# Patient Record
Sex: FEMALE | Race: BLACK OR AFRICAN AMERICAN | NOT HISPANIC OR LATINO | Employment: UNEMPLOYED | URBAN - METROPOLITAN AREA
[De-identification: names, ages, dates, MRNs, and addresses within clinical notes are randomized per-mention and may not be internally consistent; named-entity substitution may affect disease eponyms.]

---

## 2022-10-24 ENCOUNTER — APPOINTMENT (EMERGENCY)
Dept: RADIOLOGY | Facility: HOSPITAL | Age: 57
End: 2022-10-24

## 2022-10-24 ENCOUNTER — HOSPITAL ENCOUNTER (INPATIENT)
Facility: HOSPITAL | Age: 57
LOS: 7 days | Discharge: HOME | End: 2022-11-01
Attending: EMERGENCY MEDICINE | Admitting: INTERNAL MEDICINE

## 2022-10-24 DIAGNOSIS — K21.9 GASTROESOPHAGEAL REFLUX DISEASE, UNSPECIFIED WHETHER ESOPHAGITIS PRESENT: ICD-10-CM

## 2022-10-24 DIAGNOSIS — S30.1XXA RECTUS SHEATH HEMATOMA: ICD-10-CM

## 2022-10-24 DIAGNOSIS — K59.00 CONSTIPATION: ICD-10-CM

## 2022-10-24 DIAGNOSIS — R05.3 CHRONIC COUGH: ICD-10-CM

## 2022-10-24 DIAGNOSIS — K21.9 GERD (GASTROESOPHAGEAL REFLUX DISEASE): ICD-10-CM

## 2022-10-24 DIAGNOSIS — E78.5 HLD (HYPERLIPIDEMIA): ICD-10-CM

## 2022-10-24 DIAGNOSIS — J45.901 ACUTE ASTHMA EXACERBATION: Primary | ICD-10-CM

## 2022-10-24 PROBLEM — E11.9 TYPE 2 DIABETES MELLITUS, WITH LONG-TERM CURRENT USE OF INSULIN (HCC): Status: ACTIVE | Noted: 2022-10-24

## 2022-10-24 PROBLEM — A41.9 SEPSIS (HCC): Status: ACTIVE | Noted: 2022-10-24

## 2022-10-24 PROBLEM — Z79.4 TYPE 2 DIABETES MELLITUS, WITH LONG-TERM CURRENT USE OF INSULIN (HCC): Status: ACTIVE | Noted: 2022-10-24

## 2022-10-24 PROBLEM — R65.10 SIRS (SYSTEMIC INFLAMMATORY RESPONSE SYNDROME) (HCC): Status: ACTIVE | Noted: 2022-10-24

## 2022-10-24 LAB
ALBUMIN SERPL BCP-MCNC: 3.1 G/DL (ref 3.5–5)
ALP SERPL-CCNC: 167 U/L (ref 46–116)
ALT SERPL W P-5'-P-CCNC: 46 U/L (ref 12–78)
ANION GAP SERPL CALCULATED.3IONS-SCNC: 9 MMOL/L (ref 4–13)
AST SERPL W P-5'-P-CCNC: 39 U/L (ref 5–45)
BASOPHILS # BLD AUTO: 0.03 THOUSANDS/ÂΜL (ref 0–0.1)
BASOPHILS NFR BLD AUTO: 0 % (ref 0–1)
BILIRUB SERPL-MCNC: 0.44 MG/DL (ref 0.2–1)
BUN SERPL-MCNC: 6 MG/DL (ref 5–25)
CALCIUM ALBUM COR SERPL-MCNC: 9.8 MG/DL (ref 8.3–10.1)
CALCIUM SERPL-MCNC: 9.1 MG/DL (ref 8.3–10.1)
CHLORIDE SERPL-SCNC: 105 MMOL/L (ref 96–108)
CO2 SERPL-SCNC: 26 MMOL/L (ref 21–32)
CREAT SERPL-MCNC: 0.81 MG/DL (ref 0.6–1.3)
EOSINOPHIL # BLD AUTO: 0.39 THOUSAND/ÂΜL (ref 0–0.61)
EOSINOPHIL NFR BLD AUTO: 4 % (ref 0–6)
ERYTHROCYTE [DISTWIDTH] IN BLOOD BY AUTOMATED COUNT: 13.6 % (ref 11.6–15.1)
FLUAV RNA RESP QL NAA+PROBE: NEGATIVE
FLUBV RNA RESP QL NAA+PROBE: NEGATIVE
GFR SERPL CREATININE-BSD FRML MDRD: 82 ML/MIN/1.73SQ M
GLUCOSE SERPL-MCNC: 107 MG/DL (ref 65–140)
GLUCOSE SERPL-MCNC: 144 MG/DL (ref 65–140)
GLUCOSE SERPL-MCNC: 240 MG/DL (ref 65–140)
HCT VFR BLD AUTO: 38.3 % (ref 34.8–46.1)
HGB BLD-MCNC: 12.5 G/DL (ref 11.5–15.4)
IMM GRANULOCYTES # BLD AUTO: 0.05 THOUSAND/UL (ref 0–0.2)
IMM GRANULOCYTES NFR BLD AUTO: 1 % (ref 0–2)
LACTATE SERPL-SCNC: 6.5 MMOL/L (ref 0.5–2)
LYMPHOCYTES # BLD AUTO: 3.45 THOUSANDS/ÂΜL (ref 0.6–4.47)
LYMPHOCYTES NFR BLD AUTO: 32 % (ref 14–44)
MCH RBC QN AUTO: 31.4 PG (ref 26.8–34.3)
MCHC RBC AUTO-ENTMCNC: 32.6 G/DL (ref 31.4–37.4)
MCV RBC AUTO: 96 FL (ref 82–98)
MONOCYTES # BLD AUTO: 1.15 THOUSAND/ÂΜL (ref 0.17–1.22)
MONOCYTES NFR BLD AUTO: 11 % (ref 4–12)
NEUTROPHILS # BLD AUTO: 5.73 THOUSANDS/ÂΜL (ref 1.85–7.62)
NEUTS SEG NFR BLD AUTO: 52 % (ref 43–75)
NRBC BLD AUTO-RTO: 0 /100 WBCS
PLATELET # BLD AUTO: 434 THOUSANDS/UL (ref 149–390)
PMV BLD AUTO: 10.1 FL (ref 8.9–12.7)
POTASSIUM SERPL-SCNC: 3.8 MMOL/L (ref 3.5–5.3)
PROT SERPL-MCNC: 7.8 G/DL (ref 6.4–8.4)
RBC # BLD AUTO: 3.98 MILLION/UL (ref 3.81–5.12)
RSV RNA RESP QL NAA+PROBE: NEGATIVE
SARS-COV-2 RNA RESP QL NAA+PROBE: NEGATIVE
SODIUM SERPL-SCNC: 140 MMOL/L (ref 135–147)
WBC # BLD AUTO: 10.8 THOUSAND/UL (ref 4.31–10.16)

## 2022-10-24 RX ORDER — PROMETHAZINE HYDROCHLORIDE 6.25 MG/5ML
12.5 SYRUP ORAL EVERY 6 HOURS PRN
Status: DISCONTINUED | OUTPATIENT
Start: 2022-10-24 | End: 2022-11-01 | Stop reason: HOSPADM

## 2022-10-24 RX ORDER — AZITHROMYCIN 500 MG/1
500 TABLET, FILM COATED ORAL EVERY 24 HOURS
Status: COMPLETED | OUTPATIENT
Start: 2022-10-24 | End: 2022-10-26

## 2022-10-24 RX ORDER — MONTELUKAST SODIUM 10 MG/1
10 TABLET ORAL
Status: DISCONTINUED | OUTPATIENT
Start: 2022-10-24 | End: 2022-11-01 | Stop reason: HOSPADM

## 2022-10-24 RX ORDER — ENOXAPARIN SODIUM 100 MG/ML
40 INJECTION SUBCUTANEOUS DAILY
Status: DISCONTINUED | OUTPATIENT
Start: 2022-10-25 | End: 2022-10-24

## 2022-10-24 RX ORDER — ZOLPIDEM TARTRATE 10 MG/1
10 TABLET ORAL
COMMUNITY

## 2022-10-24 RX ORDER — METHYLPREDNISOLONE SODIUM SUCCINATE 125 MG/2ML
125 INJECTION, POWDER, LYOPHILIZED, FOR SOLUTION INTRAMUSCULAR; INTRAVENOUS ONCE
Status: COMPLETED | OUTPATIENT
Start: 2022-10-24 | End: 2022-10-24

## 2022-10-24 RX ORDER — CITALOPRAM 10 MG/1
10 TABLET ORAL DAILY
Status: DISCONTINUED | OUTPATIENT
Start: 2022-10-25 | End: 2022-10-24

## 2022-10-24 RX ORDER — CITALOPRAM 20 MG/1
10 TABLET ORAL
Status: DISCONTINUED | OUTPATIENT
Start: 2022-10-24 | End: 2022-11-01 | Stop reason: HOSPADM

## 2022-10-24 RX ORDER — ENOXAPARIN SODIUM 100 MG/ML
40 INJECTION SUBCUTANEOUS
Status: DISCONTINUED | OUTPATIENT
Start: 2022-10-25 | End: 2022-10-29

## 2022-10-24 RX ORDER — SODIUM CHLORIDE 9 MG/ML
150 INJECTION, SOLUTION INTRAVENOUS CONTINUOUS
Status: DISCONTINUED | OUTPATIENT
Start: 2022-10-24 | End: 2022-10-26

## 2022-10-24 RX ORDER — PRAVASTATIN SODIUM 40 MG
40 TABLET ORAL
Status: DISCONTINUED | OUTPATIENT
Start: 2022-10-25 | End: 2022-11-01 | Stop reason: HOSPADM

## 2022-10-24 RX ORDER — IPRATROPIUM BROMIDE AND ALBUTEROL SULFATE 2.5; .5 MG/3ML; MG/3ML
3 SOLUTION RESPIRATORY (INHALATION) ONCE
Status: COMPLETED | OUTPATIENT
Start: 2022-10-24 | End: 2022-10-24

## 2022-10-24 RX ORDER — MAGNESIUM SULFATE HEPTAHYDRATE 40 MG/ML
2 INJECTION, SOLUTION INTRAVENOUS ONCE
Status: COMPLETED | OUTPATIENT
Start: 2022-10-24 | End: 2022-10-24

## 2022-10-24 RX ORDER — SODIUM CHLORIDE FOR INHALATION 0.9 %
3 VIAL, NEBULIZER (ML) INHALATION ONCE
Status: COMPLETED | OUTPATIENT
Start: 2022-10-24 | End: 2022-10-24

## 2022-10-24 RX ORDER — CITALOPRAM 10 MG/1
10 TABLET ORAL DAILY
COMMUNITY

## 2022-10-24 RX ORDER — METHYLPREDNISOLONE SODIUM SUCCINATE 40 MG/ML
40 INJECTION, POWDER, LYOPHILIZED, FOR SOLUTION INTRAMUSCULAR; INTRAVENOUS EVERY 8 HOURS SCHEDULED
Status: DISCONTINUED | OUTPATIENT
Start: 2022-10-25 | End: 2022-10-26

## 2022-10-24 RX ORDER — INSULIN LISPRO 100 [IU]/ML
1-5 INJECTION, SOLUTION INTRAVENOUS; SUBCUTANEOUS
Status: DISCONTINUED | OUTPATIENT
Start: 2022-10-24 | End: 2022-11-01 | Stop reason: HOSPADM

## 2022-10-24 RX ORDER — ACETAMINOPHEN 325 MG/1
650 TABLET ORAL EVERY 6 HOURS PRN
Status: DISCONTINUED | OUTPATIENT
Start: 2022-10-24 | End: 2022-11-01 | Stop reason: HOSPADM

## 2022-10-24 RX ORDER — SUCRALFATE 1 G/1
1 TABLET ORAL
Status: DISCONTINUED | OUTPATIENT
Start: 2022-10-24 | End: 2022-10-31

## 2022-10-24 RX ORDER — PANTOPRAZOLE SODIUM 40 MG/1
40 TABLET, DELAYED RELEASE ORAL
Status: DISCONTINUED | OUTPATIENT
Start: 2022-10-25 | End: 2022-10-31

## 2022-10-24 RX ORDER — GUAIFENESIN 600 MG/1
600 TABLET, EXTENDED RELEASE ORAL 2 TIMES DAILY
Status: DISCONTINUED | OUTPATIENT
Start: 2022-10-24 | End: 2022-11-01 | Stop reason: HOSPADM

## 2022-10-24 RX ORDER — LORAZEPAM 2 MG/ML
1 INJECTION INTRAMUSCULAR ONCE
Status: COMPLETED | OUTPATIENT
Start: 2022-10-24 | End: 2022-10-24

## 2022-10-24 RX ORDER — ALBUTEROL SULFATE 2.5 MG/3ML
SOLUTION RESPIRATORY (INHALATION)
COMMUNITY

## 2022-10-24 RX ORDER — FLUTICASONE PROPIONATE 110 UG/1
2 AEROSOL, METERED RESPIRATORY (INHALATION)
Status: DISCONTINUED | OUTPATIENT
Start: 2022-10-24 | End: 2022-10-31

## 2022-10-24 RX ADMIN — MAGNESIUM SULFATE HEPTAHYDRATE 2 G: 40 INJECTION, SOLUTION INTRAVENOUS at 20:09

## 2022-10-24 RX ADMIN — SUCRALFATE 1 G: 1 TABLET ORAL at 22:07

## 2022-10-24 RX ADMIN — METHYLPREDNISOLONE SODIUM SUCCINATE 125 MG: 125 INJECTION, POWDER, FOR SOLUTION INTRAMUSCULAR; INTRAVENOUS at 18:38

## 2022-10-24 RX ADMIN — MONTELUKAST 10 MG: 10 TABLET, FILM COATED ORAL at 22:08

## 2022-10-24 RX ADMIN — IPRATROPIUM BROMIDE AND ALBUTEROL SULFATE 3 ML: 2.5; .5 SOLUTION RESPIRATORY (INHALATION) at 20:13

## 2022-10-24 RX ADMIN — ISODIUM CHLORIDE 3 ML: 0.03 SOLUTION RESPIRATORY (INHALATION) at 18:56

## 2022-10-24 RX ADMIN — LORAZEPAM 1 MG: 2 INJECTION INTRAMUSCULAR; INTRAVENOUS at 20:10

## 2022-10-24 RX ADMIN — PROMETHAZINE HYDROCHLORIDE 12.5 MG: 6.25 SYRUP ORAL at 20:59

## 2022-10-24 RX ADMIN — GUAIFENESIN 600 MG: 600 TABLET, EXTENDED RELEASE ORAL at 22:07

## 2022-10-24 RX ADMIN — ALBUTEROL SULFATE 10 MG: 2.5 SOLUTION RESPIRATORY (INHALATION) at 18:56

## 2022-10-24 RX ADMIN — SODIUM CHLORIDE 1000 ML: 0.9 INJECTION, SOLUTION INTRAVENOUS at 20:08

## 2022-10-24 RX ADMIN — IPRATROPIUM BROMIDE 1 MG: 0.5 SOLUTION RESPIRATORY (INHALATION) at 18:56

## 2022-10-24 RX ADMIN — AZITHROMYCIN 500 MG: 500 TABLET, FILM COATED ORAL at 22:07

## 2022-10-24 NOTE — ED PROVIDER NOTES
Pt Name: Eliza Rodriguez  MRN: [id number]  [place] 1965  Age/Sex: 56 y o  female  Date of evaluation: 10/24/2022  PCP: Real Hernandez    CHIEF COMPLAINT    Chief Complaint   Patient presents with   • Asthma         HPI    56 y o  female presenting with shortness of breath  Patient has a history of asthma, states that she has had multiple recent asthma exacerbations, night over the course of the year, is currently on prednisone taper for previous exacerbation with the last dose of prescribed several day course taken today  She complains of wheezing, cough, chest tightness, consistent with her usual asthma exacerbations  Patient states she has been using nebulizer treatments at home every 30 minutes today with only transient relief  She denies fever, trauma, sick contacts, other symptoms        HPI      Past Medical and Surgical History  Past medical surgical and social history obtained from EMR due to significant difficulty speaking due to respiratory distress    Surgical History      Surgical History  Surgery Date Site/Laterality Comments   CERVICAL BIOPSY W/ LOOP ELECTRODE EXCISION          OVARIAN CYST SURGERY     x2     TUBAL LIGATION     Abdominal      SECTION     x2     TOTAL ABDOMINAL HYSTERECTOMY 2011   Dr Tahmina Manuel LUMPECTOMY   Left      LAPAROSCOPY            Medical History      Medical History  Medical History Date Comments   Left breast lump       Ovarian cyst       Migraine       Asthma       Vaginal bleeding, abnormal 2018 pt had hysterectomy     Family History      Family History  Medical History Relation Name Comments   Stroke Father        Asthma Mother          Family History  Relation Name Status Comments   Father         Mother            Social History      Social History  Tobacco Use Types Packs/Day Years Used Date   Smoking Tobacco: Never           Smokeless Tobacco: Never             Social History  Alcohol Use Standard Drinks/Week Comments   Yes 0 (1 standard drink = 0 6 oz pure alcohol)           Allergies    No Known Allergies    Home Medications    Prior to Admission medications    Not on File           Review of Systems    Review of Systems   Constitutional: Negative for activity change, chills and fever  HENT: Negative for drooling and facial swelling  Eyes: Negative for pain, discharge and visual disturbance  Respiratory: Positive for cough, chest tightness, shortness of breath and wheezing  Negative for apnea  Cardiovascular: Negative for chest pain and leg swelling  Gastrointestinal: Negative for abdominal pain, constipation, diarrhea, nausea and vomiting  Genitourinary: Negative for difficulty urinating, dysuria and urgency  Musculoskeletal: Negative for arthralgias, back pain and gait problem  Skin: Negative for color change and rash  Neurological: Negative for dizziness, speech difficulty, weakness and headaches  Psychiatric/Behavioral: Negative for agitation, behavioral problems and confusion  All other systems reviewed and negative  Physical Exam      ED Triage Vitals   Temperature Pulse Respirations Blood Pressure SpO2   10/24/22 1842 10/24/22 1839 10/24/22 1839 10/24/22 1839 10/24/22 1839   98 8 °F (37 1 °C) (!) 118 (!) 25 130/75 97 %      Temp Source Heart Rate Source Patient Position - Orthostatic VS BP Location FiO2 (%)   10/24/22 1842 -- -- -- --   Axillary          Pain Score       --                      Physical Exam  Vitals and nursing note reviewed  Constitutional:       General: She is in acute distress  Appearance: She is well-developed  She is ill-appearing  HENT:      Head: Normocephalic and atraumatic  Right Ear: External ear normal       Left Ear: External ear normal    Eyes:      Conjunctiva/sclera: Conjunctivae normal       Pupils: Pupils are equal, round, and reactive to light  Cardiovascular:      Rate and Rhythm: Regular rhythm  Tachycardia present        Pulses: Normal pulses  Heart sounds: Normal heart sounds  Pulmonary:      Effort: Respiratory distress present  Breath sounds: Wheezing present  No rales  Comments: Patient in respiratory distress, speaking in 1 word phrases, tachypneic, dense wheezes throughout all lung fields, significantly prolonged expiration  Abdominal:      General: There is no distension  Palpations: Abdomen is soft  Tenderness: There is no abdominal tenderness  There is no guarding or rebound  Musculoskeletal:         General: No deformity  Normal range of motion  Cervical back: Normal range of motion and neck supple  Right lower leg: No edema  Left lower leg: No edema  Skin:     General: Skin is warm and dry  Capillary Refill: Capillary refill takes less than 2 seconds  Findings: No erythema or rash  Neurological:      Mental Status: She is alert and oriented to person, place, and time  Psychiatric:         Behavior: Behavior normal          Thought Content: Thought content normal          Judgment: Judgment normal               Diagnostic Results      Labs:    Results Reviewed     Procedure Component Value Units Date/Time    Lactic Acid with Reflex STAT [[id number]]     Lab Status: No result Specimen: Blood     Procalcitonin [[id number]]     Lab Status: No result Specimen: Blood     Fingerstick Glucose (POCT) [[id number]]  (Abnormal) Collected: 10/24/22 2018    Lab Status: Final result Updated: 10/24/22 2020     POC Glucose 144 mg/dl     FLU/RSV/COVID - if FLU/RSV clinically relevant [[id number]]  (Normal) Collected: 10/24/22 1859    Lab Status: Final result Specimen: Nares from Nose Updated: 10/24/22 2017     SARS-CoV-2 Negative     INFLUENZA A PCR Negative     INFLUENZA B PCR Negative     RSV PCR Negative    Narrative:      FOR PEDIATRIC PATIENTS - copy/paste COVID Guidelines URL to browser: [email or web address]  [email or web address]    SARS-CoV-2 assay is a Nucleic Acid Amplification assay intended for the  qualitative detection of nucleic acid from SARS-CoV-2 in nasopharyngeal  swabs  Results are for the presumptive identification of SARS-CoV-2 RNA  Positive results are indicative of infection with SARS-CoV-2, the virus  causing COVID-19, but do not rule out bacterial infection or co-infection  with other viruses  Laboratories within the [place] and its  territories are required to report all positive results to the appropriate  public health authorities  Negative results do not preclude SARS-CoV-2  infection and should not be used as the sole basis for treatment or other  patient management decisions  Negative results must be combined with  clinical observations, patient history, and epidemiological information  This test has not been FDA cleared or approved  This test has been authorized by FDA under an Emergency Use Authorization  (EUA)  This test is only authorized for the duration of time the  declaration that circumstances exist justifying the authorization of the  emergency use of an in vitro diagnostic tests for detection of SARS-CoV-2  virus and/or diagnosis of COVID-19 infection under section 564(b)(1) of  the Act, 21 U  S C  723XLW-8(L)(3), unless the authorization is terminated  or revoked sooner  The test has been validated but independent review by FDA  and CLIA is pending  Test performed using [employer] GeneXpert: This RT-PCR assay targets N2,  a region unique to SARS-CoV-2  A conserved region in the E-gene was chosen  for pan-Sarbecovirus detection which includes SARS-CoV-2  According to CMS-2020-01-R, this platform meets the definition of high-throughput technology      Comprehensive metabolic panel [[id number]]  (Abnormal) Collected: 10/24/22 1901    Lab Status: Final result Specimen: Blood from Arm, Left Updated: 10/24/22 1923     Sodium 140 mmol/L      Potassium 3 8 mmol/L      Chloride 105 mmol/L      CO2 26 mmol/L      ANION GAP 9 mmol/L      BUN 6 mg/dL      Creatinine 0 81 mg/dL      Glucose 107 mg/dL      Calcium 9 1 mg/dL      Corrected Calcium 9 8 mg/dL      AST 39 U/L      ALT 46 U/L      Alkaline Phosphatase 167 U/L      Total Protein 7 8 g/dL      Albumin 3 1 g/dL      Total Bilirubin 0 44 mg/dL      eGFR 82 ml/min/1 73sq m     Narrative:      National Kidney Disease Foundation guidelines for Chronic Kidney Disease (CKD):   •  Stage 1 with normal or high GFR (GFR > 90 mL/min/1 73 square meters)  •  Stage 2 Mild CKD (GFR = 60-89 mL/min/1 73 square meters)  •  Stage 3A Moderate CKD (GFR = 45-59 mL/min/1 73 square meters)  •  Stage 3B Moderate CKD (GFR = 30-44 mL/min/1 73 square meters)  •  Stage 4 Severe CKD (GFR = 15-29 mL/min/1 73 square meters)  •  Stage 5 End Stage CKD (GFR <15 mL/min/1 73 square meters)  Note: GFR calculation is accurate only with a steady state creatinine    CBC and differential [[id number]]  (Abnormal) Collected: 10/24/22 1901    Lab Status: Final result Specimen: Blood from Arm, Left Updated: 10/24/22 1907     WBC 10 80 Thousand/uL      RBC 3 98 Million/uL      Hemoglobin 12 5 g/dL      Hematocrit 38 3 %      MCV 96 fL      MCH 31 4 pg      MCHC 32 6 g/dL      RDW 13 6 %      MPV 10 1 fL      Platelets 254 Thousands/uL      nRBC 0 /100 WBCs      Neutrophils Relative 52 %      Immat GRANS % 1 %      Lymphocytes Relative 32 %      Monocytes Relative 11 %      Eosinophils Relative 4 %      Basophils Relative 0 %      Neutrophils Absolute 5 73 Thousands/µL      Immature Grans Absolute 0 05 Thousand/uL      Lymphocytes Absolute 3 45 Thousands/µL      Monocytes Absolute 1 15 Thousand/µL      Eosinophils Absolute 0 39 Thousand/µL      Basophils Absolute 0 03 Thousands/µL           All labs reviewed and utilized in the medical decision making process    Radiology:    XR chest 1 view portable    (Results Pending)       All radiology studies independently viewed by me and interpreted by the radiologist     Procedure    Procedures        ED Course of Care and Re-Assessments      Patient given heart neb and methylprednisolone initially, chest x-ray reassuring  Patient experience some improvement of symptoms with improvement of respiratory distress but not return to baseline    Patient given additional DuoNebs as well as magnesium, given Ativan to help counteract shakiness that her request   Also given Phenergan for cough patient's request   Admitted Internal Medicine  Medications   sodium chloride 0 9 % bolus 1,000 mL (1,000 mL Intravenous New Bag 10/24/22 2008)   promethazine (PHENERGAN) oral syrup 12 5 mg (12 5 mg Oral Given 10/24/22 2059)   acetaminophen (TYLENOL) tablet 650 mg (has no administration in time range)   guaiFENesin (MUCINEX) 12 hr tablet 600 mg (has no administration in time range)   fluticasone (FLOVENT HFA) 110 MCG/ACT inhaler 2 puff (has no administration in time range)   fluticasone-vilanterol (BREO ELLIPTA) 200-25 MCG/INH inhaler 1 puff (has no administration in time range)   ipratropium (ATROVENT) 0 02 % inhalation solution 0 5 mg (has no administration in time range)   methylPREDNISolone sodium succinate (Solu-MEDROL) injection 40 mg (has no administration in time range)   enoxaparin (LOVENOX) subcutaneous injection 40 mg (has no administration in time range)   azithromycin (ZITHROMAX) tablet 500 mg (has no administration in time range)   albuterol inhalation solution 10 mg (10 mg Nebulization Given 10/24/22 1856)     And   ipratropium (ATROVENT) 0 02 % inhalation solution 1 mg (1 mg Nebulization Given 10/24/22 1856)     And   sodium chloride 0 9 % inhalation solution 3 mL (3 mL Nebulization Given 10/24/22 1856)   methylPREDNISolone sodium succinate (Solu-MEDROL) injection 125 mg (125 mg Intravenous Given 10/24/22 1838)   ipratropium-albuterol (DUO-NEB) 0 5-2 5 mg/3 mL inhalation solution 3 mL (3 mL Nebulization Given 10/24/22 2013)   ipratropium-albuterol (DUO-NEB) 0 5-2 5 mg/3 mL inhalation solution 3 mL (3 mL Nebulization Given 10/24/22 2013)   magnesium sulfate 2 g/50 mL IVPB (premix) 2 g (0 g Intravenous Stopped 10/24/22 2058)   LORazepam (ATIVAN) injection 1 mg (1 mg Intravenous Given 10/24/22 2010)           FINAL IMPRESSION    Final diagnoses:   Acute asthma exacerbation         DISPOSITION/PLAN    Presentation as above felt most consistent acute asthma exacerbation having failed steroids and other therapy at home  Vital signs, tachycardia tachypnea, examination respiratory distress which responded somewhat to initial treatment but did not return to baseline  Low suspicion bacterial pneumonia or other infectious etiology at this time  Given further treatment for respiratory symptoms and admitted Internal Medicine, hemodynamically stable and comfortable at that time  Time reflects when diagnosis was documented in both MDM as applicable and the Disposition within this note     Time User Action Codes Description Comment    10/24/2022  8:25 PM Sabi MURO Add [P80 705] Acute asthma exacerbation       ED Disposition     ED Disposition   Admit    Condition   Stable    Date/Time   Mon Oct 24, 2022  8:25 PM    Comment   Case was discussed with RAFA and the patient's admission status was agreed to be Admission Status: observation status to the service of Dr Alpesh Sam  Follow-up Information    None           PATIENT REFERRED TO:    No follow-up provider specified  DISCHARGE MEDICATIONS:    Patient's Medications   Discharge Prescriptions    No medications on file       No discharge procedures on file  Jose Harris MD    Portions of the record may have been created with voice recognition software  Occasional wrong word or "sound alike" substitutions may have occurred due to the inherent limitations of voice recognition software    Please read the chart carefully and recognize, using context, where substitutions have occurred     Jose Harris MD  10/24/22 0676

## 2022-10-25 LAB
BACTERIA UR QL AUTO: ABNORMAL /HPF
BILIRUB UR QL STRIP: NEGATIVE
CLARITY UR: CLEAR
COLOR UR: COLORLESS
ERYTHROCYTE [DISTWIDTH] IN BLOOD BY AUTOMATED COUNT: 13.8 % (ref 11.6–15.1)
GLUCOSE SERPL-MCNC: 171 MG/DL (ref 65–140)
GLUCOSE SERPL-MCNC: 176 MG/DL (ref 65–140)
GLUCOSE SERPL-MCNC: 207 MG/DL (ref 65–140)
GLUCOSE SERPL-MCNC: 235 MG/DL (ref 65–140)
GLUCOSE SERPL-MCNC: 236 MG/DL (ref 65–140)
GLUCOSE SERPL-MCNC: 238 MG/DL (ref 65–140)
GLUCOSE UR STRIP-MCNC: ABNORMAL MG/DL
HCT VFR BLD AUTO: 38.6 % (ref 34.8–46.1)
HGB BLD-MCNC: 12.4 G/DL (ref 11.5–15.4)
HGB UR QL STRIP.AUTO: ABNORMAL
KETONES UR STRIP-MCNC: NEGATIVE MG/DL
LACTATE SERPL-SCNC: 2.8 MMOL/L (ref 0.5–2)
LACTATE SERPL-SCNC: 3.3 MMOL/L (ref 0.5–2)
LACTATE SERPL-SCNC: 4.1 MMOL/L (ref 0.5–2)
LACTATE SERPL-SCNC: 4.2 MMOL/L (ref 0.5–2)
LACTATE SERPL-SCNC: 4.8 MMOL/L (ref 0.5–2)
LACTATE SERPL-SCNC: 5.3 MMOL/L (ref 0.5–2)
LEUKOCYTE ESTERASE UR QL STRIP: NEGATIVE
MCH RBC QN AUTO: 31.2 PG (ref 26.8–34.3)
MCHC RBC AUTO-ENTMCNC: 32.1 G/DL (ref 31.4–37.4)
MCV RBC AUTO: 97 FL (ref 82–98)
NITRITE UR QL STRIP: NEGATIVE
NON-SQ EPI CELLS URNS QL MICRO: ABNORMAL /HPF
PH UR STRIP.AUTO: 6 [PH]
PLATELET # BLD AUTO: 329 THOUSANDS/UL (ref 149–390)
PMV BLD AUTO: 11.5 FL (ref 8.9–12.7)
PROCALCITONIN SERPL-MCNC: 0.05 NG/ML
PROCALCITONIN SERPL-MCNC: 0.06 NG/ML
PROT UR STRIP-MCNC: NEGATIVE MG/DL
RBC # BLD AUTO: 3.97 MILLION/UL (ref 3.81–5.12)
RBC #/AREA URNS AUTO: ABNORMAL /HPF
SP GR UR STRIP.AUTO: 1.01 (ref 1–1.03)
UROBILINOGEN UR STRIP-ACNC: <2 MG/DL
WBC # BLD AUTO: 12.17 THOUSAND/UL (ref 4.31–10.16)
WBC #/AREA URNS AUTO: ABNORMAL /HPF

## 2022-10-25 RX ORDER — POLYETHYLENE GLYCOL 3350 17 G/17G
17 POWDER, FOR SOLUTION ORAL DAILY
Status: DISCONTINUED | OUTPATIENT
Start: 2022-10-25 | End: 2022-11-01 | Stop reason: HOSPADM

## 2022-10-25 RX ORDER — ALBUTEROL SULFATE 2.5 MG/3ML
2.5 SOLUTION RESPIRATORY (INHALATION) EVERY 6 HOURS PRN
Status: DISCONTINUED | OUTPATIENT
Start: 2022-10-25 | End: 2022-10-25

## 2022-10-25 RX ORDER — ALBUTEROL SULFATE 2.5 MG/3ML
2.5 SOLUTION RESPIRATORY (INHALATION) EVERY 4 HOURS PRN
Status: DISCONTINUED | OUTPATIENT
Start: 2022-10-25 | End: 2022-10-26

## 2022-10-25 RX ORDER — LEVALBUTEROL 1.25 MG/.5ML
1.25 SOLUTION, CONCENTRATE RESPIRATORY (INHALATION)
Status: DISCONTINUED | OUTPATIENT
Start: 2022-10-25 | End: 2022-11-01 | Stop reason: HOSPADM

## 2022-10-25 RX ORDER — ZOLPIDEM TARTRATE 5 MG/1
10 TABLET ORAL
Status: DISCONTINUED | OUTPATIENT
Start: 2022-10-25 | End: 2022-11-01 | Stop reason: HOSPADM

## 2022-10-25 RX ORDER — HYDROCODONE POLISTIREX AND CHLORPHENIRAMINE POLISTIREX 10; 8 MG/5ML; MG/5ML
5 SUSPENSION, EXTENDED RELEASE ORAL EVERY 12 HOURS PRN
Status: DISCONTINUED | OUTPATIENT
Start: 2022-10-25 | End: 2022-10-26

## 2022-10-25 RX ADMIN — METHYLPREDNISOLONE SODIUM SUCCINATE 40 MG: 40 INJECTION, POWDER, FOR SOLUTION INTRAMUSCULAR; INTRAVENOUS at 14:20

## 2022-10-25 RX ADMIN — FLUTICASONE PROPIONATE 2 PUFF: 110 AEROSOL, METERED RESPIRATORY (INHALATION) at 22:15

## 2022-10-25 RX ADMIN — METHYLPREDNISOLONE SODIUM SUCCINATE 40 MG: 40 INJECTION, POWDER, FOR SOLUTION INTRAMUSCULAR; INTRAVENOUS at 22:11

## 2022-10-25 RX ADMIN — ZOLPIDEM TARTRATE 10 MG: 5 TABLET, COATED ORAL at 02:53

## 2022-10-25 RX ADMIN — INSULIN LISPRO 2 UNITS: 100 INJECTION, SOLUTION INTRAVENOUS; SUBCUTANEOUS at 17:07

## 2022-10-25 RX ADMIN — SODIUM CHLORIDE 150 ML/HR: 0.9 INJECTION, SOLUTION INTRAVENOUS at 19:56

## 2022-10-25 RX ADMIN — PROMETHAZINE HYDROCHLORIDE 12.5 MG: 6.25 SYRUP ORAL at 17:04

## 2022-10-25 RX ADMIN — SUCRALFATE 1 G: 1 TABLET ORAL at 11:54

## 2022-10-25 RX ADMIN — ENOXAPARIN SODIUM 40 MG: 40 INJECTION SUBCUTANEOUS at 10:57

## 2022-10-25 RX ADMIN — INSULIN LISPRO 1 UNITS: 100 INJECTION, SOLUTION INTRAVENOUS; SUBCUTANEOUS at 08:46

## 2022-10-25 RX ADMIN — MONTELUKAST 10 MG: 10 TABLET, FILM COATED ORAL at 22:09

## 2022-10-25 RX ADMIN — CITALOPRAM HYDROBROMIDE 10 MG: 20 TABLET ORAL at 00:54

## 2022-10-25 RX ADMIN — ALBUTEROL SULFATE 2.5 MG: 2.5 SOLUTION RESPIRATORY (INHALATION) at 17:38

## 2022-10-25 RX ADMIN — GUAIFENESIN 600 MG: 600 TABLET, EXTENDED RELEASE ORAL at 17:04

## 2022-10-25 RX ADMIN — PROMETHAZINE HYDROCHLORIDE 12.5 MG: 6.25 SYRUP ORAL at 10:57

## 2022-10-25 RX ADMIN — FLUTICASONE PROPIONATE 2 PUFF: 110 AEROSOL, METERED RESPIRATORY (INHALATION) at 00:33

## 2022-10-25 RX ADMIN — FLUTICASONE PROPIONATE 2 PUFF: 110 AEROSOL, METERED RESPIRATORY (INHALATION) at 08:45

## 2022-10-25 RX ADMIN — CEFTRIAXONE SODIUM 1000 MG: 10 INJECTION, POWDER, FOR SOLUTION INTRAVENOUS at 13:32

## 2022-10-25 RX ADMIN — INSULIN LISPRO 2 UNITS: 100 INJECTION, SOLUTION INTRAVENOUS; SUBCUTANEOUS at 11:54

## 2022-10-25 RX ADMIN — POLYETHYLENE GLYCOL 3350 17 G: 17 POWDER, FOR SOLUTION ORAL at 10:57

## 2022-10-25 RX ADMIN — INSULIN LISPRO 1 UNITS: 100 INJECTION, SOLUTION INTRAVENOUS; SUBCUTANEOUS at 22:10

## 2022-10-25 RX ADMIN — FLUTICASONE FUROATE AND VILANTEROL TRIFENATATE 1 PUFF: 200; 25 POWDER RESPIRATORY (INHALATION) at 08:55

## 2022-10-25 RX ADMIN — SODIUM CHLORIDE 100 ML/HR: 0.9 INJECTION, SOLUTION INTRAVENOUS at 00:36

## 2022-10-25 RX ADMIN — CITALOPRAM HYDROBROMIDE 10 MG: 20 TABLET ORAL at 22:09

## 2022-10-25 RX ADMIN — ZOLPIDEM TARTRATE 10 MG: 5 TABLET, COATED ORAL at 22:09

## 2022-10-25 RX ADMIN — INSULIN LISPRO 2 UNITS: 100 INJECTION, SOLUTION INTRAVENOUS; SUBCUTANEOUS at 00:48

## 2022-10-25 RX ADMIN — AZITHROMYCIN 500 MG: 500 TABLET, FILM COATED ORAL at 22:09

## 2022-10-25 RX ADMIN — SUCRALFATE 1 G: 1 TABLET ORAL at 17:03

## 2022-10-25 RX ADMIN — METHYLPREDNISOLONE SODIUM SUCCINATE 40 MG: 40 INJECTION, POWDER, FOR SOLUTION INTRAMUSCULAR; INTRAVENOUS at 05:57

## 2022-10-25 RX ADMIN — SUCRALFATE 1 G: 1 TABLET ORAL at 22:09

## 2022-10-25 RX ADMIN — LEVALBUTEROL HYDROCHLORIDE 1.25 MG: 1.25 SOLUTION, CONCENTRATE RESPIRATORY (INHALATION) at 12:35

## 2022-10-25 RX ADMIN — GUAIFENESIN 600 MG: 600 TABLET, EXTENDED RELEASE ORAL at 08:45

## 2022-10-25 RX ADMIN — PANTOPRAZOLE SODIUM 40 MG: 40 TABLET, DELAYED RELEASE ORAL at 05:57

## 2022-10-25 RX ADMIN — PRAVASTATIN SODIUM 40 MG: 40 TABLET ORAL at 17:04

## 2022-10-25 RX ADMIN — IPRATROPIUM BROMIDE 0.5 MG: 0.5 SOLUTION RESPIRATORY (INHALATION) at 12:35

## 2022-10-25 RX ADMIN — SUCRALFATE 1 G: 1 TABLET ORAL at 08:45

## 2022-10-25 NOTE — ASSESSMENT & PLAN NOTE
Lab Results   Component Value Date    HGBA1C 4 9 10/04/2022       Recent Labs     10/24/22  2018   POCGLU 144*       Blood Sugar Average: Last 72 hrs:  (P) 144   · Per chart review, appears patient previously on insulin regimen  However, secondary to hypoglycemic episodes insulin had been discontinued    · Will start on SSI with Accu-Cheks  · Monitor for hyperglycemia while treated with nebulizers and IV steroids  · Hypoglycemia protocol  · Diabetic diet

## 2022-10-25 NOTE — RESPIRATORY THERAPY NOTE
RT Protocol Note  Robinson Skiff 56 y o  female MRN: [id number]  Unit/Bed#: ED 15 Encounter: [id number]    Assessment    Principal Problem:    Asthma exacerbation  Active Problems:    SIRS (systemic inflammatory response syndrome) ([facility] [address] )    Hyperlipidemia    Type 2 diabetes mellitus, with long-term current use of insulin [facility])      Home Pulmonary Medications:     10/24/22 2300   Respiratory Protocol   Protocol Initiated? Yes   Protocol Selection Respiratory   Language Barrier? No   Medical & Social History Reviewed? Yes   Diagnostic Studies Reviewed? Yes   Physical Assessment Performed? Yes   Respiratory Plan Home Bronchodilator Patient pathway; Moderate/Severe Distress pathway   Respiratory Assessment   Assessment Type Assess only   General Appearance Alert; Awake   Respiratory Pattern Dyspnea with exertion   Chest Assessment Chest expansion symmetrical   Bilateral Breath Sounds Expiratory wheezes; Coarse   R Breath Sounds Expiratory wheezes; Coarse   L Breath Sounds Expiratory wheezes; Coarse   Location Specific No   Cough Non-productive   Resp Comments Pt does not use Home )2 or CPAP machines  Pt says she does use inhalers at home   O2 Device NC   Additional Assessments   Pulse (!) 121   Respirations (!) 24   SpO2 96 %          History reviewed  No pertinent past medical history    Social History     Socioeconomic History    Marital status: /Civil Union     Spouse name: None    Number of children: None    Years of education: None    Highest education level: None   Occupational History    None   Tobacco Use    Smoking status: Never Smoker    Smokeless tobacco: None   Substance and Sexual Activity    Alcohol use: None    Drug use: None    Sexual activity: None   Other Topics Concern    None   Social History Narrative    None     Social Determinants of Health     Financial Resource Strain: Not on file   Food Insecurity: Not on file   Transportation Needs: Not on file   Physical Activity: Not on file   Stress: Not on file   Social Connections: Not on file   Intimate Partner Violence: Not on file   Housing Stability: Not on file       Subjective         Objective    Physical Exam:   Assessment Type: Assess only  General Appearance: Alert, Awake  Respiratory Pattern: Dyspnea with exertion  Chest Assessment: Chest expansion symmetrical  Bilateral Breath Sounds: Expiratory wheezes, Coarse  R Breath Sounds: Expiratory wheezes, Coarse  L Breath Sounds: Expiratory wheezes, Coarse  Location Specific: No  Cough: Non-productive  O2 Device: NC    Vitals:  Blood pressure 130/75, pulse (!) 121, temperature 98 8 °F (37 1 °C), temperature source Axillary, resp  rate (!) 24, SpO2 96 %  Imaging and other studies: I have personally reviewed pertinent reports  O2 Device: NC     Plan    Respiratory Plan: Home Bronchodilator Patient pathway, Moderate/Severe Distress pathway        Resp Comments: Pt does not use Home )2 or CPAP machines   Pt says she does use inhalers at home

## 2022-10-25 NOTE — CASE MANAGEMENT
Case Management Progress Note    Patient name Eliza Wallace  Location ED 20/ED 20 MRN [id number]  : 1965 Date 10/25/2022       LOS (days): 0  Geometric Mean LOS (GMLOS) (days):   Days to GMLOS:        OBJECTIVE:        Current admission status: Observation  Preferred Pharmacy: No Pharmacies Listed  Primary Care Provider: Bert Cowan    Primary Insurance: BLUE CROSS  Secondary Insurance:     PROGRESS NOTE:    Per chart review, patient has a chest xray pending, and she is on neb meds and IV steroids, stable on room air  No CM needs anticipated at this time  CM department will continue to follow patient through discharge

## 2022-10-25 NOTE — ASSESSMENT & PLAN NOTE
Lab Results   Component Value Date    HGBA1C 4 9 10/04/2022       Recent Labs     10/24/22  2018   POCGLU 144*       Blood Sugar Average: Last 72 hrs:  (P) 144   · Per chart review, appears patient previously on insulin regimen  However, insulin regimen discontinued this month per the chart  Last HA1C was 4 9    · Will start on SSI with Accu-Cheks  · Monitor for hyperglycemia while treated with nebulizers and IV steroids  · Hypoglycemia protocol  · Diabetic diet

## 2022-10-25 NOTE — PROGRESS NOTES
[address] [facility]  Progress Note - Meg Buchanan 1965, 56 y o  female MRN: [id number]  Unit/Bed#: ED 20 Encounter: [id number]  Primary Care Provider: Lashay Naik   Date and time admitted to hospital: 10/24/2022  6:25 PM    * Asthma exacerbation  Assessment & Plan  Patient presenting to the ED for increased shortness of breath wheezing at home  Reports that she had taken all of her home inhaler treatments as well as a course of prednisone without improvement  Denies any associated fever/chills, chest pain, abdominal pain, nausea/vomiting/diarrhea  ED gave multiple nebulizer treatments, IV steroids, magnesium without improvement in symptoms  Currently oxygenating well on RA  Continue pre-hospital inhalers, scheduled nebulizers, IV Solu-Medrol, azithromycin  Respiratory protocol  Continuous pulse ox monitoring    Type 2 diabetes mellitus, with long-term current use of insulin [facility])  Assessment & Plan  Lab Results   Component Value Date    HGBA1C 4 9 10/04/2022       Recent Labs     10/24/22  2018   POCGLU 144*       Blood Sugar Average: Last 72 hrs:  (P) 144   · Per chart review, appears patient previously on insulin regimen  However, insulin regimen discontinued this month per the chart  Last HA1C was 4 9  · Will start on SSI with Accu-Cheks  · Monitor for hyperglycemia while treated with nebulizers and IV steroids  · Hypoglycemia protocol  · Diabetic diet    Hyperlipidemia  Assessment & Plan  · Continue statin therapy    SIRS (systemic inflammatory response syndrome) (HCC)  Assessment & Plan  · Meeting SIRS criteria for leukocytosis, tachycardia, tachypnea  · Chest x-ray w/o any acute abnormalities   · Likely in the setting of acute asthma exacerbation  · Will start on course of oral azithromycin  ·  procalc negative  · f/u lactic acid, blood cultures      VTE Pharmacologic Prophylaxis:   Pharmacologic: Heparin  Mechanical VTE Prophylaxis in Place: Yes    Patient Centered Rounds:  I have performed bedside rounds with nursing staff today  Discussions with Specialists or Other Care Team Provider: cm,  nursing    Education and Discussions with Family / Patient: pt    Time Spent for Care: 30 minutes  More than 50% of total time spent on counseling and coordination of care as described above  Current Length of Stay: 0 day(s)    Current Patient Status: Observation   Certification Statement: The patient will continue to require additional inpatient hospital stay due to see below    Discharge Plan: still requiring iv steroids    Code Status: Level 1 - Full Code      Subjective:   Denies chest pain, fevers, chills, abdominal pain    Objective:     Vitals:   Temp (24hrs), Av 6 °F (37 °C), Min:98 3 °F (36 8 °C), Max:98 8 °F (37 1 °C)    Temp:  [98 3 °F (36 8 °C)-98 8 °F (37 1 °C)] 98 3 °F (36 8 °C)  HR:  [107-121] 110  Resp:  [18-25] 18  BP: (115-130)/(64-75) 115/66  SpO2:  [95 %-97 %] 96 %  There is no height or weight on file to calculate BMI  Input and Output Summary (last 24 hours):        Intake/Output Summary (Last 24 hours) at 10/25/2022 0821  Last data filed at 10/25/2022 0319  Gross per 24 hour   Intake 1050 ml   Output --   Net 1050 ml       Physical Exam:     Physical Exam      Additional Data:     Labs:    Results from last 7 days   Lab Units 10/25/22  0433 10/24/22  1901   WBC Thousand/uL 12 17* 10 80*   HEMOGLOBIN g/dL 12 4 12 5   HEMATOCRIT % 38 6 38 3   PLATELETS Thousands/uL 329 434*   NEUTROS PCT %  --  52   LYMPHS PCT %  --  32   MONOS PCT %  --  11   EOS PCT %  --  4     Results from last 7 days   Lab Units 10/24/22  1901   SODIUM mmol/L 140   POTASSIUM mmol/L 3 8   CHLORIDE mmol/L 105   CO2 mmol/L 26   BUN mg/dL 6   CREATININE mg/dL 0 81   ANION GAP mmol/L 9   CALCIUM mg/dL 9 1   ALBUMIN g/dL 3 1*   TOTAL BILIRUBIN mg/dL 0 44   ALK PHOS U/L 167*   ALT U/L 46   AST U/L 39   GLUCOSE RANDOM mg/dL 107         Results from last 7 days   Lab Units 10/25/22  0725 10/25/22  0045 10/24/22  2229 10/24/22  2018   POC GLUCOSE mg/dl 176* 236* 240* 144*         Results from last 7 days   Lab Units 10/25/22  0433 10/25/22  0253 10/24/22  2223   LACTIC ACID mmol/L  --  5 3* 6 5*   PROCALCITONIN ng/ml 0 06  --  0 05           * I Have Reviewed All Lab Data Listed Above  * Additional Pertinent Lab Tests Reviewed: All Labs Within Last 24 Hours Reviewed    Imaging:    Imaging Reports Reviewed Today Include: na  Imaging Personally Reviewed by Myself Includes:  na    Recent Cultures (last 7 days):           Last 24 Hours Medication List:   Current Facility-Administered Medications   Medication Dose Route Frequency Provider Last Rate   • acetaminophen  650 mg Oral Q6H PRN Kira Hough PA-C     • azithromycin  500 mg Oral Q24H Kat Alvarez PA-C     • citalopram  10 mg Oral HS Kat Alvarez PA-C     • enoxaparin  40 mg Subcutaneous Q24H [facility] Kat Alvarez PA-C     • fluticasone  2 puff Inhalation BID Kat Alvarez PA-C     • fluticasone-vilanterol  1 puff Inhalation Daily Kat Alvarez PA-C     • guaiFENesin  600 mg Oral BID Kat Alvarez PA-C     • insulin lispro  1-5 Units Subcutaneous 4x Daily (AC & HS) Kat Alvarez PA-C     • ipratropium  0 5 mg Nebulization TID Kira Hough PA-C     • methylPREDNISolone sodium succinate  40 mg Intravenous Q8H [facility] Kat Alvarez PA-C     • montelukast  10 mg Oral HS Kat Alvarez PA-C     • pantoprazole  40 mg Oral Early Morning Kat Alvarez PA-C     • pravastatin  40 mg Oral Daily With [facility] LANA Rivas     • promethazine  12 5 mg Oral Q6H PRN Kira Hough PA-C     • sodium chloride  150 mL/hr Intravenous Continuous Lavell Bhakta  mL/hr (10/25/22 0036)   • sucralfate  1 g Oral 4x Daily (AC & HS) Kat Alvarez PA-C     • zolpidem  10 mg Oral HS PRN Ronnie Garvin PA-C          Today, Patient Was Seen By: Ghulam Locke    ** Please Note: Dictation voice to text software may have been used in the creation of this document   **

## 2022-10-25 NOTE — UTILIZATION REVIEW
OBSERVATION 10/24/22 @ 2104  CONVERTED TO INPATIENT 10/25/22 @ 1326 DUE TO ASTHMA EXACERBATION REQUIRING CONTINUED IV STEROIDS, NEBULIZER TREATMENTS  Initial Clinical Review    Admission: Date/Time/Statement:   Admission Orders (From admission, onward)     Ordered        10/25/22 1326  Inpatient Admission  Once                      Orders Placed This Encounter   Procedures   • Inpatient Admission     Standing Status:   Standing     Number of Occurrences:   1     Order Specific Question:   Level of Care     Answer:   Med Surg [16]     Order Specific Question:   Estimated length of stay     Answer:   More than 2 Midnights     Order Specific Question:   Certification     Answer:   I certify that inpatient services are medically necessary for this patient for a duration of greater than two midnights  See H&P and MD Progress Notes for additional information about the patient's course of treatment  ED Arrival Information     Expected   -    Arrival   10/24/2022 18:19    Acuity   Emergent            Means of arrival   Walk-In    Escorted by   Self    Service   Hospitalist    Admission type   Emergency            Arrival complaint   asthma attack,difficulty breathing           Chief Complaint   Patient presents with   • Asthma       Initial Presentation: 56 y o  female to the ED from home with complaints of shortness of breath  Admitted under observation the converted to inpatient for asthma exacerbation, SIRS  H/O asthma, currently on prednisone taper  Arrives tachycardic, tachypneic, wheezing, cough, chest tightness  Has been using nebulizer at home with transient relief  Started on IV steroids in the ED  Continue nebulizer  WBCs elevated  SIRS likely in the setting of acute asthma exacerbation  Date:     10/25  Continue with IV steroids, nebulizers  Start on course of azithromycin  Follow up lactic acid and blood cultures  Remains tachycardic requiring 2LNC  Expiratory wheezing  Cough  Continue IV fluids  ED Triage Vitals   Temperature Pulse Respirations Blood Pressure SpO2   10/24/22 1842 10/24/22 1839 10/24/22 1839 10/24/22 1839 10/24/22 1839   98 8 °F (37 1 °C) (!) 118 (!) 25 130/75 97 %      Temp Source Heart Rate Source Patient Position - Orthostatic VS BP Location FiO2 (%)   10/24/22 1842 10/25/22 0200 10/25/22 0200 10/25/22 0200 --   Axillary Monitor Sitting Left arm       Pain Score       10/25/22 0459       No Pain          Wt Readings from Last 1 Encounters:   10/25/22 88 7 kg (195 lb 8 8 oz)     Additional Vital Signs:   Date/Time Temp Pulse Resp BP MAP (mmHg) SpO2 Calculated FIO2 (%) - Nasal Cannula Nasal Cannula O2 Flow Rate (L/min) O2 Device Patient Position - Orthostatic VS   10/25/22 1239 -- -- -- -- -- 96 % 28 2 L/min Nasal cannula --   10/25/22 1018 -- 108 Abnormal  18 115/66 -- 92 % 28 2 L/min Nasal cannula Lying   10/25/22 0748 98 3 °F (36 8 °C) 110 Abnormal  18 115/66 -- 96 % 28 2 L/min -- --   10/25/22 0459 -- 107 Abnormal  -- 121/68 88 95 % 28 2 L/min Nasal         Date/Time Temp Pulse Resp BP MAP (mmHg) SpO2 Calculated FIO2 (%) - Nasal Cannula Nasal Cannula O2 Flow Rate (L/min) O2 Device Patient Position - Orthostatic VS   10/25/22 0459 -- 107 Abnormal  -- 121/68 88 95 % 28 2 L/min Nasal cannula --   10/25/22 0300 -- 110 Abnormal  24 Abnormal  117/64 -- 96 % 28 2 L/min Nasal cannula Sitting   10/25/22 0200 -- 119 Abnormal  25 Abnormal  -- -- 96 % -- -- None (Room air) Sitting   10/24/22 2300 -- 121 Abnormal  24 Abnormal  -- -- 96 % -- -- -- --   10/24/22 1842 98 8 °F (37 1 °C) -- -- -- -- -- -- -- -- --   10/24/22 1839 -- 118 Abnormal  25 Abnormal  130/75 -- 97 % -- -- None (Room air)        Pertinent Labs/Diagnostic Test Results:   XR chest 1 view portable   Final Result by Rocio Gomez MD (10/25 3117)      No acute cardiopulmonary disease                    Workstation performed: [id number]           Results from last 7 days   Lab Units 10/24/22  1859   SARS-COV-2  Negative Results from last 7 days   Lab Units 10/25/22  0433 10/24/22  1901   WBC Thousand/uL 12 17* 10 80*   HEMOGLOBIN g/dL 12 4 12 5   HEMATOCRIT % 38 6 38 3   PLATELETS Thousands/uL 329 434*   NEUTROS ABS Thousands/µL  --  5 73         Results from last 7 days   Lab Units 10/24/22  1901   SODIUM mmol/L 140   POTASSIUM mmol/L 3 8   CHLORIDE mmol/L 105   CO2 mmol/L 26   ANION GAP mmol/L 9   BUN mg/dL 6   CREATININE mg/dL 0 81   EGFR ml/min/1 73sq m 82   CALCIUM mg/dL 9 1     Results from last 7 days   Lab Units 10/24/22  1901   AST U/L 39   ALT U/L 46   ALK PHOS U/L 167*   TOTAL PROTEIN g/dL 7 8   ALBUMIN g/dL 3 1*   TOTAL BILIRUBIN mg/dL 0 44     Results from last 7 days   Lab Units 10/25/22  1051 10/25/22  0725 10/25/22  0045 10/24/22  2229 10/24/22  2018   POC GLUCOSE mg/dl 238* 176* 236* 240* 144*     Results from last 7 days   Lab Units 10/24/22  1901   GLUCOSE RANDOM mg/dL 107       Results from last 7 days   Lab Units 10/25/22  0433 10/24/22  2223   PROCALCITONIN ng/ml 0 06 0 05     Results from last 7 days   Lab Units 10/25/22  1201 10/25/22  0811 10/25/22  0253 10/24/22  2223   LACTIC ACID mmol/L 4 2* 2 8* 5 3* 6 5*       Results from last 7 days   Lab Units 10/24/22  1859   INFLUENZA A PCR  Negative   INFLUENZA B PCR  Negative   RSV PCR  Negative       ED Treatment:   Medication Administration from 10/24/2022 1819 to 10/25/2022 0726       Date/Time Order Dose Route Action     10/24/2022 1856 albuterol inhalation solution 10 mg 10 mg Nebulization Given     10/24/2022 1856 ipratropium (ATROVENT) 0 02 % inhalation solution 1 mg 1 mg Nebulization Given     10/24/2022 1856 sodium chloride 0 9 % inhalation solution 3 mL 3 mL Nebulization Given     10/24/2022 1838 methylPREDNISolone sodium succinate (Solu-MEDROL) injection 125 mg 125 mg Intravenous Given     10/24/2022 2013 ipratropium-albuterol (DUO-NEB) 0 5-2 5 mg/3 mL inhalation solution 3 mL 3 mL Nebulization Given     10/24/2022 2013 ipratropium-albuterol (DUO-NEB) 0 5-2 5 mg/3 mL inhalation solution 3 mL 3 mL Nebulization Given     10/24/2022 2009 magnesium sulfate 2 g/50 mL IVPB (premix) 2 g 2 g Intravenous New Bag     10/24/2022 2010 LORazepam (ATIVAN) injection 1 mg 1 mg Intravenous Given     10/24/2022 2008 sodium chloride 0 9 % bolus 1,000 mL 1,000 mL Intravenous New Bag     10/24/2022 2059 promethazine (PHENERGAN) oral syrup 12 5 mg 12 5 mg Oral Given     10/24/2022 2207 guaiFENesin (MUCINEX) 12 hr tablet 600 mg 600 mg Oral Given     10/25/2022 0033 fluticasone (FLOVENT HFA) 110 MCG/ACT inhaler 2 puff 2 puff Inhalation Given     10/25/2022 0557 methylPREDNISolone sodium succinate (Solu-MEDROL) injection 40 mg 40 mg Intravenous Given     10/24/2022 2207 azithromycin (ZITHROMAX) tablet 500 mg 500 mg Oral Given     10/25/2022 0557 pantoprazole (PROTONIX) EC tablet 40 mg 40 mg Oral Given     10/25/2022 0048 insulin lispro (HumaLOG) 100 units/mL subcutaneous injection 1-5 Units 2 Units Subcutaneous Given     10/24/2022 2208 montelukast (SINGULAIR) tablet 10 mg 10 mg Oral Given     10/24/2022 2207 sucralfate (CARAFATE) tablet 1 g 1 g Oral Given     10/25/2022 0054 citalopram (CeleXA) tablet 10 mg 10 mg Oral Given     10/25/2022 0036 sodium chloride 0 9 % infusion 100 mL/hr Intravenous New Bag     10/25/2022 0253 zolpidem (AMBIEN) tablet 10 mg 10 mg Oral Given          Admitting Diagnosis: Asthma [J45 [id number]]  Age/Sex: 56 y o  female  Admission Orders:  Scheduled Medications:  azithromycin, 500 mg, Oral, Q24H  citalopram, 10 mg, Oral, HS  enoxaparin, 40 mg, Subcutaneous, Q24H [facility]  fluticasone, 2 puff, Inhalation, BID  fluticasone-vilanterol, 1 puff, Inhalation, Daily  guaiFENesin, 600 mg, Oral, BID  insulin lispro, 1-5 Units, Subcutaneous, 4x Daily (AC & HS)  ipratropium, 0 5 mg, Nebulization, TID  methylPREDNISolone sodium succinate, 40 mg, Intravenous, Q8H KAYLEIGH  montelukast, 10 mg, Oral, HS  pantoprazole, 40 mg, Oral, Early Morning  pravastatin, 40 mg, Oral, Daily With Dinner  sucralfate, 1 g, Oral, 4x Daily (AC & HS)      Continuous IV Infusions:  sodium chloride, 150 mL/hr, Intravenous, Continuous      PRN Meds:  acetaminophen, 650 mg, Oral, Q6H PRN  albuterol, 2 5 mg, Nebulization, Q6H PRN  promethazine, 12 5 mg, Oral, Q6H PRN  zolpidem, 10 mg, Oral, HS PRN        None    Network Utilization Review Department  ATTENTION: Please call with any questions or concerns to [phone] and carefully listen to the prompts so that you are directed to the right person  All voicemails are confidential   Anthony Donnelly all requests for admission clinical reviews, approved or denied determinations and any other requests to dedicated fax number below belonging to the campus where the patient is receiving treatment   List of dedicated fax numbers for the Facilities:  [address] DENIALS (Administrative/Medical Necessity) [phone]   [address] (Maternity/NICU/Pediatrics) [phone]   0 Kate Vo [phone]   [facility] [place]  [phone]   [address] [place] [address] [facility]laN[facility] Center[facility] [phone]   [address] Susana Solorzano viry Richards [address] [facility] [phone]

## 2022-10-25 NOTE — ASSESSMENT & PLAN NOTE
· Meeting SIRS criteria for leukocytosis, tachycardia, tachypnea  · Chest x-ray w/o any acute abnormalities   · Likely in the setting of acute asthma exacerbation  · Will start on course of oral azithromycin  ·  procalc negative  · f/u lactic acid, blood cultures

## 2022-10-25 NOTE — ASSESSMENT & PLAN NOTE
· Meeting SIRS criteria for leukocytosis, tachycardia, tachypnea  · Chest x-ray pending  · Likely in the setting of acute asthma exacerbation  · Will start on course of oral azithromycin  · Follow-up procalcitonin, lactic acid, blood cultures

## 2022-10-25 NOTE — PLAN OF CARE
Cough continues with lactic acid still elevated  MD to order iv abt  Pt oob ambulating ad wendi  Pt dyspnic with any exertion  Miralax given for c/o constipation

## 2022-10-25 NOTE — ED NOTES
Spoke with admitting team PA Aleisha Daniels - FELICITY attempted to collect repeat lactic acid, only ~100 mL of saline bolus infused at this time  Per PA, pt to receive minimum of 500 mL bolus or lactic acid to be collected with other AM labs  RN to attempt additional PIV placement for better infusion        Tarsha Schafer RN  10/25/22 2639

## 2022-10-25 NOTE — ASSESSMENT & PLAN NOTE
Patient presenting to the ED for increased shortness of breath wheezing at home  Reports that she had taken all of her home inhaler treatments, without improvement  Denies any associated fever/chills, chest pain, abdominal pain, nausea/vomiting/diarrhea    · ED gave multiple nebulizer treatments, IV steroids, magnesium without improvement in symptoms  · Currently oxygenating well on RA  · Continue pre-hospital inhalers, scheduled nebulizers, IV Solu-Medrol, azithromycin  · Respiratory protocol  · Continuous pulse ox monitoring

## 2022-10-25 NOTE — ASSESSMENT & PLAN NOTE
Patient presenting to the ED for increased shortness of breath wheezing at home  Reports that she had taken all of her home inhaler treatments as well as a course of prednisone without improvement  Denies any associated fever/chills, chest pain, abdominal pain, nausea/vomiting/diarrhea    ED gave multiple nebulizer treatments, IV steroids, magnesium without improvement in symptoms  Currently oxygenating well on RA  Continue pre-hospital inhalers, scheduled nebulizers, IV Solu-Medrol, azithromycin  Respiratory protocol  Continuous pulse ox monitoring

## 2022-10-25 NOTE — H&P
[address] [facility]  H&P- Warner Lambert 1965, 56 y o  female MRN: [id number]  Unit/Bed#: ED 15 Encounter: [id number]  Primary Care Provider: Randolph Tavera   Date and time admitted to hospital: 10/24/2022  6:25 PM    * Asthma exacerbation  Assessment & Plan  Patient presenting to the ED for increased shortness of breath wheezing at home  Reports that she had taken all of her home inhaler treatments as well as a course of prednisone without improvement  Denies any associated fever/chills, chest pain, abdominal pain, nausea/vomiting/diarrhea  · ED gave multiple nebulizer treatments, IV steroids, magnesium without improvement in symptoms  · Currently oxygenating well on RA  · Continue pre-hospital inhalers, scheduled nebulizers, IV Solu-Medrol, azithromycin  · Respiratory protocol  · Continuous pulse ox monitoring    SIRS (systemic inflammatory response syndrome) (HCC)  Assessment & Plan  · Meeting SIRS criteria for leukocytosis, tachycardia, tachypnea  · Chest x-ray pending  · Likely in the setting of acute asthma exacerbation  · Will start on course of oral azithromycin  · Follow-up procalcitonin, lactic acid, blood cultures    Type 2 diabetes mellitus, with long-term current use of insulin [facility])  Assessment & Plan  Lab Results   Component Value Date    HGBA1C 4 9 10/04/2022       Recent Labs     10/24/22  2018   POCGLU 144*       Blood Sugar Average: Last 72 hrs:  (P) 144   · Per chart review, appears patient previously on insulin regimen  However, insulin regimen discontinued this month per the chart  Last HA1C was 4 9    · Will start on SSI with Accu-Cheks  · Monitor for hyperglycemia while treated with nebulizers and IV steroids  · Hypoglycemia protocol  · Diabetic diet    Hyperlipidemia  Assessment & Plan  · Continue statin therapy    VTE Prophylaxis: Enoxaparin (Lovenox)  / sequential compression device   Code Status: Level 1 code  Discussion with family: Update in the AM    Anticipated Length of Stay:  Patient will be admitted on an Observation basis with an anticipated length of stay of  Less than 2 midnights  Justification for Hospital Stay:  Asthma exacerbation    Total Time for Visit, including Counseling / Coordination of Care: 60 minutes  Greater than 50% of this total time spent on direct patient counseling and coordination of care  Chief Complaint:   SOB    History of Present Illness:    Matt Cotter is a 56 y o  female who has a past medical history significant for diabetes, hyperlipidemia, asthma  Patient presenting to the ED for increased shortness of breath wheezing at home  Reports that she had taken all of her home inhaler treatments, without improvement  Denies any associated fever/chills, chest pain, abdominal pain, nausea/vomiting/diarrhea  Patient requiring medical admission for treatment of acute asthma exacerbation  All patient questions answered to the best my ability  Review of Systems:    Review of Systems   Constitutional: Negative for chills and fever  HENT: Negative for ear pain and sore throat  Eyes: Negative for pain and visual disturbance  Respiratory: Positive for cough, shortness of breath and wheezing  Cardiovascular: Negative for chest pain and palpitations  Gastrointestinal: Negative for abdominal pain and vomiting  Genitourinary: Negative for dysuria and hematuria  Musculoskeletal: Negative for arthralgias and back pain  Skin: Negative for color change and rash  Neurological: Negative for seizures and syncope  All other systems reviewed and are negative  Past Medical and Surgical History:     History reviewed  No pertinent past medical history  History reviewed  No pertinent surgical history  Meds/Allergies:    Prior to Admission medications    Medication Sig Start Date End Date Taking?  Authorizing Provider   albuterol (2 5 mg/3 mL) 0 083 % nebulizer solution 3 ml    Historical Provider, MD MOSER have reviewed home medications using allscripts  Allergies: No Known Allergies    Social History:     Marital Status: /Civil Union   Occupation: NA  Patient Pre-hospital Living Situation: Home  Patient Pre-hospital Level of Mobility: Walks  Patient Pre-hospital Diet Restrictions: Diabetic  Substance Use History:   Social History     Substance and Sexual Activity   Alcohol Use None     Social History     Tobacco Use   Smoking Status Never Smoker   Smokeless Tobacco Not on file     Social History     Substance and Sexual Activity   Drug Use Not on file       Family History:    History reviewed  No pertinent family history  Physical Exam:     Vitals:   Blood Pressure: 130/75 (10/24/22 1839)  Pulse: (!) 118 (10/24/22 1839)  Temperature: 98 8 °F (37 1 °C) (10/24/22 1842)  Temp Source: Axillary (10/24/22 1842)  Respirations: (!) 25 (10/24/22 1839)  SpO2: 97 % (10/24/22 1839)    Physical Exam  Vitals and nursing note reviewed  Constitutional:       General: She is in acute distress  Appearance: She is well-developed  HENT:      Head: Normocephalic and atraumatic  Mouth/Throat:      Pharynx: Oropharynx is clear  Eyes:      Conjunctiva/sclera: Conjunctivae normal    Cardiovascular:      Rate and Rhythm: Regular rhythm  Tachycardia present  Pulses: Normal pulses  Heart sounds: No murmur heard  Pulmonary:      Effort: Respiratory distress present  Breath sounds: Wheezing present  Abdominal:      Palpations: Abdomen is soft  Tenderness: There is no abdominal tenderness  Musculoskeletal:      Cervical back: Neck supple  Right lower leg: No edema  Left lower leg: No edema  Skin:     General: Skin is warm and dry  Neurological:      Mental Status: She is alert and oriented to person, place, and time  Additional Data:     Lab Results: I have personally reviewed pertinent reports        Results from last 7 days   Lab Units 10/24/22  1901   WBC Thousand/uL 10 80*   HEMOGLOBIN g/dL 12 5   HEMATOCRIT % 38 3   PLATELETS Thousands/uL 434*   NEUTROS PCT % 52   LYMPHS PCT % 32   MONOS PCT % 11   EOS PCT % 4     Results from last 7 days   Lab Units 10/24/22  1901   SODIUM mmol/L 140   POTASSIUM mmol/L 3 8   CHLORIDE mmol/L 105   CO2 mmol/L 26   BUN mg/dL 6   CREATININE mg/dL 0 81   ANION GAP mmol/L 9   CALCIUM mg/dL 9 1   ALBUMIN g/dL 3 1*   TOTAL BILIRUBIN mg/dL 0 44   ALK PHOS U/L 167*   ALT U/L 46   AST U/L 39   GLUCOSE RANDOM mg/dL 107         Results from last 7 days   Lab Units 10/24/22  2018   POC GLUCOSE mg/dl 144*               Imaging: I have personally reviewed pertinent reports  XR chest 1 view portable    (Results Pending)       EKG, Pathology, and Other Studies Reviewed on Admission:   · EKG: Obtain baseline    Allscripts / Epic Records Reviewed: Yes     ** Please Note: This note has been constructed using a voice recognition system   **

## 2022-10-26 ENCOUNTER — APPOINTMENT (INPATIENT)
Dept: CT IMAGING | Facility: HOSPITAL | Age: 57
End: 2022-10-26

## 2022-10-26 LAB
ANION GAP SERPL CALCULATED.3IONS-SCNC: 12 MMOL/L (ref 4–13)
BASOPHILS # BLD AUTO: 0.03 THOUSANDS/ÂΜL (ref 0–0.1)
BASOPHILS NFR BLD AUTO: 0 % (ref 0–1)
BUN SERPL-MCNC: 12 MG/DL (ref 5–25)
CALCIUM SERPL-MCNC: 9 MG/DL (ref 8.3–10.1)
CHLORIDE SERPL-SCNC: 108 MMOL/L (ref 96–108)
CO2 SERPL-SCNC: 20 MMOL/L (ref 21–32)
CREAT SERPL-MCNC: 1.01 MG/DL (ref 0.6–1.3)
EOSINOPHIL # BLD AUTO: 0.01 THOUSAND/ÂΜL (ref 0–0.61)
EOSINOPHIL NFR BLD AUTO: 0 % (ref 0–6)
ERYTHROCYTE [DISTWIDTH] IN BLOOD BY AUTOMATED COUNT: 14.1 % (ref 11.6–15.1)
GFR SERPL CREATININE-BSD FRML MDRD: 62 ML/MIN/1.73SQ M
GLUCOSE SERPL-MCNC: 134 MG/DL (ref 65–140)
GLUCOSE SERPL-MCNC: 192 MG/DL (ref 65–140)
GLUCOSE SERPL-MCNC: 195 MG/DL (ref 65–140)
GLUCOSE SERPL-MCNC: 203 MG/DL (ref 65–140)
GLUCOSE SERPL-MCNC: 206 MG/DL (ref 65–140)
HCT VFR BLD AUTO: 35 % (ref 34.8–46.1)
HGB BLD-MCNC: 11.5 G/DL (ref 11.5–15.4)
IMM GRANULOCYTES # BLD AUTO: 0.33 THOUSAND/UL (ref 0–0.2)
IMM GRANULOCYTES NFR BLD AUTO: 2 % (ref 0–2)
LACTATE SERPL-SCNC: 2.7 MMOL/L (ref 0.5–2)
LACTATE SERPL-SCNC: 3.2 MMOL/L (ref 0.5–2)
LYMPHOCYTES # BLD AUTO: 1.16 THOUSANDS/ÂΜL (ref 0.6–4.47)
LYMPHOCYTES NFR BLD AUTO: 5 % (ref 14–44)
MCH RBC QN AUTO: 31.9 PG (ref 26.8–34.3)
MCHC RBC AUTO-ENTMCNC: 32.9 G/DL (ref 31.4–37.4)
MCV RBC AUTO: 97 FL (ref 82–98)
MONOCYTES # BLD AUTO: 0.93 THOUSAND/ÂΜL (ref 0.17–1.22)
MONOCYTES NFR BLD AUTO: 4 % (ref 4–12)
NEUTROPHILS # BLD AUTO: 19.75 THOUSANDS/ÂΜL (ref 1.85–7.62)
NEUTS SEG NFR BLD AUTO: 89 % (ref 43–75)
NRBC BLD AUTO-RTO: 0 /100 WBCS
PLATELET # BLD AUTO: 426 THOUSANDS/UL (ref 149–390)
PMV BLD AUTO: 10.2 FL (ref 8.9–12.7)
POTASSIUM SERPL-SCNC: 3.5 MMOL/L (ref 3.5–5.3)
RBC # BLD AUTO: 3.6 MILLION/UL (ref 3.81–5.12)
SODIUM SERPL-SCNC: 140 MMOL/L (ref 135–147)
WBC # BLD AUTO: 22.21 THOUSAND/UL (ref 4.31–10.16)

## 2022-10-26 RX ORDER — HYDROCODONE POLISTIREX AND CHLORPHENIRAMINE POLISTIREX 10; 8 MG/5ML; MG/5ML
5 SUSPENSION, EXTENDED RELEASE ORAL EVERY 6 HOURS PRN
Status: DISCONTINUED | OUTPATIENT
Start: 2022-10-26 | End: 2022-11-01 | Stop reason: HOSPADM

## 2022-10-26 RX ORDER — IPRATROPIUM BROMIDE AND ALBUTEROL SULFATE 2.5; .5 MG/3ML; MG/3ML
3 SOLUTION RESPIRATORY (INHALATION) EVERY 4 HOURS PRN
Status: DISCONTINUED | OUTPATIENT
Start: 2022-10-26 | End: 2022-11-01 | Stop reason: HOSPADM

## 2022-10-26 RX ORDER — METHYLPREDNISOLONE SODIUM SUCCINATE 40 MG/ML
40 INJECTION, POWDER, LYOPHILIZED, FOR SOLUTION INTRAMUSCULAR; INTRAVENOUS EVERY 6 HOURS SCHEDULED
Status: DISCONTINUED | OUTPATIENT
Start: 2022-10-26 | End: 2022-10-27

## 2022-10-26 RX ADMIN — PRAVASTATIN SODIUM 40 MG: 40 TABLET ORAL at 18:22

## 2022-10-26 RX ADMIN — SUCRALFATE 1 G: 1 TABLET ORAL at 21:36

## 2022-10-26 RX ADMIN — FLUTICASONE PROPIONATE 2 PUFF: 110 AEROSOL, METERED RESPIRATORY (INHALATION) at 21:38

## 2022-10-26 RX ADMIN — METHYLPREDNISOLONE SODIUM SUCCINATE 40 MG: 40 INJECTION, POWDER, FOR SOLUTION INTRAMUSCULAR; INTRAVENOUS at 18:22

## 2022-10-26 RX ADMIN — FLUTICASONE PROPIONATE 2 PUFF: 110 AEROSOL, METERED RESPIRATORY (INHALATION) at 09:13

## 2022-10-26 RX ADMIN — IPRATROPIUM BROMIDE 0.5 MG: 0.5 SOLUTION RESPIRATORY (INHALATION) at 19:57

## 2022-10-26 RX ADMIN — SUCRALFATE 1 G: 1 TABLET ORAL at 07:37

## 2022-10-26 RX ADMIN — LEVALBUTEROL HYDROCHLORIDE 1.25 MG: 1.25 SOLUTION, CONCENTRATE RESPIRATORY (INHALATION) at 07:31

## 2022-10-26 RX ADMIN — SUCRALFATE 1 G: 1 TABLET ORAL at 18:22

## 2022-10-26 RX ADMIN — IPRATROPIUM BROMIDE 0.5 MG: 0.5 SOLUTION RESPIRATORY (INHALATION) at 07:31

## 2022-10-26 RX ADMIN — PROMETHAZINE HYDROCHLORIDE 12.5 MG: 6.25 SYRUP ORAL at 11:37

## 2022-10-26 RX ADMIN — MONTELUKAST 10 MG: 10 TABLET, FILM COATED ORAL at 21:36

## 2022-10-26 RX ADMIN — METHYLPREDNISOLONE SODIUM SUCCINATE 40 MG: 40 INJECTION, POWDER, FOR SOLUTION INTRAMUSCULAR; INTRAVENOUS at 13:03

## 2022-10-26 RX ADMIN — LEVALBUTEROL HYDROCHLORIDE 1.25 MG: 1.25 SOLUTION, CONCENTRATE RESPIRATORY (INHALATION) at 19:57

## 2022-10-26 RX ADMIN — METHYLPREDNISOLONE SODIUM SUCCINATE 40 MG: 40 INJECTION, POWDER, FOR SOLUTION INTRAMUSCULAR; INTRAVENOUS at 05:26

## 2022-10-26 RX ADMIN — FLUTICASONE FUROATE AND VILANTEROL TRIFENATATE 1 PUFF: 200; 25 POWDER RESPIRATORY (INHALATION) at 09:13

## 2022-10-26 RX ADMIN — INSULIN LISPRO 1 UNITS: 100 INJECTION, SOLUTION INTRAVENOUS; SUBCUTANEOUS at 21:36

## 2022-10-26 RX ADMIN — GUAIFENESIN 600 MG: 600 TABLET, EXTENDED RELEASE ORAL at 09:10

## 2022-10-26 RX ADMIN — HYDROCODONE POLISTIREX AND CHLORPHENIRAMINE POLISTIREX 5 ML: 10; 8 SUSPENSION, EXTENDED RELEASE ORAL at 00:07

## 2022-10-26 RX ADMIN — IOHEXOL 100 ML: 350 INJECTION, SOLUTION INTRAVENOUS at 14:41

## 2022-10-26 RX ADMIN — CITALOPRAM HYDROBROMIDE 10 MG: 20 TABLET ORAL at 21:36

## 2022-10-26 RX ADMIN — GUAIFENESIN 600 MG: 600 TABLET, EXTENDED RELEASE ORAL at 18:22

## 2022-10-26 RX ADMIN — INSULIN LISPRO 1 UNITS: 100 INJECTION, SOLUTION INTRAVENOUS; SUBCUTANEOUS at 18:10

## 2022-10-26 RX ADMIN — CEFTRIAXONE SODIUM 1000 MG: 10 INJECTION, POWDER, FOR SOLUTION INTRAVENOUS at 15:04

## 2022-10-26 RX ADMIN — PANTOPRAZOLE SODIUM 40 MG: 40 TABLET, DELAYED RELEASE ORAL at 05:26

## 2022-10-26 RX ADMIN — ENOXAPARIN SODIUM 40 MG: 40 INJECTION SUBCUTANEOUS at 09:10

## 2022-10-26 RX ADMIN — POLYETHYLENE GLYCOL 3350 17 G: 17 POWDER, FOR SOLUTION ORAL at 09:10

## 2022-10-26 RX ADMIN — ALBUTEROL SULFATE 2.5 MG: 2.5 SOLUTION RESPIRATORY (INHALATION) at 00:44

## 2022-10-26 RX ADMIN — SODIUM CHLORIDE 150 ML/HR: 0.9 INJECTION, SOLUTION INTRAVENOUS at 02:27

## 2022-10-26 RX ADMIN — PROMETHAZINE HYDROCHLORIDE 12.5 MG: 6.25 SYRUP ORAL at 18:07

## 2022-10-26 RX ADMIN — AZITHROMYCIN 500 MG: 500 TABLET, FILM COATED ORAL at 21:35

## 2022-10-26 RX ADMIN — ACETAMINOPHEN 650 MG: 325 TABLET ORAL at 21:42

## 2022-10-26 NOTE — UTILIZATION REVIEW
Continued Stay Review    Date: 10/26                        Current Patient Class: Inpatient Current Level of Care: Med Surg    HPI:57 y o  female initially admitted on 10/25    Assessment/Plan: Asthma exacerbation, SIRS  Continues on Iv Steriod and Iv antibiotics  Continuous pulse ox monitoring  Monitor for hyperglycemia while treated with nebulizers and IV steroids  CT chest/abd/pelvis to r/o any occult infection  Vital Signs:   10/26/22 0925 -- -- -- -- -- 91 % -- -- -- --   10/26/22 0731 -- -- -- -- -- 91 % -- -- None (Room air) --   10/26/22 07:16:45 98 2 °F (36 8 °C) 105 18 124/69 87 91 % -- -- -- --   10/26/22 0047 -- -- -- -- -- 96 % -- -- None (Room air)      Pertinent Labs/Diagnostic Results:   10/26  CT chest/abd/pelvis - No acute pathology in the chest, abdomen, or pelvis  Hepatomegaly/hepatic steatosis      Results from last 7 days   Lab Units 10/24/22  1859   SARS-COV-2  Negative     Results from last 7 days   Lab Units 10/26/22  0312 10/25/22  0433 10/24/22  1901   WBC Thousand/uL 22 21* 12 17* 10 80*   HEMOGLOBIN g/dL 11 5 12 4 12 5   HEMATOCRIT % 35 0 38 6 38 3   PLATELETS Thousands/uL 426* 329 434*   NEUTROS ABS Thousands/µL 19 75*  --  5 73         Results from last 7 days   Lab Units 10/26/22  0000 10/24/22  1901   SODIUM mmol/L 140 140   POTASSIUM mmol/L 3 5 3 8   CHLORIDE mmol/L 108 105   CO2 mmol/L 20* 26   ANION GAP mmol/L 12 9   BUN mg/dL 12 6   CREATININE mg/dL 1 01 0 81   EGFR ml/min/1 73sq m 62 82   CALCIUM mg/dL 9 0 9 1     Results from last 7 days   Lab Units 10/24/22  1901   AST U/L 39   ALT U/L 46   ALK PHOS U/L 167*   TOTAL PROTEIN g/dL 7 8   ALBUMIN g/dL 3 1*   TOTAL BILIRUBIN mg/dL 0 44     Results from last 7 days   Lab Units 10/26/22  1132 10/26/22  0714 10/25/22  2046 10/25/22  1941 10/25/22  1642 10/25/22  1051 10/25/22  0725 10/25/22  0045 10/24/22  2229 10/24/22  2018   POC GLUCOSE mg/dl 192* 134 207* 235* 171* 238* 176* 236* 240* 144*     Results from last 7 days Lab Units 10/26/22  0000 10/24/22  1901   GLUCOSE RANDOM mg/dL 206* 107       Results from last 7 days   Lab Units 10/25/22  0433 10/24/22  2223   PROCALCITONIN ng/ml 0 06 0 05     Results from last 7 days   Lab Units 10/26/22  0241 10/26/22  0006 10/25/22  2202 10/25/22  1843 10/25/22  1622   LACTIC ACID mmol/L 3 2* 2 7* 3 3* 4 8* 4 1*         Results from last 7 days   Lab Units 10/25/22  1424   CLARITY UA  Clear   COLOR UA  Colorless   SPEC GRAV UA  1 011   PH UA  6 0   GLUCOSE UA mg/dl 500 (1/2%)*   KETONES UA mg/dl Negative   BLOOD UA  Small*   PROTEIN UA mg/dl Negative   NITRITE UA  Negative   BILIRUBIN UA  Negative   UROBILINOGEN UA (BE) mg/dl <2 0   LEUKOCYTES UA  Negative   WBC UA /hpf None Seen   RBC UA /hpf 2-4*   BACTERIA UA /hpf None Seen   EPITHELIAL CELLS WET PREP /hpf Occasional     Results from last 7 days   Lab Units 10/24/22  1859   INFLUENZA A PCR  Negative   INFLUENZA B PCR  Negative   RSV PCR  Negative         Results from last 7 days   Lab Units 10/24/22  2222 10/24/22  2204   BLOOD CULTURE  No Growth at 24 hrs  No Growth at 24 hrs       Medications:   Scheduled Medications:  azithromycin, 500 mg, Oral, Q24H  cefTRIAXone, 1,000 mg, Intravenous, Q24H  citalopram, 10 mg, Oral, HS  enoxaparin, 40 mg, Subcutaneous, Q24H KAYLEIGH  fluticasone, 2 puff, Inhalation, BID  fluticasone-vilanterol, 1 puff, Inhalation, Daily  guaiFENesin, 600 mg, Oral, BID  insulin lispro, 1-5 Units, Subcutaneous, 4x Daily (AC & HS)  ipratropium, 0 5 mg, Nebulization, TID  levalbuterol, 1 25 mg, Nebulization, TID  methylPREDNISolone sodium succinate, 40 mg, Intravenous, Q6H KAYLEIGH  montelukast, 10 mg, Oral, HS  pantoprazole, 40 mg, Oral, Early Morning  polyethylene glycol, 17 g, Oral, Daily  pravastatin, 40 mg, Oral, Daily With Dinner  sucralfate, 1 g, Oral, 4x Daily (AC & HS)      Continuous IV Infusions:    sodium chloride 0 9 % infusion  Rate: 150 mL/hr Dose: 150 mL/hr  Freq: Continuous Route: IV  Last Dose: Stopped (10/26/22 4844)  Start: 10/24/22 2315 End: 10/26/22 0332      PRN Meds:  acetaminophen, 650 mg, Oral, Q6H PRN  hydrocodone-chlorpheniramine polistirex, 5 mL, Oral, Q6H PRN  ipratropium-albuterol, 3 mL, Nebulization, Q4H PRN  promethazine, 12 5 mg, Oral, Q6H PRN 10/26 x1  zolpidem, 10 mg, Oral, HS PRN    Discharge Plan: TBD    Network Utilization Review Department  ATTENTION: Please call with any questions or concerns to [phone] and carefully listen to the prompts so that you are directed to the right person  All voicemails are confidential   Olney Andrei all requests for admission clinical reviews, approved or denied determinations and any other requests to dedicated fax number below belonging to the campus where the patient is receiving treatment   List of dedicated fax numbers for the Facilities:  [address] DENIALS (Administrative/Medical Necessity) [phone]   [address] (Maternity/NICU/Pediatrics) [phone]   [address] [phone]   [place] [address] [phone]   [address] [place] [address] [facility] [phone]   1554 [facility] Susana Solorzano [facility] 134 815 [facility] [phone]

## 2022-10-26 NOTE — ASSESSMENT & PLAN NOTE
Patient presenting to the ED for increased shortness of breath wheezing at home  Reports that she had taken all of her home inhaler treatments as well as a course of prednisone without improvement  Denies any associated fever/chills, chest pain, abdominal pain, nausea/vomiting/diarrhea    ED gave multiple nebulizer treatments, IV steroids, magnesium without improvement in symptoms  Currently oxygenating well on RA  Continue pre-hospital inhalers, scheduled nebulizers, IV Solu-Medrol  Respiratory protocol  Continuous pulse ox monitoring

## 2022-10-26 NOTE — PLAN OF CARE
Problem: INFECTION - ADULT  Goal: Absence or prevention of progression during hospitalization  Description: INTERVENTIONS:  - Assess and monitor for signs and symptoms of infection  - Monitor lab/diagnostic results  - Monitor all insertion sites, i e  indwelling lines, tubes, and drains  - Monitor endotracheal if appropriate and nasal secretions for changes in amount and color  - K[place]aid appropriate cooling/warming therapies per order  - Administer medications as ordered  - Instruct and encourage patient and family to use good hand hygiene technique  - Identify and instruct in appropriate isolation precautions for identified infection/condition  Outcome: Progressing  Goal: Absence of fever/infection during neutropenic period  Description: INTERVENTIONS:  - Monitor WBC    Outcome: Progressing

## 2022-10-26 NOTE — PROGRESS NOTES
[address] [facility]  Progress Note - Misty Souza 1965, 56 y o  female MRN: [id number]  Unit/Bed#: -01 Encounter: [id number]  Primary Care Provider: Shonda Olson   Date and time admitted to hospital: 10/24/2022  6:25 PM    * Asthma exacerbation  Assessment & Plan  Patient presenting to the ED for increased shortness of breath wheezing at home  Reports that she had taken all of her home inhaler treatments as well as a course of prednisone without improvement  Denies any associated fever/chills, chest pain, abdominal pain, nausea/vomiting/diarrhea  ED gave multiple nebulizer treatments, IV steroids, magnesium without improvement in symptoms  Currently oxygenating well on RA  Continue pre-hospital inhalers, scheduled nebulizers, IV Solu-Medrol  Respiratory protocol  Continuous pulse ox monitoring    Type 2 diabetes mellitus, with long-term current use of insulin [facility])  Assessment & Plan  Lab Results   Component Value Date    HGBA1C 4 9 10/04/2022       Recent Labs     10/25/22  1642 10/25/22  1941 10/25/22  2046 10/26/22  0714   POCGLU 171* 235* 207* 134       Blood Sugar Average: Last 72 hrs:  (P) 891 [id number]   · Per chart review, appears patient previously on insulin regimen  However, insulin regimen discontinued this month per the chart   Last HA1C was 4 9   ·  SSI with Accu-Cheks  · Monitor for hyperglycemia while treated with nebulizers and IV steroids  · Hypoglycemia protocol  · Diabetic diet  ·     Hyperlipidemia  Assessment & Plan  · Continue statin    SIRS (systemic inflammatory response syndrome) (HCC)  Assessment & Plan  · Meeting SIRS criteria for leukocytosis, tachycardia, tachypnea  · Chest x-ray w/o any acute abnormalities   · Likely in the setting of acute asthma exacerbation  · Lactate remains elevated however stable, does have leukocytosis however on steroids  · Will obtain CT chest abdomen and pelvis to rule any occult infection  · Blood cultures thus far negative      VTE Pharmacologic Prophylaxis:   Pharmacologic: Heparin  Mechanical VTE Prophylaxis in Place: Yes    Patient Centered Rounds: I have performed bedside rounds with nursing staff today  Discussions with Specialists or Other Care Team Provider: cm, nursing    Education and Discussions with Family / Patient:pt    Time Spent for Care: 30 minutes  More than 50% of total time spent on counseling and coordination of care as described above  Current Length of Stay: 1 day(s)    Current Patient Status: Inpatient   Certification Statement: The patient will continue to require additional inpatient hospital stay due to see below    Discharge Plan:  Still requiring IV steroids    Code Status: Level 1 - Full Code      Subjective:   Reports breathing improved  Denies chest pain, fevers, chills    Objective:     Vitals:   Temp (24hrs), Av 5 °F (36 9 °C), Min:98 2 °F (36 8 °C), Max:98 6 °F (37 °C)    Temp:  [98 2 °F (36 8 °C)-98 6 °F (37 °C)] 98 2 °F (36 8 °C)  HR:  [102-108] 105  Resp:  [16-18] 18  BP: (115-131)/(66-69) 124/69  SpO2:  [91 %-96 %] 91 %  Body mass index is 39 5 kg/m²  Input and Output Summary (last 24 hours): Intake/Output Summary (Last 24 hours) at 10/26/2022 0819  Last data filed at 10/26/2022 0428  Gross per 24 hour   Intake 1402 5 ml   Output --   Net 1402 5 ml       Physical Exam:     Physical Exam  Constitutional:       General: She is not in acute distress  Appearance: She is well-developed  She is not diaphoretic  HENT:      Head: Normocephalic and atraumatic  Nose: Nose normal       Mouth/Throat:      Pharynx: No oropharyngeal exudate  Eyes:      General: No scleral icterus  Right eye: No discharge  Left eye: No discharge  Conjunctiva/sclera: Conjunctivae normal    Neck:      Thyroid: No thyromegaly  Vascular: No JVD  Cardiovascular:      Rate and Rhythm: Normal rate and regular rhythm  Heart sounds: Normal heart sounds   No murmur heard     No friction rub  No gallop  Pulmonary:      Effort: Pulmonary effort is normal  No respiratory distress  Breath sounds: Normal breath sounds  No wheezing or rales  Chest:      Chest wall: No tenderness  Abdominal:      General: Bowel sounds are normal  There is no distension  Palpations: Abdomen is soft  Tenderness: There is no abdominal tenderness  There is no guarding or rebound  Musculoskeletal:         General: No tenderness or deformity  Normal range of motion  Cervical back: Normal range of motion and neck supple  Skin:     General: Skin is warm and dry  Findings: No erythema or rash  Neurological:      Mental Status: She is alert  Mental status is at baseline  Cranial Nerves: No cranial nerve deficit  Sensory: No sensory deficit  Motor: No abnormal muscle tone        Coordination: Coordination normal            Additional Data:     Labs:    Results from last 7 days   Lab Units 10/26/22  0312   WBC Thousand/uL 22 21*   HEMOGLOBIN g/dL 11 5   HEMATOCRIT % 35 0   PLATELETS Thousands/uL 426*   NEUTROS PCT % 89*   LYMPHS PCT % 5*   MONOS PCT % 4   EOS PCT % 0     Results from last 7 days   Lab Units 10/26/22  0000 10/24/22  1901   SODIUM mmol/L 140 140   POTASSIUM mmol/L 3 5 3 8   CHLORIDE mmol/L 108 105   CO2 mmol/L 20* 26   BUN mg/dL 12 6   CREATININE mg/dL 1 01 0 81   ANION GAP mmol/L 12 9   CALCIUM mg/dL 9 0 9 1   ALBUMIN g/dL  --  3 1*   TOTAL BILIRUBIN mg/dL  --  0 44   ALK PHOS U/L  --  167*   ALT U/L  --  46   AST U/L  --  39   GLUCOSE RANDOM mg/dL 206* 107         Results from last 7 days   Lab Units 10/26/22  0714 10/25/22  2046 10/25/22  1941 10/25/22  1642 10/25/22  1051 10/25/22  0725 10/25/22  0045 10/24/22  2229 10/24/22  2018   POC GLUCOSE mg/dl 134 207* 235* 171* 238* 176* 236* 240* 144*         Results from last 7 days   Lab Units 10/26/22  0241 10/26/22  0006 10/25/22  2202 10/25/22  1843 10/25/22  2996 10/25/22  0433 10/25/22  0253 10/24/22  2223   LACTIC ACID mmol/L 3 2* 2 7* 3 3* 4 8*   < >  --    < > 6 5*   PROCALCITONIN ng/ml  --   --   --   --   --  0 06  --  0 05    < > = values in this interval not displayed  * I Have Reviewed All Lab Data Listed Above  * Additional Pertinent Lab Tests Reviewed: All Labs Within Last 24 Hours Reviewed    Imaging:    Imaging Reports Reviewed Today Include: na  Imaging Personally Reviewed by Myself Includes:  na    Recent Cultures (last 7 days):     Results from last 7 days   Lab Units 10/24/22  2222 10/24/22  2204   BLOOD CULTURE  No Growth at 24 hrs  No Growth at 24 hrs         Last 24 Hours Medication List:   Current Facility-Administered Medications   Medication Dose Route Frequency Provider Last Rate   • acetaminophen  650 mg Oral Q6H PRN Lois Sandoval PA-C     • azithromycin  500 mg Oral Q24H Kat Alvarez PA-C     • citalopram  10 mg Oral HS Kat Alvarez PA-C     • enoxaparin  40 mg Subcutaneous Q24H [facility] Kat Alvarez PA-C     • fluticasone  2 puff Inhalation BID Kat Alvarez PA-C     • fluticasone-vilanterol  1 puff Inhalation Daily Kat Alvarez PA-C     • guaiFENesin  600 mg Oral BID Kat Alvarez PA-C     • hydrocodone-chlorpheniramine polistirex  5 mL Oral Q12H PRN Kat Alvarez PA-C     • insulin lispro  1-5 Units Subcutaneous 4x Daily (AC & HS) Kat Alvarez PA-C     • ipratropium  0 5 mg Nebulization TID Kat Alvarez PA-C     • ipratropium-albuterol  3 mL Nebulization Q4H PRN Lavell Bhakta MD     • levalbuterol  1 25 mg Nebulization TID Lavell Bhakta MD     • methylPREDNISolone sodium succinate  40 mg Intravenous Q8H [facility] Kat Alvarez PA-C     • montelukast  10 mg Oral HS Kat Alvarez PA-C     • pantoprazole  40 mg Oral Early Morning aKt Alvarez PA-C     • polyethylene glycol  17 g Oral Daily Lavell Bhakta MD     • pravastatin  40 mg Oral Daily With Pearlo! Rob PACarlosC     • promethazine  12 5 mg Oral Q6H PRN Lois Sandoval PA-C     • sucralfate  1 g Oral 4x Daily (AC & HS) Keron Denny PA-C     • zolpidem  10 mg Oral HS PRN Valarie Medina PA-C          Today, Patient Was Seen By: Nataliay Castaneda    ** Please Note: Dictation voice to text software may have been used in the creation of this document   **

## 2022-10-26 NOTE — ASSESSMENT & PLAN NOTE
· Meeting SIRS criteria for leukocytosis, tachycardia, tachypnea  · Chest x-ray w/o any acute abnormalities   · Likely in the setting of acute asthma exacerbation  · Lactate remains elevated however stable, does have leukocytosis however on steroids  · Will obtain CT chest abdomen and pelvis to rule any occult infection  · Blood cultures thus far negative

## 2022-10-26 NOTE — UTILIZATION REVIEW
NOTIFICATION OF INPATIENT ADMISSION   AUTHORIZATION REQUEST   SERVICING FACILITY:   [address] [place] [address]  Tax ID: [id number]  NPI: [id number] ATTENDING PROVIDER:  Attending Name and NPI#: [facility][id number]  Address: [address] [place] [address]  Phone: [phone]     ADMISSION INFORMATION:  [facility]  [facility]  Place of Service Code: 21  Inpatient Admission Date/Time: 10/25/22  1:26 PM  Discharge Date/Time: No discharge date for patient encounter  Admitting Diagnosis Code/Description:  Asthma [J45 909]  Acute asthma exacerbation [J45 901]     UTILIZATION REVIEW CONTACT:  Jordy Maldonado Utilization   Network Utilization Review Department  Phone: [phone]  Fax [phone]  Email: [email or web address]  Contact for approvals/pending authorizations, clinical reviews, and discharge  PHYSICIAN ADVISORY SERVICES:  Medical Necessity Denial & [facility]kar-xa-Ndd[facility] Review  Phone: [phone]  Fax: [phone]  Email: [email or web address]

## 2022-10-26 NOTE — ASSESSMENT & PLAN NOTE
Lab Results   Component Value Date    HGBA1C 4 9 10/04/2022       Recent Labs     10/25/22  1642 10/25/22  1941 10/25/22  2046 10/26/22  0714   POCGLU 171* 235* 207* 134       Blood Sugar Average: Last 72 hrs:  (P) 932 [id number]   · Per chart review, appears patient previously on insulin regimen  However, insulin regimen discontinued this month per the chart   Last HA1C was 4 9   ·  SSI with Accu-Cheks  · Monitor for hyperglycemia while treated with nebulizers and IV steroids  · Hypoglycemia protocol  · Diabetic diet  ·

## 2022-10-26 NOTE — PLAN OF CARE
Problem: PAIN - ADULT  Goal: Verbalizes/displays adequate comfort level or baseline comfort level  Description: Interventions:  - Encourage patient to monitor pain and request assistance  - Assess pain using appropriate pain scale  - Administer analgesics based on type and severity of pain and evaluate response  - Implement non-pharmacological measures as appropriate and evaluate response  - Consider cultural and social influences on pain and pain management  - Notify physician/advanced practitioner if interventions unsuccessful or patient reports new pain  Outcome: Progressing     Problem: INFECTION - ADULT  Goal: Absence or prevention of progression during hospitalization  Description: INTERVENTIONS:  - Assess and monitor for signs and symptoms of infection  - Monitor lab/diagnostic results  - Monitor all insertion sites, i e  indwelling lines, tubes, and drains  - Monitor endotracheal if appropriate and nasal secretions for changes in amount and color  - [place] appropriate cooling/warming therapies per order  - Administer medications as ordered  - Instruct and encourage patient and family to use good hand hygiene technique  - Identify and instruct in appropriate isolation precautions for identified infection/condition  Outcome: Progressing  Goal: Absence of fever/infection during neutropenic period  Description: INTERVENTIONS:  - Monitor     Outcome: Progressing

## 2022-10-27 LAB
ANION GAP SERPL CALCULATED.3IONS-SCNC: 11 MMOL/L (ref 4–13)
ANION GAP SERPL CALCULATED.3IONS-SCNC: 14 MMOL/L (ref 4–13)
ANISOCYTOSIS BLD QL SMEAR: PRESENT
BASOPHILS # BLD AUTO: 0.06 THOUSANDS/ÂΜL (ref 0–0.1)
BASOPHILS # BLD MANUAL: 0 THOUSAND/UL (ref 0–0.1)
BASOPHILS NFR BLD AUTO: 0 % (ref 0–1)
BASOPHILS NFR MAR MANUAL: 0 % (ref 0–1)
BUN SERPL-MCNC: 15 MG/DL (ref 5–25)
BUN SERPL-MCNC: 17 MG/DL (ref 5–25)
CALCIUM SERPL-MCNC: 8.8 MG/DL (ref 8.3–10.1)
CALCIUM SERPL-MCNC: 9 MG/DL (ref 8.3–10.1)
CHLORIDE SERPL-SCNC: 104 MMOL/L (ref 96–108)
CHLORIDE SERPL-SCNC: 104 MMOL/L (ref 96–108)
CO2 SERPL-SCNC: 23 MMOL/L (ref 21–32)
CO2 SERPL-SCNC: 24 MMOL/L (ref 21–32)
CREAT SERPL-MCNC: 0.95 MG/DL (ref 0.6–1.3)
CREAT SERPL-MCNC: 1.02 MG/DL (ref 0.6–1.3)
EOSINOPHIL # BLD AUTO: 0 THOUSAND/ÂΜL (ref 0–0.61)
EOSINOPHIL # BLD MANUAL: 0 THOUSAND/UL (ref 0–0.4)
EOSINOPHIL NFR BLD AUTO: 0 % (ref 0–6)
EOSINOPHIL NFR BLD MANUAL: 0 % (ref 0–6)
ERYTHROCYTE [DISTWIDTH] IN BLOOD BY AUTOMATED COUNT: 14.1 % (ref 11.6–15.1)
ERYTHROCYTE [DISTWIDTH] IN BLOOD BY AUTOMATED COUNT: 14.2 % (ref 11.6–15.1)
GFR SERPL CREATININE-BSD FRML MDRD: 62 ML/MIN/1.73SQ M
GFR SERPL CREATININE-BSD FRML MDRD: 67 ML/MIN/1.73SQ M
GLUCOSE SERPL-MCNC: 168 MG/DL (ref 65–140)
GLUCOSE SERPL-MCNC: 222 MG/DL (ref 65–140)
GLUCOSE SERPL-MCNC: 254 MG/DL (ref 65–140)
GLUCOSE SERPL-MCNC: 267 MG/DL (ref 65–140)
GLUCOSE SERPL-MCNC: 271 MG/DL (ref 65–140)
GLUCOSE SERPL-MCNC: 275 MG/DL (ref 65–140)
HCT VFR BLD AUTO: 35.3 % (ref 34.8–46.1)
HCT VFR BLD AUTO: 36.7 % (ref 34.8–46.1)
HGB BLD-MCNC: 11.6 G/DL (ref 11.5–15.4)
HGB BLD-MCNC: 11.9 G/DL (ref 11.5–15.4)
IMM GRANULOCYTES # BLD AUTO: >0.5 THOUSAND/UL (ref 0–0.2)
IMM GRANULOCYTES NFR BLD AUTO: 4 % (ref 0–2)
LYMPHOCYTES # BLD AUTO: 1.67 THOUSANDS/ÂΜL (ref 0.6–4.47)
LYMPHOCYTES # BLD AUTO: 1.82 THOUSAND/UL (ref 0.6–4.47)
LYMPHOCYTES # BLD AUTO: 10 % (ref 14–44)
LYMPHOCYTES NFR BLD AUTO: 8 % (ref 14–44)
MCH RBC QN AUTO: 31.4 PG (ref 26.8–34.3)
MCH RBC QN AUTO: 31.4 PG (ref 26.8–34.3)
MCHC RBC AUTO-ENTMCNC: 32.4 G/DL (ref 31.4–37.4)
MCHC RBC AUTO-ENTMCNC: 32.9 G/DL (ref 31.4–37.4)
MCV RBC AUTO: 96 FL (ref 82–98)
MCV RBC AUTO: 97 FL (ref 82–98)
MONOCYTES # BLD AUTO: 0.54 THOUSAND/UL (ref 0–1.22)
MONOCYTES # BLD AUTO: 0.96 THOUSAND/ÂΜL (ref 0.17–1.22)
MONOCYTES NFR BLD AUTO: 5 % (ref 4–12)
MONOCYTES NFR BLD: 3 % (ref 4–12)
NEUTROPHILS # BLD AUTO: 17.36 THOUSANDS/ÂΜL (ref 1.85–7.62)
NEUTROPHILS # BLD MANUAL: 15.79 THOUSAND/UL (ref 1.85–7.62)
NEUTS SEG NFR BLD AUTO: 83 % (ref 43–75)
NEUTS SEG NFR BLD AUTO: 87 % (ref 43–75)
NRBC BLD AUTO-RTO: 0 /100 WBCS
PLATELET # BLD AUTO: 428 THOUSANDS/UL (ref 149–390)
PLATELET # BLD AUTO: 433 THOUSANDS/UL (ref 149–390)
PLATELET BLD QL SMEAR: ABNORMAL
PMV BLD AUTO: 10.2 FL (ref 8.9–12.7)
PMV BLD AUTO: 10.4 FL (ref 8.9–12.7)
POTASSIUM SERPL-SCNC: 3.2 MMOL/L (ref 3.5–5.3)
POTASSIUM SERPL-SCNC: 3.8 MMOL/L (ref 3.5–5.3)
PROCALCITONIN SERPL-MCNC: <0.05 NG/ML
RBC # BLD AUTO: 3.69 MILLION/UL (ref 3.81–5.12)
RBC # BLD AUTO: 3.79 MILLION/UL (ref 3.81–5.12)
RBC MORPH BLD: PRESENT
SODIUM SERPL-SCNC: 138 MMOL/L (ref 135–147)
SODIUM SERPL-SCNC: 142 MMOL/L (ref 135–147)
WBC # BLD AUTO: 18.15 THOUSAND/UL (ref 4.31–10.16)
WBC # BLD AUTO: 20.94 THOUSAND/UL (ref 4.31–10.16)

## 2022-10-27 RX ORDER — METHYLPREDNISOLONE SODIUM SUCCINATE 40 MG/ML
40 INJECTION, POWDER, LYOPHILIZED, FOR SOLUTION INTRAMUSCULAR; INTRAVENOUS EVERY 8 HOURS SCHEDULED
Status: DISCONTINUED | OUTPATIENT
Start: 2022-10-27 | End: 2022-10-28

## 2022-10-27 RX ORDER — POTASSIUM CHLORIDE 20 MEQ/1
40 TABLET, EXTENDED RELEASE ORAL ONCE
Status: COMPLETED | OUTPATIENT
Start: 2022-10-27 | End: 2022-10-27

## 2022-10-27 RX ADMIN — INSULIN LISPRO 1 UNITS: 100 INJECTION, SOLUTION INTRAVENOUS; SUBCUTANEOUS at 08:23

## 2022-10-27 RX ADMIN — GUAIFENESIN 600 MG: 600 TABLET, EXTENDED RELEASE ORAL at 08:15

## 2022-10-27 RX ADMIN — ZOLPIDEM TARTRATE 10 MG: 5 TABLET, COATED ORAL at 22:34

## 2022-10-27 RX ADMIN — FLUTICASONE FUROATE AND VILANTEROL TRIFENATATE 1 PUFF: 200; 25 POWDER RESPIRATORY (INHALATION) at 08:21

## 2022-10-27 RX ADMIN — HYDROCODONE POLISTIREX AND CHLORPHENIRAMINE POLISTIREX 5 ML: 10; 8 SUSPENSION, EXTENDED RELEASE ORAL at 02:03

## 2022-10-27 RX ADMIN — METHYLPREDNISOLONE SODIUM SUCCINATE 40 MG: 40 INJECTION, POWDER, FOR SOLUTION INTRAMUSCULAR; INTRAVENOUS at 14:00

## 2022-10-27 RX ADMIN — LEVALBUTEROL HYDROCHLORIDE 1.25 MG: 1.25 SOLUTION, CONCENTRATE RESPIRATORY (INHALATION) at 13:33

## 2022-10-27 RX ADMIN — FLUTICASONE PROPIONATE 2 PUFF: 110 AEROSOL, METERED RESPIRATORY (INHALATION) at 08:21

## 2022-10-27 RX ADMIN — METHYLPREDNISOLONE SODIUM SUCCINATE 40 MG: 40 INJECTION, POWDER, FOR SOLUTION INTRAMUSCULAR; INTRAVENOUS at 22:34

## 2022-10-27 RX ADMIN — HYDROCODONE POLISTIREX AND CHLORPHENIRAMINE POLISTIREX 5 ML: 10; 8 SUSPENSION, EXTENDED RELEASE ORAL at 19:06

## 2022-10-27 RX ADMIN — INSULIN LISPRO 1 UNITS: 100 INJECTION, SOLUTION INTRAVENOUS; SUBCUTANEOUS at 16:25

## 2022-10-27 RX ADMIN — METHYLPREDNISOLONE SODIUM SUCCINATE 40 MG: 40 INJECTION, POWDER, FOR SOLUTION INTRAMUSCULAR; INTRAVENOUS at 09:55

## 2022-10-27 RX ADMIN — SUCRALFATE 1 G: 1 TABLET ORAL at 08:22

## 2022-10-27 RX ADMIN — SUCRALFATE 1 G: 1 TABLET ORAL at 12:25

## 2022-10-27 RX ADMIN — PRAVASTATIN SODIUM 40 MG: 40 TABLET ORAL at 16:23

## 2022-10-27 RX ADMIN — GUAIFENESIN 600 MG: 600 TABLET, EXTENDED RELEASE ORAL at 18:29

## 2022-10-27 RX ADMIN — ENOXAPARIN SODIUM 40 MG: 40 INJECTION SUBCUTANEOUS at 08:22

## 2022-10-27 RX ADMIN — SUCRALFATE 1 G: 1 TABLET ORAL at 22:34

## 2022-10-27 RX ADMIN — HYDROCODONE POLISTIREX AND CHLORPHENIRAMINE POLISTIREX 5 ML: 10; 8 SUSPENSION, EXTENDED RELEASE ORAL at 10:35

## 2022-10-27 RX ADMIN — CITALOPRAM HYDROBROMIDE 10 MG: 20 TABLET ORAL at 22:34

## 2022-10-27 RX ADMIN — SUCRALFATE 1 G: 1 TABLET ORAL at 16:23

## 2022-10-27 RX ADMIN — METHYLPREDNISOLONE SODIUM SUCCINATE 40 MG: 40 INJECTION, POWDER, FOR SOLUTION INTRAMUSCULAR; INTRAVENOUS at 01:56

## 2022-10-27 RX ADMIN — MONTELUKAST 10 MG: 10 TABLET, FILM COATED ORAL at 22:34

## 2022-10-27 RX ADMIN — INSULIN LISPRO 2 UNITS: 100 INJECTION, SOLUTION INTRAVENOUS; SUBCUTANEOUS at 12:25

## 2022-10-27 RX ADMIN — CEFTRIAXONE SODIUM 1000 MG: 10 INJECTION, POWDER, FOR SOLUTION INTRAVENOUS at 12:56

## 2022-10-27 RX ADMIN — IPRATROPIUM BROMIDE 0.5 MG: 0.5 SOLUTION RESPIRATORY (INHALATION) at 07:16

## 2022-10-27 RX ADMIN — LEVALBUTEROL HYDROCHLORIDE 1.25 MG: 1.25 SOLUTION, CONCENTRATE RESPIRATORY (INHALATION) at 07:16

## 2022-10-27 RX ADMIN — IPRATROPIUM BROMIDE 0.5 MG: 0.5 SOLUTION RESPIRATORY (INHALATION) at 13:33

## 2022-10-27 RX ADMIN — POTASSIUM CHLORIDE 40 MEQ: 1500 TABLET, EXTENDED RELEASE ORAL at 15:30

## 2022-10-27 RX ADMIN — ZOLPIDEM TARTRATE 10 MG: 5 TABLET, COATED ORAL at 02:02

## 2022-10-27 RX ADMIN — PANTOPRAZOLE SODIUM 40 MG: 40 TABLET, DELAYED RELEASE ORAL at 05:28

## 2022-10-27 RX ADMIN — INSULIN LISPRO 2 UNITS: 100 INJECTION, SOLUTION INTRAVENOUS; SUBCUTANEOUS at 22:35

## 2022-10-27 NOTE — PROGRESS NOTES
[address] [facility]  Progress Note - Robinson Skiff 1965, 56 y o  female MRN: [id number]  Unit/Bed#: -01 Encounter: [id number]  Primary Care Provider: Julia Su   Date and time admitted to hospital: 10/24/2022  6:25 PM    * Asthma exacerbation  Assessment & Plan  Patient presenting to the ED for increased shortness of breath wheezing at home  Reports that she had taken all of her home inhaler treatments as well as a course of prednisone without improvement  Denies any associated fever/chills, chest pain, abdominal pain, nausea/vomiting/diarrhea  ED gave multiple nebulizer treatments, IV steroids, magnesium without improvement in symptoms  Currently oxygenating well on RA  Continue pre-hospital inhalers, scheduled nebulizers, IV Solu-Medrol  Wean steroids as able  Respiratory protocol  Continuous pulse ox monitoring    Type 2 diabetes mellitus, with long-term current use of insulin [facility])  Assessment & Plan  Lab Results   Component Value Date    HGBA1C 4 9 10/04/2022       Recent Labs     10/26/22  1132 10/26/22  1753 10/26/22  2104 10/27/22  0732   POCGLU 192* 203* 195* 168*       Blood Sugar Average: Last 72 hrs:  (P) 679 [id number]   · Per chart review, appears patient previously on insulin regimen  However, insulin regimen discontinued this month per the chart   Last HA1C was 4 9   ·  SSI with Accu-Cheks  · Monitor for hyperglycemia while treated with nebulizers and IV steroids  · Hypoglycemia protocol  · Diabetic diet    Hyperlipidemia  Assessment & Plan  · Continue statin    SIRS (systemic inflammatory response syndrome) (HCC)  Assessment & Plan  · Meeting SIRS criteria for leukocytosis, tachycardia, tachypnea  · Chest x-ray w/o any acute abnormalities   · Likely in the setting of acute asthma exacerbation  · Lactate remains elevated however stable, does have leukocytosis however on steroids  · CT chest abdomen and pelvis did not reveal any acute infection  · Blood cultures thus far negative  · Will continue ceftriaxone for now      VTE Pharmacologic Prophylaxis:   Pharmacologic: Heparin  Mechanical VTE Prophylaxis in Place: Yes    Patient Centered Rounds: I have performed bedside rounds with nursing staff today  Discussions with Specialists or Other Care Team Provider: cm, nursing    Education and Discussions with Family / Patient: pt    Time Spent for Care: 30 minutes  More than 50% of total time spent on counseling and coordination of care as described above  Current Length of Stay: 2 day(s)    Current Patient Status: Inpatient   Certification Statement: The patient will continue to require additional inpatient hospital stay due to see below    Discharge Plan:  Still requiring IV steroids    Code Status: Level 1 - Full Code      Subjective:   Reports breathing improved  Denies chest pain, cough, fevers, chills    Objective:     Vitals:   Temp (24hrs), Av 2 °F (36 8 °C), Min:98 2 °F (36 8 °C), Max:98 2 °F (36 8 °C)    Temp:  [98 2 °F (36 8 °C)] 98 2 °F (36 8 °C)  HR:  [] 100  Resp:  [18] 18  BP: (112-132)/(59-81) 132/81  SpO2:  [91 %-95 %] 93 %  Body mass index is 39 5 kg/m²  Input and Output Summary (last 24 hours): Intake/Output Summary (Last 24 hours) at 10/27/2022 1012  Last data filed at 10/26/2022 1831  Gross per 24 hour   Intake 440 ml   Output --   Net 440 ml       Physical Exam:     Physical Exam  Constitutional:       General: She is not in acute distress  Appearance: She is well-developed  She is not diaphoretic  HENT:      Head: Normocephalic and atraumatic  Nose: Nose normal       Mouth/Throat:      Pharynx: No oropharyngeal exudate  Eyes:      General: No scleral icterus  Right eye: No discharge  Left eye: No discharge  Conjunctiva/sclera: Conjunctivae normal    Neck:      Thyroid: No thyromegaly  Vascular: No JVD  Cardiovascular:      Rate and Rhythm: Normal rate and regular rhythm        Heart sounds: Normal heart sounds  No murmur heard  No friction rub  No gallop  Pulmonary:      Effort: Pulmonary effort is normal  No respiratory distress  Breath sounds: Normal breath sounds  No wheezing or rales  Chest:      Chest wall: No tenderness  Abdominal:      General: Bowel sounds are normal  There is no distension  Palpations: Abdomen is soft  Tenderness: There is no abdominal tenderness  There is no guarding or rebound  Musculoskeletal:         General: No tenderness or deformity  Normal range of motion  Cervical back: Normal range of motion and neck supple  Skin:     General: Skin is warm and dry  Findings: No erythema or rash  Neurological:      Mental Status: She is alert  Cranial Nerves: No cranial nerve deficit  Sensory: No sensory deficit  Motor: No abnormal muscle tone  Coordination: Coordination normal        (    Additional Data:     Labs:    Results from last 7 days   Lab Units 10/27/22  0200   WBC Thousand/uL 20 94*   HEMOGLOBIN g/dL 11 9   HEMATOCRIT % 36 7   PLATELETS Thousands/uL 433*   NEUTROS PCT % 83*   LYMPHS PCT % 8*   MONOS PCT % 5   EOS PCT % 0     Results from last 7 days   Lab Units 10/27/22  0200 10/26/22  0000 10/24/22  1901   SODIUM mmol/L 138   < > 140   POTASSIUM mmol/L 3 8   < > 3 8   CHLORIDE mmol/L 104   < > 105   CO2 mmol/L 23   < > 26   BUN mg/dL 17   < > 6   CREATININE mg/dL 0 95   < > 0 81   ANION GAP mmol/L 11   < > 9   CALCIUM mg/dL 9 0   < > 9 1   ALBUMIN g/dL  --   --  3 1*   TOTAL BILIRUBIN mg/dL  --   --  0 44   ALK PHOS U/L  --   --  167*   ALT U/L  --   --  46   AST U/L  --   --  39   GLUCOSE RANDOM mg/dL 267*   < > 107    < > = values in this interval not displayed           Results from last 7 days   Lab Units 10/27/22  0732 10/26/22  2104 10/26/22  1753 10/26/22  1132 10/26/22  0714 10/25/22  2046 10/25/22  1941 10/25/22  1642 10/25/22  1051 10/25/22  0725 10/25/22  0045 10/24/22  2229   POC GLUCOSE mg/dl 168* 195* 203* 192* 134 207* 235* 171* 238* 176* 236* 240*         Results from last 7 days   Lab Units 10/27/22  0200 10/26/22  0241 10/26/22  0006 10/25/22  2202 10/25/22  1843 10/25/22  0811 10/25/22  0433 10/25/22  0253 10/24/22  2223   LACTIC ACID mmol/L  --  3 2* 2 7* 3 3* 4 8*   < >  --    < > 6 5*   PROCALCITONIN ng/ml <0 05  --   --   --   --   --  0 06  --  0 05    < > = values in this interval not displayed  * I Have Reviewed All Lab Data Listed Above  * Additional Pertinent Lab Tests Reviewed: All Labs Within Last 24 Hours Reviewed    Imaging:    Imaging Reports Reviewed Today Include: na  Imaging Personally Reviewed by Myself Includes:  na    Recent Cultures (last 7 days):     Results from last 7 days   Lab Units 10/24/22  2222 10/24/22  2204   BLOOD CULTURE  No Growth at 48 hrs  No Growth at 48 hrs         Last 24 Hours Medication List:   Current Facility-Administered Medications   Medication Dose Route Frequency Provider Last Rate   • acetaminophen  650 mg Oral Q6H PRN Keron Denny PA-C     • cefTRIAXone  1,000 mg Intravenous Q24H Lavell Bhakta MD 1,000 mg (10/26/22 1504)   • citalopram  10 mg Oral HS Kat Alvarez PA-C     • enoxaparin  40 mg Subcutaneous Q24H [address] Kat Alvarez PA-C     • fluticasone  2 puff Inhalation BID Kat Alvarez PA-C     • fluticasone-vilanterol  1 puff Inhalation Daily Kat Alvarez PA-C     • guaiFENesin  600 mg Oral BID Kat Alvarez PA-C     • hydrocodone-chlorpheniramine polistirex  5 mL Oral Q6H PRN Lavell Bhakta MD     • insulin lispro  1-5 Units Subcutaneous 4x Daily (AC & HS) Kat Alvarez PA-C     • iohexol  50 mL Oral Once in imaging Lavell Bhakta MD     • ipratropium  0 5 mg Nebulization TID Kat Alvarez PA-C     • ipratropium-albuterol  3 mL Nebulization Q4H PRN Lavell Bhakta MD     • levalbuterol  1 25 mg Nebulization TID Lavell Bhakta MD     • methylPREDNISolone sodium succinate  40 mg Intravenous Q8H [address] Lavell Bhakta MD     • montelukast  10 mg Oral HS Kat Alvarez PA-C     • pantoprazole  40 mg Oral Early Morning Kat Alvarez PA-C     • polyethylene glycol  17 g Oral Daily Lavell Bhakta MD     • pravastatin  40 mg Oral Daily With LudiaHalle Rivas PA-C     • promethazine  12 5 mg Oral Q6H PRN Kira Hough PA-C     • sucralfate  1 g Oral 4x Daily (AC & HS) Kira Hough PA-C     • zolpidem  10 mg Oral HS PRN Ronnie Garvin PA-C          Today, Patient Was Seen By: Ghulam Locke    ** Please Note: Dictation voice to text software may have been used in the creation of this document   **

## 2022-10-27 NOTE — PLAN OF CARE
Problem: PAIN - ADULT  Goal: Verbalizes/displays adequate comfort level or baseline comfort level  Description: Interventions:  - Encourage patient to monitor pain and request assistance  - Assess pain using appropriate pain scale  - Administer analgesics based on type and severity of pain and evaluate response  - Implement non-pharmacological measures as appropriate and evaluate response  - Consider cultural and social influences on pain and pain management  - Notify physician/advanced practitioner if interventions unsuccessful or patient reports new pain  Outcome: Progressing     Problem: INFECTION - ADULT  Goal: Absence or prevention of progression during hospitalization  Description: INTERVENTIONS:  - Assess and monitor for signs and symptoms of infection  - Monitor lab/diagnostic results  - Monitor all insertion sites, i e  indwelling lines, tubes, and drains  - Monitor endotracheal if appropriate and nasal secretions for changes in amount and color  - [place] appropriate cooling/warming therapies per order  - Administer medications as ordered  - Instruct and encourage patient and family to use good hand hygiene technique  - Identify and instruct in appropriate isolation precautions for identified infection/condition  Outcome: Progressing  Goal: Absence of fever/infection during neutropenic period  Description: INTERVENTIONS:  - Monitor WBC    Outcome: Progressing     Problem: SAFETY ADULT  Goal: Patient will remain free of falls  Description: INTERVENTIONS:  - Educate patient/family on patient safety including physical limitations  - Instruct patient to call for assistance with activity   - Consult OT/PT to assist with strengthening/mobility   - Keep Call bell within reach  - Keep bed low and locked with side rails adjusted as appropriate  - Keep care items and personal belongings within reach  - Initiate and maintain comfort rounds  - Make Fall Risk Sign visible to staff  - Offer Toileting every  Hours, in advance of need  - Initiate/Maintain alarm  - Obtain necessary fall risk management equipment:   - Apply yellow socks and bracelet for high fall risk patients  - Consider moving patient to room near nurses station  Outcome: Progressing  Goal: Maintain or return to baseline ADL function  Description: INTERVENTIONS:  -  Assess patient's ability to carry out ADLs; assess patient's baseline for ADL function and identify physical deficits which impact ability to perform ADLs (bathing, care of mouth/teeth, toileting, grooming, dressing, etc )  - Assess/evaluate cause of self-care deficits   - Assess range of motion  - Assess patient's mobility; develop plan if impaired  - Assess patient's need for assistive devices and provide as appropriate  - Encourage maximum independence but intervene and supervise when necessary  - Involve family in performance of ADLs  - Assess for home care needs following discharge   - Consider OT consult to assist with ADL evaluation and planning for discharge  - Provide patient education as appropriate  Outcome: Progressing  Goal: Maintains/Returns to pre admission functional level  Description: INTERVENTIONS:  - Perform BMAT or MOVE assessment daily    - Set and communicate daily mobility goal to care team and patient/family/caregiver  - Collaborate with rehabilitation services on mobility goals if consulted  - Perform Range of Motion  times a day  - Reposition patient every  hours    - Dangle patient  times a day  - Stand patient  times a day  - Ambulate patient  times a day  - Out of bed to chair  times a day   - Out of bed for meals  times a day  - Out of bed for toileting  - Record patient progress and toleration of activity level   Outcome: Progressing     Problem: DISCHARGE PLANNING  Goal: Discharge to home or other facility with appropriate resources  Description: INTERVENTIONS:  - Identify barriers to discharge w/patient and caregiver  - Arrange for needed discharge resources and transportation as appropriate  - Identify discharge learning needs (meds, wound care, etc )  - Arrange for interpretive services to assist at discharge as needed  - Refer to Case Management Department for coordinating discharge planning if the patient needs post-hospital services based on physician/advanced practitioner order or complex needs related to functional status, cognitive ability, or social support system  Outcome: Progressing     Problem: Knowledge Deficit  Goal: Patient/family/caregiver demonstrates understanding of disease process, treatment plan, medications, and discharge instructions  Description: Complete learning assessment and assess knowledge base    Interventions:  - Provide teaching at level of understanding  - Provide teaching via preferred learning methods  Outcome: Progressing     Problem: Potential for Falls  Goal: Patient will remain free of falls  Description: INTERVENTIONS:  - Educate patient/family on patient safety including physical limitations  - Instruct patient to call for assistance with activity   - Consult OT/PT to assist with strengthening/mobility   - Keep Call bell within reach  - Keep bed low and locked with side rails adjusted as appropriate  - Keep care items and personal belongings within reach  - Initiate and maintain comfort rounds  - Make Fall Risk Sign visible to staff  - Offer Toileting every  Hours, in advance of need  - Initiate/Maintain alarm  - Obtain necessary fall risk management equipment:   - Apply yellow socks and bracelet for high fall risk patients  - Consider moving patient to room near nurses station  Outcome: Progressing

## 2022-10-27 NOTE — ASSESSMENT & PLAN NOTE
· Meeting SIRS criteria for leukocytosis, tachycardia, tachypnea  · Chest x-ray w/o any acute abnormalities   · Likely in the setting of acute asthma exacerbation  · Lactate remains elevated however stable, does have leukocytosis however on steroids  · CT chest abdomen and pelvis did not reveal any acute infection  · Blood cultures thus far negative  · Will continue ceftriaxone for now

## 2022-10-27 NOTE — PLAN OF CARE
Problem: PAIN - ADULT  Goal: Verbalizes/displays adequate comfort level or baseline comfort level  Description: Interventions:  - Encourage patient to monitor pain and request assistance  - Assess pain using appropriate pain scale  - Administer analgesics based on type and severity of pain and evaluate response  - Implement non-pharmacological measures as appropriate and evaluate response  - Consider cultural and social influences on pain and pain management  - Notify physician/advanced practitioner if interventions unsuccessful or patient reports new pain  Outcome: Progressing     Problem: INFECTION - ADULT  Goal: Absence or prevention of progression during hospitalization  Description: INTERVENTIONS:  - Assess and monitor for signs and symptoms of infection  - Monitor lab/diagnostic results  - Monitor all insertion sites, i e  indwelling lines, tubes, and drains  - Monitor endotracheal if appropriate and nasal secretions for changes in amount and color  - [place] appropriate cooling/warming therapies per order  - Administer medications as ordered  - Instruct and encourage patient and family to use good hand hygiene technique  - Identify and instruct in appropriate isolation precautions for identified infection/condition  Outcome: Progressing  Goal: Absence of fever/infection during neutropenic period  Description: INTERVENTIONS:  - Monitor WBC    Outcome: Progressing     Problem: SAFETY ADULT  Goal: Patient will remain free of falls  Description: INTERVENTIONS:  - Educate patient/family on patient safety including physical limitations  - Instruct patient to call for assistance with activity   - Consult OT/PT to assist with strengthening/mobility   - Keep Call bell within reach  - Keep bed low and locked with side rails adjusted as appropriate  - Keep care items and personal belongings within reach  - Initiate and maintain comfort rounds  - Make Fall Risk Sign visible to staff  - Offer Toileting every  Hours, in advance of need  - Initiate/Maintain alarm  - Obtain necessary fall risk management equipment:   - Apply yellow socks and bracelet for high fall risk patients  - Consider moving patient to room near nurses station  Outcome: Progressing  Goal: Maintain or return to baseline ADL function  Description: INTERVENTIONS:  -  Assess patient's ability to carry out ADLs; assess patient's baseline for ADL function and identify physical deficits which impact ability to perform ADLs (bathing, care of mouth/teeth, toileting, grooming, dressing, etc )  - Assess/evaluate cause of self-care deficits   - Assess range of motion  - Assess patient's mobility; develop plan if impaired  - Assess patient's need for assistive devices and provide as appropriate  - Encourage maximum independence but intervene and supervise when necessary  - Involve family in performance of ADLs  - Assess for home care needs following discharge   - Consider OT consult to assist with ADL evaluation and planning for discharge  - Provide patient education as appropriate  Outcome: Progressing  Goal: Maintains/Returns to pre admission functional level  Description: INTERVENTIONS:  - Perform BMAT or MOVE assessment daily    - Set and communicate daily mobility goal to care team and patient/family/caregiver  - Collaborate with rehabilitation services on mobility goals if consulted  - Perform Range of Motion  times a day  - Reposition patient every  hours    - Dangle patient  times a day  - Stand patient  times a day  - Ambulate patient  times a day  - Out of bed to chair  times a day   - Out of bed for meals  times a day  - Out of bed for toileting  - Record patient progress and toleration of activity level   Outcome: Progressing     Problem: DISCHARGE PLANNING  Goal: Discharge to home or other facility with appropriate resources  Description: INTERVENTIONS:  - Identify barriers to discharge w/patient and caregiver  - Arrange for needed discharge resources and transportation as appropriate  - Identify discharge learning needs (meds, wound care, etc )  - Arrange for interpretive services to assist at discharge as needed  - Refer to Case Management Department for coordinating discharge planning if the patient needs post-hospital services based on physician/advanced practitioner order or complex needs related to functional status, cognitive ability, or social support system  Outcome: Progressing     Problem: Knowledge Deficit  Goal: Patient/family/caregiver demonstrates understanding of disease process, treatment plan, medications, and discharge instructions  Description: Complete learning assessment and assess knowledge base    Interventions:  - Provide teaching at level of understanding  - Provide teaching via preferred learning methods  Outcome: Progressing     Problem: Potential for Falls  Goal: Patient will remain free of falls  Description: INTERVENTIONS:  - Educate patient/family on patient safety including physical limitations  - Instruct patient to call for assistance with activity   - Consult OT/PT to assist with strengthening/mobility   - Keep Call bell within reach  - Keep bed low and locked with side rails adjusted as appropriate  - Keep care items and personal belongings within reach  - Initiate and maintain comfort rounds  - Make Fall Risk Sign visible to staff  - Offer Toileting every  Hours, in advance of need  - Initiate/Maintain alarm  - Obtain necessary fall risk management equipment:   - Apply yellow socks and bracelet for high fall risk patients  - Consider moving patient to room near nurses station  Outcome: Progressing

## 2022-10-27 NOTE — ASSESSMENT & PLAN NOTE
Patient presenting to the ED for increased shortness of breath wheezing at home  Reports that she had taken all of her home inhaler treatments as well as a course of prednisone without improvement  Denies any associated fever/chills, chest pain, abdominal pain, nausea/vomiting/diarrhea    ED gave multiple nebulizer treatments, IV steroids, magnesium without improvement in symptoms  Currently oxygenating well on RA  Continue pre-hospital inhalers, scheduled nebulizers, IV Solu-Medrol  Wean steroids as able  Respiratory protocol  Continuous pulse ox monitoring

## 2022-10-27 NOTE — ASSESSMENT & PLAN NOTE
Lab Results   Component Value Date    HGBA1C 4 9 10/04/2022       Recent Labs     10/26/22  1132 10/26/22  1753 10/26/22  2104 10/27/22  0732   POCGLU 192* 203* 195* 168*       Blood Sugar Average: Last 72 hrs:  (P) 441 [id number]   · Per chart review, appears patient previously on insulin regimen  However, insulin regimen discontinued this month per the chart   Last HA1C was 4 9   ·  SSI with Accu-Cheks  · Monitor for hyperglycemia while treated with nebulizers and IV steroids  · Hypoglycemia protocol  · Diabetic diet

## 2022-10-27 NOTE — UTILIZATION REVIEW
NOTIFICATION OF INPATIENT ADMISSION   AUTHORIZATION REQUEST   SERVICING FACILITY:   [address] [place] [address]  Tax ID: [id number]  NPI: [id number] ATTENDING PROVIDER:  Attending Name and NPI#: Leonardo White [[id number]]  Address: [address] [place] [address]  Phone: [phone]     ADMISSION INFORMATION:  [facility]patient [facility]  Place of Service Code: 21  Inpatient Admission Date/Time: 10/25/22  1:26 PM  Discharge Date/Time: No discharge date for patient encounter  Admitting Diagnosis Code/Description:  Asthma [J45 909]  Acute asthma exacerbation [J45 901]     UTILIZATION REVIEW CONTACT:  Elizabeth Fuentes Utilization   Network Utilization Review Department  Phone: [phone]  Fax [phone]  Email: [email or web address]  Contact for approvals/pending authorizations, clinical reviews, and discharge  PHYSICIAN ADVISORY SERVICES:  Medical Necessity Denial & Eqhk-[id number] Review  Phone: [phone]  Fax: [phone]  Email: [email or web address]

## 2022-10-28 LAB
ANION GAP SERPL CALCULATED.3IONS-SCNC: 9 MMOL/L (ref 4–13)
BUN SERPL-MCNC: 17 MG/DL (ref 5–25)
CALCIUM SERPL-MCNC: 8.7 MG/DL (ref 8.3–10.1)
CHLORIDE SERPL-SCNC: 103 MMOL/L (ref 96–108)
CO2 SERPL-SCNC: 26 MMOL/L (ref 21–32)
CREAT SERPL-MCNC: 0.91 MG/DL (ref 0.6–1.3)
ERYTHROCYTE [DISTWIDTH] IN BLOOD BY AUTOMATED COUNT: 13.9 % (ref 11.6–15.1)
GFR SERPL CREATININE-BSD FRML MDRD: 71 ML/MIN/1.73SQ M
GLUCOSE SERPL-MCNC: 144 MG/DL (ref 65–140)
GLUCOSE SERPL-MCNC: 211 MG/DL (ref 65–140)
GLUCOSE SERPL-MCNC: 216 MG/DL (ref 65–140)
GLUCOSE SERPL-MCNC: 284 MG/DL (ref 65–140)
GLUCOSE SERPL-MCNC: 309 MG/DL (ref 65–140)
GLUCOSE SERPL-MCNC: 323 MG/DL (ref 65–140)
HCT VFR BLD AUTO: 35.5 % (ref 34.8–46.1)
HGB BLD-MCNC: 11.9 G/DL (ref 11.5–15.4)
MCH RBC QN AUTO: 31.6 PG (ref 26.8–34.3)
MCHC RBC AUTO-ENTMCNC: 33.5 G/DL (ref 31.4–37.4)
MCV RBC AUTO: 94 FL (ref 82–98)
NRBC BLD AUTO-RTO: 0 /100 WBCS
PLATELET # BLD AUTO: 429 THOUSANDS/UL (ref 149–390)
PMV BLD AUTO: 10.3 FL (ref 8.9–12.7)
POTASSIUM SERPL-SCNC: 3.4 MMOL/L (ref 3.5–5.3)
RBC # BLD AUTO: 3.77 MILLION/UL (ref 3.81–5.12)
SODIUM SERPL-SCNC: 138 MMOL/L (ref 135–147)
WBC # BLD AUTO: 17.25 THOUSAND/UL (ref 4.31–10.16)

## 2022-10-28 RX ORDER — METHYLPREDNISOLONE SODIUM SUCCINATE 40 MG/ML
40 INJECTION, POWDER, LYOPHILIZED, FOR SOLUTION INTRAMUSCULAR; INTRAVENOUS EVERY 8 HOURS SCHEDULED
Status: CANCELLED | OUTPATIENT
Start: 2022-10-28

## 2022-10-28 RX ORDER — METHYLPREDNISOLONE SODIUM SUCCINATE 40 MG/ML
40 INJECTION, POWDER, LYOPHILIZED, FOR SOLUTION INTRAMUSCULAR; INTRAVENOUS EVERY 12 HOURS SCHEDULED
Status: DISCONTINUED | OUTPATIENT
Start: 2022-10-28 | End: 2022-10-29

## 2022-10-28 RX ORDER — ALBUTEROL SULFATE 2.5 MG/3ML
10 SOLUTION RESPIRATORY (INHALATION) ONCE
Status: COMPLETED | OUTPATIENT
Start: 2022-10-28 | End: 2022-10-28

## 2022-10-28 RX ORDER — FUROSEMIDE 10 MG/ML
40 INJECTION INTRAMUSCULAR; INTRAVENOUS ONCE
Status: COMPLETED | OUTPATIENT
Start: 2022-10-28 | End: 2022-10-28

## 2022-10-28 RX ORDER — POTASSIUM CHLORIDE 20 MEQ/1
40 TABLET, EXTENDED RELEASE ORAL ONCE
Status: COMPLETED | OUTPATIENT
Start: 2022-10-28 | End: 2022-10-28

## 2022-10-28 RX ORDER — ALBUTEROL SULFATE 2.5 MG/3ML
SOLUTION RESPIRATORY (INHALATION)
Status: COMPLETED
Start: 2022-10-28 | End: 2022-10-28

## 2022-10-28 RX ADMIN — IPRATROPIUM BROMIDE 0.5 MG: 0.5 SOLUTION RESPIRATORY (INHALATION) at 07:32

## 2022-10-28 RX ADMIN — CITALOPRAM HYDROBROMIDE 10 MG: 20 TABLET ORAL at 21:02

## 2022-10-28 RX ADMIN — PROMETHAZINE HYDROCHLORIDE 12.5 MG: 6.25 SYRUP ORAL at 21:02

## 2022-10-28 RX ADMIN — INSULIN LISPRO 1 UNITS: 100 INJECTION, SOLUTION INTRAVENOUS; SUBCUTANEOUS at 21:18

## 2022-10-28 RX ADMIN — LEVALBUTEROL HYDROCHLORIDE 1.25 MG: 1.25 SOLUTION, CONCENTRATE RESPIRATORY (INHALATION) at 20:08

## 2022-10-28 RX ADMIN — FLUTICASONE PROPIONATE 2 PUFF: 110 AEROSOL, METERED RESPIRATORY (INHALATION) at 20:46

## 2022-10-28 RX ADMIN — IPRATROPIUM BROMIDE 0.5 MG: 0.5 SOLUTION RESPIRATORY (INHALATION) at 20:08

## 2022-10-28 RX ADMIN — MONTELUKAST 10 MG: 10 TABLET, FILM COATED ORAL at 21:02

## 2022-10-28 RX ADMIN — ALBUTEROL SULFATE 10 MG: 2.5 SOLUTION RESPIRATORY (INHALATION) at 01:41

## 2022-10-28 RX ADMIN — ZOLPIDEM TARTRATE 10 MG: 5 TABLET, COATED ORAL at 21:02

## 2022-10-28 RX ADMIN — POTASSIUM CHLORIDE 40 MEQ: 1500 TABLET, EXTENDED RELEASE ORAL at 12:39

## 2022-10-28 RX ADMIN — FLUTICASONE FUROATE AND VILANTEROL TRIFENATATE 1 PUFF: 200; 25 POWDER RESPIRATORY (INHALATION) at 09:00

## 2022-10-28 RX ADMIN — CEFTRIAXONE SODIUM 1000 MG: 10 INJECTION, POWDER, FOR SOLUTION INTRAVENOUS at 13:54

## 2022-10-28 RX ADMIN — GUAIFENESIN 600 MG: 600 TABLET, EXTENDED RELEASE ORAL at 09:00

## 2022-10-28 RX ADMIN — METHYLPREDNISOLONE SODIUM SUCCINATE 40 MG: 40 INJECTION, POWDER, FOR SOLUTION INTRAMUSCULAR; INTRAVENOUS at 05:58

## 2022-10-28 RX ADMIN — HYDROCODONE POLISTIREX AND CHLORPHENIRAMINE POLISTIREX 5 ML: 10; 8 SUSPENSION, EXTENDED RELEASE ORAL at 01:13

## 2022-10-28 RX ADMIN — SUCRALFATE 1 G: 1 TABLET ORAL at 09:00

## 2022-10-28 RX ADMIN — PRAVASTATIN SODIUM 40 MG: 40 TABLET ORAL at 17:15

## 2022-10-28 RX ADMIN — IPRATROPIUM BROMIDE 0.5 MG: 0.5 SOLUTION RESPIRATORY (INHALATION) at 01:38

## 2022-10-28 RX ADMIN — IPRATROPIUM BROMIDE 0.5 MG: 0.5 SOLUTION RESPIRATORY (INHALATION) at 13:55

## 2022-10-28 RX ADMIN — ENOXAPARIN SODIUM 40 MG: 40 INJECTION SUBCUTANEOUS at 09:45

## 2022-10-28 RX ADMIN — IPRATROPIUM BROMIDE AND ALBUTEROL SULFATE 3 ML: 2.5; .5 SOLUTION RESPIRATORY (INHALATION) at 01:24

## 2022-10-28 RX ADMIN — PANTOPRAZOLE SODIUM 40 MG: 40 TABLET, DELAYED RELEASE ORAL at 05:57

## 2022-10-28 RX ADMIN — INSULIN LISPRO 2 UNITS: 100 INJECTION, SOLUTION INTRAVENOUS; SUBCUTANEOUS at 15:49

## 2022-10-28 RX ADMIN — LEVALBUTEROL HYDROCHLORIDE 1.25 MG: 1.25 SOLUTION, CONCENTRATE RESPIRATORY (INHALATION) at 07:32

## 2022-10-28 RX ADMIN — HYDROCODONE POLISTIREX AND CHLORPHENIRAMINE POLISTIREX 5 ML: 10; 8 SUSPENSION, EXTENDED RELEASE ORAL at 10:08

## 2022-10-28 RX ADMIN — LEVALBUTEROL HYDROCHLORIDE 1.25 MG: 1.25 SOLUTION, CONCENTRATE RESPIRATORY (INHALATION) at 13:55

## 2022-10-28 RX ADMIN — INSULIN LISPRO 3 UNITS: 100 INJECTION, SOLUTION INTRAVENOUS; SUBCUTANEOUS at 12:00

## 2022-10-28 RX ADMIN — METHYLPREDNISOLONE SODIUM SUCCINATE 40 MG: 40 INJECTION, POWDER, FOR SOLUTION INTRAMUSCULAR; INTRAVENOUS at 21:02

## 2022-10-28 RX ADMIN — HYDROCODONE POLISTIREX AND CHLORPHENIRAMINE POLISTIREX 5 ML: 10; 8 SUSPENSION, EXTENDED RELEASE ORAL at 14:28

## 2022-10-28 RX ADMIN — FUROSEMIDE 40 MG: 10 INJECTION, SOLUTION INTRAMUSCULAR; INTRAVENOUS at 12:40

## 2022-10-28 RX ADMIN — IPRATROPIUM BROMIDE 0.5 MG: 0.5 SOLUTION RESPIRATORY (INHALATION) at 01:42

## 2022-10-28 RX ADMIN — FLUTICASONE PROPIONATE 2 PUFF: 110 AEROSOL, METERED RESPIRATORY (INHALATION) at 09:00

## 2022-10-28 RX ADMIN — GUAIFENESIN 600 MG: 600 TABLET, EXTENDED RELEASE ORAL at 17:14

## 2022-10-28 NOTE — PLAN OF CARE
Problem: PAIN - ADULT  Goal: Verbalizes/displays adequate comfort level or baseline comfort level  Description: Interventions:  - Encourage patient to monitor pain and request assistance  - Assess pain using appropriate pain scale  - Administer analgesics based on type and severity of pain and evaluate response  - Implement non-pharmacological measures as appropriate and evaluate response  - Consider cultural and social influences on pain and pain management  - Notify physician/advanced practitioner if interventions unsuccessful or patient reports new pain  Outcome: Progressing     Problem: INFECTION - ADULT  Goal: Absence or prevention of progression during hospitalization  Description: INTERVENTIONS:  - Assess and monitor for signs and symptoms of infection  - Monitor lab/diagnostic results  - Monitor all insertion sites, i e  indwelling lines, tubes, and drains  - Monitor endotracheal if appropriate and nasal secretions for changes in amount and color  - [place] appropriate cooling/warming therapies per order  - Administer medications as ordered  - Instruct and encourage patient and family to use good hand hygiene technique  - Identify and instruct in appropriate isolation precautions for identified infection/condition  Outcome: Progressing  Goal: Absence of fever/infection during neutropenic period  Description: INTERVENTIONS:  - Monitor WBC    Outcome: Progressing     Problem: SAFETY ADULT  Goal: Patient will remain free of falls  Description: INTERVENTIONS:  - Educate patient/family on patient safety including physical limitations  - Instruct patient to call for assistance with activity   - Consult OT/PT to assist with strengthening/mobility   - Keep Call bell within reach  - Keep bed low and locked with side rails adjusted as appropriate  - Keep care items and personal belongings within reach  - Initiate and maintain comfort rounds  - Make Fall Risk Sign visible to staff  - Offer Toileting every  Hours, in advance of need  - Initiate/Maintain alarm  - Obtain necessary fall risk management equipment:   - Apply yellow socks and bracelet for high fall risk patients  - Consider moving patient to room near nurses station  Outcome: Progressing  Goal: Maintain or return to baseline ADL function  Description: INTERVENTIONS:  -  Assess patient's ability to carry out ADLs; assess patient's baseline for ADL function and identify physical deficits which impact ability to perform ADLs (bathing, care of mouth/teeth, toileting, grooming, dressing, etc )  - Assess/evaluate cause of self-care deficits   - Assess range of motion  - Assess patient's mobility; develop plan if impaired  - Assess patient's need for assistive devices and provide as appropriate  - Encourage maximum independence but intervene and supervise when necessary  - Involve family in performance of ADLs  - Assess for home care needs following discharge   - Consider OT consult to assist with ADL evaluation and planning for discharge  - Provide patient education as appropriate  Outcome: Progressing  Goal: Maintains/Returns to pre admission functional level  Description: INTERVENTIONS:  - Perform BMAT or MOVE assessment daily    - Set and communicate daily mobility goal to care team and patient/family/caregiver  - Collaborate with rehabilitation services on mobility goals if consulted  - Perform Range of Motion  times a day  - Reposition patient every  hours    - Dangle patient  times a day  - Stand patient  times a day  - Ambulate patient  times a day  - Out of bed to chair  times a day   - Out of bed for meals  times a day  - Out of bed for toileting  - Record patient progress and toleration of activity level   Outcome: Progressing     Problem: DISCHARGE PLANNING  Goal: Discharge to home or other facility with appropriate resources  Description: INTERVENTIONS:  - Identify barriers to discharge w/patient and caregiver  - Arrange for needed discharge resources and transportation as appropriate  - Identify discharge learning needs (meds, wound care, etc )  - Arrange for interpretive services to assist at discharge as needed  - Refer to Case Management Department for coordinating discharge planning if the patient needs post-hospital services based on physician/advanced practitioner order or complex needs related to functional status, cognitive ability, or social support system  Outcome: Progressing     Problem: Knowledge Deficit  Goal: Patient/family/caregiver demonstrates understanding of disease process, treatment plan, medications, and discharge instructions  Description: Complete learning assessment and assess knowledge base    Interventions:  - Provide teaching at level of understanding  - Provide teaching via preferred learning methods  Outcome: Progressing     Problem: Potential for Falls  Goal: Patient will remain free of falls  Description: INTERVENTIONS:  - Educate patient/family on patient safety including physical limitations  - Instruct patient to call for assistance with activity   - Consult OT/PT to assist with strengthening/mobility   - Keep Call bell within reach  - Keep bed low and locked with side rails adjusted as appropriate  - Keep care items and personal belongings within reach  - Initiate and maintain comfort rounds  - Make Fall Risk Sign visible to staff  - Offer Toileting every  Hours, in advance of need  - Initiate/Maintain alarm  - Obtain necessary fall risk management equipment:   - Apply yellow socks and bracelet for high fall risk patients  - Consider moving patient to room near nurses station  Outcome: Progressing

## 2022-10-28 NOTE — ASSESSMENT & PLAN NOTE
Lab Results   Component Value Date    HGBA1C 4 9 10/04/2022       Recent Labs     10/27/22  1111 10/27/22  1611 10/27/22  2120 10/28/22  0736   POCGLU 271* 222* 254* 144*       Blood Sugar Average: Last 72 hrs:  (P) 432 [id number]   · Per chart review, appears patient previously on insulin regimen  However, insulin regimen discontinued this month per the chart   Last HA1C was 4 9   ·  SSI with Accu-Cheks  · Monitor for hyperglycemia while treated with nebulizers and IV steroids  · Hypoglycemia protocol  · Diabetic diet S

## 2022-10-28 NOTE — PROGRESS NOTES
[address] [facility]  Progress Note - Charlesemmanuel Jasper 1965, 56 y o  female MRN: [id number]  Unit/Bed#: -01 Encounter: [id number]  Primary Care Provider: Valerie Seay   Date and time admitted to hospital: 10/24/2022  6:25 PM    * Asthma exacerbation  Assessment & Plan  Patient presenting to the ED for increased shortness of breath wheezing at home  Reports that she had taken all of her home inhaler treatments as well as a course of prednisone without improvement  Denies any associated fever/chills, chest pain, abdominal pain, nausea/vomiting/diarrhea  ED gave multiple nebulizer treatments, IV steroids, magnesium without improvement in symptoms  Currently oxygenating well on RA  Continue pre-hospital inhalers, scheduled nebulizers, IV Solu-Medrol  Wean steroids as able  Respiratory protocol  Continuous pulse ox monitoring    Type 2 diabetes mellitus, with long-term current use of insulin [facility])  Assessment & Plan  Lab Results   Component Value Date    HGBA1C 4 9 10/04/2022       Recent Labs     10/27/22  1111 10/27/22  1611 10/27/22  2120 10/28/22  0736   POCGLU 271* 222* 254* 144*       Blood Sugar Average: Last 72 hrs:  (P) 397 [id number]   · Per chart review, appears patient previously on insulin regimen  However, insulin regimen discontinued this month per the chart   Last HA1C was 4 9   ·  SSI with Accu-Cheks  · Monitor for hyperglycemia while treated with nebulizers and IV steroids  · Hypoglycemia protocol  · Diabetic diet S    Hyperlipidemia  Assessment & Plan  Continue statin    SIRS (systemic inflammatory response syndrome) (HCC)  Assessment & Plan  · Meeting SIRS criteria for leukocytosis, tachycardia, tachypnea  · Chest x-ray w/o any acute abnormalities   · Likely in the setting of acute asthma exacerbation  · Lactate remains elevated however stable, does have leukocytosis however on steroids  · CT chest abdomen and pelvis did not reveal any acute infection  · Blood cultures thus far negative  · Will continue ceftriaxone for now      VTE Pharmacologic Prophylaxis:   Pharmacologic: Heparin  Mechanical VTE Prophylaxis in Place: Yes    Patient Centered Rounds: I have performed bedside rounds with nursing staff today  Discussions with Specialists or Other Care Team Provider: cm, nursing    Education and Discussions with Family / Patient: pt    Time Spent for Care: 30 minutes  More than 50% of total time spent on counseling and coordination of care as described above  Current Length of Stay: 3 day(s)    Current Patient Status: Inpatient   Certification Statement: The patient will continue to require additional inpatient hospital stay due to see below    Discharge Plan:  Still requiring IV steroids  Code Status: Level 1 - Full Code      Subjective:   Reports breathing improved  Denies chest pain, abdominal pain, nausea    Objective:     Vitals:   Temp (24hrs), Av 4 °F (36 3 °C), Min:97 4 °F (36 3 °C), Max:97 4 °F (36 3 °C)    Temp:  [97 4 °F (36 3 °C)] 97 4 °F (36 3 °C)  HR:  [73-80] 73  Resp:  [18-20] 20  BP: (126-127)/(73-78) 127/78  SpO2:  [93 %-98 %] 98 %  Body mass index is 39 5 kg/m²  Input and Output Summary (last 24 hours): Intake/Output Summary (Last 24 hours) at 10/28/2022 1013  Last data filed at 10/27/2022 1301  Gross per 24 hour   Intake 100 ml   Output --   Net 100 ml       Physical Exam:     Physical Exam  Constitutional:       General: She is not in acute distress  Appearance: She is well-developed  She is not diaphoretic  HENT:      Head: Normocephalic and atraumatic  Nose: Nose normal       Mouth/Throat:      Pharynx: No oropharyngeal exudate  Eyes:      General: No scleral icterus  Right eye: No discharge  Left eye: No discharge  Conjunctiva/sclera: Conjunctivae normal    Neck:      Thyroid: No thyromegaly  Vascular: No JVD  Cardiovascular:      Rate and Rhythm: Normal rate and regular rhythm        Heart sounds: Normal heart sounds  No murmur heard  No friction rub  No gallop  Pulmonary:      Effort: Pulmonary effort is normal  No respiratory distress  Breath sounds: Normal breath sounds  No wheezing or rales  Chest:      Chest wall: No tenderness  Abdominal:      General: Bowel sounds are normal  There is no distension  Palpations: Abdomen is soft  Tenderness: There is no abdominal tenderness  There is no guarding or rebound  Musculoskeletal:         General: No tenderness or deformity  Normal range of motion  Cervical back: Normal range of motion and neck supple  Skin:     General: Skin is warm and dry  Findings: No erythema or rash  Neurological:      Mental Status: She is alert and oriented to person, place, and time  Mental status is at baseline  Cranial Nerves: No cranial nerve deficit  Sensory: No sensory deficit  Motor: No abnormal muscle tone  Coordination: Coordination normal          Additional Data:     Labs:    Results from last 7 days   Lab Units 10/28/22  0532 10/27/22  1107 10/27/22  0200   WBC Thousand/uL 17 25* 18 15* 20 94*   HEMOGLOBIN g/dL 11 9 11 6 11 9   HEMATOCRIT % 35 5 35 3 36 7   PLATELETS Thousands/uL 429* 428* 433*   NEUTROS PCT %  --   --  83*   LYMPHS PCT %  --   --  8*   LYMPHO PCT %  --  10*  --    MONOS PCT %  --   --  5   MONO PCT %  --  3*  --    EOS PCT %  --  0 0     Results from last 7 days   Lab Units 10/28/22  0532 10/26/22  0000 10/24/22  1901   SODIUM mmol/L 138   < > 140   POTASSIUM mmol/L 3 4*   < > 3 8   CHLORIDE mmol/L 103   < > 105   CO2 mmol/L 26   < > 26   BUN mg/dL 17   < > 6   CREATININE mg/dL 0 91   < > 0 81   ANION GAP mmol/L 9   < > 9   CALCIUM mg/dL 8 7   < > 9 1   ALBUMIN g/dL  --   --  3 1*   TOTAL BILIRUBIN mg/dL  --   --  0 44   ALK PHOS U/L  --   --  167*   ALT U/L  --   --  46   AST U/L  --   --  39   GLUCOSE RANDOM mg/dL 211*   < > 107    < > = values in this interval not displayed  Results from last 7 days   Lab Units 10/28/22  0736 10/27/22  2120 10/27/22  1611 10/27/22  1111 10/27/22  0732 10/26/22  2104 10/26/22  1753 10/26/22  1132 10/26/22  0714 10/25/22  2046 10/25/22  1941 10/25/22  1642   POC GLUCOSE mg/dl 144* 254* 222* 271* 168* 195* 203* 192* 134 207* 235* 171*         Results from last 7 days   Lab Units 10/27/22  0200 10/26/22  0241 10/26/22  0006 10/25/22  2202 10/25/22  1843 10/25/22  0811 10/25/22  0433 10/25/22  0253 10/24/22  2223   LACTIC ACID mmol/L  --  3 2* 2 7* 3 3* 4 8*   < >  --    < > 6 5*   PROCALCITONIN ng/ml <0 05  --   --   --   --   --  0 06  --  0 05    < > = values in this interval not displayed  * I Have Reviewed All Lab Data Listed Above  * Additional Pertinent Lab Tests Reviewed: All Labs Within Last 24 Hours Reviewed    Imaging:    Imaging Reports Reviewed Today Include: na  Imaging Personally Reviewed by Myself Includes:  na    Recent Cultures (last 7 days):     Results from last 7 days   Lab Units 10/24/22  2222 10/24/22  2204   BLOOD CULTURE  No Growth at 72 hrs  No Growth at 72 hrs         Last 24 Hours Medication List:   Current Facility-Administered Medications   Medication Dose Route Frequency Provider Last Rate   • acetaminophen  650 mg Oral Q6H PRN Kira Hough PA-C     • cefTRIAXone  1,000 mg Intravenous Q24H Lavell Bhakta  mL/hr at 10/27/22 1301   • citalopram  10 mg Oral HS Kat Alvarez PA-C     • enoxaparin  40 mg Subcutaneous Q24H [facility] Kat Alvarez PA-C     • fluticasone  2 puff Inhalation BID Kat Alvarez PA-C     • fluticasone-vilanterol  1 puff Inhalation Daily Kat Alvarez PA-C     • guaiFENesin  600 mg Oral BID Kat Alvarez PA-C     • hydrocodone-chlorpheniramine polistirex  5 mL Oral Q6H PRN Lavell Bhakta MD     • insulin lispro  1-5 Units Subcutaneous 4x Daily (AC & HS) Kat Alvarez PA-C     • iohexol  50 mL Oral Once in imaging Lavell Bhakta MD     • ipratropium  0 5 mg Nebulization TID Kira Hough PA-C     • ipratropium-albuterol  3 mL Nebulization Q4H PRN Lavell Bhakta MD     • levalbuterol  1 25 mg Nebulization TID Lavell Bhakta MD     • methylPREDNISolone sodium succinate  40 mg Intravenous Q8H [facility] Lavell Bhakta MD     • montelukast  10 mg Oral HS Kat Alvarez PA-C     • pantoprazole  40 mg Oral Early Morning Kat Alvarez PA-C     • polyethylene glycol  17 g Oral Daily Lavell Bhakta MD     • potassium chloride  40 mEq Oral Once Lavell Bhakta MD     • pravastatin  40 mg Oral Daily With Spottly LANA Rivas     • promethazine  12 5 mg Oral Q6H PRN Aleta Odonnell PA-C     • sucralfate  1 g Oral 4x Daily (AC & HS) Aleta Odonnell PA-C     • zolpidem  10 mg Oral HS PRN Devon Lynn PA-C          Today, Patient Was Seen By: Dmitriy Valera    ** Please Note: Dictation voice to text software may have been used in the creation of this document   **

## 2022-10-28 NOTE — PLAN OF CARE
Problem: PAIN - ADULT  Goal: Verbalizes/displays adequate comfort level or baseline comfort level  Description: Interventions:  - Encourage patient to monitor pain and request assistance  - Assess pain using appropriate pain scale  - Administer analgesics based on type and severity of pain and evaluate response  - Implement non-pharmacological measures as appropriate and evaluate response  - Consider cultural and social influences on pain and pain management  - Notify physician/advanced practitioner if interventions unsuccessful or patient reports new pain  Outcome: Progressing     Problem: INFECTION - ADULT  Goal: Absence or prevention of progression during hospitalization  Description: INTERVENTIONS:  - Assess and monitor for signs and symptoms of infection  - Monitor lab/diagnostic results  - Monitor all insertion sites, i e  indwelling lines, tubes, and drains  - Monitor endotracheal if appropriate and nasal secretions for changes in amount and color  - [place] appropriate cooling/warming therapies per order  - Administer medications as ordered  - Instruct and encourage patient and family to use good hand hygiene technique  - Identify and instruct in appropriate isolation precautions for identified infection/condition  Outcome: Progressing  Goal: Absence of fever/infection during neutropenic period  Description: INTERVENTIONS:  - Monitor WBC    Outcome: Progressing     Problem: SAFETY ADULT  Goal: Patient will remain free of falls  Description: INTERVENTIONS:  - Educate patient/family on patient safety including physical limitations  - Instruct patient to call for assistance with activity   - Consult OT/PT to assist with strengthening/mobility   - Keep Call bell within reach  - Keep bed low and locked with side rails adjusted as appropriate  - Keep care items and personal belongings within reach  - Initiate and maintain comfort rounds  - Make Fall Risk Sign visible to staff  - Offer Toileting every  Hours, in advance of need  - Initiate/Maintain alarm  - Obtain necessary fall risk management equipment:   - Apply yellow socks and bracelet for high fall risk patients  - Consider moving patient to room near nurses station  Outcome: Progressing  Goal: Maintain or return to baseline ADL function  Description: INTERVENTIONS:  -  Assess patient's ability to carry out ADLs; assess patient's baseline for ADL function and identify physical deficits which impact ability to perform ADLs (bathing, care of mouth/teeth, toileting, grooming, dressing, etc )  - Assess/evaluate cause of self-care deficits   - Assess range of motion  - Assess patient's mobility; develop plan if impaired  - Assess patient's need for assistive devices and provide as appropriate  - Encourage maximum independence but intervene and supervise when necessary  - Involve family in performance of ADLs  - Assess for home care needs following discharge   - Consider OT consult to assist with ADL evaluation and planning for discharge  - Provide patient education as appropriate  Outcome: Progressing  Goal: Maintains/Returns to pre admission functional level  Description: INTERVENTIONS:  - Perform BMAT or MOVE assessment daily    - Set and communicate daily mobility goal to care team and patient/family/caregiver  - Collaborate with rehabilitation services on mobility goals if consulted  - Perform Range of Motion  times a day  - Reposition patient every  hours    - Dangle patient  times a day  - Stand patient  times a day  - Ambulate patient  times a day  - Out of bed to chair  times a day   - Out of bed for meals  times a day  - Out of bed for toileting  - Record patient progress and toleration of activity level   Outcome: Progressing     Problem: DISCHARGE PLANNING  Goal: Discharge to home or other facility with appropriate resources  Description: INTERVENTIONS:  - Identify barriers to discharge w/patient and caregiver  - Arrange for needed discharge resources and transportation as appropriate  - Identify discharge learning needs (meds, wound care, etc )  - Arrange for interpretive services to assist at discharge as needed  - Refer to Case Management Department for coordinating discharge planning if the patient needs post-hospital services based on physician/advanced practitioner order or complex needs related to functional status, cognitive ability, or social support system  Outcome: Progressing     Problem: Knowledge Deficit  Goal: Patient/family/caregiver demonstrates understanding of disease process, treatment plan, medications, and discharge instructions  Description: Complete learning assessment and assess knowledge base    Interventions:  - Provide teaching at level of understanding  - Provide teaching via preferred learning methods  Outcome: Progressing     Problem: Potential for Falls  Goal: Patient will remain free of falls  Description: INTERVENTIONS:  - Educate patient/family on patient safety including physical limitations  - Instruct patient to call for assistance with activity   - Consult OT/PT to assist with strengthening/mobility   - Keep Call bell within reach  - Keep bed low and locked with side rails adjusted as appropriate  - Keep care items and personal belongings within reach  - Initiate and maintain comfort rounds  - Make Fall Risk Sign visible to staff  - Offer Toileting every Hours, in advance of need  - Initiate/Maintain alarm  - Obtain necessary fall risk management equipment:   - Apply yellow socks and bracelet for high fall risk patients  - Consider moving patient to room near nurses station  Outcome: Progressing

## 2022-10-29 ENCOUNTER — APPOINTMENT (INPATIENT)
Dept: CT IMAGING | Facility: HOSPITAL | Age: 57
End: 2022-10-29

## 2022-10-29 PROBLEM — S30.1XXA RECTUS SHEATH HEMATOMA: Status: ACTIVE | Noted: 2022-10-29

## 2022-10-29 LAB
ALBUMIN SERPL BCP-MCNC: 2.7 G/DL (ref 3.5–5)
ALP SERPL-CCNC: 106 U/L (ref 46–116)
ALT SERPL W P-5'-P-CCNC: 37 U/L (ref 12–78)
ANION GAP SERPL CALCULATED.3IONS-SCNC: 9 MMOL/L (ref 4–13)
AST SERPL W P-5'-P-CCNC: 30 U/L (ref 5–45)
BILIRUB SERPL-MCNC: 0.33 MG/DL (ref 0.2–1)
BUN SERPL-MCNC: 18 MG/DL (ref 5–25)
CALCIUM ALBUM COR SERPL-MCNC: 9.4 MG/DL (ref 8.3–10.1)
CALCIUM SERPL-MCNC: 8.4 MG/DL (ref 8.3–10.1)
CHLORIDE SERPL-SCNC: 103 MMOL/L (ref 96–108)
CO2 SERPL-SCNC: 29 MMOL/L (ref 21–32)
CREAT SERPL-MCNC: 0.68 MG/DL (ref 0.6–1.3)
ERYTHROCYTE [DISTWIDTH] IN BLOOD BY AUTOMATED COUNT: 14.2 % (ref 11.6–15.1)
GFR SERPL CREATININE-BSD FRML MDRD: 98 ML/MIN/1.73SQ M
GLUCOSE SERPL-MCNC: 129 MG/DL (ref 65–140)
GLUCOSE SERPL-MCNC: 134 MG/DL (ref 65–140)
GLUCOSE SERPL-MCNC: 137 MG/DL (ref 65–140)
GLUCOSE SERPL-MCNC: 158 MG/DL (ref 65–140)
GLUCOSE SERPL-MCNC: 174 MG/DL (ref 65–140)
HCT VFR BLD AUTO: 36.2 % (ref 34.8–46.1)
HCT VFR BLD AUTO: 38.1 % (ref 34.8–46.1)
HGB BLD-MCNC: 11.8 G/DL (ref 11.5–15.4)
HGB BLD-MCNC: 12.3 G/DL (ref 11.5–15.4)
MCH RBC QN AUTO: 31.2 PG (ref 26.8–34.3)
MCHC RBC AUTO-ENTMCNC: 32.6 G/DL (ref 31.4–37.4)
MCV RBC AUTO: 96 FL (ref 82–98)
PLATELET # BLD AUTO: 437 THOUSANDS/UL (ref 149–390)
PMV BLD AUTO: 10.3 FL (ref 8.9–12.7)
POTASSIUM SERPL-SCNC: 3.9 MMOL/L (ref 3.5–5.3)
PROT SERPL-MCNC: 6.7 G/DL (ref 6.4–8.4)
RBC # BLD AUTO: 3.78 MILLION/UL (ref 3.81–5.12)
SODIUM SERPL-SCNC: 141 MMOL/L (ref 135–147)
WBC # BLD AUTO: 16.39 THOUSAND/UL (ref 4.31–10.16)

## 2022-10-29 RX ORDER — OXYCODONE HYDROCHLORIDE 5 MG/1
5 TABLET ORAL EVERY 4 HOURS PRN
Status: DISCONTINUED | OUTPATIENT
Start: 2022-10-29 | End: 2022-11-01 | Stop reason: HOSPADM

## 2022-10-29 RX ORDER — KETOROLAC TROMETHAMINE 30 MG/ML
15 INJECTION, SOLUTION INTRAMUSCULAR; INTRAVENOUS ONCE
Status: COMPLETED | OUTPATIENT
Start: 2022-10-29 | End: 2022-10-29

## 2022-10-29 RX ORDER — PREDNISONE 20 MG/1
40 TABLET ORAL DAILY
Status: DISCONTINUED | OUTPATIENT
Start: 2022-10-29 | End: 2022-10-31

## 2022-10-29 RX ADMIN — PREDNISONE 40 MG: 20 TABLET ORAL at 12:08

## 2022-10-29 RX ADMIN — HYDROCODONE POLISTIREX AND CHLORPHENIRAMINE POLISTIREX 5 ML: 10; 8 SUSPENSION, EXTENDED RELEASE ORAL at 15:41

## 2022-10-29 RX ADMIN — CITALOPRAM HYDROBROMIDE 10 MG: 20 TABLET ORAL at 21:43

## 2022-10-29 RX ADMIN — PROMETHAZINE HYDROCHLORIDE 12.5 MG: 6.25 SYRUP ORAL at 19:19

## 2022-10-29 RX ADMIN — ZOLPIDEM TARTRATE 10 MG: 5 TABLET, COATED ORAL at 21:45

## 2022-10-29 RX ADMIN — IOHEXOL 100 ML: 350 INJECTION, SOLUTION INTRAVENOUS at 09:17

## 2022-10-29 RX ADMIN — FLUTICASONE PROPIONATE 2 PUFF: 110 AEROSOL, METERED RESPIRATORY (INHALATION) at 08:29

## 2022-10-29 RX ADMIN — LEVALBUTEROL HYDROCHLORIDE 1.25 MG: 1.25 SOLUTION, CONCENTRATE RESPIRATORY (INHALATION) at 19:56

## 2022-10-29 RX ADMIN — GUAIFENESIN 600 MG: 600 TABLET, EXTENDED RELEASE ORAL at 08:28

## 2022-10-29 RX ADMIN — HYDROCODONE POLISTIREX AND CHLORPHENIRAMINE POLISTIREX 5 ML: 10; 8 SUSPENSION, EXTENDED RELEASE ORAL at 03:09

## 2022-10-29 RX ADMIN — PANTOPRAZOLE SODIUM 40 MG: 40 TABLET, DELAYED RELEASE ORAL at 05:29

## 2022-10-29 RX ADMIN — FLUTICASONE FUROATE AND VILANTEROL TRIFENATATE 1 PUFF: 200; 25 POWDER RESPIRATORY (INHALATION) at 08:27

## 2022-10-29 RX ADMIN — IPRATROPIUM BROMIDE 0.5 MG: 0.5 SOLUTION RESPIRATORY (INHALATION) at 19:56

## 2022-10-29 RX ADMIN — PROMETHAZINE HYDROCHLORIDE 12.5 MG: 6.25 SYRUP ORAL at 06:51

## 2022-10-29 RX ADMIN — MORPHINE SULFATE 2 MG: 2 INJECTION, SOLUTION INTRAMUSCULAR; INTRAVENOUS at 10:20

## 2022-10-29 RX ADMIN — MORPHINE SULFATE 2 MG: 2 INJECTION, SOLUTION INTRAMUSCULAR; INTRAVENOUS at 15:33

## 2022-10-29 RX ADMIN — INSULIN LISPRO 1 UNITS: 100 INJECTION, SOLUTION INTRAVENOUS; SUBCUTANEOUS at 21:43

## 2022-10-29 RX ADMIN — METHYLPREDNISOLONE SODIUM SUCCINATE 40 MG: 40 INJECTION, POWDER, FOR SOLUTION INTRAMUSCULAR; INTRAVENOUS at 08:28

## 2022-10-29 RX ADMIN — MONTELUKAST 10 MG: 10 TABLET, FILM COATED ORAL at 21:43

## 2022-10-29 RX ADMIN — GUAIFENESIN 600 MG: 600 TABLET, EXTENDED RELEASE ORAL at 18:26

## 2022-10-29 RX ADMIN — OXYCODONE HYDROCHLORIDE 5 MG: 5 TABLET ORAL at 18:32

## 2022-10-29 RX ADMIN — KETOROLAC TROMETHAMINE 15 MG: 30 INJECTION, SOLUTION INTRAMUSCULAR at 06:47

## 2022-10-29 RX ADMIN — PRAVASTATIN SODIUM 40 MG: 40 TABLET ORAL at 17:00

## 2022-10-29 RX ADMIN — CEFTRIAXONE SODIUM 1000 MG: 10 INJECTION, POWDER, FOR SOLUTION INTRAVENOUS at 12:10

## 2022-10-29 NOTE — CONSULTS
Consult- General Surgery   Lidya Joy Sport 56 y o  female MRN: [id number]  Unit/Bed#: -01 Encounter: [id number]      Assessment/Plan     Gilda Watts is a 56 y o  female with rectus sheath hematoma  CTAP: 7 6 cm right rectus abdominis hematoma with small internal focus of active extravasation  Plan:  · No acute surgical intervention indicated at this time  The rectus sheath bleed will likely resolve without intervention as patient is not on chronic anticoagulation  · Hold dvt ppx  · Recommend PCA pump for IV pain control  · Okay to continue regular diet  · Serial abdominal exams  · Monitor labs and vitals  · Encourage ambulation t i d   · Remainder of care per primary team - SLIM  · Surgery will sign off - tiger text if any questions or concerns    History of Present Illness     HPI:  Gilda Watts is a 56 y o  female who presents with asthma exacerbation on 10/24 and persistent cough throughout admission  She reports a few days ago she experienced a sharp pain of the anterior abdomen while coughing  CTAP demonstrated 7 6 cm right rectus abdominis hematoma with small internal focus of active extravasation  Patient denies any history of similar symptoms  Aside from the shortness of breath due to asthma and abdominal pain due to the hematoma she has no other complaints at this time  The patient denies CP, SOB, palpitations, headache, fever, chills, unintentional weight loss, night sweats, nausea, vomiting, constipation, diarrhea  Admits to normal bowel function  No chronic anticoagulation  Review of Systems   Constitutional: Negative for activity change, appetite change and fever  HENT: Negative for congestion, hearing loss and sore throat  Eyes: Negative for discharge and visual disturbance  Respiratory: Positive for cough  Negative for chest tightness and shortness of breath  Cardiovascular: Negative for chest pain and palpitations  Gastrointestinal: Positive for abdominal pain  Negative for abdominal distention, constipation, diarrhea, nausea and vomiting  Endocrine: Negative for polyuria  Genitourinary: Negative for difficulty urinating, dysuria and urgency  Musculoskeletal: Negative for arthralgias, myalgias and neck stiffness  Skin: Negative for color change and wound  Neurological: Negative for dizziness, seizures, syncope and headaches  Psychiatric/Behavioral: Negative for behavioral problems  Historical Information   History reviewed  No pertinent past medical history  History reviewed  No pertinent surgical history  Social History   Social History     Substance and Sexual Activity   Alcohol Use Never     Social History     Substance and Sexual Activity   Drug Use Never     Social History     Tobacco Use   Smoking Status Never Smoker   Smokeless Tobacco Never Used     Family History: non-contributory    Meds/Allergies   all medications and allergies reviewed  No Known Allergies    Objective   First Vitals:   Blood Pressure: 130/75 (10/24/22 1839)  Pulse: (!) 118 (10/24/22 1839)  Temperature: 98 8 °F (37 1 °C) (10/24/22 1842)  Temp Source: Axillary (10/24/22 1842)  Respirations: (!) 25 (10/24/22 1839)  Height: 4' 11" (149 9 cm) (10/25/22 1018)  Weight - Scale: 88 7 kg (195 lb 8 8 oz) (10/25/22 1018)  SpO2: 97 % (10/24/22 1839)    Current Vitals:   Blood Pressure: 152/87 (10/29/22 0719)  Pulse: 78 (10/29/22 0719)  Temperature: 98 2 °F (36 8 °C) (10/29/22 0719)  Temp Source: Axillary (10/24/22 1842)  Respirations: 18 (10/29/22 0719)  Height: 4' 11" (149 9 cm) (10/25/22 1018)  Weight - Scale: 88 7 kg (195 lb 8 8 oz) (10/25/22 1018)  SpO2: 96 % (10/29/22 0719)      Intake/Output Summary (Last 24 hours) at 10/29/2022 1152  Last data filed at 10/29/2022 0300  Gross per 24 hour   Intake 480 ml   Output --   Net 480 ml       Invasive Devices  Report    Peripheral Intravenous Line  Duration           Peripheral IV 10/27/22 Dorsal (posterior); Right Forearm 2 days Physical Exam  Vitals reviewed  Constitutional:       General: She is not in acute distress  Appearance: Normal appearance  She is not ill-appearing  HENT:      Head: Normocephalic and atraumatic  Right Ear: External ear normal       Left Ear: External ear normal       Nose: Nose normal       Mouth/Throat:      Mouth: Mucous membranes are moist    Eyes:      General:         Right eye: No discharge  Left eye: No discharge  Conjunctiva/sclera: Conjunctivae normal    Cardiovascular:      Rate and Rhythm: Normal rate and regular rhythm  Pulmonary:      Effort: Pulmonary effort is normal  No respiratory distress  Abdominal:      General: There is no distension  Palpations: Abdomen is soft  Tenderness: There is abdominal tenderness  There is no guarding  Musculoskeletal:         General: Normal range of motion  Cervical back: Normal range of motion  Skin:     General: Skin is warm  Coloration: Skin is not jaundiced  Neurological:      General: No focal deficit present  Mental Status: She is alert and oriented to person, place, and time  Psychiatric:         Mood and Affect: Mood normal          Behavior: Behavior normal          Thought Content: Thought content normal          Judgment: Judgment normal          Lab Results: I have personally reviewed pertinent lab results      Recent Results (from the past 36 hour(s))   CBC and differential    Collection Time: 10/28/22  5:32 AM   Result Value Ref Range    WBC 17 25 (H) 4 31 - 10 16 Thousand/uL    RBC 3 77 (L) 3 81 - 5 12 Million/uL    Hemoglobin 11 9 11 5 - 15 4 g/dL    Hematocrit 35 5 34 8 - 46 1 %    MCV 94 82 - 98 fL    MCH 31 6 26 8 - 34 3 pg    MCHC 33 5 31 4 - 37 4 g/dL    RDW 13 9 11 6 - 15 1 %    MPV 10 3 8 9 - 12 7 fL    Platelets 961 (H) 110 - 390 Thousands/uL    nRBC 0 /100 WBCs   Basic metabolic panel    Collection Time: 10/28/22  5:32 AM   Result Value Ref Range    Sodium 138 135 - 147 mmol/L    Potassium 3 4 (L) 3 5 - 5 3 mmol/L    Chloride 103 96 - 108 mmol/L    CO2 26 21 - 32 mmol/L    ANION GAP 9 4 - 13 mmol/L    BUN 17 5 - 25 mg/dL    Creatinine 0 91 0 60 - 1 30 mg/dL    Glucose 211 (H) 65 - 140 mg/dL    Calcium 8 7 8 3 - 10 1 mg/dL    eGFR 71 ml/min/1 73sq m   Fingerstick Glucose (POCT)    Collection Time: 10/28/22  7:36 AM   Result Value Ref Range    POC Glucose 144 (H) 65 - 140 mg/dl   Fingerstick Glucose (POCT)    Collection Time: 10/28/22 11:33 AM   Result Value Ref Range    POC Glucose 323 (H) 65 - 140 mg/dl   Fingerstick Glucose (POCT)    Collection Time: 10/28/22 11:34 AM   Result Value Ref Range    POC Glucose 309 (H) 65 - 140 mg/dl   Fingerstick Glucose (POCT)    Collection Time: 10/28/22  3:43 PM   Result Value Ref Range    POC Glucose 284 (H) 65 - 140 mg/dl   Fingerstick Glucose (POCT)    Collection Time: 10/28/22  9:17 PM   Result Value Ref Range    POC Glucose 216 (H) 65 - 140 mg/dl   Fingerstick Glucose (POCT)    Collection Time: 10/29/22  7:18 AM   Result Value Ref Range    POC Glucose 158 (H) 65 - 140 mg/dl   Comprehensive metabolic panel    Collection Time: 10/29/22  8:43 AM   Result Value Ref Range    Sodium 141 135 - 147 mmol/L    Potassium 3 9 3 5 - 5 3 mmol/L    Chloride 103 96 - 108 mmol/L    CO2 29 21 - 32 mmol/L    ANION GAP 9 4 - 13 mmol/L    BUN 18 5 - 25 mg/dL    Creatinine 0 68 0 60 - 1 30 mg/dL    Glucose 134 65 - 140 mg/dL    Calcium 8 4 8 3 - 10 1 mg/dL    Corrected Calcium 9 4 8 3 - 10 1 mg/dL    AST 30 5 - 45 U/L    ALT 37 12 - 78 U/L    Alkaline Phosphatase 106 46 - 116 U/L    Total Protein 6 7 6 4 - 8 4 g/dL    Albumin 2 7 (L) 3 5 - 5 0 g/dL    Total Bilirubin 0 33 0 20 - 1 00 mg/dL    eGFR 98 ml/min/1 73sq m   CBC and differential    Collection Time: 10/29/22  8:43 AM   Result Value Ref Range    WBC 16 39 (H) 4 31 - 10 16 Thousand/uL    RBC 3 78 (L) 3 81 - 5 12 Million/uL    Hemoglobin 11 8 11 5 - 15 4 g/dL    Hematocrit 36 2 34 8 - 46 1 %    MCV 96 82 - 98 fL    MCH 31 2 26 8 - 34 3 pg    MCHC 32 6 31 4 - 37 4 g/dL    RDW 14 2 11 6 - 15 1 %    MPV 10 3 8 9 - 12 7 fL    Platelets 270 (H) 014 - 390 Thousands/uL   Fingerstick Glucose (POCT)    Collection Time: 10/29/22 11:23 AM   Result Value Ref Range    POC Glucose 129 65 - 140 mg/dl     Imaging: I have personally reviewed pertinent reports  XR chest 1 view portable    Result Date: 10/25/2022  Impression: No acute cardiopulmonary disease  Workstation performed: [id number]     CT chest abdomen pelvis w contrast    Result Date: 10/26/2022  Impression: No acute pathology in the chest, abdomen, or pelvis  Hepatomegaly/hepatic steatosis  Workstation performed: [id number]     CT abdomen pelvis w contrast    Result Date: 10/29/2022  Impression: 1   7 6 cm right rectus abdominis hematoma with small internal focus of active extravasation  2   Small but increasing layering pleural effusions  Workstation performed: [id number]       EKG, Pathology, and Other Studies: I have personally reviewed pertinent reports

## 2022-10-29 NOTE — PLAN OF CARE
Problem: PAIN - ADULT  Goal: Verbalizes/displays adequate comfort level or baseline comfort level  Description: Interventions:  - Encourage patient to monitor pain and request assistance  - Assess pain using appropriate pain scale  - Administer analgesics based on type and severity of pain and evaluate response  - Implement non-pharmacological measures as appropriate and evaluate response  - Consider cultural and social influences on pain and pain management  - Notify physician/advanced practitioner if interventions unsuccessful or patient reports new pain  Outcome: Progressing     Problem: INFECTION - ADULT  Goal: Absence or prevention of progression during hospitalization  Description: INTERVENTIONS:  - Assess and monitor for signs and symptoms of infection  - Monitor lab/diagnostic results  - Monitor all insertion sites, i e  indwelling lines, tubes, and drains  - Monitor endotracheal if appropriate and nasal secretions for changes in amount and color  - [place] appropriate cooling/warming therapies per order  - Administer medications as ordered  - Instruct and encourage patient and family to use good hand hygiene technique  - Identify and instruct in appropriate isolation precautions for identified infection/condition  Outcome: Progressing  Goal: Absence of fever/infection during neutropenic period  Description: INTERVENTIONS:  - Monitor WBC    Outcome: Progressing     Problem: SAFETY ADULT  Goal: Patient will remain free of falls  Description: INTERVENTIONS:  - Educate patient/family on patient safety including physical limitations  - Instruct patient to call for assistance with activity   - Consult OT/PT to assist with strengthening/mobility   - Keep Call bell within reach  - Keep bed low and locked with side rails adjusted as appropriate  - Keep care items and personal belongings within reach  - Initiate and maintain comfort rounds  - Make Fall Risk Sign visible to staff  - Offer Toileting every  Hours, in advance of need  - Initiate/Maintain alarm  - Obtain necessary fall risk management equipment:   - Apply yellow socks and bracelet for high fall risk patients  - Consider moving patient to room near nurses station  Outcome: Progressing  Goal: Maintain or return to baseline ADL function  Description: INTERVENTIONS:  -  Assess patient's ability to carry out ADLs; assess patient's baseline for ADL function and identify physical deficits which impact ability to perform ADLs (bathing, care of mouth/teeth, toileting, grooming, dressing, etc )  - Assess/evaluate cause of self-care deficits   - Assess range of motion  - Assess patient's mobility; develop plan if impaired  - Assess patient's need for assistive devices and provide as appropriate  - Encourage maximum independence but intervene and supervise when necessary  - Involve family in performance of ADLs  - Assess for home care needs following discharge   - Consider OT consult to assist with ADL evaluation and planning for discharge  - Provide patient education as appropriate  Outcome: Progressing  Goal: Maintains/Returns to pre admission functional level  Description: INTERVENTIONS:  - Perform BMAT or MOVE assessment daily    - Set and communicate daily mobility goal to care team and patient/family/caregiver  - Collaborate with rehabilitation services on mobility goals if consulted  - Perform Range of Motion  times a day  - Reposition patient every  hours    - Dangle patient  times a day  - Stand patient  times a day  - Ambulate patient  times a day  - Out of bed to chair  times a day   - Out of bed for meals  times a day  - Out of bed for toileting  - Record patient progress and toleration of activity level   Outcome: Progressing     Problem: DISCHARGE PLANNING  Goal: Discharge to home or other facility with appropriate resources  Description: INTERVENTIONS:  - Identify barriers to discharge w/patient and caregiver  - Arrange for needed discharge resources and transportation as appropriate  - Identify discharge learning needs (meds, wound care, etc )  - Arrange for interpretive services to assist at discharge as needed  - Refer to Case Management Department for coordinating discharge planning if the patient needs post-hospital services based on physician/advanced practitioner order or complex needs related to functional status, cognitive ability, or social support system  Outcome: Progressing     Problem: Knowledge Deficit  Goal: Patient/family/caregiver demonstrates understanding of disease process, treatment plan, medications, and discharge instructions  Description: Complete learning assessment and assess knowledge base    Interventions:  - Provide teaching at level of understanding  - Provide teaching via preferred learning methods  Outcome: Progressing     Problem: Potential for Falls  Goal: Patient will remain free of falls  Description: INTERVENTIONS:  - Educate patient/family on patient safety including physical limitations  - Instruct patient to call for assistance with activity   - Consult OT/PT to assist with strengthening/mobility   - Keep Call bell within reach  - Keep bed low and locked with side rails adjusted as appropriate  - Keep care items and personal belongings within reach  - Initiate and maintain comfort rounds  - Make Fall Risk Sign visible to staff  - Offer Toileting every Hours, in advance of need  - Initiate/Maintain alarm  - Obtain necessary fall risk management equipment: Apply yellow socks and bracelet for high fall risk patients  - Consider moving patient to room near nurses station  Outcome: Progressing

## 2022-10-29 NOTE — ASSESSMENT & PLAN NOTE
**Patient presenting to the ED for increased shortness of breath/wheezing at home  Reports that she had taken all of her home inhaler treatments as well as a course of prednisone without improvement  ED gave multiple nebulizer treatments, IV steroids, magnesium without improvement in symptoms  Currently oxygenating well on RA  · Continue breo ellipta/flovent, scheduled atrovent/xoponexnebulizers  · On IV Solu-Medrol -> start PO taper  · On iv ceftriaxone (day4/5)  · On RA  · Please note chest x-ray negative for any acute abnormality

## 2022-10-29 NOTE — PROGRESS NOTES
[address] [facility]  Progress Note - Ave Rash 1965, 56 y o  female MRN: [id number]  Unit/Bed#: -01 Encounter: [id number]  Primary Care Provider: Sara Kay   Date and time admitted to hospital: 10/24/2022  6:25 PM    SIRS (systemic inflammatory response syndrome) (HCC)  Assessment & Plan  · Meeting SIRS criteria on admission for leukocytosis, tachycardia, tachypnea  · Chest x-ray w/o any acute abnormalities   · Likely in the setting of acute asthma exacerbation  · Lactate remains elevated however stable, does have leukocytosis however on steroids  · Blood cultures thus far negative  · Will continue ceftriaxone for now(day4/5)    * Asthma exacerbation  Assessment & Plan  **Patient presenting to the ED for increased shortness of breath/wheezing at home  Reports that she had taken all of her home inhaler treatments as well as a course of prednisone without improvement  ED gave multiple nebulizer treatments, IV steroids, magnesium without improvement in symptoms  Currently oxygenating well on RA  · Continue breo ellipta/flovent, scheduled atrovent/xoponexnebulizers  · On IV Solu-Medrol -> start PO taper  · On iv ceftriaxone (day4/5)  · On RA  · Please note chest x-ray negative for any acute abnormality  Rectus sheath hematoma  Assessment & Plan  This am patient reporting Increased RLQ Pain 10/10 not relieved despite iv toradol    · CT : 7 6 cm right rectus abdominis hematoma with small internal focus of active extravasation  · General surgery made aware  · Patient now NPO  · Lovenox Dc'd  · Monitor H/H closely   Currently stable  · PRN morphine     Type 2 diabetes mellitus, with long-term current use of insulin [facility])  Assessment & Plan  Lab Results   Component Value Date    HGBA1C 4 9 10/04/2022       Recent Labs     10/28/22  1134 10/28/22  1543 10/28/22  2117 10/29/22  0718   POCGLU 309* 284* 216* 158*       Blood Sugar Average: Last 72 hrs:  (P) 219 5   · Per chart review, appears patient previously on insulin regimen  However, insulin regimen discontinued this month per the chart  Last HA1C was 4 9   · SSI with Accu-Cheks  · Monitor for hyperglycemia while treated with nebulizers and IV steroids  · Hypoglycemia protocol  · Diabetic diet     Hyperlipidemia  Assessment & Plan  · Continue statin      VTE Pharmacologic Prophylaxis:   Pharmacologic: Pharmacologic VTE Prophylaxis contraindicated due to rectus sheath hematoma  Mechanical VTE Prophylaxis in Place: Yes    Patient Centered Rounds: I have performed bedside rounds with nursing staff today  Discussions with Specialists or Other Care Team Provider: general surgery    Education and Discussions with Family / Patient: dw patient and her  at bedside    Time Spent for Care: 30 minutes  More than 50% of total time spent on counseling and coordination of care as described above  Current Length of Stay: 4 day(s)    Current Patient Status: Inpatient   Certification Statement: The patient will continue to require additional inpatient hospital stay due to needs general surgery eval    Discharge Plan: undetermined    Code Status: Level 1 - Full Code      Subjective:   C/o intense RLQ pain this am  No associated nausea/vomiting/fevers  SOB Improved  Still with significant cough    Objective:     Vitals:   Temp (24hrs), Av 2 °F (36 8 °C), Min:97 9 °F (36 6 °C), Max:98 5 °F (36 9 °C)    Temp:  [97 9 °F (36 6 °C)-98 5 °F (36 9 °C)] 98 2 °F (36 8 °C)  HR:  [78-94] 78  Resp:  [18] 18  BP: (111-152)/(69-87) 152/87  SpO2:  [94 %-96 %] 96 %  Body mass index is 39 5 kg/m²  Input and Output Summary (last 24 hours): Intake/Output Summary (Last 24 hours) at 10/29/2022 1116  Last data filed at 10/29/2022 0300  Gross per 24 hour   Intake 480 ml   Output --   Net 480 ml       Physical Exam:     Physical Exam  Constitutional:       General: She is not in acute distress  Appearance: She is obese   She is not toxic-appearing  HENT:      Mouth/Throat:      Mouth: Mucous membranes are moist       Pharynx: Oropharynx is clear  Cardiovascular:      Rate and Rhythm: Normal rate and regular rhythm  Pulses: Normal pulses  Pulmonary:      Effort: Pulmonary effort is normal       Breath sounds: Normal breath sounds  Abdominal:      General: Abdomen is flat  Bowel sounds are normal       Tenderness: There is abdominal tenderness  There is no guarding  Musculoskeletal:      Right lower leg: No edema  Left lower leg: No edema  Neurological:      General: No focal deficit present  Mental Status: She is alert and oriented to person, place, and time  Psychiatric:         Mood and Affect: Mood normal          Additional Data:     Labs:    Results from last 7 days   Lab Units 10/29/22  0843 10/28/22  0532 10/27/22  1107 10/27/22  0200   WBC Thousand/uL 16 39*   < > 18 15* 20 94*   HEMOGLOBIN g/dL 11 8   < > 11 6 11 9   HEMATOCRIT % 36 2   < > 35 3 36 7   PLATELETS Thousands/uL 437*   < > 428* 433*   NEUTROS PCT %  --   --   --  83*   LYMPHS PCT %  --   --   --  8*   LYMPHO PCT %  --   --  10*  --    MONOS PCT %  --   --   --  5   MONO PCT %  --   --  3*  --    EOS PCT %  --   --  0 0    < > = values in this interval not displayed       Results from last 7 days   Lab Units 10/29/22  0843   SODIUM mmol/L 141   POTASSIUM mmol/L 3 9   CHLORIDE mmol/L 103   CO2 mmol/L 29   BUN mg/dL 18   CREATININE mg/dL 0 68   ANION GAP mmol/L 9   CALCIUM mg/dL 8 4   ALBUMIN g/dL 2 7*   TOTAL BILIRUBIN mg/dL 0 33   ALK PHOS U/L 106   ALT U/L 37   AST U/L 30   GLUCOSE RANDOM mg/dL 134         Results from last 7 days   Lab Units 10/29/22  0718 10/28/22  2117 10/28/22  1543 10/28/22  1134 10/28/22  1133 10/28/22  0736 10/27/22  2120 10/27/22  1611 10/27/22  1111 10/27/22  0732 10/26/22  2104 10/26/22  1753   POC GLUCOSE mg/dl 158* 216* 284* 309* 323* 144* 254* 222* 271* 168* 195* 203*         Results from last 7 days   Lab Units 10/27/22  0200 10/26/22  0241 10/26/22  0006 10/25/22  2202 10/25/22  1843 10/25/22  0811 10/25/22  0433 10/25/22  0253 10/24/22  2223   LACTIC ACID mmol/L  --  3 2* 2 7* 3 3* 4 8*   < >  --    < > 6 5*   PROCALCITONIN ng/ml <0 05  --   --   --   --   --  0 06  --  0 05    < > = values in this interval not displayed  * I Have Reviewed All Lab Data Listed Above  * Additional Pertinent Lab Tests Reviewed: All Labs Within Last 24 Hours Reviewed      Recent Cultures (last 7 days):     Results from last 7 days   Lab Units 10/24/22  2222 10/24/22  2204   BLOOD CULTURE  No Growth After 4 Days  No Growth After 4 Days         Last 24 Hours Medication List:   Current Facility-Administered Medications   Medication Dose Route Frequency Provider Last Rate   • acetaminophen  650 mg Oral Q6H PRN Donn Lemus PA-C     • cefTRIAXone  1,000 mg Intravenous Q24H Debora Carreon MD 1,000 mg (10/28/22 1354)   • citalopram  10 mg Oral HS Kat Alvarez PA-C     • fluticasone  2 puff Inhalation BID Kat Alvarez PA-C     • fluticasone-vilanterol  1 puff Inhalation Daily Kat Alvarez PA-C     • guaiFENesin  600 mg Oral BID Kat Alvarez PA-C     • hydrocodone-chlorpheniramine polistirex  5 mL Oral Q6H PRN Lavell Bhakta MD     • insulin lispro  1-5 Units Subcutaneous 4x Daily (AC & HS) Kat Alvarez PA-C     • iohexol  50 mL Oral Once in imaging Lavell Bhakta MD     • ipratropium  0 5 mg Nebulization TID Kat Alvarez PA-C     • ipratropium-albuterol  3 mL Nebulization Q4H PRN Lavell Bhakta MD     • levalbuterol  1 25 mg Nebulization TID Lavell Bhakta MD     • montelukast  10 mg Oral HS Kat Alvarez PA-C     • morphine injection  2 mg Intravenous Q6H PRN Debora Carreon MD     • pantoprazole  40 mg Oral Early Morning Kat Alvarez PA-C     • polyethylene glycol  17 g Oral Daily Lavell Bhakta MD     • pravastatin  40 mg Oral Daily With [employer], PA-C     • predniSONE  40 mg Oral Daily Debora Carreon MD     • promethazine 12 5 mg Oral Q6H PRN Jose Day, LANA     • sucralfate  1 g Oral 4x Daily (AC & HS) Jose Day, LANA     • zolpidem  10 mg Oral HS PRN Gilda Gabriel PA-C          Today, Patient Was Seen By: JASON Guerrero      ** Please Note: Dictation voice to text software may have been used in the creation of this document   **

## 2022-10-29 NOTE — ASSESSMENT & PLAN NOTE
· Meeting SIRS criteria on admission for leukocytosis, tachycardia, tachypnea  · Chest x-ray w/o any acute abnormalities   · Likely in the setting of acute asthma exacerbation  · Lactate remains elevated however stable, does have leukocytosis however on steroids  · Blood cultures thus far negative  · Will continue ceftriaxone for now(day4/5)

## 2022-10-29 NOTE — PLAN OF CARE
Problem: PAIN - ADULT  Goal: Verbalizes/displays adequate comfort level or baseline comfort level  Description: Interventions:  - Encourage patient to monitor pain and request assistance  - Assess pain using appropriate pain scale  - Administer analgesics based on type and severity of pain and evaluate response  - Implement non-pharmacological measures as appropriate and evaluate response  - Consider cultural and social influences on pain and pain management  - Notify physician/advanced practitioner if interventions unsuccessful or patient reports new pain  Outcome: Progressing     Problem: INFECTION - ADULT  Goal: Absence or prevention of progression during hospitalization  Description: INTERVENTIONS:  - Assess and monitor for signs and symptoms of infection  - Monitor lab/diagnostic results  - Monitor all insertion sites, i e  indwelling lines, tubes, and drains  - Monitor endotracheal if appropriate and nasal secretions for changes in amount and color  - [place] appropriate cooling/warming therapies per order  - Administer medications as ordered  - Instruct and encourage patient and family to use good hand hygiene technique  - Identify and instruct in appropriate isolation precautions for identified infection/condition  Outcome: Progressing  Goal: Absence of fever/infection during neutropenic period  Description: INTERVENTIONS:  - Monitor WBC    Outcome: Progressing     Problem: SAFETY ADULT  Goal: Patient will remain free of falls  Description: INTERVENTIONS:  - Educate patient/family on patient safety including physical limitations  - Instruct patient to call for assistance with activity   - Consult OT/PT to assist with strengthening/mobility   - Keep Call bell within reach  - Keep bed low and locked with side rails adjusted as appropriate  - Keep care items and personal belongings within reach  - Initiate and maintain comfort rounds  - Make Fall Risk Sign visible to staff  - Offer Toileting every  Hours, in advance of need  - Initiate/Maintain alarm  - Obtain necessary fall risk management equipment:   - Apply yellow socks and bracelet for high fall risk patients  - Consider moving patient to room near nurses station  Outcome: Progressing  Goal: Maintain or return to baseline ADL function  Description: INTERVENTIONS:  -  Assess patient's ability to carry out ADLs; assess patient's baseline for ADL function and identify physical deficits which impact ability to perform ADLs (bathing, care of mouth/teeth, toileting, grooming, dressing, etc )  - Assess/evaluate cause of self-care deficits   - Assess range of motion  - Assess patient's mobility; develop plan if impaired  - Assess patient's need for assistive devices and provide as appropriate  - Encourage maximum independence but intervene and supervise when necessary  - Involve family in performance of ADLs  - Assess for home care needs following discharge   - Consider OT consult to assist with ADL evaluation and planning for discharge  - Provide patient education as appropriate  Outcome: Progressing  Goal: Maintains/Returns to pre admission functional level  Description: INTERVENTIONS:  - Perform BMAT or MOVE assessment daily    - Set and communicate daily mobility goal to care team and patient/family/caregiver  - Collaborate with rehabilitation services on mobility goals if consulted  - Perform Range of Motion  times a day  - Reposition patient every  hours    - Dangle patient  times a day  - Stand patient  times a day  - Ambulate patient  times a day  - Out of bed to chair  times a day   - Out of bed for meals  times a day  - Out of bed for toileting  - Record patient progress and toleration of activity level   Outcome: Progressing     Problem: DISCHARGE PLANNING  Goal: Discharge to home or other facility with appropriate resources  Description: INTERVENTIONS:  - Identify barriers to discharge w/patient and caregiver  - Arrange for needed discharge resources and transportation as appropriate  - Identify discharge learning needs (meds, wound care, etc )  - Arrange for interpretive services to assist at discharge as needed  - Refer to Case Management Department for coordinating discharge planning if the patient needs post-hospital services based on physician/advanced practitioner order or complex needs related to functional status, cognitive ability, or social support system  Outcome: Progressing     Problem: Knowledge Deficit  Goal: Patient/family/caregiver demonstrates understanding of disease process, treatment plan, medications, and discharge instructions  Description: Complete learning assessment and assess knowledge base    Interventions:  - Provide teaching at level of understanding  - Provide teaching via preferred learning methods  Outcome: Progressing     Problem: Potential for Falls  Goal: Patient will remain free of falls  Description: INTERVENTIONS:  - Educate patient/family on patient safety including physical limitations  - Instruct patient to call for assistance with activity   - Consult OT/PT to assist with strengthening/mobility   - Keep Call bell within reach  - Keep bed low and locked with side rails adjusted as appropriate  - Keep care items and personal belongings within reach  - Initiate and maintain comfort rounds  - Make Fall Risk Sign visible to staff  - Offer Toileting every Hours, in advance of need  - Initiate/Maintain alarm  - Obtain necessary fall risk management equipment:  - Apply yellow socks and bracelet for high fall risk patients  - Consider moving patient to room near nurses station  Outcome: Progressing

## 2022-10-29 NOTE — ASSESSMENT & PLAN NOTE
Lab Results   Component Value Date    HGBA1C 4 9 10/04/2022       Recent Labs     10/28/22  1134 10/28/22  1543 10/28/22  2117 10/29/22  0718   POCGLU 309* 284* 216* 158*       Blood Sugar Average: Last 72 hrs:  (P) 219 5   · Per chart review, appears patient previously on insulin regimen  However, insulin regimen discontinued this month per the chart   Last HA1C was 4 9   · SSI with Accu-Cheks  · Monitor for hyperglycemia while treated with nebulizers and IV steroids  · Hypoglycemia protocol  · Diabetic diet

## 2022-10-29 NOTE — ASSESSMENT & PLAN NOTE
This am patient reporting Increased RLQ Pain 10/10 not relieved despite iv toradol    · CT : 7 6 cm right rectus abdominis hematoma with small internal focus of active extravasation  · General surgery made aware  · Patient now NPO  · Lovenox Dc'd  · Monitor H/H closely   Currently stable  · PRN morphine

## 2022-10-29 NOTE — NURSING NOTE
Patient complaints of severe abdominal pain, RLQ  Increases when coughing  Abdomen is soft to palpation  Heating pack applied   Toradol IV push ordered by Jewel TOMLIN  Pt is resting in bed

## 2022-10-30 LAB
ALBUMIN SERPL BCP-MCNC: 2.7 G/DL (ref 3.5–5)
ALP SERPL-CCNC: 111 U/L (ref 46–116)
ALT SERPL W P-5'-P-CCNC: 45 U/L (ref 12–78)
ANION GAP SERPL CALCULATED.3IONS-SCNC: 8 MMOL/L (ref 4–13)
AST SERPL W P-5'-P-CCNC: 22 U/L (ref 5–45)
BACTERIA BLD CULT: NORMAL
BACTERIA BLD CULT: NORMAL
BILIRUB SERPL-MCNC: 0.25 MG/DL (ref 0.2–1)
BUN SERPL-MCNC: 26 MG/DL (ref 5–25)
CALCIUM ALBUM COR SERPL-MCNC: 9.3 MG/DL (ref 8.3–10.1)
CALCIUM SERPL-MCNC: 8.3 MG/DL (ref 8.3–10.1)
CHLORIDE SERPL-SCNC: 102 MMOL/L (ref 96–108)
CO2 SERPL-SCNC: 29 MMOL/L (ref 21–32)
CREAT SERPL-MCNC: 1.02 MG/DL (ref 0.6–1.3)
ERYTHROCYTE [DISTWIDTH] IN BLOOD BY AUTOMATED COUNT: 14.6 % (ref 11.6–15.1)
GFR SERPL CREATININE-BSD FRML MDRD: 62 ML/MIN/1.73SQ M
GLUCOSE SERPL-MCNC: 121 MG/DL (ref 65–140)
GLUCOSE SERPL-MCNC: 161 MG/DL (ref 65–140)
GLUCOSE SERPL-MCNC: 190 MG/DL (ref 65–140)
GLUCOSE SERPL-MCNC: 280 MG/DL (ref 65–140)
GLUCOSE SERPL-MCNC: 84 MG/DL (ref 65–140)
HCT VFR BLD AUTO: 35.9 % (ref 34.8–46.1)
HGB BLD-MCNC: 11.7 G/DL (ref 11.5–15.4)
MCH RBC QN AUTO: 31.7 PG (ref 26.8–34.3)
MCHC RBC AUTO-ENTMCNC: 32.6 G/DL (ref 31.4–37.4)
MCV RBC AUTO: 97 FL (ref 82–98)
NRBC BLD AUTO-RTO: 0 /100 WBCS
PLATELET # BLD AUTO: 398 THOUSANDS/UL (ref 149–390)
PMV BLD AUTO: 10.2 FL (ref 8.9–12.7)
POTASSIUM SERPL-SCNC: 3.2 MMOL/L (ref 3.5–5.3)
PROT SERPL-MCNC: 6.7 G/DL (ref 6.4–8.4)
RBC # BLD AUTO: 3.69 MILLION/UL (ref 3.81–5.12)
SODIUM SERPL-SCNC: 139 MMOL/L (ref 135–147)
WBC # BLD AUTO: 18.13 THOUSAND/UL (ref 4.31–10.16)

## 2022-10-30 RX ORDER — FUROSEMIDE 20 MG/1
20 TABLET ORAL ONCE
Status: COMPLETED | OUTPATIENT
Start: 2022-10-30 | End: 2022-10-30

## 2022-10-30 RX ORDER — POTASSIUM CHLORIDE 20 MEQ/1
40 TABLET, EXTENDED RELEASE ORAL ONCE
Status: COMPLETED | OUTPATIENT
Start: 2022-10-30 | End: 2022-10-30

## 2022-10-30 RX ORDER — FLUTICASONE PROPIONATE 50 MCG
1 SPRAY, SUSPENSION (ML) NASAL DAILY
Status: DISCONTINUED | OUTPATIENT
Start: 2022-10-30 | End: 2022-11-01 | Stop reason: HOSPADM

## 2022-10-30 RX ADMIN — PROMETHAZINE HYDROCHLORIDE 12.5 MG: 6.25 SYRUP ORAL at 23:28

## 2022-10-30 RX ADMIN — GUAIFENESIN 600 MG: 600 TABLET, EXTENDED RELEASE ORAL at 17:43

## 2022-10-30 RX ADMIN — CITALOPRAM HYDROBROMIDE 10 MG: 20 TABLET ORAL at 21:58

## 2022-10-30 RX ADMIN — FLUTICASONE PROPIONATE 1 SPRAY: 50 SPRAY, METERED NASAL at 11:00

## 2022-10-30 RX ADMIN — IPRATROPIUM BROMIDE 0.5 MG: 0.5 SOLUTION RESPIRATORY (INHALATION) at 07:34

## 2022-10-30 RX ADMIN — HYDROCODONE POLISTIREX AND CHLORPHENIRAMINE POLISTIREX 5 ML: 10; 8 SUSPENSION, EXTENDED RELEASE ORAL at 09:13

## 2022-10-30 RX ADMIN — ACETAMINOPHEN 650 MG: 325 TABLET ORAL at 09:15

## 2022-10-30 RX ADMIN — MORPHINE SULFATE 2 MG: 2 INJECTION, SOLUTION INTRAMUSCULAR; INTRAVENOUS at 23:29

## 2022-10-30 RX ADMIN — PRAVASTATIN SODIUM 40 MG: 40 TABLET ORAL at 17:43

## 2022-10-30 RX ADMIN — HYDROCODONE POLISTIREX AND CHLORPHENIRAMINE POLISTIREX 5 ML: 10; 8 SUSPENSION, EXTENDED RELEASE ORAL at 00:57

## 2022-10-30 RX ADMIN — POTASSIUM CHLORIDE 40 MEQ: 1500 TABLET, EXTENDED RELEASE ORAL at 09:14

## 2022-10-30 RX ADMIN — OXYCODONE HYDROCHLORIDE 5 MG: 5 TABLET ORAL at 09:14

## 2022-10-30 RX ADMIN — FLUTICASONE PROPIONATE 2 PUFF: 110 AEROSOL, METERED RESPIRATORY (INHALATION) at 20:55

## 2022-10-30 RX ADMIN — MORPHINE SULFATE 2 MG: 2 INJECTION, SOLUTION INTRAMUSCULAR; INTRAVENOUS at 14:28

## 2022-10-30 RX ADMIN — FLUTICASONE FUROATE AND VILANTEROL TRIFENATATE 1 PUFF: 200; 25 POWDER RESPIRATORY (INHALATION) at 09:17

## 2022-10-30 RX ADMIN — LEVALBUTEROL HYDROCHLORIDE 1.25 MG: 1.25 SOLUTION, CONCENTRATE RESPIRATORY (INHALATION) at 13:41

## 2022-10-30 RX ADMIN — PROMETHAZINE HYDROCHLORIDE 12.5 MG: 6.25 SYRUP ORAL at 05:30

## 2022-10-30 RX ADMIN — PREDNISONE 40 MG: 20 TABLET ORAL at 09:14

## 2022-10-30 RX ADMIN — HYDROCODONE POLISTIREX AND CHLORPHENIRAMINE POLISTIREX 5 ML: 10; 8 SUSPENSION, EXTENDED RELEASE ORAL at 17:43

## 2022-10-30 RX ADMIN — SUCRALFATE 1 G: 1 TABLET ORAL at 17:43

## 2022-10-30 RX ADMIN — GUAIFENESIN 600 MG: 600 TABLET, EXTENDED RELEASE ORAL at 09:14

## 2022-10-30 RX ADMIN — CEFTRIAXONE SODIUM 1000 MG: 10 INJECTION, POWDER, FOR SOLUTION INTRAVENOUS at 13:25

## 2022-10-30 RX ADMIN — FLUTICASONE PROPIONATE 2 PUFF: 110 AEROSOL, METERED RESPIRATORY (INHALATION) at 09:17

## 2022-10-30 RX ADMIN — MONTELUKAST 10 MG: 10 TABLET, FILM COATED ORAL at 21:58

## 2022-10-30 RX ADMIN — FUROSEMIDE 20 MG: 20 TABLET ORAL at 09:58

## 2022-10-30 RX ADMIN — INSULIN LISPRO 1 UNITS: 100 INJECTION, SOLUTION INTRAVENOUS; SUBCUTANEOUS at 11:32

## 2022-10-30 RX ADMIN — LEVALBUTEROL HYDROCHLORIDE 1.25 MG: 1.25 SOLUTION, CONCENTRATE RESPIRATORY (INHALATION) at 07:34

## 2022-10-30 RX ADMIN — POLYETHYLENE GLYCOL 3350 17 G: 17 POWDER, FOR SOLUTION ORAL at 09:14

## 2022-10-30 RX ADMIN — PANTOPRAZOLE SODIUM 40 MG: 40 TABLET, DELAYED RELEASE ORAL at 05:29

## 2022-10-30 RX ADMIN — MORPHINE SULFATE 2 MG: 2 INJECTION, SOLUTION INTRAMUSCULAR; INTRAVENOUS at 00:57

## 2022-10-30 RX ADMIN — MORPHINE SULFATE 2 MG: 2 INJECTION, SOLUTION INTRAMUSCULAR; INTRAVENOUS at 05:28

## 2022-10-30 RX ADMIN — OXYCODONE HYDROCHLORIDE 5 MG: 5 TABLET ORAL at 17:54

## 2022-10-30 RX ADMIN — INSULIN LISPRO 2 UNITS: 100 INJECTION, SOLUTION INTRAVENOUS; SUBCUTANEOUS at 17:43

## 2022-10-30 RX ADMIN — IPRATROPIUM BROMIDE 0.5 MG: 0.5 SOLUTION RESPIRATORY (INHALATION) at 13:41

## 2022-10-30 RX ADMIN — ZOLPIDEM TARTRATE 10 MG: 5 TABLET, COATED ORAL at 21:57

## 2022-10-30 NOTE — PROGRESS NOTES
[facility]  Progress Note - Sid Gorman 1965, 56 y o  female MRN: [id number]  Unit/Bed#: -[facility] Encounter: [id number]  Primary Care Provider: Mare Godoy   Date and time admitted to hospital: 10/24/2022  6:25 PM    SIRS (systemic inflammatory response syndrome) (HCC)  Assessment & Plan  · Meeting SIRS criteria on admission for leukocytosis, tachycardia, tachypnea  · Chest x-ray w/o any acute abnormalities   · Likely in the setting of acute asthma exacerbation  · Lactate remains elevated however stable, does have leukocytosis however on steroids  · Blood cultures thus far negative  · S/p 5 days of ceftriaxone    * Asthma exacerbation  Assessment & Plan  **Patient presenting to the ED for increased shortness of breath/wheezing at home  Reports that she had taken all of her home inhaler treatments as well as a course of prednisone without improvement  ED gave multiple nebulizer treatments, IV steroids, magnesium without improvement in symptoms  Currently oxygenating well on RA  · Continue breo ellipta/flovent, scheduled atrovent/xoponexnebulizers  · On IV Solu-Medrol -> started PO taper  · S/p iv ceftriaxone  · On RA  · Please note chest x-ray negative for any acute abnormality  Rectus sheath hematoma  Assessment & Plan  On 10/29 am patient reporting Increased RLQ Pain 10/10 not relieved despite iv toradol    · CT : 7 6 cm right rectus abdominis hematoma with small internal focus of active extravasation  · General surgery signed off  No surgical recommendations  · Lovenox Dc'd  · Monitor H/H closely  Currently stable at 11 7  · PRN morphine/oxy for pain control      Type 2 diabetes mellitus, with long-term current use of insulin [facility])  Assessment & Plan  Lab Results   Component Value Date    HGBA1C 4 9 10/04/2022       Recent Labs     10/29/22  1123 10/29/22  1653 10/29/22  2142 10/30/22  0818   POCGLU 129 137 174* 84       Blood Sugar Average: Last 72 hrs:  (P) 515 [id number]   · Per chart review, appears patient previously on insulin regimen  However, insulin regimen discontinued this month per the chart  Last HA1C was 4 9   · SSI with Accu-Cheks  · Monitor for hyperglycemia while treated with nebulizers and IV steroids  · Hypoglycemia protocol  · Diabetic diet     Hyperlipidemia  Assessment & Plan  · Continue statin      VTE Pharmacologic Prophylaxis:   Pharmacologic: Pharmacologic VTE Prophylaxis contraindicated due to rectus sheath hematoma  Mechanical VTE Prophylaxis in Place: Yes    Patient Centered Rounds: I have performed bedside rounds with nursing staff today  Discussions with Specialists or Other Care Team Provider: general surgery    Education and Discussions with Family / Patient: patient    Time Spent for Care: 30 minutes  More than 50% of total time spent on counseling and coordination of care as described above  Current Length of Stay: 5 day(s)    Current Patient Status: Inpatient   Certification Statement: The patient will continue to require additional inpatient hospital stay due to persistent cough and abdominal pain    Discharge Plan: 24-48hrs    Code Status: Level 1 - Full Code      Subjective: This morning patient does report abdominal pain improved especially at rest   On coughing still 10 on 10 in intensity  Patient is able to ambulate  Shortness of breath improved however she still reports the persistent hacking cough which she feels has worsened since yesterday  She is reporting now greenish sputum production  Oxygen saturation has been good in the 94-96 range on room air  Objective:     Vitals:   Temp (24hrs), Av 2 °F (36 8 °C), Min:97 3 °F (36 3 °C), Max:98 8 °F (37 1 °C)    Temp:  [97 3 °F (36 3 °C)-98 8 °F (37 1 °C)] 98 8 °F (37 1 °C)  HR:  [74-97] 83  Resp:  [16] 16  BP: (114-147)/(73-86) 114/73  SpO2:  [92 %-96 %] 96 %  Body mass index is 39 5 kg/m²       Input and Output Summary (last 24 hours):     No intake or output data in the 24 hours ending 10/30/22 0959    Physical Exam:     Physical Exam  Constitutional:       General: She is not in acute distress  Appearance: She is obese  She is not toxic-appearing  HENT:      Mouth/Throat:      Mouth: Mucous membranes are moist       Pharynx: Oropharynx is clear  Cardiovascular:      Rate and Rhythm: Normal rate and regular rhythm  Pulses: Normal pulses  Pulmonary:      Effort: Pulmonary effort is normal       Breath sounds: Normal breath sounds  Abdominal:      General: Abdomen is flat  Bowel sounds are normal       Tenderness: There is abdominal tenderness  There is no guarding  Musculoskeletal:      Right lower leg: Edema present  Left lower leg: Edema present  Neurological:      General: No focal deficit present  Mental Status: She is alert and oriented to person, place, and time  Psychiatric:         Mood and Affect: Mood normal          Additional Data:     Labs:    Results from last 7 days   Lab Units 10/30/22  0548 10/28/22  0532 10/27/22  1107 10/27/22  0200   WBC Thousand/uL 18 13*   < > 18 15* 20 94*   HEMOGLOBIN g/dL 11 7   < > 11 6 11 9   HEMATOCRIT % 35 9   < > 35 3 36 7   PLATELETS Thousands/uL 398*   < > 428* 433*   NEUTROS PCT %  --   --   --  83*   LYMPHS PCT %  --   --   --  8*   LYMPHO PCT %  --   --  10*  --    MONOS PCT %  --   --   --  5   MONO PCT %  --   --  3*  --    EOS PCT %  --   --  0 0    < > = values in this interval not displayed       Results from last 7 days   Lab Units 10/30/22  0548   SODIUM mmol/L 139   POTASSIUM mmol/L 3 2*   CHLORIDE mmol/L 102   CO2 mmol/L 29   BUN mg/dL 26*   CREATININE mg/dL 1 02   ANION GAP mmol/L 8   CALCIUM mg/dL 8 3   ALBUMIN g/dL 2 7*   TOTAL BILIRUBIN mg/dL 0 25   ALK PHOS U/L 111   ALT U/L 45   AST U/L 22   GLUCOSE RANDOM mg/dL 161*         Results from last 7 days   Lab Units 10/30/22  0818 10/29/22  2142 10/29/22  1653 10/29/22  1123 10/29/22  0718 10/28/22  2117 10/28/22  1543 10/28/22  1134 10/28/22  1133 10/28/22  0736 10/27/22  2120 10/27/22  1611   POC GLUCOSE mg/dl 84 174* 137 129 158* 216* 284* 309* 323* 144* 254* 222*         Results from last 7 days   Lab Units 10/27/22  0200 10/26/22  0241 10/26/22  0006 10/25/22  2202 10/25/22  1843 10/25/22  0811 10/25/22  0433 10/25/22  0253 10/24/22  2223   LACTIC ACID mmol/L  --  3 2* 2 7* 3 3* 4 8*   < >  --    < > 6 5*   PROCALCITONIN ng/ml <0 05  --   --   --   --   --  0 06  --  0 05    < > = values in this interval not displayed  * I Have Reviewed All Lab Data Listed Above  * Additional Pertinent Lab Tests Reviewed: All Labs Within Last 24 Hours Reviewed      Recent Cultures (last 7 days):     Results from last 7 days   Lab Units 10/24/22  2222 10/24/22  2204   BLOOD CULTURE  No Growth After 5 Days  No Growth After 5 Days         Last 24 Hours Medication List:   Current Facility-Administered Medications   Medication Dose Route Frequency Provider Last Rate   • acetaminophen  650 mg Oral Q6H PRN Maurice Castro PA-C     • cefTRIAXone  1,000 mg Intravenous Q24H Michael Quezada MD 1,000 mg (10/29/22 1210)   • citalopram  10 mg Oral HS Kat Alvarez PA-C     • fluticasone  1 spray Each Nare Daily Michael Quezada MD     • fluticasone  2 puff Inhalation BID Kat Alvarez PA-C     • fluticasone-vilanterol  1 puff Inhalation Daily Kat Alvarez PA-C     • guaiFENesin  600 mg Oral BID Kat Alvarez PA-C     • hydrocodone-chlorpheniramine polistirex  5 mL Oral Q6H PRN Lavell Bhakta MD     • insulin lispro  1-5 Units Subcutaneous 4x Daily (AC & HS) Kat Alvarez PA-C     • iohexol  50 mL Oral Once in imaging Lavell Bhakta MD     • ipratropium  0 5 mg Nebulization TID Kat Alvarez PA-C     • ipratropium-albuterol  3 mL Nebulization Q4H PRN Lavell Bhakta MD     • levalbuterol  1 25 mg Nebulization TID Lavell Bhakta MD     • montelukast  10 mg Oral HS Kat Alvarez PA-C     • morphine injection  2 mg Intravenous Q4H PRN Michael Quezada MD     • oxyCODONE  5 mg Oral Q4H PRN Lorie Cochran MD     • pantoprazole  40 mg Oral Early Morning Kat Alvarez PA-C     • polyethylene glycol  17 g Oral Daily Lavell Bhakta MD     • pravastatin  40 mg Oral Daily [employer], PA-C     • predniSONE  40 mg Oral Daily Lorie Cochran MD     • promethazine  12 5 mg Oral Q6H PRN Florinda Chavez PA-C     • sucralfate  1 g Oral 4x Daily (AC & HS) Florinda Chavez PA-C     • zolpidem  10 mg Oral HS PRN Haylie Travis PA-C          Today, Patient Was Seen By: JASON Xavier      ** Please Note: Dictation voice to text software may have been used in the creation of this document   **

## 2022-10-30 NOTE — ASSESSMENT & PLAN NOTE
· Meeting SIRS criteria on admission for leukocytosis, tachycardia, tachypnea  · Chest x-ray w/o any acute abnormalities   · Likely in the setting of acute asthma exacerbation  · Lactate remains elevated however stable, does have leukocytosis however on steroids  · Blood cultures thus far negative  · S/p 5 days of ceftriaxone

## 2022-10-30 NOTE — ASSESSMENT & PLAN NOTE
Lab Results   Component Value Date    HGBA1C 4 9 10/04/2022       Recent Labs     10/29/22  1123 10/29/22  1653 10/29/22  2142 10/30/22  0818   POCGLU 129 137 174* 84       Blood Sugar Average: Last 72 hrs:  (P) 352 [id number]   · Per chart review, appears patient previously on insulin regimen  However, insulin regimen discontinued this month per the chart   Last HA1C was 4 9   · SSI with Accu-Cheks  · Monitor for hyperglycemia while treated with nebulizers and IV steroids  · Hypoglycemia protocol  · Diabetic diet

## 2022-10-30 NOTE — ASSESSMENT & PLAN NOTE
**Patient presenting to the ED for increased shortness of breath/wheezing at home  Reports that she had taken all of her home inhaler treatments as well as a course of prednisone without improvement  ED gave multiple nebulizer treatments, IV steroids, magnesium without improvement in symptoms  Currently oxygenating well on RA  · Continue breo ellipta/flovent, scheduled atrovent/xoponexnebulizers  · On IV Solu-Medrol -> started PO taper  · S/p iv ceftriaxone  · On RA  · Please note chest x-ray negative for any acute abnormality

## 2022-10-30 NOTE — PLAN OF CARE
Problem: PAIN - ADULT  Goal: Verbalizes/displays adequate comfort level or baseline comfort level  Description: Interventions:  - Encourage patient to monitor pain and request assistance  - Assess pain using appropriate pain scale  - Administer analgesics based on type and severity of pain and evaluate response  - Implement non-pharmacological measures as appropriate and evaluate response  - Consider cultural and social influences on pain and pain management  - Notify physician/advanced practitioner if interventions unsuccessful or patient reports new pain  Outcome: Progressing     Problem: INFECTION - ADULT  Goal: Absence or prevention of progression during hospitalization  Description: INTERVENTIONS:  - Assess and monitor for signs and symptoms of infection  - Monitor lab/diagnostic results  - Monitor all insertion sites, i e  indwelling lines, tubes, and drains  - Monitor endotracheal if appropriate and nasal secretions for changes in amount and color  - [place] appropriate cooling/warming therapies per order  - Administer medications as ordered  - Instruct and encourage patient and family to use good hand hygiene technique  - Identify and instruct in appropriate isolation precautions for identified infection/condition  Outcome: Progressing  Goal: Absence of fever/infection during neutropenic period  Description: INTERVENTIONS:  - Monitor WBC    Outcome: Progressing     Problem: SAFETY ADULT  Goal: Patient will remain free of falls  Description: INTERVENTIONS:  - Educate patient/family on patient safety including physical limitations  - Instruct patient to call for assistance with activity   - Consult OT/PT to assist with strengthening/mobility   - Keep Call bell within reach  - Keep bed low and locked with side rails adjusted as appropriate  - Keep care items and personal belongings within reach  - Initiate and maintain comfort rounds  - Make Fall Risk Sign visible to staff  - Offer Toileting every  Hours, in advance of need  - Initiate/Maintain alarm  - Obtain necessary fall risk management equipment:   - Apply yellow socks and bracelet for high fall risk patients  - Consider moving patient to room near nurses station  Outcome: Progressing  Goal: Maintain or return to baseline ADL function  Description: INTERVENTIONS:  -  Assess patient's ability to carry out ADLs; assess patient's baseline for ADL function and identify physical deficits which impact ability to perform ADLs (bathing, care of mouth/teeth, toileting, grooming, dressing, etc )  - Assess/evaluate cause of self-care deficits   - Assess range of motion  - Assess patient's mobility; develop plan if impaired  - Assess patient's need for assistive devices and provide as appropriate  - Encourage maximum independence but intervene and supervise when necessary  - Involve family in performance of ADLs  - Assess for home care needs following discharge   - Consider OT consult to assist with ADL evaluation and planning for discharge  - Provide patient education as appropriate  Outcome: Progressing  Goal: Maintains/Returns to pre admission functional level  Description: INTERVENTIONS:  - Perform BMAT or MOVE assessment daily    - Set and communicate daily mobility goal to care team and patient/family/caregiver  - Collaborate with rehabilitation services on mobility goals if consulted  - Perform Range of Motion  times a day  - Reposition patient every  hours    - Dangle patient  times a day  - Stand patient  times a day  - Ambulate patient  times a day  - Out of bed to chair  times a day   - Out of bed for meals  times a day  - Out of bed for toileting  - Record patient progress and toleration of activity level   Outcome: Progressing     Problem: DISCHARGE PLANNING  Goal: Discharge to home or other facility with appropriate resources  Description: INTERVENTIONS:  - Identify barriers to discharge w/patient and caregiver  - Arrange for needed discharge resources and transportation as appropriate  - Identify discharge learning needs (meds, wound care, etc )  - Arrange for interpretive services to assist at discharge as needed  - Refer to Case Management Department for coordinating discharge planning if the patient needs post-hospital services based on physician/advanced practitioner order or complex needs related to functional status, cognitive ability, or social support system  Outcome: Progressing     Problem: Knowledge Deficit  Goal: Patient/family/caregiver demonstrates understanding of disease process, treatment plan, medications, and discharge instructions  Description: Complete learning assessment and assess knowledge base    Interventions:  - Provide teaching at level of understanding  - Provide teaching via preferred learning methods  Outcome: Progressing     Problem: Potential for Falls  Goal: Patient will remain free of falls  Description: INTERVENTIONS:  - Educate patient/family on patient safety including physical limitations  - Instruct patient to call for assistance with activity   - Consult OT/PT to assist with strengthening/mobility   - Keep Call bell within reach  - Keep bed low and locked with side rails adjusted as appropriate  - Keep care items and personal belongings within reach  - Initiate and maintain comfort rounds  - Make Fall Risk Sign visible to staff  - Offer Toileting every  Hours, in advance of need  - Initiate/Maintain alarm  - Obtain necessary fall risk management equipment: - Apply yellow socks and bracelet for high fall risk patients  - Consider moving patient to room near nurses station  Outcome: Progressing

## 2022-10-30 NOTE — PLAN OF CARE
Problem: PAIN - ADULT  Goal: Verbalizes/displays adequate comfort level or baseline comfort level  Description: Interventions:  - Encourage patient to monitor pain and request assistance  - Assess pain using appropriate pain scale  - Administer analgesics based on type and severity of pain and evaluate response  - Implement non-pharmacological measures as appropriate and evaluate response  - Consider cultural and social influences on pain and pain management  - Notify physician/advanced practitioner if interventions unsuccessful or patient reports new pain  Outcome: Progressing     Problem: INFECTION - ADULT  Goal: Absence or prevention of progression during hospitalization  Description: INTERVENTIONS:  - Assess and monitor for signs and symptoms of infection  - Monitor lab/diagnostic results  - Monitor all insertion sites, i e  indwelling lines, tubes, and drains  - Monitor endotracheal if appropriate and nasal secretions for changes in amount and color  - [place] appropriate cooling/warming therapies per order  - Administer medications as ordered  - Instruct and encourage patient and family to use good hand hygiene technique  - Identify and instruct in appropriate isolation precautions for identified infection/condition  Outcome: Progressing  Goal: Absence of fever/infection during neutropenic period  Description: INTERVENTIONS:  - Monitor WBC    Outcome: Progressing     Problem: SAFETY ADULT  Goal: Patient will remain free of falls  Description: INTERVENTIONS:  - Educate patient/family on patient safety including physical limitations  - Instruct patient to call for assistance with activity   - Consult OT/PT to assist with strengthening/mobility   - Keep Call bell within reach  - Keep bed low and locked with side rails adjusted as appropriate  - Keep care items and personal belongings within reach  - Initiate and maintain comfort rounds  - Make Fall Risk Sign visible to staff  - Offer Toileting every  Hours, in advance of need  - Initiate/Maintain alarm  - Obtain necessary fall risk management equipment:   - Apply yellow socks and bracelet for high fall risk patients  - Consider moving patient to room near nurses station  Outcome: Progressing  Goal: Maintain or return to baseline ADL function  Description: INTERVENTIONS:  -  Assess patient's ability to carry out ADLs; assess patient's baseline for ADL function and identify physical deficits which impact ability to perform ADLs (bathing, care of mouth/teeth, toileting, grooming, dressing, etc )  - Assess/evaluate cause of self-care deficits   - Assess range of motion  - Assess patient's mobility; develop plan if impaired  - Assess patient's need for assistive devices and provide as appropriate  - Encourage maximum independence but intervene and supervise when necessary  - Involve family in performance of ADLs  - Assess for home care needs following discharge   - Consider OT consult to assist with ADL evaluation and planning for discharge  - Provide patient education as appropriate  Outcome: Progressing  Goal: Maintains/Returns to pre admission functional level  Description: INTERVENTIONS:  - Perform BMAT or MOVE assessment daily    - Set and communicate daily mobility goal to care team and patient/family/caregiver  - Collaborate with rehabilitation services on mobility goals if consulted  - Perform Range of Motion  times a day  - Reposition patient every  hours    - Dangle patient  times a day  - Stand patient times a day  - Ambulate patient  times a day  - Out of bed to chair  times a day   - Out of bed for meals  times a day  - Out of bed for toileting  - Record patient progress and toleration of activity level   Outcome: Progressing     Problem: DISCHARGE PLANNING  Goal: Discharge to home or other facility with appropriate resources  Description: INTERVENTIONS:  - Identify barriers to discharge w/patient and caregiver  - Arrange for needed discharge resources and transportation as appropriate  - Identify discharge learning needs (meds, wound care, etc )  - Arrange for interpretive services to assist at discharge as needed  - Refer to Case Management Department for coordinating discharge planning if the patient needs post-hospital services based on physician/advanced practitioner order or complex needs related to functional status, cognitive ability, or social support system  Outcome: Progressing     Problem: Knowledge Deficit  Goal: Patient/family/caregiver demonstrates understanding of disease process, treatment plan, medications, and discharge instructions  Description: Complete learning assessment and assess knowledge base    Interventions:  - Provide teaching at level of understanding  - Provide teaching via preferred learning methods  Outcome: Progressing     Problem: Potential for Falls  Goal: Patient will remain free of falls  Description: INTERVENTIONS:  - Educate patient/family on patient safety including physical limitations  - Instruct patient to call for assistance with activity   - Consult OT/PT to assist with strengthening/mobility   - Keep Call bell within reach  - Keep bed low and locked with side rails adjusted as appropriate  - Keep care items and personal belongings within reach  - Initiate and maintain comfort rounds  - Make Fall Risk Sign visible to staff  - Offer Toileting every  Hours, in advance of need  - Initiate/Maintain alarm  - Obtain necessary fall risk management equipment:  - Apply yellow socks and bracelet for high fall risk patients  - Consider moving patient to room near nurses station  Outcome: Progressing

## 2022-10-30 NOTE — ASSESSMENT & PLAN NOTE
On 10/29 am patient reporting Increased RLQ Pain 10/10 not relieved despite iv toradol    · CT : 7 6 cm right rectus abdominis hematoma with small internal focus of active extravasation  · General surgery signed off  No surgical recommendations  · Lovenox Dc'd  · Monitor H/H closely  Currently stable at 11 7  · PRN morphine/oxy for pain control

## 2022-10-31 ENCOUNTER — APPOINTMENT (INPATIENT)
Dept: RADIOLOGY | Facility: HOSPITAL | Age: 57
End: 2022-10-31

## 2022-10-31 PROBLEM — K21.9 GERD (GASTROESOPHAGEAL REFLUX DISEASE): Status: ACTIVE | Noted: 2022-10-31

## 2022-10-31 PROBLEM — J45.51 SEVERE PERSISTENT ASTHMA WITH EXACERBATION: Status: ACTIVE | Noted: 2022-10-24

## 2022-10-31 LAB
ANION GAP SERPL CALCULATED.3IONS-SCNC: 6 MMOL/L (ref 4–13)
BASOPHILS # BLD AUTO: 0.04 THOUSANDS/ÂΜL (ref 0–0.1)
BASOPHILS NFR BLD AUTO: 0 % (ref 0–1)
BUN SERPL-MCNC: 17 MG/DL (ref 5–25)
CALCIUM SERPL-MCNC: 8.6 MG/DL (ref 8.3–10.1)
CHLORIDE SERPL-SCNC: 100 MMOL/L (ref 96–108)
CO2 SERPL-SCNC: 30 MMOL/L (ref 21–32)
CREAT SERPL-MCNC: 0.78 MG/DL (ref 0.6–1.3)
EOSINOPHIL # BLD AUTO: 0.06 THOUSAND/ÂΜL (ref 0–0.61)
EOSINOPHIL NFR BLD AUTO: 0 % (ref 0–6)
ERYTHROCYTE [DISTWIDTH] IN BLOOD BY AUTOMATED COUNT: 14.8 % (ref 11.6–15.1)
GFR SERPL CREATININE-BSD FRML MDRD: 85 ML/MIN/1.73SQ M
GLUCOSE SERPL-MCNC: 142 MG/DL (ref 65–140)
GLUCOSE SERPL-MCNC: 179 MG/DL (ref 65–140)
GLUCOSE SERPL-MCNC: 197 MG/DL (ref 65–140)
GLUCOSE SERPL-MCNC: 219 MG/DL (ref 65–140)
GLUCOSE SERPL-MCNC: 342 MG/DL (ref 65–140)
GLUCOSE SERPL-MCNC: 96 MG/DL (ref 65–140)
HCT VFR BLD AUTO: 35.5 % (ref 34.8–46.1)
HGB BLD-MCNC: 11.5 G/DL (ref 11.5–15.4)
IMM GRANULOCYTES # BLD AUTO: >0.5 THOUSAND/UL (ref 0–0.2)
IMM GRANULOCYTES NFR BLD AUTO: 3 % (ref 0–2)
LYMPHOCYTES # BLD AUTO: 4.27 THOUSANDS/ÂΜL (ref 0.6–4.47)
LYMPHOCYTES NFR BLD AUTO: 23 % (ref 14–44)
MCH RBC QN AUTO: 31.8 PG (ref 26.8–34.3)
MCHC RBC AUTO-ENTMCNC: 32.4 G/DL (ref 31.4–37.4)
MCV RBC AUTO: 98 FL (ref 82–98)
MONOCYTES # BLD AUTO: 1.54 THOUSAND/ÂΜL (ref 0.17–1.22)
MONOCYTES NFR BLD AUTO: 8 % (ref 4–12)
NEUTROPHILS # BLD AUTO: 12.31 THOUSANDS/ÂΜL (ref 1.85–7.62)
NEUTS SEG NFR BLD AUTO: 66 % (ref 43–75)
NRBC BLD AUTO-RTO: 0 /100 WBCS
PLATELET # BLD AUTO: 375 THOUSANDS/UL (ref 149–390)
PMV BLD AUTO: 10.3 FL (ref 8.9–12.7)
POTASSIUM SERPL-SCNC: 3.8 MMOL/L (ref 3.5–5.3)
PROCALCITONIN SERPL-MCNC: 0.08 NG/ML
RBC # BLD AUTO: 3.62 MILLION/UL (ref 3.81–5.12)
SODIUM SERPL-SCNC: 136 MMOL/L (ref 135–147)
WBC # BLD AUTO: 18.8 THOUSAND/UL (ref 4.31–10.16)

## 2022-10-31 RX ORDER — FUROSEMIDE 10 MG/ML
20 INJECTION INTRAMUSCULAR; INTRAVENOUS ONCE
Status: COMPLETED | OUTPATIENT
Start: 2022-10-31 | End: 2022-10-31

## 2022-10-31 RX ORDER — HYDROXYZINE HYDROCHLORIDE 25 MG/1
25 TABLET, FILM COATED ORAL EVERY 6 HOURS PRN
Status: DISCONTINUED | OUTPATIENT
Start: 2022-10-31 | End: 2022-11-01 | Stop reason: HOSPADM

## 2022-10-31 RX ORDER — METHYLPREDNISOLONE SODIUM SUCCINATE 40 MG/ML
40 INJECTION, POWDER, LYOPHILIZED, FOR SOLUTION INTRAMUSCULAR; INTRAVENOUS EVERY 8 HOURS SCHEDULED
Status: DISCONTINUED | OUTPATIENT
Start: 2022-10-31 | End: 2022-11-01

## 2022-10-31 RX ORDER — BUDESONIDE 0.5 MG/2ML
0.5 INHALANT ORAL
Status: DISCONTINUED | OUTPATIENT
Start: 2022-10-31 | End: 2022-11-01 | Stop reason: HOSPADM

## 2022-10-31 RX ORDER — PANTOPRAZOLE SODIUM 40 MG/1
40 TABLET, DELAYED RELEASE ORAL
Status: DISCONTINUED | OUTPATIENT
Start: 2022-11-01 | End: 2022-10-31

## 2022-10-31 RX ORDER — PANTOPRAZOLE SODIUM 40 MG/1
40 TABLET, DELAYED RELEASE ORAL
Status: DISCONTINUED | OUTPATIENT
Start: 2022-10-31 | End: 2022-11-01 | Stop reason: HOSPADM

## 2022-10-31 RX ORDER — FAMOTIDINE 20 MG/1
40 TABLET, FILM COATED ORAL 2 TIMES DAILY
Status: DISCONTINUED | OUTPATIENT
Start: 2022-10-31 | End: 2022-11-01 | Stop reason: HOSPADM

## 2022-10-31 RX ADMIN — LEVALBUTEROL HYDROCHLORIDE 1.25 MG: 1.25 SOLUTION, CONCENTRATE RESPIRATORY (INHALATION) at 13:54

## 2022-10-31 RX ADMIN — IPRATROPIUM BROMIDE 0.5 MG: 0.5 SOLUTION RESPIRATORY (INHALATION) at 13:54

## 2022-10-31 RX ADMIN — GUAIFENESIN 600 MG: 600 TABLET, EXTENDED RELEASE ORAL at 10:48

## 2022-10-31 RX ADMIN — HYDROXYZINE HYDROCHLORIDE 25 MG: 25 TABLET ORAL at 16:35

## 2022-10-31 RX ADMIN — INSULIN LISPRO 3 UNITS: 100 INJECTION, SOLUTION INTRAVENOUS; SUBCUTANEOUS at 17:52

## 2022-10-31 RX ADMIN — BUDESONIDE 0.5 MG: 0.5 INHALANT ORAL at 19:42

## 2022-10-31 RX ADMIN — IPRATROPIUM BROMIDE AND ALBUTEROL SULFATE 3 ML: 2.5; .5 SOLUTION RESPIRATORY (INHALATION) at 01:32

## 2022-10-31 RX ADMIN — LEVALBUTEROL HYDROCHLORIDE 1.25 MG: 1.25 SOLUTION, CONCENTRATE RESPIRATORY (INHALATION) at 19:32

## 2022-10-31 RX ADMIN — OXYCODONE HYDROCHLORIDE 5 MG: 5 TABLET ORAL at 01:22

## 2022-10-31 RX ADMIN — HYDROCODONE POLISTIREX AND CHLORPHENIRAMINE POLISTIREX 5 ML: 10; 8 SUSPENSION, EXTENDED RELEASE ORAL at 07:55

## 2022-10-31 RX ADMIN — ZOLPIDEM TARTRATE 10 MG: 5 TABLET, COATED ORAL at 21:30

## 2022-10-31 RX ADMIN — METHYLPREDNISOLONE SODIUM SUCCINATE 40 MG: 40 INJECTION, POWDER, FOR SOLUTION INTRAMUSCULAR; INTRAVENOUS at 10:47

## 2022-10-31 RX ADMIN — INSULIN LISPRO 1 UNITS: 100 INJECTION, SOLUTION INTRAVENOUS; SUBCUTANEOUS at 21:31

## 2022-10-31 RX ADMIN — FLUTICASONE FUROATE AND VILANTEROL TRIFENATATE 1 PUFF: 200; 25 POWDER RESPIRATORY (INHALATION) at 10:55

## 2022-10-31 RX ADMIN — MONTELUKAST 10 MG: 10 TABLET, FILM COATED ORAL at 21:28

## 2022-10-31 RX ADMIN — IPRATROPIUM BROMIDE 0.5 MG: 0.5 SOLUTION RESPIRATORY (INHALATION) at 07:27

## 2022-10-31 RX ADMIN — PANTOPRAZOLE SODIUM 40 MG: 40 TABLET, DELAYED RELEASE ORAL at 18:03

## 2022-10-31 RX ADMIN — LEVALBUTEROL HYDROCHLORIDE 1.25 MG: 1.25 SOLUTION, CONCENTRATE RESPIRATORY (INHALATION) at 07:27

## 2022-10-31 RX ADMIN — POLYETHYLENE GLYCOL 3350 17 G: 17 POWDER, FOR SOLUTION ORAL at 10:48

## 2022-10-31 RX ADMIN — FUROSEMIDE 20 MG: 10 INJECTION, SOLUTION INTRAMUSCULAR; INTRAVENOUS at 16:32

## 2022-10-31 RX ADMIN — INSULIN LISPRO 1 UNITS: 100 INJECTION, SOLUTION INTRAVENOUS; SUBCUTANEOUS at 12:52

## 2022-10-31 RX ADMIN — HYDROCODONE POLISTIREX AND CHLORPHENIRAMINE POLISTIREX 5 ML: 10; 8 SUSPENSION, EXTENDED RELEASE ORAL at 01:08

## 2022-10-31 RX ADMIN — GUAIFENESIN 600 MG: 600 TABLET, EXTENDED RELEASE ORAL at 17:54

## 2022-10-31 RX ADMIN — HYDROCODONE POLISTIREX AND CHLORPHENIRAMINE POLISTIREX 5 ML: 10; 8 SUSPENSION, EXTENDED RELEASE ORAL at 18:40

## 2022-10-31 RX ADMIN — FAMOTIDINE 40 MG: 20 TABLET ORAL at 13:02

## 2022-10-31 RX ADMIN — FLUTICASONE PROPIONATE 1 SPRAY: 50 SPRAY, METERED NASAL at 10:56

## 2022-10-31 RX ADMIN — OXYCODONE HYDROCHLORIDE 5 MG: 5 TABLET ORAL at 07:56

## 2022-10-31 RX ADMIN — MORPHINE SULFATE 2 MG: 2 INJECTION, SOLUTION INTRAMUSCULAR; INTRAVENOUS at 13:12

## 2022-10-31 RX ADMIN — PANTOPRAZOLE SODIUM 40 MG: 40 TABLET, DELAYED RELEASE ORAL at 06:22

## 2022-10-31 RX ADMIN — CITALOPRAM HYDROBROMIDE 10 MG: 20 TABLET ORAL at 21:28

## 2022-10-31 RX ADMIN — IPRATROPIUM BROMIDE 0.5 MG: 0.5 SOLUTION RESPIRATORY (INHALATION) at 19:32

## 2022-10-31 RX ADMIN — METHYLPREDNISOLONE SODIUM SUCCINATE 40 MG: 40 INJECTION, POWDER, FOR SOLUTION INTRAMUSCULAR; INTRAVENOUS at 21:28

## 2022-10-31 RX ADMIN — MORPHINE SULFATE 2 MG: 2 INJECTION, SOLUTION INTRAMUSCULAR; INTRAVENOUS at 04:29

## 2022-10-31 RX ADMIN — PRAVASTATIN SODIUM 40 MG: 40 TABLET ORAL at 18:03

## 2022-10-31 RX ADMIN — FAMOTIDINE 40 MG: 20 TABLET ORAL at 18:04

## 2022-10-31 NOTE — ASSESSMENT & PLAN NOTE
· History of;  · Last EGD was completed in March 2022; unable to see EGD report    Biopsy is reviewed by GI  · GI Consult, appreciate input  · Recommending barium swallow study to investigate size of hiatal hernia  · Continue PPI BID  · Initiate on Pepcid

## 2022-10-31 NOTE — CONSULTS
Consultation - [facility]  Warner Woodarder 56 y o  female MRN: [id number]  Unit/Bed#: -01 Encounter: [id number]         Reason for Consult / Principal Problem: Dysphagia, GERD     HPI:  Eli Srivastava is a 62-year-old female with history of asthma, type 2 DM and GERD  Patient presented to the hospital with asthma exacerbations symptoms  She reports that in the past year, she has had multiple hospitalizations for asthma  She continues to have treatment with steroid and nebulizer, but reports she has recurrence of cough and wheezing soon after  GI was consulted for concern that GERD is contributing  She experiences dysphagia to solids and regurgitation  She denies unintentional weight loss  Patient reports she would like to find a GI group closer to home  For GERD, she has been taking PPI once daily and Carafate tabs  CXR on this admission showing no acute cardiopulmonary disease  Patient reports it is difficult for her to take a deep breath, and she is still wheezing  She is otherwise in no respiratory distress when sitting in the chair  Patient's last EGD was at [facility] in March 2022  Report is unavailable to me, but the biopsy results are  Patient was told that she had a hiatal hernia, gastritis and possible Candida esophagitis  Gastric and esophageal biopsies were negative  Esophageal brushings consistent with Candida esophagitis  She reports that she was treated  She also had a gastric emptying study that was normal     Review of Systems:    CONSTITUTIONAL: Denies any fever, chills, or rigors  Good appetite, and no recent weight loss  HEENT: No earache or tinnitus  Denies hearing loss or visual disturbances  CARDIOVASCULAR: No chest pain or palpitations  RESPIRATORY: Positive for wheezing and dry cough  GASTROINTESTINAL: As noted in the History of Present Illness  GENITOURINARY: No problems with urination  Denies any hematuria or dysuria    NEUROLOGIC: No dizziness or vertigo, denies headaches  MUSCULOSKELETAL: Denies any muscle or joint pain  SKIN: Denies skin rashes or itching  ENDOCRINE: Denies excessive thirst  Denies intolerance to heat or cold  PSYCHOSOCIAL: Denies depression or anxiety  Denies any recent memory loss  Historical Information   History reviewed  No pertinent past medical history  History reviewed  No pertinent surgical history  Social History   Social History     Substance and Sexual Activity   Alcohol Use Never     Social History     Substance and Sexual Activity   Drug Use Never     Social History     Tobacco Use   Smoking Status Never Smoker   Smokeless Tobacco Never Used     History reviewed  No pertinent family history       Meds/Allergies     Current Facility-Administered Medications   Medication Dose Route Frequency   • acetaminophen (TYLENOL) tablet 650 mg  650 mg Oral Q6H PRN   • budesonide (PULMICORT) inhalation solution 0 5 mg  0 5 mg Nebulization Q12H   • citalopram (CeleXA) tablet 10 mg  10 mg Oral HS   • fluticasone (FLONASE) 50 mcg/act nasal spray 1 spray  1 spray Each Nare Daily   • fluticasone-vilanterol (BREO ELLIPTA) 200-25 MCG/INH inhaler 1 puff  1 puff Inhalation Daily   • guaiFENesin (MUCINEX) 12 hr tablet 600 mg  600 mg Oral BID   • hydrocodone-chlorpheniramine polistirex (TUSSIONEX) ER suspension 5 mL  5 mL Oral Q6H PRN   • insulin lispro (HumaLOG) 100 units/mL subcutaneous injection 1-5 Units  1-5 Units Subcutaneous 4x Daily (AC & HS)   • iohexol (OMNIPAQUE) 240 MG/ML solution 50 mL  50 mL Oral Once in imaging   • ipratropium (ATROVENT) 0 02 % inhalation solution 0 5 mg  0 5 mg Nebulization TID   • ipratropium-albuterol (DUO-NEB) 0 5-2 5 mg/3 mL inhalation solution 3 mL  3 mL Nebulization Q4H PRN   • levalbuterol (XOPENEX) inhalation solution 1 25 mg  1 25 mg Nebulization TID   • methylPREDNISolone sodium succinate (Solu-MEDROL) injection 40 mg  40 mg Intravenous Q8H KAYLEIGH   • montelukast (SINGULAIR) tablet 10 mg  10 mg Oral HS   • morphine injection 2 mg  2 mg Intravenous Q4H PRN   • oxyCODONE (ROXICODONE) IR tablet 5 mg  5 mg Oral Q4H PRN   • [START ON 11/1/2022] pantoprazole (PROTONIX) EC tablet 40 mg  40 mg Oral Early Morning   • polyethylene glycol (MIRALAX) packet 17 g  17 g Oral Daily   • pravastatin (PRAVACHOL) tablet 40 mg  40 mg Oral Daily With Dinner   • promethazine (PHENERGAN) oral syrup 12 5 mg  12 5 mg Oral Q6H PRN   • sucralfate (CARAFATE) tablet 1 g  1 g Oral 4x Daily (AC & HS)   • zolpidem (AMBIEN) tablet 10 mg  10 mg Oral HS PRN       No Known Allergies      Objective     Blood pressure 109/64, pulse 80, temperature 98 8 °F (37 1 °C), resp  rate 16, height 4' 11" (1 499 m), weight 88 7 kg (195 lb 8 8 oz), SpO2 98 %  Intake/Output Summary (Last 24 hours) at 10/31/2022 1147  Last data filed at 10/31/2022 0701  Gross per 24 hour   Intake 480 ml   Output --   Net 480 ml         PHYSICAL EXAM:      General Appearance:   Alert and oriented x 3  Cooperative, and in no respiratory distress   HEENT:   Normocephalic, atraumatic, anicteric      Neck:  Supple, symmetrical, trachea midline   Lungs:   Wheezes auscultated bilaterly; no rales, rhonchi or wheezing; respirations unlabored    Heart[de-identified]   S1 and S2 normal; regular rate and rhythm; no murmur, rub, or gallop     Abdomen:   Soft, non-tender, non-distended; normal bowel sounds; no masses, no organomegaly    Genitalia:   Deferred    Rectal:   Deferred    Extremities:  No cyanosis, clubbing or edema    Pulses:  2+ and symmetric all extremities    Skin:  Skin color, texture, turgor normal, no rashes or lesions    Lymph nodes:  No palpable cervical or supraclavicular lymphadenopathy        Lab Results:   Results from last 7 days   Lab Units 10/31/22  0546   WBC Thousand/uL 18 80*   HEMOGLOBIN g/dL 11 5   HEMATOCRIT % 35 5   PLATELETS Thousands/uL 375   NEUTROS PCT % 66   LYMPHS PCT % 23   MONOS PCT % 8   EOS PCT % 0     Results from last 7 days   Lab Units 10/31/22  0546 10/30/22  0548   POTASSIUM mmol/L 3 8 3 2*   CHLORIDE mmol/L 100 102   CO2 mmol/L 30 29   BUN mg/dL 17 26*   CREATININE mg/dL 0 78 1 02   CALCIUM mg/dL 8 6 8 3   ALK PHOS U/L  --  111   ALT U/L  --  45   AST U/L  --  22               Imaging Studies: I have personally reviewed pertinent imaging studies  XR chest 1 view portable    Result Date: 10/25/2022  Impression: No acute cardiopulmonary disease  Workstation performed: [id number]     CT chest abdomen pelvis w contrast    Result Date: 10/26/2022  Impression: No acute pathology in the chest, abdomen, or pelvis  Hepatomegaly/hepatic steatosis  Workstation performed: [id number]     CT abdomen pelvis w contrast    Result Date: 10/29/2022  Impression: 1   7 6 cm right rectus abdominis hematoma with small internal focus of active extravasation  2   Small but increasing layering pleural effusions  Workstation performed: [id number]       ASSESSMENT and PLAN:      1) Dysphagia to solid, GERD and asthma - Patient told she has a hiatal hernia  EGD report is not available currently, but we were able to access her biopsies from March 2022  GES was negative even in the setting of newly diagnosed diabetes  - We will plan for barium swallow to investigate size of hiatal hernia and to screen for dysmotility  - Ultimately, she may benefit from an outpatient pH and manometry test   - Will increase PPI to BID, and order Pepcid   - Outpatient follow-up with us and potentially a thoracic surgery consult   - Discussed with pulmonary and Dr Cassi Sutherland on [facility]  - Patient agrees with above plan    The patient was seen and examined by Dr Nolberto Nickerson, all [facility] medical decisions were made with Dr Nolberto Nickerson  Thank you for allowing us to participate in the care of this pleasant patient  We will follow up with you closely

## 2022-10-31 NOTE — CASE MANAGEMENT
Case Management Discharge Planning Note    Patient name Sam Acosta  Location [address] 211/-[id number] MRN [id number]  : 1965 Date 10/31/2022       Current Admission Date: 10/24/2022  Current Admission Diagnosis:Severe persistent asthma with exacerbation   Patient Active Problem List    Diagnosis Date Noted   • GERD (gastroesophageal reflux disease) 10/31/2022   • Rectus sheath hematoma 10/29/2022   • SIRS (systemic inflammatory response syndrome) ([facility] [address] ) 10/24/2022   • Severe persistent asthma with exacerbation 10/24/2022   • Hyperlipidemia 10/24/2022   • Type 2 diabetes mellitus, with long-term current use of insulin ([facility] [address] ) 10/24/2022      LOS (days): 6  Geometric Mean LOS (GMLOS) (days): 2 80  Days to GMLOS:-3 4     OBJECTIVE:  Risk of Unplanned Readmission Score: 10 32         Current admission status: Inpatient   Preferred Pharmacy:   [facility] [address] [place] [address] 65 22  [address] [address]  Phone: [phone] Fax: [phone]    Primary Care Provider: Lukas Hoffman    Primary Insurance: BLUE CROSS  Secondary Insurance:     DISCHARGE DETAILS:  Patient reviewed for d/c disposition during SLIM rounds today  Anticipated d/c in 48hrs  Patient is still on O2  No rehab recommendation noted at this time  CM will continue to follow as needed for d/c planning

## 2022-10-31 NOTE — PROGRESS NOTES
[facility]  Progress Note - Sharon Handley 1965, 56 y o  female MRN: [id number]  Unit/Bed#: -01 Encounter: [id number]  Primary Care Provider: Anamika Linda   Date and time admitted to hospital: 10/24/2022  6:25 PM    * Severe persistent asthma with exacerbation  Assessment & Plan  · Patient presented to the ED with increased shortness of breath/wheezing at home  Reports that she had taken all of her home inhaler treatments as well as a course of prednisone without improvement  · ED gave multiple nebulizer treatments, IV steroids, magnesium without improvement in symptoms  · Pulmonology consult, appreciate input  · Continue nebulizer treatments  · Continue Pulmicort, Breo Ellipta  · Await sputum culture   · Patient had been on oral prednisone as of yesterday, however, switched back to IV Solu-medrol in setting of persistent shortness of breath and wheezing  · Continue IV Solu-Medrol, 40 mg every 8 hours; wean as able  · Consult for reflux/GERD-GI consulted          GERD (gastroesophageal reflux disease)  Assessment & Plan  · History of;  · Last EGD was completed in March 2022; unable to see EGD report  Biopsy is reviewed by GI  · GI Consult, appreciate input  · Recommending barium swallow study to investigate size of hiatal hernia  · Continue PPI BID  · Initiate on Pepcid    Rectus sheath hematoma  Assessment & Plan  · On 10/29, am patient reporting Increased RLQ Pain 10/10 not relieved despite iv toradol  · CT : 7 6 cm right rectus abdominis hematoma with small internal focus of active extravasation  · General surgery signed off  No surgical recommendations  · Lovenox Dc'd  · Hemoglobin stays stable at 11 5  · PRN morphine/oxy for pain control      Type 2 diabetes mellitus, with long-term current use of insulin [facility])  Assessment & Plan  Lab Results   Component Value Date    HGBA1C 4 9 10/04/2022       Recent Labs     10/30/22  2150 10/31/22  0153 10/31/22  5559 10/31/22  1147   POCGLU 121 179* 96 197*       Blood Sugar Average: Last 72 hrs:  (P) 188 8125     · Per chart review, appears patient previously on insulin regimen  However, insulin regimen discontinued this month per the chart  Last HA1C was 4 9   · SSI with Accu-Cheks  · Monitor for hyperglycemia while treated with nebulizers and IV steroids  · Hypoglycemia protocol  · Diabetic diet     Hyperlipidemia  Assessment & Plan  · Outpatient lipid check  · Continue statin    SIRS (systemic inflammatory response syndrome) (HCC)  Assessment & Plan  · Stable, low suspicion for infection  ·  Meeting SIRS criteria on admission for leukocytosis, tachycardia, tachypnea  · Chest x-ray w/o any acute abnormalities   · Likely in the setting of acute asthma exacerbation  · Lactate remains elevated however stable, does have leukocytosis however on steroids  · Blood cultures thus far negative  · S/p 5 days of ceftriaxone; monitor off abx  VTE Pharmacologic Prophylaxis: VTE Score: 4 none due to rectus sheath hematoma    Patient Centered Rounds: I performed bedside rounds with nursing staff today  Discussions with Specialists or Other Care Team Provider: yes pulmonary and GI    Education and Discussions with Family / Patient: patient has been updated  Time Spent for Care: 15 minutes  More than 50% of total time spent on counseling and coordination of care as described above  Current Length of Stay: 6 day(s)  Current Patient Status: Inpatient   Certification Statement: The patient will continue to require additional inpatient hospital stay due to need for IV steroids  Discharge Plan: Anticipate discharge in 24-48 hrs to home  Code Status: Level 1 - Full Code    Subjective:   Seen and examined at bedside  Pt continues to have sob and wheezing and cough  XR done today shows no cardiopulm disease  No fever       Objective:     Vitals:   No data recorded      HR:  [80-82] 80  BP: (108-143)/(58-80) 109/64  SpO2:  [91 %-98 %] 95 %  Body mass index is 39 5 kg/m²  Input and Output Summary (last 24 hours):      Intake/Output Summary (Last 24 hours) at 10/31/2022 1446  Last data filed at 10/31/2022 1312  Gross per 24 hour   Intake 1320 ml   Output --   Net 1320 ml       Physical Exam:   Physical Exam General- Awake, alert and oriented x 3, looks uncomfortable from abd pain on coughing  HEENT- Normocephalic, atraumatic, oral mucosa- moist  Neck- Supple, No carotid bruit, no JVD  CVS- Normal S1/ S2, Regular rate and rhythm, No murmur, mild edema  Respiratory system- B/L wheezing  Abdomen- Soft, Non distended, (+) tenderness, Bowel sound- present 4 quads  Genitourinary- No suprapubic tenderness, No CVA tenderness  Skin- No new bruise or rash  Musculoskeletal- No gross deformity  Psych- No acute psychosis  CNS- CN II- XII grossly intact, No acute focal neurologic deficit noted      Additional Data:     Labs:  Results from last 7 days   Lab Units 10/31/22  0546   WBC Thousand/uL 18 80*   HEMOGLOBIN g/dL 11 5   HEMATOCRIT % 35 5   PLATELETS Thousands/uL 375   NEUTROS PCT % 66   LYMPHS PCT % 23   MONOS PCT % 8   EOS PCT % 0     Results from last 7 days   Lab Units 10/31/22  0546 10/30/22  0548   SODIUM mmol/L 136 139   POTASSIUM mmol/L 3 8 3 2*   CHLORIDE mmol/L 100 102   CO2 mmol/L 30 29   BUN mg/dL 17 26*   CREATININE mg/dL 0 78 1 02   ANION GAP mmol/L 6 8   CALCIUM mg/dL 8 6 8 3   ALBUMIN g/dL  --  2 7*   TOTAL BILIRUBIN mg/dL  --  0 25   ALK PHOS U/L  --  111   ALT U/L  --  45   AST U/L  --  22   GLUCOSE RANDOM mg/dL 142* 161*         Results from last 7 days   Lab Units 10/31/22  1147 10/31/22  0812 10/31/22  0153 10/30/22  2150 10/30/22  1713 10/30/22  1122 10/30/22  0818 10/29/22  2142 10/29/22  1653 10/29/22  1123 10/29/22  0718 10/28/22  2117   POC GLUCOSE mg/dl 197* 96 179* 121 280* 190* 84 174* 137 129 158* 216*         Results from last 7 days   Lab Units 10/31/22  1301 10/27/22  0200 10/26/22  0241 10/26/22  0006 10/25/22  2202 10/25/22  1843 10/25/22  0811 10/25/22  0433 10/25/22  0253 10/24/22  2223   LACTIC ACID mmol/L  --   --  3 2* 2 7* 3 3* 4 8*   < >  --    < > 6 5*   PROCALCITONIN ng/ml 0 08 <0 05  --   --   --   --   --  0 06  --  0 05    < > = values in this interval not displayed  Lines/Drains:  Invasive Devices  Report    Peripheral Intravenous Line  Duration           Peripheral IV 10/27/22 Dorsal (posterior); Right Forearm 4 days                      Imaging: Reviewed radiology reports from this admission including: chest xray    Recent Cultures (last 7 days):   Results from last 7 days   Lab Units 10/30/22  2156 10/24/22  2222 10/24/22  2204   BLOOD CULTURE   --  No Growth After 5 Days  No Growth After 5 Days     GRAM STAIN RESULT  2+ Epithelial cells per low power field*  2+ Polys*  2+ Mononuclear Cells*  1+ Gram positive cocci in pairs, chains and clusters*  1+ Gram positive rods*  --   --        Last 24 Hours Medication List:   Current Facility-Administered Medications   Medication Dose Route Frequency Provider Last Rate   • acetaminophen  650 mg Oral Q6H PRN Maurice Castro PA-C     • budesonide  0 5 mg Nebulization Q12H Manfred Garrett MD     • citalopram  10 mg Oral HS Kat Alvarez PA-C     • famotidine  40 mg Oral BID Suhail Gomez PA-C     • fluticasone  1 spray Each Nare Daily Michael Quezada MD     • fluticasone-vilanterol  1 puff Inhalation Daily Kat Alvarez PA-C     • guaiFENesin  600 mg Oral BID Kat Alvarez PA-C     • hydrocodone-chlorpheniramine polistirex  5 mL Oral Q6H PRN Lavell Bhakta MD     • insulin lispro  1-5 Units Subcutaneous 4x Daily (AC & HS) Kat Alvarez PA-C     • iohexol  50 mL Oral Once in imaging Lavell Bhakta MD     • ipratropium  0 5 mg Nebulization TID Kat Alvarez PA-C     • ipratropium-albuterol  3 mL Nebulization Q4H PRN Lavell Bhakta MD     • levalbuterol  1 25 mg Nebulization TID Lavell Bhakta MD     • methylPREDNISolone sodium succinate  40 mg Intravenous Q8H [address] Sumit Mounika Álvarez MD     • montelukast  10 mg Oral HS Kat Alvarez PA-C     • morphine injection  2 mg Intravenous Q4H PRN Loyda Sullivan MD     • oxyCODONE  5 mg Oral Q4H PRN Loyda Sullivan MD     • pantoprazole  40 mg Oral BID AC Corine Sidhu PA-C     • polyethylene glycol  17 g Oral Daily Lavell Bhakta MD     • pravastatin  40 mg Oral Daily With [employer] DAWN RivasC     • promethazine  12 5 mg Oral Q6H PRN Fadi Martino PA-C     • zolpidem  10 mg Oral HS PRN Idania Holder PA-C          Today, Patient Was Seen By: Dom Arango    **Please Note: This note may have been constructed using a voice recognition system  **

## 2022-10-31 NOTE — CONSULTS
Consultation - Pulmonary Medicine   Betty Salvador 56 y o  female MRN: [id number]  Unit/Bed#: -01 Encounter: [id number]      Assessment:  1  Severe persistent asthma with acute exacerbation, resolving  2  Persistent cough  3  Rectus sheath hematoma  4  GERD    Plan:   Resolving asthma exacerbation with persistent cough  She received 6 days of ceftriaxone, CT chest showed just atelectasis on admission  She is afebrile, no leukocytosis  Persistent cough seems to be longstanding for patient over the last 2 years or so, as outpatient had been taking promethazine/codeine until it was discontinued  Repeat CXR today still looks like atelectasis, sputum culture has been ordered  Agree with adding budesonide nebs, continue Breo, xopenex, atrovent, Singulair, tussionex  Has been switched back to solu-medrol this morning due to persistent cough - no significant wheezing on exam but poor effort - can consider switching back to prednisone  Encourage OOB/ambulate, use of IS    Symptoms seem to be related to uncontrolled GERD despite treatment and possibly contributed by her hiatal hernia  Will consult GI for their input  Will continue to follow  History of Present Illness   Physician Requesting Consult: Shannon Madrigal MD  Reason for Consult / Principal Problem: Asthma exacerbation  Hx and PE limited by: None  HPI: Betty Salvador is a 56y o  year old female with PMH of severe persistent asthma, DM who presents with complaint of worsening shortness of breath and cough over several days prior to admission  She has been treated with antibiotics, steroids and is now having persistent cough despite treatments  She reports that since having COVID in 2020, her asthma has been poorly controlled  She has required prednisone frequently at least once per month and has been hospitalized several times for her asthma  She developed asthma during both of her pregnancies but had no trouble in between   Into her 45s her asthma had been well controlled  Over the last 2-3 years has been having trouble with her asthma  She takes Breo, Flovent, Singulair, albuterol    Consults    Review of Systems   Constitutional: Negative  HENT: Negative  Respiratory: Positive for cough and shortness of breath  Cardiovascular: Negative  Gastrointestinal: Negative  Genitourinary: Negative  Musculoskeletal: Negative  Skin: Negative  Allergic/Immunologic: Negative  Neurological: Negative  Psychiatric/Behavioral: Negative  Historical Information   History reviewed  No pertinent past medical history  History reviewed  No pertinent surgical history  Social History   Social History     Substance and Sexual Activity   Alcohol Use Never     Social History     Substance and Sexual Activity   Drug Use Never     E-Cigarette/Vaping   • E-Cigarette Use Never User      E-Cigarette/Vaping Substances   • Nicotine No    • THC No    • CBD No    • Flavoring No    • Other No    • Unknown No      Social History     Tobacco Use   Smoking Status Never Smoker   Smokeless Tobacco Never Used     Occupational History:     Family History: History reviewed  No pertinent family history      Meds/Allergies   all current active meds have been reviewed, pertinent pulmonary meds have been reviewed and current meds:   Current Facility-Administered Medications   Medication Dose Route Frequency   • acetaminophen (TYLENOL) tablet 650 mg  650 mg Oral Q6H PRN   • budesonide (PULMICORT) inhalation solution 0 5 mg  0 5 mg Nebulization Q12H   • citalopram (CeleXA) tablet 10 mg  10 mg Oral HS   • fluticasone (FLONASE) 50 mcg/act nasal spray 1 spray  1 spray Each Nare Daily   • fluticasone-vilanterol (BREO ELLIPTA) 200-25 MCG/INH inhaler 1 puff  1 puff Inhalation Daily   • guaiFENesin (MUCINEX) 12 hr tablet 600 mg  600 mg Oral BID   • hydrocodone-chlorpheniramine polistirex (TUSSIONEX) ER suspension 5 mL  5 mL Oral Q6H PRN   • insulin lispro (HumaLOG) 100 units/mL subcutaneous injection 1-5 Units  1-5 Units Subcutaneous 4x Daily (AC & HS)   • iohexol (OMNIPAQUE) 240 MG/ML solution 50 mL  50 mL Oral Once in imaging   • ipratropium (ATROVENT) 0 02 % inhalation solution 0 5 mg  0 5 mg Nebulization TID   • ipratropium-albuterol (DUO-NEB) 0 5-2 5 mg/3 mL inhalation solution 3 mL  3 mL Nebulization Q4H PRN   • levalbuterol (XOPENEX) inhalation solution 1 25 mg  1 25 mg Nebulization TID   • methylPREDNISolone sodium succinate (Solu-MEDROL) injection 40 mg  40 mg Intravenous Q8H [address]   • montelukast (SINGULAIR) tablet 10 mg  10 mg Oral HS   • morphine injection 2 mg  2 mg Intravenous Q4H PRN   • oxyCODONE (ROXICODONE) IR tablet 5 mg  5 mg Oral Q4H PRN   • [START ON 11/1/2022] pantoprazole (PROTONIX) EC tablet 40 mg  40 mg Oral Early Morning   • polyethylene glycol (MIRALAX) packet 17 g  17 g Oral Daily   • pravastatin (PRAVACHOL) tablet 40 mg  40 mg Oral Daily With Dinner   • promethazine (PHENERGAN) oral syrup 12 5 mg  12 5 mg Oral Q6H PRN   • sucralfate (CARAFATE) tablet 1 g  1 g Oral 4x Daily (AC & HS)   • zolpidem (AMBIEN) tablet 10 mg  10 mg Oral HS PRN       No Known Allergies    Objective   Vitals: Blood pressure 109/64, pulse 80, temperature 98 8 °F (37 1 °C), resp  rate 16, height 4' 11" (1 499 m), weight 88 7 kg (195 lb 8 8 oz), SpO2 98 %  ,Body mass index is 39 5 kg/m²  Intake/Output Summary (Last 24 hours) at 10/31/2022 1100  Last data filed at 10/31/2022 0701  Gross per 24 hour   Intake 480 ml   Output --   Net 480 ml     Invasive Devices  Report    Peripheral Intravenous Line  Duration           Peripheral IV 10/27/22 Dorsal (posterior); Right Forearm 4 days                Physical Exam  Vitals reviewed  Constitutional:       General: She is not in acute distress  Appearance: Normal appearance  She is obese  She is not ill-appearing  HENT:      Head: Normocephalic and atraumatic  Mouth/Throat:      Pharynx: Oropharynx is clear     Eyes: Conjunctiva/sclera: Conjunctivae normal    Cardiovascular:      Rate and Rhythm: Normal rate and regular rhythm  Pulmonary:      Effort: Pulmonary effort is normal  No respiratory distress  Breath sounds: Decreased breath sounds present  No wheezing, rhonchi or rales  Abdominal:      General: Abdomen is flat  There is no distension  Musculoskeletal:         General: Normal range of motion  Cervical back: Normal range of motion  Right lower leg: No edema  Left lower leg: No edema  Skin:     General: Skin is warm and dry  Neurological:      Mental Status: She is alert and oriented to person, place, and time  Psychiatric:         Mood and Affect: Mood normal          Behavior: Behavior normal          Lab Results:   I have personally reviewed pertinent lab results  , CBC:   Lab Results   Component Value Date    WBC 18 80 (H) 10/31/2022    HGB 11 5 10/31/2022    HCT 35 5 10/31/2022    MCV 98 10/31/2022     10/31/2022    MCH 31 8 10/31/2022    MCHC 32 4 10/31/2022    RDW 14 8 10/31/2022    MPV 10 3 10/31/2022    NRBC 0 10/31/2022   , CMP:   Lab Results   Component Value Date    SODIUM 136 10/31/2022    K 3 8 10/31/2022     10/31/2022    CO2 30 10/31/2022    BUN 17 10/31/2022    CREATININE 0 78 10/31/2022    CALCIUM 8 6 10/31/2022    EGFR 85 10/31/2022     Imaging Studies: I have personally reviewed pertinent reports  and I have personally reviewed pertinent films in PACS  EKG, Pathology, and Other Studies: I have personally reviewed pertinent reports      VTE Prophylaxis: Sequential compression device Nicole Rodríguez     Code Status: Level 1 - Full Code  Advance Directive and Living Will:      Power of :    POLST:

## 2022-10-31 NOTE — ASSESSMENT & PLAN NOTE
· Patient presented to the ED with increased shortness of breath/wheezing at home  Reports that she had taken all of her home inhaler treatments as well as a course of prednisone without improvement      · ED gave multiple nebulizer treatments, IV steroids, magnesium without improvement in symptoms  · Pulmonology consult, appreciate input  · Continue nebulizer treatments  · Continue Pulmicort, Breo Ellipta  · Await sputum culture   · Patient had been on oral prednisone as of yesterday, however, switched back to IV Solu-medrol in setting of persistent shortness of breath and wheezing  · Continue IV Solu-Medrol, 40 mg every 8 hours; wean as able  · Consult for reflux/GERD-GI consulted

## 2022-10-31 NOTE — ASSESSMENT & PLAN NOTE
· Stable, low suspicion for infection  ·  Meeting SIRS criteria on admission for leukocytosis, tachycardia, tachypnea  · Chest x-ray w/o any acute abnormalities   · Likely in the setting of acute asthma exacerbation  · Lactate remains elevated however stable, does have leukocytosis however on steroids  · Blood cultures thus far negative  · S/p 5 days of ceftriaxone; monitor off abx

## 2022-10-31 NOTE — ASSESSMENT & PLAN NOTE
· On 10/29, am patient reporting Increased RLQ Pain 10/10 not relieved despite iv toradol  · CT : 7 6 cm right rectus abdominis hematoma with small internal focus of active extravasation  · General surgery signed off  No surgical recommendations  · Lovenox Dc'd  · Hemoglobin stays stable at 11 5  · PRN morphine/oxy for pain control

## 2022-10-31 NOTE — UTILIZATION REVIEW
Continued Stay Review    Date: 10/31                          Current Patient Class: INpatient   Current Level of Care: MEd/surg    HPI:57 y o  female initially admitted on 10/25  Assessment/Plan: PErsistant shortness of breath requiring switch back to IV solu medrol  Continue   Consult GI for GERd  Bilateral wheezing lung sounds  Pulmonary consult 10/31:Has chronic cough  Maintained on breo and flovent  Has been taking protonix and carafate regularly  Current exacerbation likely triggered by URi, but also has chronic trigger of GERD  Continue with nebulizers  Sputum culture pending  Decreased breath sounds  GI consult: She experiences dysphagia to solids and regurgitation  Wheezing breath sounds  Abdomen soft, nontender  Vital Signs:   Date/Time Temp Pulse Resp BP MAP (mmHg) SpO2 Calculated FIO2 (%) - Nasal Cannula Nasal Cannula O2 Flow Rate (L/min) O2 Device Patient Position - Orthostatic VS   10/31/22 0727 -- -- -- -- -- 98 % 28 2 L/min Nasal cannula --   10/31/22 07:01:18 -- 80 -- 109/64 79 96 % -- -- -- --   10/31/22 0132 -- -- -- -- -- 96 % -- -- None (Room air) --   10/30/22 21:52:19 -- 80 -- 143/80 101 97 % -- -- -- --   10/30/22 15:47:22 -- 82 -- 108/58 75 91 % -- -- -- --   10/30/22 1342 -- -- -- -- -- 96 % 28 2 L/min Nasal cannula --   10/30/22 0900 -- -- -- -- -- 95 % -- -- None       Pertinent Labs/Diagnostic Results:   10/31 CXR: No acute cardiopulmonary disease  10/29 CT A/P:  7 6 cm right rectus abdominis hematoma with small internal focus of active extravasation      Small but increasing layering pleural effusions     Results from last 7 days   Lab Units 10/24/22  1859   SARS-COV-2  Negative     Results from last 7 days   Lab Units 10/31/22  0546 10/30/22  0548 10/29/22  1254 10/29/22  0843 10/28/22  0532 10/27/22  1107 10/27/22  0200 10/26/22  0312   WBC Thousand/uL 18 80* 18 13*  --  16 39* 17 25*   < > 20 94* 22 21*   HEMOGLOBIN g/dL 11 5 11 7 12 3 11 8 11 9   < > 11 9 11 5 HEMATOCRIT % 35 5 35 9 38 1 36 2 35 5   < > 36 7 35 0   PLATELETS Thousands/uL 375 398*  --  437* 429*   < > 433* 426*   NEUTROS ABS Thousands/µL 12 31*  --   --   --   --   --  17 36* 19 75*    < > = values in this interval not displayed           Results from last 7 days   Lab Units 10/31/22  0546 10/30/22  0548 10/29/22  0843 10/28/22  0532 10/27/22  1107   SODIUM mmol/L 136 139 141 138 142   POTASSIUM mmol/L 3 8 3 2* 3 9 3 4* 3 2*   CHLORIDE mmol/L 100 102 103 103 104   CO2 mmol/L 30 29 29 26 24   ANION GAP mmol/L 6 8 9 9 14*   BUN mg/dL 17 26* 18 17 15   CREATININE mg/dL 0 78 1 02 0 68 0 91 1 02   EGFR ml/min/1 73sq m 85 62 98 71 62   CALCIUM mg/dL 8 6 8 3 8 4 8 7 8 8     Results from last 7 days   Lab Units 10/30/22  0548 10/29/22  0843 10/24/22  1901   AST U/L 22 30 39   ALT U/L 45 37 46   ALK PHOS U/L 111 106 167*   TOTAL PROTEIN g/dL 6 7 6 7 7 8   ALBUMIN g/dL 2 7* 2 7* 3 1*   TOTAL BILIRUBIN mg/dL 0 25 0 33 0 44     Results from last 7 days   Lab Units 10/31/22  1147 10/31/22  0812 10/31/22  0153 10/30/22  2150 10/30/22  1713 10/30/22  1122 10/30/22  0818 10/29/22  2142 10/29/22  1653 10/29/22  1123 10/29/22  0718 10/28/22  2117   POC GLUCOSE mg/dl 197* 96 179* 121 280* 190* 84 174* 137 129 158* 216*     Results from last 7 days   Lab Units 10/31/22  0546 10/30/22  0548 10/29/22  0843 10/28/22  0532 10/27/22  1107 10/27/22  0200 10/26/22  0000 10/24/22  1901   GLUCOSE RANDOM mg/dL 142* 161* 134 211* 275* 267* 206* 107         Results from last 7 days   Lab Units 10/31/22  1301 10/27/22  0200 10/25/22  0433 10/24/22  2223   PROCALCITONIN ng/ml 0 08 <0 05 0 06 0 05     Results from last 7 days   Lab Units 10/26/22  0241 10/26/22  0006 10/25/22  2202 10/25/22  1843 10/25/22  1622   LACTIC ACID mmol/L 3 2* 2 7* 3 3* 4 8* 4 1*       Results from last 7 days   Lab Units 10/25/22  1424   CLARITY UA  Clear   COLOR UA  Colorless   SPEC GRAV UA  1 011   PH UA  6 0   GLUCOSE UA mg/dl 500 (1/2%)*   KETONES UA mg/dl Negative   BLOOD UA  Small*   PROTEIN UA mg/dl Negative   NITRITE UA  Negative   BILIRUBIN UA  Negative   UROBILINOGEN UA (BE) mg/dl <2 0   LEUKOCYTES UA  Negative   WBC UA /hpf None Seen   RBC UA /hpf 2-4*   BACTERIA UA /hpf None Seen   EPITHELIAL CELLS WET PREP /hpf Occasional     Results from last 7 days   Lab Units 10/24/22  1859   INFLUENZA A PCR  Negative   INFLUENZA B PCR  Negative   RSV PCR  Negative       Results from last 7 days   Lab Units 10/30/22  2156 10/24/22  2222 10/24/22  2204   BLOOD CULTURE   --  No Growth After 5 Days  No Growth After 5 Days     GRAM STAIN RESULT  2+ Epithelial cells per low power field*  2+ Polys*  2+ Mononuclear Cells*  1+ Gram positive cocci in pairs, chains and clusters*  1+ Gram positive rods*  --   --          Medications:   Scheduled Medications:  budesonide, 0 5 mg, Nebulization, Q12H  citalopram, 10 mg, Oral, HS  famotidine, 40 mg, Oral, BID  fluticasone, 1 spray, Each Nare, Daily  fluticasone-vilanterol, 1 puff, Inhalation, Daily  guaiFENesin, 600 mg, Oral, BID  insulin lispro, 1-5 Units, Subcutaneous, 4x Daily (AC & HS)  ipratropium, 0 5 mg, Nebulization, TID  levalbuterol, 1 25 mg, Nebulization, TID  methylPREDNISolone sodium succinate, 40 mg, Intravenous, Q8H KAYLEIGH  montelukast, 10 mg, Oral, HS  pantoprazole, 40 mg, Oral, BID AC  polyethylene glycol, 17 g, Oral, Daily  pravastatin, 40 mg, Oral, Daily With Dinner      Continuous IV Infusions:     PRN Meds:  acetaminophen, 650 mg, Oral, Q6H PRN  hydrocodone-chlorpheniramine polistirex, 5 mL, Oral, Q6H PRN  iohexol, 50 mL, Oral, Once in imaging  ipratropium-albuterol, 3 mL, Nebulization, Q4H PRN  morphine injection, 2 mg, Intravenous, Q4H PRN 4 on 10/30, x 2 on 10/31  oxyCODONE, 5 mg, Oral, Q4H PRN  promethazine, 12 5 mg, Oral, Q6H PRN  zolpidem, 10 mg, Oral, HS PRN        Discharge Plan: TBD    Network Utilization Review Department  ATTENTION: Please call with any questions or concerns to [phone] and carefully listen to the prompts so that you are directed to the right person  All voicemails are confidential   Allyson Caryn all requests for admission clinical reviews, approved or denied determinations and any other requests to dedicated fax number below belonging to the campus where the patient is receiving treatment   List of dedicated fax numbers for the Facilities:  [address] DENIALS (Administrative/Medical Necessity) [phone]   [address] (Maternity/NICU/Pediatrics) [phone]   5 Kate Vo [phone]   [facility] [place]  [phone]   [address] [address] Diallo Bliss [facility] 28 [phone]   1553 [facility] Susana Solorzano [facility] 134 815 [facility] [phone]

## 2022-10-31 NOTE — ASSESSMENT & PLAN NOTE
Lab Results   Component Value Date    HGBA1C 4 9 10/04/2022       Recent Labs     10/30/22  2150 10/31/22  0153 10/31/22  0812 10/31/22  1147   POCGLU 121 179* 96 197*       Blood Sugar Average: Last 72 hrs:  (P) 188 8125     · Per chart review, appears patient previously on insulin regimen  However, insulin regimen discontinued this month per the chart   Last HA1C was 4 9   · SSI with Accu-Cheks  · Monitor for hyperglycemia while treated with nebulizers and IV steroids  · Hypoglycemia protocol  · Diabetic diet

## 2022-10-31 NOTE — PLAN OF CARE
Problem: PAIN - ADULT  Goal: Verbalizes/displays adequate comfort level or baseline comfort level  Description: Interventions:  - Encourage patient to monitor pain and request assistance  - Assess pain using appropriate pain scale  - Administer analgesics based on type and severity of pain and evaluate response  - Implement non-pharmacological measures as appropriate and evaluate response  - Consider cultural and social influences on pain and pain management  - Notify physician/advanced practitioner if interventions unsuccessful or patient reports new pain  Outcome: Progressing     Problem: INFECTION - ADULT  Goal: Absence or prevention of progression during hospitalization  Description: INTERVENTIONS:  - Assess and monitor for signs and symptoms of infection  - Monitor lab/diagnostic results  - Monitor all insertion sites, i e  indwelling lines, tubes, and drains  - Monitor endotracheal if appropriate and nasal secretions for changes in amount and color  - [place] appropriate cooling/warming therapies per order  - Administer medications as ordered  - Instruct and encourage patient and family to use good hand hygiene technique  - Identify and instruct in appropriate isolation precautions for identified infection/condition  Outcome: Progressing  Goal: Absence of fever/infection during neutropenic period  Description: INTERVENTIONS:  - Monitor WBC    Outcome: Progressing

## 2022-10-31 NOTE — PLAN OF CARE
Problem: PAIN - ADULT  Goal: Verbalizes/displays adequate comfort level or baseline comfort level  Description: Interventions:  - Encourage patient to monitor pain and request assistance  - Assess pain using appropriate pain scale  - Administer analgesics based on type and severity of pain and evaluate response  - Implement non-pharmacological measures as appropriate and evaluate response  - Consider cultural and social influences on pain and pain management  - Notify physician/advanced practitioner if interventions unsuccessful or patient reports new pain  Outcome: Progressing     Problem: INFECTION - ADULT  Goal: Absence or prevention of progression during hospitalization  Description: INTERVENTIONS:  - Assess and monitor for signs and symptoms of infection  - Monitor lab/diagnostic results  - Monitor all insertion sites, i e  indwelling lines, tubes, and drains  - Monitor endotracheal if appropriate and nasal secretions for changes in amount and color  - [place] appropriate cooling/warming therapies per order  - Administer medications as ordered  - Instruct and encourage patient and family to use good hand hygiene technique  - Identify and instruct in appropriate isolation precautions for identified infection/condition  Outcome: Progressing  Goal: Absence of fever/infection during neutropenic period  Description: INTERVENTIONS:  - Monitor WBC    Outcome: Progressing     Problem: SAFETY ADULT  Goal: Patient will remain free of falls  Description: INTERVENTIONS:  - Educate patient/family on patient safety including physical limitations  - Instruct patient to call for assistance with activity   - Consult OT/PT to assist with strengthening/mobility   - Keep Call bell within reach  - Keep bed low and locked with side rails adjusted as appropriate  - Keep care items and personal belongings within reach  - Initiate and maintain comfort rounds  - Make Fall Risk Sign visible to staff  - Offer Toileting every  Hours, in advance of need  - Initiate/Maintain alarm  - Obtain necessary fall risk management equipment:   - Apply yellow socks and bracelet for high fall risk patients  - Consider moving patient to room near nurses station  Outcome: Progressing  Goal: Maintain or return to baseline ADL function  Description: INTERVENTIONS:  -  Assess patient's ability to carry out ADLs; assess patient's baseline for ADL function and identify physical deficits which impact ability to perform ADLs (bathing, care of mouth/teeth, toileting, grooming, dressing, etc )  - Assess/evaluate cause of self-care deficits   - Assess range of motion  - Assess patient's mobility; develop plan if impaired  - Assess patient's need for assistive devices and provide as appropriate  - Encourage maximum independence but intervene and supervise when necessary  - Involve family in performance of ADLs  - Assess for home care needs following discharge   - Consider OT consult to assist with ADL evaluation and planning for discharge  - Provide patient education as appropriate  Outcome: Progressing  Goal: Maintains/Returns to pre admission functional level  Description: INTERVENTIONS:  - Perform BMAT or MOVE assessment daily    - Set and communicate daily mobility goal to care team and patient/family/caregiver  - Collaborate with rehabilitation services on mobility goals if consulted  - Perform Range of Motion  times a day  - Reposition patient every  hours    - Dangle patient  times a day  - Stand patient  times a day  - Ambulate patient  times a day  - Out of bed to chair  times a day   - Out of bed for meals  times a day  - Out of bed for toileting  - Record patient progress and toleration of activity level   Outcome: Progressing     Problem: DISCHARGE PLANNING  Goal: Discharge to home or other facility with appropriate resources  Description: INTERVENTIONS:  - Identify barriers to discharge w/patient and caregiver  - Arrange for needed discharge resources and transportation as appropriate  - Identify discharge learning needs (meds, wound care, etc )  - Arrange for interpretive services to assist at discharge as needed  - Refer to Case Management Department for coordinating discharge planning if the patient needs post-hospital services based on physician/advanced practitioner order or complex needs related to functional status, cognitive ability, or social support system  Outcome: Progressing     Problem: Knowledge Deficit  Goal: Patient/family/caregiver demonstrates understanding of disease process, treatment plan, medications, and discharge instructions  Description: Complete learning assessment and assess knowledge base    Interventions:  - Provide teaching at level of understanding  - Provide teaching via preferred learning methods  Outcome: Progressing     Problem: Potential for Falls  Goal: Patient will remain free of falls  Description: INTERVENTIONS:  - Educate patient/family on patient safety including physical limitations  - Instruct patient to call for assistance with activity   - Consult OT/PT to assist with strengthening/mobility   - Keep Call bell within reach  - Keep bed low and locked with side rails adjusted as appropriate  - Keep care items and personal belongings within reach  - Initiate and maintain comfort rounds  - Make Fall Risk Sign visible to staff  - Offer Toileting every  Hours, in advance of need  - Initiate/Maintain alarm  - Obtain necessary fall risk management equipment:   - Apply yellow socks and bracelet for high fall risk patients  - Consider moving patient to room near nurses station  Outcome: Progressing

## 2022-11-01 ENCOUNTER — APPOINTMENT (INPATIENT)
Dept: RADIOLOGY | Facility: HOSPITAL | Age: 57
End: 2022-11-01

## 2022-11-01 VITALS
DIASTOLIC BLOOD PRESSURE: 75 MMHG | TEMPERATURE: 98 F | HEIGHT: 59 IN | HEART RATE: 91 BPM | RESPIRATION RATE: 18 BRPM | OXYGEN SATURATION: 93 % | SYSTOLIC BLOOD PRESSURE: 134 MMHG | BODY MASS INDEX: 39.42 KG/M2 | WEIGHT: 195.55 LBS

## 2022-11-01 PROBLEM — R05.3 CHRONIC COUGH: Status: ACTIVE | Noted: 2022-11-01

## 2022-11-01 LAB
ANION GAP SERPL CALCULATED.3IONS-SCNC: 7 MMOL/L (ref 4–13)
BUN SERPL-MCNC: 19 MG/DL (ref 5–25)
CALCIUM SERPL-MCNC: 8.9 MG/DL (ref 8.3–10.1)
CHLORIDE SERPL-SCNC: 99 MMOL/L (ref 96–108)
CO2 SERPL-SCNC: 30 MMOL/L (ref 21–32)
CREAT SERPL-MCNC: 0.85 MG/DL (ref 0.6–1.3)
ERYTHROCYTE [DISTWIDTH] IN BLOOD BY AUTOMATED COUNT: 14.8 % (ref 11.6–15.1)
GFR SERPL CREATININE-BSD FRML MDRD: 77 ML/MIN/1.73SQ M
GLUCOSE SERPL-MCNC: 166 MG/DL (ref 65–140)
GLUCOSE SERPL-MCNC: 177 MG/DL (ref 65–140)
GLUCOSE SERPL-MCNC: 182 MG/DL (ref 65–140)
GLUCOSE SERPL-MCNC: 255 MG/DL (ref 65–140)
HCT VFR BLD AUTO: 35.8 % (ref 34.8–46.1)
HGB BLD-MCNC: 11.9 G/DL (ref 11.5–15.4)
MCH RBC QN AUTO: 31.7 PG (ref 26.8–34.3)
MCHC RBC AUTO-ENTMCNC: 33.2 G/DL (ref 31.4–37.4)
MCV RBC AUTO: 96 FL (ref 82–98)
PLATELET # BLD AUTO: 375 THOUSANDS/UL (ref 149–390)
PMV BLD AUTO: 10.2 FL (ref 8.9–12.7)
POTASSIUM SERPL-SCNC: 4.5 MMOL/L (ref 3.5–5.3)
PROCALCITONIN SERPL-MCNC: 0.08 NG/ML
RBC # BLD AUTO: 3.75 MILLION/UL (ref 3.81–5.12)
SODIUM SERPL-SCNC: 136 MMOL/L (ref 135–147)
WBC # BLD AUTO: 18.23 THOUSAND/UL (ref 4.31–10.16)

## 2022-11-01 RX ORDER — FLUTICASONE FUROATE AND VILANTEROL 200; 25 UG/1; UG/1
1 POWDER RESPIRATORY (INHALATION) DAILY
Qty: 60 BLISTER | Refills: 0 | Status: SHIPPED | OUTPATIENT
Start: 2022-11-01 | End: 2022-12-01

## 2022-11-01 RX ORDER — PRAVASTATIN SODIUM 40 MG
40 TABLET ORAL
Qty: 30 TABLET | Refills: 0 | Status: SHIPPED | OUTPATIENT
Start: 2022-11-01 | End: 2022-12-01

## 2022-11-01 RX ORDER — FAMOTIDINE 40 MG/1
40 TABLET, FILM COATED ORAL 2 TIMES DAILY
Qty: 60 TABLET | Refills: 0 | Status: SHIPPED | OUTPATIENT
Start: 2022-11-01 | End: 2022-12-01

## 2022-11-01 RX ORDER — PROMETHAZINE HYDROCHLORIDE AND CODEINE PHOSPHATE 6.25; 1 MG/5ML; MG/5ML
5 SYRUP ORAL EVERY 4 HOURS PRN
Qty: 118 ML | Refills: 0 | Status: SHIPPED | OUTPATIENT
Start: 2022-11-01 | End: 2022-11-01

## 2022-11-01 RX ORDER — IPRATROPIUM BROMIDE AND ALBUTEROL SULFATE 2.5; .5 MG/3ML; MG/3ML
3 SOLUTION RESPIRATORY (INHALATION) EVERY 4 HOURS PRN
Refills: 0
Start: 2022-11-01

## 2022-11-01 RX ORDER — GUAIFENESIN 600 MG/1
600 TABLET, EXTENDED RELEASE ORAL 2 TIMES DAILY
Qty: 14 TABLET | Refills: 0 | Status: SHIPPED | OUTPATIENT
Start: 2022-11-01 | End: 2022-11-01

## 2022-11-01 RX ORDER — POLYETHYLENE GLYCOL 3350 17 G/17G
17 POWDER, FOR SOLUTION ORAL DAILY
Qty: 170 G | Refills: 0 | Status: SHIPPED | OUTPATIENT
Start: 2022-11-02 | End: 2022-11-12

## 2022-11-01 RX ORDER — PREDNISONE 20 MG/1
40 TABLET ORAL DAILY
Status: DISCONTINUED | OUTPATIENT
Start: 2022-11-01 | End: 2022-11-01 | Stop reason: HOSPADM

## 2022-11-01 RX ORDER — ACETAMINOPHEN 325 MG/1
650 TABLET ORAL EVERY 6 HOURS PRN
Refills: 0
Start: 2022-11-01

## 2022-11-01 RX ORDER — HYDROXYZINE HYDROCHLORIDE 25 MG/1
25 TABLET, FILM COATED ORAL EVERY 6 HOURS PRN
Qty: 30 TABLET | Refills: 0 | Status: SHIPPED | OUTPATIENT
Start: 2022-11-01 | End: 2022-11-11

## 2022-11-01 RX ORDER — PREDNISONE 20 MG/1
TABLET ORAL
Qty: 15 TABLET | Refills: 0 | Status: SHIPPED | OUTPATIENT
Start: 2022-11-01 | End: 2022-11-13

## 2022-11-01 RX ORDER — GUAIFENESIN AND CODEINE PHOSPHATE 100; 10 MG/5ML; MG/5ML
5 SOLUTION ORAL 3 TIMES DAILY PRN
Qty: 118 ML | Refills: 0 | Status: SHIPPED | OUTPATIENT
Start: 2022-11-01 | End: 2022-11-11

## 2022-11-01 RX ORDER — METHYLPREDNISOLONE SODIUM SUCCINATE 40 MG/ML
40 INJECTION, POWDER, LYOPHILIZED, FOR SOLUTION INTRAMUSCULAR; INTRAVENOUS EVERY 12 HOURS SCHEDULED
Status: DISCONTINUED | OUTPATIENT
Start: 2022-11-01 | End: 2022-11-01

## 2022-11-01 RX ORDER — METHYLPREDNISOLONE SODIUM SUCCINATE 40 MG/ML
40 INJECTION, POWDER, LYOPHILIZED, FOR SOLUTION INTRAMUSCULAR; INTRAVENOUS DAILY
Status: DISCONTINUED | OUTPATIENT
Start: 2022-11-01 | End: 2022-11-01

## 2022-11-01 RX ORDER — OXYCODONE HYDROCHLORIDE 5 MG/1
5 TABLET ORAL EVERY 4 HOURS PRN
Qty: 10 TABLET | Refills: 0 | Status: SHIPPED | OUTPATIENT
Start: 2022-11-01 | End: 2022-11-11

## 2022-11-01 RX ORDER — MONTELUKAST SODIUM 10 MG/1
10 TABLET ORAL
Qty: 30 TABLET | Refills: 0 | Status: SHIPPED | OUTPATIENT
Start: 2022-11-01 | End: 2022-12-01

## 2022-11-01 RX ORDER — PANTOPRAZOLE SODIUM 40 MG/1
40 TABLET, DELAYED RELEASE ORAL
Qty: 60 TABLET | Refills: 0 | Status: SHIPPED | OUTPATIENT
Start: 2022-11-01 | End: 2022-12-01

## 2022-11-01 RX ADMIN — OXYCODONE HYDROCHLORIDE 5 MG: 5 TABLET ORAL at 12:33

## 2022-11-01 RX ADMIN — INSULIN LISPRO 1 UNITS: 100 INJECTION, SOLUTION INTRAVENOUS; SUBCUTANEOUS at 12:35

## 2022-11-01 RX ADMIN — PREDNISONE 40 MG: 20 TABLET ORAL at 14:04

## 2022-11-01 RX ADMIN — HYDROCODONE POLISTIREX AND CHLORPHENIRAMINE POLISTIREX 5 ML: 10; 8 SUSPENSION, EXTENDED RELEASE ORAL at 08:36

## 2022-11-01 RX ADMIN — PANTOPRAZOLE SODIUM 40 MG: 40 TABLET, DELAYED RELEASE ORAL at 08:35

## 2022-11-01 RX ADMIN — FLUTICASONE FUROATE AND VILANTEROL TRIFENATATE 1 PUFF: 200; 25 POWDER RESPIRATORY (INHALATION) at 08:36

## 2022-11-01 RX ADMIN — FAMOTIDINE 40 MG: 20 TABLET ORAL at 12:27

## 2022-11-01 RX ADMIN — FLUTICASONE PROPIONATE 1 SPRAY: 50 SPRAY, METERED NASAL at 08:36

## 2022-11-01 RX ADMIN — MORPHINE SULFATE 2 MG: 2 INJECTION, SOLUTION INTRAMUSCULAR; INTRAVENOUS at 05:33

## 2022-11-01 RX ADMIN — OXYCODONE HYDROCHLORIDE 5 MG: 5 TABLET ORAL at 00:22

## 2022-11-01 RX ADMIN — LEVALBUTEROL HYDROCHLORIDE 1.25 MG: 1.25 SOLUTION, CONCENTRATE RESPIRATORY (INHALATION) at 07:20

## 2022-11-01 RX ADMIN — IPRATROPIUM BROMIDE 0.5 MG: 0.5 SOLUTION RESPIRATORY (INHALATION) at 07:20

## 2022-11-01 RX ADMIN — GUAIFENESIN 600 MG: 600 TABLET, EXTENDED RELEASE ORAL at 08:35

## 2022-11-01 RX ADMIN — METHYLPREDNISOLONE SODIUM SUCCINATE 40 MG: 40 INJECTION, POWDER, FOR SOLUTION INTRAMUSCULAR; INTRAVENOUS at 05:33

## 2022-11-01 RX ADMIN — BUDESONIDE 0.5 MG: 0.5 INHALANT ORAL at 07:20

## 2022-11-01 RX ADMIN — INSULIN HUMAN 2 UNITS: 100 INJECTION, SOLUTION PARENTERAL at 00:44

## 2022-11-01 NOTE — ASSESSMENT & PLAN NOTE
· Improved, cleared for DC by pulm on tapering dose of steroids  · Patient presented to the ED with increased shortness of breath/wheezing at home  Reports that she had taken all of her home inhaler treatments as well as a course of prednisone without improvement      · ED gave multiple nebulizer treatments, IV steroids, magnesium without improvement in symptoms  · Pulmonology consult, appreciate input  · Continue nebulizer treatments  · Continue Pulmicort, Breo Ellipta  · Patient had been on oral prednisone initially, however, switched back to IV Solu-medrol in setting of persistent shortness of breath and wheezing  · Continue IV Solu-Medrol, 40 mg every 8 hours; wean as able  · Consult for reflux/GERD-GI consulted

## 2022-11-01 NOTE — PLAN OF CARE
Problem: PAIN - ADULT  Goal: Verbalizes/displays adequate comfort level or baseline comfort level  Description: Interventions:  - Encourage patient to monitor pain and request assistance  - Assess pain using appropriate pain scale  - Administer analgesics based on type and severity of pain and evaluate response  - Implement non-pharmacological measures as appropriate and evaluate response  - Consider cultural and social influences on pain and pain management  - Notify physician/advanced practitioner if interventions unsuccessful or patient reports new pain  Outcome: Progressing     Problem: INFECTION - ADULT  Goal: Absence or prevention of progression during hospitalization  Description: INTERVENTIONS:  - Assess and monitor for signs and symptoms of infection  - Monitor lab/diagnostic results  - Monitor all insertion sites, i e  indwelling lines, tubes, and drains  - Monitor endotracheal if appropriate and nasal secretions for changes in amount and color  - [place] appropriate cooling/warming therapies per order  - Administer medications as ordered  - Instruct and encourage patient and family to use good hand hygiene technique  - Identify and instruct in appropriate isolation precautions for identified infection/condition  Outcome: Progressing  Goal: Absence of fever/infection during neutropenic period  Description: INTERVENTIONS:  - Monitor WBC    Outcome: Progressing

## 2022-11-01 NOTE — DISCHARGE SUMMARY
[address] [facility]  Discharge- Kathy Bergman 1965, 56 y o  female MRN: [id number]  Unit/Bed#: -01 Encounter: [id number]  Primary Care Provider: Spencer Valladares   Date and time admitted to hospital: 10/24/2022  6:25 PM    * Severe persistent asthma with exacerbation  Assessment & Plan  · Improved, cleared for DC by pulm on tapering dose of steroids  · Patient presented to the ED with increased shortness of breath/wheezing at home  Reports that she had taken all of her home inhaler treatments as well as a course of prednisone without improvement  · ED gave multiple nebulizer treatments, IV steroids, magnesium without improvement in symptoms  · Pulmonology consult, appreciate input  · Continue nebulizer treatments  · Continue Pulmicort, Breo Ellipta  · Patient had been on oral prednisone initially, however, switched back to IV Solu-medrol in setting of persistent shortness of breath and wheezing  · Continue IV Solu-Medrol, 40 mg every 8 hours; wean as able  · Consult for reflux/GERD-GI consulted          Chronic cough  Assessment & Plan  · Likely from GERD more than asthma as asthma has been treated well and she still has persistent cough  · Started on pepcid bid and protonix bid    GERD (gastroesophageal reflux disease)  Assessment & Plan  · History of;  · Last EGD was completed in March 2022; unable to see EGD report  Biopsy is reviewed by GI  · GI Consult, appreciate input  · Recommending barium swallow study to investigate size of hiatal hernia  · Continue PPI BID  · Initiate on Pepcid BID  · OP manometry testing  · Barium study shows small hiatal hernia, and severe reflux    Rectus sheath hematoma  Assessment & Plan  · abd binder for pain control  · On 10/29, am patient reporting Increased RLQ Pain 10/10 not relieved despite iv toradol  · CT : 7 6 cm right rectus abdominis hematoma with small internal focus of active extravasation  · General surgery signed off   No surgical recommendations  · Lovenox Dc'd  · Hemoglobin stable   · PRN morphine/oxy for pain control  Type 2 diabetes mellitus, with long-term current use of insulin [facility])  Assessment & Plan  Lab Results   Component Value Date    HGBA1C 4 9 10/04/2022       Recent Labs     10/31/22  2054 11/01/22  0031 11/01/22  0717 11/01/22  1032   POCGLU 219* 255* 166* 182*       Blood Sugar Average: Last 72 hrs:  (P) 181 8125     · Per chart review, appears patient previously on insulin regimen  However, insulin regimen discontinued this month per the chart  Last HA1C was 4 9   · SSI with Accu-Cheks  · Monitor for hyperglycemia while treated with nebulizers and IV steroids  · Hypoglycemia protocol  · Diabetic diet     Hyperlipidemia  Assessment & Plan  · Stable, Outpatient lipid check  · Continue statin    SIRS (systemic inflammatory response syndrome) (HCC)  Assessment & Plan  · Stable,  low suspicion for infection  ·  Meeting SIRS criteria on admission for leukocytosis, tachycardia, tachypnea  · Chest x-ray w/o any acute abnormalities   · Likely in the setting of acute asthma exacerbation  · Lactate remains elevated however stable, does have leukocytosis however on steroids  · Blood cultures thus far negative  · S/p 5 days of ceftriaxone; monitor off abx        Medical Problems             Resolved Problems  Date Reviewed: 11/1/2022   None               Discharging Physician / Practitioner: Meme Russo  PCP: Jonny Bravo  Admission Date:   Admission Orders (From admission, onward)     Ordered        10/25/22 1326  Inpatient Admission  Once            10/24/22 2104  Place in Observation  Once                      Discharge Date: 11/01/22    Consultations During Hospital Stay:  Jessica Phillips 100 TO PULMONOLOGY  · IP CONSULT TO GASTROENTEROLOGY      Procedures Performed:   FL barium swallow ROUTINE esophagus   Final Result by Francheska Tomas MD (11/01 1050)   Multiple episodes of moderate to severe gastroesophageal reflux   No obstructing or constricting lesions seen   Small hiatal hernia   No paraesophageal hernia   Esophageal tertiary contractions with mild esophageal dysmotility   No delay in emptying of the contrast from esophagus      Workstation performed: [id number]         XR chest portable   Final Result by Rekha Yee MD (10/31 1051)      No acute cardiopulmonary disease  Workstation performed: [id number]         CT abdomen pelvis w contrast   Final Result by Nicol Reyes MD (10/29 1008)      1   7 6 cm right rectus abdominis hematoma with small internal focus of active extravasation  2   Small but increasing layering pleural effusions  Workstation performed: [id number]         CT chest abdomen pelvis w contrast   Final Result by Donald Wilkes MD (10/26 1530)      No acute pathology in the chest, abdomen, or pelvis  Hepatomegaly/hepatic steatosis  Workstation performed: [id number]         XR chest 1 view portable   Final Result by Elida Lopez MD (10/25 0825)      No acute cardiopulmonary disease  Workstation performed: [id number]         ·   ·     Significant Findings / Test Results:   · none    Incidental Findings:   · none     Test Results Pending at Discharge (will require follow up):   · none     Outpatient Tests Requested:  · Cbc in 1 week  · Manometry OP with GI in 1 week    Complications:  none    Reason for Admission: Leonid Dominique is a 56 y o  female who has a past medical history significant for diabetes, hyperlipidemia, asthma  Patient presenting to the ED for increased shortness of breath wheezing at home  Reports that she had taken all of her home inhaler treatments, without improvement  Denies any associated fever/chills, chest pain, abdominal pain, nausea/vomiting/diarrhea  Patient requiring medical admission for treatment of acute asthma exacerbation    All patient questions answered to the best my ability  Hospital Course:   Musa Pavon is a 56 y o  female patient who originally presented to the hospital on 10/24/2022 due to the above reason  Pt was treated with IV steroids for asthma exacerbation and is now under an acceptable control  Pt has chronic cough likely from GERD confirmed by barium study today to rule out hiatal hernia  Pt has small hiatal hernia  Pt has been set up by GI for OP manometry testing  Pt developed rectus sheath hematoma and abd binder and pain control were initiated  Now pain is controlled with oxycodone and the binder  Hb has remained stable  Pt is hemodynamically stable and is medically cleared for DC today with close OP followup with PCP    Please see above list of diagnoses and related plan for additional information  Condition at Discharge: stable    Discharge Day Visit / Exam:   Subjective:  No CP or SOB,  Vitals: Blood Pressure: 110/64 (11/01/22 0702)  Pulse: 84 (11/01/22 0900)  Temperature: 97 8 °F (36 6 °C) (11/01/22 0702)  Temp Source: Oral (11/01/22 6724)  Respirations: 18 (11/01/22 0702)  Height: 4' 11" (149 9 cm) (10/25/22 1018)  Weight - Scale: 88 7 kg (195 lb 8 8 oz) (10/25/22 1018)  SpO2: 94 % (11/01/22 0720)  Exam:   Physical Exam General- Awake, alert and oriented x 3, looks comfortable  HEENT- Normocephalic, atraumatic, oral mucosa- moist  Neck- Supple, No carotid bruit, no JVD  CVS- Normal S1/ S2, Regular rate and rhythm, No murmur, No edema  Respiratory system- B/L clear breath sounds, no wheezing today, reduced air at the bases  Abdomen- Soft, Non distended, (+) mild to moderate tenderness, Bowel sound- present 4 quads, abd binder (+)  Genitourinary- No suprapubic tenderness, No CVA tenderness  Skin- No new bruise or rash  Musculoskeletal- No gross deformity  Psych- anxious, No acute psychosis  CNS- CN II- XII grossly intact, No acute focal neurologic deficit noted      Discussion with Family: Patient declined call to        Discharge instructions/Information to patient and family:   See after visit summary for information provided to patient and family  Provisions for Follow-Up Care:  See after visit summary for information related to follow-up care and any pertinent home health orders  Disposition:   Home    Planned Readmission: no     Discharge Statement:  I spent 35 minutes discharging the patient  This time was spent on the day of discharge  I had direct contact with the patient on the day of discharge  Greater than 50% of the total time was spent examining patient, answering all patient questions, arranging and discussing plan of care with patient as well as directly providing post-discharge instructions  Additional time then spent on discharge activities  Discharge Medications:  See after visit summary for reconciled discharge medications provided to patient and/or family        **Please Note: This note may have been constructed using a voice recognition system**

## 2022-11-01 NOTE — PLAN OF CARE
Problem: PAIN - ADULT  Goal: Verbalizes/displays adequate comfort level or baseline comfort level  Description: Interventions:  - Encourage patient to monitor pain and request assistance  - Assess pain using appropriate pain scale  - Administer analgesics based on type and severity of pain and evaluate response  - Implement non-pharmacological measures as appropriate and evaluate response  - Consider cultural and social influences on pain and pain management  - Notify physician/advanced practitioner if interventions unsuccessful or patient reports new pain  Outcome: Progressing     Problem: INFECTION - ADULT  Goal: Absence or prevention of progression during hospitalization  Description: INTERVENTIONS:  - Assess and monitor for signs and symptoms of infection  - Monitor lab/diagnostic results  - Monitor all insertion sites, i e  indwelling lines, tubes, and drains  - Monitor endotracheal if appropriate and nasal secretions for changes in amount and color  - [place] appropriate cooling/warming therapies per order  - Administer medications as ordered  - Instruct and encourage patient and family to use good hand hygiene technique  - Identify and instruct in appropriate isolation precautions for identified infection/condition  Outcome: Progressing  Goal: Absence of fever/infection during neutropenic period  Description: INTERVENTIONS:  - Monitor WBC    Outcome: Progressing     Problem: SAFETY ADULT  Goal: Patient will remain free of falls  Description: INTERVENTIONS:  - Educate patient/family on patient safety including physical limitations  - Instruct patient to call for assistance with activity   - Consult OT/PT to assist with strengthening/mobility   - Keep Call bell within reach  - Keep bed low and locked with side rails adjusted as appropriate  - Keep care items and personal belongings within reach  - Initiate and maintain comfort rounds  - Make Fall Risk Sign visible to staff  - Offer Toileting every  Hours, in advance of need  - Initiate/Maintain alarm  - Obtain necessary fall risk management equipment:   - Apply yellow socks and bracelet for high fall risk patients  - Consider moving patient to room near nurses station  Outcome: Progressing  Goal: Maintain or return to baseline ADL function  Description: INTERVENTIONS:  -  Assess patient's ability to carry out ADLs; assess patient's baseline for ADL function and identify physical deficits which impact ability to perform ADLs (bathing, care of mouth/teeth, toileting, grooming, dressing, etc )  - Assess/evaluate cause of self-care deficits   - Assess range of motion  - Assess patient's mobility; develop plan if impaired  - Assess patient's need for assistive devices and provide as appropriate  - Encourage maximum independence but intervene and supervise when necessary  - Involve family in performance of ADLs  - Assess for home care needs following discharge   - Consider OT consult to assist with ADL evaluation and planning for discharge  - Provide patient education as appropriate  Outcome: Progressing  Goal: Maintains/Returns to pre admission functional level  Description: INTERVENTIONS:  - Perform BMAT or MOVE assessment daily    - Set and communicate daily mobility goal to care team and patient/family/caregiver  - Collaborate with rehabilitation services on mobility goals if consulted  - Perform Range of Motion  times a day  - Reposition patient every  hours    - Dangle patient  times a day  - Stand patient  times a day  - Ambulate patient  times a day  - Out of bed to chair  times a day   - Out of bed for meals  times a day  - Out of bed for toileting  - Record patient progress and toleration of activity level   Outcome: Progressing     Problem: DISCHARGE PLANNING  Goal: Discharge to home or other facility with appropriate resources  Description: INTERVENTIONS:  - Identify barriers to discharge w/patient and caregiver  - Arrange for needed discharge resources and transportation as appropriate  - Identify discharge learning needs (meds, wound care, etc )  - Arrange for interpretive services to assist at discharge as needed  - Refer to Case Management Department for coordinating discharge planning if the patient needs post-hospital services based on physician/advanced practitioner order or complex needs related to functional status, cognitive ability, or social support system  Outcome: Progressing     Problem: Knowledge Deficit  Goal: Patient/family/caregiver demonstrates understanding of disease process, treatment plan, medications, and discharge instructions  Description: Complete learning assessment and assess knowledge base    Interventions:  - Provide teaching at level of understanding  - Provide teaching via preferred learning methods  Outcome: Progressing     Problem: Potential for Falls  Goal: Patient will remain free of falls  Description: INTERVENTIONS:  - Educate patient/family on patient safety including physical limitations  - Instruct patient to call for assistance with activity   - Consult OT/PT to assist with strengthening/mobility   - Keep Call bell within reach  - Keep bed low and locked with side rails adjusted as appropriate  - Keep care items and personal belongings within reach  - Initiate and maintain comfort rounds  - Make Fall Risk Sign visible to staff  - Offer Toileting every  Hours, in advance of need  - Initiate/Maintain alarm  - Obtain necessary fall risk management equipment:   - Apply yellow socks and bracelet for high fall risk patients  - Consider moving patient to room near nurses station  Outcome: Progressing

## 2022-11-01 NOTE — DISCHARGE INSTR - AVS FIRST PAGE
Take prednisone as recommended in tapering doses  Take pepcid and protonix  Use abd binder for pain control when you are up   Take it off when you are resting   GI appointment as scheduled by them

## 2022-11-01 NOTE — ASSESSMENT & PLAN NOTE
· History of;  · Last EGD was completed in March 2022; unable to see EGD report    Biopsy is reviewed by GI  · GI Consult, appreciate input  · Recommending barium swallow study to investigate size of hiatal hernia  · Continue PPI BID  · Initiate on Pepcid BID  · OP manometry testing  · Barium study shows small hiatal hernia, and severe reflux

## 2022-11-01 NOTE — PROGRESS NOTES
Progress Note - Pulmonary   Lidya Varner 56 y o  female MRN: [id number]  Unit/Bed#: -01 Encounter: [id number]    Assessment:  1  Severe persistent asthma with acute exacerbation  2  Chronic cough  3  Poorly controlled GERD    Plan:  Severe persistent asthma with acute exacerbation likely triggered by acute URI  At baseline has uncontrolled asthma due to poorly controlled GERD  She underwent barium swallow today which showed some increased teritary contractions of the esophagus  Plan for outpatient manometry  Continue PPI twice per day and pepcid BID  Continue Breo, Flovent, Singulair, albuterol 4 times per day as needed upon discharge  Can transition to prednisone 40 mg and decrease by 10 mg every 3 days  Would send with codeine cough syrup as that has helped her cough the most in the past-  Can hopefully stop use once GERD under control  She is stable for discharge home from pulmonary standpoint, had been following with [facility] pulmonary as well as a pulmonologist in [place]    Subjective:   Patient resting in bed  She continues to have ongoing cough though overall better from prior to admission  She is worried about the cough when she goes home  Objective:     Vitals: Blood pressure 110/64, pulse 84, temperature 97 8 °F (36 6 °C), temperature source Oral, resp  rate 18, height 4' 11" (1 499 m), weight 88 7 kg (195 lb 8 8 oz), SpO2 94 %  ,Body mass index is 39 5 kg/m²  No intake or output data in the 24 hours ending 11/01/22 1327    Invasive Devices  Report    Peripheral Intravenous Line  Duration           Peripheral IV 10/27/22 Dorsal (posterior); Right Forearm 5 days                Physical Exam: /64 (BP Location: Right arm)   Pulse 84   Temp 97 8 °F (36 6 °C) (Oral)   Resp 18   Ht 4' 11" (1 499 m)   Wt 88 7 kg (195 lb 8 8 oz)   SpO2 94%   BMI 39 50 kg/m²   General appearance: alert and oriented, in no acute distress  Head: Normocephalic, without obvious abnormality, atraumatic  Eyes: negative findings: conjunctivae and sclerae normal  Lungs: faint expiratory wheeze  Heart: regular rate and rhythm  Abdomen: normal findings: soft, non-tender  Extremities: No edema  Skin: Warm and dry  Neurologic: Mental status: Alert, oriented, thought content appropriate     Labs: I have personally reviewed pertinent lab results  , CBC:   Lab Results   Component Value Date    WBC 18 23 (H) 11/01/2022    HGB 11 9 11/01/2022    HCT 35 8 11/01/2022    MCV 96 11/01/2022     11/01/2022    MCH 31 7 11/01/2022    MCHC 33 2 11/01/2022    RDW 14 8 11/01/2022    MPV 10 2 11/01/2022   , CMP:   Lab Results   Component Value Date    SODIUM 136 11/01/2022    K 4 5 11/01/2022    CL 99 11/01/2022    CO2 30 11/01/2022    BUN 19 11/01/2022    CREATININE 0 85 11/01/2022    CALCIUM 8 9 11/01/2022    EGFR 77 11/01/2022     Imaging and other studies: I have personally reviewed pertinent reports     and I have personally reviewed pertinent films in PACS

## 2022-11-01 NOTE — ASSESSMENT & PLAN NOTE
Lab Results   Component Value Date    HGBA1C 4 9 10/04/2022       Recent Labs     10/31/22  2054 11/01/22  0031 11/01/22  0717 11/01/22  1032   POCGLU 219* 255* 166* 182*       Blood Sugar Average: Last 72 hrs:  (P) 181 8125     · Per chart review, appears patient previously on insulin regimen  However, insulin regimen discontinued this month per the chart   Last HA1C was 4 9   · SSI with Accu-Cheks  · Monitor for hyperglycemia while treated with nebulizers and IV steroids  · Hypoglycemia protocol  · Diabetic diet

## 2022-11-01 NOTE — ASSESSMENT & PLAN NOTE
· Likely from GERD more than asthma as asthma has been treated well and she still has persistent cough  · Started on pepcid bid and protonix bid

## 2022-11-01 NOTE — ASSESSMENT & PLAN NOTE
· abd binder for pain control  · On 10/29, am patient reporting Increased RLQ Pain 10/10 not relieved despite iv toradol  · CT : 7 6 cm right rectus abdominis hematoma with small internal focus of active extravasation  · General surgery signed off  No surgical recommendations  · Lovenox Dc'd  · Hemoglobin stable   · PRN morphine/oxy for pain control

## 2022-11-01 NOTE — PROGRESS NOTES
Progress Note - Nubia Mckeon 56 y o  female MRN: [id number]    Unit/Bed#: -01 Encounter: [id number]        Subjective:     Patient has no new complaints  We went over her barium swallow results this morning  We changed her acid reflux regimen yesterday  ROS: As noted in the HPI, otherwise all others negative  Objective:     Vitals: Blood pressure 110/64, pulse 84, temperature 97 8 °F (36 6 °C), temperature source Oral, resp  rate 18, height 4' 11" (1 499 m), weight 88 7 kg (195 lb 8 8 oz), SpO2 94 %  ,Body mass index is 39 5 kg/m²  Intake/Output Summary (Last 24 hours) at 11/1/2022 1130  Last data filed at 10/31/2022 1312  Gross per 24 hour   Intake 480 ml   Output --   Net 480 ml       Physical Exam:     General Appearance: Alert and oriented x 3  In no respiratory distress  Lungs:  Wheezing to auscultation bilaterally, no rales or rhonchi  Heart: Regular rate and rhythm, S1, S2 normal, no murmur, click, rub or gallop  Abdomen: Soft, non-tender, non-distended; bowel sounds normal; no masses or no organomegaly  Extremities: No cyanosis, edema    Invasive Devices  Report    Peripheral Intravenous Line  Duration           Peripheral IV 10/27/22 Dorsal (posterior); Right Forearm 5 days                Lab Results:  Results from last 7 days   Lab Units 11/01/22  0532 10/31/22  0546   WBC Thousand/uL 18 23* 18 80*   HEMOGLOBIN g/dL 11 9 11 5   HEMATOCRIT % 35 8 35 5   PLATELETS Thousands/uL 375 375   NEUTROS PCT %  --  66   LYMPHS PCT %  --  23   MONOS PCT %  --  8   EOS PCT %  --  0     Results from last 7 days   Lab Units 11/01/22  0532 10/31/22  0546 10/30/22  0548   POTASSIUM mmol/L 4 5   < > 3 2*   CHLORIDE mmol/L 99   < > 102   CO2 mmol/L 30   < > 29   BUN mg/dL 19   < > 26*   CREATININE mg/dL 0 85   < > 1 02   CALCIUM mg/dL 8 9   < > 8 3   ALK PHOS U/L  --   --  111   ALT U/L  --   --  45   AST U/L  --   --  22    < > = values in this interval not displayed                 Imaging Studies: I have personally reviewed pertinent imaging studies  XR chest 1 view portable    Result Date: 10/25/2022  Impression: No acute cardiopulmonary disease  Workstation performed: [id number]     CT chest abdomen pelvis w contrast    Result Date: 10/26/2022  Impression: No acute pathology in the chest, abdomen, or pelvis  Hepatomegaly/hepatic steatosis  Workstation performed: [id number]     CT abdomen pelvis w contrast    Result Date: 10/29/2022  Impression: 1   7 6 cm right rectus abdominis hematoma with small internal focus of active extravasation  2   Small but increasing layering pleural effusions  Workstation performed: [id number]         Assessment and Plan:     1) Dysphagia to solid with tertiary contractions on barium swallow, GERD and asthma -  Patient admitted with what was thought to be asthma exacerbation  She reports that she has been hospitalized on and off for the past year  She was following with Gastroenterology at [facility] in [place]  She had EGD and gastric emptying study this year  Patient was reporting dysphagia  Therefore, we plan for barium swallow revealing a small hiatal hernia and increased tertiary contractions of the esophagus  As an outpatient, patient was only taking PPI once daily and Carafate  We discussed her barium swallow, and suggestion of dysmotility  - Will plan for manometry testing as an outpatient  - Continue PPI twice daily, and Pepcid at lunch and before bedtime  - Patient reports that she already sleeps upright  - Outpatient follow-up with us and potentially a thoracic surgery consult depending on manometry results  - Discussed with pulmonary  - Patient agrees with above plan      GI will sign off, please call with questions    Thank you

## 2022-11-02 ENCOUNTER — TELEPHONE (OUTPATIENT)
Dept: GASTROENTEROLOGY | Facility: HOSPITAL | Age: 57
End: 2022-11-02

## 2022-11-02 LAB
BACTERIA SPT RESP CULT: ABNORMAL
GRAM STN SPEC: ABNORMAL

## 2022-11-02 NOTE — UTILIZATION REVIEW
Maranda James MD   Physician   Hospitalist   Discharge Summary      Signed   Date of Service:  11/1/2022  1:15 PM                 Signed              Show:Clear all  [x]Manual[x]Template[x]Copied    Added by:  [x]Sumit Meza MD      []Genaro for details    [facility] INPATIENT  Discharge- Ave Rash 1965, 56 y o  female MRN: [id number]  Unit/Bed#: -01 Encounter: [id number]  Primary Care Provider: Sara Kay   Date and time admitted to hospital: 10/24/2022  6:25 PM     * Severe persistent asthma with exacerbation  Assessment & Plan  · Improved, cleared for DC by pulm on tapering dose of steroids  · Patient presented to the ED with increased shortness of breath/wheezing at home  Reports that she had taken all of her home inhaler treatments as well as a course of prednisone without improvement  · ED gave multiple nebulizer treatments, IV steroids, magnesium without improvement in symptoms  · Pulmonology consult, appreciate input  ? Continue nebulizer treatments  ? Continue Pulmicort, Breo Ellipta  ? Patient had been on oral prednisone initially, however, switched back to IV Solu-medrol in setting of persistent shortness of breath and wheezing  ? Continue IV Solu-Medrol, 40 mg every 8 hours; wean as able  ? Consult for reflux/GERD-GI consulted              Chronic cough  Assessment & Plan  · Likely from GERD more than asthma as asthma has been treated well and she still has persistent cough  · Started on pepcid bid and protonix bid     GERD (gastroesophageal reflux disease)  Assessment & Plan  · History of;  · Last EGD was completed in March 2022; unable to see EGD report  Biopsy is reviewed by GI  · GI Consult, appreciate input  ? Recommending barium swallow study to investigate size of hiatal hernia  ? Continue PPI BID  ? Initiate on Pepcid BID  ? OP manometry testing  ?  Barium study shows small hiatal hernia, and severe reflux     Rectus sheath hematoma  Assessment & Plan  · abd binder for pain control  · On 10/29, am patient reporting Increased RLQ Pain 10/10 not relieved despite iv toradol  · CT : 7 6 cm right rectus abdominis hematoma with small internal focus of active extravasation  · General surgery signed off  No surgical recommendations  · Lovenox Dc'd  · Hemoglobin stable   · PRN morphine/oxy for pain control      Type 2 diabetes mellitus, with long-term current use of insulin [facility])  Assessment & Plan        Lab Results   Component Value Date     HGBA1C 4 9 10/04/2022                Recent Labs     10/31/22  2054 11/01/22  0031 11/01/22  0717 11/01/22  1032   POCGLU 219* 255* 166* 182*         Blood Sugar Average: Last 72 hrs:  (P) 181 8125      · Per chart review, appears patient previously on insulin regimen  However, insulin regimen discontinued this month per the chart   Last HA1C was 4 9   · SSI with Accu-Cheks  · Monitor for hyperglycemia while treated with nebulizers and IV steroids  · Hypoglycemia protocol  · Diabetic diet      Hyperlipidemia  Assessment & Plan  · Stable, Outpatient lipid check  · Continue statin     SIRS (systemic inflammatory response syndrome) (HCC)  Assessment & Plan  · Stable,  low suspicion for infection  ·  Meeting SIRS criteria on admission for leukocytosis, tachycardia, tachypnea  · Chest x-ray w/o any acute abnormalities   · Likely in the setting of acute asthma exacerbation  · Lactate remains elevated however stable, does have leukocytosis however on steroids  · Blood cultures thus far negative  · S/p 5 days of ceftriaxone; monitor off abx             Medical Problems                  Resolved Problems  Date Reviewed: 11/1/2022   None                   Discharging Physician / Practitioner: Nanette Quiroz  PCP: Elroy Dooley  Admission Date:       Admission Orders (From admission, onward)              Ordered          10/25/22 1326   Inpatient Admission  Once              10/24/22 2104   Place in Observation  Once                        Discharge Date: 11/01/22     Consultations During Hospital Stay:  · IP CONSULT TO ACUTE CARE SURGERY  · IP CONSULT TO PULMONOLOGY  · IP CONSULT TO GASTROENTEROLOGY        Procedures Performed:   · FL barium swallow ROUTINE esophagus ·   Final Result by Katalina Cm MD (11/01 1050) ·   Multiple episodes of moderate to severe gastroesophageal reflux ·   No obstructing or constricting lesions seen ·   Small hiatal hernia ·   No paraesophageal hernia ·   Esophageal tertiary contractions with mild esophageal dysmotility ·   No delay in emptying of the contrast from esophagus ·     ·   Workstation performed: [id number] ·     ·     ·   XR chest portable ·   Final Result by Katalina Cruz MD (10/31 1051) ·     ·   No acute cardiopulmonary disease  ·     ·     ·     ·     ·     ·   Workstation performed: [id number] ·     ·     ·   CT abdomen pelvis w contrast ·   Final Result by Eric Amaya MD (10/29 1008) ·     ·   1   7 6 cm right rectus abdominis hematoma with small internal focus of active extravasation  ·     ·   2  Small but increasing layering pleural effusions  ·     ·   Workstation performed: [id number] ·     ·     ·   CT chest abdomen pelvis w contrast ·   Final Result by August Gitelman, MD (10/26 7826) ·     ·   No acute pathology in the chest, abdomen, or pelvis  ·     ·   Hepatomegaly/hepatic steatosis  ·     ·     ·     ·   Workstation performed: [id number] ·     ·     ·   XR chest 1 view portable ·   Final Result by Ruma Miguel MD (10/25 8845) ·     ·   No acute cardiopulmonary disease   ·     ·     ·     ·     ·     ·   Workstation performed: [id number] ·     ·     ·      ·       Significant Findings / Test Results:   · none     Incidental Findings:   · none      Test Results Pending at Discharge (will require follow up):   · none     Outpatient Tests Requested:  · Cbc in 1 week  · Manometry OP with GI in 1 week     Complications:  none     Reason for Admission: Christy Cullen y o  female who has a past medical history significant for diabetes, hyperlipidemia, asthma  Patient presenting to the ED for increased shortness of breath wheezing at home   Reports that she had taken all of her home inhaler treatments, without improvement   Denies any associated fever/chills, chest pain, abdominal pain, nausea/vomiting/diarrhea   Patient requiring medical admission for treatment of acute asthma exacerbation   All patient questions answered to the best my ability      Hospital Course:   Sharon Handley is a 56 y o  female patient who originally presented to the hospital on 10/24/2022 due to the above reason  Pt was treated with IV steroids for asthma exacerbation and is now under an acceptable control  Pt has chronic cough likely from GERD confirmed by barium study today to rule out hiatal hernia  Pt has small hiatal hernia  Pt has been set up by GI for OP manometry testing  Pt developed rectus sheath hematoma and abd binder and pain control were initiated  Now pain is controlled with oxycodone and the binder   Hb has remained stable  Pt is hemodynamically stable and is medically cleared for DC today with close OP followup with PCP     Please see above list of diagnoses and related plan for additional information       Condition at Discharge: stable     Discharge Day Visit / Exam:   Subjective:  No CP or SOB,  Vitals: Blood Pressure: 110/64 (11/01/22 0702)  Pulse: 84 (11/01/22 0900)  Temperature: 97 8 °F (36 6 °C) (11/01/22 0702)  Temp Source: Oral (11/01/22 0702)  Respirations: 18 (11/01/22 0702)  Height: 4' 11" (149 9 cm) (10/25/22 1018)  Weight - Scale: 88 7 kg (195 lb 8 8 oz) (10/25/22 1018)  SpO2: 94 % (11/01/22 0720)  Exam:   Physical Exam General- Awake, alert and oriented x 3, looks comfortable  HEENT- Normocephalic, atraumatic, oral mucosa- moist  Neck- Supple, No carotid bruit, no JVD  CVS- Normal S1/ S2, Regular rate and rhythm, No murmur, No edema  Respiratory system- B/L clear breath sounds, no wheezing today, reduced air at the bases  Abdomen- Soft, Non distended, (+) mild to moderate tenderness, Bowel sound- present 4 quads, abd binder (+)  Genitourinary- No suprapubic tenderness, No CVA tenderness  Skin- No new bruise or rash  Musculoskeletal- No gross deformity  Psych- anxious, No acute psychosis  CNS- CN II- XII grossly intact, No acute focal neurologic deficit noted        Discussion with Family: Patient declined call to        Discharge instructions/Information to patient and family:   See after visit summary for information provided to patient and family        Provisions for Follow-Up Care:  See after visit summary for information related to follow-up care and any pertinent home health orders  Disposition:   Home     Planned Readmission: no     Discharge Statement:  I spent 35 minutes discharging the patient  This time was spent on the day of discharge  I had direct contact with the patient on the day of discharge  Greater than 50% of the total time was spent examining patient, answering all patient questions, arranging and discussing plan of care with patient as well as directly providing post-discharge instructions    Additional time then spent on discharge activities      Discharge Medications:  See after visit summary for reconciled discharge medications provided to patient and/or family        **Please Note: This note may have been constructed using a voice recognition system**

## 2022-11-03 ENCOUNTER — HOSPITAL ENCOUNTER (EMERGENCY)
Facility: HOSPITAL | Age: 57
Discharge: HOME | End: 2022-11-03
Attending: EMERGENCY MEDICINE

## 2022-11-03 VITALS
TEMPERATURE: 98 F | SYSTOLIC BLOOD PRESSURE: 108 MMHG | OXYGEN SATURATION: 96 % | HEART RATE: 84 BPM | DIASTOLIC BLOOD PRESSURE: 55 MMHG | RESPIRATION RATE: 20 BRPM

## 2022-11-03 DIAGNOSIS — R19.8 ABNORMAL BOWEL MOVEMENT: Primary | ICD-10-CM

## 2022-11-03 LAB
ALBUMIN SERPL BCP-MCNC: 2.5 G/DL (ref 3.5–5)
ALP SERPL-CCNC: 142 U/L (ref 46–116)
ALT SERPL W P-5'-P-CCNC: 140 U/L (ref 12–78)
ANION GAP SERPL CALCULATED.3IONS-SCNC: 8 MMOL/L (ref 4–13)
AST SERPL W P-5'-P-CCNC: 76 U/L (ref 5–45)
BASOPHILS # BLD AUTO: 0.05 THOUSANDS/ÂΜL (ref 0–0.1)
BASOPHILS NFR BLD AUTO: 0 % (ref 0–1)
BILIRUB SERPL-MCNC: 0.34 MG/DL (ref 0.2–1)
BUN SERPL-MCNC: 18 MG/DL (ref 5–25)
CALCIUM ALBUM COR SERPL-MCNC: 9.3 MG/DL (ref 8.3–10.1)
CALCIUM SERPL-MCNC: 8.1 MG/DL (ref 8.3–10.1)
CHLORIDE SERPL-SCNC: 104 MMOL/L (ref 96–108)
CO2 SERPL-SCNC: 29 MMOL/L (ref 21–32)
CREAT SERPL-MCNC: 0.85 MG/DL (ref 0.6–1.3)
EOSINOPHIL # BLD AUTO: 0.05 THOUSAND/ÂΜL (ref 0–0.61)
EOSINOPHIL NFR BLD AUTO: 0 % (ref 0–6)
ERYTHROCYTE [DISTWIDTH] IN BLOOD BY AUTOMATED COUNT: 15.3 % (ref 11.6–15.1)
GFR SERPL CREATININE-BSD FRML MDRD: 77 ML/MIN/1.73SQ M
GLUCOSE SERPL-MCNC: 155 MG/DL (ref 65–140)
HCT VFR BLD AUTO: 36 % (ref 34.8–46.1)
HGB BLD-MCNC: 11.4 G/DL (ref 11.5–15.4)
IMM GRANULOCYTES # BLD AUTO: >0.5 THOUSAND/UL (ref 0–0.2)
IMM GRANULOCYTES NFR BLD AUTO: 4 % (ref 0–2)
LIPASE SERPL-CCNC: 73 U/L (ref 73–393)
LYMPHOCYTES # BLD AUTO: 4.75 THOUSANDS/ÂΜL (ref 0.6–4.47)
LYMPHOCYTES NFR BLD AUTO: 30 % (ref 14–44)
MAGNESIUM SERPL-MCNC: 2.1 MG/DL (ref 1.6–2.6)
MCH RBC QN AUTO: 31.6 PG (ref 26.8–34.3)
MCHC RBC AUTO-ENTMCNC: 31.7 G/DL (ref 31.4–37.4)
MCV RBC AUTO: 100 FL (ref 82–98)
MONOCYTES # BLD AUTO: 1.52 THOUSAND/ÂΜL (ref 0.17–1.22)
MONOCYTES NFR BLD AUTO: 10 % (ref 4–12)
NEUTROPHILS # BLD AUTO: 8.67 THOUSANDS/ÂΜL (ref 1.85–7.62)
NEUTS SEG NFR BLD AUTO: 56 % (ref 43–75)
NRBC BLD AUTO-RTO: 0 /100 WBCS
PLATELET # BLD AUTO: 321 THOUSANDS/UL (ref 149–390)
PMV BLD AUTO: 10.5 FL (ref 8.9–12.7)
POTASSIUM SERPL-SCNC: 3.7 MMOL/L (ref 3.5–5.3)
PROT SERPL-MCNC: 6.2 G/DL (ref 6.4–8.4)
RBC # BLD AUTO: 3.61 MILLION/UL (ref 3.81–5.12)
SODIUM SERPL-SCNC: 141 MMOL/L (ref 135–147)
WBC # BLD AUTO: 15.68 THOUSAND/UL (ref 4.31–10.16)

## 2022-11-03 RX ORDER — IPRATROPIUM BROMIDE AND ALBUTEROL SULFATE 2.5; .5 MG/3ML; MG/3ML
3 SOLUTION RESPIRATORY (INHALATION) ONCE
Status: COMPLETED | OUTPATIENT
Start: 2022-11-03 | End: 2022-11-03

## 2022-11-03 RX ORDER — OXYCODONE HYDROCHLORIDE 5 MG/1
5 TABLET ORAL ONCE
Status: COMPLETED | OUTPATIENT
Start: 2022-11-03 | End: 2022-11-03

## 2022-11-03 RX ADMIN — IPRATROPIUM BROMIDE AND ALBUTEROL SULFATE 3 ML: 2.5; .5 SOLUTION RESPIRATORY (INHALATION) at 02:48

## 2022-11-03 RX ADMIN — OXYCODONE HYDROCHLORIDE 5 MG: 5 TABLET ORAL at 02:48

## 2022-11-03 NOTE — ED PROVIDER NOTES
History  Chief Complaint   Patient presents with   • Abdominal Pain     RUQ pain, known hematoma, reports change in bowel movements today - white and fully formed approx 11P      Patient is a 56-year-old female past medical history of asthma, diabetes, hyperlipidemia, GERD presenting with white stool  Patient states that she was discharged today from the hospital following admission for asthma exacerbation as well as right upper quadrant hematoma  She states that her pain is unchanged that she took her OxyContin today with some relief and states that her asthma has been flaring however she took the steroids that she was discharged from the hospital with and states that the shortness breath is unchanged from hospitalization  She states that she had 1 white bowel movement today prior to arrival and had another bowel movement however did not notice the color  Denies any diarrhea constipation, nausea vomiting, fevers, chest pain, rashes, vision changes, dysuria  Prior to Admission Medications   Prescriptions Last Dose Informant Patient Reported? Taking?   acetaminophen (TYLENOL) 325 mg tablet   No No   Sig: Take 2 tablets (650 mg total) by mouth every 6 (six) hours as needed for mild pain, headaches or fever   albuterol (2 5 mg/3 mL) 0 083 % nebulizer solution   Yes No   Sig: 3 ml   citalopram (CeleXA) 10 mg tablet   Yes No   Sig: Take 10 mg by mouth daily   famotidine (PEPCID) 40 MG tablet   No No   Sig: Take 1 tablet (40 mg total) by mouth 2 (two) times a day   fluticasone-vilanterol (Breo Ellipta) 200-25 mcg/actuation inhaler   No No   Sig: Inhale 1 puff daily Rinse mouth after use     guaifenesin-codeine (GUAIFENESIN AC) 100-10 MG/5ML liquid   No No   Sig: Take 5 mL by mouth 3 (three) times a day as needed for cough for up to 10 days   hydrOXYzine HCL (ATARAX) 25 mg tablet   No No   Sig: Take 1 tablet (25 mg total) by mouth every 6 (six) hours as needed for anxiety for up to 10 days   ipratropium (ATROVENT) 0 02 % nebulizer solution   No No   Sig: Take 2 5 mL (0 5 mg total) by nebulization 3 (three) times a day   ipratropium-albuterol (DUO-NEB) 0 5-2 5 mg/3 mL nebulizer solution   No No   Sig: Take 3 mL by nebulization every 4 (four) hours as needed for wheezing or shortness of breath   montelukast (SINGULAIR) 10 mg tablet   No No   Sig: Take 1 tablet (10 mg total) by mouth daily at bedtime   oxyCODONE (ROXICODONE) 5 immediate release tablet   No No   Sig: Take 1 tablet (5 mg total) by mouth every 4 (four) hours as needed for moderate pain for up to 10 days Max Daily Amount: 30 mg   pantoprazole (PROTONIX) 40 mg tablet   No No   Sig: Take 1 tablet (40 mg total) by mouth 2 (two) times a day before meals   polyethylene glycol (MIRALAX) 17 g packet   No No   Sig: Take 17 g by mouth daily for 10 days Do not start before November 2, 2022  pravastatin (PRAVACHOL) 40 mg tablet   No No   Sig: Take 1 tablet (40 mg total) by mouth daily with dinner   predniSONE 20 mg tablet   No No   Sig: Take 2 tablets (40 mg total) by mouth daily for 3 days, THEN 1 5 tablets (30 mg total) daily for 3 days, THEN 1 tablet (20 mg total) daily for 3 days, THEN 0 5 tablets (10 mg total) daily for 3 days  zolpidem (Ambien) 10 mg tablet   Yes No   Sig: Take 10 mg by mouth daily at bedtime as needed for sleep      Facility-Administered Medications: None       Past Medical History:   Diagnosis Date   • Asthma    • Diabetes mellitus ([facility] [address] )        No past surgical history on file  No family history on file  I have reviewed and agree with the history as documented      E-Cigarette/Vaping   • E-Cigarette Use Never User      E-Cigarette/Vaping Substances   • Nicotine No    • THC No    • CBD No    • Flavoring No    • Other No    • Unknown No      Social History     Tobacco Use   • Smoking status: Never Smoker   • Smokeless tobacco: Never Used   Vaping Use   • Vaping Use: Never used   Substance Use Topics   • Alcohol use: Never   • Drug use: Never       Review of Systems   All other systems reviewed and are negative  Physical Exam  Physical Exam  Vitals reviewed  Constitutional:       General: She is not in acute distress  Appearance: Normal appearance  She is not ill-appearing  HENT:      Mouth/Throat:      Mouth: Mucous membranes are moist    Eyes:      Conjunctiva/sclera: Conjunctivae normal       Comments: Normal conjunctiva   Cardiovascular:      Rate and Rhythm: Normal rate and regular rhythm  Heart sounds: Normal heart sounds  Pulmonary:      Effort: Pulmonary effort is normal       Comments: No conversational dyspnea retractions, scattered expiratory wheezing and severe cough  Abdominal:      General: Abdomen is flat  Palpations: Abdomen is soft  Tenderness: There is no abdominal tenderness  Musculoskeletal:         General: No swelling  Normal range of motion  Cervical back: Neck supple  Skin:     General: Skin is warm and dry  Neurological:      General: No focal deficit present  Mental Status: She is alert     Psychiatric:         Mood and Affect: Mood normal          Vital Signs  ED Triage Vitals   Temperature Pulse Respirations Blood Pressure SpO2   11/03/22 0218 11/03/22 0218 11/03/22 0218 11/03/22 0218 11/03/22 0218   98 °F (36 7 °C) 95 20 121/59 96 %      Temp Source Heart Rate Source Patient Position - Orthostatic VS BP Location FiO2 (%)   11/03/22 0218 11/03/22 0218 11/03/22 0218 11/03/22 0218 --   Tympanic Monitor Sitting Left arm       Pain Score       11/03/22 0248       8           Vitals:    11/03/22 0218 11/03/22 0514   BP: 121/59 108/55   Pulse: 95 84   Patient Position - Orthostatic VS: Sitting Lying         Visual Acuity      ED Medications  Medications   ipratropium-albuterol (DUO-NEB) 0 5-2 5 mg/3 mL inhalation solution 3 mL (3 mL Nebulization Given 11/3/22 0248)   oxyCODONE (ROXICODONE) IR tablet 5 mg (5 mg Oral Given 11/3/22 0248)       Diagnostic Studies  Results Reviewed Procedure Component Value Units Date/Time    Comprehensive metabolic panel [[id number]]  (Abnormal) Collected: 11/03/22 0504    Lab Status: Final result Specimen: Blood from Arm, Right Updated: 11/03/22 0530     Sodium 141 mmol/L      Potassium 3 7 mmol/L      Chloride 104 mmol/L      CO2 29 mmol/L      ANION GAP 8 mmol/L      BUN 18 mg/dL      Creatinine 0 85 mg/dL      Glucose 155 mg/dL      Calcium 8 1 mg/dL      Corrected Calcium 9 3 mg/dL      AST 76 U/L       U/L      Alkaline Phosphatase 142 U/L      Total Protein 6 2 g/dL      Albumin 2 5 g/dL      Total Bilirubin 0 34 mg/dL      eGFR 77 ml/min/1 73sq m     Narrative:      [place] guidelines for Chronic Kidney Disease (CKD):   •  Stage 1 with normal or high GFR (GFR > 90 mL/min/1 73 square meters)  •  Stage 2 Mild CKD (GFR = 60-89 mL/min/1 73 square meters)  •  Stage 3A Moderate CKD (GFR = 45-59 mL/min/1 73 square meters)  •  Stage 3B Moderate CKD (GFR = 30-44 mL/min/1 73 square meters)  •  Stage 4 Severe CKD (GFR = 15-29 mL/min/1 73 square meters)  •  Stage 5 End Stage CKD (GFR <15 mL/min/1 73 square meters)  Note: GFR calculation is accurate only with a steady state creatinine    Magnesium [[id number]]  (Normal) Collected: 11/03/22 0504    Lab Status: Final result Specimen: Blood from Arm, Right Updated: 11/03/22 0530     Magnesium 2 1 mg/dL     Lipase [[id number]]  (Normal) Collected: 11/03/22 0504    Lab Status: Final result Specimen: Blood from Arm, Right Updated: 11/03/22 0530     Lipase 73 u/L     CBC and differential [[id number]]  (Abnormal) Collected: 11/03/22 0347    Lab Status: Final result Specimen: Blood from Arm, Right Updated: 11/03/22 0410     WBC 15 68 Thousand/uL      RBC 3 61 Million/uL      Hemoglobin 11 4 g/dL      Hematocrit 36 0 %       fL      MCH 31 6 pg      MCHC 31 7 g/dL      RDW 15 3 %      MPV 10 5 fL      Platelets 990 Thousands/uL      nRBC 0 /100 WBCs      Neutrophils Relative 56 % Immat GRANS % 4 %      Lymphocytes Relative 30 %      Monocytes Relative 10 %      Eosinophils Relative 0 %      Basophils Relative 0 %      Neutrophils Absolute 8 67 Thousands/µL      Immature Grans Absolute >0 50 Thousand/uL      Lymphocytes Absolute 4 75 Thousands/µL      Monocytes Absolute 1 52 Thousand/µL      Eosinophils Absolute 0 05 Thousand/µL      Basophils Absolute 0 05 Thousands/µL     Narrative: This is an appended report  These results have been appended to a previously verified report  No orders to display              Procedures  Procedures         ED Course  ED Course as of 11/03/22 0548   Thu Nov 03, 2022   1383 Patient has mild transaminitis but otherwise unremarkable, tolerating p o  in the ED, will discharge with return precautions outpatient follow-up for white stools  SBIRT 20yo+    Flowsheet Row Most Recent Value   SBIRT (25 yo +)    In order to provide better care to our patients, we are screening all of our patients for alcohol and drug use  Would it be okay to ask you these screening questions? Yes Filed at: 11/03/2022 0216   Initial Alcohol Screen: US AUDIT-C     1  How often do you have a drink containing alcohol? 1 Filed at: 11/03/2022 0216   2  How many drinks containing alcohol do you have on a typical day you are drinking? 1 Filed at: 11/03/2022 0216   3b  FEMALE Any Age, or MALE 65+: How often do you have 4 or more drinks on one occassion? 0 Filed at: 11/03/2022 0216   Audit-C Score 2 Filed at: 11/03/2022 0870   JANNETH: How many times in the past year have you    Used an illegal drug or used a prescription medication for non-medical reasons? Never Filed at: 11/03/2022 0216                    MDM  Number of Diagnoses or Management Options  Diagnosis management comments: Patient is a 44-year-old female past medical history of asthma, diabetes, hyperlipidemia, GERD presenting with white stools    Patient is well-appearing bedside stable vitals and in no acute distress  She has no significant abdominal tenderness, scattered expiratory wheezing and severe cough at bedside however denies any change to her abdominal pain or shortness of breath since discharge from the hospital   Also states she took steroids today  Will give DuoNeb breathing treatment, give oxycodone for pain which patient is taking at home following discharge, obtain labs but do not feel the patient requires repeat imaging due to lack of significant abdominal tenderness and will reassess  Disposition  Final diagnoses:   Abnormal bowel movement     Time reflects when diagnosis was documented in both MDM as applicable and the Disposition within this note     Time User Action Codes Description Comment    11/3/2022  5:38 AM Ayad Alfredochester Add [R19 8] Abnormal bowel movement       ED Disposition     ED Disposition   Discharge    Condition   Stable    Date/Time   Thu Nov 3, 2022  5:38 AM    Comment   Kathy Bergman discharge to home/self care  Follow-up Information     Follow up With Specialties Details Why Contact Info Additional [facility] Gastroenterology Schedule an appointment as soon as possible for a visit   [address] [address]  Jessica Petty [facility], [facility] [phone], [address], [facility], [place], [address] [phone]           Patient's Medications   Discharge Prescriptions    No medications on file       No discharge procedures on file      PDMP Review     None          ED Provider  Electronically Signed by           Tiara Prado DO  11/03/22 9280

## 2022-11-06 ENCOUNTER — HOSPITAL ENCOUNTER (EMERGENCY)
Facility: HOSPITAL | Age: 57
Discharge: HOME | End: 2022-11-06
Attending: EMERGENCY MEDICINE

## 2022-11-06 ENCOUNTER — APPOINTMENT (EMERGENCY)
Dept: CT IMAGING | Facility: HOSPITAL | Age: 57
End: 2022-11-06

## 2022-11-06 VITALS
TEMPERATURE: 98.7 F | HEIGHT: 59 IN | HEART RATE: 108 BPM | RESPIRATION RATE: 18 BRPM | DIASTOLIC BLOOD PRESSURE: 69 MMHG | BODY MASS INDEX: 39.31 KG/M2 | WEIGHT: 195 LBS | SYSTOLIC BLOOD PRESSURE: 129 MMHG | OXYGEN SATURATION: 95 %

## 2022-11-06 DIAGNOSIS — S30.1XXA ABDOMINAL WALL HEMATOMA, INITIAL ENCOUNTER: Primary | ICD-10-CM

## 2022-11-06 LAB
ALBUMIN SERPL BCP-MCNC: 3.3 G/DL (ref 3.5–5)
ALP SERPL-CCNC: 200 U/L (ref 46–116)
ALT SERPL W P-5'-P-CCNC: 204 U/L (ref 12–78)
ANION GAP SERPL CALCULATED.3IONS-SCNC: 7 MMOL/L (ref 4–13)
APTT PPP: 26 SECONDS (ref 23–37)
AST SERPL W P-5'-P-CCNC: 56 U/L (ref 5–45)
BASOPHILS # BLD AUTO: 0.01 THOUSANDS/ÂΜL (ref 0–0.1)
BASOPHILS NFR BLD AUTO: 0 % (ref 0–1)
BILIRUB DIRECT SERPL-MCNC: 0.3 MG/DL (ref 0–0.2)
BILIRUB SERPL-MCNC: 1.01 MG/DL (ref 0.2–1)
BUN SERPL-MCNC: 12 MG/DL (ref 5–25)
CALCIUM SERPL-MCNC: 9.2 MG/DL (ref 8.3–10.1)
CHLORIDE SERPL-SCNC: 98 MMOL/L (ref 96–108)
CO2 SERPL-SCNC: 32 MMOL/L (ref 21–32)
CREAT SERPL-MCNC: 0.98 MG/DL (ref 0.6–1.3)
EOSINOPHIL # BLD AUTO: 0.12 THOUSAND/ÂΜL (ref 0–0.61)
EOSINOPHIL NFR BLD AUTO: 1 % (ref 0–6)
ERYTHROCYTE [DISTWIDTH] IN BLOOD BY AUTOMATED COUNT: 15.3 % (ref 11.6–15.1)
GFR SERPL CREATININE-BSD FRML MDRD: 65 ML/MIN/1.73SQ M
GLUCOSE SERPL-MCNC: 143 MG/DL (ref 65–140)
HCT VFR BLD AUTO: 38.9 % (ref 34.8–46.1)
HGB BLD-MCNC: 12.7 G/DL (ref 11.5–15.4)
IMM GRANULOCYTES # BLD AUTO: 0.16 THOUSAND/UL (ref 0–0.2)
IMM GRANULOCYTES NFR BLD AUTO: 1 % (ref 0–2)
INR PPP: 0.95 (ref 0.84–1.19)
LACTATE SERPL-SCNC: 1.6 MMOL/L (ref 0.5–2)
LIPASE SERPL-CCNC: 85 U/L (ref 73–393)
LYMPHOCYTES # BLD AUTO: 3.14 THOUSANDS/ÂΜL (ref 0.6–4.47)
LYMPHOCYTES NFR BLD AUTO: 27 % (ref 14–44)
MCH RBC QN AUTO: 32 PG (ref 26.8–34.3)
MCHC RBC AUTO-ENTMCNC: 32.6 G/DL (ref 31.4–37.4)
MCV RBC AUTO: 98 FL (ref 82–98)
MONOCYTES # BLD AUTO: 1.03 THOUSAND/ÂΜL (ref 0.17–1.22)
MONOCYTES NFR BLD AUTO: 9 % (ref 4–12)
NEUTROPHILS # BLD AUTO: 7.07 THOUSANDS/ÂΜL (ref 1.85–7.62)
NEUTS SEG NFR BLD AUTO: 62 % (ref 43–75)
NRBC BLD AUTO-RTO: 0 /100 WBCS
PLATELET # BLD AUTO: 324 THOUSANDS/UL (ref 149–390)
PMV BLD AUTO: 10.5 FL (ref 8.9–12.7)
POTASSIUM SERPL-SCNC: 3.5 MMOL/L (ref 3.5–5.3)
PROCALCITONIN SERPL-MCNC: 0.18 NG/ML
PROT SERPL-MCNC: 7.9 G/DL (ref 6.4–8.4)
PROTHROMBIN TIME: 12.5 SECONDS (ref 11.6–14.5)
RBC # BLD AUTO: 3.97 MILLION/UL (ref 3.81–5.12)
SODIUM SERPL-SCNC: 137 MMOL/L (ref 135–147)
WBC # BLD AUTO: 11.53 THOUSAND/UL (ref 4.31–10.16)

## 2022-11-06 RX ORDER — ONDANSETRON 2 MG/ML
4 INJECTION INTRAMUSCULAR; INTRAVENOUS ONCE
Status: COMPLETED | OUTPATIENT
Start: 2022-11-06 | End: 2022-11-06

## 2022-11-06 RX ORDER — OXYCODONE HYDROCHLORIDE 10 MG/1
10 TABLET ORAL EVERY 6 HOURS PRN
Qty: 30 TABLET | Refills: 0 | Status: SHIPPED | OUTPATIENT
Start: 2022-11-06

## 2022-11-06 RX ORDER — MORPHINE SULFATE 10 MG/ML
6 INJECTION, SOLUTION INTRAMUSCULAR; INTRAVENOUS ONCE
Status: COMPLETED | OUTPATIENT
Start: 2022-11-06 | End: 2022-11-06

## 2022-11-06 RX ADMIN — MORPHINE SULFATE 6 MG: 10 INJECTION INTRAVENOUS at 08:02

## 2022-11-06 RX ADMIN — IOHEXOL 100 ML: 350 INJECTION, SOLUTION INTRAVENOUS at 09:17

## 2022-11-06 RX ADMIN — ONDANSETRON 4 MG: 2 INJECTION INTRAMUSCULAR; INTRAVENOUS at 08:03

## 2022-11-06 NOTE — ED NOTES
Patient found drinking coffee prior to administration of Zofran  Patient advised to avoid drinking or eating food until CT scan is resulted  Patient expressed understanding and has no further questions or concerns at this time, provider was also made aware       Ruma Mathew RN  11/06/22 0748

## 2022-11-06 NOTE — ED PROVIDER NOTES
History  Chief Complaint   Patient presents with   • Abdominal Pain     Pain in rt side of stomach  Pain started a week ago  Low grade fevers  HPI patient is a 72-year-old female, recently hospitalized with asthma apparently developed abdominal pain during that stay, was found to have a 7 6 cm right rectus abdominus hematoma on CT scan  Patient reports the pain from the hematoma has gotten worse complains of right-sided abdominal pain now with low-grade fever 100 2 100 4  She denies worsening cough or congestion  Denies difficulty breathing  Primarily complaining of pain  She reports that when she coughs it is very painful when she coughs in the area of the hematoma  She denies any new injury  She denies any falls or weakness  She denies any difficulty ambulating  She reports a fairly sharp right-sided abdominal pain worse with a deep cough or worse with palpation of her abdominal wall  She denies any vomiting or diarrhea  She reports improvement with oxycodone at home but more improvement with the cough suppressant  Past medical history asthma abdominal wall hematoma diabetes,  Family history noncontributory  Social history nonsmoker no history of drug abuse    Prior to Admission Medications   Prescriptions Last Dose Informant Patient Reported? Taking?   acetaminophen (TYLENOL) 325 mg tablet   No No   Sig: Take 2 tablets (650 mg total) by mouth every 6 (six) hours as needed for mild pain, headaches or fever   albuterol (2 5 mg/3 mL) 0 083 % nebulizer solution   Yes No   Sig: 3 ml   citalopram (CeleXA) 10 mg tablet   Yes No   Sig: Take 10 mg by mouth daily   famotidine (PEPCID) 40 MG tablet   No No   Sig: Take 1 tablet (40 mg total) by mouth 2 (two) times a day   fluticasone-vilanterol (Breo Ellipta) 200-25 mcg/actuation inhaler   No No   Sig: Inhale 1 puff daily Rinse mouth after use     guaifenesin-codeine (GUAIFENESIN AC) 100-10 MG/5ML liquid   No No   Sig: Take 5 mL by mouth 3 (three) times a day as needed for cough for up to 10 days   hydrOXYzine HCL (ATARAX) 25 mg tablet   No No   Sig: Take 1 tablet (25 mg total) by mouth every 6 (six) hours as needed for anxiety for up to 10 days   ipratropium (ATROVENT) 0 02 % nebulizer solution   No No   Sig: Take 2 5 mL (0 5 mg total) by nebulization 3 (three) times a day   ipratropium-albuterol (DUO-NEB) 0 5-2 5 mg/3 mL nebulizer solution   No No   Sig: Take 3 mL by nebulization every 4 (four) hours as needed for wheezing or shortness of breath   montelukast (SINGULAIR) 10 mg tablet   No No   Sig: Take 1 tablet (10 mg total) by mouth daily at bedtime   oxyCODONE (ROXICODONE) 5 immediate release tablet   No No   Sig: Take 1 tablet (5 mg total) by mouth every 4 (four) hours as needed for moderate pain for up to 10 days Max Daily Amount: 30 mg   pantoprazole (PROTONIX) 40 mg tablet   No No   Sig: Take 1 tablet (40 mg total) by mouth 2 (two) times a day before meals   polyethylene glycol (MIRALAX) 17 g packet   No No   Sig: Take 17 g by mouth daily for 10 days Do not start before November 2, 2022  pravastatin (PRAVACHOL) 40 mg tablet   No No   Sig: Take 1 tablet (40 mg total) by mouth daily with dinner   predniSONE 20 mg tablet   No No   Sig: Take 2 tablets (40 mg total) by mouth daily for 3 days, THEN 1 5 tablets (30 mg total) daily for 3 days, THEN 1 tablet (20 mg total) daily for 3 days, THEN 0 5 tablets (10 mg total) daily for 3 days  zolpidem (Ambien) 10 mg tablet   Yes No   Sig: Take 10 mg by mouth daily at bedtime as needed for sleep      Facility-Administered Medications: None       Past Medical History:   Diagnosis Date   • Asthma    • Diabetes mellitus ([facility] [address] )        History reviewed  No pertinent surgical history  History reviewed  No pertinent family history  I have reviewed and agree with the history as documented      E-Cigarette/Vaping   • E-Cigarette Use Never User      E-Cigarette/Vaping Substances   • Nicotine No    • THC No    • CBD No    • Flavoring No    • Other No    • Unknown No      Social History     Tobacco Use   • Smoking status: Never Smoker   • Smokeless tobacco: Never Used   Vaping Use   • Vaping Use: Never used   Substance Use Topics   • Alcohol use: Never   • Drug use: Never       Review of Systems   Constitutional: Negative for diaphoresis, fatigue and fever  HENT: Negative for congestion, ear pain, nosebleeds and sore throat  Eyes: Negative for photophobia, pain, discharge and visual disturbance  Respiratory: Negative for cough, choking, chest tightness, shortness of breath and wheezing  Cardiovascular: Negative for chest pain and palpitations  Gastrointestinal: Positive for abdominal pain  Negative for abdominal distention, diarrhea and vomiting  Genitourinary: Negative for dysuria, flank pain and frequency  Musculoskeletal: Negative for back pain, gait problem and joint swelling  Skin: Negative for color change and rash  Neurological: Negative for dizziness, syncope and headaches  Psychiatric/Behavioral: Negative for behavioral problems and confusion  The patient is not nervous/anxious  All other systems reviewed and are negative  Physical Exam  Physical Exam  Vitals and nursing note reviewed  Constitutional:       Appearance: She is well-developed  HENT:      Head: Normocephalic  Right Ear: External ear normal       Left Ear: External ear normal       Nose: Nose normal    Eyes:      General: Lids are normal       Pupils: Pupils are equal, round, and reactive to light  Cardiovascular:      Rate and Rhythm: Normal rate and regular rhythm  Pulses: Normal pulses  Heart sounds: Normal heart sounds  Pulmonary:      Effort: Pulmonary effort is normal  No respiratory distress  Breath sounds: Normal breath sounds  Abdominal:      General: Abdomen is flat  Bowel sounds are normal       Tenderness: There is abdominal tenderness        Comments: Tender right side of her abdomen primarily mid abdomen, no left-sided tenderness, no rebound no guarding,   Musculoskeletal:         General: No deformity  Normal range of motion  Cervical back: Normal range of motion and neck supple  Skin:     General: Skin is warm and dry  Neurological:      Mental Status: She is alert and oriented to person, place, and time  Pulse oximetry 98% on room air adequate oxygenation, there is no hypoxia  Vital Signs  ED Triage Vitals   Temperature Pulse Respirations Blood Pressure SpO2   11/06/22 0737 11/06/22 0737 11/06/22 0737 11/06/22 0737 11/06/22 0737   98 7 °F (37 1 °C) (!) 118 20 142/70 98 %      Temp Source Heart Rate Source Patient Position - Orthostatic VS BP Location FiO2 (%)   11/06/22 0737 11/06/22 0956 11/06/22 0737 11/06/22 0737 --   Temporal Monitor Sitting Left arm       Pain Score       11/06/22 0802       9           Vitals:    11/06/22 0737 11/06/22 0956   BP: 142/70 129/69   Pulse: (!) 118 (!) 108   Patient Position - Orthostatic VS: Sitting          Visual Acuity      ED Medications  Medications   ondansetron (ZOFRAN) injection 4 mg (4 mg Intravenous Given 11/6/22 0803)   morphine injection 6 mg (6 mg Intravenous Given 11/6/22 0802)   iohexol (OMNIPAQUE) 350 MG/ML injection (MULTI-DOSE) 100 mL (100 mL Intravenous Given 11/6/22 0917)       Diagnostic Studies  Results Reviewed     Procedure Component Value Units Date/Time    Blood culture #1 [[id number]] Collected: 11/06/22 0757    Lab Status: Preliminary result Specimen: Blood from Arm, Right Updated: 11/06/22 1203     Blood Culture Received in Microbiology Lab  Culture in Progress  Blood culture #2 [[id number]] Collected: 11/06/22 0757    Lab Status: Preliminary result Specimen: Blood from Arm, Left Updated: 11/06/22 1203     Blood Culture Received in Microbiology Lab  Culture in Progress      Procalcitonin [[id number]]  (Normal) Collected: 11/06/22 0757    Lab Status: Final result Specimen: Blood from Arm, Left Updated: 11/06/22 7312 Procalcitonin 0 18 ng/ml     Lactic acid [[id number]]  (Normal) Collected: 11/06/22 0757    Lab Status: Final result Specimen: Blood from Arm, Left Updated: 11/06/22 0829     LACTIC ACID 1 6 mmol/L     Narrative:      Result may be elevated if tourniquet was used during collection      Hepatic function panel [[id number]]  (Abnormal) Collected: 11/06/22 0757    Lab Status: Final result Specimen: Blood from Arm, Left Updated: 11/06/22 0826     Total Bilirubin 1 01 mg/dL      Bilirubin, Direct 0 30 mg/dL      Alkaline Phosphatase 200 U/L      AST 56 U/L       U/L      Total Protein 7 9 g/dL      Albumin 3 3 g/dL     Lipase [[id number]]  (Normal) Collected: 11/06/22 0757    Lab Status: Final result Specimen: Blood from Arm, Left Updated: 11/06/22 0826     Lipase 85 u/L     Basic metabolic panel [[id number]]  (Abnormal) Collected: 11/06/22 0757    Lab Status: Final result Specimen: Blood from Arm, Left Updated: 11/06/22 0826     Sodium 137 mmol/L      Potassium 3 5 mmol/L      Chloride 98 mmol/L      CO2 32 mmol/L      ANION GAP 7 mmol/L      BUN 12 mg/dL      Creatinine 0 98 mg/dL      Glucose 143 mg/dL      Calcium 9 2 mg/dL      eGFR 65 ml/min/1 73sq m     Narrative:      [place] guidelines for Chronic Kidney Disease (CKD):   •  Stage 1 with normal or high GFR (GFR > 90 mL/min/1 73 square meters)  •  Stage 2 Mild CKD (GFR = 60-89 mL/min/1 73 square meters)  •  Stage 3A Moderate CKD (GFR = 45-59 mL/min/1 73 square meters)  •  Stage 3B Moderate CKD (GFR = 30-44 mL/min/1 73 square meters)  •  Stage 4 Severe CKD (GFR = 15-29 mL/min/1 73 square meters)  •  Stage 5 End Stage CKD (GFR <15 mL/min/1 73 square meters)  Note: GFR calculation is accurate only with a steady state creatinine    Protime-INR [[id number]]  (Normal) Collected: 11/06/22 0757    Lab Status: Final result Specimen: Blood from Arm, Left Updated: 11/06/22 0824     Protime 12 5 seconds      INR 0 95    APTT [[id number]]  (Normal) Collected: 11/06/22 0757    Lab Status: Final result Specimen: Blood from Arm, Left Updated: 11/06/22 0824     PTT 26 seconds     CBC and differential [[id number]]  (Abnormal) Collected: 11/06/22 0757    Lab Status: Final result Specimen: Blood from Arm, Left Updated: 11/06/22 0809     WBC 11 53 Thousand/uL      RBC 3 97 Million/uL      Hemoglobin 12 7 g/dL      Hematocrit 38 9 %      MCV 98 fL      MCH 32 0 pg      MCHC 32 6 g/dL      RDW 15 3 %      MPV 10 5 fL      Platelets 098 Thousands/uL      nRBC 0 /100 WBCs      Neutrophils Relative 62 %      Immat GRANS % 1 %      Lymphocytes Relative 27 %      Monocytes Relative 9 %      Eosinophils Relative 1 %      Basophils Relative 0 %      Neutrophils Absolute 7 07 Thousands/µL      Immature Grans Absolute 0 16 Thousand/uL      Lymphocytes Absolute 3 14 Thousands/µL      Monocytes Absolute 1 03 Thousand/µL      Eosinophils Absolute 0 12 Thousand/µL      Basophils Absolute 0 01 Thousands/µL                  CT abdomen pelvis with contrast   Final Result by Lacey Ellis MD (11/06 0957)      Improving right rectus abdominis intramuscular hematoma  New adjacent subcutaneous fatty inflammatory-type change  Correlate clinically for infection  Workstation performed: [id number]                    Procedures  Procedures         ED Course            sepsis workup showed no sign of severe sepsis, normal lactate normal procalcitonin  Liver function showed some mild elevations-nonspecific finding    White count was minimally elevated 11 5 nonspecific finding hemoglobin was normal at 12 no sign of anemia  CT scan abdomen pelvis showed a right rectus abdominus intramuscular hematoma that seems to be resolving, discussed with patient gave her copy the CT report  Patient was improved with analgesics                                  MDM medical decision making 24-year-old female, recently hospitalized and had a spontaneous abdominal wall hematoma seen on CT scan here in the hospital   Patient reports increasing tenderness in that area, CT showed an improving hematoma  Discussed with patient requires additional analgesics  This point there is no sign of obvious infection  We discussed outpatient treatment follow-up we discussed indications to return  Patient has history of asthma, we discussed potential of developing pneumonia  We discussed no indication for antibiotics at this time but advised patient to return for worsening symptoms or increasing fever any problems  Disposition  Final diagnoses:   Abdominal wall hematoma, initial encounter - Resolving     Time reflects when diagnosis was documented in both MDM as applicable and the Disposition within this note     Time User Action Codes Description Comment    11/6/2022 10:04 AM Peri Peck Add [S30 1XXA] Abdominal wall hematoma, initial encounter     11/6/2022 10:04 AM Peri Peck Modify [S30 1XXA] Abdominal wall hematoma, initial encounter Resolving      ED Disposition     ED Disposition   Discharge    Condition   Stable    Date/Time   Sun Nov 6, 2022 10:04 AM    Comment   Ronald Mccain discharge to home/self care  Follow-up Information     Follow up With Specialties Details Why Contact Info    [address] [place] [address]    [address] [place] [address]            Discharge Medication List as of 11/6/2022 10:06 AM      START taking these medications    Details   !! oxyCODONE (ROXICODONE) 10 MG TABS Take 1 tablet (10 mg total) by mouth every 6 (six) hours as needed for moderate pain or severe pain for up to 30 doses Max Daily Amount: 40 mg, Starting Sun 11/6/2022, Normal       !! - Potential duplicate medications found  Please discuss with provider        CONTINUE these medications which have NOT CHANGED    Details   acetaminophen (TYLENOL) 325 mg tablet Take 2 tablets (650 mg total) by mouth every 6 (six) hours as needed for mild pain, headaches or fever, Starting Tue 11/1/2022, No Print albuterol (2 5 mg/3 mL) 0 083 % nebulizer solution 3 ml, Historical Med      citalopram (CeleXA) 10 mg tablet Take 10 mg by mouth daily, Historical Med      famotidine (PEPCID) 40 MG tablet Take 1 tablet (40 mg total) by mouth 2 (two) times a day, Starting Tue 11/1/2022, Until Thu 12/1/2022, Normal      fluticasone-vilanterol (Breo Ellipta) 200-25 mcg/actuation inhaler Inhale 1 puff daily Rinse mouth after use , Starting Tue 11/1/2022, Until Thu 12/1/2022, Normal      guaifenesin-codeine (GUAIFENESIN AC) 100-10 MG/5ML liquid Take 5 mL by mouth 3 (three) times a day as needed for cough for up to 10 days, Starting Tue 11/1/2022, Until Fri 11/11/2022 at 2359, Normal      hydrOXYzine HCL (ATARAX) 25 mg tablet Take 1 tablet (25 mg total) by mouth every 6 (six) hours as needed for anxiety for up to 10 days, Starting Tue 11/1/2022, Until Fri 11/11/2022 at 2359, Normal      ipratropium (ATROVENT) 0 02 % nebulizer solution Take 2 5 mL (0 5 mg total) by nebulization 3 (three) times a day, Starting Tue 11/1/2022, Until Thu 12/1/2022, Normal      ipratropium-albuterol (DUO-NEB) 0 5-2 5 mg/3 mL nebulizer solution Take 3 mL by nebulization every 4 (four) hours as needed for wheezing or shortness of breath, Starting Tue 11/1/2022, No Print      montelukast (SINGULAIR) 10 mg tablet Take 1 tablet (10 mg total) by mouth daily at bedtime, Starting Tue 11/1/2022, Until Thu 12/1/2022, Normal      !! oxyCODONE (ROXICODONE) 5 immediate release tablet Take 1 tablet (5 mg total) by mouth every 4 (four) hours as needed for moderate pain for up to 10 days Max Daily Amount: 30 mg, Starting Tue 11/1/2022, Until Fri 11/11/2022 at 2359, Normal      pantoprazole (PROTONIX) 40 mg tablet Take 1 tablet (40 mg total) by mouth 2 (two) times a day before meals, Starting Tue 11/1/2022, Until Thu 12/1/2022, Normal      polyethylene glycol (MIRALAX) 17 g packet Take 17 g by mouth daily for 10 days Do not start before November 2, 2022 , Starting Wed 11/2/2022, Until Sat 11/12/2022, Normal      pravastatin (PRAVACHOL) 40 mg tablet Take 1 tablet (40 mg total) by mouth daily with dinner, Starting Tue 11/1/2022, Until Thu 12/1/2022, Normal      predniSONE 20 mg tablet Multiple Dosages:Starting Tue 11/1/2022, Until Thu 11/3/2022 at 2359, THEN Starting Fri 11/4/2022, Until Sun 11/6/2022 at 2359, THEN Starting Mon 11/7/2022, Until Wed 11/9/2022 at 2359, THEN Starting Thu 11/10/2022, Until Sat 11/12/2022 at 2359Take  2 tablets (40 mg total) by mouth daily for 3 days, THEN 1 5 tablets (30 mg total) daily for 3 days, THEN 1 tablet (20 mg total) daily for 3 days, THEN 0 5 tablets (10 mg total) daily for 3 days  , Normal      zolpidem (Ambien) 10 mg tablet Take 10 mg by mouth daily at bedtime as needed for sleep, Historical Med       !! - Potential duplicate medications found  Please discuss with provider  No discharge procedures on file      PDMP Review     None          ED Provider  Electronically Signed by           Yoly Hurley MD  11/06/22 9895

## 2022-11-06 NOTE — ED NOTES
Patient returned from [facility] scan     Stewart Huntley RN  11/06/22 2555

## 2022-11-06 NOTE — DISCHARGE INSTRUCTIONS
The hematoma will slowly resolving ear pain should improve    Oxycodone 10 mg every 6 hours if needed for pain caution narcotic will make a sleepy   follow-up with your provider return increasing pain fever or any problems

## 2022-11-09 ENCOUNTER — TELEPHONE (OUTPATIENT)
Dept: GASTROENTEROLOGY | Facility: HOSPITAL | Age: 57
End: 2022-11-09

## 2022-11-11 LAB
BACTERIA BLD CULT: NORMAL
BACTERIA BLD CULT: NORMAL

## 2022-11-28 ENCOUNTER — TELEPHONE (OUTPATIENT)
Dept: OTHER | Facility: OTHER | Age: 57
End: 2022-11-28

## 2022-11-28 NOTE — TELEPHONE ENCOUNTER
Patient would like a call back ASAP  She does not think she can do the manometry she has scheduled for tomorrow and wants to talk to Dr Alfredo Basurto or the PA-C about alternative procedures

## 2022-11-30 RX ORDER — EPINEPHRINE 0.3 MG/.3ML
INJECTION SUBCUTANEOUS
COMMUNITY

## 2022-11-30 RX ORDER — TORSEMIDE 10 MG/1
10 TABLET ORAL DAILY
COMMUNITY
Start: 2022-09-15

## 2022-11-30 RX ORDER — BLOOD SUGAR DIAGNOSTIC
STRIP MISCELLANEOUS
COMMUNITY
Start: 2022-10-21

## 2022-11-30 RX ORDER — ALBUTEROL SULFATE 90 UG/1
AEROSOL, METERED RESPIRATORY (INHALATION)
COMMUNITY
Start: 2022-10-21

## 2022-11-30 RX ORDER — PANTOPRAZOLE SODIUM 40 MG/1
GRANULE, DELAYED RELEASE ORAL EVERY 24 HOURS
COMMUNITY

## 2022-11-30 RX ORDER — SENNOSIDES 8.6 MG
CAPSULE ORAL EVERY 12 HOURS
COMMUNITY

## 2022-11-30 RX ORDER — BUDESONIDE AND FORMOTEROL FUMARATE DIHYDRATE 160; 4.5 UG/1; UG/1
AEROSOL RESPIRATORY (INHALATION) EVERY 12 HOURS
COMMUNITY

## 2022-11-30 RX ORDER — EPINEPHRINE 0.3 MG/.3ML
INJECTION SUBCUTANEOUS
COMMUNITY
Start: 2022-09-16

## 2022-11-30 RX ORDER — MONTELUKAST SODIUM 10 MG/1
TABLET ORAL EVERY 24 HOURS
COMMUNITY

## 2022-11-30 RX ORDER — DIPHENHYDRAMINE HCL 25 MG
CAPSULE ORAL 3 TIMES DAILY
COMMUNITY

## 2022-11-30 RX ORDER — PREDNISONE 10 MG/1
TABLET ORAL
COMMUNITY
Start: 2022-10-21

## 2022-11-30 RX ORDER — ROSUVASTATIN CALCIUM 5 MG/1
1 TABLET, COATED ORAL DAILY
COMMUNITY

## 2022-11-30 RX ORDER — ROSUVASTATIN CALCIUM 5 MG/1
5 TABLET, COATED ORAL DAILY
COMMUNITY

## 2022-11-30 RX ORDER — ONDANSETRON 4 MG/1
1 TABLET, FILM COATED ORAL EVERY 8 HOURS
COMMUNITY

## 2022-11-30 RX ORDER — SEMAGLUTIDE 1.34 MG/ML
INJECTION, SOLUTION SUBCUTANEOUS
COMMUNITY
Start: 2022-09-27

## 2022-11-30 RX ORDER — ZOLPIDEM TARTRATE 10 MG/1
1 TABLET ORAL
COMMUNITY

## 2022-11-30 RX ORDER — ONDANSETRON 4 MG/1
TABLET, FILM COATED ORAL
COMMUNITY
Start: 2022-11-22

## 2022-11-30 RX ORDER — SEMAGLUTIDE 1.34 MG/ML
INJECTION, SOLUTION SUBCUTANEOUS
COMMUNITY
End: 2022-12-26

## 2022-11-30 RX ORDER — CODEINE PHOSPHATE AND GUAIFENESIN 10; 100 MG/5ML; MG/5ML
SOLUTION ORAL
COMMUNITY
Start: 2022-11-15

## 2022-11-30 RX ORDER — GABAPENTIN 100 MG/1
100 CAPSULE ORAL DAILY
COMMUNITY
Start: 2022-10-21

## 2022-11-30 RX ORDER — PANTOPRAZOLE SODIUM 40 MG/1
40 TABLET, DELAYED RELEASE ORAL DAILY
COMMUNITY

## 2022-11-30 RX ORDER — GABAPENTIN 100 MG/1
CAPSULE ORAL EVERY 24 HOURS
COMMUNITY
Start: 2022-10-21

## 2022-11-30 RX ORDER — FLUTICASONE FUROATE AND VILANTEROL 200; 25 UG/1; UG/1
POWDER RESPIRATORY (INHALATION) EVERY 24 HOURS
COMMUNITY

## 2022-11-30 RX ORDER — INSULIN LISPRO 100 [IU]/ML
INJECTION, SOLUTION INTRAVENOUS; SUBCUTANEOUS 3 TIMES DAILY
COMMUNITY
Start: 2022-08-04

## 2022-11-30 RX ORDER — IBUPROFEN 600 MG/1
TABLET ORAL
COMMUNITY
Start: 2022-10-07

## 2022-11-30 RX ORDER — FLUTICASONE PROPIONATE 250 UG/1
1 POWDER, METERED RESPIRATORY (INHALATION) 2 TIMES DAILY
COMMUNITY

## 2022-11-30 RX ORDER — DEXTROMETHORPHAN HYDROBROMIDE AND PROMETHAZINE HYDROCHLORIDE 15; 6.25 MG/5ML; MG/5ML
SYRUP ORAL
COMMUNITY
Start: 2022-09-23

## 2022-11-30 RX ORDER — SUCRALFATE ORAL 1 G/10ML
SUSPENSION ORAL
COMMUNITY

## 2022-11-30 RX ORDER — ALBUTEROL SULFATE 2.5 MG/3ML
2.5 SOLUTION RESPIRATORY (INHALATION) EVERY 4 HOURS PRN
COMMUNITY

## 2022-11-30 RX ORDER — BLOOD PRESSURE TEST KIT-LARGE
KIT MISCELLANEOUS
COMMUNITY
Start: 2022-10-21

## 2022-12-01 ENCOUNTER — CONSULT (OUTPATIENT)
Dept: GASTROENTEROLOGY | Facility: CLINIC | Age: 57
End: 2022-12-01

## 2022-12-01 VITALS
BODY MASS INDEX: 38.51 KG/M2 | HEART RATE: 95 BPM | WEIGHT: 191 LBS | SYSTOLIC BLOOD PRESSURE: 118 MMHG | OXYGEN SATURATION: 96 % | DIASTOLIC BLOOD PRESSURE: 82 MMHG | HEIGHT: 59 IN

## 2022-12-01 DIAGNOSIS — R13.19 ESOPHAGEAL DYSPHAGIA: Primary | ICD-10-CM

## 2022-12-01 DIAGNOSIS — K21.9 GASTROESOPHAGEAL REFLUX DISEASE, UNSPECIFIED WHETHER ESOPHAGITIS PRESENT: ICD-10-CM

## 2022-12-01 NOTE — PROGRESS NOTES
[address] [facility]  Lisbet Wali 56 y o  female MRN: [id number]       CC:  Post hospital follow-up for dysphagia and GERD    HPI: Kt Rodríguez is a 56year old female with history of  history of asthma, type 2 DM and GERD on Ozempic  Patient presents to the office for post hospitalization  We had seen her for consultation for dysphagia and GERD  Patient reports that she was having dysphagia to solids and regurgitation  In addition, she will have nausea and vomiting  At that time, she was in an asthma exacerbation  He also had a recent EGD at [facility]  Therefore, we had order a barium swallow to help investigate her dysphagia complaints  She had increased tertiary contractions on barium swallow with severe GERD seen  We had recommended an outpatient manometry, which we did arrange  However, when she was discussing instructions and how the procedures performed more detail with 1 of the RNs she did not think she could tolerate  She was told it could be done sedated  Terms of her asthma, she is actually doing very well feels that the change in acid reflux regimen may have something to do with it  Reports that she is still feeling nauseous  In addition, she also is mentioning issues with chronic constipation with abdominal cramping and mucus  Patient's last EGD was at [facility] in March 2022  Report is unavailable to me, but the biopsy results are  Patient was told that she had a hiatal hernia, gastritis and possible Candida esophagitis  Gastric and esophageal biopsies were negative  Esophageal brushings consistent with Candida esophagitis  She reports that she was treated  She also had a gastric emptying study that was normal   She believes that she had a colonoscopy in 2020 with her previous gastroenterologist   Those records are not available to me at this time  She does not have first-degree relatives with colon cancer    She reports that she does have second-degree family history of gastric cancer  Review of Systems:    CONSTITUTIONAL: Denies any fever, chills, or rigors  Good appetite, and no recent weight loss  HEENT: No earache or tinnitus  Denies hearing loss or visual disturbances  CARDIOVASCULAR: No chest pain or palpitations  RESPIRATORY: Denies any cough, hemoptysis, shortness of breath or dyspnea on exertion  GASTROINTESTINAL: As noted in the History of Present Illness  GENITOURINARY: No problems with urination  Denies any hematuria or dysuria  NEUROLOGIC: No dizziness or vertigo, denies headaches  MUSCULOSKELETAL: Denies any muscle or joint pain  SKIN: Denies skin rashes or itching  ENDOCRINE: Denies excessive thirst  Denies intolerance to heat or cold  PSYCHOSOCIAL: Denies depression or anxiety  Denies any recent memory loss         Current Outpatient Medications   Medication Sig Dispense Refill   • gabapentin (NEURONTIN) 100 mg capsule every 24 hours     • insulin lispro (HumaLOG KwikPen) 100 units/mL injection pen 3 (three) times a day     • acetaminophen (Tylenol 8 Hour Arthritis Pain) 650 mg CR tablet Every 12 hours     • acetaminophen (TYLENOL) 325 mg tablet Take 2 tablets (650 mg total) by mouth every 6 (six) hours as needed for mild pain, headaches or fever  0   • albuterol (2 5 mg/3 mL) 0 083 % nebulizer solution 3 ml     • albuterol (2 5 mg/3 mL) 0 083 % nebulizer solution Inhale 2 5 mg every 4 (four) hours as needed     • albuterol (PROVENTIL HFA,VENTOLIN HFA) 90 mcg/act inhaler INHALE 2 PUFFS INTO THE LUNGS EVERY 4 HOURS AS NEEDED FOR 30 DAYS     • albuterol (PROVENTIL HFA,VENTOLIN HFA) 90 mcg/act inhaler Inhale 2 puffs as needed     • Blood Pressure Monitoring (Blood Pressure Monitor 3) CARLOS MANUEL Use as directed     • budesonide-formoterol (Symbicort) 160-4 5 mcg/act inhaler Every 12 hours     • Cholecalciferol 50 MCG (2000 UT) CAPS every 24 hours     • citalopram (CeleXA) 10 mg tablet Take 10 mg by mouth daily     • diphenhydrAMINE (Benadryl Allergy) 25 mg capsule 3 (three) times a day     • EPINEPHrine (EpiPen 2-Alen) 0 3 mg/0 3 mL SOAJ as directed     • EPINEPHrine (EPIPEN) 0 3 mg/0 3 mL SOAJ INJECT 1 PEN INTRAMUSCULARLY AS DIRECTED AS NEEDED     • famotidine (PEPCID) 40 MG tablet Take 1 tablet (40 mg total) by mouth 2 (two) times a day 60 tablet 0   • fluticasone (Flovent Diskus) 250 mcg/actuation diskus inhaler Inhale 1 puff 2 (two) times a day     • fluticasone-vilanterol (Breo Ellipta) 200-25 mcg/actuation inhaler Inhale 1 puff daily Rinse mouth after use   60 blister 0   • fluticasone-vilanterol 200-25 mcg/actuation inhaler every 24 hours     • gabapentin (NEURONTIN) 100 mg capsule Take 100 mg by mouth daily     • Glucagon 1 MG/0 2ML SOAJ 1 mg if BS is less than 60     • Glucagon, rDNA, (Glucagon Emergency) 1 MG KIT INJECT 1MG UNDER THE SKIN IF BLOOD SUGAR IS LESS THAN 60 AS NEEDED     • guaiFENesin-codeine (ROBITUSSIN AC) 100-10 mg/5 mL oral solution TAKE 1 TEASPOONFUL (5ML) BY MOUTH EVERY 6 HOURS AS NEEDED FOR 7 DAYS     • hydrOXYzine HCL (ATARAX) 25 mg tablet Take 1 tablet (25 mg total) by mouth every 6 (six) hours as needed for anxiety for up to 10 days 30 tablet 0   • ipratropium (ATROVENT) 0 02 % nebulizer solution Take 2 5 mL (0 5 mg total) by nebulization 3 (three) times a day 225 mL 0   • ipratropium-albuterol (DUO-NEB) 0 5-2 5 mg/3 mL nebulizer solution Take 3 mL by nebulization every 4 (four) hours as needed for wheezing or shortness of breath  0   • montelukast (SINGULAIR) 10 mg tablet Take 1 tablet (10 mg total) by mouth daily at bedtime 30 tablet 0   • montelukast (SINGULAIR) 10 mg tablet every 24 hours     • ondansetron (ZOFRAN) 4 mg tablet      • ondansetron (ZOFRAN) 4 mg tablet Take 1 tablet by mouth every 8 (eight) hours     • OneTouch Ultra test strip TEST TWICE A DAY     • oxyCODONE (ROXICODONE) 10 MG TABS Take 1 tablet (10 mg total) by mouth every 6 (six) hours as needed for moderate pain or severe pain for up to 30 doses Max Daily Amount: 40 mg 30 tablet 0   • Ozempic, 1 MG/DOSE, 4 MG/3ML SOPN injection pen INJECT 1MG SUBCUTANEOUSLY ONCE WEEKLY     • pantoprazole (PROTONIX) 40 mg tablet Take 1 tablet (40 mg total) by mouth 2 (two) times a day before meals 60 tablet 0   • pantoprazole (PROTONIX) 40 mg tablet Take 40 mg by mouth daily     • pantoprazole (Protonix) 40 mg every 24 hours     • polyethylene glycol (MIRALAX) 17 g packet Take 17 g by mouth daily for 10 days Do not start before November 2, 2022  170 g 0   • pravastatin (PRAVACHOL) 40 mg tablet Take 1 tablet (40 mg total) by mouth daily with dinner 30 tablet 0   • predniSONE 10 mg tablet TAKE 4 TABS DAILY X 3 DAYS, 3 TABS DAILY X 3 DAYS, 2 TABS DAILY X 3 DAYS, 1 TAB DAILY X3 DAYS     • promethazine-dextromethorphan (PHENERGAN-DM) 6 25-15 mg/5 mL oral syrup TAKE 5 MILLILITERS BY MOUTH EVERY 6 HOURS AS NEEDED FOR 5 DAYS     • Promethazine-Phenyleph-Codeine (Promethazine VC/Codeine) 6  25-5-10 MG/5ML SYRP Every 6 hours     • rosuvastatin (CRESTOR) 5 mg tablet Take 5 mg by mouth daily     • rosuvastatin (CRESTOR) 5 mg tablet Take 1 tablet by mouth daily     • semaglutide, 1 mg/dose, (Ozempic, 1 MG/DOSE,) 4 MG/3ML SOPN injection pen 1 mg     • sucralfate (Carafate) 1 g/10 mL suspension 1 tablet on an empty stomach     • torsemide (DEMADEX) 10 mg tablet Take 10 mg by mouth daily     • zolpidem (Ambien) 10 mg tablet Take 10 mg by mouth daily at bedtime as needed for sleep     • zolpidem (AMBIEN) 10 mg tablet Take 1 tablet by mouth daily at bedtime as needed       No current facility-administered medications for this visit  Past Medical History:   Diagnosis Date   • Asthma    • Diabetes mellitus ([facility] [address] )      No past surgical history on file    Social History     Socioeconomic History   • Marital status: /Civil Union     Spouse name: Not on file   • Number of children: Not on file   • Years of education: Not on file   • Highest education level: Not on file   Occupational History • Not on file   Tobacco Use   • Smoking status: Never   • Smokeless tobacco: Never   Vaping Use   • Vaping Use: Never used   Substance and Sexual Activity   • Alcohol use: Never   • Drug use: Never   • Sexual activity: Not on file   Other Topics Concern   • Not on file   Social History Narrative   • Not on file     Social Determinants of Health     Financial Resource Strain: Not on file   Food Insecurity: Not on file   Transportation Needs: Not on file   Physical Activity: Not on file   Stress: Not on file   Social Connections: Not on file   Intimate Partner Violence: Not on file   Housing Stability: Not on file     No family history on file  PHYSICAL EXAM:    There were no vitals filed for this visit  General Appearance:   Alert and oriented x 3   Cooperative, and in no respiratory distress   HEENT:   Normocephalic, atraumatic, anicteric      Neck:  Supple, symmetrical, trachea midline   Lungs:   Clear to auscultation bilaterally    Heart[de-identified]   Regular rate and rhythm   Abdomen:   Soft, non-tender, non-distended; normal bowel sounds; no masses, no organomegaly    Genitalia:   Deferred    Rectal:   Deferred    Extremities:  No cyanosis, clubbing or edema    Pulses:  2+ and symmetric all extremities    Skin:  Skin color, texture, turgor normal, no rashes or lesions    Lymph nodes:  No palpable cervical or supraclavicular lymphadenopathy        Lab Results:   Results from last 6 Months   Lab Units 11/06/22  0757   WBC Thousand/uL 11 53*   HEMOGLOBIN g/dL 12 7   HEMATOCRIT % 38 9   PLATELETS Thousands/uL 324   NEUTROS PCT % 62   LYMPHS PCT % 27   MONOS PCT % 9   EOS PCT % 1     Results from last 6 Months   Lab Units 11/06/22  0757   POTASSIUM mmol/L 3 5   CHLORIDE mmol/L 98   CO2 mmol/L 32   BUN mg/dL 12   CREATININE mg/dL 0 98   CALCIUM mg/dL 9 2   ALK PHOS U/L 200*   ALT U/L 204*   AST U/L 56*     Results from last 6 Months   Lab Units 11/06/22  0757   INR  0 95     Results from last 6 Months   Lab Units 11/06/22  0757   LIPASE u/L 85       Imaging Studies: I have personally reviewed pertinent imaging studies  CT abdomen pelvis with contrast    Result Date: 11/6/2022  Impression: Improving right rectus abdominis intramuscular hematoma  New adjacent subcutaneous fatty inflammatory-type change  Correlate clinically for infection  Workstation performed: [id number]       ASSESSMENT and PLAN:      1) Dysphagia to solids with tertiary contractions on barium swallow, GERD and asthma - Patient was admitted for asthma exacerbation in November  She was previously following with Gastroenterology in [place]  She had an EGD and gastric emptying study in 2022  We were planning for a manometry test, but patient does not think she could tolerate this awake  Fortunately, her asthma is currently under control  Her lung sounds significantly improved as compared to her exam in early November, and are actually clear on exam   Her barium swallow is suspicious for possible motility disorder  In order to refer her to thoracic surgery, she would require this testing   - Will discuss with Dr Jennifer Steven if we can offer her a sedated manometry test   Myself and the patient discussed that if she is having an asthma exacerbation, she can not safely have the endoscopy performed  He voices understanding   - Continue Protonix 30-60 minutes before breakfast, then 30-60 minutes before dinner as well as Pepcid at lunch and Pepcid before bedtime  - Patient reports that she is already sleeping upright at night  - Weight loss   - Continue follow-up with pulmonary         Follow up after above testing

## 2022-12-06 ENCOUNTER — TELEPHONE (OUTPATIENT)
Dept: GASTROENTEROLOGY | Facility: CLINIC | Age: 57
End: 2022-12-06

## 2022-12-06 NOTE — TELEPHONE ENCOUNTER
Received patients records from surgery center  Gave to Rani to look over then will scan into patients chart

## 2022-12-21 ENCOUNTER — PREP FOR PROCEDURE (OUTPATIENT)
Dept: GASTROENTEROLOGY | Facility: CLINIC | Age: 57
End: 2022-12-21

## 2022-12-21 DIAGNOSIS — R13.19 ESOPHAGEAL DYSPHAGIA: Primary | ICD-10-CM

## 2022-12-21 DIAGNOSIS — K21.9 GASTROESOPHAGEAL REFLUX DISEASE, UNSPECIFIED WHETHER ESOPHAGITIS PRESENT: ICD-10-CM

## 2022-12-30 ENCOUNTER — TELEPHONE (OUTPATIENT)
Dept: GASTROENTEROLOGY | Facility: CLINIC | Age: 57
End: 2022-12-30

## 2022-12-30 NOTE — TELEPHONE ENCOUNTER
EGD/Manometry scheduled 01/5/23 @[facility] with Dr Lisa Rodriguez  Prep instructions emailed to [email or web address]

## 2022-12-30 NOTE — TELEPHONE ENCOUNTER
----- Message from Charline Rankin sent at 12/22/2022  9:38 AM EST -----  Please assist in scheduling  Thank you  ----- Message -----  From: Andre Montgomery MD  Sent: 12/21/2022   7:28 PM EST  To: Chasidy Navarro RN, Eladia Mills PA-C, #    Can we schedule this patient for egd with manometry   Thanks

## 2023-01-03 ENCOUNTER — TELEPHONE (OUTPATIENT)
Dept: GASTROENTEROLOGY | Facility: HOSPITAL | Age: 58
End: 2023-01-03

## 2023-01-05 ENCOUNTER — APPOINTMENT (OUTPATIENT)
Dept: RADIOLOGY | Facility: HOSPITAL | Age: 58
End: 2023-01-05

## 2023-01-05 ENCOUNTER — HOSPITAL ENCOUNTER (INPATIENT)
Dept: GASTROENTEROLOGY | Facility: HOSPITAL | Age: 58
LOS: 6 days | Discharge: HOME | End: 2023-01-11
Attending: INTERNAL MEDICINE | Admitting: INTERNAL MEDICINE

## 2023-01-05 ENCOUNTER — ANESTHESIA EVENT (OUTPATIENT)
Dept: GASTROENTEROLOGY | Facility: HOSPITAL | Age: 58
End: 2023-01-05

## 2023-01-05 ENCOUNTER — ANESTHESIA (OUTPATIENT)
Dept: GASTROENTEROLOGY | Facility: HOSPITAL | Age: 58
End: 2023-01-05

## 2023-01-05 ENCOUNTER — HOSPITAL ENCOUNTER (OUTPATIENT)
Dept: GASTROENTEROLOGY | Facility: HOSPITAL | Age: 58
End: 2023-01-05

## 2023-01-05 DIAGNOSIS — R05.3 CHRONIC COUGH: ICD-10-CM

## 2023-01-05 DIAGNOSIS — R13.19 ESOPHAGEAL DYSPHAGIA: ICD-10-CM

## 2023-01-05 DIAGNOSIS — K21.9 GASTROESOPHAGEAL REFLUX DISEASE, UNSPECIFIED WHETHER ESOPHAGITIS PRESENT: ICD-10-CM

## 2023-01-05 DIAGNOSIS — J45.901 ASTHMA EXACERBATION: Primary | ICD-10-CM

## 2023-01-05 DIAGNOSIS — J96.91 RESPIRATORY FAILURE WITH HYPOXIA (HCC): ICD-10-CM

## 2023-01-05 DIAGNOSIS — G47.00 INSOMNIA: ICD-10-CM

## 2023-01-05 DIAGNOSIS — R93.3 ABNORMAL BARIUM SWALLOW: ICD-10-CM

## 2023-01-05 PROBLEM — R94.31 QT PROLONGATION: Status: ACTIVE | Noted: 2023-01-05

## 2023-01-05 LAB
ALBUMIN SERPL BCP-MCNC: 3.2 G/DL (ref 3.5–5)
ALP SERPL-CCNC: 126 U/L (ref 46–116)
ALT SERPL W P-5'-P-CCNC: 32 U/L (ref 12–78)
ANION GAP SERPL CALCULATED.3IONS-SCNC: 10 MMOL/L (ref 4–13)
ANION GAP SERPL CALCULATED.3IONS-SCNC: 11 MMOL/L (ref 4–13)
ANION GAP SERPL CALCULATED.3IONS-SCNC: 13 MMOL/L (ref 4–13)
ANION GAP SERPL CALCULATED.3IONS-SCNC: 6 MMOL/L (ref 4–13)
AST SERPL W P-5'-P-CCNC: 94 U/L (ref 5–45)
ATRIAL RATE: 96 BPM
BASE EX.OXY STD BLDV CALC-SCNC: 86.4 % (ref 60–80)
BASE EXCESS BLDV CALC-SCNC: -1.2 MMOL/L
BASOPHILS # BLD AUTO: 0.02 THOUSANDS/ÂΜL (ref 0–0.1)
BASOPHILS NFR BLD AUTO: 0 % (ref 0–1)
BILIRUB SERPL-MCNC: 0.72 MG/DL (ref 0.2–1)
BUN SERPL-MCNC: 5 MG/DL (ref 5–25)
BUN SERPL-MCNC: 6 MG/DL (ref 5–25)
CALCIUM ALBUM COR SERPL-MCNC: 9.4 MG/DL (ref 8.3–10.1)
CALCIUM SERPL-MCNC: 8.8 MG/DL (ref 8.3–10.1)
CALCIUM SERPL-MCNC: 8.8 MG/DL (ref 8.3–10.1)
CALCIUM SERPL-MCNC: 8.9 MG/DL (ref 8.3–10.1)
CALCIUM SERPL-MCNC: 9.1 MG/DL (ref 8.3–10.1)
CHLORIDE SERPL-SCNC: 105 MMOL/L (ref 96–108)
CHLORIDE SERPL-SCNC: 106 MMOL/L (ref 96–108)
CHLORIDE SERPL-SCNC: 107 MMOL/L (ref 96–108)
CHLORIDE SERPL-SCNC: 109 MMOL/L (ref 96–108)
CO2 SERPL-SCNC: 20 MMOL/L (ref 21–32)
CO2 SERPL-SCNC: 21 MMOL/L (ref 21–32)
CO2 SERPL-SCNC: 21 MMOL/L (ref 21–32)
CO2 SERPL-SCNC: 22 MMOL/L (ref 21–32)
CREAT SERPL-MCNC: 0.76 MG/DL (ref 0.6–1.3)
CREAT SERPL-MCNC: 0.84 MG/DL (ref 0.6–1.3)
CREAT SERPL-MCNC: 1.06 MG/DL (ref 0.6–1.3)
CREAT SERPL-MCNC: 1.12 MG/DL (ref 0.6–1.3)
EOSINOPHIL # BLD AUTO: 0.02 THOUSAND/ÂΜL (ref 0–0.61)
EOSINOPHIL NFR BLD AUTO: 0 % (ref 0–6)
ERYTHROCYTE [DISTWIDTH] IN BLOOD BY AUTOMATED COUNT: 15.7 % (ref 11.6–15.1)
FLUAV RNA RESP QL NAA+PROBE: NEGATIVE
FLUBV RNA RESP QL NAA+PROBE: NEGATIVE
GFR SERPL CREATININE-BSD FRML MDRD: 55 ML/MIN/1.73SQ M
GFR SERPL CREATININE-BSD FRML MDRD: 59 ML/MIN/1.73SQ M
GFR SERPL CREATININE-BSD FRML MDRD: 78 ML/MIN/1.73SQ M
GFR SERPL CREATININE-BSD FRML MDRD: 88 ML/MIN/1.73SQ M
GLUCOSE SERPL-MCNC: 116 MG/DL (ref 65–140)
GLUCOSE SERPL-MCNC: 155 MG/DL (ref 65–140)
GLUCOSE SERPL-MCNC: 191 MG/DL (ref 65–140)
GLUCOSE SERPL-MCNC: 210 MG/DL (ref 65–140)
GLUCOSE SERPL-MCNC: 224 MG/DL (ref 65–140)
GLUCOSE SERPL-MCNC: 235 MG/DL (ref 65–140)
GLUCOSE SERPL-MCNC: 236 MG/DL (ref 65–140)
GLUCOSE SERPL-MCNC: 266 MG/DL (ref 65–140)
HCO3 BLDV-SCNC: 22.8 MMOL/L (ref 24–30)
HCT VFR BLD AUTO: 40 % (ref 34.8–46.1)
HGB BLD-MCNC: 13.1 G/DL (ref 11.5–15.4)
IMM GRANULOCYTES # BLD AUTO: 0.06 THOUSAND/UL (ref 0–0.2)
IMM GRANULOCYTES NFR BLD AUTO: 0 % (ref 0–2)
IPAP: 10
LYMPHOCYTES # BLD AUTO: 2.79 THOUSANDS/ÂΜL (ref 0.6–4.47)
LYMPHOCYTES NFR BLD AUTO: 20 % (ref 14–44)
MAGNESIUM SERPL-MCNC: 2.3 MG/DL (ref 1.6–2.6)
MCH RBC QN AUTO: 30.5 PG (ref 26.8–34.3)
MCHC RBC AUTO-ENTMCNC: 32.8 G/DL (ref 31.4–37.4)
MCV RBC AUTO: 93 FL (ref 82–98)
MONOCYTES # BLD AUTO: 0.59 THOUSAND/ÂΜL (ref 0.17–1.22)
MONOCYTES NFR BLD AUTO: 4 % (ref 4–12)
NEUTROPHILS # BLD AUTO: 10.5 THOUSANDS/ÂΜL (ref 1.85–7.62)
NEUTS SEG NFR BLD AUTO: 76 % (ref 43–75)
NON VENT- BIPAP: ABNORMAL
NRBC BLD AUTO-RTO: 0 /100 WBCS
O2 CT BLDV-SCNC: 17.2 ML/DL
P AXIS: 70 DEGREES
PCO2 BLDV: 36.2 MM HG (ref 42–50)
PEEP MAX SETTING VENT: 5 CM[H2O]
PH BLDV: 7.42 [PH] (ref 7.3–7.4)
PHOSPHATE SERPL-MCNC: 2.8 MG/DL (ref 2.7–4.5)
PLATELET # BLD AUTO: 424 THOUSANDS/UL (ref 149–390)
PMV BLD AUTO: 10.4 FL (ref 8.9–12.7)
PO2 BLDV: 51.4 MM HG (ref 35–45)
POTASSIUM SERPL-SCNC: 2.6 MMOL/L (ref 3.5–5.3)
POTASSIUM SERPL-SCNC: 3.3 MMOL/L (ref 3.5–5.3)
POTASSIUM SERPL-SCNC: 3.8 MMOL/L (ref 3.5–5.3)
POTASSIUM SERPL-SCNC: 6.7 MMOL/L (ref 3.5–5.3)
PR INTERVAL: 142 MS
PROT SERPL-MCNC: 8.2 G/DL (ref 6.4–8.4)
QRS AXIS: 20 DEGREES
QRSD INTERVAL: 83 MS
QT INTERVAL: 413 MS
QTC INTERVAL: 522 MS
RBC # BLD AUTO: 4.29 MILLION/UL (ref 3.81–5.12)
RSV RNA RESP QL NAA+PROBE: NEGATIVE
SARS-COV-2 RNA RESP QL NAA+PROBE: NEGATIVE
SODIUM SERPL-SCNC: 134 MMOL/L (ref 135–147)
SODIUM SERPL-SCNC: 138 MMOL/L (ref 135–147)
SODIUM SERPL-SCNC: 139 MMOL/L (ref 135–147)
SODIUM SERPL-SCNC: 140 MMOL/L (ref 135–147)
T WAVE AXIS: 53 DEGREES
VENT BIPAP FIO2: 60 %
VENTRICULAR RATE: 96 BPM
WBC # BLD AUTO: 13.98 THOUSAND/UL (ref 4.31–10.16)

## 2023-01-05 PROCEDURE — 0CJS8ZZ INSPECTION OF LARYNX, VIA NATURAL OR ARTIFICIAL OPENING ENDOSCOPIC: ICD-10-PCS | Performed by: INTERNAL MEDICINE

## 2023-01-05 RX ORDER — ZOLPIDEM TARTRATE 5 MG/1
10 TABLET ORAL
Status: DISCONTINUED | OUTPATIENT
Start: 2023-01-05 | End: 2023-01-11 | Stop reason: HOSPADM

## 2023-01-05 RX ORDER — POTASSIUM CHLORIDE 20 MEQ/1
20 TABLET, EXTENDED RELEASE ORAL ONCE
Status: COMPLETED | OUTPATIENT
Start: 2023-01-05 | End: 2023-01-05

## 2023-01-05 RX ORDER — PROPOFOL 10 MG/ML
INJECTION, EMULSION INTRAVENOUS AS NEEDED
Status: DISCONTINUED | OUTPATIENT
Start: 2023-01-05 | End: 2023-01-05

## 2023-01-05 RX ORDER — METHYLPREDNISOLONE SODIUM SUCCINATE 125 MG/2ML
125 INJECTION, POWDER, LYOPHILIZED, FOR SOLUTION INTRAMUSCULAR; INTRAVENOUS ONCE
Status: COMPLETED | OUTPATIENT
Start: 2023-01-05 | End: 2023-01-05

## 2023-01-05 RX ORDER — ALBUTEROL SULFATE 90 UG/1
AEROSOL, METERED RESPIRATORY (INHALATION) AS NEEDED
Status: DISCONTINUED | OUTPATIENT
Start: 2023-01-05 | End: 2023-01-05

## 2023-01-05 RX ORDER — POTASSIUM CHLORIDE 29.8 MG/ML
40 INJECTION INTRAVENOUS ONCE
Status: DISCONTINUED | OUTPATIENT
Start: 2023-01-05 | End: 2023-01-05

## 2023-01-05 RX ORDER — ALBUTEROL SULFATE 2.5 MG/3ML
SOLUTION RESPIRATORY (INHALATION) AS NEEDED
Status: COMPLETED | OUTPATIENT
Start: 2023-01-05 | End: 2023-01-05

## 2023-01-05 RX ORDER — FAMOTIDINE 20 MG/1
40 TABLET, FILM COATED ORAL 2 TIMES DAILY
Status: DISCONTINUED | OUTPATIENT
Start: 2023-01-06 | End: 2023-01-11 | Stop reason: HOSPADM

## 2023-01-05 RX ORDER — CITALOPRAM 10 MG/1
10 TABLET ORAL DAILY
Status: DISCONTINUED | OUTPATIENT
Start: 2023-01-06 | End: 2023-01-11 | Stop reason: HOSPADM

## 2023-01-05 RX ORDER — LEVALBUTEROL 1.25 MG/.5ML
1.25 SOLUTION, CONCENTRATE RESPIRATORY (INHALATION)
Status: DISCONTINUED | OUTPATIENT
Start: 2023-01-05 | End: 2023-01-09

## 2023-01-05 RX ORDER — METHYLPREDNISOLONE SODIUM SUCCINATE 40 MG/ML
40 INJECTION, POWDER, LYOPHILIZED, FOR SOLUTION INTRAMUSCULAR; INTRAVENOUS EVERY 6 HOURS SCHEDULED
Status: DISCONTINUED | OUTPATIENT
Start: 2023-01-05 | End: 2023-01-09

## 2023-01-05 RX ORDER — INSULIN LISPRO 100 [IU]/ML
1-5 INJECTION, SOLUTION INTRAVENOUS; SUBCUTANEOUS EVERY 6 HOURS SCHEDULED
Status: DISCONTINUED | OUTPATIENT
Start: 2023-01-05 | End: 2023-01-06

## 2023-01-05 RX ORDER — ACETAMINOPHEN 325 MG/1
975 TABLET ORAL ONCE
Status: COMPLETED | OUTPATIENT
Start: 2023-01-05 | End: 2023-01-05

## 2023-01-05 RX ORDER — PANTOPRAZOLE SODIUM 40 MG/1
40 TABLET, DELAYED RELEASE ORAL
Status: DISCONTINUED | OUTPATIENT
Start: 2023-01-06 | End: 2023-01-11 | Stop reason: HOSPADM

## 2023-01-05 RX ORDER — LIDOCAINE HYDROCHLORIDE 20 MG/ML
INJECTION, SOLUTION EPIDURAL; INFILTRATION; INTRACAUDAL; PERINEURAL AS NEEDED
Status: DISCONTINUED | OUTPATIENT
Start: 2023-01-05 | End: 2023-01-05

## 2023-01-05 RX ORDER — BUDESONIDE 0.5 MG/2ML
0.5 INHALANT ORAL
Status: DISCONTINUED | OUTPATIENT
Start: 2023-01-05 | End: 2023-01-11 | Stop reason: HOSPADM

## 2023-01-05 RX ORDER — HEPARIN SODIUM 5000 [USP'U]/ML
5000 INJECTION, SOLUTION INTRAVENOUS; SUBCUTANEOUS EVERY 8 HOURS SCHEDULED
Status: DISCONTINUED | OUTPATIENT
Start: 2023-01-05 | End: 2023-01-06

## 2023-01-05 RX ORDER — ALBUTEROL SULFATE 2.5 MG/3ML
2.5 SOLUTION RESPIRATORY (INHALATION) ONCE AS NEEDED
Status: COMPLETED | OUTPATIENT
Start: 2023-01-05 | End: 2023-01-05

## 2023-01-05 RX ORDER — EPINEPHRINE 0.1 MG/ML
SYRINGE (ML) INJECTION AS NEEDED
Status: DISCONTINUED | OUTPATIENT
Start: 2023-01-05 | End: 2023-01-05

## 2023-01-05 RX ORDER — IPRATROPIUM BROMIDE AND ALBUTEROL SULFATE 2.5; .5 MG/3ML; MG/3ML
3 SOLUTION RESPIRATORY (INHALATION) ONCE
Status: COMPLETED | OUTPATIENT
Start: 2023-01-05 | End: 2023-01-05

## 2023-01-05 RX ORDER — LORAZEPAM 2 MG/ML
1 INJECTION INTRAMUSCULAR ONCE
Status: COMPLETED | OUTPATIENT
Start: 2023-01-05 | End: 2023-01-05

## 2023-01-05 RX ORDER — ONDANSETRON 2 MG/ML
4 INJECTION INTRAMUSCULAR; INTRAVENOUS ONCE AS NEEDED
Status: DISCONTINUED | OUTPATIENT
Start: 2023-01-05 | End: 2023-01-05 | Stop reason: HOSPADM

## 2023-01-05 RX ORDER — SODIUM CHLORIDE 9 MG/ML
INJECTION, SOLUTION INTRAVENOUS CONTINUOUS PRN
Status: DISCONTINUED | OUTPATIENT
Start: 2023-01-05 | End: 2023-01-05

## 2023-01-05 RX ORDER — MAGNESIUM SULFATE HEPTAHYDRATE 40 MG/ML
2 INJECTION, SOLUTION INTRAVENOUS ONCE
Status: COMPLETED | OUTPATIENT
Start: 2023-01-05 | End: 2023-01-05

## 2023-01-05 RX ORDER — GUAIFENESIN/DEXTROMETHORPHAN 100-10MG/5
10 SYRUP ORAL EVERY 4 HOURS PRN
Status: DISCONTINUED | OUTPATIENT
Start: 2023-01-05 | End: 2023-01-06

## 2023-01-05 RX ORDER — DEXAMETHASONE SODIUM PHOSPHATE 10 MG/ML
INJECTION, SOLUTION INTRAMUSCULAR; INTRAVENOUS AS NEEDED
Status: DISCONTINUED | OUTPATIENT
Start: 2023-01-05 | End: 2023-01-05

## 2023-01-05 RX ORDER — METHYLPREDNISOLONE SODIUM SUCCINATE 40 MG/ML
40 INJECTION, POWDER, LYOPHILIZED, FOR SOLUTION INTRAMUSCULAR; INTRAVENOUS EVERY 6 HOURS SCHEDULED
Status: DISCONTINUED | OUTPATIENT
Start: 2023-01-05 | End: 2023-01-05

## 2023-01-05 RX ORDER — POTASSIUM CHLORIDE 14.9 MG/ML
20 INJECTION INTRAVENOUS ONCE
Status: DISCONTINUED | OUTPATIENT
Start: 2023-01-05 | End: 2023-01-05

## 2023-01-05 RX ORDER — PRAVASTATIN SODIUM 40 MG
40 TABLET ORAL
Status: DISCONTINUED | OUTPATIENT
Start: 2023-01-06 | End: 2023-01-11 | Stop reason: HOSPADM

## 2023-01-05 RX ORDER — FENTANYL CITRATE/PF 50 MCG/ML
25 SYRINGE (ML) INJECTION
Status: DISCONTINUED | OUTPATIENT
Start: 2023-01-05 | End: 2023-01-05 | Stop reason: HOSPADM

## 2023-01-05 RX ORDER — TERBUTALINE SULFATE 1 MG/ML
0.25 INJECTION, SOLUTION SUBCUTANEOUS ONCE
Status: COMPLETED | OUTPATIENT
Start: 2023-01-05 | End: 2023-01-05

## 2023-01-05 RX ORDER — FENTANYL CITRATE 50 UG/ML
25 INJECTION, SOLUTION INTRAMUSCULAR; INTRAVENOUS ONCE
Status: COMPLETED | OUTPATIENT
Start: 2023-01-05 | End: 2023-01-05

## 2023-01-05 RX ADMIN — FENTANYL CITRATE 25 MCG: 50 INJECTION INTRAMUSCULAR; INTRAVENOUS at 10:36

## 2023-01-05 RX ADMIN — IPRATROPIUM BROMIDE AND ALBUTEROL SULFATE 6 ML: 2.5; .5 SOLUTION RESPIRATORY (INHALATION) at 08:43

## 2023-01-05 RX ADMIN — IPRATROPIUM BROMIDE 0.5 MG: 0.5 SOLUTION RESPIRATORY (INHALATION) at 13:25

## 2023-01-05 RX ADMIN — ALBUTEROL SULFATE 10 MG: 2.5 SOLUTION RESPIRATORY (INHALATION) at 10:36

## 2023-01-05 RX ADMIN — LIDOCAINE HYDROCHLORIDE 100 MG: 20 INJECTION, SOLUTION EPIDURAL; INFILTRATION; INTRACAUDAL; PERINEURAL at 08:16

## 2023-01-05 RX ADMIN — POTASSIUM CHLORIDE 20 MEQ: 1500 TABLET, EXTENDED RELEASE ORAL at 17:51

## 2023-01-05 RX ADMIN — ALBUTEROL SULFATE 2.5 MG: 2.5 SOLUTION RESPIRATORY (INHALATION) at 08:41

## 2023-01-05 RX ADMIN — INSULIN LISPRO 1 UNITS: 100 INJECTION, SOLUTION INTRAVENOUS; SUBCUTANEOUS at 17:46

## 2023-01-05 RX ADMIN — BUDESONIDE 0.5 MG: 0.5 INHALANT ORAL at 20:59

## 2023-01-05 RX ADMIN — METHYLPREDNISOLONE SODIUM SUCCINATE 40 MG: 40 INJECTION, POWDER, FOR SOLUTION INTRAMUSCULAR; INTRAVENOUS at 17:46

## 2023-01-05 RX ADMIN — HEPARIN SODIUM 5000 UNITS: 5000 INJECTION INTRAVENOUS; SUBCUTANEOUS at 13:10

## 2023-01-05 RX ADMIN — POTASSIUM CHLORIDE 20 MEQ: 14.9 INJECTION, SOLUTION INTRAVENOUS at 13:10

## 2023-01-05 RX ADMIN — ZOLPIDEM TARTRATE 10 MG: 5 TABLET ORAL at 21:23

## 2023-01-05 RX ADMIN — DEXAMETHASONE SODIUM PHOSPHATE 10 MG: 10 INJECTION, SOLUTION INTRAMUSCULAR; INTRAVENOUS at 08:38

## 2023-01-05 RX ADMIN — ACETAMINOPHEN 975 MG: 325 TABLET, FILM COATED ORAL at 21:23

## 2023-01-05 RX ADMIN — LEVALBUTEROL HYDROCHLORIDE 1.25 MG: 1.25 SOLUTION, CONCENTRATE RESPIRATORY (INHALATION) at 13:25

## 2023-01-05 RX ADMIN — LEVALBUTEROL HYDROCHLORIDE 1.25 MG: 1.25 SOLUTION, CONCENTRATE RESPIRATORY (INHALATION) at 20:59

## 2023-01-05 RX ADMIN — BUDESONIDE 0.5 MG: 0.5 INHALANT ORAL at 12:19

## 2023-01-05 RX ADMIN — MAGNESIUM SULFATE HEPTAHYDRATE 2 G: 40 INJECTION, SOLUTION INTRAVENOUS at 10:33

## 2023-01-05 RX ADMIN — PROPOFOL 200 MG: 10 INJECTION, EMULSION INTRAVENOUS at 08:16

## 2023-01-05 RX ADMIN — LORAZEPAM 1 MG: 2 INJECTION INTRAMUSCULAR; INTRAVENOUS at 11:14

## 2023-01-05 RX ADMIN — EPINEPHRINE 20 MCG: 0.1 INJECTION INTRACARDIAC; INTRAVENOUS at 08:55

## 2023-01-05 RX ADMIN — ALBUTEROL SULFATE 2.5 MG: 2.5 SOLUTION RESPIRATORY (INHALATION) at 09:39

## 2023-01-05 RX ADMIN — INSULIN LISPRO 1 UNITS: 100 INJECTION, SOLUTION INTRAVENOUS; SUBCUTANEOUS at 13:10

## 2023-01-05 RX ADMIN — SODIUM CHLORIDE: 0.9 INJECTION, SOLUTION INTRAVENOUS at 08:06

## 2023-01-05 RX ADMIN — ALBUTEROL SULFATE 4 PUFF: 90 AEROSOL, METERED RESPIRATORY (INHALATION) at 08:30

## 2023-01-05 RX ADMIN — METHYLPREDNISOLONE SODIUM SUCCINATE 125 MG: 125 INJECTION, POWDER, FOR SOLUTION INTRAMUSCULAR; INTRAVENOUS at 10:33

## 2023-01-05 RX ADMIN — TERBUTALINE SULFATE 0.25 MG: 1 INJECTION, SOLUTION SUBCUTANEOUS at 10:45

## 2023-01-05 RX ADMIN — POTASSIUM CHLORIDE 20 MEQ: 1500 TABLET, EXTENDED RELEASE ORAL at 15:00

## 2023-01-05 RX ADMIN — HEPARIN SODIUM 5000 UNITS: 5000 INJECTION INTRAVENOUS; SUBCUTANEOUS at 21:23

## 2023-01-05 RX ADMIN — IPRATROPIUM BROMIDE 0.5 MG: 0.5 SOLUTION RESPIRATORY (INHALATION) at 20:59

## 2023-01-05 NOTE — ASSESSMENT & PLAN NOTE
Assessment:   -EKG on arrival to unit showing QTc of 522, which is prolonged from prior EKG 8/22 with QTc of 465    Plan:   - Discontinue QTc prolonging medications while inpatient   - Repeat EKG 1/6 showing QTc of 462

## 2023-01-05 NOTE — H&P
H&P Exam - Critical Care   Lidya Gonzalez 56 y o  female MRN: [id number]  [facility]: [id number]      -------------------------------------------------------------------------------------------------------------  Chief Complaint: Acute asthma exacerbation     History of Present Illness   HX and PE limited by: Shyann Monk is a 56 y o  female with PMH of GERD, hiatal hernia, gastritis, candidal esophagitis s/p treatment, HTN, asthma and diabetes who presents after EGD planned for manometry catheter placement with GI  Patient suspected to have bronchospasm, and procedure was aborted and patient placed on BiPAP and admitted to MICU for acute asthma exacerbation  She has beenhospitalized 9 times since last October for asthma, never been intubated  Initial orders:   -Albuterol nebs 10mg x1  -Magnesium sulfate 2g x1  -Methylprednisolone 125mg x1  -Terbutaline 0 25mg x1    History obtained from chart review and patient   -------------------------------------------------------------------------------------------------------------  Assessment and Plan:    Neuro:   o No acute issues      CV:   • Diagnosis: QTc prolongation  o EKG on arrival to unit showing QTc of 522, which is prolonged from prior EKG 8/22 with QTc of 465  o Plan:   - Discontinue QTc prolonging medications while inpatient   - Repeat EKG   • Diagnosis: HLD  o Plan:   - Continue statin      Pulm:  • Diagnosis: Acute asthma exacerbation  o Patient with hx of at least 3 hospitalizations for asthma exacerbations this year, has never been intubated per chart review  o PFTs 3/22: The FEV1 was moderately decreased 45%  FVC moderately decreased 45%  FEV1/FVC was normal 83%  Following the bronchodilator, the FEV1 increased to 20%, FVC increased 21%  o Initial VBG showing pH 7 418, pCO2 36 2, pO2 51 4, HCO3 22 8  o S/p terbutaline   25 x1, Albuterol nebs 10mg x1, Magnesium sulfate 2g x1, Methylprednisolone 125mg x1  o Plan:   - Budesonide BID, Atrovent TID, Xenopex TID  - Solumedrol 40mg q6h scheduled  - Held home inhalers while inpatient regimen in place       GI:   • Diagnosis: GERD, dysphagia  o Barium swallow with evidence of severe GERD, increased contractions  o Patient presented 1/5 for EGD and manometry catheter placement  o Plan:   - Continue patient's home regimen pepcid and pantoprazole  - GI following, appreciate recommendations   • Diagnosis: Hiatal hernia  o Plan:   - Serial abdominal exams       :   o No acute issues      F/E/N:   • Fluids: none   • Nutrition: NPO on BiPAP  • Electrolytes:   o Initial BMP showing K+ of 6 7, repeat showing 2 6   o In setting of 10mg albuterol, 2 6 more likely, will replete slowly at 20mEq and repeat a BMP         Heme/Onc:   • Diagnosis: DVT ppx  o Plan: heparin + SCDs      Endo:   • Diagnosis: T2DM  o Last A1c Oct 22 showing 4 9%  o Plan:   - Sliding scale insulin algorithm #1       ID:   o No acute issues      MSK/Skin:   o No acute issues  o Prior hx of rectus sheath hematoma 2/2 violent coughing      Disposition: Admit to Critical Care   Code Status: Prior  --------------------------------------------------------------------------------------------------------------  Review of Systems   Respiratory: Positive for shortness of breath and wheezing  Psychiatric/Behavioral:        Anxious   All other systems reviewed and are negative  A 12-point, complete review of systems was reviewed and negative except as stated above     Physical Exam  Constitutional:       General: She is in acute distress  HENT:      Head: Normocephalic and atraumatic  Nose: Nose normal       Mouth/Throat:      Mouth: Mucous membranes are moist    Eyes:      Extraocular Movements: Extraocular movements intact  Cardiovascular:      Rate and Rhythm: Regular rhythm  Tachycardia present  Pulses: Normal pulses  Pulmonary:      Effort: Respiratory distress present  Breath sounds: Wheezing present  Abdominal:      Palpations: Abdomen is soft  Tenderness: There is no abdominal tenderness  Musculoskeletal:      Right lower leg: No edema  Left lower leg: No edema  Skin:     General: Skin is warm and dry  Neurological:      General: No focal deficit present  Mental Status: She is alert and oriented to person, place, and time  Psychiatric:      Comments: Anxious mood       --------------------------------------------------------------------------------------------------------------  Vitals:   Vitals:    01/05/23 0915 01/05/23 0930 01/05/23 0945 01/05/23 1000   BP: 100/62 105/65 103/61 106/58   Pulse: 94 94 96 94   Resp: (!) 31 (!) 36 (!) 36 (!) 42   Temp: 98 °F (36 7 °C)      TempSrc:       SpO2: 99% 96% 97% 97%   Weight:       Height:         Temp  Min: 96 8 °F (36 °C)  Max: 98 °F (36 7 °C)     Height: 4' 11" (149 9 cm)  Body mass index is 38 38 kg/m²  Laboratory and Diagnostics:        Invalid input(s):  EOSPCT                        ABG:    VBG:          Micro:        EKG: Reviewed  Imaging: Reviewed  Historical Information   Past Medical History:   Diagnosis Date   • Asthma    • Diabetes mellitus ([facility] [address] )      History reviewed  No pertinent surgical history  Social History   Social History     Substance and Sexual Activity   Alcohol Use Never     Social History     Substance and Sexual Activity   Drug Use Never     Social History     Tobacco Use   Smoking Status Never   Smokeless Tobacco Never       Family History:   History reviewed  No pertinent family history    I have reviewed this patient's family history and commented on sigificant items within the HPI      Medications:  Current Facility-Administered Medications   Medication Dose Route Frequency   • albuterol inhalation solution 10 mg  10 mg Nebulization Once   • fentanyl citrate (PF) 100 MCG/2ML 25 mcg  25 mcg Intravenous Once   • magnesium sulfate 2 g/50 mL IVPB (premix) 2 g  2 g Intravenous Once   • methylPREDNISolone sodium succinate (Solu-MEDROL) injection 125 mg  125 mg Intravenous Once   • terbutaline (BRETHINE) injection 0 25 mg  0 25 mg Subcutaneous Once     Home medications:  Prior to Admission Medications   Prescriptions Last Dose Informant Patient Reported? Taking? Blood Pressure Monitoring (Blood Pressure Monitor 3) CARLOS MANUEL   Yes No   Sig: Use as directed   Cholecalciferol 50 MCG ( UT) CAPS   Yes No   Sig: every 24 hours   EPINEPHrine (EPIPEN) 0 3 mg/0 3 mL SOAJ   Yes No   Sig: as directed   Glucagon 1 MG/0 2ML SOAJ   Yes No   Si mg if BS is less than 60   OneTouch Ultra test strip   Yes No   Sig: TEST TWICE A DAY   Ozempic, 1 MG/DOSE, 4 MG/3ML SOPN injection pen   Yes No   Sig: INJECT 1MG SUBCUTANEOUSLY ONCE WEEKLY   Promethazine-Phenyleph-Codeine (Promethazine VC/Codeine) 6  25-5-10 MG/5ML SYRP   Yes No   Sig: Every 6 hours   acetaminophen (TYLENOL) 650 mg CR tablet   Yes No   Sig: Every 12 hours   albuterol (2 5 mg/3 mL) 0 083 % nebulizer solution   Yes No   Sig: 3 ml   albuterol (PROVENTIL HFA,VENTOLIN HFA) 90 mcg/act inhaler 2023  Yes Yes   Sig: Inhale 2 puffs as needed   budesonide-formoterol (SYMBICORT) 160-4 5 mcg/act inhaler   Yes No   Sig: Every 12 hours   citalopram (CeleXA) 10 mg tablet 2023  Yes Yes   Sig: Take 10 mg by mouth daily   diphenhydrAMINE (BENADRYL) 25 mg capsule   Yes No   Sig: 3 (three) times a day   famotidine (PEPCID) 40 MG tablet   No No   Sig: Take 1 tablet (40 mg total) by mouth 2 (two) times a day   fluticasone (Flovent Diskus) 250 mcg/actuation diskus inhaler   Yes No   Sig: Inhale 1 puff 2 (two) times a day   fluticasone-vilanterol (Breo Ellipta) 200-25 mcg/actuation inhaler   No No   Sig: Inhale 1 puff daily Rinse mouth after use     fluticasone-vilanterol 200-25 mcg/actuation inhaler   Yes No   Sig: every 24 hours   gabapentin (NEURONTIN) 100 mg capsule 2023  Yes Yes   Sig: every 24 hours   guaiFENesin-codeine (ROBITUSSIN AC) 100-10 mg/5 mL oral solution   Yes No Sig: TAKE 1 TEASPOONFUL (5ML) BY MOUTH EVERY 6 HOURS AS NEEDED FOR 7 DAYS   hydrOXYzine HCL (ATARAX) 25 mg tablet   No No   Sig: Take 1 tablet (25 mg total) by mouth every 6 (six) hours as needed for anxiety for up to 10 days   insulin lispro (HumaLOG) 100 units/mL injection pen   Yes No   Sig: 3 (three) times a day   ipratropium (ATROVENT) 0 02 % nebulizer solution   No No   Sig: Take 2 5 mL (0 5 mg total) by nebulization 3 (three) times a day   ipratropium-albuterol (DUO-NEB) 0 5-2 5 mg/3 mL nebulizer solution   No No   Sig: Take 3 mL by nebulization every 4 (four) hours as needed for wheezing or shortness of breath   montelukast (SINGULAIR) 10 mg tablet   No No   Sig: Take 1 tablet (10 mg total) by mouth daily at bedtime   montelukast (SINGULAIR) 10 mg tablet   Yes No   Sig: every 24 hours   ondansetron (ZOFRAN) 4 mg tablet   Yes No   Sig: Take 1 tablet by mouth every 8 (eight) hours   oxyCODONE (ROXICODONE) 10 MG TABS   No No   Sig: Take 1 tablet (10 mg total) by mouth every 6 (six) hours as needed for moderate pain or severe pain for up to 30 doses Max Daily Amount: 40 mg   pantoprazole (PROTONIX) 40 mg   Yes No   Sig: every 24 hours   polyethylene glycol (MIRALAX) 17 g packet   No No   Sig: Take 17 g by mouth daily for 10 days Do not start before 2022     pravastatin (PRAVACHOL) 40 mg tablet   No No   Sig: Take 1 tablet (40 mg total) by mouth daily with dinner   predniSONE 10 mg tablet   Yes No   Sig: TAKE 4 TABS DAILY X 3 DAYS, 3 TABS DAILY X 3 DAYS, 2 TABS DAILY X 3 DAYS, 1 TAB DAILY X3 DAYS   promethazine-dextromethorphan (PHENERGAN-DM) 6 25-15 mg/5 mL oral syrup   Yes No   Sig: TAKE 5 MILLILITERS BY MOUTH EVERY 6 HOURS AS NEEDED FOR 5 DAYS   rosuvastatin (CRESTOR) 5 mg tablet   Yes No   Sig: Take 1 tablet by mouth daily   sucralfate (CARAFATE) 1 g/10 mL suspension   Yes No   Si tablet on an empty stomach   torsemide (DEMADEX) 10 mg tablet   Yes No   Sig: Take 10 mg by mouth daily zolpidem (AMBIEN) 10 mg tablet   Yes No   Sig: Take 1 tablet by mouth daily at bedtime as needed      Facility-Administered Medications: None     Allergies: Allergies   Allergen Reactions   • Codeine Other (See Comments)   • Aspirin GI Intolerance and Rash   • Hydromorphone Rash     GI upset     • Oxycodone-Acetaminophen Rash   • Tramadol Rash     ------------------------------------------------------------------------------------------------------------  Advance Directive and Living Will:      Power of :    POLST:    ------------------------------------------------------------------------------------------------------------  Anticipated Length of Stay is > 2 midnights    Care Time Delivered:   Please see attending's attestation  Ade Rosen MD        Portions of the record may have been created with voice recognition software  Occasional wrong word or "sound a like" substitutions may have occurred due to the inherent limitations of voice recognition software    Read the chart carefully and recognize, using context, where substitutions have occurred

## 2023-01-05 NOTE — RESPIRATORY THERAPY NOTE
RT Protocol Note  Katina Lainez 56 y o  female MRN: [id number]  Unit/Bed#:  Encounter: [id number]    Assessment    Active Problems:    * No active hospital problems  *      Home Pulmonary Medications:  Albuterol udn/mdi , duoneb, atrovent , breo, flovent, symbicort       Past Medical History:   Diagnosis Date    Asthma     Diabetes mellitus ([facility] [address] )      Social History     Socioeconomic History    Marital status: /Civil Union     Spouse name: None    Number of children: None    Years of education: None    Highest education level: None   Occupational History    None   Tobacco Use    Smoking status: Never    Smokeless tobacco: Never   Vaping Use    Vaping Use: Never used   Substance and Sexual Activity    Alcohol use: Never    Drug use: Never    Sexual activity: None   Other Topics Concern    None   Social History Narrative    None     Social Determinants of Health     Financial Resource Strain: Not on file   Food Insecurity: Not on file   Transportation Needs: Not on file   Physical Activity: Not on file   Stress: Not on file   Social Connections: Not on file   Intimate Partner Violence: Not on file   Housing Stability: Not on file       Subjective         Objective    Physical Exam:   Assessment Type: Assess only  General Appearance: Alert, Awake  Respiratory Pattern: Tachypneic  Chest Assessment: Chest expansion symmetrical  Bilateral Breath Sounds: Expiratory wheezes    Vitals:  Blood pressure 100/62, pulse 94, temperature 98 °F (36 7 °C), resp  rate (!) 31, height 4' 11" (1 499 m), weight 86 2 kg (190 lb), SpO2 99 %  Imaging and other studies: I have personally reviewed pertinent reports  Plan    Respiratory Plan: Vent/NIV/HFNC        Resp Comments: pt placed on bipap post gi procedure  pt has hx of asthma  transported to pacu on bipap  pt will be admitted to [facility] overnight

## 2023-01-05 NOTE — ASSESSMENT & PLAN NOTE
Lab Results   Component Value Date    HGBA1C 4 9 10/04/2022       Recent Labs     01/05/23  0940 01/05/23  1156   POCGLU 116 191*       Blood Sugar Average: Last 72 hrs:  (P) 153 5     Assessment:   -Diabetes in setting of steroid use     Plan:   -SSI Algorithm #3  -Carb controlled diet   -POCT glucose

## 2023-01-05 NOTE — ASSESSMENT & PLAN NOTE
Assessment:   -follows with GI outpatient  -had presented initially for outpatient EGD and manometry placement     Plan:   -Continue pepcid 40mg BID   -Continue Protonix 40mg daily   -Continue carafate BID   -Outpatient GI follow up for EGD planning

## 2023-01-05 NOTE — ANESTHESIA POSTPROCEDURE EVALUATION
Post-Op Assessment Note    CV Status:  Stable    Pain management: adequate     Mental Status:  Alert and awake   Hydration Status:  Euvolemic   PONV Controlled:  Controlled   Airway Patency:  Patent      Post Op Vitals Reviewed: Yes            No notable events documented      /65 (01/05/23 0930)    Temp      Pulse 94 (01/05/23 0930)   Resp (!) 36 (01/05/23 0930)    SpO2 96 % (01/05/23 0930)

## 2023-01-05 NOTE — PROGRESS NOTES
Medical Critical Care Attending and Respiratory therapist at bedside   Report called to MICU and patient transferred with medical critical care resident and respiratory therapist

## 2023-01-05 NOTE — RESPIRATORY THERAPY NOTE
Pt pulled off bipap mask at this time  Pt states she is done with it  Breathing appears much improved with rr28 and B/S faint exp wheezes with increased aeration  Pt appears anxious and in pain c/o of pain at I V  site  RN aware ice applied to site  Pt placed on N/C at 5lpm  Spo2 95%  Will cont to monitor and provide support as needed

## 2023-01-05 NOTE — H&P
History and Physical - [facility]  Lidya Summer Rawls 56 y o  female MRN: [id number]    HPI: Jacoby Foley is a 56y o  year old female w asthma, GERD, T2DM, hiatal hernia, gastritis, candida esophagitis s/p treatment 2022 who presents for EGD and manometry catheter placement  Last EGD 3/2022 with above findings  Subsequently endorsing dysphagia to solids and regurgitation, nausea, emesis  Barium swallow showed severe GERD and increased tertiary contractions  Last Hg   Lab Results   Component Value Date    HGB 12 7 11/06/2022     Last INR   Lab Results   Component Value Date    INR 0 95 11/06/2022       Review of Systems    Historical Information   Past Medical History:   Diagnosis Date   • Asthma    • Diabetes mellitus ([address] )      No past surgical history on file  Social History   Social History     Substance and Sexual Activity   Alcohol Use Never     Social History     Substance and Sexual Activity   Drug Use Never     Social History     Tobacco Use   Smoking Status Never   Smokeless Tobacco Never     No family history on file      Meds/Allergies     (Not in a hospital admission)      Allergies   Allergen Reactions   • Codeine Other (See Comments)   • Aspirin GI Intolerance and Rash   • Hydromorphone Rash     GI upset     • Oxycodone-Acetaminophen Rash   • Tramadol Rash       Objective     /88   Pulse 82   Temp (!) 96 8 °F (36 °C) (Tympanic)   Resp 18   Ht 4' 11" (1 499 m)   Wt 86 2 kg (190 lb)   LMP  (LMP Unknown)   SpO2 100%   BMI 38 38 kg/m²     PHYSICAL EXAM    Gen: NAD  CV: RRR  CHEST: Clear  ABD: soft, NT/ND  EXT: no edema  Neuro: AAO    ASSESSMENT/PLAN:  This is a 56y o  year old female here for EGD, manometry catheter placement    Plan/Procedure: EGD

## 2023-01-05 NOTE — ANESTHESIA PREPROCEDURE EVALUATION
Procedure:  EGD    Relevant Problems   CARDIO   (+) Hyperlipidemia      ENDO   (+) Type 2 diabetes mellitus, with long-term current use of insulin (HCC)      GI/HEPATIC   (+) GERD (gastroesophageal reflux disease)      PULMONARY   (+) Severe persistent asthma with exacerbation        Physical Exam    Airway    Mallampati score: III  TM Distance: >3 FB  Neck ROM: full     Dental   No notable dental hx     Cardiovascular  Rhythm: regular, Rate: normal,     Pulmonary  Breath sounds clear to auscultation,     Other Findings  Intercisor Distance > 3cm          Anesthesia Plan  ASA Score- 3     Anesthesia Type- IV sedation with anesthesia with ASA Monitors  Additional Monitors:   Airway Plan:     Comment: NPO appropriate  Discussed benefits/risks of monitored anesthetic care which involves providing a dynamic level of mild to deep sedation  Complications include awareness and/or airway obstruction/aspiration which may necessitate conversion to general anesthesia  All questions answered  Patient understands and wishes to proceed          Plan Factors-Exercise tolerance (METS): >4 METS  Chart reviewed  EKG reviewed  Existing labs reviewed  Induction-     Postoperative Plan- Plan for postoperative opioid use  Informed Consent- Anesthetic plan and risks discussed with patient  I personally reviewed this patient with the CRNA  Discussed and agreed on the Anesthesia Plan with the JARRED Phipps

## 2023-01-06 LAB
ANION GAP SERPL CALCULATED.3IONS-SCNC: 6 MMOL/L (ref 4–13)
ATRIAL RATE: 97 BPM
BASOPHILS # BLD AUTO: 0.03 THOUSANDS/ÂΜL (ref 0–0.1)
BASOPHILS NFR BLD AUTO: 0 % (ref 0–1)
BUN SERPL-MCNC: 7 MG/DL (ref 5–25)
CALCIUM SERPL-MCNC: 9.3 MG/DL (ref 8.3–10.1)
CHLORIDE SERPL-SCNC: 111 MMOL/L (ref 96–108)
CO2 SERPL-SCNC: 23 MMOL/L (ref 21–32)
CREAT SERPL-MCNC: 0.66 MG/DL (ref 0.6–1.3)
EOSINOPHIL # BLD AUTO: 0 THOUSAND/ÂΜL (ref 0–0.61)
EOSINOPHIL NFR BLD AUTO: 0 % (ref 0–6)
ERYTHROCYTE [DISTWIDTH] IN BLOOD BY AUTOMATED COUNT: 15.3 % (ref 11.6–15.1)
GFR SERPL CREATININE-BSD FRML MDRD: 99 ML/MIN/1.73SQ M
GLUCOSE SERPL-MCNC: 143 MG/DL (ref 65–140)
GLUCOSE SERPL-MCNC: 149 MG/DL (ref 65–140)
GLUCOSE SERPL-MCNC: 153 MG/DL (ref 65–140)
GLUCOSE SERPL-MCNC: 160 MG/DL (ref 65–140)
GLUCOSE SERPL-MCNC: 207 MG/DL (ref 65–140)
GLUCOSE SERPL-MCNC: 213 MG/DL (ref 65–140)
HCT VFR BLD AUTO: 40.6 % (ref 34.8–46.1)
HGB BLD-MCNC: 13.2 G/DL (ref 11.5–15.4)
IMM GRANULOCYTES # BLD AUTO: 0.14 THOUSAND/UL (ref 0–0.2)
IMM GRANULOCYTES NFR BLD AUTO: 1 % (ref 0–2)
LYMPHOCYTES # BLD AUTO: 1.15 THOUSANDS/ÂΜL (ref 0.6–4.47)
LYMPHOCYTES NFR BLD AUTO: 5 % (ref 14–44)
MCH RBC QN AUTO: 30.6 PG (ref 26.8–34.3)
MCHC RBC AUTO-ENTMCNC: 32.5 G/DL (ref 31.4–37.4)
MCV RBC AUTO: 94 FL (ref 82–98)
MONOCYTES # BLD AUTO: 0.62 THOUSAND/ÂΜL (ref 0.17–1.22)
MONOCYTES NFR BLD AUTO: 3 % (ref 4–12)
NEUTROPHILS # BLD AUTO: 22.41 THOUSANDS/ÂΜL (ref 1.85–7.62)
NEUTS SEG NFR BLD AUTO: 91 % (ref 43–75)
NRBC BLD AUTO-RTO: 0 /100 WBCS
P AXIS: 55 DEGREES
PLATELET # BLD AUTO: 407 THOUSANDS/UL (ref 149–390)
PMV BLD AUTO: 10.1 FL (ref 8.9–12.7)
POTASSIUM SERPL-SCNC: 4.6 MMOL/L (ref 3.5–5.3)
PR INTERVAL: 129 MS
QRS AXIS: 19 DEGREES
QRSD INTERVAL: 79 MS
QT INTERVAL: 363 MS
QTC INTERVAL: 462 MS
RBC # BLD AUTO: 4.32 MILLION/UL (ref 3.81–5.12)
SODIUM SERPL-SCNC: 140 MMOL/L (ref 135–147)
T WAVE AXIS: 50 DEGREES
VENTRICULAR RATE: 97 BPM
WBC # BLD AUTO: 24.35 THOUSAND/UL (ref 4.31–10.16)

## 2023-01-06 RX ORDER — SUCRALFATE 1 G/1
1 TABLET ORAL
Status: DISCONTINUED | OUTPATIENT
Start: 2023-01-06 | End: 2023-01-11 | Stop reason: HOSPADM

## 2023-01-06 RX ORDER — INSULIN LISPRO 100 [IU]/ML
1-6 INJECTION, SOLUTION INTRAVENOUS; SUBCUTANEOUS
Status: DISCONTINUED | OUTPATIENT
Start: 2023-01-06 | End: 2023-01-11 | Stop reason: HOSPADM

## 2023-01-06 RX ORDER — LANOLIN ALCOHOL/MO/W.PET/CERES
9 CREAM (GRAM) TOPICAL
Status: DISCONTINUED | OUTPATIENT
Start: 2023-01-06 | End: 2023-01-11 | Stop reason: HOSPADM

## 2023-01-06 RX ORDER — ACETAMINOPHEN 325 MG/1
650 TABLET ORAL EVERY 8 HOURS PRN
Status: DISCONTINUED | OUTPATIENT
Start: 2023-01-06 | End: 2023-01-11 | Stop reason: HOSPADM

## 2023-01-06 RX ORDER — HYDROCODONE POLISTIREX AND CHLORPHENIRAMINE POLISTIREX 10; 8 MG/5ML; MG/5ML
2.5 SUSPENSION, EXTENDED RELEASE ORAL EVERY 8 HOURS PRN
Status: DISCONTINUED | OUTPATIENT
Start: 2023-01-06 | End: 2023-01-07

## 2023-01-06 RX ORDER — GABAPENTIN 300 MG/1
300 CAPSULE ORAL
Status: DISCONTINUED | OUTPATIENT
Start: 2023-01-06 | End: 2023-01-11 | Stop reason: HOSPADM

## 2023-01-06 RX ORDER — ALPRAZOLAM 0.25 MG/1
0.25 TABLET ORAL
Status: COMPLETED | OUTPATIENT
Start: 2023-01-06 | End: 2023-01-09

## 2023-01-06 RX ORDER — ALBUTEROL SULFATE 90 UG/1
2 AEROSOL, METERED RESPIRATORY (INHALATION) EVERY 4 HOURS PRN
Status: DISCONTINUED | OUTPATIENT
Start: 2023-01-06 | End: 2023-01-07

## 2023-01-06 RX ORDER — HEPARIN SODIUM 5000 [USP'U]/ML
7500 INJECTION, SOLUTION INTRAVENOUS; SUBCUTANEOUS EVERY 8 HOURS SCHEDULED
Status: DISCONTINUED | OUTPATIENT
Start: 2023-01-06 | End: 2023-01-11 | Stop reason: HOSPADM

## 2023-01-06 RX ORDER — HYDROCODONE POLISTIREX AND CHLORPHENIRAMINE POLISTIREX 10; 8 MG/5ML; MG/5ML
5 SUSPENSION, EXTENDED RELEASE ORAL EVERY 12 HOURS PRN
Status: DISCONTINUED | OUTPATIENT
Start: 2023-01-06 | End: 2023-01-06

## 2023-01-06 RX ADMIN — IPRATROPIUM BROMIDE 0.5 MG: 0.5 SOLUTION RESPIRATORY (INHALATION) at 21:18

## 2023-01-06 RX ADMIN — BUDESONIDE 0.5 MG: 0.5 INHALANT ORAL at 09:21

## 2023-01-06 RX ADMIN — GABAPENTIN 300 MG: 300 CAPSULE ORAL at 22:41

## 2023-01-06 RX ADMIN — FAMOTIDINE 40 MG: 20 TABLET ORAL at 17:44

## 2023-01-06 RX ADMIN — MELATONIN 9 MG: at 00:34

## 2023-01-06 RX ADMIN — HEPARIN SODIUM 7500 UNITS: 5000 INJECTION INTRAVENOUS; SUBCUTANEOUS at 22:40

## 2023-01-06 RX ADMIN — METHYLPREDNISOLONE SODIUM SUCCINATE 40 MG: 40 INJECTION, POWDER, FOR SOLUTION INTRAMUSCULAR; INTRAVENOUS at 05:52

## 2023-01-06 RX ADMIN — METHYLPREDNISOLONE SODIUM SUCCINATE 40 MG: 40 INJECTION, POWDER, FOR SOLUTION INTRAMUSCULAR; INTRAVENOUS at 23:02

## 2023-01-06 RX ADMIN — SUCRALFATE 1 G: 1 TABLET ORAL at 08:13

## 2023-01-06 RX ADMIN — METHYLPREDNISOLONE SODIUM SUCCINATE 40 MG: 40 INJECTION, POWDER, FOR SOLUTION INTRAMUSCULAR; INTRAVENOUS at 17:43

## 2023-01-06 RX ADMIN — PANTOPRAZOLE SODIUM 40 MG: 40 TABLET, DELAYED RELEASE ORAL at 05:52

## 2023-01-06 RX ADMIN — LEVALBUTEROL HYDROCHLORIDE 1.25 MG: 1.25 SOLUTION, CONCENTRATE RESPIRATORY (INHALATION) at 14:01

## 2023-01-06 RX ADMIN — HYDROCODONE POLISTIREX AND CHLORPHENIRAMINE POLISTIREX 2.5 ML: 10; 8 SUSPENSION, EXTENDED RELEASE ORAL at 19:52

## 2023-01-06 RX ADMIN — PRAVASTATIN SODIUM 40 MG: 40 TABLET ORAL at 17:44

## 2023-01-06 RX ADMIN — CITALOPRAM HYDROBROMIDE 10 MG: 10 TABLET ORAL at 09:20

## 2023-01-06 RX ADMIN — ALBUTEROL SULFATE 2 PUFF: 90 AEROSOL, METERED RESPIRATORY (INHALATION) at 22:23

## 2023-01-06 RX ADMIN — IPRATROPIUM BROMIDE 0.5 MG: 0.5 SOLUTION RESPIRATORY (INHALATION) at 14:01

## 2023-01-06 RX ADMIN — HEPARIN SODIUM 7500 UNITS: 5000 INJECTION INTRAVENOUS; SUBCUTANEOUS at 14:19

## 2023-01-06 RX ADMIN — ZOLPIDEM TARTRATE 10 MG: 5 TABLET ORAL at 23:29

## 2023-01-06 RX ADMIN — HEPARIN SODIUM 5000 UNITS: 5000 INJECTION INTRAVENOUS; SUBCUTANEOUS at 05:52

## 2023-01-06 RX ADMIN — METHYLPREDNISOLONE SODIUM SUCCINATE 40 MG: 40 INJECTION, POWDER, FOR SOLUTION INTRAMUSCULAR; INTRAVENOUS at 11:47

## 2023-01-06 RX ADMIN — METHYLPREDNISOLONE SODIUM SUCCINATE 40 MG: 40 INJECTION, POWDER, FOR SOLUTION INTRAMUSCULAR; INTRAVENOUS at 00:34

## 2023-01-06 RX ADMIN — INSULIN LISPRO 2 UNITS: 100 INJECTION, SOLUTION INTRAVENOUS; SUBCUTANEOUS at 11:47

## 2023-01-06 RX ADMIN — ALBUTEROL SULFATE 10 MG: 2.5 SOLUTION RESPIRATORY (INHALATION) at 23:51

## 2023-01-06 RX ADMIN — BUDESONIDE 0.5 MG: 0.5 INHALANT ORAL at 21:17

## 2023-01-06 RX ADMIN — IPRATROPIUM BROMIDE 0.5 MG: 0.5 SOLUTION RESPIRATORY (INHALATION) at 09:21

## 2023-01-06 RX ADMIN — HYDROCODONE POLISTIREX AND CHLORPHENIRAMINE POLISTIREX 5 ML: 10; 8 SUSPENSION, EXTENDED RELEASE ORAL at 11:47

## 2023-01-06 RX ADMIN — MELATONIN 9 MG: at 23:29

## 2023-01-06 RX ADMIN — INSULIN LISPRO 1 UNITS: 100 INJECTION, SOLUTION INTRAVENOUS; SUBCUTANEOUS at 00:33

## 2023-01-06 RX ADMIN — ACETAMINOPHEN 650 MG: 325 TABLET, FILM COATED ORAL at 17:46

## 2023-01-06 RX ADMIN — FAMOTIDINE 40 MG: 20 TABLET ORAL at 09:20

## 2023-01-06 RX ADMIN — SUCRALFATE 1 G: 1 TABLET ORAL at 16:13

## 2023-01-06 RX ADMIN — LEVALBUTEROL HYDROCHLORIDE 1.25 MG: 1.25 SOLUTION, CONCENTRATE RESPIRATORY (INHALATION) at 21:18

## 2023-01-06 RX ADMIN — LEVALBUTEROL HYDROCHLORIDE 1.25 MG: 1.25 SOLUTION, CONCENTRATE RESPIRATORY (INHALATION) at 09:21

## 2023-01-06 RX ADMIN — INSULIN LISPRO 1 UNITS: 100 INJECTION, SOLUTION INTRAVENOUS; SUBCUTANEOUS at 22:40

## 2023-01-06 RX ADMIN — INSULIN LISPRO 1 UNITS: 100 INJECTION, SOLUTION INTRAVENOUS; SUBCUTANEOUS at 17:53

## 2023-01-06 RX ADMIN — ALPRAZOLAM 0.25 MG: 0.25 TABLET ORAL at 22:42

## 2023-01-06 NOTE — UTILIZATION REVIEW
NOTIFICATION OF INPATIENT ADMISSION   AUTHORIZATION REQUEST   SERVICING FACILITY:   [facility]  Address: [address] [place] [address], [address] [place] [address]  Tax ID: [id number]  NPI: [id number] ATTENDING PROVIDER:  Attending Name and NPI#: Denisse Ji [[id number]]  Address: [address] [place] [address]  Phone: [phone]   ADMISSION INFORMATION:  Place of Service: [place] [address]  Place of Service Code: 21  Inpatient Admission Date/Time: 1/5/23 10:30 AM  Discharge Date/Time: No discharge date for patient encounter  Admitting Diagnosis Code/Description:  Esophageal dysphagia [R13 19]  Gastroesophageal reflux disease, unspecified whether esophagitis present [K21 9]     UTILIZATION REVIEW CONTACT:  Vipin Martinez Utilization   Network Utilization Review Department  Phone: [phone]  Fax: [phone]  Email: [email or web address]  Contact for approvals/pending authorizations, clinical reviews, and discharge  PHYSICIAN ADVISORY SERVICES:  Medical Necessity Denial & [facility] Review  Phone: [phone]  Fax: [phone]  Email: [email or web address]

## 2023-01-06 NOTE — PLAN OF CARE
Problem: MOBILITY - ADULT  Goal: Maintain or return to baseline ADL function  Description: INTERVENTIONS:  -  Assess patient's ability to carry out ADLs; assess patient's baseline for ADL function and identify physical deficits which impact ability to perform ADLs (bathing, care of mouth/teeth, toileting, grooming, dressing, etc )  - Assess/evaluate cause of self-care deficits   - Assess range of motion  - Assess patient's mobility; develop plan if impaired  - Assess patient's need for assistive devices and provide as appropriate  - Encourage maximum independence but intervene and supervise when necessary  - Involve family in performance of ADLs  - Assess for home care needs following discharge   - Consider OT consult to assist with ADL evaluation and planning for discharge  - Provide patient education as appropriate  Outcome: Progressing  Goal: Maintains/Returns to pre admission functional level  Description: INTERVENTIONS:  - Perform BMAT or MOVE assessment daily    - Set and communicate daily mobility goal to care team and patient/family/caregiver  - Collaborate with rehabilitation services on mobility goals if consulted  - Perform Range of Motion 4 times a day  - Reposition patient every 2 hours    - Dangle patient 3 times a day  - Stand patient 3 times a day  - Ambulate patient 3 times a day  - Out of bed to chair 3 times a day   - Out of bed for meals 3 times a day  - Out of bed for toileting  - Record patient progress and toleration of activity level   Outcome: Progressing     Problem: Prexisting or High Potential for Compromised Skin Integrity  Goal: Skin integrity is maintained or improved  Description: INTERVENTIONS:  - Identify patients at risk for skin breakdown  - Assess and monitor skin integrity  - Assess and monitor nutrition and hydration status  - Monitor labs   - Assess for incontinence   - Turn and reposition patient  - Assist with mobility/ambulation  - Relieve pressure over bony prominences  - Avoid friction and shearing  - Provide appropriate hygiene as needed including keeping skin clean and dry  - Evaluate need for skin moisturizer/barrier cream  - Collaborate with interdisciplinary team   - Patient/family teaching  - Consider wound care consult   Outcome: Progressing     Problem: Potential for Falls  Goal: Patient will remain free of falls  Description: INTERVENTIONS:  - Educate patient/family on patient safety including physical limitations  - Instruct patient to call for assistance with activity   - Consult OT/PT to assist with strengthening/mobility   - Keep Call bell within reach  - Keep bed low and locked with side rails adjusted as appropriate  - Keep care items and personal belongings within reach  - Initiate and maintain comfort rounds  - Make Fall Risk Sign visible to staff  - Offer Toileting every 2 Hours, in advance of need  - Initiate/Maintain alarm  - Obtain necessary fall risk management equipment  - Apply yellow socks and bracelet for high fall risk patients  - Consider moving patient to room near nurses station  Outcome: Progressing

## 2023-01-06 NOTE — PROGRESS NOTES
Transfer Note - Critical Care   Lidya Hobbs Willows 56 y o  female MRN: [id number]  Unit/Bed#: MICU 07 Encounter: [id number]    Code Status: Level 1 - Full Code  POA:    POLST:      Reason for ICU admission:   Acute Asthma Exacerbation    Active problems:   Principal Problem:    Acute asthma exacerbation  Active Problems:    Hyperlipidemia    Type 2 diabetes mellitus, with long-term current use of insulin ([facility])    GERD (gastroesophageal reflux disease)    QT prolongation  Resolved Problems:    * No resolved hospital problems  *    QT prolongation  Assessment & Plan  Assessment:   -EKG on arrival to unit showing QTc of 522, which is prolonged from prior EKG 8/22 with QTc of 465    Plan:   - Discontinue QTc prolonging medications while inpatient   - Repeat EKG 1/6 showing QTc of 462    GERD (gastroesophageal reflux disease)  Assessment & Plan  Assessment:   -follows with GI outpatient  -had presented initially for outpatient EGD and manometry placement     Plan:   -Continue pepcid 40mg BID   -Continue Protonix 40mg daily   -Continue carafate BID   -Outpatient GI follow up for EGD planning    Type 2 diabetes mellitus, with long-term current use of insulin [facility])  Assessment & Plan  Lab Results   Component Value Date    HGBA1C 4 9 10/04/2022       Recent Labs     01/05/23  0940 01/05/23  1156   POCGLU 116 191*       Blood Sugar Average: Last 72 hrs:  (P) 153 5     Assessment:   -Diabetes in setting of steroid use     Plan:   -SSI Algorithm #3  -Carb controlled diet   -POCT glucose    Hyperlipidemia  Assessment & Plan  Continue statin    * Acute asthma exacerbation  Assessment & Plan  Assessment:   ? Patient with hx of at least 3 hospitalizations for asthma exacerbations this year, has never been intubated per chart review  ? PFTs 3/22: The FEV1 was moderately decreased 45%  FVC moderately decreased 45%  FEV1/FVC was normal 83%  Following the bronchodilator, the FEV1 increased to 20%, FVC increased 21%    ? Initial VBG showing pH 7 418, pCO2 36 2, pO2 51 4, HCO3 22 8  ? Initially requiring BiPAP, weaned to NC  ? S/p terbutaline   25 x1, Albuterol nebs 10mg x1, Magnesium sulfate 2g x1, Methylprednisolone 125mg x1  Plan:   - Budesonide BID, Atrovent TID, Xenopex TID  - Solumedrol 40mg q6h scheduled  - Held home inhalers while inpatient regimen in place   - Added cough suppressant - tussinex today      Consultants:   -None    History of Present Illness:   Lidya Ansari is a 56 y  o  female with PMH of GERD, hiatal hernia, gastritis, candidal esophagitis s/p treatment, HTN, asthma and diabetes who presents after EGD planned for manometry catheter placement with GI  Patient suspected to have bronchospasm, and procedure was aborted and patient placed on BiPAP and admitted to MICU for acute asthma exacerbation  She has been hospitalized 9 times since last October for asthma, never been intubated      Summary of clinical course:   -Patient initially required BiPAP for respiratory support, she was treated with Albuterol nebs 10mg x1, Magnesium sulfate 2g x1, Methylprednisolone 125mg x1, Terbutaline 0 25mg x1 on arrival to the ICU  She was weaned from BiPAP to nasal cannula  -Patient was found to have QTc prolongation of 522 on arrival to ICU  QT prolonging home medications were held as able  -Gastroenterology will plan for outpatient follow up  Recent or scheduled procedures:   None    Outstanding/pending diagnostics:   None    Cultures:   None       Mobilization Plan:   None    Nutrition Plan:   Carb controlled diet for Diabetes in setting of steroid use    VTE Pharmacologic Prophylaxis: Heparin  VTE Mechanical Prophylaxis: sequential compression device    Home medications that are not reordered and reason why:   1  Albuterol - currently on nebs here  2  Symbicort - currently on nebs here   3  Benadryl - patient not taking   4  Fluticasone - currently on nebs here   5  Duo-neb - currently on nebs here   6   Singulair - currently on nebs here 7  Zofran - patient only takes once a week with Ozempic, QT prolongation   8  Tylenol - patient not taking   9  Oxycodone - patient not taking   10  Prednisone - on steroids here   11  Torsemide - patient was taking as needed    Spoke with [facility]  regarding transfer  Please call critical care with any questions or concerns  Portions of the record may have been created with voice recognition software  Occasional wrong word or "sound a like" substitutions may have occurred due to the inherent limitations of voice recognition software  Read the chart carefully and recognize, using context, where substitutions have occurred

## 2023-01-06 NOTE — ASSESSMENT & PLAN NOTE
Assessment:   ? Patient with hx of at least 3 hospitalizations for asthma exacerbations this year, has never been intubated per chart review  ? PFTs 3/22: The FEV1 was moderately decreased 45%  FVC moderately decreased 45%  FEV1/FVC was normal 83%  Following the bronchodilator, the FEV1 increased to 20%, FVC increased 21%  ? Initial VBG showing pH 7 418, pCO2 36 2, pO2 51 4, HCO3 22 8  ? Initially requiring BiPAP, weaned to NC  ? S/p terbutaline   25 x1, Albuterol nebs 10mg x1, Magnesium sulfate 2g x1, Methylprednisolone 125mg x1  Plan:   - Budesonide BID, Atrovent TID, Xenopex TID  - Solumedrol 40mg q6h scheduled  - Held home inhalers while inpatient regimen in place   - Added cough suppressant - tussinex today

## 2023-01-06 NOTE — CASE MANAGEMENT
Case Management Assessment & Discharge Planning Note    Patient name Serafin Bravo  [facility] [id number]  : 1965 Date 2023       Current Admission Date: 2023  Current Admission Diagnosis:Acute asthma exacerbation   Patient Active Problem List    Diagnosis Date Noted   • Acute asthma exacerbation 2023   • QT prolongation 2023   • Chronic cough 2022   • GERD (gastroesophageal reflux disease) 10/31/2022   • Rectus sheath hematoma 10/29/2022   • [facility]ic [facility] response syndrome) [facility] ) 10/24/2022   • Severe persistent asthma with exacerbation 10/24/2022   • Hyperlipidemia 10/24/2022   • Type 2 diabetes mellitus, with long-term current use of insulin ([facility] ) 10/24/2022      LOS (days): 1  Geometric Mean LOS (GMLOS) (days): 2 60  Days to GMLOS:1 4     OBJECTIVE:    Risk of Unplanned Readmission Score: 17 91         Current admission status: Inpatient       Preferred Pharmacy:   [facility]/pharmacy [address], [facility] [address]  [address] [place] [address]  Phone: [phone] Fax: [phone]    Primary Care Provider: Jason Manrique    Primary Insurance: BLUE CROSS  Secondary Insurance:     ASSESSMENT:  Fabio Wilkerson Proxies    There are no active Health Care Proxies on file  Readmission Root Cause  30 Day Readmission: No    Patient Information  Admitted from[de-identified] Home  Mental Status: Alert  During Assessment patient was accompanied by: Not accompanied during assessment  Assessment information provided by[de-identified] Patient  Primary Caregiver: Self  Support Systems: Spouse/significant other  [place] of Residence: [place] [address] do you live in?: [address] [facility] entry access options   Select all that apply : Stairs  Number of steps to enter home : 3  Type of Current Residence: [address] [place] [address] home  Upon entering residence, is there a bedroom on the main floor (no further steps)?: No  A bedroom is located on the following floor levels of residence (select all that apply):: 2nd Floor  Upon entering residence, is there a bathroom on the main floor (no further steps)?: No  Indicate which floors of current residence have a bathroom (select all the apply):: 2nd Floor  Number of steps to 2nd floor from main floor: One Flight  In the last 12 months, was there a time when you were not able to pay the mortgage or rent on time?: No  In the last 12 months, was there a time when you did not have a steady place to sleep or slept in a shelter (including now)?: No  Homeless/housing insecurity resource given?: N/A  Living Arrangements: Lives w/ Spouse/significant other  Is patient a ?: No    Activities of Daily Living Prior to Admission  Functional Status: Independent  Completes ADLs independently?: Yes  Ambulates independently?: Yes  Does patient use assisted devices?: No  Does patient currently own DME?: No  Does patient have a history of Outpatient Therapy (PT/OT)?: No  Does the patient have a history of Short-Term Rehab?: No  Does patient have a history of HHC?: No  Does patient currently have [facility]?: No         Patient Information Continued  Income Source: SSI/SSD  Does patient have prescription coverage?: Yes  Within the past 12 months, you worried that your food would run out before you got the money to buy more : Never true  Within the past 12 months, the food you bought just didn't last and you didn't have money to get more : Never true  Food insecurity resource given?: N/A  Does patient receive dialysis treatments?: No  Does patient have a history of substance abuse?: No  Does patient have a history of Mental Health Diagnosis?: No         Means of Transportation  In the past 12 months, has lack of transportation kept you from medical appointments or from getting medications?: No  In the past 12 months, has lack of transportation kept you from meetings, work, or from getting things needed for daily living?: No        DISCHARGE DETAILS:    Discharge planning discussed with[de-identified] Patient  Freedom of Choice: Yes     CM contacted family/caregiver?: No- see comments (patient declines)  Were Treatment Team discharge recommendations reviewed with patient/caregiver?: Yes  Did patient/caregiver verbalize understanding of patient care needs?: Yes  Were patient/caregiver advised of the risks associated with not following Treatment Team discharge recommendations?: Yes    Contacts  Patient Contacts: Keisha Santana,   Relationship to Patient[de-identified] Family  Contact Method: Phone  Phone Number: [phone]  Reason/Outcome: Emergency Contact              Other Referral/Resources/Interventions Provided:  Interventions: DME         Treatment Team Recommendation: Other (TBD)  Discharge Destination Plan[de-identified] Other (TBD -- awaiting recommendations)  Transport at Discharge : Family                Patient/caregiver received discharge checklist   Content reviewed  Patient/caregiver encouraged to participate in discharge plan of care prior to discharge home  CM reviewed d/c planning process including the following: identifying help at home, patient preference for d/c planning needs, Discharge Lounge, [facility] Meds to Bed program, availability of treatment team to discuss questions or concerns patient and/or family may have regarding understanding medications and recognizing signs and symptoms once discharged  CM also encouraged patient to follow up with all recommended appointments after discharge  Patient advised of importance for patient and family to participate in managing patient’s medical well being  Additional Comments: Met with patient at bedside, introduced self and role  Patient independent at baseline, stated she used to be active and worked full-time, however has finally been able to receive disability due to effects of COVID    Patient would be interested in physical therapy (either outpatient or with Romeo Aaron), and is interested in obtaining a shower chair prior to discharge  Awaiting PT/OT recommendations  CM will continue to follow

## 2023-01-06 NOTE — PROGRESS NOTES
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 with PMH of GERD, hiatal hernia, gastritis, candidal esophagitis s/p treatment, HTN, asthma and diabetes who presents after EGD planned for manometry catheter placement with GI  Patient suspected to have bronchospasm, and procedure was aborted and patient placed on BiPAP and admitted to MICU for acute asthma exacerbation  She has beenhospitalized 9 times since last October for asthma, never been intubated  24h events:   -weaned from BiPAP  -repeat  QTc    ---------------------------------------------------------------------------------------  SUBJECTIVE  Patient asking for medication for her cough  Developing muscle pain from coughing  Robitussin makes her vomit  States tessalon perles do not work for her  Will address with pharmacy about medication that does not prolong her QT  Review of Systems   Respiratory: Positive for cough and wheezing  All other systems reviewed and are negative  Temp:  [97 4 °F (36 3 °C)-98 °F (36 7 °C)] 98 °F (36 7 °C)  HR:  [] 84  Resp:  [22-42] 32  BP: ()/(58-78) 104/64  SpO2:  [94 %-100 %] 95 %    I/O last 24 hours: In: 300 [I V :300]  Out: -     ---------------------------------------------------------------------------------------  Assessment and Plan:    Neuro:   ? No acute issues        CV:   • Diagnosis: QTc prolongation  ? EKG on arrival to unit showing QTc of 522, which is prolonged from prior EKG 8/22 with QTc of 465  ? Plan:   - Discontinue QTc prolonging medications while inpatient   - Repeat EKG showing QTc of 462  • Diagnosis: HLD  ? Plan:   - Continue statin        Pulm:  • Diagnosis: Acute asthma exacerbation  ?  Patient with hx of at least 3 hospitalizations for asthma exacerbations this year, has never been intubated per chart review  ? PFTs 3/22: The FEV1 was moderately decreased 45%  FVC moderately decreased 45%  FEV1/FVC was normal 83%  Following the bronchodilator, the FEV1 increased to 20%, FVC increased 21%  ? Initial VBG showing pH 7 418, pCO2 36 2, pO2 51 4, HCO3 22 8  ? S/p terbutaline   25 x1, Albuterol nebs 10mg x1, Magnesium sulfate 2g x1, Methylprednisolone 125mg x1  ? Plan:   - Budesonide BID, Atrovent TID, Xenopex TID  - Solumedrol 40mg q6h scheduled  - Held home inhalers while inpatient regimen in place   - ?cough suppressant?        GI:   • Diagnosis: GERD, dysphagia  ? Barium swallow with evidence of severe GERD, increased contractions  ? Patient presented 1/5 for EGD and manometry catheter placement  ? Plan:   - Continue patient's home regimen pepcid and pantoprazole  - GI following, appreciate recommendations   • Diagnosis: Hiatal hernia  ? Plan:   - Serial abdominal exams         :   ? No acute issues        F/E/N:   • Fluids: none   • Nutrition: NPO on BiPAP  • Electrolytes:   ? Initial BMP showing K+ of 6 7, repeat showing 2 6   ? In setting of 10mg albuterol, 2 6 more likely, will replete slowly at 20mEq and repeat a BMP            Heme/Onc:   • Diagnosis: DVT ppx  ? Plan: heparin + SCDs        Endo:   • Diagnosis: T2DM  ? Last A1c Oct 22 showing 4 9%  ? Plan:   - Sliding scale insulin algorithm #1         ID:   ? No acute issues        MSK/Skin:   ? No acute issues  ?  Prior hx of rectus sheath hematoma 2/2 violent coughing    Patient appropriate for transfer out of the ICU today?: No  Disposition: Continue Critical Care   Code Status: Level 3 - DNAR and DNI  ---------------------------------------------------------------------------------------  ICU CORE MEASURES    Prophylaxis   VTE Pharmacologic Prophylaxis: Heparin  VTE Mechanical Prophylaxis: sequential compression device  Stress Ulcer Prophylaxis: Pantoprazole PO      Invasive Devices Review  Invasive Devices     Peripheral Intravenous Line  Duration           Peripheral IV 23 Dorsal (posterior); Right Hand <1 day    Peripheral IV 23 Left Arm <1 day              Can any invasive devices be discontinued today? Not applicable  ---------------------------------------------------------------------------------------  OBJECTIVE    Vitals   Vitals:    23 2200 23 0000 23 0600 23 0726   BP: 130/62 113/65 (!) 89/59 104/64   BP Location:  Left arm     Pulse: 100 102 94 84   Resp: (!) 28 22 (!) 24 (!) 32   Temp:  97 5 °F (36 4 °C) 97 8 °F (36 6 °C) 98 °F (36 7 °C)   TempSrc:  Axillary Oral Oral   SpO2: 94% 94% 97% 95%   Weight:   90 kg (198 lb 6 6 oz)    Height:         Temp (24hrs), Av 7 °F (36 5 °C), Min:97 4 °F (36 3 °C), Max:98 °F (36 7 °C)  Current: Temperature: 98 °F (36 7 °C)    Respiratory:    Nasal Cannula O2 Flow Rate (L/min): 1 L/min    Invasive/non-invasive ventilation settings   Respiratory    Lab Data (Last 4 hours)    None         O2/Vent Data (Last 4 hours)    None                Physical Exam  Constitutional:       General: She is not in acute distress  HENT:      Head: Normocephalic and atraumatic  Mouth/Throat:      Mouth: Mucous membranes are moist    Eyes:      Extraocular Movements: Extraocular movements intact  Cardiovascular:      Rate and Rhythm: Normal rate and regular rhythm  Pulmonary:      Effort: No respiratory distress  Breath sounds: Wheezing present  Comments: On RA     Abdominal:      Palpations: Abdomen is soft  Tenderness: There is no abdominal tenderness  Musculoskeletal:      Right lower leg: No edema  Left lower leg: No edema  Skin:     General: Skin is warm and dry  Neurological:      Mental Status: She is alert and oriented to person, place, and time               Laboratory and Diagnostics:  Results from last 7 days   Lab Units 23  0600 23  1053   WBC Thousand/uL 24 35* 13 98* HEMOGLOBIN g/dL 13 2 13 1   HEMATOCRIT % 40 6 40 0   PLATELETS Thousands/uL 407* 424*   NEUTROS PCT % 91* 76*   MONOS PCT % 3* 4     Results from last 7 days   Lab Units 01/06/23  0600 01/05/23 2011 01/05/23  1640 01/05/23  1153 01/05/23  1053   SODIUM mmol/L 140 139 138 140 134*   POTASSIUM mmol/L 4 6 3 8 3 3* 2 6* 6 7*   CHLORIDE mmol/L 111* 106 105 109* 107   CO2 mmol/L 23 22 20* 21 21   ANION GAP mmol/L 6 11 13 10 6   BUN mg/dL 7 6 5 6 6   CREATININE mg/dL 0 66 1 06 1 12 0 76 0 84   CALCIUM mg/dL 9 3 9 1 8 9 8 8 8 8   GLUCOSE RANDOM mg/dL 149* 266* 235* 210* 155*   ALT U/L  --   --   --   --  32   AST U/L  --   --   --   --  94*   ALK PHOS U/L  --   --   --   --  126*   ALBUMIN g/dL  --   --   --   --  3 2*   TOTAL BILIRUBIN mg/dL  --   --   --   --  0 72     Results from last 7 days   Lab Units 01/05/23  1053   MAGNESIUM mg/dL 2 3   PHOSPHORUS mg/dL 2 8                   ABG:    VBG:  Results from last 7 days   Lab Units 01/05/23  1107   PH JOHAN  7 418*   PCO2 JOHAN mm Hg 36 2*   PO2 JOHAN mm Hg 51 4*   HCO3 JOHAN mmol/L 22 8*   BASE EXC JOHAN mmol/L -1 2           Micro        EKG: Reviewed  Imaging: Reviewed  Intake and Output  I/O       01/04 0701 01/05 0700 01/05 0701 01/06 0700 01/06 0701 01/07 0700    I V  (mL/kg)  300 (3 3)     Total Intake(mL/kg)  300 (3 3)     Net  +300            Unmeasured Urine Occurrence  1 x           Height and Weights   Height: 4' 11" (149 9 cm)     Body mass index is 40 07 kg/m²  Weight (last 2 days)     Date/Time Weight    01/06/23 0600 90 (198 41)    01/05/23 1047 86 2 (190)    01/05/23 0749 86 2 (190)            Nutrition       Diet Orders   (From admission, onward)             Start     Ordered    01/06/23 0000  Diet NPO; Sips with meds  Diet effective midnight        References:    Nutrtion Support Algorithm Enteral vs  Parenteral   Question Answer Comment   Diet Type NPO    NPO Except:  Sips with meds        01/05/23 1111                    Active Medications  Scheduled Meds:  Current Facility-Administered Medications   Medication Dose Route Frequency Provider Last Rate   • budesonide  0 5 mg Nebulization Q12H Marlen Tirado MD     • citalopram  10 mg Oral Daily Marlen Tirado MD     • dextromethorphan-guaiFENesin  10 mL Oral Q4H PRN Marlen Tirado MD     • famotidine  40 mg Oral BID Marlen Tirado MD     • heparin (porcine)  5,000 Units Subcutaneous [facility] MD     • insulin lispro  1-5 Units Subcutaneous Q6H [address] Stephanie Manjarrez MD     • ipratropium  0 5 mg Nebulization TID Marlen Tirado MD     • levalbuterol  1 25 mg Nebulization TID Marlen Tirado MD     • melatonin  9 mg Oral HS Huong Devine DO     • methylPREDNISolone sodium succinate  40 mg Intravenous Q6H [address] Stephanie Manjarrez MD     • pantoprazole  40 mg Oral Early Morning Marlen Tirado MD     • pravastatin  40 mg Oral Daily With [address] Ivan Meza MD     • sucralfate  1 g Oral BID AC Chasity Salvador DO     • zolpidem  10 mg Oral HS Marlen Tirado MD       Continuous Infusions:     PRN Meds:   dextromethorphan-guaiFENesin, 10 mL, Q4H PRN        Allergies   Allergies   Allergen Reactions   • Codeine Other (See Comments)   • Aspirin GI Intolerance and Rash   • Hydromorphone Rash     GI upset     • Oxycodone-Acetaminophen Rash   • Tramadol Rash     ---------------------------------------------------------------------------------------  Advance Directive and Living Will:      Power of :    POLST:    ---------------------------------------------------------------------------------------  Care Time Delivered:   See attending's attestation  Marlen Tirado MD      Portions of the record may have been created with voice recognition software  Occasional wrong word or "sound a like" substitutions may have occurred due to the inherent limitations of voice recognition software    Read the chart carefully and recognize, using context, where substitutions have occurred

## 2023-01-06 NOTE — UTILIZATION REVIEW
Initial Clinical Review    Admission: Date/Time/Statement:   Admission Orders (From admission, onward)     Ordered        01/05/23 1339  Inpatient Admission  Once                      Orders Placed This Encounter   Procedures   • Inpatient Admission     Standing Status:   Standing     Number of Occurrences:   1     Order Specific Question:   Level of Care     Answer:   Critical Care [15]     Order Specific Question:   Estimated length of stay     Answer:   More than 2 Midnights     Order Specific Question:   Certification     Answer:   I certify that inpatient services are medically necessary for this patient for a duration of greater than two midnights  See H&P and MD Progress Notes for additional information about the patient's course of treatment  ED Arrival Information     Patient not seen in ED                       Initial Presentation: 56 y o  female, Direct admit, initially presented for Elective EGD planned for manometry catheter placement with GI  Pt had acute bronchospasm and status asthmaticus in the PACU and procedure was aborted  She was placed on BiPAP and admit to MICU for acute asthma exacerbation  Pt has beenhospitalized 9 times since last October for asthma, never been intubated  PMH for GERD, hiatal hernia, gastritis, candidal esophagitis s/p treatment, HTN, asthma and diabetes  Admit Inpatient level of care for Acute asthma exacerbation and QTc prolongation  Initial VBG showing pH 7 418, pCO2 36 2, pO2 51 4, HCO3 22 8  S/p terbutaline   25 x1, Albuterol nebs 10mg x1, Magnesium sulfate 2g x1, Methylprednisolone 125mg x1  Started on Iv Solu-medron q6h  Repeat EKG  D/c QTc prolonging meds while in hospital  NPO on BiPAP  Serial abdominal exams  On exam; Tachycardia and Wheezing  Respiratory distress       Date: 1/6  Day 2:   Progress notes; 24hr events; Weaned from BiPAP  Continues on Iv Solu-medrol  Repeat  QTc  Developing muscle pain from coughing  Robitussin makes her vomit   States tessalon perles do not work for her  On exam; wheezing  Vitals   Temperature Pulse Respirations Blood Pressure SpO2   01/05/23 0749 01/05/23 0749 01/05/23 0749 01/05/23 0749 01/05/23 0749   (!) 96 8 °F (36 °C) 82 18 137/88 100 %      Temp Source Heart Rate Source Patient Position - Orthostatic VS BP Location FiO2 (%)   01/05/23 0749 01/05/23 0749 01/05/23 1047 01/05/23 1047 01/05/23 1233   Tympanic Monitor Lying Left arm 2      Pain Score       01/05/23 0749       No Pain          Wt Readings from Last 1 Encounters:   01/06/23 90 kg (198 lb 6 6 oz)     Additional Vital Signs:   01/06/23 0726 98 °F (36 7 °C) 84 32   Abnormal  104/64 78 95 % -- 24 1 L/min Nasal cannula -- --   01/06/23 0600 97 8 °F (36 6 °C) 94 24   Abnormal  89/59   Abnormal  67 97 % -- -- -- -- -- --   01/06/23 0000 97 5 °F (36 4 °C) 102 22 113/65 76 94 % -- -- -- Nasal cannula -- Lying   01/05/23 2200 -- 100 28   Abnormal  130/62 88 94 % -- -- -- Nasal cannula -- --   01/05/23 2059 -- -- -- -- -- -- -- 28 2 L/min Nasal cannula --      01/05/23 1327 -- -- -- -- -- 100 % -- -- -- -- --   01/05/23 1300 -- -- -- -- -- 98 % -- -- -- BiPAP --   01/05/23 1234 -- 94 22 106/64 77 98 % -- -- -- -- --   01/05/23 1233 -- 94 22 -- -- 97 % 2 -- -- Nasal cannula --   01/05/23 1200 -- 94 24   Abnormal  107/59 70 98 % -- -- -- -- --   01/05/23 1047 97 4 °F (36 3 °C)   Abnormal  110   Abnormal  25   Abnormal  118/62 92 99 % -- -- -- BiPAP      Pertinent Labs/Diagnostic Test Results:   XR chest portable ICU   Final Result by Santos Holder MD (01/05 8202)      New obscuration of the left costophrenic angle is favored to be due to overlying soft tissue structures, though a pleural effusion is not excluded  If there is clinical concern for an effusion, and upright frontal and lateral chest radiograph can be    considered for further assessment                    Workstation performed: [id number]           1/5   EKG - NSR    1/6  EKG - NSR    Results from last 7 days   Lab Units 01/05/23  1153   SARS-COV-2  Negative     Results from last 7 days   Lab Units 01/06/23  0600 01/05/23  1053   WBC Thousand/uL 24 35* 13 98*   HEMOGLOBIN g/dL 13 2 13 1   HEMATOCRIT % 40 6 40 0   PLATELETS Thousands/uL 407* 424*   NEUTROS ABS Thousands/µL 22 41* 10 50*         Results from last 7 days   Lab Units 01/06/23  0600 01/05/23 2011 01/05/23  1640 01/05/23  1153 01/05/23  1053   SODIUM mmol/L 140 139 138 140 134*   POTASSIUM mmol/L 4 6 3 8 3 3* 2 6* 6 7*   CHLORIDE mmol/L 111* 106 105 109* 107   CO2 mmol/L 23 22 20* 21 21   ANION GAP mmol/L 6 11 13 10 6   BUN mg/dL 7 6 5 6 6   CREATININE mg/dL 0 66 1 06 1 12 0 76 0 84   EGFR ml/min/1 73sq m 99 59 55 88 78   CALCIUM mg/dL 9 3 9 1 8 9 8 8 8 8   MAGNESIUM mg/dL  --   --   --   --  2 3   PHOSPHORUS mg/dL  --   --   --   --  2 8     Results from last 7 days   Lab Units 01/05/23  1053   AST U/L 94*   ALT U/L 32   ALK PHOS U/L 126*   TOTAL PROTEIN g/dL 8 2   ALBUMIN g/dL 3 2*   TOTAL BILIRUBIN mg/dL 0 72     Results from last 7 days   Lab Units 01/06/23  0609 01/06/23  0003 01/05/23  2134 01/05/23  1740 01/05/23  1156 01/05/23  0940   POC GLUCOSE mg/dl 143* 207* 236* 224* 191* 116     Results from last 7 days   Lab Units 01/06/23  0600 01/05/23 2011 01/05/23  1640 01/05/23  1153 01/05/23  1053   GLUCOSE RANDOM mg/dL 149* 266* 235* 210* 155*       Results from last 7 days   Lab Units 01/05/23  1107   PH JOHAN  7 418*   PCO2 JOHAN mm Hg 36 2*   PO2 JOHAN mm Hg 51 4*   HCO3 JOHAN mmol/L 22 8*   BASE EXC JOHAN mmol/L -1 2   O2 CONTENT JOHAN ml/dL 17 2   O2 HGB, VENOUS % 86 4*         Results from last 7 days   Lab Units 01/05/23  1153   INFLUENZA A PCR  Negative   INFLUENZA B PCR  Negative   RSV PCR  Negative       Past Medical History:   Diagnosis Date   • Asthma    • Diabetes mellitus ([facility] [address] )      Present on Admission:  • GERD (gastroesophageal reflux disease)  • Hyperlipidemia      Admitting Diagnosis: Esophageal dysphagia [R13 19]  Gastroesophageal reflux disease, unspecified whether esophagitis present [K21 9]  Age/Sex: 56 y o  female     Admission Orders:  Scheduled Medications:  budesonide, 0 5 mg, Nebulization, Q12H  citalopram, 10 mg, Oral, Daily  famotidine, 40 mg, Oral, BID  heparin (porcine), 7,500 Units, Subcutaneous, Q8H [address]  insulin lispro, 1-5 Units, Subcutaneous, Q6H KAYLEIGH  ipratropium, 0 5 mg, Nebulization, TID  levalbuterol, 1 25 mg, Nebulization, TID  melatonin, 9 mg, Oral, HS  methylPREDNISolone sodium succinate, 40 mg, Intravenous, Q6H KAYLEIGH  pantoprazole, 40 mg, Oral, Early Morning  pravastatin, 40 mg, Oral, Daily With Dinner  sucralfate, 1 g, Oral, BID AC  zolpidem, 10 mg, Oral, HS      Continuous IV Infusions: None     PRN Meds:       IP CONSULT TO CASE MANAGEMENT    Network Utilization Review Department  ATTENTION: Please call with any questions or concerns to [phone] and carefully listen to the prompts so that you are directed to the right person  All voicemails are confidential   Gabby Back all requests for admission clinical reviews, approved or denied determinations and any other requests to dedicated fax number below belonging to the campus where the patient is receiving treatment   List of dedicated fax numbers for the Facilities:  [address] DENIALS (Administrative/Medical Necessity) [phone]   [address] (Maternity/NICU/Pediatrics) [phone]   [address] Vo [phone]   [facility] [phone]   [facility] [phone]   [facility] [place] [address] [facility] [place] [place] [id number] [place] [address] [address] [phone] [address] [address] Alec Rae [phone]

## 2023-01-07 PROBLEM — J96.01 ACUTE RESPIRATORY FAILURE WITH HYPOXIA (HCC): Status: ACTIVE | Noted: 2023-01-07

## 2023-01-07 PROBLEM — F41.8 DEPRESSION WITH ANXIETY: Status: ACTIVE | Noted: 2023-01-07

## 2023-01-07 PROBLEM — F32.A DEPRESSION: Status: ACTIVE | Noted: 2023-01-07

## 2023-01-07 LAB
ANION GAP SERPL CALCULATED.3IONS-SCNC: 7 MMOL/L (ref 4–13)
BASOPHILS # BLD AUTO: 0.03 THOUSANDS/ÂΜL (ref 0–0.1)
BASOPHILS NFR BLD AUTO: 0 % (ref 0–1)
BUN SERPL-MCNC: 11 MG/DL (ref 5–25)
CALCIUM SERPL-MCNC: 9.1 MG/DL (ref 8.3–10.1)
CHLORIDE SERPL-SCNC: 107 MMOL/L (ref 96–108)
CO2 SERPL-SCNC: 23 MMOL/L (ref 21–32)
CREAT SERPL-MCNC: 0.87 MG/DL (ref 0.6–1.3)
EOSINOPHIL # BLD AUTO: 0 THOUSAND/ÂΜL (ref 0–0.61)
EOSINOPHIL NFR BLD AUTO: 0 % (ref 0–6)
ERYTHROCYTE [DISTWIDTH] IN BLOOD BY AUTOMATED COUNT: 15.5 % (ref 11.6–15.1)
GFR SERPL CREATININE-BSD FRML MDRD: 75 ML/MIN/1.73SQ M
GLUCOSE SERPL-MCNC: 130 MG/DL (ref 65–140)
GLUCOSE SERPL-MCNC: 158 MG/DL (ref 65–140)
GLUCOSE SERPL-MCNC: 161 MG/DL (ref 65–140)
GLUCOSE SERPL-MCNC: 164 MG/DL (ref 65–140)
GLUCOSE SERPL-MCNC: 204 MG/DL (ref 65–140)
GLUCOSE SERPL-MCNC: 220 MG/DL (ref 65–140)
HCT VFR BLD AUTO: 38.8 % (ref 34.8–46.1)
HGB BLD-MCNC: 12.5 G/DL (ref 11.5–15.4)
IMM GRANULOCYTES # BLD AUTO: 0.17 THOUSAND/UL (ref 0–0.2)
IMM GRANULOCYTES NFR BLD AUTO: 1 % (ref 0–2)
LYMPHOCYTES # BLD AUTO: 1.09 THOUSANDS/ÂΜL (ref 0.6–4.47)
LYMPHOCYTES NFR BLD AUTO: 5 % (ref 14–44)
MCH RBC QN AUTO: 31 PG (ref 26.8–34.3)
MCHC RBC AUTO-ENTMCNC: 32.2 G/DL (ref 31.4–37.4)
MCV RBC AUTO: 96 FL (ref 82–98)
MONOCYTES # BLD AUTO: 1.07 THOUSAND/ÂΜL (ref 0.17–1.22)
MONOCYTES NFR BLD AUTO: 5 % (ref 4–12)
NEUTROPHILS # BLD AUTO: 20.51 THOUSANDS/ÂΜL (ref 1.85–7.62)
NEUTS SEG NFR BLD AUTO: 89 % (ref 43–75)
NRBC BLD AUTO-RTO: 0 /100 WBCS
PLATELET # BLD AUTO: 408 THOUSANDS/UL (ref 149–390)
PMV BLD AUTO: 10.4 FL (ref 8.9–12.7)
POTASSIUM SERPL-SCNC: 4 MMOL/L (ref 3.5–5.3)
RBC # BLD AUTO: 4.03 MILLION/UL (ref 3.81–5.12)
SODIUM SERPL-SCNC: 137 MMOL/L (ref 135–147)
WBC # BLD AUTO: 22.87 THOUSAND/UL (ref 4.31–10.16)

## 2023-01-07 RX ORDER — ALPRAZOLAM 0.25 MG/1
0.25 TABLET ORAL ONCE AS NEEDED
Status: COMPLETED | OUTPATIENT
Start: 2023-01-07 | End: 2023-01-07

## 2023-01-07 RX ORDER — HYDROCODONE POLISTIREX AND CHLORPHENIRAMINE POLISTIREX 10; 8 MG/5ML; MG/5ML
2.5 SUSPENSION, EXTENDED RELEASE ORAL EVERY 8 HOURS PRN
Status: DISCONTINUED | OUTPATIENT
Start: 2023-01-07 | End: 2023-01-07

## 2023-01-07 RX ORDER — BENZONATATE 100 MG/1
100 CAPSULE ORAL 3 TIMES DAILY PRN
Status: DISCONTINUED | OUTPATIENT
Start: 2023-01-07 | End: 2023-01-07

## 2023-01-07 RX ORDER — HYDROCODONE POLISTIREX AND CHLORPHENIRAMINE POLISTIREX 10; 8 MG/5ML; MG/5ML
2.5 SUSPENSION, EXTENDED RELEASE ORAL EVERY 6 HOURS PRN
Status: DISCONTINUED | OUTPATIENT
Start: 2023-01-07 | End: 2023-01-10 | Stop reason: SDUPTHER

## 2023-01-07 RX ORDER — LEVALBUTEROL 1.25 MG/.5ML
1.25 SOLUTION, CONCENTRATE RESPIRATORY (INHALATION) EVERY 4 HOURS PRN
Status: DISCONTINUED | OUTPATIENT
Start: 2023-01-07 | End: 2023-01-09

## 2023-01-07 RX ORDER — ALBUTEROL SULFATE 2.5 MG/3ML
2.5 SOLUTION RESPIRATORY (INHALATION) EVERY 4 HOURS PRN
Status: DISCONTINUED | OUTPATIENT
Start: 2023-01-07 | End: 2023-01-07

## 2023-01-07 RX ORDER — HYDROCODONE POLISTIREX AND CHLORPHENIRAMINE POLISTIREX 10; 8 MG/5ML; MG/5ML
5 SUSPENSION, EXTENDED RELEASE ORAL EVERY 8 HOURS PRN
Status: DISCONTINUED | OUTPATIENT
Start: 2023-01-07 | End: 2023-01-07

## 2023-01-07 RX ADMIN — INSULIN LISPRO 1 UNITS: 100 INJECTION, SOLUTION INTRAVENOUS; SUBCUTANEOUS at 16:12

## 2023-01-07 RX ADMIN — BUDESONIDE 0.5 MG: 0.5 INHALANT ORAL at 19:45

## 2023-01-07 RX ADMIN — CITALOPRAM HYDROBROMIDE 10 MG: 10 TABLET ORAL at 08:02

## 2023-01-07 RX ADMIN — LEVALBUTEROL HYDROCHLORIDE 1.25 MG: 1.25 SOLUTION, CONCENTRATE RESPIRATORY (INHALATION) at 19:45

## 2023-01-07 RX ADMIN — FAMOTIDINE 40 MG: 20 TABLET ORAL at 08:01

## 2023-01-07 RX ADMIN — IPRATROPIUM BROMIDE 0.5 MG: 0.5 SOLUTION RESPIRATORY (INHALATION) at 19:45

## 2023-01-07 RX ADMIN — LEVALBUTEROL HYDROCHLORIDE 1.25 MG: 1.25 SOLUTION, CONCENTRATE RESPIRATORY (INHALATION) at 13:25

## 2023-01-07 RX ADMIN — PANTOPRAZOLE SODIUM 40 MG: 40 TABLET, DELAYED RELEASE ORAL at 06:00

## 2023-01-07 RX ADMIN — ALBUTEROL SULFATE 2.5 MG: 2.5 SOLUTION RESPIRATORY (INHALATION) at 02:18

## 2023-01-07 RX ADMIN — INSULIN LISPRO 2 UNITS: 100 INJECTION, SOLUTION INTRAVENOUS; SUBCUTANEOUS at 06:14

## 2023-01-07 RX ADMIN — BUDESONIDE 0.5 MG: 0.5 INHALANT ORAL at 07:02

## 2023-01-07 RX ADMIN — SUCRALFATE 1 G: 1 TABLET ORAL at 06:00

## 2023-01-07 RX ADMIN — PRAVASTATIN SODIUM 40 MG: 40 TABLET ORAL at 16:12

## 2023-01-07 RX ADMIN — HEPARIN SODIUM 7500 UNITS: 5000 INJECTION INTRAVENOUS; SUBCUTANEOUS at 06:00

## 2023-01-07 RX ADMIN — ZOLPIDEM TARTRATE 10 MG: 5 TABLET ORAL at 22:12

## 2023-01-07 RX ADMIN — HEPARIN SODIUM 7500 UNITS: 5000 INJECTION INTRAVENOUS; SUBCUTANEOUS at 13:02

## 2023-01-07 RX ADMIN — METHYLPREDNISOLONE SODIUM SUCCINATE 40 MG: 40 INJECTION, POWDER, FOR SOLUTION INTRAMUSCULAR; INTRAVENOUS at 11:46

## 2023-01-07 RX ADMIN — METHYLPREDNISOLONE SODIUM SUCCINATE 40 MG: 40 INJECTION, POWDER, FOR SOLUTION INTRAMUSCULAR; INTRAVENOUS at 17:56

## 2023-01-07 RX ADMIN — Medication 2.5 ML: at 21:12

## 2023-01-07 RX ADMIN — HEPARIN SODIUM 7500 UNITS: 5000 INJECTION INTRAVENOUS; SUBCUTANEOUS at 22:12

## 2023-01-07 RX ADMIN — IPRATROPIUM BROMIDE 0.5 MG: 0.5 SOLUTION RESPIRATORY (INHALATION) at 07:02

## 2023-01-07 RX ADMIN — HYDROCODONE POLISTIREX AND CHLORPHENIRAMINE POLISTIREX 2.5 ML: 10; 8 SUSPENSION, EXTENDED RELEASE ORAL at 13:02

## 2023-01-07 RX ADMIN — LEVALBUTEROL HYDROCHLORIDE 1.25 MG: 1.25 SOLUTION, CONCENTRATE RESPIRATORY (INHALATION) at 07:02

## 2023-01-07 RX ADMIN — FAMOTIDINE 40 MG: 20 TABLET ORAL at 17:56

## 2023-01-07 RX ADMIN — MELATONIN 9 MG: at 22:12

## 2023-01-07 RX ADMIN — METHYLPREDNISOLONE SODIUM SUCCINATE 40 MG: 40 INJECTION, POWDER, FOR SOLUTION INTRAMUSCULAR; INTRAVENOUS at 06:00

## 2023-01-07 RX ADMIN — GABAPENTIN 300 MG: 300 CAPSULE ORAL at 22:13

## 2023-01-07 RX ADMIN — METHYLPREDNISOLONE SODIUM SUCCINATE 40 MG: 40 INJECTION, POWDER, FOR SOLUTION INTRAMUSCULAR; INTRAVENOUS at 23:28

## 2023-01-07 RX ADMIN — ALPRAZOLAM 0.25 MG: 0.25 TABLET ORAL at 08:05

## 2023-01-07 RX ADMIN — ALBUTEROL SULFATE 2 PUFF: 90 AEROSOL, METERED RESPIRATORY (INHALATION) at 08:01

## 2023-01-07 RX ADMIN — SUCRALFATE 1 G: 1 TABLET ORAL at 16:12

## 2023-01-07 RX ADMIN — HYDROCODONE POLISTIREX AND CHLORPHENIRAMINE POLISTIREX 2.5 ML: 10; 8 SUSPENSION, EXTENDED RELEASE ORAL at 05:59

## 2023-01-07 RX ADMIN — IPRATROPIUM BROMIDE 0.5 MG: 0.5 SOLUTION RESPIRATORY (INHALATION) at 13:26

## 2023-01-07 RX ADMIN — INSULIN LISPRO 1 UNITS: 100 INJECTION, SOLUTION INTRAVENOUS; SUBCUTANEOUS at 11:44

## 2023-01-07 RX ADMIN — INSULIN LISPRO 1 UNITS: 100 INJECTION, SOLUTION INTRAVENOUS; SUBCUTANEOUS at 22:16

## 2023-01-07 NOTE — ASSESSMENT & PLAN NOTE
Multiple hospitalizations through the last year for similar issue  Initially presented in the outpatient setting for an elective procedure (EGD w/ manometry) which was aborted due to intractable wheezing found to be in status asthmaticus -> required a loading dose of IV Solu-Medrol coupled w/ IV magnesium and terbutaline, and urgent Albuterol nebulizer treatment  Initially BiPAP dependent, now progressively wean down to room air - transferred out of ICU on 1/6  Continue IV Solu-Medrol, Pulmicort, and nebulizer treatments  Still reports refractory coughing which induces more wheezing episodes -> on Tussionex for cough control  Pulmonology to follow

## 2023-01-07 NOTE — ASSESSMENT & PLAN NOTE
Lab Results   Component Value Date    HGBA1C 4 9 10/04/2022     Well-controlled  Continue SSI coverage per [employer]   Carbohydrate restricted diet  Anticipate a degree of blood sugar fluctuation with corticosteroid administration

## 2023-01-07 NOTE — PLAN OF CARE
Problem: MOBILITY - ADULT  Goal: Maintain or return to baseline ADL function  Description: INTERVENTIONS:  -  Assess patient's ability to carry out ADLs; assess patient's baseline for ADL function and identify physical deficits which impact ability to perform ADLs (bathing, care of mouth/teeth, toileting, grooming, dressing, etc )  - Assess/evaluate cause of self-care deficits   - Assess range of motion  - Assess patient's mobility; develop plan if impaired  - Assess patient's need for assistive devices and provide as appropriate  - Encourage maximum independence but intervene and supervise when necessary  - Involve family in performance of ADLs  - Assess for home care needs following discharge   - Consider OT consult to assist with ADL evaluation and planning for discharge  - Provide patient education as appropriate  Outcome: Progressing  Goal: Maintains/Returns to pre admission functional level  Description: INTERVENTIONS:  - Perform BMAT or MOVE assessment daily    - Set and communicate daily mobility goal to care team and patient/family/caregiver  - Collaborate with rehabilitation services on mobility goals if consulted  - Perform Range of Motion 3 times a day  - Reposition patient every 2 hours    - Dangle patient 3 times a day  - Stand patient 3 times a day  - Ambulate patient 3 times a day  - Out of bed to chair 3 times a day   - Out of bed for meals 3 times a day  - Out of bed for toileting  - Record patient progress and toleration of activity level   Outcome: Progressing     Problem: Prexisting or High Potential for Compromised Skin Integrity  Goal: Skin integrity is maintained or improved  Description: INTERVENTIONS:  - Identify patients at risk for skin breakdown  - Assess and monitor skin integrity  - Assess and monitor nutrition and hydration status  - Monitor labs   - Assess for incontinence   - Turn and reposition patient  - Assist with mobility/ambulation  - Relieve pressure over bony prominences  - Avoid friction and shearing  - Provide appropriate hygiene as needed including keeping skin clean and dry  - Evaluate need for skin moisturizer/barrier cream  - Collaborate with interdisciplinary team   - Patient/family teaching  - Consider wound care consult   Outcome: Progressing     Problem: Potential for Falls  Goal: Patient will remain free of falls  Description: INTERVENTIONS:  - Educate patient/family on patient safety including physical limitations  - Instruct patient to call for assistance with activity   - Consult OT/PT to assist with strengthening/mobility   - Keep Call bell within reach  - Keep bed low and locked with side rails adjusted as appropriate  - Keep care items and personal belongings within reach  - Initiate and maintain comfort rounds  - Make Fall Risk Sign visible to staff  - Offer Toileting every  Hours, in advance of need  - Initiate/Maintain alarm  - Obtain necessary fall risk management equipment:   - Apply yellow socks and bracelet for high fall risk patients  - Consider moving patient to room near nurses station  Outcome: Progressing     Problem: PAIN - ADULT  Goal: Verbalizes/displays adequate comfort level or baseline comfort level  Description: Interventions:  - Encourage patient to monitor pain and request assistance  - Assess pain using appropriate pain scale  - Administer analgesics based on type and severity of pain and evaluate response  - Implement non-pharmacological measures as appropriate and evaluate response  - Consider cultural and social influences on pain and pain management  - Notify physician/advanced practitioner if interventions unsuccessful or patient reports new pain  Outcome: Progressing     Problem: INFECTION - ADULT  Goal: Absence or prevention of progression during hospitalization  Description: INTERVENTIONS:  - Assess and monitor for signs and symptoms of infection  - Monitor lab/diagnostic results  - Monitor all insertion sites, i e  indwelling lines, tubes, and drains  - Monitor endotracheal if appropriate and nasal secretions for changes in amount and color  - [place] appropriate cooling/warming therapies per order  - Administer medications as ordered  - Instruct and encourage patient and family to use good hand hygiene technique  - Identify and instruct in appropriate isolation precautions for identified infection/condition  Outcome: Progressing  Goal: Absence of fever/infection during neutropenic period  Description: INTERVENTIONS:  - Monitor WBC    Outcome: Progressing     Problem: DISCHARGE PLANNING  Goal: Discharge to home or other facility with appropriate resources  Description: INTERVENTIONS:  - Identify barriers to discharge w/patient and caregiver  - Arrange for needed discharge resources and transportation as appropriate  - Identify discharge learning needs (meds, wound care, etc )  - Arrange for interpretive services to assist at discharge as needed  - Refer to Case Management Department for coordinating discharge planning if the patient needs post-hospital services based on physician/advanced practitioner order or complex needs related to functional status, cognitive ability, or social support system  Outcome: Progressing     Problem: Knowledge Deficit  Goal: Patient/family/caregiver demonstrates understanding of disease process, treatment plan, medications, and discharge instructions  Description: Complete learning assessment and assess knowledge base    Interventions:  - Provide teaching at level of understanding  - Provide teaching via preferred learning methods  Outcome: Progressing     Problem: RESPIRATORY - ADULT  Goal: Achieves optimal ventilation and oxygenation  Description: INTERVENTIONS:  - Assess for changes in respiratory status  - Assess for changes in mentation and behavior  - Position to facilitate oxygenation and minimize respiratory effort  - Oxygen administered by appropriate delivery if ordered  - Initiate smoking cessation education as indicated  - Encourage broncho-pulmonary hygiene including cough, deep breathe, Incentive Spirometry  - Assess the need for suctioning and aspirate as needed  - Assess and instruct to report SOB or any respiratory difficulty  - Respiratory Therapy support as indicated  Outcome: Progressing

## 2023-01-07 NOTE — CONSULTS
No need for a new consult, pt will be seen by pulmonary as a follow up   Pls see the follow up note from today

## 2023-01-07 NOTE — PROGRESS NOTES
Progress Note - Pulmonary   Lidya Jones [place] 56 y o  female MRN: [id number]  Unit/Bed#: [facility] 206-01 Encounter: [id number]      Assessment:  1  Acute hypoxic respiratory failure-noted postprocedure acute bronchospasm/likely from #2 admitted to the ICU for BiPAP  Downgraded 1/6  2  Asthma exacerbation  3  Hiatal hernia  4  Acid reflux    Plan:  · Continue budesonide every 12, Atrovent/Xopenex 3 times daily all nebs  · Continue Solu-Medrol 40 mg every 6 given the ongoing wheezing  · Tussionex for cough PRN every 6  · We will continue to follow    Subjective:   Respiratory distress overnight, required additional nebulizer treatment  Did not require BiPAP  Still not feeling back to baseline, frequents dry cough  Vitals: Blood pressure 108/62, pulse (!) 106, temperature 97 8 °F (36 6 °C), temperature source Oral, resp  rate (!) 38, height 4' 11" (1 499 m), weight 90 kg (198 lb 6 6 oz), SpO2 94 %  , Body mass index is 40 07 kg/m²  Intake/Output Summary (Last 24 hours) at 1/7/2023 1356  Last data filed at 1/7/2023 1200  Gross per 24 hour   Intake 380 ml   Output --   Net 380 ml       Physical Exam  Gen: not in acute distress, obese, Body mass index is 40 07 kg/m²  HEENT:supple, no accessory muscle use, no JVD appreciated  Cardiac: RRR, no murmurs appreciated  Lungs: normal respiratory effort, moderate expiratory wheeze/bilateral  Abdomen: soft, nontender, normoactive bowel sounds  Extremities: no cyanosis, no clubbing, no edema  Neuro: AAO X3, no focal motor deficit    Labs: I have personally reviewed pertinent lab results          Jayla Orellana MD

## 2023-01-07 NOTE — PROGRESS NOTES
[facility]  Progress Note - Annette Hall 1965, 56 y o  female MRN: [id number]  [facility] Encounter: [id number]  Primary Care Provider: Ailyn Sood   Date and time admitted to hospital: 1/5/2023 10:30 AM      * Severe asthma with acute exacerbation  Assessment & Plan  Multiple hospitalizations through the last year for similar issue  Initially presented in the outpatient setting for an elective procedure (EGD w/ manometry) which was aborted due to intractable wheezing found to be in status asthmaticus -> required a loading dose of IV Solu-Medrol coupled w/ IV magnesium and terbutaline, and urgent Albuterol nebulizer treatment  Initially BiPAP dependent, now progressively wean down to room air - transferred out of ICU on 1/6  Continue IV Solu-Medrol, Pulmicort, and nebulizer treatments  Still reports refractory coughing which induces more wheezing episodes -> on Tussionex for cough control  Pulmonology to follow    Acute respiratory failure with hypoxia   Assessment & Plan  Previously requiring BiPAP now progressively weaned down to room air at rest  Setting of severe asthma exacerbation (see above)  Influenza/RSV/COVID screen negative    GERD (gastroesophageal reflux disease)  Assessment & Plan  Continue PPI/Pepcid regimen    Hyperlipidemia  Assessment & Plan  Continue statin    Insulin-dependent diabetes mellitus  Assessment & Plan  Lab Results   Component Value Date    HGBA1C 4 9 10/04/2022     Well-controlled  Continue SSI coverage per Accu-Cheks   Carbohydrate restricted diet  Anticipate a degree of blood sugar fluctuation with corticosteroid administration    Depression with anxiety  Assessment & Plan  Continue Celexa and PRN Xanax      DVT Prophylaxis:  Heparin SC       Patient Centered Rounds:  I have performed bedside rounds and discussed plan of care with nursing today    Discussions with Specialists or Other Care Team Provider:  see above assessments if applicable    Education and Discussions with Family / Patient:  Patient at bedside - denied need to update other contacts today    Time Spent for Care:  32 minutes  More than 50% of total time spent on counseling and coordination of care as described above  Current Length of Stay: 2 day(s)  Current Patient Status: Inpatient   Certification Statement:  Patient will continue to require additional hospital stay due to assessments as noted above  Code Status: Level 1 - Full Code        Subjective:     Encountered earlier in the day  Still reports increased/intermittent frequencies of coughing spells which induced further wheezing  At the time of my encounter, she was oxygenating at rest on room air  Objective:     Vitals:   Temp (24hrs), Av 7 °F (36 5 °C), Min:96 8 °F (36 °C), Max:98 1 °F (36 7 °C)    Temp:  [96 8 °F (36 °C)-98 1 °F (36 7 °C)] 97 8 °F (36 6 °C)  HR:  [] 106  Resp:  [19-38] 38  BP: ()/(62-76) 108/62  SpO2:  [93 %-98 %] 94 %  Body mass index is 40 07 kg/m²  Input and Output Summary (last 24 hours):        Intake/Output Summary (Last 24 hours) at 2023 1459  Last data filed at 2023 1200  Gross per 24 hour   Intake 380 ml   Output --   Net 380 ml       Physical Exam:     GENERAL:   Mildly weak/fatigued  HEAD:   Normocephalic - atraumatic  EYES:   PERRL - EOMI   MOUTH:   Mucosa moist  NECK:   Supple - full range of motion  CARDIAC:   Tachycardic - S1/S2 positive  PULMONARY:   Diminished bilaterally with expiratory wheezing   ABDOMEN:   Soft - nontender/nondistended - active bowel sounds  MUSCULOSKELETAL:   Motor strength/range of motion intact  NEUROLOGIC:   Alert/oriented at baseline  SKIN:   Chronic wrinkles/blemishes   PSYCHIATRIC:   Mood/affect stable      Additional Data:     Labs & Recent Cultures:    Results from last 7 days   Lab Units 23  0613   WBC Thousand/uL 22 87*   HEMOGLOBIN g/dL 12 5   HEMATOCRIT % 38 8   PLATELETS Thousands/uL 408*   NEUTROS PCT % 89*   LYMPHS PCT % 5*   MONOS PCT % 5   EOS PCT % 0     Results from last 7 days   Lab Units 01/07/23  0613 01/05/23  1153 01/05/23  1053   POTASSIUM mmol/L 4 0   < > 6 7*   CHLORIDE mmol/L 107   < > 107   CO2 mmol/L 23   < > 21   BUN mg/dL 11   < > 6   CREATININE mg/dL 0 87   < > 0 84   CALCIUM mg/dL 9 1   < > 8 8   ALK PHOS U/L  --   --  126*   ALT U/L  --   --  32   AST U/L  --   --  94*    < > = values in this interval not displayed           Results from last 7 days   Lab Units 01/07/23  1115 01/07/23  0727 01/07/23  8414 01/06/23  2059 01/06/23  1719 01/06/23  1135 01/06/23  0609 01/06/23  0003 01/05/23  2134 01/05/23  1740 01/05/23  1156 01/05/23  0940   POC GLUCOSE mg/dl 161* 130 204* 160* 153* 213* 143* 207* 236* 224* 191* 116                         Lines/Drains:  Invasive Devices     Peripheral Intravenous Line  Duration           Peripheral IV 01/06/23 Right;Ventral (anterior) Forearm <1 day                  Last 24 Hours Medication List:   Current Facility-Administered Medications   Medication Dose Route Frequency Provider Last Rate   • acetaminophen  650 mg Oral Q8H PRN Stephanie Montalvo MD     • ALPRAZolam  0 25 mg Oral HS PRN Harshad Smith PA-C     • benzonatate  100 mg Oral TID PRN Hever Castellanos MD     • budesonide  0 5 mg Nebulization Q12H Stephanie Montalvo MD     • citalopram  10 mg Oral Daily Naomie Carney MD     • famotidine  40 mg Oral BID Naomie Carney MD     • gabapentin  300 mg Oral HS Naomie Carney MD     • heparin (porcine)  7,500 Units Subcutaneous Q8H [address] Naomie Carney MD     • hydrocodone-chlorpheniramine polistirex  2 5 mL Oral Q8H PRN Hever Castellanos MD     • insulin lispro  1-6 Units Subcutaneous TID AC Naomie Carney MD     • insulin lispro  1-6 Units Subcutaneous HS Naomie Carney MD     • ipratropium  0 5 mg Nebulization TID Naomie Carney MD     • levalbuterol  1 25 mg Nebulization TID Naomie Carney MD     • levalbuterol  1 25 mg Nebulization Q4H PRN Karthik Cody MD     • melatonin  9 mg Oral HS Sonja Blair MD     • methylPREDNISolone sodium succinate  40 mg Intravenous Q6H [address] Sonja Blair MD     • pantoprazole  40 mg Oral Early Morning Sonja Blair MD     • pravastatin  40 mg Oral Daily With Tan Paige MD     • sucralfate  1 g Oral BID AC Sonja Blair MD     • zolpidem  10 mg Oral HS Sonja Blair MD                      ** Please Note: This note is constructed using a voice recognition dictation system  An occasional wrong word/phrase or “sound-a-like” substitution may have been picked up by dictation device due to the inherent limitations of voice recognition software  Read the chart carefully and recognize, using reasonable context, where substitutions may have occurred  **

## 2023-01-07 NOTE — ASSESSMENT & PLAN NOTE
Previously requiring BiPAP now progressively weaned down to room air at rest  Setting of severe asthma exacerbation (see above)  Influenza/RSV/COVID screen negative

## 2023-01-08 LAB
ANION GAP SERPL CALCULATED.3IONS-SCNC: 6 MMOL/L (ref 4–13)
BASOPHILS # BLD AUTO: 0.03 THOUSANDS/ÂΜL (ref 0–0.1)
BASOPHILS NFR BLD AUTO: 0 % (ref 0–1)
BUN SERPL-MCNC: 14 MG/DL (ref 5–25)
CALCIUM SERPL-MCNC: 9 MG/DL (ref 8.3–10.1)
CHLORIDE SERPL-SCNC: 107 MMOL/L (ref 96–108)
CO2 SERPL-SCNC: 26 MMOL/L (ref 21–32)
CREAT SERPL-MCNC: 0.71 MG/DL (ref 0.6–1.3)
EOSINOPHIL # BLD AUTO: 0 THOUSAND/ÂΜL (ref 0–0.61)
EOSINOPHIL NFR BLD AUTO: 0 % (ref 0–6)
ERYTHROCYTE [DISTWIDTH] IN BLOOD BY AUTOMATED COUNT: 15.5 % (ref 11.6–15.1)
GFR SERPL CREATININE-BSD FRML MDRD: 96 ML/MIN/1.73SQ M
GLUCOSE SERPL-MCNC: 149 MG/DL (ref 65–140)
GLUCOSE SERPL-MCNC: 163 MG/DL (ref 65–140)
GLUCOSE SERPL-MCNC: 176 MG/DL (ref 65–140)
GLUCOSE SERPL-MCNC: 182 MG/DL (ref 65–140)
GLUCOSE SERPL-MCNC: 196 MG/DL (ref 65–140)
HCT VFR BLD AUTO: 40.8 % (ref 34.8–46.1)
HGB BLD-MCNC: 13.1 G/DL (ref 11.5–15.4)
IMM GRANULOCYTES # BLD AUTO: 0.28 THOUSAND/UL (ref 0–0.2)
IMM GRANULOCYTES NFR BLD AUTO: 2 % (ref 0–2)
LYMPHOCYTES # BLD AUTO: 1.13 THOUSANDS/ÂΜL (ref 0.6–4.47)
LYMPHOCYTES NFR BLD AUTO: 8 % (ref 14–44)
MCH RBC QN AUTO: 30.5 PG (ref 26.8–34.3)
MCHC RBC AUTO-ENTMCNC: 32.1 G/DL (ref 31.4–37.4)
MCV RBC AUTO: 95 FL (ref 82–98)
MONOCYTES # BLD AUTO: 0.81 THOUSAND/ÂΜL (ref 0.17–1.22)
MONOCYTES NFR BLD AUTO: 6 % (ref 4–12)
NEUTROPHILS # BLD AUTO: 11.88 THOUSANDS/ÂΜL (ref 1.85–7.62)
NEUTS SEG NFR BLD AUTO: 84 % (ref 43–75)
NRBC BLD AUTO-RTO: 0 /100 WBCS
PLATELET # BLD AUTO: 398 THOUSANDS/UL (ref 149–390)
PMV BLD AUTO: 10.3 FL (ref 8.9–12.7)
POTASSIUM SERPL-SCNC: 4 MMOL/L (ref 3.5–5.3)
RBC # BLD AUTO: 4.29 MILLION/UL (ref 3.81–5.12)
SODIUM SERPL-SCNC: 139 MMOL/L (ref 135–147)
WBC # BLD AUTO: 14.13 THOUSAND/UL (ref 4.31–10.16)

## 2023-01-08 RX ORDER — POLYETHYLENE GLYCOL 3350 17 G/17G
17 POWDER, FOR SOLUTION ORAL DAILY PRN
Status: DISCONTINUED | OUTPATIENT
Start: 2023-01-08 | End: 2023-01-11 | Stop reason: HOSPADM

## 2023-01-08 RX ORDER — ALPRAZOLAM 0.25 MG/1
0.25 TABLET ORAL ONCE
Status: COMPLETED | OUTPATIENT
Start: 2023-01-08 | End: 2023-01-08

## 2023-01-08 RX ORDER — MAGNESIUM SULFATE HEPTAHYDRATE 40 MG/ML
2 INJECTION, SOLUTION INTRAVENOUS ONCE
Status: COMPLETED | OUTPATIENT
Start: 2023-01-08 | End: 2023-01-08

## 2023-01-08 RX ADMIN — MAGNESIUM HYDROXIDE 30 ML: 400 SUSPENSION ORAL at 22:30

## 2023-01-08 RX ADMIN — BUDESONIDE 0.5 MG: 0.5 INHALANT ORAL at 20:35

## 2023-01-08 RX ADMIN — HEPARIN SODIUM 7500 UNITS: 5000 INJECTION INTRAVENOUS; SUBCUTANEOUS at 06:29

## 2023-01-08 RX ADMIN — ALPRAZOLAM 0.25 MG: 0.25 TABLET ORAL at 08:52

## 2023-01-08 RX ADMIN — FAMOTIDINE 40 MG: 20 TABLET ORAL at 08:20

## 2023-01-08 RX ADMIN — LEVALBUTEROL HYDROCHLORIDE 1.25 MG: 1.25 SOLUTION, CONCENTRATE RESPIRATORY (INHALATION) at 20:35

## 2023-01-08 RX ADMIN — MELATONIN 9 MG: at 21:59

## 2023-01-08 RX ADMIN — METHYLPREDNISOLONE SODIUM SUCCINATE 40 MG: 40 INJECTION, POWDER, FOR SOLUTION INTRAMUSCULAR; INTRAVENOUS at 17:03

## 2023-01-08 RX ADMIN — INSULIN LISPRO 2 UNITS: 100 INJECTION, SOLUTION INTRAVENOUS; SUBCUTANEOUS at 16:53

## 2023-01-08 RX ADMIN — IPRATROPIUM BROMIDE 0.5 MG: 0.5 SOLUTION RESPIRATORY (INHALATION) at 02:33

## 2023-01-08 RX ADMIN — HEPARIN SODIUM 7500 UNITS: 5000 INJECTION INTRAVENOUS; SUBCUTANEOUS at 21:59

## 2023-01-08 RX ADMIN — Medication 2.5 ML: at 03:22

## 2023-01-08 RX ADMIN — BUDESONIDE 0.5 MG: 0.5 INHALANT ORAL at 06:56

## 2023-01-08 RX ADMIN — HEPARIN SODIUM 7500 UNITS: 5000 INJECTION INTRAVENOUS; SUBCUTANEOUS at 14:33

## 2023-01-08 RX ADMIN — PANTOPRAZOLE SODIUM 40 MG: 40 TABLET, DELAYED RELEASE ORAL at 06:30

## 2023-01-08 RX ADMIN — LEVALBUTEROL HYDROCHLORIDE 1.25 MG: 1.25 SOLUTION, CONCENTRATE RESPIRATORY (INHALATION) at 06:55

## 2023-01-08 RX ADMIN — METHYLPREDNISOLONE SODIUM SUCCINATE 40 MG: 40 INJECTION, POWDER, FOR SOLUTION INTRAMUSCULAR; INTRAVENOUS at 06:29

## 2023-01-08 RX ADMIN — LEVALBUTEROL HYDROCHLORIDE 1.25 MG: 1.25 SOLUTION, CONCENTRATE RESPIRATORY (INHALATION) at 13:32

## 2023-01-08 RX ADMIN — IPRATROPIUM BROMIDE 0.5 MG: 0.5 SOLUTION RESPIRATORY (INHALATION) at 13:33

## 2023-01-08 RX ADMIN — IPRATROPIUM BROMIDE 0.5 MG: 0.5 SOLUTION RESPIRATORY (INHALATION) at 20:35

## 2023-01-08 RX ADMIN — GABAPENTIN 300 MG: 300 CAPSULE ORAL at 21:59

## 2023-01-08 RX ADMIN — FAMOTIDINE 40 MG: 20 TABLET ORAL at 16:51

## 2023-01-08 RX ADMIN — INSULIN LISPRO 1 UNITS: 100 INJECTION, SOLUTION INTRAVENOUS; SUBCUTANEOUS at 12:03

## 2023-01-08 RX ADMIN — MAGNESIUM SULFATE HEPTAHYDRATE 2 G: 40 INJECTION, SOLUTION INTRAVENOUS at 16:50

## 2023-01-08 RX ADMIN — SUCRALFATE 1 G: 1 TABLET ORAL at 06:29

## 2023-01-08 RX ADMIN — Medication 2.5 ML: at 16:50

## 2023-01-08 RX ADMIN — METHYLPREDNISOLONE SODIUM SUCCINATE 40 MG: 40 INJECTION, POWDER, FOR SOLUTION INTRAMUSCULAR; INTRAVENOUS at 12:03

## 2023-01-08 RX ADMIN — CITALOPRAM HYDROBROMIDE 10 MG: 10 TABLET ORAL at 08:20

## 2023-01-08 RX ADMIN — ZOLPIDEM TARTRATE 10 MG: 5 TABLET ORAL at 21:59

## 2023-01-08 RX ADMIN — IPRATROPIUM BROMIDE 0.5 MG: 0.5 SOLUTION RESPIRATORY (INHALATION) at 06:56

## 2023-01-08 RX ADMIN — PRAVASTATIN SODIUM 40 MG: 40 TABLET ORAL at 16:50

## 2023-01-08 RX ADMIN — Medication 2.5 ML: at 09:39

## 2023-01-08 RX ADMIN — LEVALBUTEROL HYDROCHLORIDE 1.25 MG: 1.25 SOLUTION, CONCENTRATE RESPIRATORY (INHALATION) at 02:33

## 2023-01-08 RX ADMIN — SUCRALFATE 1 G: 1 TABLET ORAL at 16:50

## 2023-01-08 RX ADMIN — INSULIN LISPRO 1 UNITS: 100 INJECTION, SOLUTION INTRAVENOUS; SUBCUTANEOUS at 22:03

## 2023-01-08 NOTE — ASSESSMENT & PLAN NOTE
Multiple hospitalizations through the last year for similar issue  Initially presented in the outpatient setting for an elective procedure (EGD w/ manometry) which was aborted due to intractable wheezing found to be in status asthmaticus -> required a loading dose of IV Solu-Medrol coupled w/ IV magnesium and terbutaline, and urgent Albuterol nebulizer treatment  Initially BIPAP dependent, now progressively wean down to room air - transferred out of ICU on 1/6  Continue IV Solu-Medrol (w/ plan for progressive tapering), Pulmicort, and nebulizer treatments -> additional dose of IV magnesium sulfate administered today  Continues to report refractory coughing which induces more wheezing episodes -> optimized Tussionex for cough control  Further management/recommendations per pulmonology

## 2023-01-08 NOTE — ASSESSMENT & PLAN NOTE
Lab Results   Component Value Date    HGBA1C 4 9 10/04/2022     Well-controlled  Continue SSI coverage per Accu-Cheks - add basal QHS insulin to optimize blood sugar coverage  Carbohydrate restricted diet  Anticipate a degree of blood sugar fluctuation with corticosteroid administration

## 2023-01-08 NOTE — PROGRESS NOTES
[address] [facility]  Progress Note - Lucie Rae 1965, 56 y o  female MRN: [id number]  Unit/Bed#: [facility] 206-01 Encounter: [id number]  Primary Care Provider: Ruma Waggoner   Date and time admitted to hospital: 1/5/2023 10:30 AM      * Severe asthma with acute exacerbation  Assessment & Plan  Multiple hospitalizations through the last year for similar issue  Initially presented in the outpatient setting for an elective procedure (EGD w/ manometry) which was aborted due to intractable wheezing found to be in status asthmaticus -> required a loading dose of IV Solu-Medrol coupled w/ IV magnesium and terbutaline, and urgent Albuterol nebulizer treatment  Initially BIPAP dependent, now progressively wean down to room air - transferred out of ICU on 1/6  Continue IV Solu-Medrol (w/ plan for progressive tapering), Pulmicort, and nebulizer treatments  Continues to report refractory coughing which induces more wheezing episodes -> optimized Tussionex for cough control  Further management/recommendations per pulmonology    Acute respiratory failure with hypoxia   Assessment & Plan  Previously requiring BiPAP now progressively weaned down to room air at rest  Setting of severe asthma exacerbation (see above)  Influenza/RSV/COVID screen negative    GERD (gastroesophageal reflux disease)  Assessment & Plan  Continue PPI/Pepcid/Carafate regimen    Hyperlipidemia  Assessment & Plan  Continue statin    Insulin-dependent diabetes mellitus  Assessment & Plan  Lab Results   Component Value Date    HGBA1C 4 9 10/04/2022     Well-controlled  Continue SSI coverage per Accu-Cheks   Carbohydrate restricted diet  Anticipate a degree of blood sugar fluctuation with corticosteroid administration    Depression with anxiety  Assessment & Plan  Continue Celexa and PRN Xanax      DVT Prophylaxis:  Heparin SC       Patient Centered Rounds:  I have performed bedside rounds and discussed plan of care with nursing today  Discussions with Specialists or Other Care Team Provider:  see above assessments if applicable    Education and Discussions with Family / Patient:  Patient, along with /son, at bedside today    Time Spent for Care:  32 minutes  More than 50% of total time spent on counseling and coordination of care as described above  Current Length of Stay: 3 day(s)  Current Patient Status: Inpatient   Certification Statement:  Patient will continue to require additional hospital stay due to assessments as noted above  Code Status: Level 1 - Full Code        Subjective:     Seen and examined earlier today  Still reports intermittent wheezing and coughing spells, however, states pain has slightly improved, when compared to yesterday  Per nursing, she also reports occasional anxiety  Objective:     Vitals:   Temp (24hrs), Av 1 °F (36 7 °C), Min:97 8 °F (36 6 °C), Max:98 3 °F (36 8 °C)    Temp:  [97 8 °F (36 6 °C)-98 3 °F (36 8 °C)] 98 3 °F (36 8 °C)  HR:  [78-88] 78  Resp:  [18-29] 18  BP: (104-120)/(61-82) 120/82  SpO2:  [93 %-96 %] 93 %  Body mass index is 40 07 kg/m²  Input and Output Summary (last 24 hours):        Intake/Output Summary (Last 24 hours) at 2023 1500  Last data filed at 2023 0901  Gross per 24 hour   Intake 840 ml   Output --   Net 840 ml       Physical Exam:     GENERAL:   Remains weak/fatigued  HEAD:   Normocephalic - atraumatic  MOUTH:   Mucosa moist  NECK:   Supple - full range of motion  CARDIAC:   Rate controlled - S1/S2 positive  PULMONARY:   Diminished bilaterally with expiratory wheezing   ABDOMEN:   Soft - nontender/nondistended - active bowel sounds  MUSCULOSKELETAL:   Motor strength/range of motion fairly intact  NEUROLOGIC:   Alert/oriented at baseline  SKIN:   Chronic wrinkles/blemishes   PSYCHIATRIC:   Mood/affect stable      Additional Data:     Labs & Recent Cultures:    Results from last 7 days   Lab Units 23  0640   WBC Thousand/uL 14 13*   HEMOGLOBIN g/dL 13 1   HEMATOCRIT % 40 8   PLATELETS Thousands/uL 398*   NEUTROS PCT % 84*   LYMPHS PCT % 8*   MONOS PCT % 6   EOS PCT % 0     Results from last 7 days   Lab Units 01/08/23  0640 01/05/23  1153 01/05/23  1053   POTASSIUM mmol/L 4 0   < > 6 7*   CHLORIDE mmol/L 107   < > 107   CO2 mmol/L 26   < > 21   BUN mg/dL 14   < > 6   CREATININE mg/dL 0 71   < > 0 84   CALCIUM mg/dL 9 0   < > 8 8   ALK PHOS U/L  --   --  126*   ALT U/L  --   --  32   AST U/L  --   --  94*    < > = values in this interval not displayed           Results from last 7 days   Lab Units 01/08/23  1117 01/08/23  0647 01/07/23  2112 01/07/23  1543 01/07/23  1115 01/07/23  0727 01/07/23  0611 01/06/23  2059 01/06/23  1719 01/06/23  1135 01/06/23  0609 01/06/23  0003   POC GLUCOSE mg/dl 182* 149* 158* 164* 161* 130 204* 160* 153* 213* 143* 207*                         Lines/Drains:  Invasive Devices     Peripheral Intravenous Line  Duration           Peripheral IV 01/06/23 Right;Ventral (anterior) Forearm 1 day                  Last 24 Hours Medication List:   Current Facility-Administered Medications   Medication Dose Route Frequency Provider Last Rate   • acetaminophen  650 mg Oral Q8H PRN Stephanie Montalvo MD     • ALPRAZolam  0 25 mg Oral HS PRN Harshad Smith PA-C     • budesonide  0 5 mg Nebulization Q12H Naomie Carney MD     • citalopram  10 mg Oral Daily Naomie Carney MD     • famotidine  40 mg Oral BID Naomie Carney MD     • gabapentin  300 mg Oral HS Naomie Carney MD     • heparin (porcine)  7,500 Units Subcutaneous Q8H [address] Naomie Carney MD     • hydrocodone-chlorpheniramine polistirex  2 5 mL Oral Q6H PRN Vladimir Wilson MD     • insulin lispro  1-6 Units Subcutaneous TID AC Naomie Carney MD     • insulin lispro  1-6 Units Subcutaneous HS Naomie Carney MD     • ipratropium  0 5 mg Nebulization TID Naomie Carney MD     • ipratropium  0 5 mg Nebulization Q4H PRN Nella Argueta DO     • levalbuterol  1 25 mg Nebulization TID Gui Ortega MD     • levalbuterol  1 25 mg Nebulization Q4H PRN Rosi Downing MD     • melatonin  9 mg Oral HS Gui Ortega MD     • methylPREDNISolone sodium succinate  40 mg Intravenous Q6H [facility] Stephanie Cruz MD     • pantoprazole  40 mg Oral Early Morning Gui Ortega MD     • pravastatin  40 mg Oral Daily With Shin Tesfaye MD     • sucralfate  1 g Oral BID AC Gui Ortega MD     • zolpidem  10 mg Oral HS Gui Ortega MD                      ** Please Note: This note is constructed using a voice recognition dictation system  An occasional wrong word/phrase or “sound-a-like” substitution may have been picked up by dictation device due to the inherent limitations of voice recognition software  Read the chart carefully and recognize, using reasonable context, where substitutions may have occurred  **

## 2023-01-08 NOTE — PLAN OF CARE
Problem: MOBILITY - ADULT  Goal: Maintain or return to baseline ADL function  Description: INTERVENTIONS:  -  Assess patient's ability to carry out ADLs; assess patient's baseline for ADL function and identify physical deficits which impact ability to perform ADLs (bathing, care of mouth/teeth, toileting, grooming, dressing, etc )  - Assess/evaluate cause of self-care deficits   - Assess range of motion  - Assess patient's mobility; develop plan if impaired  - Assess patient's need for assistive devices and provide as appropriate  - Encourage maximum independence but intervene and supervise when necessary  - Involve family in performance of ADLs  - Assess for home care needs following discharge   - Consider OT consult to assist with ADL evaluation and planning for discharge  - Provide patient education as appropriate  Outcome: Progressing  Goal: Maintains/Returns to pre admission functional level  Description: INTERVENTIONS:  - Perform BMAT or MOVE assessment daily    - Set and communicate daily mobility goal to care team and patient/family/caregiver  - Collaborate with rehabilitation services on mobility goals if consulted  - Perform Range of Motion   times a day  - Reposition patient every   hours    - Dangle patient   times a day  - Stand patient   times a day  - Ambulate patient   times a day  - Out of bed to chair   times a day   - Out of bed for meals   times a day  - Out of bed for toileting  - Record patient progress and toleration of activity level   Outcome: Progressing     Problem: Prexisting or High Potential for Compromised Skin Integrity  Goal: Skin integrity is maintained or improved  Description: INTERVENTIONS:  - Identify patients at risk for skin breakdown  - Assess and monitor skin integrity  - Assess and monitor nutrition and hydration status  - Monitor labs   - Assess for incontinence   - Turn and reposition patient  - Assist with mobility/ambulation  - Relieve pressure over bony prominences  - Avoid friction and shearing  - Provide appropriate hygiene as needed including keeping skin clean and dry  - Evaluate need for skin moisturizer/barrier cream  - Collaborate with interdisciplinary team   - Patient/family teaching  - Consider wound care consult   Outcome: Progressing     Problem: Potential for Falls  Goal: Patient will remain free of falls  Description: INTERVENTIONS:  - Educate patient/family on patient safety including physical limitations  - Instruct patient to call for assistance with activity   - Consult OT/PT to assist with strengthening/mobility   - Keep Call bell within reach  - Keep bed low and locked with side rails adjusted as appropriate  - Keep care items and personal belongings within reach  - Initiate and maintain comfort rounds  - Make Fall Risk Sign visible to staff  - Offer Toileting every   Hours, in advance of need  - Initiate/Maintain  alarm  - Obtain necessary fall risk management equipment:           - Apply yellow socks and bracelet for high fall risk patients  - Consider moving patient to room near nurses station  Outcome: Progressing     Problem: PAIN - ADULT  Goal: Verbalizes/displays adequate comfort level or baseline comfort level  Description: Interventions:  - Encourage patient to monitor pain and request assistance  - Assess pain using appropriate pain scale  - Administer analgesics based on type and severity of pain and evaluate response  - Implement non-pharmacological measures as appropriate and evaluate response  - Consider cultural and social influences on pain and pain management  - Notify physician/advanced practitioner if interventions unsuccessful or patient reports new pain  Outcome: Progressing     Problem: INFECTION - ADULT  Goal: Absence or prevention of progression during hospitalization  Description: INTERVENTIONS:  - Assess and monitor for signs and symptoms of infection  - Monitor lab/diagnostic results  - Monitor all insertion sites, i e  indwelling lines, tubes, and drains  - Monitor endotracheal if appropriate and nasal secretions for changes in amount and color  - [place] appropriate cooling/warming therapies per order  - Administer medications as ordered  - Instruct and encourage patient and family to use good hand hygiene technique  - Identify and instruct in appropriate isolation precautions for identified infection/condition  Outcome: Progressing  Goal: Absence of fever/infection during neutropenic period  Description: INTERVENTIONS:  - Monitor WBC    Outcome: Progressing     Problem: INFECTION - ADULT  Goal: Absence or prevention of progression during hospitalization  Description: INTERVENTIONS:  - Assess and monitor for signs and symptoms of infection  - Monitor lab/diagnostic results  - Monitor all insertion sites, i e  indwelling lines, tubes, and drains  - Monitor endotracheal if appropriate and nasal secretions for changes in amount and color  - [place] appropriate cooling/warming therapies per order  - Administer medications as ordered  - Instruct and encourage patient and family to use good hand hygiene technique  - Identify and instruct in appropriate isolation precautions for identified infection/condition  Outcome: Progressing  Goal: Absence of fever/infection during neutropenic period  Description: INTERVENTIONS:  - Monitor WBC    Outcome: Progressing     Problem: DISCHARGE PLANNING  Goal: Discharge to home or other facility with appropriate resources  Description: INTERVENTIONS:  - Identify barriers to discharge w/patient and caregiver  - Arrange for needed discharge resources and transportation as appropriate  - Identify discharge learning needs (meds, wound care, etc )  - Arrange for interpretive services to assist at discharge as needed  - Refer to Case Management Department for coordinating discharge planning if the patient needs post-hospital services based on physician/advanced practitioner order or complex needs related to functional status, cognitive ability, or social support system  Outcome: Progressing     Problem: Knowledge Deficit  Goal: Patient/family/caregiver demonstrates understanding of disease process, treatment plan, medications, and discharge instructions  Description: Complete learning assessment and assess knowledge base    Interventions:  - Provide teaching at level of understanding  - Provide teaching via preferred learning methods  Outcome: Progressing

## 2023-01-08 NOTE — PROGRESS NOTES
PULMONOLOGY PROGRESS NOTE     Name: Kathlyn Leventhal   Age & Sex: 56 y o  female   MRN: [id number]  Unit/Bed#: [facility] 206   Encounter: [id number]    PATIENT INFORMATION     Name: Kathlyn Leventhal   Age & Sex: 56 y o  female   MRN: [id number]  Hospital Stay Days: 3    ASSESSMENT/PLAN     1  Acute asthma exacerbation  - History of frequent exacerbations  - Was undergoing elective EGD  that had to be aborted due to coughing, wheezing and desaturation  Patient felt to be in status asthmaticus at that time  - Patient states that she is on a biologic - Tezspire and feels she has less exacerbations on this medication  Also on ICS/LABA and singulair as an outpatient  - Improving on IV glucocorticoids and nebs    2  Chronic cough  3  GERD  - On PPI and pepcid for reflux    4  History of rectus abdominus muscle hematoma due to coughing    Plan:  - Continue Pulmicort, atrovent, zopenex nebs scheduled  - Solu-medrol 40mg IV Q6, hopefully can decrease to BID tomorrow  - PPI, H2 blocker and sucralfate  - Monitor blood sugars and adjust insulin regimen as necessary while on glucocorticoids    - DVT ppx      SUBJECTIVE     Patient seen and examined  No acute events overnight  Patient states that she continues to have a dry cough and this keeps her up at night  She did not sleep well at all last night  She has been ambulating a little better  OBJECTIVE     Vitals:    23 1945 23 2300 23 0236 23 0820   BP:    120/82   BP Location:    Left arm   Pulse:    78   Resp:  (!) 24  18   Temp:  98 3 °F (36 8 °C) 98 3 °F (36 8 °C) 98 3 °F (36 8 °C)   TempSrc:  Oral Oral Oral   SpO2: 96%   93%   Weight:       Height:          Temperature:   Temp (24hrs), Av 1 °F (36 7 °C), Min:97 8 °F (36 6 °C), Max:98 3 °F (36 8 °C)    Temperature: 98 3 °F (36 8 °C)  Intake & Output:  I/O        07 07 07    P  O  240 1040     I V  (mL/kg)       Total Intake(mL/kg) 240 (2 7) 1040 (11 6)     Net +240 +1040            Unmeasured Urine Occurrence  1 x     Unmeasured Stool Occurrence 1 x          Weights:        Body mass index is 40 07 kg/m²  Weight (last 2 days)     Date/Time Weight    01/06/23 0600 90 (198 41)        Physical Exam  Vitals and nursing note reviewed  Constitutional:       General: She is not in acute distress  Appearance: Normal appearance  She is well-developed  She is not ill-appearing  HENT:      Head: Normocephalic and atraumatic  Mouth/Throat:      Pharynx: No oropharyngeal exudate or posterior oropharyngeal erythema  Eyes:      General: No scleral icterus  Conjunctiva/sclera: Conjunctivae normal    Cardiovascular:      Rate and Rhythm: Normal rate and regular rhythm  Heart sounds: No murmur heard  No friction rub  No gallop  Pulmonary:      Effort: No respiratory distress  Breath sounds: Wheezing present  No rhonchi or rales  Comments: Dry coughing throughout exam  Abdominal:      General: Bowel sounds are normal  There is no distension  Palpations: Abdomen is soft  Tenderness: There is no abdominal tenderness  There is no guarding  Musculoskeletal:         General: No swelling  Right lower leg: No edema  Left lower leg: No edema  Skin:     General: Skin is warm and dry  Capillary Refill: Capillary refill takes less than 2 seconds  Neurological:      General: No focal deficit present  Mental Status: She is alert and oriented to person, place, and time  Psychiatric:         Mood and Affect: Mood normal        LABORATORY DATA     Labs: I have personally reviewed pertinent reports    Results from last 7 days   Lab Units 01/08/23  0640 01/07/23  0613 01/06/23  0600   WBC Thousand/uL 14 13* 22 87* 24 35*   HEMOGLOBIN g/dL 13 1 12 5 13 2   HEMATOCRIT % 40 8 38 8 40 6   PLATELETS Thousands/uL 398* 408* 407*   NEUTROS PCT % 84* 89* 91*   MONOS PCT % 6 5 3*      Results from last 7 days   Lab Units 01/08/23  0640 01/07/23  0613 01/06/23  0600 01/05/23  1153 01/05/23  1053   POTASSIUM mmol/L 4 0 4 0 4 6   < > 6 7*   CHLORIDE mmol/L 107 107 111*   < > 107   CO2 mmol/L 26 23 23   < > 21   BUN mg/dL 14 11 7   < > 6   CREATININE mg/dL 0 71 0 87 0 66   < > 0 84   CALCIUM mg/dL 9 0 9 1 9 3   < > 8 8   ALK PHOS U/L  --   --   --   --  126*   ALT U/L  --   --   --   --  32   AST U/L  --   --   --   --  94*    < > = values in this interval not displayed  Results from last 7 days   Lab Units 01/05/23  1053   MAGNESIUM mg/dL 2 3     Results from last 7 days   Lab Units 01/05/23  1053   PHOSPHORUS mg/dL 2 8              IMAGING & DIAGNOSTIC TESTING     Radiology Results: I have personally reviewed pertinent reports  EGD    Result Date: 1/5/2023  Impression: - Esophagus, stomach, duodenum endoscopically appeared normal - Hill 4 hiatal hernia seen - Procedure aborted due to patient desaturation and need for breathing treatments post procedure  RECOMMENDATION:  Recommend manometry catheter placement without sedation Pt and family updated post procedure  Isaura Argueta,      XR chest portable ICU    Result Date: 1/5/2023  Impression: New obscuration of the left costophrenic angle is favored to be due to overlying soft tissue structures, though a pleural effusion is not excluded  If there is clinical concern for an effusion, and upright frontal and lateral chest radiograph can be considered for further assessment  Workstation performed: [id number]     Other Diagnostic Testing: I have personally reviewed pertinent reports      ACTIVE MEDICATIONS     Current Facility-Administered Medications   Medication Dose Route Frequency   • acetaminophen (TYLENOL) tablet 650 mg  650 mg Oral Q8H PRN   • ALPRAZolam (XANAX) tablet 0 25 mg  0 25 mg Oral HS PRN   • budesonide (PULMICORT) inhalation solution 0 5 mg  0 5 mg Nebulization Q12H   • citalopram (CeleXA) tablet 10 mg  10 mg Oral Daily   • famotidine (PEPCID) tablet 40 mg  40 mg Oral BID   • gabapentin (NEURONTIN) capsule 300 mg  300 mg Oral HS   • heparin (porcine) subcutaneous injection 7,500 Units  7,500 Units Subcutaneous Q8H [facility]   • hydrocodone-chlorpheniramine polistirex (TUSSIONEX) ER suspension 2 5 mL  2 5 mL Oral Q6H PRN   • insulin lispro (HumaLOG) 100 units/mL subcutaneous injection 1-6 Units  1-6 Units Subcutaneous TID AC   • insulin lispro (HumaLOG) 100 units/mL subcutaneous injection 1-6 Units  1-6 Units Subcutaneous HS   • ipratropium (ATROVENT) 0 02 % inhalation solution 0 5 mg  0 5 mg Nebulization TID   • ipratropium (ATROVENT) 0 02 % inhalation solution 0 5 mg  0 5 mg Nebulization Q4H PRN   • levalbuterol (XOPENEX) inhalation solution 1 25 mg  1 25 mg Nebulization TID   • levalbuterol (XOPENEX) inhalation solution 1 25 mg  1 25 mg Nebulization Q4H PRN   • melatonin tablet 9 mg  9 mg Oral HS   • methylPREDNISolone sodium succinate (Solu-MEDROL) injection 40 mg  40 mg Intravenous Q6H [facility]   • pantoprazole (PROTONIX) EC tablet 40 mg  40 mg Oral Early Morning   • pravastatin (PRAVACHOL) tablet 40 mg  40 mg Oral Daily With Dinner   • sucralfate (CARAFATE) tablet 1 g  1 g Oral BID AC   • zolpidem (AMBIEN) tablet 10 mg  10 mg Oral HS       VTE Pharmacologic Prophylaxis: Heparin  VTE Mechanical Prophylaxis: sequential compression device      Disclaimer: Portions of the record may have been created with voice recognition software  Occasional wrong word or "sound a like" substitutions may have occurred due to the inherent limitations of voice recognition software  Careful consideration should be taken to recognize, using context, where substitutions have occurred      Alisson Kat DO  Pulmonary/Critical Care Fellowship PGY-V  [facility]

## 2023-01-09 ENCOUNTER — APPOINTMENT (INPATIENT)
Dept: RADIOLOGY | Facility: HOSPITAL | Age: 58
End: 2023-01-09

## 2023-01-09 LAB
ANION GAP SERPL CALCULATED.3IONS-SCNC: 6 MMOL/L (ref 4–13)
ANISOCYTOSIS BLD QL SMEAR: PRESENT
BASOPHILS # BLD MANUAL: 0 THOUSAND/UL (ref 0–0.1)
BASOPHILS NFR MAR MANUAL: 0 % (ref 0–1)
BUN SERPL-MCNC: 17 MG/DL (ref 5–25)
CALCIUM SERPL-MCNC: 9 MG/DL (ref 8.3–10.1)
CHLORIDE SERPL-SCNC: 104 MMOL/L (ref 96–108)
CO2 SERPL-SCNC: 29 MMOL/L (ref 21–32)
CREAT SERPL-MCNC: 0.7 MG/DL (ref 0.6–1.3)
EOSINOPHIL # BLD MANUAL: 0 THOUSAND/UL (ref 0–0.4)
EOSINOPHIL NFR BLD MANUAL: 0 % (ref 0–6)
ERYTHROCYTE [DISTWIDTH] IN BLOOD BY AUTOMATED COUNT: 15 % (ref 11.6–15.1)
GFR SERPL CREATININE-BSD FRML MDRD: 97 ML/MIN/1.73SQ M
GIANT PLATELETS BLD QL SMEAR: PRESENT
GLUCOSE SERPL-MCNC: 146 MG/DL (ref 65–140)
GLUCOSE SERPL-MCNC: 147 MG/DL (ref 65–140)
GLUCOSE SERPL-MCNC: 174 MG/DL (ref 65–140)
GLUCOSE SERPL-MCNC: 188 MG/DL (ref 65–140)
GLUCOSE SERPL-MCNC: 270 MG/DL (ref 65–140)
HCT VFR BLD AUTO: 41.1 % (ref 34.8–46.1)
HGB BLD-MCNC: 13.4 G/DL (ref 11.5–15.4)
LYMPHOCYTES # BLD AUTO: 17 % (ref 14–44)
LYMPHOCYTES # BLD AUTO: 2.22 THOUSAND/UL (ref 0.6–4.47)
MCH RBC QN AUTO: 30.5 PG (ref 26.8–34.3)
MCHC RBC AUTO-ENTMCNC: 32.6 G/DL (ref 31.4–37.4)
MCV RBC AUTO: 93 FL (ref 82–98)
METAMYELOCYTES NFR BLD MANUAL: 1 % (ref 0–1)
MONOCYTES # BLD AUTO: 0.78 THOUSAND/UL (ref 0–1.22)
MONOCYTES NFR BLD: 6 % (ref 4–12)
NEUTROPHILS # BLD MANUAL: 9.81 THOUSAND/UL (ref 1.85–7.62)
NEUTS BAND NFR BLD MANUAL: 1 % (ref 0–8)
NEUTS SEG NFR BLD AUTO: 74 % (ref 43–75)
PLATELET # BLD AUTO: 402 THOUSANDS/UL (ref 149–390)
PLATELET BLD QL SMEAR: ADEQUATE
PMV BLD AUTO: 10.1 FL (ref 8.9–12.7)
POLYCHROMASIA BLD QL SMEAR: PRESENT
POTASSIUM SERPL-SCNC: 3.9 MMOL/L (ref 3.5–5.3)
RBC # BLD AUTO: 4.4 MILLION/UL (ref 3.81–5.12)
RBC MORPH BLD: PRESENT
SODIUM SERPL-SCNC: 139 MMOL/L (ref 135–147)
VARIANT LYMPHS # BLD AUTO: 1 %
WBC # BLD AUTO: 13.08 THOUSAND/UL (ref 4.31–10.16)

## 2023-01-09 RX ORDER — IPRATROPIUM BROMIDE AND ALBUTEROL SULFATE 2.5; .5 MG/3ML; MG/3ML
3 SOLUTION RESPIRATORY (INHALATION) EVERY 6 HOURS PRN
Status: DISCONTINUED | OUTPATIENT
Start: 2023-01-09 | End: 2023-01-11 | Stop reason: HOSPADM

## 2023-01-09 RX ORDER — METHYLPREDNISOLONE SODIUM SUCCINATE 40 MG/ML
40 INJECTION, POWDER, LYOPHILIZED, FOR SOLUTION INTRAMUSCULAR; INTRAVENOUS EVERY 12 HOURS SCHEDULED
Status: COMPLETED | OUTPATIENT
Start: 2023-01-09 | End: 2023-01-10

## 2023-01-09 RX ORDER — FORMOTEROL FUMARATE 20 UG/2ML
20 SOLUTION RESPIRATORY (INHALATION)
Status: DISCONTINUED | OUTPATIENT
Start: 2023-01-09 | End: 2023-01-11 | Stop reason: HOSPADM

## 2023-01-09 RX ORDER — ALPRAZOLAM 0.5 MG/1
0.5 TABLET ORAL DAILY PRN
Status: DISCONTINUED | OUTPATIENT
Start: 2023-01-09 | End: 2023-01-11 | Stop reason: HOSPADM

## 2023-01-09 RX ORDER — MAGNESIUM SULFATE HEPTAHYDRATE 40 MG/ML
2 INJECTION, SOLUTION INTRAVENOUS ONCE
Status: COMPLETED | OUTPATIENT
Start: 2023-01-09 | End: 2023-01-09

## 2023-01-09 RX ORDER — INSULIN GLARGINE 100 [IU]/ML
5 INJECTION, SOLUTION SUBCUTANEOUS
Status: DISCONTINUED | OUTPATIENT
Start: 2023-01-09 | End: 2023-01-10

## 2023-01-09 RX ORDER — LEVALBUTEROL 1.25 MG/.5ML
1.25 SOLUTION, CONCENTRATE RESPIRATORY (INHALATION)
Status: DISCONTINUED | OUTPATIENT
Start: 2023-01-09 | End: 2023-01-09

## 2023-01-09 RX ADMIN — HEPARIN SODIUM 7500 UNITS: 5000 INJECTION INTRAVENOUS; SUBCUTANEOUS at 16:46

## 2023-01-09 RX ADMIN — MAGNESIUM SULFATE HEPTAHYDRATE 2 G: 40 INJECTION, SOLUTION INTRAVENOUS at 10:37

## 2023-01-09 RX ADMIN — LEVALBUTEROL HYDROCHLORIDE 1.25 MG: 1.25 SOLUTION, CONCENTRATE RESPIRATORY (INHALATION) at 05:27

## 2023-01-09 RX ADMIN — MELATONIN 9 MG: at 22:15

## 2023-01-09 RX ADMIN — IPRATROPIUM BROMIDE 0.5 MG: 0.5 SOLUTION RESPIRATORY (INHALATION) at 05:27

## 2023-01-09 RX ADMIN — GABAPENTIN 300 MG: 300 CAPSULE ORAL at 22:15

## 2023-01-09 RX ADMIN — ALPRAZOLAM 0.5 MG: 0.5 TABLET ORAL at 22:15

## 2023-01-09 RX ADMIN — FAMOTIDINE 40 MG: 20 TABLET ORAL at 17:05

## 2023-01-09 RX ADMIN — Medication 2.5 ML: at 02:31

## 2023-01-09 RX ADMIN — INSULIN GLARGINE 5 UNITS: 100 INJECTION, SOLUTION SUBCUTANEOUS at 22:14

## 2023-01-09 RX ADMIN — INSULIN LISPRO 1 UNITS: 100 INJECTION, SOLUTION INTRAVENOUS; SUBCUTANEOUS at 22:15

## 2023-01-09 RX ADMIN — HEPARIN SODIUM 7500 UNITS: 5000 INJECTION INTRAVENOUS; SUBCUTANEOUS at 22:14

## 2023-01-09 RX ADMIN — SUCRALFATE 1 G: 1 TABLET ORAL at 06:18

## 2023-01-09 RX ADMIN — IPRATROPIUM BROMIDE 0.5 MG: 0.5 SOLUTION RESPIRATORY (INHALATION) at 07:58

## 2023-01-09 RX ADMIN — INSULIN LISPRO 1 UNITS: 100 INJECTION, SOLUTION INTRAVENOUS; SUBCUTANEOUS at 16:43

## 2023-01-09 RX ADMIN — METHYLPREDNISOLONE SODIUM SUCCINATE 40 MG: 40 INJECTION, POWDER, FOR SOLUTION INTRAMUSCULAR; INTRAVENOUS at 00:01

## 2023-01-09 RX ADMIN — INSULIN LISPRO 4 UNITS: 100 INJECTION, SOLUTION INTRAVENOUS; SUBCUTANEOUS at 12:26

## 2023-01-09 RX ADMIN — ALPRAZOLAM 0.25 MG: 0.25 TABLET ORAL at 09:11

## 2023-01-09 RX ADMIN — ZOLPIDEM TARTRATE 10 MG: 5 TABLET ORAL at 22:15

## 2023-01-09 RX ADMIN — LEVALBUTEROL HYDROCHLORIDE 1.25 MG: 1.25 SOLUTION, CONCENTRATE RESPIRATORY (INHALATION) at 07:57

## 2023-01-09 RX ADMIN — FAMOTIDINE 40 MG: 20 TABLET ORAL at 09:12

## 2023-01-09 RX ADMIN — HEPARIN SODIUM 7500 UNITS: 5000 INJECTION INTRAVENOUS; SUBCUTANEOUS at 06:19

## 2023-01-09 RX ADMIN — SUCRALFATE 1 G: 1 TABLET ORAL at 16:46

## 2023-01-09 RX ADMIN — FORMOTEROL FUMARATE 20 MCG: 20 SOLUTION RESPIRATORY (INHALATION) at 21:06

## 2023-01-09 RX ADMIN — PRAVASTATIN SODIUM 40 MG: 40 TABLET ORAL at 16:46

## 2023-01-09 RX ADMIN — METHYLPREDNISOLONE SODIUM SUCCINATE 40 MG: 40 INJECTION, POWDER, FOR SOLUTION INTRAMUSCULAR; INTRAVENOUS at 06:19

## 2023-01-09 RX ADMIN — METHYLPREDNISOLONE SODIUM SUCCINATE 40 MG: 40 INJECTION, POWDER, FOR SOLUTION INTRAMUSCULAR; INTRAVENOUS at 22:14

## 2023-01-09 RX ADMIN — Medication 2.5 ML: at 09:12

## 2023-01-09 RX ADMIN — CITALOPRAM HYDROBROMIDE 10 MG: 10 TABLET ORAL at 09:12

## 2023-01-09 RX ADMIN — PANTOPRAZOLE SODIUM 40 MG: 40 TABLET, DELAYED RELEASE ORAL at 06:18

## 2023-01-09 RX ADMIN — BUDESONIDE 0.5 MG: 0.5 INHALANT ORAL at 21:06

## 2023-01-09 RX ADMIN — BUDESONIDE 0.5 MG: 0.5 INHALANT ORAL at 07:58

## 2023-01-09 NOTE — PROGRESS NOTES
PULMONOLOGY PROGRESS NOTE     Name: Veronica Das   Age & Sex: 56 y o  female   MRN: [id number]  Unit/Bed#: [facility] 206-01   Encounter: [id number]    PATIENT INFORMATION     Name: Veronica Das   Age & Sex: 56 y o  female   MRN: [id number]  Hospital Stay Days: 4    ASSESSMENT/PLAN     Assessment:     1  Acute asthma exacerbation  · Initially here for planned EGD for manometry catheter placement by GI on 1/05, however developed bronchospasms during the procedure and thought to be in status asthmaticus --> started on BiPAP and admitted to the MICU, from which she was downgraded on 1/6  · Now on RA with 2L via NC at night  · Hx of frequent exacerbations, especially since getting COVID-19 x3 in 2020  · Home regimen per chart review: Breo (ICS, LABA) 200-25 mcg 1 puff daily, ipatropium-albuterol 0 5-2 5 mg nebs q4hr PRN, montelukast 10 mg BID, guaifenesin-codeine  Per patient, started on Tezspire (Thymic stromal lymphoprotein blocker) in 2022  · Improving on IV methylprednisolone and nebulizers  · Central wheezing noted on auscultation of the trachea  Provided history of hiatal hernia and GERD, patient may have excessive dynamic airway collapse  2  Chronic cough  3  GERD  4  Hx of rectus abdominal muscle hematoma due to coughing    Plan:  • Will order neck CT w/ inspiration and expiration to further investigate possible EDAC  • Reduced IV methylprednisolone (day 5) from 40 mg q6h to q12h  • Continue ICS/LABA nebs: Budesonide 0 5 mg and formoterol 20 mg BID  • Ipratropium-albuterol 0 5-2 5 mg nebs PRN  • Continue oxygen supplementation at night PRN, wean as tolerated  • GERD - continue famotidine, pantoprazole, sucralfate  • Monitor blood sugars while on steroids  • Heparin for DVT prophylaxis  SUBJECTIVE     Patient seen and examined  No acute events overnight  States she has felt the same since she left the MICU   Continues to have chest tightness, SOB, chronic cough all day with some yellow-clear phlegm, SOB  She ambulates around the unit in the evenings  Denies hemoptysis, blood in stool, nausea, vomiting, chest pain, abdominal pain, dysuria  OBJECTIVE     Vitals:    23 0600 23 0800 23 0830 23 1228   BP:    112/69   BP Location:    Right arm   Pulse:    94   Resp:    (!) 35   Temp:   98 4 °F (36 9 °C) 97 7 °F (36 5 °C)   TempSrc:   Oral Oral   SpO2:  95%  97%   Weight: 90 3 kg (199 lb)      Height:          Temperature:   Temp (24hrs), Av 9 °F (36 6 °C), Min:97 6 °F (36 4 °C), Max:98 4 °F (36 9 °C)    Temperature: 97 7 °F (36 5 °C)  Intake & Output:  I/O        0701   0700  0701   07 0701  01/10 0700    P  O  1040 660 760    I V  (mL/kg)  10 (0 1)     IV Piggyback  50     Total Intake(mL/kg) 1040 (11 6) 720 (8) 760 (8 4)    Net +1040 +720 +760           Unmeasured Urine Occurrence 1 x 1 x         Weights:        Body mass index is 40 19 kg/m²  Weight (last 2 days)     Date/Time Weight    23 0600 90 3 (199)        Physical Exam  Vitals reviewed  Constitutional:       General: She is not in acute distress  Appearance: She is obese  She is not ill-appearing, toxic-appearing or diaphoretic  Comments: Appears fatigued   HENT:      Head: Normocephalic and atraumatic  Mouth/Throat:      Mouth: Mucous membranes are moist    Eyes:      Conjunctiva/sclera: Conjunctivae normal    Cardiovascular:      Rate and Rhythm: Normal rate and regular rhythm  Pulses: Normal pulses  Heart sounds: No murmur heard  No friction rub  No gallop  Pulmonary:      Breath sounds: No stridor  No rhonchi or rales  Comments: Poor respiratory effort  Transmitted wheezing sound in bilateral lung fields  Coughing with attempted deep respirations  Abdominal:      General: Bowel sounds are normal  There is no distension  Palpations: Abdomen is soft  Tenderness: There is no abdominal tenderness  There is no guarding  Musculoskeletal:      Right lower leg: No edema  Left lower leg: No edema  Skin:     General: Skin is warm and dry  Neurological:      Mental Status: She is alert and oriented to person, place, and time  LABORATORY DATA     Labs: I have personally reviewed pertinent reports  Results from last 7 days   Lab Units 01/09/23  0617 01/08/23  0640 01/07/23  0613 01/06/23  0600   WBC Thousand/uL 13 08* 14 13* 22 87* 24 35*   HEMOGLOBIN g/dL 13 4 13 1 12 5 13 2   HEMATOCRIT % 41 1 40 8 38 8 40 6   PLATELETS Thousands/uL 402* 398* 408* 407*   NEUTROS PCT %  --  84* 89* 91*   MONOS PCT %  --  6 5 3*   MONO PCT % 6  --   --   --       Results from last 7 days   Lab Units 01/09/23  0617 01/08/23  0640 01/07/23  0613 01/05/23  1153 01/05/23  1053   POTASSIUM mmol/L 3 9 4 0 4 0   < > 6 7*   CHLORIDE mmol/L 104 107 107   < > 107   CO2 mmol/L 29 26 23   < > 21   BUN mg/dL 17 14 11   < > 6   CREATININE mg/dL 0 70 0 71 0 87   < > 0 84   CALCIUM mg/dL 9 0 9 0 9 1   < > 8 8   ALK PHOS U/L  --   --   --   --  126*   ALT U/L  --   --   --   --  32   AST U/L  --   --   --   --  94*    < > = values in this interval not displayed  Results from last 7 days   Lab Units 01/05/23  1053   MAGNESIUM mg/dL 2 3     Results from last 7 days   Lab Units 01/05/23  1053   PHOSPHORUS mg/dL 2 8                           IMAGING & DIAGNOSTIC TESTING     Radiology Results: I have personally reviewed pertinent reports  EGD    Result Date: 1/5/2023  Impression: - Esophagus, stomach, duodenum endoscopically appeared normal - Hill 4 hiatal hernia seen - Procedure aborted due to patient desaturation and need for breathing treatments post procedure  RECOMMENDATION:  Recommend manometry catheter placement without sedation Pt and family updated post procedure      Vipul Argueta DO     XR chest portable ICU    Result Date: 1/5/2023  Impression: New obscuration of the left costophrenic angle is favored to be due to overlying soft tissue structures, though a pleural effusion is not excluded  If there is clinical concern for an effusion, and upright frontal and lateral chest radiograph can be considered for further assessment  Workstation performed: [id number]     Other Diagnostic Testing: I have personally reviewed pertinent reports      ACTIVE MEDICATIONS     Current Facility-Administered Medications   Medication Dose Route Frequency   • acetaminophen (TYLENOL) tablet 650 mg  650 mg Oral Q8H PRN   • budesonide (PULMICORT) inhalation solution 0 5 mg  0 5 mg Nebulization Q12H   • citalopram (CeleXA) tablet 10 mg  10 mg Oral Daily   • famotidine (PEPCID) tablet 40 mg  40 mg Oral BID   • formoterol (PERFOROMIST) nebulizer solution 20 mcg  20 mcg Nebulization Q12H   • gabapentin (NEURONTIN) capsule 300 mg  300 mg Oral HS   • heparin (porcine) subcutaneous injection 7,500 Units  7,500 Units Subcutaneous Q8H [address]   • hydrocodone-chlorpheniramine polistirex (TUSSIONEX) ER suspension 2 5 mL  2 5 mL Oral Q6H PRN   • insulin glargine (LANTUS) subcutaneous injection 5 Units 0 05 mL  5 Units Subcutaneous HS   • insulin lispro (HumaLOG) 100 units/mL subcutaneous injection 1-6 Units  1-6 Units Subcutaneous TID AC   • insulin lispro (HumaLOG) 100 units/mL subcutaneous injection 1-6 Units  1-6 Units Subcutaneous HS   • ipratropium-albuterol (DUO-NEB) 0 5-2 5 mg/3 mL inhalation solution 3 mL  3 mL Nebulization Q6H PRN   • magnesium hydroxide (MILK OF MAGNESIA) oral suspension 30 mL  30 mL Oral Daily PRN   • melatonin tablet 9 mg  9 mg Oral HS   • methylPREDNISolone sodium succinate (Solu-MEDROL) injection 40 mg  40 mg Intravenous Q12H [facility]   • pantoprazole (PROTONIX) EC tablet 40 mg  40 mg Oral Early Morning   • polyethylene glycol (MIRALAX) packet 17 g  17 g Oral Daily PRN   • pravastatin (PRAVACHOL) tablet 40 mg  40 mg Oral Daily With Dinner   • sucralfate (CARAFATE) tablet 1 g  1 g Oral BID AC   • zolpidem (AMBIEN) tablet 10 mg  10 mg Oral HS Disclaimer: Portions of the record may have been created with voice recognition software  Occasional wrong word or "sound a like" substitutions may have occurred due to the inherent limitations of voice recognition software  Careful consideration should be taken to recognize, using context, where substitutions have occurred      ----------------------------------------------------  Kendall Dallas

## 2023-01-09 NOTE — PLAN OF CARE
Problem: MOBILITY - ADULT  Goal: Maintain or return to baseline ADL function  Description: INTERVENTIONS:  -  Assess patient's ability to carry out ADLs; assess patient's baseline for ADL function and identify physical deficits which impact ability to perform ADLs (bathing, care of mouth/teeth, toileting, grooming, dressing, etc )  - Assess/evaluate cause of self-care deficits   - Assess range of motion  - Assess patient's mobility; develop plan if impaired  - Assess patient's need for assistive devices and provide as appropriate  - Encourage maximum independence but intervene and supervise when necessary  - Involve family in performance of ADLs  - Assess for home care needs following discharge   - Consider OT consult to assist with ADL evaluation and planning for discharge  - Provide patient education as appropriate  Outcome: Progressing  Goal: Maintains/Returns to pre admission functional level  Description: INTERVENTIONS:  - Perform BMAT or MOVE assessment daily    - Set and communicate daily mobility goal to care team and patient/family/caregiver  - Collaborate with rehabilitation services on mobility goals if consulted  - Perform Range of Motion   times a day  - Reposition patient every   hours    - Dangle patient   times a day  - Stand patient   times a day  - Ambulate patient   times a day  - Out of bed to chair   times a day   - Out of bed for meals   times a day  - Out of bed for toileting  - Record patient progress and toleration of activity level   Outcome: Progressing     Problem: Prexisting or High Potential for Compromised Skin Integrity  Goal: Skin integrity is maintained or improved  Description: INTERVENTIONS:  - Identify patients at risk for skin breakdown  - Assess and monitor skin integrity  - Assess and monitor nutrition and hydration status  - Monitor labs   - Assess for incontinence   - Turn and reposition patient  - Assist with mobility/ambulation  - Relieve pressure over bony prominences  - Avoid friction and shearing  - Provide appropriate hygiene as needed including keeping skin clean and dry  - Evaluate need for skin moisturizer/barrier cream  - Collaborate with interdisciplinary team   - Patient/family teaching  - Consider wound care consult   Outcome: Progressing     Problem: Potential for Falls  Goal: Patient will remain free of falls  Description: INTERVENTIONS:  - Educate patient/family on patient safety including physical limitations  - Instruct patient to call for assistance with activity   - Consult OT/PT to assist with strengthening/mobility   - Keep Call bell within reach  - Keep bed low and locked with side rails adjusted as appropriate  - Keep care items and personal belongings within reach  - Initiate and maintain comfort rounds  - Make Fall Risk Sign visible to staff  - Offer Toileting every   Hours, in advance of need  - Initiate/Maintain  alarm  - Obtain necessary fall risk management equipment:       - Apply yellow socks and bracelet for high fall risk patients  - Consider moving patient to room near nurses station  Outcome: Progressing     Problem: PAIN - ADULT  Goal: Verbalizes/displays adequate comfort level or baseline comfort level  Description: Interventions:  - Encourage patient to monitor pain and request assistance  - Assess pain using appropriate pain scale  - Administer analgesics based on type and severity of pain and evaluate response  - Implement non-pharmacological measures as appropriate and evaluate response  - Consider cultural and social influences on pain and pain management  - Notify physician/advanced practitioner if interventions unsuccessful or patient reports new pain  Outcome: Progressing     Problem: INFECTION - ADULT  Goal: Absence or prevention of progression during hospitalization  Description: INTERVENTIONS:  - Assess and monitor for signs and symptoms of infection  - Monitor lab/diagnostic results  - Monitor all insertion sites, i e  indwelling lines, tubes, and drains  - Monitor endotracheal if appropriate and nasal secretions for changes in amount and color  - [place] appropriate cooling/warming therapies per order  - Administer medications as ordered  - Instruct and encourage patient and family to use good hand hygiene technique  - Identify and instruct in appropriate isolation precautions for identified infection/condition  Outcome: Progressing  Goal: Absence of fever/infection during neutropenic period  Description: INTERVENTIONS:  - Monitor WBC    Outcome: Progressing     Problem: DISCHARGE PLANNING  Goal: Discharge to home or other facility with appropriate resources  Description: INTERVENTIONS:  - Identify barriers to discharge w/patient and caregiver  - Arrange for needed discharge resources and transportation as appropriate  - Identify discharge learning needs (meds, wound care, etc )  - Arrange for interpretive services to assist at discharge as needed  - Refer to Case Management Department for coordinating discharge planning if the patient needs post-hospital services based on physician/advanced practitioner order or complex needs related to functional status, cognitive ability, or social support system  Outcome: Progressing     Problem: Knowledge Deficit  Goal: Patient/family/caregiver demonstrates understanding of disease process, treatment plan, medications, and discharge instructions  Description: Complete learning assessment and assess knowledge base    Interventions:  - Provide teaching at level of understanding  - Provide teaching via preferred learning methods  Outcome: Progressing     Problem: RESPIRATORY - ADULT  Goal: Achieves optimal ventilation and oxygenation  Description: INTERVENTIONS:  - Assess for changes in respiratory status  - Assess for changes in mentation and behavior  - Position to facilitate oxygenation and minimize respiratory effort  - Oxygen administered by appropriate delivery if ordered  - Initiate smoking cessation education as indicated  - Encourage broncho-pulmonary hygiene including cough, deep breathe, Incentive Spirometry  - Assess the need for suctioning and aspirate as needed  - Assess and instruct to report SOB or any respiratory difficulty  - Respiratory Therapy support as indicated  Outcome: Progressing

## 2023-01-09 NOTE — PROGRESS NOTES
[address] [facility]  Progress Note - Kwame Nicole 1965, 56 y o  female MRN: [id number]  [facility]: [id number]  Primary Care Provider: Shruthi Burgos   Date and time admitted to hospital: 1/5/2023 10:30 AM      ADDENDUM (1/9 @ 5:04 PM) - contacted by pulmonology who ordered CT imaging of the neck/soft tissues with report notable for oropharyngeal airway narrowing along with mild collapse of the posterior tracheal/glottic airway, suspicious for tracheobronchomalacia -> pulmonology has consulted ENT for further evaluation/management/recommendations         * Severe asthma with acute exacerbation  Assessment & Plan  Multiple hospitalizations through the last year for similar issue  Initially presented in the outpatient setting for an elective procedure (EGD w/ manometry) which was aborted due to intractable wheezing found to be in status asthmaticus -> required a loading dose of IV Solu-Medrol coupled w/ IV magnesium and terbutaline, and urgent Albuterol nebulizer treatment  Initially BIPAP dependent, now progressively wean down to room air - transferred out of ICU on 1/6  Continue IV Solu-Medrol (w/ plan for progressive tapering), Pulmicort, and nebulizer treatments -> additional dose of IV magnesium sulfate administered today  Continues to report refractory coughing which induces more wheezing episodes -> optimized Tussionex for cough control  Further management/recommendations per pulmonology    Acute respiratory failure with hypoxia   Assessment & Plan  Previously requiring BiPAP now progressively weaned down to room air at rest  Setting of severe asthma exacerbation (see above)  Influenza/RSV/COVID screen negative    GERD (gastroesophageal reflux disease)  Assessment & Plan  Continue PPI/Pepcid/Carafate regimen    Hyperlipidemia  Assessment & Plan  Continue statin    Insulin-dependent diabetes mellitus  Assessment & Plan  Lab Results   Component Value Date    HGBA1C 4 9 10/04/2022     Well-controlled  Continue SSI coverage per Accu-Cheks - add basal QHS insulin to optimize blood sugar coverage  Carbohydrate restricted diet  Anticipate a degree of blood sugar fluctuation with corticosteroid administration    Depression with anxiety  Assessment & Plan  Continue Celexa and PRN Xanax      DVT Prophylaxis:  Heparin SC       Patient Centered Rounds:  I have performed bedside rounds and discussed plan of care with nursing today  Discussions with Specialists or Other Care Team Provider:  see above assessments if applicable    Education and Discussions with Family / Patient:  Patient at bedside -discussed in depth, with /son, at bedside yesterday    Time Spent for Care:  32 minutes  More than 50% of total time spent on counseling and coordination of care as described above  Current Length of Stay: 4 day(s)  Current Patient Status: Inpatient   Certification Statement:  Patient will continue to require additional hospital stay due to assessments as noted above  Code Status: Level 1 - Full Code        Subjective:     Encountered earlier in the day  Still complaining of intermittent coughing which induces her wheezing spells  Objective:     Vitals:   Temp (24hrs), Av 9 °F (36 6 °C), Min:97 6 °F (36 4 °C), Max:98 4 °F (36 9 °C)    Temp:  [97 6 °F (36 4 °C)-98 4 °F (36 9 °C)] 97 7 °F (36 5 °C)  HR:  [72-94] 94  Resp:  [18-35] 35  BP: (112-155)/(67-85) 112/69  SpO2:  [95 %-97 %] 97 %  Body mass index is 40 19 kg/m²  Input and Output Summary (last 24 hours):        Intake/Output Summary (Last 24 hours) at 2023 1334  Last data filed at 2023 1245  Gross per 24 hour   Intake 1300 ml   Output --   Net 1300 ml       Physical Exam:     GENERAL:   Obese - weak/fatigued  HEAD:   Normocephalic - atraumatic  EYES:   PERRL - EOMI   MOUTH:   Mucosa moist  NECK:   Supple - full range of motion  CARDIAC:   Rate controlled - S1/S2 positive  PULMONARY: Diminished bilateral breath sounds with expiratory wheezes   ABDOMEN:   Soft - nontender/nondistended - active bowel sounds  MUSCULOSKELETAL:   Motor strength/range of motion intact  NEUROLOGIC:   Alert/oriented at baseline  SKIN:   Chronic wrinkles/blemishes   PSYCHIATRIC:   Mood/affect stable      Additional Data:     Labs & Recent Cultures:    Results from last 7 days   Lab Units 01/09/23  0617 01/08/23  0640   WBC Thousand/uL 13 08* 14 13*   HEMOGLOBIN g/dL 13 4 13 1   HEMATOCRIT % 41 1 40 8   PLATELETS Thousands/uL 402* 398*   BANDS PCT % 1  --    NEUTROS PCT %  --  84*   LYMPHS PCT %  --  8*   LYMPHO PCT % 17  --    MONOS PCT %  --  6   MONO PCT % 6  --    EOS PCT % 0 0     Results from last 7 days   Lab Units 01/09/23  0617 01/05/23  1153 01/05/23  1053   POTASSIUM mmol/L 3 9   < > 6 7*   CHLORIDE mmol/L 104   < > 107   CO2 mmol/L 29   < > 21   BUN mg/dL 17   < > 6   CREATININE mg/dL 0 70   < > 0 84   CALCIUM mg/dL 9 0   < > 8 8   ALK PHOS U/L  --   --  126*   ALT U/L  --   --  32   AST U/L  --   --  94*    < > = values in this interval not displayed           Results from last 7 days   Lab Units 01/09/23  1210 01/09/23  0550 01/08/23  2051 01/08/23  1613 01/08/23  1117 01/08/23  5502 01/07/23  2112 01/07/23  1543 01/07/23  1115 01/07/23  0727 01/07/23  0611 01/06/23  2059   POC GLUCOSE mg/dl 270* 147* 176* 196* 182* 149* 158* 164* 161* 130 204* 160*                         Lines/Drains:  Invasive Devices     Peripheral Intravenous Line  Duration           Peripheral IV 01/06/23 Right;Ventral (anterior) Forearm 2 days                  Last 24 Hours Medication List:   Current Facility-Administered Medications   Medication Dose Route Frequency Provider Last Rate   • acetaminophen  650 mg Oral Q8H PRN Stephanie Torres MD     • budesonide  0 5 mg Nebulization Q12H Alin Arizmendi MD     • citalopram  10 mg Oral Daily Alin Arizmendi MD     • famotidine  40 mg Oral BID Alin Arizmendi MD     • formoterol  20 mcg Nebulization Q12H Aron Abdul MD     • gabapentin  300 mg Oral HS Tani Dominique MD     • heparin (porcine)  7,500 Units Subcutaneous Q8H [address] Tani Dominique MD     • hydrocodone-chlorpheniramine polistirex  2 5 mL Oral Q6H PRN Gunnar Simms MD     • insulin glargine  5 Units Subcutaneous HS Jaquelin Leblanc MD     • insulin lispro  1-6 Units Subcutaneous TID AC Tani Dominique MD     • insulin lispro  1-6 Units Subcutaneous HS Tani Dominique MD     • ipratropium-albuterol  3 mL Nebulization Q6H PRN Aron Abdul MD     • magnesium hydroxide  30 mL Oral Daily PRN Saroj Christina PA-C     • melatonin  9 mg Oral HS Tani Dominique MD     • methylPREDNISolone sodium succinate  40 mg Intravenous Q12H [address] Aron Abdul MD     • pantoprazole  40 mg Oral Early Morning Tani Dominique MD     • polyethylene glycol  17 g Oral Daily PRN Saroj Christina PA-C     • pravastatin  40 mg Oral Daily With Jose Luis Mo MD     • sucralfate  1 g Oral BID AC Tani Dominique MD     • zolpidem  10 mg Oral HS Tani Dominique MD                      ** Please Note: This note is constructed using a voice recognition dictation system  An occasional wrong word/phrase or “sound-a-like” substitution may have been picked up by dictation device due to the inherent limitations of voice recognition software  Read the chart carefully and recognize, using reasonable context, where substitutions may have occurred  **

## 2023-01-10 ENCOUNTER — TELEPHONE (OUTPATIENT)
Dept: OTHER | Facility: OTHER | Age: 58
End: 2023-01-10

## 2023-01-10 PROBLEM — J98.8 NARROWING OF AIRWAY: Status: ACTIVE | Noted: 2023-01-10

## 2023-01-10 PROBLEM — J39.8 TRACHEOBRONCHOMALACIA: Status: ACTIVE | Noted: 2023-01-10

## 2023-01-10 LAB
ANION GAP SERPL CALCULATED.3IONS-SCNC: 6 MMOL/L (ref 4–13)
BASOPHILS # BLD MANUAL: 0 THOUSAND/UL (ref 0–0.1)
BASOPHILS NFR MAR MANUAL: 0 % (ref 0–1)
BUN SERPL-MCNC: 21 MG/DL (ref 5–25)
CALCIUM SERPL-MCNC: 8.4 MG/DL (ref 8.3–10.1)
CHLORIDE SERPL-SCNC: 102 MMOL/L (ref 96–108)
CO2 SERPL-SCNC: 27 MMOL/L (ref 21–32)
CREAT SERPL-MCNC: 0.83 MG/DL (ref 0.6–1.3)
EOSINOPHIL # BLD MANUAL: 0 THOUSAND/UL (ref 0–0.4)
EOSINOPHIL NFR BLD MANUAL: 0 % (ref 0–6)
ERYTHROCYTE [DISTWIDTH] IN BLOOD BY AUTOMATED COUNT: 14.9 % (ref 11.6–15.1)
GFR SERPL CREATININE-BSD FRML MDRD: 79 ML/MIN/1.73SQ M
GLUCOSE SERPL-MCNC: 200 MG/DL (ref 65–140)
GLUCOSE SERPL-MCNC: 213 MG/DL (ref 65–140)
GLUCOSE SERPL-MCNC: 244 MG/DL (ref 65–140)
GLUCOSE SERPL-MCNC: 259 MG/DL (ref 65–140)
GLUCOSE SERPL-MCNC: 267 MG/DL (ref 65–140)
HCT VFR BLD AUTO: 39.4 % (ref 34.8–46.1)
HGB BLD-MCNC: 12.6 G/DL (ref 11.5–15.4)
LYMPHOCYTES # BLD AUTO: 1.05 THOUSAND/UL (ref 0.6–4.47)
LYMPHOCYTES # BLD AUTO: 7 % (ref 14–44)
MCH RBC QN AUTO: 30.5 PG (ref 26.8–34.3)
MCHC RBC AUTO-ENTMCNC: 32 G/DL (ref 31.4–37.4)
MCV RBC AUTO: 95 FL (ref 82–98)
MONOCYTES # BLD AUTO: 1.81 THOUSAND/UL (ref 0–1.22)
MONOCYTES NFR BLD: 12 % (ref 4–12)
NEUTROPHILS # BLD MANUAL: 12.05 THOUSAND/UL (ref 1.85–7.62)
NEUTS BAND NFR BLD MANUAL: 1 % (ref 0–8)
NEUTS SEG NFR BLD AUTO: 79 % (ref 43–75)
PLATELET # BLD AUTO: 396 THOUSANDS/UL (ref 149–390)
PLATELET BLD QL SMEAR: ADEQUATE
PMV BLD AUTO: 10.7 FL (ref 8.9–12.7)
POTASSIUM SERPL-SCNC: 3.5 MMOL/L (ref 3.5–5.3)
RBC # BLD AUTO: 4.13 MILLION/UL (ref 3.81–5.12)
RBC MORPH BLD: PRESENT
SODIUM SERPL-SCNC: 135 MMOL/L (ref 135–147)
VARIANT LYMPHS # BLD AUTO: 1 %
WBC # BLD AUTO: 15.06 THOUSAND/UL (ref 4.31–10.16)

## 2023-01-10 RX ORDER — INSULIN GLARGINE 100 [IU]/ML
10 INJECTION, SOLUTION SUBCUTANEOUS
Status: DISCONTINUED | OUTPATIENT
Start: 2023-01-10 | End: 2023-01-11 | Stop reason: HOSPADM

## 2023-01-10 RX ORDER — PREDNISONE 20 MG/1
40 TABLET ORAL DAILY
Status: DISCONTINUED | OUTPATIENT
Start: 2023-01-11 | End: 2023-01-11 | Stop reason: HOSPADM

## 2023-01-10 RX ORDER — INSULIN LISPRO 100 [IU]/ML
3 INJECTION, SOLUTION INTRAVENOUS; SUBCUTANEOUS
Status: DISCONTINUED | OUTPATIENT
Start: 2023-01-10 | End: 2023-01-11 | Stop reason: HOSPADM

## 2023-01-10 RX ORDER — HYDROCODONE POLISTIREX AND CHLORPHENIRAMINE POLISTIREX 10; 8 MG/5ML; MG/5ML
2.5 SUSPENSION, EXTENDED RELEASE ORAL EVERY 6 HOURS PRN
Status: DISCONTINUED | OUTPATIENT
Start: 2023-01-10 | End: 2023-01-11 | Stop reason: HOSPADM

## 2023-01-10 RX ADMIN — INSULIN LISPRO 3 UNITS: 100 INJECTION, SOLUTION INTRAVENOUS; SUBCUTANEOUS at 14:30

## 2023-01-10 RX ADMIN — INSULIN LISPRO 2 UNITS: 100 INJECTION, SOLUTION INTRAVENOUS; SUBCUTANEOUS at 14:30

## 2023-01-10 RX ADMIN — FORMOTEROL FUMARATE 20 MCG: 20 SOLUTION RESPIRATORY (INHALATION) at 08:59

## 2023-01-10 RX ADMIN — PANTOPRAZOLE SODIUM 40 MG: 40 TABLET, DELAYED RELEASE ORAL at 06:33

## 2023-01-10 RX ADMIN — SUCRALFATE 1 G: 1 TABLET ORAL at 06:33

## 2023-01-10 RX ADMIN — FAMOTIDINE 40 MG: 20 TABLET ORAL at 08:51

## 2023-01-10 RX ADMIN — FAMOTIDINE 40 MG: 20 TABLET ORAL at 17:23

## 2023-01-10 RX ADMIN — INSULIN LISPRO 2 UNITS: 100 INJECTION, SOLUTION INTRAVENOUS; SUBCUTANEOUS at 06:33

## 2023-01-10 RX ADMIN — BUDESONIDE 0.5 MG: 0.5 INHALANT ORAL at 21:05

## 2023-01-10 RX ADMIN — MELATONIN 9 MG: at 21:30

## 2023-01-10 RX ADMIN — Medication 2.5 ML: at 03:34

## 2023-01-10 RX ADMIN — METHYLPREDNISOLONE SODIUM SUCCINATE 40 MG: 40 INJECTION, POWDER, FOR SOLUTION INTRAMUSCULAR; INTRAVENOUS at 08:52

## 2023-01-10 RX ADMIN — BUDESONIDE 0.5 MG: 0.5 INHALANT ORAL at 09:00

## 2023-01-10 RX ADMIN — HYDROCODONE POLISTIREX AND CHLORPHENIRAMINE POLISTIREX 2.5 ML: 10; 8 SUSPENSION, EXTENDED RELEASE ORAL at 21:30

## 2023-01-10 RX ADMIN — METHYLPREDNISOLONE SODIUM SUCCINATE 40 MG: 40 INJECTION, POWDER, FOR SOLUTION INTRAMUSCULAR; INTRAVENOUS at 20:11

## 2023-01-10 RX ADMIN — INSULIN LISPRO 3 UNITS: 100 INJECTION, SOLUTION INTRAVENOUS; SUBCUTANEOUS at 21:30

## 2023-01-10 RX ADMIN — ZOLPIDEM TARTRATE 10 MG: 5 TABLET ORAL at 21:30

## 2023-01-10 RX ADMIN — GABAPENTIN 300 MG: 300 CAPSULE ORAL at 21:30

## 2023-01-10 RX ADMIN — Medication 2.5 ML: at 10:00

## 2023-01-10 RX ADMIN — HEPARIN SODIUM 7500 UNITS: 5000 INJECTION INTRAVENOUS; SUBCUTANEOUS at 06:32

## 2023-01-10 RX ADMIN — ALPRAZOLAM 0.5 MG: 0.5 TABLET ORAL at 20:22

## 2023-01-10 RX ADMIN — FORMOTEROL FUMARATE 20 MCG: 20 SOLUTION RESPIRATORY (INHALATION) at 21:05

## 2023-01-10 RX ADMIN — CITALOPRAM HYDROBROMIDE 10 MG: 10 TABLET ORAL at 08:51

## 2023-01-10 RX ADMIN — INSULIN GLARGINE 10 UNITS: 100 INJECTION, SOLUTION SUBCUTANEOUS at 21:32

## 2023-01-10 RX ADMIN — HEPARIN SODIUM 7500 UNITS: 5000 INJECTION INTRAVENOUS; SUBCUTANEOUS at 14:30

## 2023-01-10 RX ADMIN — HEPARIN SODIUM 7500 UNITS: 5000 INJECTION INTRAVENOUS; SUBCUTANEOUS at 21:31

## 2023-01-10 NOTE — CONSULTS
OTOLARYNGOLOGY CONSULT    Date of Service: 1/10/2023    Reason for consult: Tracheobronchiomalacia     ASSESSMENT/PLAN:  Luigi Don is a 56 y o  female who we are consulted on for tracheobronchiomalacia       -widely patient airway on flexible laryngoscopy   -significant BOT tongue collapse on CT scan supine     No acute ENT intervention, f/u GI for further dysphagia evaluation and reflux management    HPI  This patient is a 55 yo female with a PMH of severe asthma, GERD who presented with an acute asthma exacerbation  The patient also has complained of long standing dysphagia  A CT neck was obtained with the patient laying supine showing narrowing of the supraglottic airway       [facility] MEDICATIONS  Current Facility-Administered Medications   Medication Dose Route Frequency Provider Last Rate Last Admin   • acetaminophen (TYLENOL) tablet 650 mg  650 mg Oral Q8H PRN Stephanie Lopez MD   650 mg at 01/06/23 1746   • ALPRAZolam (XANAX) tablet 0 5 mg  0 5 mg Oral Daily PRN Fortino Arita PA-C   0 5 mg at 01/09/23 2215   • budesonide (PULMICORT) inhalation solution 0 5 mg  0 5 mg Nebulization Q12H Stephanie Lopez MD   0 5 mg at 01/09/23 2106   • citalopram (CeleXA) tablet 10 mg  10 mg Oral Daily Fausto Crum MD   10 mg at 01/09/23 0912   • famotidine (PEPCID) tablet 40 mg  40 mg Oral BID Fausto Crum MD   40 mg at 01/09/23 1705   • formoterol (PERFOROMIST) nebulizer solution 20 mcg  20 mcg Nebulization Q12H Matt Gutiérrez MD   20 mcg at 01/09/23 2106   • gabapentin (NEURONTIN) capsule 300 mg  300 mg Oral HS Fausto Crum MD   300 mg at 01/09/23 2215   • heparin (porcine) subcutaneous injection 7,500 Units  7,500 Units Subcutaneous Q8H [address] [facility] [address] [facility] [address], [place]   7,500 Units at 01/09/23 2214   • hydrocodone-chlorpheniramine polistirex (TUSSIONEX) ER suspension 2 5 mL  2 5 mL Oral Q6H PRN Victor Manuel Trent MD   2 5 mL at 01/10/23 0334   • insulin glargine (LANTUS) subcutaneous injection 5 Units 0 05 mL  5 Units Subcutaneous HS Mina Wallis MD   5 Units at 01/09/23 2214   • insulin lispro (HumaLOG) 100 units/mL subcutaneous injection 1-6 Units  1-6 Units Subcutaneous TID AC Stephanie Hopkins MD   1 Units at 01/09/23 1643   • insulin lispro (HumaLOG) 100 units/mL subcutaneous injection 1-6 Units  1-6 Units Subcutaneous HS Mor Weir MD   1 Units at 01/09/23 2215   • ipratropium-albuterol (DUO-NEB) 0 5-2 5 mg/3 mL inhalation solution 3 mL  3 mL Nebulization Q6H PRN Angela Arriola MD       • magnesium hydroxide (MILK OF MAGNESIA) oral suspension 30 mL  30 mL Oral Daily PRN Ernestina Montana PA-C   30 mL at 01/08/23 2230   • melatonin tablet 9 mg  9 mg Oral HS Mor Weir MD   9 mg at 01/09/23 2215   • methylPREDNISolone sodium succinate (Solu-MEDROL) injection 40 mg  40 mg Intravenous Q12H [address] Angela Arriola MD   40 mg at 01/09/23 2214   • pantoprazole (PROTONIX) EC tablet 40 mg  40 mg Oral Early Morning Stephanie Hopkins MD   40 mg at 01/09/23 0618   • polyethylene glycol (MIRALAX) packet 17 g  17 g Oral Daily PRN Ernestina Montana PA-C       • pravastatin (PRAVACHOL) tablet 40 mg  40 mg Oral Daily With Yeimi Luevano MD   40 mg at 01/09/23 1646   • sucralfate (CARAFATE) tablet 1 g  1 g Oral BID AC Mor Weir MD   1 g at 01/09/23 1646   • zolpidem (AMBIEN) tablet 10 mg  10 mg Oral HS Mor Weir MD   10 mg at 01/09/23 2215       REVIEW OF SYSTEMS  As above    HISTORIES  PMH:  Past Medical History:   Diagnosis Date   • Asthma    • Diabetes mellitus ([facility] [address] )    • GERD (gastroesophageal reflux disease)        PSH:  History reviewed  No pertinent surgical history  SocHx:  Social History     Tobacco Use   • Smoking status: Never   • Smokeless tobacco: Never   Vaping Use   • Vaping Use: Never used   Substance Use Topics   • Alcohol use: Never   • Drug use: Never       FH:  History reviewed   No pertinent family history  ALLERGIES:  Allergies   Allergen Reactions   • Codeine Other (See Comments)   • Aspirin GI Intolerance and Rash   • Hydromorphone Rash     GI upset     • Oxycodone-Acetaminophen Rash   • Tramadol Rash       PHYSICAL EXAM  Visit Vitals  /70 (BP Location: Right arm)   Pulse 84   Temp 98 7 °F (37 1 °C) (Oral)   Resp 21   Ht 4' 11" (1 499 m)   Wt 90 3 kg (199 lb)   LMP  (LMP Unknown)   SpO2 97%   BMI 40 19 kg/m²   OB Status Hysterectomy   Smoking Status Never   BSA 1 84 m²       General: NAD, AOx4  Eyes:  EOMI, PERRL  Ears:External ears normal in appearance  Nose:  External appearance normal, no septal deviation   Oral cavity:  No trismus, no mass/lesions  Neck: Trachea is midline; no thyroid nodules, Salivary glands symmetrical, no masses/abnormality on palpation  Lymph:  No cervical lymphadenopathy  Skin:  No obvious facial lesions  Neuro: Motor and sensory grossly intact  Face symmetrical, no obvious cranial nerve palsies,motor and sensory grossly intact, no focal deficits  Lungs:  Mildly increased WOB symmetrical chest expansion  Vascular: Well perfused      LABORATORY  Reviewed    PROCEDURES  Flexible laryngoscopy performed:  Fiberoptic scope advanced through left nare, findings:  Nasal cavity: Septum deviated to the left, no polyps or mucopus  Nasopharynx: unremarkable, no masses or lesions, eustachian tube orifi and Fossae of [place]muller unremakable  Oropharynx: mucosa moist, no masses or lesions, mild tongue base hypertrophy, lateral and posterior walls normal  Larynx: True vocal folds mobile, no masses or lesions, widely patent glottic chink, arytenoids erythema and evidence of reflux  Hypopharynx: Pyriform sinuses pooling  Post-cricoid area unremarkable       RADIOLOGY  [unfilled]    Patient Active Problem List    Diagnosis Date Noted   • Acute respiratory failure with hypoxia  01/07/2023   • Depression with anxiety 01/07/2023   • Severe asthma with acute exacerbation 01/05/2023 • Chronic cough 11/01/2022   • GERD (gastroesophageal reflux disease) 10/31/2022   • Rectus sheath hematoma 10/29/2022   • S[facility]ic [facility] 10/24/2022   • Severe persistent asthma with exacerbation 10/24/2022   • Hyperlipidemia 10/24/2022   • Insulin-dependent diabetes mellitus 10/24/2022         Pedro Anderson MD  Otolaryngology--Head and Neck Surgery  1/10/2023 6:29 AM

## 2023-01-10 NOTE — PROGRESS NOTES
PULMONOLOGY PROGRESS NOTE     Name: Mikki Yun   Age & Sex: 56 y o  female   MRN: [id number]  Unit/Bed#: [facility] 206-01   Encounter: [id number]    PATIENT INFORMATION     Name: Mikki Yun   Age & Sex: 56 y o  female   MRN: [id number]  Hospital Stay Days: 5    ASSESSMENT/PLAN     1  Acute asthma exacerbation  • Initially here for planned EGD for manometry catheter placement by GI on 1/05, however developed bronchospasms during the procedure and thought to be in status asthmaticus --> started on BiPAP and admitted to the MICU, from which she was downgraded on 1/6    ? Now on RA during the day  Was on 2L via NC at night, switched to BiPAP at night by ENT on 1/09  Patient wishes not to continue with BiPAP  • Hx of frequent exacerbations, especially since getting COVID-19 x3 in 2020  • Home regimen per chart review: Breo (ICS, LABA) 200-25 mcg 1 puff daily, ipatropium-albuterol 0 5-2 5 mg nebs q4hr PRN, montelukast 10 mg BID, guaifenesin-codeine  Per patient, started on Tezspire (Thymic stromal lymphoprotein blocker) in 2022  • Improved on a course of IV methylprednisolone and nebulizers  • Central wheezing noted on auscultation of the neck  CT neck showed severe narrowing of the oropharyngeal airway at the level of the palatine tonsils and [id number] aspect of the uvula  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       2  Chronic cough  3  GERD  4  Hx of rectus abdominal muscle hematoma due to coughing     Plan:  • Discontinue IV steroids  Start PO prednisone taper  • Continue ICS/LABA nebs: Budesonide 0 5 mg and formoterol 20 mg BID  • Ipratropium-albuterol 0 5-2 5 mg nebs PRN  • Continue biologic on discharge  • GERD - continue famotidine, pantoprazole, sucralfate  • F/u w/ pulmonology at [facility]  • Monitor blood sugars while on steroids  • Heparin for DVT prophylaxis  • Recommend weight loss  SUBJECTIVE     Patient seen and examined  No acute events overnight   Has ongoing difficulty breathing, especially while lying down  Reports oropharyngeal sharp pain when swallowing food  Reports the taste of gastric reflux in her mouth when she wakes up in the morning  Continues to cough  Denies headaches, nausea, vomiting, chest pain, abdominal pain, diarrhea, constipation, dysuria  OBJECTIVE     Vitals:    01/10/23 0318 01/10/23 0600 01/10/23 0750 01/10/23 0830   BP: 130/70      BP Location: Right arm      Pulse: 84      Resp: 21      Temp: 98 7 °F (37 1 °C)   97 6 °F (36 4 °C)   TempSrc: Oral   Oral   SpO2: 97%  97%    Weight:  90 4 kg (199 lb 3 2 oz)     Height:          Temperature:   Temp (24hrs), Av 1 °F (36 7 °C), Min:97 6 °F (36 4 °C), Max:98 7 °F (37 1 °C)    Temperature: 97 6 °F (36 4 °C)  Intake & Output:  I/O        07 07 0701  01/10 0700 01/10 07 07    P  O  660 760     I V  (mL/kg) 10 (0 1)      IV Piggyback 50      Total Intake(mL/kg) 720 (8) 760 (8 4)     Net +720 +760            Unmeasured Urine Occurrence 1 x          Weights:        Body mass index is 40 23 kg/m²  Weight (last 2 days)     Date/Time Weight    01/10/23 0600 90 4 (199 2)    23 0600 90 3 (199)        Physical Exam  Vitals reviewed  Constitutional:       General: She is not in acute distress  Appearance: She is obese  She is not ill-appearing, toxic-appearing or diaphoretic  HENT:      Head: Normocephalic and atraumatic  Mouth/Throat:      Mouth: Mucous membranes are moist       Pharynx: Posterior oropharyngeal erythema present  Comments: Uvula is thin/eroded  Posterior oral pharynx is erythematous  Eyes:      Conjunctiva/sclera: Conjunctivae normal    Cardiovascular:      Rate and Rhythm: Normal rate and regular rhythm  Pulses: Normal pulses  Heart sounds: Normal heart sounds  No murmur heard  No friction rub  No gallop  Pulmonary:      Effort: Respiratory distress present  Breath sounds: No stridor  Wheezing present  No rhonchi or rales  Comments: Expiratory wheeze most pronounced on neck auscultation   Abdominal:      General: Bowel sounds are normal  There is no distension  Palpations: Abdomen is soft  Tenderness: There is no abdominal tenderness  There is no guarding  Musculoskeletal:      Right lower leg: No edema  Left lower leg: No edema  Skin:     General: Skin is warm and dry  Neurological:      Mental Status: She is alert and oriented to person, place, and time  LABORATORY DATA     Labs: I have personally reviewed pertinent reports  Results from last 7 days   Lab Units 01/10/23  0511 01/09/23  0617 01/08/23  0640 01/07/23  0613 01/06/23  0600   WBC Thousand/uL 15 06* 13 08* 14 13* 22 87* 24 35*   HEMOGLOBIN g/dL 12 6 13 4 13 1 12 5 13 2   HEMATOCRIT % 39 4 41 1 40 8 38 8 40 6   PLATELETS Thousands/uL 396* 402* 398* 408* 407*   NEUTROS PCT %  --   --  84* 89* 91*   MONOS PCT %  --   --  6 5 3*   MONO PCT % 12 6  --   --   --       Results from last 7 days   Lab Units 01/10/23  0511 01/09/23  0617 01/08/23  0640 01/05/23  1153 01/05/23  1053   POTASSIUM mmol/L 3 5 3 9 4 0   < > 6 7*   CHLORIDE mmol/L 102 104 107   < > 107   CO2 mmol/L 27 29 26   < > 21   BUN mg/dL 21 17 14   < > 6   CREATININE mg/dL 0 83 0 70 0 71   < > 0 84   CALCIUM mg/dL 8 4 9 0 9 0   < > 8 8   ALK PHOS U/L  --   --   --   --  126*   ALT U/L  --   --   --   --  32   AST U/L  --   --   --   --  94*    < > = values in this interval not displayed  Results from last 7 days   Lab Units 01/05/23  1053   MAGNESIUM mg/dL 2 3     Results from last 7 days   Lab Units 01/05/23  1053   PHOSPHORUS mg/dL 2 8                      ABG:       Micro:         IMAGING & DIAGNOSTIC TESTING     Radiology Results: I have personally reviewed pertinent reports    EGD    Result Date: 1/5/2023  Impression: - Esophagus, stomach, duodenum endoscopically appeared normal - Hill 4 hiatal hernia seen - Procedure aborted due to patient desaturation and need for breathing treatments post procedure  RECOMMENDATION:  Recommend manometry catheter placement without sedation Pt and family updated post procedure  Monika Argueta,      CT soft tissue neck wo contrast    Result Date: 1/9/2023  Impression: Severe narrowing of oropharyngeal airway at the level of palatine tonsils and lyc-ic-jksqfnlg aspect of uvula, which is worse during inspiration  No suspicious neck mass on this noncontrast examination  Mild collapse of the posterior subglottic airway, posterior tracheal airway, and visualized proximal bilateral mainstem bronchi during expiratory images  Findings are suspicious for tracheobronchomalacia  Additional chronic/incidental findings as detailed above  Please see same-day CT chest without contrast for further evaluation  I personally discussed this study with Zoe Carpenter on 1/9/2023 at 4:38 PM  Workstation performed: [id number]     XR chest portable ICU    Result Date: 1/5/2023  Impression: New obscuration of the left costophrenic angle is favored to be due to overlying soft tissue structures, though a pleural effusion is not excluded  If there is clinical concern for an effusion, and upright frontal and lateral chest radiograph can be considered for further assessment  Workstation performed: [id number]     Other Diagnostic Testing: I have personally reviewed pertinent reports      ACTIVE MEDICATIONS     Current Facility-Administered Medications   Medication Dose Route Frequency   • acetaminophen (TYLENOL) tablet 650 mg  650 mg Oral Q8H PRN   • ALPRAZolam (XANAX) tablet 0 5 mg  0 5 mg Oral Daily PRN   • budesonide (PULMICORT) inhalation solution 0 5 mg  0 5 mg Nebulization Q12H   • citalopram (CeleXA) tablet 10 mg  10 mg Oral Daily   • famotidine (PEPCID) tablet 40 mg  40 mg Oral BID   • formoterol (PERFOROMIST) nebulizer solution 20 mcg  20 mcg Nebulization Q12H   • gabapentin (NEURONTIN) capsule 300 mg  300 mg Oral HS   • heparin (porcine) subcutaneous injection 7,500 Units  7,500 Units Subcutaneous Q8H [address]   • hydrocodone-chlorpheniramine polistirex (TUSSIONEX) ER suspension 2 5 mL  2 5 mL Oral Q6H PRN   • insulin glargine (LANTUS) subcutaneous injection 5 Units 0 05 mL  5 Units Subcutaneous HS   • insulin lispro (HumaLOG) 100 units/mL subcutaneous injection 1-6 Units  1-6 Units Subcutaneous TID AC   • insulin lispro (HumaLOG) 100 units/mL subcutaneous injection 1-6 Units  1-6 Units Subcutaneous HS   • ipratropium-albuterol (DUO-NEB) 0 5-2 5 mg/3 mL inhalation solution 3 mL  3 mL Nebulization Q6H PRN   • magnesium hydroxide (MILK OF MAGNESIA) oral suspension 30 mL  30 mL Oral Daily PRN   • melatonin tablet 9 mg  9 mg Oral HS   • methylPREDNISolone sodium succinate (Solu-MEDROL) injection 40 mg  40 mg Intravenous Q12H [address]   • pantoprazole (PROTONIX) EC tablet 40 mg  40 mg Oral Early Morning   • polyethylene glycol (MIRALAX) packet 17 g  17 g Oral Daily PRN   • pravastatin (PRAVACHOL) tablet 40 mg  40 mg Oral Daily With Dinner   • sucralfate (CARAFATE) tablet 1 g  1 g Oral BID AC   • zolpidem (AMBIEN) tablet 10 mg  10 mg Oral HS       Disclaimer: Portions of the record may have been created with voice recognition software  Occasional wrong word or "sound a like" substitutions may have occurred due to the inherent limitations of voice recognition software  Careful consideration should be taken to recognize, using context, where substitutions have occurred      ----------------------------------------------------  Kendall Gomez

## 2023-01-10 NOTE — PLAN OF CARE
Problem: MOBILITY - ADULT  Goal: Maintain or return to baseline ADL function  Description: INTERVENTIONS:  -  Assess patient's ability to carry out ADLs; assess patient's baseline for ADL function and identify physical deficits which impact ability to perform ADLs (bathing, care of mouth/teeth, toileting, grooming, dressing, etc )  - Assess/evaluate cause of self-care deficits   - Assess range of motion  - Assess patient's mobility; develop plan if impaired  - Assess patient's need for assistive devices and provide as appropriate  - Encourage maximum independence but intervene and supervise when necessary  - Involve family in performance of ADLs  - Assess for home care needs following discharge   - Consider OT consult to assist with ADL evaluation and planning for discharge  - Provide patient education as appropriate  Outcome: Progressing  Goal: Maintains/Returns to pre admission functional level  Description: INTERVENTIONS:  - Perform BMAT or MOVE assessment daily    - Set and communicate daily mobility goal to care team and patient/family/caregiver  - Collaborate with rehabilitation services on mobility goals if consulted  - Perform Range of Motion  times a day  - Reposition patient every  hours    - Dangle patient  times a day  - Stand patient  times a day  - Ambulate patient  times a day  - Out of bed to chair  times a day   - Out of bed for meals  times a day  - Out of bed for toileting  - Record patient progress and toleration of activity level   Outcome: Progressing     Problem: Prexisting or High Potential for Compromised Skin Integrity  Goal: Skin integrity is maintained or improved  Description: INTERVENTIONS:  - Identify patients at risk for skin breakdown  - Assess and monitor skin integrity  - Assess and monitor nutrition and hydration status  - Monitor labs   - Assess for incontinence   - Turn and reposition patient  - Assist with mobility/ambulation  - Relieve pressure over bony prominences  - Avoid friction and shearing  - Provide appropriate hygiene as needed including keeping skin clean and dry  - Evaluate need for skin moisturizer/barrier cream  - Collaborate with interdisciplinary team   - Patient/family teaching  - Consider wound care consult   Outcome: Progressing     Problem: Potential for Falls  Goal: Patient will remain free of falls  Description: INTERVENTIONS:  - Educate patient/family on patient safety including physical limitations  - Instruct patient to call for assistance with activity   - Consult OT/PT to assist with strengthening/mobility   - Keep Call bell within reach  - Keep bed low and locked with side rails adjusted as appropriate  - Keep care items and personal belongings within reach  - Initiate and maintain comfort rounds  - Make Fall Risk Sign visible to staff  - Offer Toileting every  Hours, in advance of need  - Initiate/Maintainalarm  - Obtain necessary fall risk management equipment:  - Apply yellow socks and bracelet for high fall risk patients  - Consider moving patient to room near nurses station  Outcome: Progressing     Problem: PAIN - ADULT  Goal: Verbalizes/displays adequate comfort level or baseline comfort level  Description: Interventions:  - Encourage patient to monitor pain and request assistance  - Assess pain using appropriate pain scale  - Administer analgesics based on type and severity of pain and evaluate response  - Implement non-pharmacological measures as appropriate and evaluate response  - Consider cultural and social influences on pain and pain management  - Notify physician/advanced practitioner if interventions unsuccessful or patient reports new pain  Outcome: Progressing     Problem: INFECTION - ADULT  Goal: Absence or prevention of progression during hospitalization  Description: INTERVENTIONS:  - Assess and monitor for signs and symptoms of infection  - Monitor lab/diagnostic results  - Monitor all insertion sites, i e  indwelling lines, tubes, and drains  - Monitor endotracheal if appropriate and nasal secretions for changes in amount and color  - Spade appropriate cooling/warming therapies per order  - Administer medications as ordered  - Instruct and encourage patient and family to use good hand hygiene technique  - Identify and instruct in appropriate isolation precautions for identified infection/condition  Outcome: Progressing  Goal: Absence of fever/infection during neutropenic period  Description: INTERVENTIONS:  - Monitor WBC    Outcome: Progressing     Problem: DISCHARGE PLANNING  Goal: Discharge to home or other facility with appropriate resources  Description: INTERVENTIONS:  - Identify barriers to discharge w/patient and caregiver  - Arrange for needed discharge resources and transportation as appropriate  - Identify discharge learning needs (meds, wound care, etc )  - Arrange for interpretive services to assist at discharge as needed  - Refer to Case Management Department for coordinating discharge planning if the patient needs post-hospital services based on physician/advanced practitioner order or complex needs related to functional status, cognitive ability, or social support system  Outcome: Progressing     Problem: Knowledge Deficit  Goal: Patient/family/caregiver demonstrates understanding of disease process, treatment plan, medications, and discharge instructions  Description: Complete learning assessment and assess knowledge base    Interventions:  - Provide teaching at level of understanding  - Provide teaching via preferred learning methods  Outcome: Progressing

## 2023-01-10 NOTE — OCCUPATIONAL THERAPY NOTE
Occupational Therapy Evaluation     Patient Name: Miley Wayne  IJMGT'R Date: 1/10/2023  Problem List  Principal Problem:    Severe asthma with acute exacerbation  Active Problems:    Hyperlipidemia    Insulin-dependent diabetes mellitus    GERD (gastroesophageal reflux disease)    Acute respiratory failure with hypoxia     Depression with anxiety    Past Medical History  Past Medical History:   Diagnosis Date    Asthma     Diabetes mellitus ([facility] [address] )     GERD (gastroesophageal reflux disease)      Past Surgical History  History reviewed  No pertinent surgical history  01/10/23 1111   OT Last Visit   OT Visit Date 01/10/23   Note Type   Note type Evaluation   Pain Assessment   Pain Score No Pain   Restrictions/Precautions   Weight Bearing Precautions Per Order No   Other Precautions Telemetry;Multiple lines   Home Living   Type of [address] [place] [address] Two level;1/2 bath on main level;Bed/bath upstairs;Stairs to enter with rails   Bathroom Shower/Tub Tub/shower unit   Bathroom Toilet Standard   Bathroom Accessibility Accessible   Additional Comments Pt reports 3STE an dFFOS to second floor where bed/bath are located, denies DME PTA   Prior Function   Level of [place] Independent with ADLs; Independent with functional mobility; Needs assistance with IADLS   Lives With Spouse; Family   Receives Help From Family   IADLs Independent with medication management; Family/Friend/Other provides transportation; Family/Friend/Other provides meals   Falls in the last 6 months 0   Vocational On disability   Lifestyle   Autonomy I with ADLS shared IADLS with family, Reports she has not been driving   Reciprocal Relationships suportive  and teenage children   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   Intrinsic Gratification watching TV   Subjective   Subjective "It's hard to breathe sometimes"   ADL   Where Assessed Chair   Grooming Assistance 6 Modified Independent   UB Bathing Assistance 6  Modified Independent   LB Bathing Assistance 6  Modified Independent   UB Dressing Assistance 6  Modified independent   LB Dressing Assistance 6  Modified independent   Toileting Assistance  6  Modified independent   Bed Mobility   Additional Comments OOB upon presentation and at end of session   Transfers   Sit to Stand 6  Modified independent   Stand to Sit 6  Modified independent   Stand pivot 6  Modified independent   Additional Comments RW   Functional Mobility   Functional Mobility 6  Modified independent   Additional items Rolling walker   Balance   Static Sitting Good   Dynamic Sitting Good   Static Standing Fair +   Dynamic Standing Fair   Ambulatory Fair   Activity Tolerance   Activity Tolerance Patient tolerated treatment well   Medical Staff Made Aware DPT, NSG aware   Nurse Made Aware yes   RUE Assessment   RUE Assessment WFL   LUE Assessment   LUE Assessment WFL   Psychosocial   Psychosocial (WDL) WDL   Cognition   Overall Cognitive Status WFL   Arousal/Participation Alert; Responsive; Cooperative   Attention Within functional limits   Orientation Level Oriented X4   Memory Within functional limits   Following Commands Follows all commands and directions without difficulty   Comments Pleasant and cooperative   Assessment   Assessment Patient is a 56 y o  female admitted to [facility] on 1/5/2023 due to Severe asthma with acute exacerbation  Pt performed at Mod I level with no OT needs identified at this time Comorbidities affecting pt's physical performance at time of assessment include HLD, DM, GERD, acute respiratory failure, depression with anxiety  The patient's raw score on the AM-PAC Daily Activity inpatient short form is 24  , standardized score is 57 54  , greater than 39 4  Patients at this level are likely to benefit from DC to home  From OT standpoint recommend home with family support upon D/C   No further acute OT needs identified at this time  Recommend continued mobilization with hospital staff and restorative services while in the hospital to increase pt’s endurance and strength upon D/C  D/C pt from OT caseload at this time     Goals   Patient Goals To feel better   Recommendation   OT Discharge Recommendation Home with outpatient rehabilitation   AM-PAC Daily Activity Inpatient   Lower Body Dressing 4   Bathing 4   Toileting 4   Upper Body Dressing 4   Grooming 4   Eating 4   Daily Activity Raw Score 24   Daily Activity Standardized Score (Calc for Raw Score >=11) 57 54   AM-PAC Applied Cognition Inpatient   Following a Speech/Presentation 4   Understanding Ordinary Conversation 4   Taking Medications 4   Remembering Where Things Are Placed or Put Away 4   Remembering List of 4-5 Errands 4   Taking Care of Complicated Tasks 4   Applied Cognition Raw Score 24   Applied Cognition Standardized Score 62 21

## 2023-01-10 NOTE — CASE MANAGEMENT
Case Management Discharge Planning Note    Patient name Sterling Danielle  [facility] [id number]  : 1965 Date 1/10/2023       Current Admission Date: 2023  Current Admission Diagnosis:Severe asthma with acute exacerbation   Patient Active Problem List    Diagnosis Date Noted   • Acute respiratory failure with hypoxia  2023   • Depression with anxiety 2023   • Severe asthma with acute exacerbation 2023   • Chronic cough 2022   • GERD (gastroesophageal reflux disease) 10/31/2022   • Rectus sheath hematoma 10/29/2022   • SIRS (systemic inflammatory response syndrome) ([facility] [address] ) 10/24/2022   • Severe persistent asthma with exacerbation 10/24/2022   • Hyperlipidemia 10/24/2022   • Insulin-dependent diabetes mellitus 10/24/2022      LOS (days): 5  Geometric Mean LOS (GMLOS) (days): 2 80  Days to GMLOS:-2 4     OBJECTIVE:  Risk of Unplanned Readmission Score: 18 46         Current admission status: Inpatient   Preferred Pharmacy:   [facility]/pharm[facility] [address], [facility] [address]  [address] [facility] [address]  Phone: [phone] Fax: [phone]    Primary Care Provider: Bulmaro Schmid    Primary Insurance: BLUE CROSS  Secondary Insurance:     DISCHARGE DETAILS:               Treatment Team Recommendation: Home  Discharge Destination Plan[de-identified] Home            Additional Comments: PT/OT recommendations for walker, bedside commode and hospital bed at discharge, this CM will order within 24 hours of d/c

## 2023-01-10 NOTE — PHYSICAL THERAPY NOTE
Physical Therapy Evaluation     Patient's Name: Nolberto Roberts    Admitting Diagnosis  Esophageal dysphagia [R13 19]  Gastroesophageal reflux disease, unspecified whether esophagitis present [K21 9]    Problem List  Patient Active Problem List   Diagnosis    SIRS (systemic inflammatory response syndrome) ([facility] )    Severe persistent asthma with exacerbation    Hyperlipidemia    Insulin-dependent diabetes mellitus    Rectus sheath hematoma    GERD (gastroesophageal reflux disease)    Chronic cough    Severe asthma with acute exacerbation    Acute respiratory failure with hypoxia     Depression with anxiety       Past Medical History  Past Medical History:   Diagnosis Date    Asthma     Diabetes mellitus ([facility] )     GERD (gastroesophageal reflux disease)        Past Surgical History  History reviewed  No pertinent surgical history         01/10/23 1120   PT Last Visit   PT Visit Date 01/10/23   Note Type   Note type Evaluation   Pain Assessment   Pain Assessment Tool 0-10   Pain Score No Pain   Hospital Pain Intervention(s) Ambulation/increased activity;Repositioned   Restrictions/Precautions   Weight Bearing Precautions Per Order No   Other Precautions Fall Risk;Multiple lines;Telemetry   Home Living   Type of [address] Two level;1/2 bath on main level   Home Equipment   (Pt denies any DME)   Prior Function   Level of [place] Independent with functional mobility   Lives With Spouse;Son  (12 yo son, also has 24 yo twins)   [facility] in the last 6 months 0   Vocational Other (Comment)  (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)   Cognition   Orientation Level Oriented X4   RLE Assessment   RLE Assessment WFL   LLE Assessment   LLE Assessment WFL  (pt reports frequent knee pain)   Coordination   Movements are Fluid and Coordinated 1   Bed Mobility   Supine to Sit Unable to assess   Sit to Supine Unable to assess   Additional Comments Pt found resting in chair, left in chair as requested call bell in reach   Transfers   Sit to Stand 6  Modified independent   Stand to Sit 6  Modified independent   Ambulation/Elevation   Gait pattern Excessively slow   Gait Assistance 5  Supervision   Additional items Assist x 1   Assistive Device Rolling walker  (to improve breathing during activity)   Distance 250   Endurance Deficit   Endurance Deficit Yes   Endurance Deficit Description limited by SOB and coughing   Activity Tolerance   Activity Tolerance Treatment limited secondary to medical complications (Comment)   Medical Staff Made Aware OT and CM for D/C planning   Nurse Made Aware yes, nsg gave clearance to work with pt   Assessment   Prognosis Good   Problem List Decreased endurance; Impaired balance;Decreased mobility; Decreased coordination;Decreased safety awareness;Pain   Assessment Pt is 56 y o  female seen for PT evaluation s/p admit to [facility] on 1/5/2023 w/ Severe asthma with acute exacerbation  PT consulted to assess pt's functional mobility and d/c needs  Order placed for PT eval and tx, w/ up w/ A order  Comorbidities affecting pt's physical performance at time of assessment include:  has a past medical history of Asthma, Diabetes mellitus ([address] ), and GERD (gastroesophageal reflux disease)  Pt reports frequent knee and back pain and recent shoulder injury  PTA, pt was ambulates community distances and elevations, lives in multi-level home and was working prior to 17th months ago when medical issues began  Personal factors affecting pt at time of IE include: ambulating w/ assistive device, stairs to enter home, unable to perform physical activity, inability to perform IADLs and inability to perform ADLs  Please find objective findings from PT assessment regarding body systems outlined above with impairments and limitations including decreased endurance, pain, decreased activity tolerance and SOB upon exertion   PT demonstrated ability to complete all mobility with S or better level of A  Ambulated with slow although steady gait  Noted increased WOB during ambulation  Trial RW to improve breathing technique during gait which pt reports did help  [facility] from [facility]  Pt requesting additional information on hospital bed to decrease need to use the stairs and pt reports of difficulty laying flat with respiratory trouble  The following objective measures performed on IE also reveal limitations: The patient's AM-PAC Basic Mobility Inpatient Short Form Raw Score is 22, Standardized Score is 47 4  A standardized score greater than 42 9 suggests the patient may benefit from discharge to home  Please also refer to the recommendation of the Physical Therapist for safe discharge planning  Pt's clinical presentation is currently unstable/unpredictable seen in pt's presentation of ongoing medical management  Pt to benefit from continued mobility with staff to maintain level of functional independent mobility and consistency  From PT/mobility standpoint, recommendation at time of d/c would be home with outpatient rehabilitation pending progress in order to facilitate return to PLOF  Goals   Patient Goals To feel better and return home   Plan   Treatment/Interventions Spoke to case management;Spoke to nursing;Gait training;Bed mobility; Patient/family training; Endurance training;LE strengthening/ROM; Functional transfer training   PT Frequency   (D/C inpt skilled PT)   Recommendation   PT Discharge Recommendation Home with outpatient rehabilitation  (for knee and back pain  May benefit from pulmonary rehab referral)   Equipment Recommended Lula Mcclain; Other (Comment)  [facility] pt requesting information on hospital bed)   Walker Package Recommended Wheeled walker   Change/add to [place]?  No   AM-PAC Basic Mobility Inpatient   Turning in Flat Bed Without Bedrails 4   Lying on Back to Sitting on Edge of Flat Bed Without Bedrails 4   Moving Bed to Chair 4   Standing Up From Chair Using Arms 4   Walk in Room 3   Climb 3-5 Stairs With Railing 3   Basic Mobility Inpatient Raw Score 22   Basic Mobility Standardized Score 47 4   Highest Level Of Mobility   JH-HLM Goal 7: Walk 25 feet or more   JH-HLM Achieved 8: Walk 250 feet ot more           Yola Sequeira, PT

## 2023-01-11 VITALS
OXYGEN SATURATION: 95 % | HEIGHT: 59 IN | WEIGHT: 198.85 LBS | HEART RATE: 79 BPM | DIASTOLIC BLOOD PRESSURE: 67 MMHG | RESPIRATION RATE: 18 BRPM | TEMPERATURE: 97.7 F | SYSTOLIC BLOOD PRESSURE: 137 MMHG | BODY MASS INDEX: 40.09 KG/M2

## 2023-01-11 PROBLEM — J96.01 ACUTE RESPIRATORY FAILURE WITH HYPOXIA (HCC): Status: RESOLVED | Noted: 2023-01-07 | Resolved: 2023-01-11

## 2023-01-11 LAB
ANISOCYTOSIS BLD QL SMEAR: PRESENT
BASOPHILS # BLD MANUAL: 0 THOUSAND/UL (ref 0–0.1)
BASOPHILS NFR MAR MANUAL: 0 % (ref 0–1)
DME PARACHUTE DELIVERY DATE REQUESTED: NORMAL
DME PARACHUTE ITEM DESCRIPTION: NORMAL
DME PARACHUTE ORDER STATUS: NORMAL
DME PARACHUTE SUPPLIER NAME: NORMAL
DME PARACHUTE SUPPLIER PHONE: NORMAL
EOSINOPHIL # BLD MANUAL: 0 THOUSAND/UL (ref 0–0.4)
EOSINOPHIL NFR BLD MANUAL: 0 % (ref 0–6)
ERYTHROCYTE [DISTWIDTH] IN BLOOD BY AUTOMATED COUNT: 14.8 % (ref 11.6–15.1)
GLUCOSE SERPL-MCNC: 154 MG/DL (ref 65–140)
GLUCOSE SERPL-MCNC: 247 MG/DL (ref 65–140)
HCT VFR BLD AUTO: 37.8 % (ref 34.8–46.1)
HGB BLD-MCNC: 12.6 G/DL (ref 11.5–15.4)
LYMPHOCYTES # BLD AUTO: 1.96 THOUSAND/UL (ref 0.6–4.47)
LYMPHOCYTES # BLD AUTO: 10 % (ref 14–44)
MACROCYTES BLD QL AUTO: PRESENT
MCH RBC QN AUTO: 30.8 PG (ref 26.8–34.3)
MCHC RBC AUTO-ENTMCNC: 33.3 G/DL (ref 31.4–37.4)
MCV RBC AUTO: 92 FL (ref 82–98)
MONOCYTES # BLD AUTO: 1.37 THOUSAND/UL (ref 0–1.22)
MONOCYTES NFR BLD: 7 % (ref 4–12)
MYELOCYTES NFR BLD MANUAL: 2 % (ref 0–1)
NEUTROPHILS # BLD MANUAL: 15.66 THOUSAND/UL (ref 1.85–7.62)
NEUTS BAND NFR BLD MANUAL: 1 % (ref 0–8)
NEUTS SEG NFR BLD AUTO: 79 % (ref 43–75)
PLATELET # BLD AUTO: 391 THOUSANDS/UL (ref 149–390)
PLATELET BLD QL SMEAR: ADEQUATE
PMV BLD AUTO: 10.9 FL (ref 8.9–12.7)
POIKILOCYTOSIS BLD QL SMEAR: PRESENT
POLYCHROMASIA BLD QL SMEAR: PRESENT
RBC # BLD AUTO: 4.09 MILLION/UL (ref 3.81–5.12)
RBC MORPH BLD: PRESENT
VARIANT LYMPHS # BLD AUTO: 1 %
WBC # BLD AUTO: 19.57 THOUSAND/UL (ref 4.31–10.16)

## 2023-01-11 RX ORDER — HYDROCODONE POLISTIREX AND CHLORPHENIRAMINE POLISTIREX 10; 8 MG/5ML; MG/5ML
2.5 SUSPENSION, EXTENDED RELEASE ORAL EVERY 6 HOURS PRN
Qty: 120 ML | Refills: 0 | Status: SHIPPED | OUTPATIENT
Start: 2023-01-11 | End: 2023-01-21

## 2023-01-11 RX ORDER — ZOLPIDEM TARTRATE 10 MG/1
10 TABLET ORAL
Qty: 5 TABLET | Refills: 0 | Status: SHIPPED | OUTPATIENT
Start: 2023-01-11

## 2023-01-11 RX ORDER — PREDNISONE 20 MG/1
TABLET ORAL
Qty: 13 TABLET | Refills: 0 | Status: SHIPPED | OUTPATIENT
Start: 2023-01-12 | End: 2023-01-23

## 2023-01-11 RX ADMIN — HYDROCODONE POLISTIREX AND CHLORPHENIRAMINE POLISTIREX 2.5 ML: 10; 8 SUSPENSION, EXTENDED RELEASE ORAL at 11:06

## 2023-01-11 RX ADMIN — INSULIN LISPRO 3 UNITS: 100 INJECTION, SOLUTION INTRAVENOUS; SUBCUTANEOUS at 06:39

## 2023-01-11 RX ADMIN — SUCRALFATE 1 G: 1 TABLET ORAL at 06:35

## 2023-01-11 RX ADMIN — PANTOPRAZOLE SODIUM 40 MG: 40 TABLET, DELAYED RELEASE ORAL at 06:35

## 2023-01-11 RX ADMIN — HYDROCODONE POLISTIREX AND CHLORPHENIRAMINE POLISTIREX 2.5 ML: 10; 8 SUSPENSION, EXTENDED RELEASE ORAL at 03:56

## 2023-01-11 RX ADMIN — IPRATROPIUM BROMIDE AND ALBUTEROL SULFATE 3 ML: 2.5; .5 SOLUTION RESPIRATORY (INHALATION) at 06:08

## 2023-01-11 RX ADMIN — FAMOTIDINE 40 MG: 20 TABLET ORAL at 09:37

## 2023-01-11 RX ADMIN — INSULIN LISPRO 3 UNITS: 100 INJECTION, SOLUTION INTRAVENOUS; SUBCUTANEOUS at 09:37

## 2023-01-11 RX ADMIN — HEPARIN SODIUM 7500 UNITS: 5000 INJECTION INTRAVENOUS; SUBCUTANEOUS at 06:35

## 2023-01-11 RX ADMIN — PREDNISONE 40 MG: 20 TABLET ORAL at 09:36

## 2023-01-11 RX ADMIN — INSULIN LISPRO 1 UNITS: 100 INJECTION, SOLUTION INTRAVENOUS; SUBCUTANEOUS at 11:37

## 2023-01-11 RX ADMIN — CITALOPRAM HYDROBROMIDE 10 MG: 10 TABLET ORAL at 09:37

## 2023-01-11 RX ADMIN — INSULIN LISPRO 3 UNITS: 100 INJECTION, SOLUTION INTRAVENOUS; SUBCUTANEOUS at 11:38

## 2023-01-11 NOTE — CASE MANAGEMENT
Case Management Discharge Planning Note    Patient name Shady Ramirez  Location [address] [id number] MRN [id number]  : 1965 Date 2023       Current Admission Date: 2023  Current Admission Diagnosis:Severe asthma with acute exacerbation   Patient Active Problem List    Diagnosis Date Noted   • Narrowing of airway 01/10/2023   • Depression with anxiety 2023   • Severe asthma with acute exacerbation 2023   • Chronic cough 2022   • GERD (gastroesophageal reflux disease) 10/31/2022   • Rectus sheath hematoma 10/29/2022   • SIRS (systemic inflammatory response syndrome) ([facility] ) 10/24/2022   • Severe persistent asthma with exacerbation 10/24/2022   • Hyperlipidemia 10/24/2022   • Insulin-dependent diabetes mellitus 10/24/2022      LOS (days): 6  Geometric Mean LOS (GMLOS) (days): 2 80  Days to GMLOS:-3 2     OBJECTIVE:  Risk of Unplanned Readmission Score: 18 68         Current admission status: Inpatient   Preferred Pharmacy:   [facility] [address], [facility] [address]  [address] [address]  Phone: [phone] Fax: [phone]    Primary Care Provider: Bridgette Gomez    Primary Insurance: BLUE CROSS  Secondary Insurance:     DISCHARGE DETAILS:    Discharge planning discussed with[de-identified] Patient  Freedom of Choice: Yes  Comments - Freedom of Choice: Pt requesting Shower chair and RW  and HOspital bed  Pt does not qualify for Hospital bed  Per patietn she needs a Bipap  CM TT Rocio with [facility] patient does not need bipaper per [employer]  Cm orded RW and Shower chair from [employer]  Pt was given alist of out patient phycail therapy sites for Divya Paz'    CM contacted family/caregiver?: No- see comments  Were Treatment Team discharge recommendations reviewed with patient/caregiver?: Yes  Did patient/caregiver verbalize understanding of patient care needs?: Yes  Were patient/caregiver advised of the risks associated with not following Treatment Team discharge recommendations?: Yes              DME Referral Provided  Referral made for DME?: Yes  DME Supplier Name[de-identified] [facility]    Other Referral/Resources/Interventions Provided:  Interventions: DME         Treatment Team Recommendation: Home  Discharge Destination Plan[de-identified] Home

## 2023-01-11 NOTE — DISCHARGE SUMMARY
Discharge Summary - [facility] Internal Medicine    Patient Information: Miley Wayne 56 y o  female MRN: [id number]  Unit/Bed#: -01 Encounter: [id number]    Discharging Physician / Practitioner: EDUARDA Nance  PCP: Brianda Rosa  Admission Date: 1/5/2023  Discharge Date: 01/11/23    Reason for Admission: Severe asthma with acute exacerbation, acute respiratory failure with hypoxia    Discharge Diagnoses:     Principal Problem:    Severe asthma with acute exacerbation  Active Problems:    Hyperlipidemia    Insulin-dependent diabetes mellitus    GERD (gastroesophageal reflux disease)    Depression with anxiety    Narrowing of airway  Resolved Problems:    Acute respiratory failure with hypoxia       Consultations During Hospital Stay:  · Pulmonology  · Respiratory  · ENT    Procedures Performed:     · Flexible laryngoscopy, widely patent airway  Significant Findings / Test Results:     CT soft tissue neck wo contrast   Final Result by Genny Beckett MD (01/09 4994)      Severe narrowing of oropharyngeal airway at the level of palatine tonsils and wgr-pf-[id number] aspect of uvula, which is worse during inspiration  No suspicious neck mass on this noncontrast examination  Mild collapse of the posterior subglottic airway, posterior tracheal airway, and visualized proximal bilateral mainstem bronchi during expiratory images  Findings are suspicious for tracheobronchomalacia  Additional chronic/incidental findings as detailed above  Please see same-day CT chest without contrast for further evaluation  I personally discussed this study with Andriy Waldron on 1/9/2023 at 4:38 PM                Workstation performed: [id number]         CT chest wo contrast   Final Result by Marlon Handley MD (01/10 4713)      Normal appearance of the trachea and bronchi on expiration with no excessive collapse; nothing to indicate tracheobronchomalacia        Bilateral dependent atelectasis in the lower lobes, possibly related to aspiration  Trace effusions  Workstation performed: [id number]         XR chest portable ICU   Final Result by Flo Langston MD (01/05 1451)      New obscuration of the left costophrenic angle is favored to be due to overlying soft tissue structures, though a pleural effusion is not excluded  If there is clinical concern for an effusion, and upright frontal and lateral chest radiograph can be    considered for further assessment  Workstation performed: [id number]         ·     Incidental Findings:   · None    Test Results Pending at Discharge (will require follow up): · None     Outpatient Tests Requested:  · Outpatient follow-up with PCP  · Patient follow-up with primary pulmonologist versus [facility]monology  · Patient follow-up with GI    Complications: None    Hospital Course:     Manuel Heller is a 56 y o  female patient with a past medical history of severe asthma, tracheal wheezing, severe chronic GERD, diabetes, hypertension, rectus abdominal muscle hematoma secondary to coughing, depression and anxiety, hyperlipidemia who originally presented to the hospital on 1/5/2023 due to acute respiratory failure/asthma exacerbation  Patient initially presented to the hospital for outpatient endoscopic procedure but had an acute bronchospasm and procedure was aborted, patient was sent to PACU where she was treated by the ICU team for status asthmaticus  Patient initially required BiPAP  Was given IV steroids  Admitted to critical care, later transitioned out  COVID/influenza swab negative  Has been improving  Was seen by ENT per pulmonology recommendations given CT appearance, trachea widely patent  No evidence of vocal cord dysfunction  Patient's main complaints continue to be GERD with associated globus sensation  At this point, her respiratory status is improved    She will be discharged home today on steroid taper   She should follow-up with gastroenterology as well as her pulmonologist to discuss timing of rescheduling her endoscopic procedure  Patient did request that this be done this admission however I discussed with her extensively that she is extremely high risk at this time and that recent attempt was aborted secondary to bronchospasm  Condition at Discharge: stable     Discharge Day Visit / Exam:     Subjective: Patient offers no acute complaints  Continues to have GERD symptoms with associated globus sensation  She is overall frustrated by recurrent hospitalizations and recurrent complications preventing her from proceeding with endoscopic evaluation  Vitals: Blood Pressure: 113/59 (01/10/23 2221)  Pulse: 79 (01/10/23 2221)  Temperature: 97 9 °F (36 6 °C) (01/10/23 2221)  Temp Source: Oral (01/10/23 1200)  Respirations: 18 (01/10/23 2106)  Height: 4' 11" (149 9 cm) (01/05/23 1047)  Weight - Scale: 90 2 kg (198 lb 13 7 oz) (01/11/23 0535)  SpO2: 95 % (01/11/23 9248)  Exam:   Physical Exam  Vitals and nursing note reviewed  Constitutional:       Appearance: She is obese  Cardiovascular:      Rate and Rhythm: Normal rate  Pulmonary:      Effort: No respiratory distress  Breath sounds: Decreased breath sounds present  No wheezing  Abdominal:      Tenderness: There is no abdominal tenderness  Musculoskeletal:         General: No swelling  Skin:     General: Skin is warm  Neurological:      Mental Status: She is alert and oriented to person, place, and time  Mental status is at baseline  Psychiatric:         Mood and Affect: Mood normal          Discussion with Family: Patient    Discharge instructions/Information to patient and family:   See after visit summary for information provided to patient and family  Provisions for Follow-Up Care:  See after visit summary for information related to follow-up care and any pertinent home health orders        Disposition:     Home    For Discharges to [facility] SNF:   · Not Applicable to this Patient - Not Applicable to this Patient    Planned Readmission: no     Discharge Statement:  I spent 50 minutes discharging the patient  This time was spent on the day of discharge  I had direct contact with the patient on the day of discharge  Greater than 50% of the total time was spent examining patient, answering all patient questions, arranging and discussing plan of care with patient as well as directly providing post-discharge instructions  Additional time then spent on discharge activities  Discharge Medications:  See after visit summary for reconciled discharge medications provided to patient and family        ** Please Note: This note has been constructed using a voice recognition system **

## 2023-01-11 NOTE — ASSESSMENT & PLAN NOTE
Multiple hospitalizations through the last year for similar issue  Initially presented in the outpatient setting for an elective procedure (EGD w/ manometry) which was aborted due to intractable wheezing found to be in status asthmaticus -> required a loading dose of IV Solu-Medrol coupled w/ IV magnesium and terbutaline, and urgent Albuterol nebulizer treatment  Initially BIPAP dependent, now progressively wean down to room air - transferred out of ICU on 1/6  Plan to transition IV Solu-Medrol to oral Prednisone (w/ plan for progressive tapering) - continue Perforomist/Pulmicort and nebulizer treatments -> additional doses of IV magnesium sulfate intermittently administered over the last few days  Waxing/waning but slightly improved coughing which induces more wheezing episodes -> optimized Tussionex for cough control  Further management/recommendations per pulmonology

## 2023-01-11 NOTE — PLAN OF CARE
Problem: MOBILITY - ADULT  Goal: Maintain or return to baseline ADL function  Description: INTERVENTIONS:  -  Assess patient's ability to carry out ADLs; assess patient's baseline for ADL function and identify physical deficits which impact ability to perform ADLs (bathing, care of mouth/teeth, toileting, grooming, dressing, etc )  - Assess/evaluate cause of self-care deficits   - Assess range of motion  - Assess patient's mobility; develop plan if impaired  - Assess patient's need for assistive devices and provide as appropriate  - Encourage maximum independence but intervene and supervise when necessary  - Involve family in performance of ADLs  - Assess for home care needs following discharge   - Consider OT consult to assist with ADL evaluation and planning for discharge  - Provide patient education as appropriate  Outcome: Progressing  Goal: Maintains/Returns to pre admission functional level  Description: INTERVENTIONS:  - Perform BMAT or MOVE assessment daily    - Set and communicate daily mobility goal to care team and patient/family/caregiver     - Collaborate with rehabilitation services on mobility goals if consulted  - Out of bed for toileting  - Record patient progress and toleration of activity level   Outcome: Progressing     Problem: Prexisting or High Potential for Compromised Skin Integrity  Goal: Skin integrity is maintained or improved  Description: INTERVENTIONS:  - Identify patients at risk for skin breakdown  - Assess and monitor skin integrity  - Assess and monitor nutrition and hydration status  - Monitor labs   - Assess for incontinence   - Turn and reposition patient  - Assist with mobility/ambulation  - Relieve pressure over bony prominences  - Avoid friction and shearing  - Provide appropriate hygiene as needed including keeping skin clean and dry  - Evaluate need for skin moisturizer/barrier cream  - Collaborate with interdisciplinary team   - Patient/family teaching  - Consider wound care consult   Outcome: Progressing     Problem: Potential for Falls  Goal: Patient will remain free of falls  Description: INTERVENTIONS:  - Educate patient/family on patient safety including physical limitations  - Instruct patient to call for assistance with activity   - Consult OT/PT to assist with strengthening/mobility   - Keep Call bell within reach  - Keep bed low and locked with side rails adjusted as appropriate  - Keep care items and personal belongings within reach  - Initiate and maintain comfort rounds  - Make Fall Risk Sign visible to staff  - Apply yellow socks and bracelet for high fall risk patients  - Consider moving patient to room near nurses station  Outcome: Progressing     Problem: PAIN - ADULT  Goal: Verbalizes/displays adequate comfort level or baseline comfort level  Description: Interventions:  - Encourage patient to monitor pain and request assistance  - Assess pain using appropriate pain scale  - Administer analgesics based on type and severity of pain and evaluate response  - Implement non-pharmacological measures as appropriate and evaluate response  - Consider cultural and social influences on pain and pain management  - Notify physician/advanced practitioner if interventions unsuccessful or patient reports new pain  Outcome: Progressing     Problem: INFECTION - ADULT  Goal: Absence or prevention of progression during hospitalization  Description: INTERVENTIONS:  - Assess and monitor for signs and symptoms of infection  - Monitor lab/diagnostic results  - Monitor all insertion sites, i e  indwelling lines, tubes, and drains  - Monitor endotracheal if appropriate and nasal secretions for changes in amount and color  - [place] appropriate cooling/warming therapies per order  - Administer medications as ordered  - Instruct and encourage patient and family to use good hand hygiene technique  - Identify and instruct in appropriate isolation precautions for identified infection/condition  Outcome: Progressing  Goal: Absence of fever/infection during neutropenic period  Description: INTERVENTIONS:  - Monitor WBC    Outcome: Progressing     Problem: DISCHARGE PLANNING  Goal: Discharge to home or other facility with appropriate resources  Description: INTERVENTIONS:  - Identify barriers to discharge w/patient and caregiver  - Arrange for needed discharge resources and transportation as appropriate  - Identify discharge learning needs (meds, wound care, etc )  - Arrange for interpretive services to assist at discharge as needed  - Refer to Case Management Department for coordinating discharge planning if the patient needs post-hospital services based on physician/advanced practitioner order or complex needs related to functional status, cognitive ability, or social support system  Outcome: Progressing     Problem: Knowledge Deficit  Goal: Patient/family/caregiver demonstrates understanding of disease process, treatment plan, medications, and discharge instructions  Description: Complete learning assessment and assess knowledge base    Interventions:  - Provide teaching at level of understanding  - Provide teaching via preferred learning methods  Outcome: Progressing     Problem: RESPIRATORY - ADULT  Goal: Achieves optimal ventilation and oxygenation  Description: INTERVENTIONS:  - Assess for changes in respiratory status  - Assess for changes in mentation and behavior  - Position to facilitate oxygenation and minimize respiratory effort  - Oxygen administered by appropriate delivery if ordered  - Initiate smoking cessation education as indicated  - Encourage broncho-pulmonary hygiene including cough, deep breathe, Incentive Spirometry  - Assess the need for suctioning and aspirate as needed  - Assess and instruct to report SOB or any respiratory difficulty  - Respiratory Therapy support as indicated  Outcome: Progressing

## 2023-01-11 NOTE — PROGRESS NOTES
[address] [facility]  Progress Note - Ana Arellanor 1965, 56 y o  female MRN: [id number]  Unit/Bed#: -01 Encounter: [id number]  Primary Care Provider: Lashawn Thompson   Date and time admitted to hospital: 1/5/2023 10:30 AM      * Severe asthma with acute exacerbation  Assessment & Plan  Multiple hospitalizations through the last year for similar issue  Initially presented in the outpatient setting for an elective procedure (EGD w/ manometry) which was aborted due to intractable wheezing found to be in status asthmaticus -> required a loading dose of IV Solu-Medrol coupled w/ IV magnesium and terbutaline, and urgent Albuterol nebulizer treatment  Initially BIPAP dependent, now progressively wean down to room air - transferred out of ICU on 1/6  Plan to transition IV Solu-Medrol to oral Prednisone (w/ plan for progressive tapering) - continue Perforomist/Pulmicort and nebulizer treatments -> additional doses of IV magnesium sulfate intermittently administered over the last few days  Waxing/waning but slightly improved coughing which induces more wheezing episodes -> optimized Tussionex for cough control  Further management/recommendations per pulmonology    Narrowing of airway  Assessment & Plan  CT imaging of the neck soft tissues noting: "Severe narrowing of oropharyngeal airway at the level of palatine tonsils and fqa-la-gaaoqzwi aspect of uvula, which is worse during inspiration  No suspicious neck mass on this noncontrast examination  Mild collapse of the posterior subglottic airway, posterior tracheal airway, and visualized proximal bilateral mainstem bronchi during expiratory images  Findings are suspicious for tracheobronchomalacia "  However subsequent CT imaging of the chest reports: "Normal appearance of the trachea and bronchi on expiration with no excessive collapse; nothing to indicate tracheobronchomalacia   Bilateral dependent atelectasis in the lower lobes, possibly related to aspiration  Trace effusions "  Appreciate ENT input -> s/p  (error ) flexible laryngoscopy today now noting a widely patent airway -> recommending optimization of dysphagia/GERD management    Acute respiratory failure with hypoxia   Assessment & Plan  Previously requiring BiPAP during daytime now progressively weaned down to room air at rest -> still occasionally requiring BiPAP use at night  In the setting of severe asthma exacerbation and possible tracheobronchomalacia (see above)    Influenza/RSV/COVID screen negative    GERD (gastroesophageal reflux disease)  Assessment & Plan  Continue PPI/Pepcid/Carafate regimen  Should follow-up outpatient with gastroenterology to reschedule manometry once acute medical issue(s) have resolved    Hyperlipidemia  Assessment & Plan  Continue statin    Insulin-dependent diabetes mellitus  Assessment & Plan  Lab Results   Component Value Date    HGBA1C 4 9 10/04/2022     A1c well controlled, however, in the setting of current corticosteroid regimen, blood sugars remain elevated - will optimize basal/prandial insulin with continuation of SSI coverage per Accu-Cheks   Carbohydrate restricted diet    Depression with anxiety  Assessment & Plan  Continue Celexa and PRN Xanax      DVT Prophylaxis:  Heparin SC    Patient Centered Rounds:  I have performed bedside rounds and discussed plan of care with nursing today  Discussions with Specialists or Other Care Team Provider:  see above assessments if applicable    Education and Discussions with Family / Patient:  Patient at bedside, who will self-update family as necessary    Time Spent for Care:  32 minutes  More than 50% of total time spent on counseling and coordination of care as described above  Current Length of Stay: 5 day(s)  Current Patient Status: Inpatient   Certification Statement:  Patient will continue to require additional hospital stay due to assessments as noted above      Code Status: Level 1 - Full Code        Subjective:     Encountered earlier in the day  Still reports intermittent coughing however, states that her wheezing has somewhat improved over the last day  Objective:     Vitals:   Temp (24hrs), Av 1 °F (36 7 °C), Min:97 5 °F (36 4 °C), Max:98 7 °F (37 1 °C)    Temp:  [97 5 °F (36 4 °C)-98 7 °F (37 1 °C)] 98 4 °F (36 9 °C)  HR:  [] 79  Resp:  [20-26] 20  BP: (100-133)/(60-76) 103/71  SpO2:  [94 %-99 %] 94 %  Body mass index is 40 23 kg/m²  Input and Output Summary (last 24 hours): Intake/Output Summary (Last 24 hours) at 1/10/2023 1906  Last data filed at 1/10/2023 0930  Gross per 24 hour   Intake 240 ml   Output --   Net 240 ml       Physical Exam:     GENERAL:   Obese - improving weakness/fatigue  HEAD:   Normocephalic - atraumatic  EYES:   PERRL - EOMI   MOUTH:   Mucosa moist  NECK:   Supple - full range of motion  CARDIAC:   Rate controlled currently - S1/S2 positive  PULMONARY:   Diminished bilaterally with expiratory wheezes - nonlabored respirations at rest  ABDOMEN:   Soft - nontender/nondistended - active bowel sounds  MUSCULOSKELETAL:   Motor strength/range of motion intact  NEUROLOGIC:   Alert/oriented at baseline  SKIN:   Chronic wrinkles/blemishes   PSYCHIATRIC:   Mood/affect stable      Additional Data:     Labs & Recent Cultures:    Results from last 7 days   Lab Units 01/10/23  0511 23  0617 23  0640   WBC Thousand/uL 15 06*   < > 14 13*   HEMOGLOBIN g/dL 12 6   < > 13 1   HEMATOCRIT % 39 4   < > 40 8   PLATELETS Thousands/uL 396*   < > 398*   BANDS PCT % 1   < >  --    NEUTROS PCT %  --   --  84*   LYMPHS PCT %  --   --  8*   LYMPHO PCT % 7*   < >  --    MONOS PCT %  --   --  6   MONO PCT % 12   < >  --    EOS PCT % 0   < > 0    < > = values in this interval not displayed       Results from last 7 days   Lab Units 01/10/23  0511 23  1153 01/05/23  1053   POTASSIUM mmol/L 3 5   < > 6 7*   CHLORIDE mmol/L 102   < > 107   CO2 mmol/L 27 < > 21   BUN mg/dL 21   < > 6   CREATININE mg/dL 0 83   < > 0 84   CALCIUM mg/dL 8 4   < > 8 8   ALK PHOS U/L  --   --  126*   ALT U/L  --   --  32   AST U/L  --   --  94*    < > = values in this interval not displayed           Results from last 7 days   Lab Units 01/10/23  1706 01/10/23  1200 01/10/23  0622 01/09/23  2116 01/09/23  1641 01/09/23  1210 01/09/23  0550 01/08/23  2051 01/08/23  1613 01/08/23  1117 01/08/23  0647 01/07/23 2112   POC GLUCOSE mg/dl 244* 200* 213* 174* 188* 270* 147* 176* 196* 182* 149* 158*                         Lines/Drains:  Invasive Devices     Peripheral Intravenous Line  Duration           Peripheral IV 01/06/23 Right;Ventral (anterior) Forearm 3 days                  Last 24 Hours Medication List:   Current Facility-Administered Medications   Medication Dose Route Frequency Provider Last Rate   • acetaminophen  650 mg Oral Q8H PRN Robyn Gil MD     • ALPRAZolam  0 5 mg Oral Daily PRN Robyn Gil MD     • budesonide  0 5 mg Nebulization Q12H Robyn Gil MD     • citalopram  10 mg Oral Daily Robyn Gil MD     • famotidine  40 mg Oral BID Robyn Gil MD     • formoterol  20 mcg Nebulization Q12H Robyn Gil MD     • gabapentin  300 mg Oral HS Robyn Gil MD     • heparin (porcine)  7,500 Units Subcutaneous Q8H [facility] Robyn Gil MD     • hydrocodone-chlorpheniramine polistirex  2 5 mL Oral Q6H PRN Robyn Gil MD     • insulin glargine  10 Units Subcutaneous HS Robyn Gil MD     • insulin lispro  1-6 Units Subcutaneous TID AC Robyn Gil MD     • insulin lispro  1-6 Units Subcutaneous HS Robyn Gil MD     • insulin lispro  3 Units Subcutaneous TID With Meals Robyn Gil MD     • ipratropium-albuterol  3 mL Nebulization Q6H PRN Robyn Gil MD     • magnesium hydroxide  30 mL Oral Daily PRN Robyn Gil MD     • melatonin  9 mg Oral HS Robyn Gil MD     • methylPREDNISolone sodium succinate  40 mg Intravenous Q12H [facility] Jd Aguilera MD     • pantoprazole  40 mg Oral Early Morning Kimberli Gil MD     • polyethylene glycol  17 g Oral Daily PRN Kimberli Gil MD     • pravastatin  40 mg Oral Daily With Roberto Lockett MD     • [START ON 1/11/2023] predniSONE  40 mg Oral Daily Kimberli Gil MD     • sucralfate  1 g Oral BID AC Kimberli Gil MD     • zolpidem  10 mg Oral HS Kimberli Gil MD                      ** Please Note: This note is constructed using a voice recognition dictation system  An occasional wrong word/phrase or “sound-a-like” substitution may have been picked up by dictation device due to the inherent limitations of voice recognition software  Read the chart carefully and recognize, using reasonable context, where substitutions may have occurred  **

## 2023-01-11 NOTE — ASSESSMENT & PLAN NOTE
CT imaging of the neck soft tissues noting: "Severe narrowing of oropharyngeal airway at the level of palatine tonsils and ngy-[id number]-lukmlzww aspect of uvula, which is worse during inspiration  No suspicious neck mass on this noncontrast examination  Mild collapse of the posterior subglottic airway, posterior tracheal airway, and visualized proximal bilateral mainstem bronchi during expiratory images  Findings are suspicious for tracheobronchomalacia "  However subsequent CT imaging of the chest reports: "Normal appearance of the trachea and bronchi on expiration with no excessive collapse; nothing to indicate tracheobronchomalacia  Bilateral dependent atelectasis in the lower lobes, possibly related to aspiration   Trace effusions "  Appreciate ENT input -> s/p flexible laryngoscopy today now noting a widely patent airway -> recommending optimization of dysphagia/GERD management

## 2023-01-11 NOTE — DISCHARGE INSTR - AVS FIRST PAGE
Continue all home medications as previously taking  You have been prescribed prednisone which is a steroid  Please follow instructions and complete as prescribed  Princess Nogueira has been made aware of your discharge today as per your request  Her office will reach out to you to schedule outpatient follow up  Below is information for [facility] pulmonology you wish to establish care within [facility] system which may be beneficial to your overall care plan

## 2023-01-11 NOTE — ASSESSMENT & PLAN NOTE
Continue PPI/Pepcid/Carafate regimen  Should follow-up outpatient with gastroenterology to reschedule manometry once acute medical issue(s) have resolved

## 2023-01-11 NOTE — ASSESSMENT & PLAN NOTE
CT imaging of the neck soft tissues noting: "Severe narrowing of oropharyngeal airway at the level of palatine tonsils and upc-ay-qbintwqd aspect of uvula, which is worse during inspiration  No suspicious neck mass on this noncontrast examination  Mild collapse of the posterior subglottic airway, posterior tracheal airway, and visualized proximal bilateral mainstem bronchi during expiratory images  Findings are suspicious for tracheobronchomalacia "  However subsequent CT imaging of the chest reports: "Normal appearance of the trachea and bronchi on expiration with no excessive collapse; nothing to indicate tracheobronchomalacia  Bilateral dependent atelectasis in the lower lobes, possibly related to aspiration   Trace effusions "  Appreciate ENT input -> s/p fiberoptic laryngoscopy today now noting a widely patent airway -> recommending optimization of dysphagia/GERD management

## 2023-01-11 NOTE — ASSESSMENT & PLAN NOTE
Lab Results   Component Value Date    HGBA1C 4 9 10/04/2022     A1c well controlled, however, in the setting of current corticosteroid regimen, blood sugars remain elevated - will optimize basal/prandial insulin with continuation of SSI coverage per Accu-Cheks   Carbohydrate restricted diet

## 2023-01-12 LAB
DME PARACHUTE DELIVERY DATE EXPECTED: NORMAL
DME PARACHUTE DELIVERY DATE REQUESTED: NORMAL
DME PARACHUTE ITEM DESCRIPTION: NORMAL
DME PARACHUTE ORDER STATUS: NORMAL
DME PARACHUTE SUPPLIER NAME: NORMAL
DME PARACHUTE SUPPLIER PHONE: NORMAL

## 2023-01-12 NOTE — UTILIZATION REVIEW
NOTIFICATION OF ADMISSION DISCHARGE   This is a Notification of Discharge from [facility] [place]  Please be advised that this patient has been discharge from our facility  Below you will find the admission and discharge date and time including the patient’s disposition  UTILIZATION REVIEW CONTACT:  Arianna Sahni  Utilization   Network Utilization Review Department  Phone: [phone] x carefully listen to the prompts  All voicemails are confidential   Email: [email or web address]     ADMISSION INFORMATION  PRESENTATION DATE: 1/5/2023 10:30 AM  OBERVATION ADMISSION DATE:   INPATIENT ADMISSION DATE: 1/5/23 10:30 AM   DISCHARGE DATE: 1/11/2023  1:28 PM   DISPOSITION:Home/Self Care    IMPORTANT INFORMATION:  Send all requests for admission clinical reviews, approved or denied determinations and any other requests to dedicated fax number below belonging to the campus where the patient is receiving treatment   List of dedicated fax numbers:  [address] DENIALS (Administrative/Medical Necessity) [phone]   [address] (Maternity/NICU/Pediatrics) [phone]   [facility] [phone]   [facility] 87 [phone]   [address] [phone]   [address] [facility] [phone]   [address] [place] [address] [phone]   [address] [place], [place] [address] [phone]   [facility]  [phone]   [facility] [phone]   [address] [place] [address] [address] [facility] [phone]

## 2023-01-17 LAB
DME PARACHUTE DELIVERY DATE ACTUAL: NORMAL
DME PARACHUTE DELIVERY DATE EXPECTED: NORMAL
DME PARACHUTE DELIVERY DATE REQUESTED: NORMAL
DME PARACHUTE ITEM DESCRIPTION: NORMAL
DME PARACHUTE ORDER STATUS: NORMAL
DME PARACHUTE SUPPLIER NAME: NORMAL
DME PARACHUTE SUPPLIER PHONE: NORMAL

## 2023-01-18 ENCOUNTER — TELEPHONE (OUTPATIENT)
Dept: GASTROENTEROLOGY | Facility: HOSPITAL | Age: 58
End: 2023-01-18

## 2023-01-24 ENCOUNTER — HOSPITAL ENCOUNTER (EMERGENCY)
Facility: HOSPITAL | Age: 58
Discharge: HOME | End: 2023-01-24
Attending: EMERGENCY MEDICINE

## 2023-01-24 ENCOUNTER — HOSPITAL ENCOUNTER (OUTPATIENT)
Dept: GASTROENTEROLOGY | Facility: HOSPITAL | Age: 58
Discharge: HOME | End: 2023-01-24
Attending: INTERNAL MEDICINE

## 2023-01-24 VITALS
SYSTOLIC BLOOD PRESSURE: 135 MMHG | OXYGEN SATURATION: 99 % | DIASTOLIC BLOOD PRESSURE: 60 MMHG | HEART RATE: 84 BPM | TEMPERATURE: 98 F | RESPIRATION RATE: 20 BRPM

## 2023-01-24 VITALS
OXYGEN SATURATION: 97 % | RESPIRATION RATE: 16 BRPM | DIASTOLIC BLOOD PRESSURE: 73 MMHG | TEMPERATURE: 98.1 F | SYSTOLIC BLOOD PRESSURE: 133 MMHG | HEART RATE: 94 BPM

## 2023-01-24 DIAGNOSIS — R13.19 ESOPHAGEAL DYSPHAGIA: ICD-10-CM

## 2023-01-24 DIAGNOSIS — J45.901 ASTHMA EXACERBATION: Primary | ICD-10-CM

## 2023-01-24 RX ORDER — SODIUM CHLORIDE FOR INHALATION 0.9 %
3 VIAL, NEBULIZER (ML) INHALATION ONCE
Status: COMPLETED | OUTPATIENT
Start: 2023-01-24 | End: 2023-01-24

## 2023-01-24 RX ADMIN — ISODIUM CHLORIDE 3 ML: 0.03 SOLUTION RESPIRATORY (INHALATION) at 13:03

## 2023-01-24 RX ADMIN — ALBUTEROL SULFATE 10 MG: 2.5 SOLUTION RESPIRATORY (INHALATION) at 13:03

## 2023-01-24 RX ADMIN — IPRATROPIUM BROMIDE 1 MG: 0.5 SOLUTION RESPIRATORY (INHALATION) at 13:03

## 2023-01-24 NOTE — DISCHARGE INSTRUCTIONS
You were seen in the ED for an asthma exacerbation  You were treated with a breathing treatment  Please follow up with your family doctor  Return to the ED for any new or concerning symptoms

## 2023-01-24 NOTE — PERIOPERATIVE NURSING NOTE
Patient brought in the room and explained the esophageal manometry procedure  After the confirmation of allergies, lidocaine 2% inserted via right /left nostril and  a transnasal insertion of the High Resolution esophageal manometry catheter was inserted via right nostril  Patient given water to drink during the insertion and once the catheter inserted pressure bands of both Upper esophageal sphincter  (UES) and Lower esophageal sphincter ( LES) were observed on the color contour  Patient instructed to take a deep breath to verify placement of the catheter, diaphragmatic pinch noted on inspiration  Catheter was secured to rightcheek  Patient was assisted to supine position on her right side Hob 20 %   Patient was instructed to relax  while acclimating the catheter for about 5 minutes  A 30 second baseline resting pressure was obtained to identify the UES and LES followed by a series of 10 liquid swallows using 5 cc room temperature normal saline to assess esophageal motility and bolus transit  No viscous swallows  Done  1 multiple rapid drink swallow using 2 cc room temperature normal saline given a total of 5 drinks  Patient instructed to sit up at the edge of the stretcher and given 5 upright liquid swallows using 5 cc room temperature normal saline and 1 rapid drink challenge using 175 cc room temperature water  At the end of the procedure the high resolution esophageal manometry catheter was removed from the nostril intact  Patient given discharge instructions and patient left in stable condition with no distress

## 2023-01-24 NOTE — ED ATTENDING ATTESTATION
Final Diagnoses:     1  Asthma exacerbation      ED Course as of 01/24/23 1417   Tue Jan 24, 2023   1259 Nebulized lidocaine  Felt wheezing  Getting Dana Clive DARLING, saw and evaluated the patient  All available labs and X-rays were ordered by me or the resident and have been reviewed by myself  I discussed the patient with the resident / non-physician and agree with the resident's / non-physician practitioner's findings and plan as documented in the resident's / non-physician practicitioner's note, except where noted  At this point, I agree with the current assessment done in the ED  I was present during key portions of all procedures performed unless otherwise stated  Nursing Triage:     Chief Complaint   Patient presents with   • Wheezing     "Was in GI lab for testing they gave me lidocaine gel up both nostrils and then I started wheezing and now im bringing up mucous" noted wheezing during triage  Hx of asthma pt states "I feel like my throat is tight"       HPI:   This is a 56 y o  female presenting for evaluation of asthma? The patient was getting an EGD / scope  The patient had lidocaine injected into nares (2%) and then the procedure was done  After scope was pulled out, she started to feel acutely SOB with cough productive of mucous/sputum  No f/ch/n/v/cp  She felt SOB, likey her throat was closing  ASSESSMENT + PLAN:   Lidocaine related? Procedure related? Sounds and examines like mild/moderate astham exacerbation  Will do neb, re-evaluate  If no improvement, will check labs  Too low of a dose for this to be LAST / anesthetic overdose  Could be lidocaine related in that she subjectively won't like the sensation  Physical:   Pertinent: wheezing in bilateral lungs anteriorly and posteriorly  No tachypnea  No hypoxia  General: VS reviewed  Appears in NAD  awake, alert  Well-nourished, well-developed  Appears stated age     Speaking normally in full sentences  Head: Normocephalic, atraumatic  Eyes: EOM-I  No diplopia  No hyphema  No subconjunctival hemorrhages  Symmetrical lids  ENT: Atraumatic external nose and ears  MMM  No malocclusion  No stridor  Normal phonation  No drooling  Normal swallowing  Neck: No JVD  CV: No pallor noted  Lungs: as above   Abd: soft nt nd no rebound/guarding  MSK:   FROM spontaneously  Skin: Dry, intact  Neuro: Awake, alert, GCS15, CN II-XII grossly intact  Motor grossly intact  Psychiatric/Behavioral: interacting normally; appropriate mood/affect   Exam: deferred    Vitals:    01/24/23 1252 01/24/23 1330   BP: 138/94 135/60   BP Location: Left arm    Pulse: 94 84   Resp: 20    Temp: 98 °F (36 7 °C)    TempSrc: Tympanic    SpO2: 98% 99%     - There are no obvious limitations to social determinants of care  - Nursing note reviewed  - Vitals reviewed  - Orders placed by myself and/or advanced practitioner / resident     - Previous chart was reviewed  - No language barrier    - History obtained from patient  - There are no limitations to the history obtained:     Past Medical:    has a past medical history of Asthma, Diabetes mellitus ([address] ), and GERD (gastroesophageal reflux disease)  Past Surgical:    has no past surgical history on file  Social:     Social History     Substance and Sexual Activity   Alcohol Use Never     Social History     Tobacco Use   Smoking Status Never   Smokeless Tobacco Never     Social History     Substance and Sexual Activity   Drug Use Never       Echo:   No results found for this or any previous visit  No results found for this or any previous visit  Cath:    No results found for this or any previous visit        Code Status: Prior  Advance Directive and Living Will:      Power of :    POLST:    Medications   albuterol inhalation solution 10 mg (10 mg Nebulization Given 1/24/23 1303)     And   ipratropium (ATROVENT) 0 02 % inhalation solution 1 mg (1 mg Nebulization Given 1/24/23 1303)     And   sodium chloride 0 9 % inhalation solution 3 mL (3 mL Nebulization Given 1/24/23 1303)     No orders to display     No orders of the defined types were placed in this encounter  Labs Reviewed - No data to display  Time reflects when diagnosis was documented in both MDM as applicable and the Disposition within this note     Time User Action Codes Description Comment    1/24/2023  2:02 PM Amanda Flores Shaheen [T70 [id number]] Asthma exacerbation       ED Disposition     ED Disposition   Discharge    Condition   Stable    Date/Time   Tue Jan 24, 2023  2:02 PM    Comment   Robert Barragan discharge to home/self care                 Follow-up Information     Follow up With Specialties Details Why Contact Info Additional [address]    [address] [facility] [address] [facility] Emergency Department Emergency Medicine  As needed [address] [phone]  [address] [facility] [address] [facility] Emergency Department, [address], [address]        Discharge Medication List as of 1/24/2023  2:11 PM      CONTINUE these medications which have NOT CHANGED    Details   albuterol (2 5 mg/3 mL) 0 083 % nebulizer solution 3 ml, Historical Med      albuterol (PROVENTIL HFA,VENTOLIN HFA) 90 mcg/act inhaler Inhale 2 puffs as needed, Historical Med      Blood Pressure Monitoring (Blood Pressure Monitor 3) CARLOS MANUEL Use as directed, Starting Fri 10/21/2022, Historical Med      budesonide-formoterol (SYMBICORT) 160-4 5 mcg/act inhaler Every 12 hours, Historical Med      citalopram (CeleXA) 10 mg tablet Take 10 mg by mouth daily, Historical Med      diphenhydrAMINE (BENADRYL) 25 mg capsule 3 (three) times a day, Historical Med      EPINEPHrine (EPIPEN) 0 3 mg/0 3 mL SOAJ as directed, Historical Med      famotidine (PEPCID) 40 MG tablet Take 1 tablet (40 mg total) by mouth 2 (two) times a day, Starting Tue 11/1/2022, Until Thu 12/1/2022, Normal      fluticasone-vilanterol (Breo Ellipta) 200-25 mcg/actuation inhaler Inhale 1 puff daily Rinse mouth after use , Starting Tue 11/1/2022, Until Wed 1/11/2023, Normal      gabapentin (NEURONTIN) 100 mg capsule every 24 hours, Starting Fri 10/21/2022, Historical Med      Glucagon 1 MG/0 2ML SOAJ 1 mg if BS is less than 60, Historical Med      hydrOXYzine HCL (ATARAX) 25 mg tablet Take 1 tablet (25 mg total) by mouth every 6 (six) hours as needed for anxiety for up to 10 days, Starting Tue 11/1/2022, Until Fri 11/11/2022 at 2359, Normal      insulin lispro (HumaLOG) 100 units/mL injection pen 3 (three) times a day, Starting Thu 8/4/2022, Historical Med      ipratropium (ATROVENT) 0 02 % nebulizer solution Take 2 5 mL (0 5 mg total) by nebulization 3 (three) times a day, Starting Tue 11/1/2022, Until Thu 12/1/2022, Normal      ipratropium-albuterol (DUO-NEB) 0 5-2 5 mg/3 mL nebulizer solution Take 3 mL by nebulization every 4 (four) hours as needed for wheezing or shortness of breath, Starting Tue 11/1/2022, No Print      montelukast (SINGULAIR) 10 mg tablet Take 1 tablet (10 mg total) by mouth daily at bedtime, Starting Tue 11/1/2022, Until Thu 12/1/2022, Normal      ondansetron (ZOFRAN) 4 mg tablet Take 1 tablet by mouth every 8 (eight) hours, Historical Med      OneTouch Ultra test strip TEST TWICE A DAY, Historical Med      Ozempic, 1 MG/DOSE, 4 MG/3ML SOPN injection pen INJECT 1MG SUBCUTANEOUSLY ONCE WEEKLY, Historical Med      pantoprazole (PROTONIX) 40 mg every 24 hours, Historical Med      pravastatin (PRAVACHOL) 40 mg tablet Take 1 tablet (40 mg total) by mouth daily with dinner, Starting Tue 11/1/2022, Until Wed 1/11/2023, Normal      rosuvastatin (CRESTOR) 5 mg tablet Take 1 tablet by mouth daily, Historical Med      sucralfate (CARAFATE) 1 g/10 mL suspension 1 tablet on an empty stomach, Historical Med      torsemide (DEMADEX) 10 mg tablet Take 10 mg by mouth daily, Starting Thu 9/15/2022, Historical Med      zolpidem (AMBIEN) 10 mg tablet Take 1 tablet (10 mg total) by mouth daily at bedtime as needed for sleep, Starting 2023, Normal         STOP taking these medications       predniSONE 20 mg tablet Comments:   Reason for Stopping:             No discharge procedures on file  Prior to Admission Medications   Prescriptions Last Dose Informant Patient Reported? Taking?    Blood Pressure Monitoring (Blood Pressure Monitor 3) CARLOS MANUEL   Yes No   Sig: Use as directed   EPINEPHrine (EPIPEN) 0 3 mg/0 3 mL SOAJ   Yes No   Sig: as directed   Glucagon 1 MG/0 2ML SOAJ   Yes No   Si mg if BS is less than 60   OneTouch Ultra test strip   Yes No   Sig: TEST TWICE A DAY   Ozempic, 1 MG/DOSE, 4 MG/3ML SOPN injection pen   Yes No   Sig: INJECT 1MG SUBCUTANEOUSLY ONCE WEEKLY   albuterol (2 5 mg/3 mL) 0 083 % nebulizer solution   Yes No   Sig: 3 ml   albuterol (PROVENTIL HFA,VENTOLIN HFA) 90 mcg/act inhaler   Yes No   Sig: Inhale 2 puffs as needed   budesonide-formoterol (SYMBICORT) 160-4 5 mcg/act inhaler   Yes No   Sig: Every 12 hours   citalopram (CeleXA) 10 mg tablet   Yes No   Sig: Take 10 mg by mouth daily   diphenhydrAMINE (BENADRYL) 25 mg capsule   Yes No   Sig: 3 (three) times a day   famotidine (PEPCID) 40 MG tablet   No No   Sig: Take 1 tablet (40 mg total) by mouth 2 (two) times a day   fluticasone-vilanterol (Breo Ellipta) 200-25 mcg/actuation inhaler   No No   Sig: Inhale 1 puff daily Rinse mouth after use    gabapentin (NEURONTIN) 100 mg capsule   Yes No   Sig: every 24 hours   hydrOXYzine HCL (ATARAX) 25 mg tablet   No No   Sig: Take 1 tablet (25 mg total) by mouth every 6 (six) hours as needed for anxiety for up to 10 days   insulin lispro (HumaLOG) 100 units/mL injection pen   Yes No   Sig: 3 (three) times a day   ipratropium (ATROVENT) 0 02 % nebulizer solution   No No   Sig: Take 2 5 mL (0 5 mg total) by nebulization 3 (three) times a day ipratropium-albuterol (DUO-NEB) 0 5-2 5 mg/3 mL nebulizer solution   No No   Sig: Take 3 mL by nebulization every 4 (four) hours as needed for wheezing or shortness of breath   montelukast (SINGULAIR) 10 mg tablet   No No   Sig: Take 1 tablet (10 mg total) by mouth daily at bedtime   ondansetron (ZOFRAN) 4 mg tablet   Yes No   Sig: Take 1 tablet by mouth every 8 (eight) hours   pantoprazole (PROTONIX) 40 mg   Yes No   Sig: every 24 hours   pravastatin (PRAVACHOL) 40 mg tablet   No No   Sig: Take 1 tablet (40 mg total) by mouth daily with dinner   predniSONE 20 mg tablet   No No   Sig: Take 2 tablets (40 mg total) by mouth daily for 2 days, THEN 1 5 tablets (30 mg total) daily for 3 days, THEN 1 tablet (20 mg total) daily for 3 days, THEN 0 5 tablets (10 mg total) daily for 3 days  Do not start before 2023  rosuvastatin (CRESTOR) 5 mg tablet   Yes No   Sig: Take 1 tablet by mouth daily   sucralfate (CARAFATE) 1 g/10 mL suspension   Yes No   Si tablet on an empty stomach   torsemide (DEMADEX) 10 mg tablet   Yes No   Sig: Take 10 mg by mouth daily   zolpidem (AMBIEN) 10 mg tablet   No No   Sig: Take 1 tablet (10 mg total) by mouth daily at bedtime as needed for sleep      Facility-Administered Medications: None                        Portions of the record may have been created with voice recognition software  Occasional wrong word or "sound a like" substitutions may have occurred due to the inherent limitations of voice recognition software  Read the chart carefully and recognize, using context, where substitutions have occurred      Electronically signed by:  Jovita Chase

## 2023-01-25 NOTE — ED PROVIDER NOTES
History  Chief Complaint   Patient presents with   • Wheezing     "Was in GI lab for testing they gave me lidocaine gel up both nostrils and then I started wheezing and now im bringing up mucous" noted wheezing during triage  Hx of asthma pt states "I feel like my throat is tight"     Patient is a 57 YO F, PMhx of asthma, GERD, and DM, who presents to the ED for an asthma exacerbation  Patient states this started just prior to arrival after receiving nebulized lidocaine prior to manometry measurement of her esophagus  She states following the procedure she developed SOB and wheezing  She states this is identical to prior asthma exacerbations  Did not administer herself any albuterol prior to coming in  Denies any difficulty swallowing, but states she feels like she has a lot of mucus  No fevers, chills, CP, abdominal pain, back pain  No other complaints at this time  Chart reviewed  Patient received 2% nebulized lidocaine during her procedure earlier today  No reported concerns during or immediately after the procedure  Prior to Admission Medications   Prescriptions Last Dose Informant Patient Reported? Taking?    Blood Pressure Monitoring (Blood Pressure Monitor 3) CARLOS MANUEL   Yes No   Sig: Use as directed   EPINEPHrine (EPIPEN) 0 3 mg/0 3 mL SOAJ   Yes No   Sig: as directed   Glucagon 1 MG/0 2ML SOAJ   Yes No   Si mg if BS is less than 60   OneTouch Ultra test strip   Yes No   Sig: TEST TWICE A DAY   Ozempic, 1 MG/DOSE, 4 MG/3ML SOPN injection pen   Yes No   Sig: INJECT 1MG SUBCUTANEOUSLY ONCE WEEKLY   albuterol (2 5 mg/3 mL) 0 083 % nebulizer solution   Yes No   Sig: 3 ml   albuterol (PROVENTIL HFA,VENTOLIN HFA) 90 mcg/act inhaler   Yes No   Sig: Inhale 2 puffs as needed   budesonide-formoterol (SYMBICORT) 160-4 5 mcg/act inhaler   Yes No   Sig: Every 12 hours   citalopram (CeleXA) 10 mg tablet   Yes No   Sig: Take 10 mg by mouth daily   diphenhydrAMINE (BENADRYL) 25 mg capsule   Yes No   Sig: 3 (three) times a day   famotidine (PEPCID) 40 MG tablet   No No   Sig: Take 1 tablet (40 mg total) by mouth 2 (two) times a day   fluticasone-vilanterol (Breo Ellipta) 200-25 mcg/actuation inhaler   No No   Sig: Inhale 1 puff daily Rinse mouth after use    gabapentin (NEURONTIN) 100 mg capsule   Yes No   Sig: every 24 hours   hydrOXYzine HCL (ATARAX) 25 mg tablet   No No   Sig: Take 1 tablet (25 mg total) by mouth every 6 (six) hours as needed for anxiety for up to 10 days   insulin lispro (HumaLOG) 100 units/mL injection pen   Yes No   Sig: 3 (three) times a day   ipratropium (ATROVENT) 0 02 % nebulizer solution   No No   Sig: Take 2 5 mL (0 5 mg total) by nebulization 3 (three) times a day   ipratropium-albuterol (DUO-NEB) 0 5-2 5 mg/3 mL nebulizer solution   No No   Sig: Take 3 mL by nebulization every 4 (four) hours as needed for wheezing or shortness of breath   montelukast (SINGULAIR) 10 mg tablet   No No   Sig: Take 1 tablet (10 mg total) by mouth daily at bedtime   ondansetron (ZOFRAN) 4 mg tablet   Yes No   Sig: Take 1 tablet by mouth every 8 (eight) hours   pantoprazole (PROTONIX) 40 mg   Yes No   Sig: every 24 hours   pravastatin (PRAVACHOL) 40 mg tablet   No No   Sig: Take 1 tablet (40 mg total) by mouth daily with dinner   predniSONE 20 mg tablet   No No   Sig: Take 2 tablets (40 mg total) by mouth daily for 2 days, THEN 1 5 tablets (30 mg total) daily for 3 days, THEN 1 tablet (20 mg total) daily for 3 days, THEN 0 5 tablets (10 mg total) daily for 3 days  Do not start before 2023     rosuvastatin (CRESTOR) 5 mg tablet   Yes No   Sig: Take 1 tablet by mouth daily   sucralfate (CARAFATE) 1 g/10 mL suspension   Yes No   Si tablet on an empty stomach   torsemide (DEMADEX) 10 mg tablet   Yes No   Sig: Take 10 mg by mouth daily   zolpidem (AMBIEN) 10 mg tablet   No No   Sig: Take 1 tablet (10 mg total) by mouth daily at bedtime as needed for sleep      Facility-Administered Medications: None Past Medical History:   Diagnosis Date   • Asthma    • Diabetes mellitus ([facility] [address] )    • GERD (gastroesophageal reflux disease)        History reviewed  No pertinent surgical history  History reviewed  No pertinent family history  I have reviewed and agree with the history as documented  E-Cigarette/Vaping   • E-Cigarette Use Never User      E-Cigarette/Vaping Substances   • Nicotine No    • THC No    • CBD No    • Flavoring No    • Other No    • Unknown No      Social History     Tobacco Use   • Smoking status: Never   • Smokeless tobacco: Never   Vaping Use   • Vaping Use: Never used   Substance Use Topics   • Alcohol use: Never   • Drug use: Never        Review of Systems   Constitutional: Negative for chills and fever  Respiratory: Positive for shortness of breath and wheezing  Cardiovascular: Negative for chest pain  Gastrointestinal: Negative for nausea and vomiting  All other systems reviewed and are negative  Physical Exam  ED Triage Vitals   Temperature Pulse Respirations Blood Pressure SpO2   01/24/23 1252 01/24/23 1252 01/24/23 1252 01/24/23 1252 01/24/23 1252   98 °F (36 7 °C) 94 20 138/94 98 %      Temp Source Heart Rate Source Patient Position - Orthostatic VS BP Location FiO2 (%)   01/24/23 1252 01/24/23 1252 01/24/23 1252 01/24/23 1252 --   Tympanic Monitor Sitting Left arm       Pain Score       01/24/23 1330       No Pain             Orthostatic Vital Signs  Vitals:    01/24/23 1252 01/24/23 1330   BP: 138/94 135/60   Pulse: 94 84   Patient Position - Orthostatic VS: Sitting        Physical Exam  Vitals and nursing note reviewed  Constitutional:       General: She is not in acute distress  Appearance: She is well-developed  She is not diaphoretic  HENT:      Head: Normocephalic and atraumatic  Right Ear: External ear normal       Left Ear: External ear normal       Nose: Nose normal    Eyes:      General: Lids are normal  No scleral icterus    Cardiovascular: Rate and Rhythm: Normal rate and regular rhythm  Heart sounds: Normal heart sounds  No murmur heard  No friction rub  No gallop  Pulmonary:      Effort: Pulmonary effort is normal  No respiratory distress  Breath sounds: Wheezing (Diffuse, mild) present  No rales  Abdominal:      Palpations: Abdomen is soft  Tenderness: There is no abdominal tenderness  There is no guarding or rebound  Musculoskeletal:         General: No deformity  Normal range of motion  Cervical back: Normal range of motion and neck supple  Skin:     General: Skin is warm and dry  Neurological:      General: No focal deficit present  Mental Status: She is alert  Psychiatric:         Mood and Affect: Mood normal          Behavior: Behavior normal          ED Medications  Medications   albuterol inhalation solution 10 mg (10 mg Nebulization Given 1/24/23 1303)     And   ipratropium (ATROVENT) 0 02 % inhalation solution 1 mg (1 mg Nebulization Given 1/24/23 1303)     And   sodium chloride 0 9 % inhalation solution 3 mL (3 mL Nebulization Given 1/24/23 1303)       Diagnostic Studies  Results Reviewed     None                 No orders to display         Procedures  Procedures      ED Course  ED Course as of 01/25/23 0706   Tue Jan 24, 2023   1330 Pt re-evaluated  Feeling much better  Finishing treatment now   1400 Patient was again re-evaluated  Resting comfortably  Wheezing resolved  Vitals are stable  As no further testing or treatment is indicated/necessary at this time, will discharge  Dicussed steroid rx, but patient declined because she states she just finished a steroid course and does not want to take more  I had a detailed discussion with the patient and/or guardian regarding the historical points, exam findings, and any diagnostic results supporting the discharge diagnosis  Discharge instructions reviewed with patient and/or family/caregiver   Instructions given to return to the emergency department if symptoms worsen or persist, or if there are any questions or concerns that arise at home  Patient and/or guardian verbalized understanding and agreed with the plan of care  Medical Decision Making  Patient is a 56 y o  female with a past medical history of asthma who presents to the ED for SOB and wheezing  Patient's presentation is most consistent with an acute asthma exacerbation as the patient's signs and symptoms are similar to prior flares without overt deviations from normal exacerbations  Considered lidocaine toxicity/LAST, but I feel this is very unlikely given 2% concentration and no other signs or symptoms of lidocaine toxicity  Low suspicion for alternate etiologies such as pneumothorax and acute PE  Presentation not consistent with other acute cardiopulmonary causes including ACS, CHF, or cardiac effusion  Plan for trial of breathing treatments  No clear indications for a chest xray at this time  Will re-evaluate between treatments  If no improvement following treatments, may require admission  Portions of the record may have been created with voice recognition software  Occasional wrong word or "sound a like" substitutions may have occurred due to the inherent limitations of voice recognition software  Read the chart carefully and recognize, using context, where substitutions have occurred  Asthma exacerbation: acute illness or injury  Amount and/or Complexity of Data Reviewed  External Data Reviewed: notes  Risk  OTC drugs  Prescription drug management              Disposition  Final diagnoses:   Asthma exacerbation     Time reflects when diagnosis was documented in both MDM as applicable and the Disposition within this note     Time User Action Codes Description Comment    1/24/2023  2:02 PM Meli Garcia Add [S05 [id number]] Asthma exacerbation       ED Disposition     ED Disposition   Discharge    Condition   Stable Date/Time   Tue Jan 24, 2023  2:02 PM    Comment   Gaelne Self discharge to home/self care                 Follow-up Information     Follow up With Specialties Details Why Contact Info Additional [address] [facility] [address]    [address] [facility] [address] [facility] Emergency Department Emergency Medicine  As needed [address] [id number]  [address] [facility] [address] [facility], [address], [place], [place], [address]          Discharge Medication List as of 1/24/2023  2:11 PM      CONTINUE these medications which have NOT CHANGED    Details   albuterol (2 5 mg/3 mL) 0 083 % nebulizer solution 3 ml, Historical Med      albuterol (PROVENTIL HFA,VENTOLIN HFA) 90 mcg/act inhaler Inhale 2 puffs as needed, Historical Med      Blood Pressure Monitoring (Blood Pressure Monitor 3) CARLOS MANUEL Use as directed, Starting Fri 10/21/2022, Historical Med      budesonide-formoterol (SYMBICORT) 160-4 5 mcg/act inhaler Every 12 hours, Historical Med      citalopram (CeleXA) 10 mg tablet Take 10 mg by mouth daily, Historical Med      diphenhydrAMINE (BENADRYL) 25 mg capsule 3 (three) times a day, Historical Med      EPINEPHrine (EPIPEN) 0 3 mg/0 3 mL SOAJ as directed, Historical Med      famotidine (PEPCID) 40 MG tablet Take 1 tablet (40 mg total) by mouth 2 (two) times a day, Starting Tue 11/1/2022, Until Thu 12/1/2022, Normal      fluticasone-vilanterol (Breo Ellipta) 200-25 mcg/actuation inhaler Inhale 1 puff daily Rinse mouth after use , Starting Tue 11/1/2022, Until Wed 1/11/2023, Normal      gabapentin (NEURONTIN) 100 mg capsule every 24 hours, Starting Fri 10/21/2022, Historical Med      Glucagon 1 MG/0 2ML SOAJ 1 mg if BS is less than 60, Historical Med      hydrOXYzine HCL (ATARAX) 25 mg tablet Take 1 tablet (25 mg total) by mouth every 6 (six) hours as needed for anxiety for up to 10 days, Starting Tue 11/1/2022, Until Fri 11/11/2022 at 2359, Normal      insulin lispro (HumaLOG) 100 units/mL injection pen 3 (three) times a day, Starting Thu 8/4/2022, Historical Med      ipratropium (ATROVENT) 0 02 % nebulizer solution Take 2 5 mL (0 5 mg total) by nebulization 3 (three) times a day, Starting Tue 11/1/2022, Until Thu 12/1/2022, Normal      ipratropium-albuterol (DUO-NEB) 0 5-2 5 mg/3 mL nebulizer solution Take 3 mL by nebulization every 4 (four) hours as needed for wheezing or shortness of breath, Starting Tue 11/1/2022, No Print      montelukast (SINGULAIR) 10 mg tablet Take 1 tablet (10 mg total) by mouth daily at bedtime, Starting Tue 11/1/2022, Until Thu 12/1/2022, Normal      ondansetron (ZOFRAN) 4 mg tablet Take 1 tablet by mouth every 8 (eight) hours, Historical Med      OneTouch Ultra test strip TEST TWICE A DAY, Historical Med      Ozempic, 1 MG/DOSE, 4 MG/3ML SOPN injection pen INJECT 1MG SUBCUTANEOUSLY ONCE WEEKLY, Historical Med      pantoprazole (PROTONIX) 40 mg every 24 hours, Historical Med      pravastatin (PRAVACHOL) 40 mg tablet Take 1 tablet (40 mg total) by mouth daily with dinner, Starting Tue 11/1/2022, Until Wed 1/11/2023, Normal      rosuvastatin (CRESTOR) 5 mg tablet Take 1 tablet by mouth daily, Historical Med      sucralfate (CARAFATE) 1 g/10 mL suspension 1 tablet on an empty stomach, Historical Med      torsemide (DEMADEX) 10 mg tablet Take 10 mg by mouth daily, Starting Thu 9/15/2022, Historical Med      zolpidem (AMBIEN) 10 mg tablet Take 1 tablet (10 mg total) by mouth daily at bedtime as needed for sleep, Starting Wed 1/11/2023, Normal         STOP taking these medications       predniSONE 20 mg tablet Comments:   Reason for Stopping:             No discharge procedures on file  PDMP Review     None           ED Provider  Attending physically available and evaluated Rodolfo Rousseaudanish MOSER managed the patient along with the ED Attending      Electronically Signed by         Sandee Espinoza DO  01/25/23 6145

## 2023-01-26 ENCOUNTER — TELEPHONE (OUTPATIENT)
Dept: GASTROENTEROLOGY | Facility: CLINIC | Age: 58
End: 2023-01-26

## 2023-01-26 NOTE — TELEPHONE ENCOUNTER
Called & spoke to patient  Let patient know that the manometry results are not in yet, and when they are someone from office will call with them

## 2023-01-26 NOTE — TELEPHONE ENCOUNTER
Patients GI provider:  SADA Estevez Hence    Number to return call: [phone]    Reason for call: Pt calling requesting to go over her results from her manometry       Scheduled procedure/appointment date if applicable: N/A

## 2023-01-27 ENCOUNTER — TELEPHONE (OUTPATIENT)
Dept: GASTROENTEROLOGY | Facility: AMBULARY SURGERY CENTER | Age: 58
End: 2023-01-27

## 2023-01-27 NOTE — TELEPHONE ENCOUNTER
----- Message from Bala Wagoner MD sent at 1/27/2023 12:14 PM EST -----  Call patient to report normal results

## 2023-01-30 NOTE — TELEPHONE ENCOUNTER
Patients GI provider:  PA: Gracie Villafuerte     Number to return call: [phone]    Reason for call: Pt calling requesting to speak with someone from the office regarding an appt sooner than the available appt that was offerred 3-8-2023   Patient stated she was in ICU and was not seen at the hospital     Scheduled procedure/appointment date if applicable: Apt/procedure n/a

## 2023-01-31 NOTE — TELEPHONE ENCOUNTER
Spoke with patient  She is scheduled for an OV 2/14/2023  Looking for a sooner OV   Please place on cancellation list

## 2023-02-06 ENCOUNTER — TELEPHONE (OUTPATIENT)
Dept: CARDIAC SURGERY | Facility: CLINIC | Age: 58
End: 2023-02-06

## 2023-02-06 ENCOUNTER — TELEPHONE (OUTPATIENT)
Dept: GASTROENTEROLOGY | Facility: CLINIC | Age: 58
End: 2023-02-06

## 2023-02-06 ENCOUNTER — TELEPHONE (OUTPATIENT)
Dept: OTHER | Facility: OTHER | Age: 58
End: 2023-02-06

## 2023-02-06 DIAGNOSIS — K21.9 GASTROESOPHAGEAL REFLUX DISEASE, UNSPECIFIED WHETHER ESOPHAGITIS PRESENT: Primary | ICD-10-CM

## 2023-02-06 DIAGNOSIS — R05.3 CHRONIC COUGH: ICD-10-CM

## 2023-02-06 DIAGNOSIS — K44.9 HIATAL HERNIA: ICD-10-CM

## 2023-02-06 NOTE — TELEPHONE ENCOUNTER
Left message for patient to call the office to schedule a new patient appointment with Dr Denia Honeycutt

## 2023-02-06 NOTE — TELEPHONE ENCOUNTER
Pt called in stating she would like to set up a telemedicine appt with Christin Ngo  She is requesting a call back at [phone]

## 2023-02-07 ENCOUNTER — TELEPHONE (OUTPATIENT)
Dept: GYNECOLOGIC ONCOLOGY | Facility: CLINIC | Age: 58
End: 2023-02-07

## 2023-02-07 NOTE — TELEPHONE ENCOUNTER
Patient called in today to schedule an appointment  Transferred to Maricel Dang to schedule the appointment

## 2023-02-10 ENCOUNTER — TELEPHONE (OUTPATIENT)
Dept: GASTROENTEROLOGY | Facility: CLINIC | Age: 58
End: 2023-02-10

## 2023-02-10 NOTE — TELEPHONE ENCOUNTER
LMOM for patient to phone back and R/S her appt with Brain Whitaker on 03/06    This is a virtual appt

## 2023-02-24 ENCOUNTER — APPOINTMENT (EMERGENCY)
Dept: RADIOLOGY | Facility: HOSPITAL | Age: 58
End: 2023-02-24

## 2023-02-24 ENCOUNTER — HOSPITAL ENCOUNTER (INPATIENT)
Facility: HOSPITAL | Age: 58
LOS: 4 days | Discharge: HOME | End: 2023-03-01
Attending: EMERGENCY MEDICINE | Admitting: INTERNAL MEDICINE

## 2023-02-24 DIAGNOSIS — R06.2 WHEEZING: ICD-10-CM

## 2023-02-24 DIAGNOSIS — R05.1 ACUTE COUGH: ICD-10-CM

## 2023-02-24 DIAGNOSIS — R05.3 CHRONIC COUGH: ICD-10-CM

## 2023-02-24 DIAGNOSIS — K21.9 GASTROESOPHAGEAL REFLUX DISEASE, UNSPECIFIED WHETHER ESOPHAGITIS PRESENT: ICD-10-CM

## 2023-02-24 DIAGNOSIS — J45.901 ACUTE ASTHMA EXACERBATION: Primary | ICD-10-CM

## 2023-02-24 DIAGNOSIS — B37.0 ORAL THRUSH: ICD-10-CM

## 2023-02-24 LAB
ANION GAP SERPL CALCULATED.3IONS-SCNC: 8 MMOL/L (ref 4–13)
ATRIAL RATE: 90 BPM
BASOPHILS # BLD AUTO: 0.04 THOUSANDS/ÂΜL (ref 0–0.1)
BASOPHILS NFR BLD AUTO: 0 % (ref 0–1)
BUN SERPL-MCNC: 8 MG/DL (ref 5–25)
CALCIUM SERPL-MCNC: 9.7 MG/DL (ref 8.4–10.2)
CARDIAC TROPONIN I PNL SERPL HS: <2 NG/L
CHLORIDE SERPL-SCNC: 105 MMOL/L (ref 96–108)
CO2 SERPL-SCNC: 26 MMOL/L (ref 21–32)
CREAT SERPL-MCNC: 0.72 MG/DL (ref 0.6–1.3)
EOSINOPHIL # BLD AUTO: 0.22 THOUSAND/ÂΜL (ref 0–0.61)
EOSINOPHIL NFR BLD AUTO: 2 % (ref 0–6)
ERYTHROCYTE [DISTWIDTH] IN BLOOD BY AUTOMATED COUNT: 14.4 % (ref 11.6–15.1)
GFR SERPL CREATININE-BSD FRML MDRD: 93 ML/MIN/1.73SQ M
GLUCOSE SERPL-MCNC: 218 MG/DL (ref 65–140)
GLUCOSE SERPL-MCNC: 94 MG/DL (ref 65–140)
HCT VFR BLD AUTO: 43.2 % (ref 34.8–46.1)
HGB BLD-MCNC: 13.8 G/DL (ref 11.5–15.4)
IMM GRANULOCYTES # BLD AUTO: 0.03 THOUSAND/UL (ref 0–0.2)
IMM GRANULOCYTES NFR BLD AUTO: 0 % (ref 0–2)
LYMPHOCYTES # BLD AUTO: 5.12 THOUSANDS/ÂΜL (ref 0.6–4.47)
LYMPHOCYTES NFR BLD AUTO: 51 % (ref 14–44)
MCH RBC QN AUTO: 30.1 PG (ref 26.8–34.3)
MCHC RBC AUTO-ENTMCNC: 31.9 G/DL (ref 31.4–37.4)
MCV RBC AUTO: 94 FL (ref 82–98)
MONOCYTES # BLD AUTO: 0.71 THOUSAND/ÂΜL (ref 0.17–1.22)
MONOCYTES NFR BLD AUTO: 7 % (ref 4–12)
NEUTROPHILS # BLD AUTO: 4.12 THOUSANDS/ÂΜL (ref 1.85–7.62)
NEUTS SEG NFR BLD AUTO: 40 % (ref 43–75)
NRBC BLD AUTO-RTO: 0 /100 WBCS
P AXIS: 30 DEGREES
PLATELET # BLD AUTO: 381 THOUSANDS/UL (ref 149–390)
PMV BLD AUTO: 10 FL (ref 8.9–12.7)
POTASSIUM SERPL-SCNC: 4.8 MMOL/L (ref 3.5–5.3)
PR INTERVAL: 128 MS
QRS AXIS: 27 DEGREES
QRSD INTERVAL: 68 MS
QT INTERVAL: 384 MS
QTC INTERVAL: 469 MS
RBC # BLD AUTO: 4.58 MILLION/UL (ref 3.81–5.12)
SODIUM SERPL-SCNC: 139 MMOL/L (ref 135–147)
T WAVE AXIS: 22 DEGREES
VENTRICULAR RATE: 90 BPM
WBC # BLD AUTO: 10.24 THOUSAND/UL (ref 4.31–10.16)

## 2023-02-24 RX ORDER — MONTELUKAST SODIUM 10 MG/1
10 TABLET ORAL
Status: DISCONTINUED | OUTPATIENT
Start: 2023-02-24 | End: 2023-03-01 | Stop reason: HOSPADM

## 2023-02-24 RX ORDER — BUDESONIDE AND FORMOTEROL FUMARATE DIHYDRATE 80; 4.5 UG/1; UG/1
2 AEROSOL RESPIRATORY (INHALATION) 2 TIMES DAILY
Status: DISCONTINUED | OUTPATIENT
Start: 2023-02-24 | End: 2023-02-24

## 2023-02-24 RX ORDER — ALBUTEROL SULFATE 90 UG/1
2 AEROSOL, METERED RESPIRATORY (INHALATION) EVERY 4 HOURS PRN
Status: DISCONTINUED | OUTPATIENT
Start: 2023-02-24 | End: 2023-03-01 | Stop reason: HOSPADM

## 2023-02-24 RX ORDER — HYDROCODONE BITARTRATE AND HOMATROPINE METHYLBROMIDE ORAL SOLUTION 5; 1.5 MG/5ML; MG/5ML
5 LIQUID ORAL EVERY 4 HOURS PRN
Status: DISCONTINUED | OUTPATIENT
Start: 2023-02-24 | End: 2023-03-01 | Stop reason: HOSPADM

## 2023-02-24 RX ORDER — METHYLPREDNISOLONE SODIUM SUCCINATE 40 MG/ML
40 INJECTION, POWDER, LYOPHILIZED, FOR SOLUTION INTRAMUSCULAR; INTRAVENOUS EVERY 6 HOURS SCHEDULED
Status: DISCONTINUED | OUTPATIENT
Start: 2023-02-24 | End: 2023-02-25

## 2023-02-24 RX ORDER — SODIUM CHLORIDE FOR INHALATION 0.9 %
3 VIAL, NEBULIZER (ML) INHALATION ONCE
Status: COMPLETED | OUTPATIENT
Start: 2023-02-24 | End: 2023-02-24

## 2023-02-24 RX ORDER — CITALOPRAM 20 MG/1
10 TABLET ORAL DAILY
Status: DISCONTINUED | OUTPATIENT
Start: 2023-02-25 | End: 2023-03-01 | Stop reason: HOSPADM

## 2023-02-24 RX ORDER — HEPARIN SODIUM 5000 [USP'U]/ML
5000 INJECTION, SOLUTION INTRAVENOUS; SUBCUTANEOUS EVERY 8 HOURS SCHEDULED
Status: DISCONTINUED | OUTPATIENT
Start: 2023-02-25 | End: 2023-03-01 | Stop reason: HOSPADM

## 2023-02-24 RX ORDER — PRAVASTATIN SODIUM 40 MG
40 TABLET ORAL
Status: DISCONTINUED | OUTPATIENT
Start: 2023-02-25 | End: 2023-03-01 | Stop reason: HOSPADM

## 2023-02-24 RX ORDER — SODIUM CHLORIDE FOR INHALATION 0.9 %
3 VIAL, NEBULIZER (ML) INHALATION
Status: DISCONTINUED | OUTPATIENT
Start: 2023-02-24 | End: 2023-02-24

## 2023-02-24 RX ORDER — FLUTICASONE PROPIONATE 110 UG/1
2 AEROSOL, METERED RESPIRATORY (INHALATION)
Status: DISCONTINUED | OUTPATIENT
Start: 2023-02-24 | End: 2023-03-01 | Stop reason: HOSPADM

## 2023-02-24 RX ORDER — GUAIFENESIN 100 MG/5ML
200 SOLUTION ORAL EVERY 4 HOURS PRN
Status: DISCONTINUED | OUTPATIENT
Start: 2023-02-24 | End: 2023-03-01 | Stop reason: HOSPADM

## 2023-02-24 RX ORDER — PANTOPRAZOLE SODIUM 40 MG/1
40 TABLET, DELAYED RELEASE ORAL
Status: DISCONTINUED | OUTPATIENT
Start: 2023-02-24 | End: 2023-02-25

## 2023-02-24 RX ORDER — MAGNESIUM SULFATE HEPTAHYDRATE 40 MG/ML
2 INJECTION, SOLUTION INTRAVENOUS ONCE
Status: COMPLETED | OUTPATIENT
Start: 2023-02-24 | End: 2023-02-24

## 2023-02-24 RX ORDER — TORSEMIDE 10 MG/1
10 TABLET ORAL DAILY
Status: DISCONTINUED | OUTPATIENT
Start: 2023-02-25 | End: 2023-03-01 | Stop reason: HOSPADM

## 2023-02-24 RX ORDER — LEVALBUTEROL 1.25 MG/.5ML
1.25 SOLUTION, CONCENTRATE RESPIRATORY (INHALATION)
Status: DISCONTINUED | OUTPATIENT
Start: 2023-02-24 | End: 2023-02-27

## 2023-02-24 RX ORDER — ZOLPIDEM TARTRATE 5 MG/1
10 TABLET ORAL
Status: DISCONTINUED | OUTPATIENT
Start: 2023-02-24 | End: 2023-02-26

## 2023-02-24 RX ORDER — INSULIN LISPRO 100 [IU]/ML
1-5 INJECTION, SOLUTION INTRAVENOUS; SUBCUTANEOUS
Status: DISCONTINUED | OUTPATIENT
Start: 2023-02-24 | End: 2023-03-01 | Stop reason: HOSPADM

## 2023-02-24 RX ORDER — FLUTICASONE PROPIONATE 110 UG/1
2 AEROSOL, METERED RESPIRATORY (INHALATION)
Status: DISCONTINUED | OUTPATIENT
Start: 2023-02-24 | End: 2023-02-24 | Stop reason: SDUPTHER

## 2023-02-24 RX ORDER — BUDESONIDE AND FORMOTEROL FUMARATE DIHYDRATE 160; 4.5 UG/1; UG/1
2 AEROSOL RESPIRATORY (INHALATION) 2 TIMES DAILY
Status: DISCONTINUED | OUTPATIENT
Start: 2023-02-24 | End: 2023-03-01 | Stop reason: HOSPADM

## 2023-02-24 RX ORDER — INSULIN LISPRO 100 [IU]/ML
1-5 INJECTION, SOLUTION INTRAVENOUS; SUBCUTANEOUS
Status: DISCONTINUED | OUTPATIENT
Start: 2023-02-25 | End: 2023-02-28

## 2023-02-24 RX ORDER — GABAPENTIN 100 MG/1
100 CAPSULE ORAL DAILY
Status: DISCONTINUED | OUTPATIENT
Start: 2023-02-25 | End: 2023-03-01 | Stop reason: HOSPADM

## 2023-02-24 RX ORDER — PANTOPRAZOLE SODIUM 40 MG/1
40 TABLET, DELAYED RELEASE ORAL
Status: DISCONTINUED | OUTPATIENT
Start: 2023-02-25 | End: 2023-02-25

## 2023-02-24 RX ORDER — METHYLPREDNISOLONE SODIUM SUCCINATE 125 MG/2ML
62.5 INJECTION, POWDER, LYOPHILIZED, FOR SOLUTION INTRAMUSCULAR; INTRAVENOUS ONCE
Status: COMPLETED | OUTPATIENT
Start: 2023-02-24 | End: 2023-02-24

## 2023-02-24 RX ORDER — BENZONATATE 100 MG/1
100 CAPSULE ORAL 3 TIMES DAILY PRN
Status: DISCONTINUED | OUTPATIENT
Start: 2023-02-24 | End: 2023-02-25

## 2023-02-24 RX ADMIN — IPRATROPIUM BROMIDE 1 MG: 0.5 SOLUTION RESPIRATORY (INHALATION) at 12:02

## 2023-02-24 RX ADMIN — HYDROCODONE BITARTRATE AND HOMATROPINE METHYLBROMIDE 5 ML: 5; 1.5 SYRUP ORAL at 18:14

## 2023-02-24 RX ADMIN — BUDESONIDE AND FORMOTEROL FUMARATE DIHYDRATE 2 PUFF: 160; 4.5 AEROSOL RESPIRATORY (INHALATION) at 21:47

## 2023-02-24 RX ADMIN — ALBUTEROL SULFATE 10 MG: 2.5 SOLUTION RESPIRATORY (INHALATION) at 14:17

## 2023-02-24 RX ADMIN — METHYLPREDNISOLONE SODIUM SUCCINATE 62.5 MG: 125 INJECTION, POWDER, FOR SOLUTION INTRAMUSCULAR; INTRAVENOUS at 12:42

## 2023-02-24 RX ADMIN — IPRATROPIUM BROMIDE 0.5 MG: 0.5 SOLUTION RESPIRATORY (INHALATION) at 20:06

## 2023-02-24 RX ADMIN — LEVALBUTEROL HYDROCHLORIDE 1.25 MG: 1.25 SOLUTION, CONCENTRATE RESPIRATORY (INHALATION) at 20:06

## 2023-02-24 RX ADMIN — ALBUTEROL SULFATE 10 MG: 2.5 SOLUTION RESPIRATORY (INHALATION) at 12:02

## 2023-02-24 RX ADMIN — INSULIN LISPRO 2 UNITS: 100 INJECTION, SOLUTION INTRAVENOUS; SUBCUTANEOUS at 22:32

## 2023-02-24 RX ADMIN — METHYLPREDNISOLONE SODIUM SUCCINATE 40 MG: 40 INJECTION, POWDER, FOR SOLUTION INTRAMUSCULAR; INTRAVENOUS at 18:14

## 2023-02-24 RX ADMIN — MONTELUKAST 10 MG: 10 TABLET, FILM COATED ORAL at 21:46

## 2023-02-24 RX ADMIN — ZOLPIDEM TARTRATE 10 MG: 5 TABLET, FILM COATED ORAL at 21:46

## 2023-02-24 RX ADMIN — ISODIUM CHLORIDE 3 ML: 0.03 SOLUTION RESPIRATORY (INHALATION) at 12:02

## 2023-02-24 RX ADMIN — IPRATROPIUM BROMIDE 1 MG: 0.5 SOLUTION RESPIRATORY (INHALATION) at 14:16

## 2023-02-24 RX ADMIN — HYDROCODONE BITARTRATE AND HOMATROPINE METHYLBROMIDE 5 ML: 5; 1.5 SYRUP ORAL at 22:32

## 2023-02-24 RX ADMIN — ISODIUM CHLORIDE 3 ML: 0.03 SOLUTION RESPIRATORY (INHALATION) at 14:17

## 2023-02-24 RX ADMIN — MAGNESIUM SULFATE HEPTAHYDRATE 2 G: 40 INJECTION, SOLUTION INTRAVENOUS at 13:06

## 2023-02-24 RX ADMIN — PANTOPRAZOLE SODIUM 40 MG: 40 TABLET, DELAYED RELEASE ORAL at 18:14

## 2023-02-24 RX ADMIN — FLUTICASONE PROPIONATE 2 PUFF: 110 AEROSOL, METERED RESPIRATORY (INHALATION) at 21:46

## 2023-02-24 NOTE — CONSULTS
Consultation - Pulmonary Medicine   Lidya Guevara 66 y o  female MRN: [id number]  Unit/Bed#: -01 Encounter: [id number]      Assessment:  Severe persistent asthma with acute exacerbation  Significant acid reflux  Obesity      Plan:   Reviewed the chart in detail from [facility] as well as [facility]  Severe persistent asthma with acute exacerbation with multiple exacerbations in the past year secondary to acid reflux and GERD  Will continue with her nebulizer treatments around-the-clock along with Flovent and will add Symbicort  Continue with Solu-Medrol 40 mg every 6 hours for today decrease it to 40 mg every 8 hours for tomorrow  Would need treatment for her acid reflux with PPI, ?Nathaly Paulino used to be on Carafate she states  Reviewed the CT of the chest results with the patient  Outpatient follow-up for PFTs as well as optimization of her asthma management with the biologic agent as well probably needs change given she has been on tezpire  and has had multiple exacerbations  She does have outpatient pulmonary follow-up with Dr lira next week  Thank you for the consultation we will continue to follow    History of Present Illness   Physician Requesting Consult: Wei Pereira MD  Reason for Consult / Principal Problem: Asthma exacerbation  Hx and PE limited by: None  HPI: Hugh Escobar is a 66y o  year old female who has never smoked, used to work as a registered nurse until recently, just stopped working about 3 months ago because of her severe asthma exacerbations she states, has history of diabetes mellitus acid reflux and GERD and she was diagnosed with asthma in 2004 when she was pregnant with twins and also had significant GERD at that time she states, she was following up with a hospital in [place], since 2020 August, her asthma has gotten worse since the COVID infection, has had multiple hospital admissions she states in the past year has had an hospital admissions for asthma exacerbation  She has also been on biologic agent tezpire every month for the past year, gets it at a pulmonologist in [place]  In spite of being on the biologic agent she states she has had approximately 9-10 exacerbations in the past year  Last month in January 2023 she was undergoing manometry and EGD when she had severe bronchospasm and had to be admitted into the ICU with the BiPAP since then she has not been feeling well she states, she did not have any fevers and no recent sick contacts but her breathing has been getting worse for which she had to come into the ER  With the magnesium as well as the nebulizer treatments in the ER she states she feels slightly better    Inpatient consult to Pulmonology  Consult performed by: Iftikhar Fung MD  Consult ordered by: Anni Orellana MD          Review of Systems   Constitutional: Positive for fatigue  HENT: Negative  Eyes: Negative  Respiratory: Positive for cough, shortness of breath and wheezing  Cardiovascular: Negative  Gastrointestinal: Negative  Endocrine: Negative  Genitourinary: Negative  Musculoskeletal: Negative  Skin: Negative  Allergic/Immunologic: Negative  Neurological: Negative  Hematological: Negative  Psychiatric/Behavioral: Negative  Historical Information   Past Medical History:   Diagnosis Date   • Asthma    • Diabetes mellitus ([facility] [address] )    • GERD (gastroesophageal reflux disease)      History reviewed  No pertinent surgical history    Social History   Social History     Substance and Sexual Activity   Alcohol Use Never     Social History     Substance and Sexual Activity   Drug Use Never     E-Cigarette/Vaping   • E-Cigarette Use Never User      E-Cigarette/Vaping Substances   • Nicotine No    • THC No    • CBD No    • Flavoring No    • Other No    • Unknown No      Social History     Tobacco Use   Smoking Status Never   Smokeless Tobacco Never     Occupational History: Registered nurse    Family History: non-contributory    Meds/Allergies   current meds:   Current Facility-Administered Medications   Medication Dose Route Frequency   • benzonatate (TESSALON PERLES) capsule 100 mg  100 mg Oral TID PRN   • fluticasone (FLOVENT HFA) 110 MCG/ACT inhaler 2 puff  2 puff Inhalation BID   • guaiFENesin (ROBITUSSIN) oral liquid 200 mg  200 mg Oral Q4H PRN   • [START ON 2/25/2023] heparin (porcine) subcutaneous injection 5,000 Units  5,000 Units Subcutaneous Q8H [address]   • HYDROcodone Bit-Homatrop MBr (HYCODAN) oral syrup 5 mL  5 mL Oral Q4H PRN   • ipratropium (ATROVENT) 0 02 % inhalation solution 0 5 mg  0 5 mg Nebulization TID   • levalbuterol (XOPENEX) inhalation solution 1 25 mg  1 25 mg Nebulization TID   • methylPREDNISolone sodium succinate (Solu-MEDROL) injection 40 mg  40 mg Intravenous Q6H [address]       Allergies   Allergen Reactions   • Codeine Other (See Comments)   • Aspirin GI Intolerance and Rash   • Hydromorphone Rash and Other (See Comments)     GI upset     • Oxycodone-Acetaminophen Rash and Other (See Comments)   • Tramadol Rash and Other (See Comments)       Objective   Vitals: Blood pressure 149/81, pulse 101, temperature 98 1 °F (36 7 °C), resp  rate (!) 32, height 4' 11" (1 499 m), weight 83 6 kg (184 lb 4 9 oz), SpO2 94 %  ,Body mass index is 37 22 kg/m²      Intake/Output Summary (Last 24 hours) at 2/24/2023 1704  Last data filed at 2/24/2023 1402  Gross per 24 hour   Intake 50 ml   Output --   Net 50 ml     Invasive Devices     Peripheral Intravenous Line  Duration           Peripheral IV 02/24/23 Right;Dorsal (posterior) Hand <1 day                Physical Exam  Currently in bed in no acute respiratory distress  Eyes anicteric sclera, conjunctive is clear  ENT nares is patent, no evidence of any discharge  Neck no cervical lymphadenopathy no JVD  Lungs diminished breath sounds bilaterally bilateral expiratory rhonchi  Heart first and second heart sounds are heard no murmur or gallop is heard  Abdomen soft nontender bowel sounds are present  Extremities no pedal edema  CNS awake alert and oriented x3  Skin no rash no bruises  Lab Results:   CBC:   Lab Results   Component Value Date    WBC 10 24 (H) 02/24/2023    HGB 13 8 02/24/2023    HCT 43 2 02/24/2023    MCV 94 02/24/2023     02/24/2023    MCH 30 1 02/24/2023    MCHC 31 9 02/24/2023    RDW 14 4 02/24/2023    MPV 10 0 02/24/2023    NRBC 0 02/24/2023     Imaging Studies: I have personally reviewed pertinent films in PACS  EKG, Pathology, and Other Studies: I have personally reviewed pertinent reports      VTE Prophylaxis: Sequential compression device Tyron Sandoval)     Code Status: Level 1 - Full Code  Advance Directive and Living Will:      Power of :    POLST:

## 2023-02-24 NOTE — ED PROVIDER NOTES
History  Chief Complaint   Patient presents with   • Asthma     C/o cough since  states she has asthma  62-year-old female history of asthma and diabetes presenting with shortness of breath and wheezing  Patient reports gradually worsening asthma despite home medications the past couple of days  Took home dose of steroids on  and Monday without improvement  Reports associated chest pain with tightness sensation  Denies any exertional component or any association with radiation, nausea/vomiting, lightheadedness/syncope, diaphoresis  Denies any abdominal pain  Reports cough productive of clear sputum  Denies any other complaints  Chart reviewed  Past Medical History:  No date: Asthma  No date: Diabetes mellitus ([facility] [address] )  No date: GERD (gastroesophageal reflux disease)  Family History: non-contributory  Social History          Prior to Admission Medications   Prescriptions Last Dose Informant Patient Reported? Taking? Blood Pressure Monitoring (Blood Pressure Monitor 3) CARLOS MANUEL   Yes No   Sig: Use as directed   EPINEPHrine (EPIPEN) 0 3 mg/0 3 mL SOAJ   Yes No   Sig: as directed   Glucagon 1 MG/0 2ML SOAJ   Yes No   Si mg if BS is less than 60   OneTouch Ultra test strip   Yes No   Sig: TEST TWICE A DAY   Ozempic, 1 MG/DOSE, 4 MG/3ML SOPN injection pen   Yes No   Sig: INJECT 1MG SUBCUTANEOUSLY ONCE WEEKLY   Promethazine-Phenyleph-Codeine (Promethazine VC/Codeine) 6  25-5-10 MG/5ML SYRP   Yes No   Sig: Every 6 hours   albuterol (2 5 mg/3 mL) 0 083 % nebulizer solution   Yes No   Sig: 3 ml   albuterol (PROVENTIL HFA,VENTOLIN HFA) 90 mcg/act inhaler   Yes No   Sig: Inhale 2 puffs as needed   budesonide-formoterol (SYMBICORT) 160-4 5 mcg/act inhaler   Yes No   Sig: Every 12 hours   citalopram (CeleXA) 10 mg tablet   Yes No   Sig: Take 10 mg by mouth daily   citalopram (CeleXA) 10 mg tablet   Yes No   Sig: every 24 hours   diphenhydrAMINE (BENADRYL) 25 mg capsule   Yes No   Sig: 3 (three) times a day   famotidine (PEPCID) 40 MG tablet   No No   Sig: Take 1 tablet (40 mg total) by mouth 2 (two) times a day   fluticasone-vilanterol (Breo Ellipta) 200-25 mcg/actuation inhaler   No No   Sig: Inhale 1 puff daily Rinse mouth after use    gabapentin (NEURONTIN) 100 mg capsule   Yes No   Sig: every 24 hours   gabapentin (NEURONTIN) 100 mg capsule   Yes No   Sig: every 24 hours   hydrOXYzine HCL (ATARAX) 25 mg tablet   No No   Sig: Take 1 tablet (25 mg total) by mouth every 6 (six) hours as needed for anxiety for up to 10 days   insulin lispro (HumaLOG) 100 units/mL injection pen   Yes No   Sig: 3 (three) times a day   ipratropium (ATROVENT) 0 02 % nebulizer solution   No No   Sig: Take 2 5 mL (0 5 mg total) by nebulization 3 (three) times a day   ipratropium-albuterol (DUO-NEB) 0 5-2 5 mg/3 mL nebulizer solution   No No   Sig: Take 3 mL by nebulization every 4 (four) hours as needed for wheezing or shortness of breath   montelukast (SINGULAIR) 10 mg tablet   No No   Sig: Take 1 tablet (10 mg total) by mouth daily at bedtime   ondansetron (ZOFRAN) 4 mg tablet   Yes No   Sig: Take 1 tablet by mouth every 8 (eight) hours   pantoprazole (PROTONIX) 40 mg   Yes No   Sig: every 24 hours   phentermine (ADIPEX-P) 37 5 MG tablet   Yes No   Sig: Take 37 5 mg by mouth daily   pravastatin (PRAVACHOL) 40 mg tablet   No No   Sig: Take 1 tablet (40 mg total) by mouth daily with dinner   rosuvastatin (CRESTOR) 5 mg tablet   Yes No   Sig: Take 1 tablet by mouth daily   sucralfate (CARAFATE) 1 g/10 mL suspension   Yes No   Si tablet on an empty stomach   torsemide (DEMADEX) 10 mg tablet   Yes No   Sig: Take 10 mg by mouth daily   zolpidem (AMBIEN) 10 mg tablet   No No   Sig: Take 1 tablet (10 mg total) by mouth daily at bedtime as needed for sleep      Facility-Administered Medications: None       Past Medical History:   Diagnosis Date   • Asthma    • Diabetes mellitus (HCC)    • GERD (gastroesophageal reflux disease) History reviewed  No pertinent surgical history  History reviewed  No pertinent family history  I have reviewed and agree with the history as documented  E-Cigarette/Vaping   • E-Cigarette Use Never User      E-Cigarette/Vaping Substances   • Nicotine No    • THC No    • CBD No    • Flavoring No    • Other No    • Unknown No      Social History     Tobacco Use   • Smoking status: Never   • Smokeless tobacco: Never   Vaping Use   • Vaping Use: Never used   Substance Use Topics   • Alcohol use: Never   • Drug use: Never       Review of Systems   Constitutional: Negative for appetite change, chills, diaphoresis, fever and unexpected weight change  HENT: Negative for congestion and rhinorrhea  Eyes: Negative for photophobia and visual disturbance  Respiratory: Positive for cough, chest tightness and shortness of breath  Cardiovascular: Positive for chest pain  Negative for palpitations and leg swelling  Gastrointestinal: Negative for abdominal distention, abdominal pain, blood in stool, constipation, diarrhea, nausea and vomiting  Genitourinary: Negative for dysuria and hematuria  Musculoskeletal: Negative for back pain, joint swelling, neck pain and neck stiffness  Skin: Negative for color change, pallor, rash and wound  Neurological: Negative for dizziness, syncope, weakness, light-headedness and headaches  Psychiatric/Behavioral: Negative for agitation  All other systems reviewed and are negative  Physical Exam  Physical Exam  Vitals and nursing note reviewed  Constitutional:       General: She is not in acute distress  Appearance: She is well-developed  She is ill-appearing  She is not toxic-appearing or diaphoretic  HENT:      Head: Normocephalic and atraumatic  Nose: Nose normal  No congestion or rhinorrhea  Mouth/Throat:      Mouth: Mucous membranes are moist       Pharynx: Oropharynx is clear   No oropharyngeal exudate or posterior oropharyngeal erythema  Eyes:      General: No scleral icterus  Right eye: No discharge  Left eye: No discharge  Extraocular Movements: Extraocular movements intact  Conjunctiva/sclera: Conjunctivae normal       Pupils: Pupils are equal, round, and reactive to light  Neck:      Vascular: No JVD  Trachea: No tracheal deviation  Comments: Supple  Normal range of motion  Cardiovascular:      Rate and Rhythm: Regular rhythm  Tachycardia present  Heart sounds: Normal heart sounds  No murmur heard  No friction rub  No gallop  Comments: Tachycardia to low 100s and regular rhythm  Pulmonary:      Effort: No respiratory distress  Breath sounds: No stridor  Wheezing present  No rales  Comments: Diffusely tight and inspiratory expiratory wheezing throughout  Chest:      Chest wall: No tenderness  Abdominal:      General: Bowel sounds are normal  There is no distension  Palpations: Abdomen is soft  Tenderness: There is no abdominal tenderness  There is no right CVA tenderness, left CVA tenderness, guarding or rebound  Comments: Soft, nontender, nondistended  Normal bowel sounds throughout   Musculoskeletal:         General: No swelling, tenderness, deformity or signs of injury  Normal range of motion  Cervical back: Normal range of motion and neck supple  No rigidity  No muscular tenderness  Right lower leg: No edema  Left lower leg: No edema  Lymphadenopathy:      Cervical: No cervical adenopathy  Skin:     General: Skin is warm and dry  Coloration: Skin is not pale  Findings: No erythema or rash  Neurological:      General: No focal deficit present  Mental Status: She is alert  Mental status is at baseline  Sensory: No sensory deficit  Motor: No weakness or abnormal muscle tone  Coordination: Coordination normal       Gait: Gait normal       Comments: Alert  Strength and sensation grossly intact    Ambulatory without difficulty at baseline  Psychiatric:         Behavior: Behavior normal          Thought Content:  Thought content normal          Vital Signs  ED Triage Vitals   Temperature Pulse Respirations Blood Pressure SpO2   02/24/23 1137 02/24/23 1137 02/24/23 1137 02/24/23 1138 02/24/23 1137   97 9 °F (36 6 °C) (!) 113 19 146/93 97 %      Temp Source Heart Rate Source Patient Position - Orthostatic VS BP Location FiO2 (%)   02/24/23 1526 02/24/23 1137 02/24/23 1332 02/24/23 1332 --   Oral Monitor Sitting Left arm       Pain Score       02/24/23 1332       No Pain           Vitals:    02/26/23 0746 02/26/23 1524 02/26/23 2207 02/27/23 0719   BP: 116/85 119/84 106/58 121/78   Pulse: 76 75 81 81   Patient Position - Orthostatic VS:  Lying           Visual Acuity      ED Medications  Medications   heparin (porcine) subcutaneous injection 5,000 Units (5,000 Units Subcutaneous Given 2/27/23 0653)   guaiFENesin (ROBITUSSIN) oral liquid 200 mg (200 mg Oral Given 2/27/23 0852)   fluticasone (FLOVENT HFA) 110 MCG/ACT inhaler 2 puff (2 puffs Inhalation Given 2/27/23 0857)   albuterol (PROVENTIL HFA,VENTOLIN HFA) inhaler 2 puff (2 puffs Inhalation Given 2/26/23 0823)   budesonide-formoterol (SYMBICORT) 160-4 5 mcg/act inhaler 2 puff (2 puffs Inhalation Given 2/27/23 0857)   citalopram (CeleXA) tablet 10 mg (10 mg Oral Given 2/27/23 0853)   gabapentin (NEURONTIN) capsule 100 mg (100 mg Oral Given 2/27/23 0852)   montelukast (SINGULAIR) tablet 10 mg (10 mg Oral Given 2/26/23 2236)   torsemide (DEMADEX) tablet 10 mg (10 mg Oral Given 2/27/23 0851)   pravastatin (PRAVACHOL) tablet 40 mg (40 mg Oral Given 2/26/23 1729)   HYDROcodone Bit-Homatrop MBr (HYCODAN) oral syrup 5 mL (5 mL Oral Given 2/26/23 1009)   insulin lispro (HumaLOG) 100 units/mL subcutaneous injection 1-5 Units (1 Units Subcutaneous Not Given 2/27/23 1156)   insulin lispro (HumaLOG) 100 units/mL subcutaneous injection 1-5 Units (1 Units Subcutaneous Not Given 2/26/23 2237)   insulin lispro (HumaLOG) 100 units/mL subcutaneous injection 2 Units (2 Units Subcutaneous Given 2/27/23 1157)   melatonin tablet 3 mg (3 mg Oral Given 2/26/23 2237)   LORazepam (ATIVAN) injection 0 5 mg (0 5 mg Intravenous Given 2/27/23 0939)   pantoprazole (PROTONIX) injection 40 mg (40 mg Intravenous Given 2/27/23 0932)   zolpidem (AMBIEN) tablet 10 mg (10 mg Oral Given 2/26/23 2300)   famotidine (PEPCID) tablet 40 mg (has no administration in time range)   methylPREDNISolone sodium succinate (Solu-MEDROL) injection 40 mg (has no administration in time range)   ipratropium (ATROVENT) 0 02 % inhalation solution 0 5 mg (has no administration in time range)   albuterol inhalation solution 10 mg (10 mg Nebulization Given 2/24/23 1202)     And   ipratropium (ATROVENT) 0 02 % inhalation solution 1 mg (1 mg Nebulization Given 2/24/23 1202)     And   sodium chloride 0 9 % inhalation solution 3 mL (3 mL Nebulization Given 2/24/23 1202)   magnesium sulfate 2 g/50 mL IVPB (premix) 2 g (0 g Intravenous Stopped 2/24/23 1402)   methylPREDNISolone sodium succinate (Solu-MEDROL) injection 62 5 mg (62 5 mg Intravenous Given 2/24/23 1242)   albuterol inhalation solution 10 mg (10 mg Nebulization Given 2/24/23 1417)     And   ipratropium (ATROVENT) 0 02 % inhalation solution 1 mg (1 mg Nebulization Given 2/24/23 1416)     And   sodium chloride 0 9 % inhalation solution 3 mL (3 mL Nebulization Given 2/24/23 1417)   acetaminophen (TYLENOL) tablet 650 mg (650 mg Oral Given 2/25/23 0640)   LORazepam (ATIVAN) injection 0 5 mg (0 5 mg Intravenous Given 2/25/23 1141)   iohexol (OMNIPAQUE) 350 MG/ML injection (SINGLE-DOSE) 85 mL (85 mL Intravenous Given 2/27/23 1409)   potassium chloride (K-DUR,KLOR-CON) CR tablet 40 mEq (40 mEq Oral Given 2/27/23 1441)       Diagnostic Studies  Results Reviewed     Procedure Component Value Units Date/Time    Basic metabolic panel [[id number]]  (Abnormal) Collected: 02/25/23 0617    Lab Status: Final result Specimen: Blood from Arm, Right Updated: 02/25/23 0715     Sodium 137 mmol/L      Potassium 3 7 mmol/L      Chloride 102 mmol/L      CO2 23 mmol/L      ANION GAP 12 mmol/L      BUN 10 mg/dL      Creatinine 0 66 mg/dL      Glucose 173 mg/dL      Calcium 9 5 mg/dL      eGFR 99 ml/min/1 73sq m     Narrative:      [place] guidelines for Chronic Kidney Disease (CKD):   •  Stage 1 with normal or high GFR (GFR > 90 mL/min/1 73 square meters)  •  Stage 2 Mild CKD (GFR = 60-89 mL/min/1 73 square meters)  •  Stage 3A Moderate CKD (GFR = 45-59 mL/min/1 73 square meters)  •  Stage 3B Moderate CKD (GFR = 30-44 mL/min/1 73 square meters)  •  Stage 4 Severe CKD (GFR = 15-29 mL/min/1 73 square meters)  •  Stage 5 End Stage CKD (GFR <15 mL/min/1 73 square meters)  Note: GFR calculation is accurate only with a steady state creatinine    CBC and differential [[id number]]  (Abnormal) Collected: 02/25/23 0617    Lab Status: Final result Specimen: Blood from Arm, Right Updated: 02/25/23 0648     WBC 13 82 Thousand/uL      RBC 4 42 Million/uL      Hemoglobin 13 4 g/dL      Hematocrit 40 3 %      MCV 91 fL      MCH 30 3 pg      MCHC 33 3 g/dL      RDW 14 3 %      MPV 10 0 fL      Platelets 975 Thousands/uL      nRBC 0 /100 WBCs      Neutrophils Relative 88 %      Immat GRANS % 1 %      Lymphocytes Relative 9 %      Monocytes Relative 2 %      Eosinophils Relative 0 %      Basophils Relative 0 %      Neutrophils Absolute 12 30 Thousands/µL      Immature Grans Absolute 0 08 Thousand/uL      Lymphocytes Absolute 1 21 Thousands/µL      Monocytes Absolute 0 22 Thousand/µL      Eosinophils Absolute 0 00 Thousand/µL      Basophils Absolute 0 01 Thousands/µL     Basic metabolic panel [[id number]] Collected: 02/24/23 1243    Lab Status: Final result Specimen: Blood from Arm, Right Updated: 02/24/23 1343     Sodium 139 mmol/L      Potassium 4 8 mmol/L      Chloride 105 mmol/L      CO2 26 mmol/L ANION GAP 8 mmol/L      BUN 8 mg/dL      Creatinine 0 72 mg/dL      Glucose 94 mg/dL      Calcium 9 7 mg/dL      eGFR 93 ml/min/1 73sq m     Narrative:      [employer] guidelines for Chronic Kidney Disease (CKD):   •  Stage 1 with normal or high GFR (GFR > 90 mL/min/1 73 square meters)  •  Stage 2 Mild CKD (GFR = 60-89 mL/min/1 73 square meters)  •  Stage 3A Moderate CKD (GFR = 45-59 mL/min/1 73 square meters)  •  Stage 3B Moderate CKD (GFR = 30-44 mL/min/1 73 square meters)  •  Stage 4 Severe CKD (GFR = 15-29 mL/min/1 73 square meters)  •  Stage 5 End Stage CKD (GFR <15 mL/min/1 73 square meters)  Note: GFR calculation is accurate only with a steady state creatinine    HS Troponin 0hr (reflex protocol) [[id number]]  (Normal) Collected: 02/24/23 1243    Lab Status: Final result Specimen: Blood from Arm, Right Updated: 02/24/23 1321     hs TnI 0hr <2 ng/L     CBC and differential [[id number]]  (Abnormal) Collected: 02/24/23 1243    Lab Status: Final result Specimen: Blood from Arm, Right Updated: 02/24/23 1250     WBC 10 24 Thousand/uL      RBC 4 58 Million/uL      Hemoglobin 13 8 g/dL      Hematocrit 43 2 %      MCV 94 fL      MCH 30 1 pg      MCHC 31 9 g/dL      RDW 14 4 %      MPV 10 0 fL      Platelets 281 Thousands/uL      nRBC 0 /100 WBCs      Neutrophils Relative 40 %      Immat GRANS % 0 %      Lymphocytes Relative 51 %      Monocytes Relative 7 %      Eosinophils Relative 2 %      Basophils Relative 0 %      Neutrophils Absolute 4 12 Thousands/µL      Immature Grans Absolute 0 03 Thousand/uL      Lymphocytes Absolute 5 12 Thousands/µL      Monocytes Absolute 0 71 Thousand/µL      Eosinophils Absolute 0 22 Thousand/µL      Basophils Absolute 0 04 Thousands/µL                  XR chest 2 views   Final Result by Cher Huizar MD (02/24 1430)      No acute cardiopulmonary disease        Previous small left pleural effusion has resolved            Workstation performed: [id number] CT soft tissue neck w contrast    (Results Pending)              Procedures  Procedures         ED Course             HEART Risk Score    Flowsheet Row Most Recent Value   Heart Score Risk Calculator    History 0 Filed at: 02/24/2023 1500   ECG 1 Filed at: 02/24/2023 1500   Age 1 Filed at: 02/24/2023 1500   Risk Factors 1 Filed at: 02/24/2023 1500   Troponin 0 Filed at: 02/24/2023 1500   HEART Score 3 Filed at: 02/24/2023 1500                                      Medical Decision Making  42-year-old female history of asthma and diabetes presenting with shortness of breath and wheezing  Audible wheezing without auscultation and respiratory rate in the mid 20s  Normal saturation at this time  Suspect likely acute asthma exacerbation  Plan for cardiac evaluation including EKG and troponin plus chest x-ray  Basic labs  Plan for breathing treatments plus IV steroids and IV magnesium  Frequent reassessment  EKG NSR and nonspecific ST abnormality  Labs interpreted and no significant acute process  CXR interpreted by me and no significant acute cardiopulmonary process  Wheezing and breathing with mild improvement after breathing treatment and IV magnesium  Recurrent wheezing and tightness  Ordered additional breathing treatment and admitted for persistent asthma and further evaluation and treatment  Admitted  Acute asthma exacerbation: acute illness or injury  Acute cough: acute illness or injury  Wheezing: acute illness or injury  Amount and/or Complexity of Data Reviewed  Labs: ordered  Radiology: ordered  Risk  Prescription drug management  Decision regarding hospitalization            Disposition  Final diagnoses:   Acute asthma exacerbation   Acute cough   Wheezing     Time reflects when diagnosis was documented in both MDM as applicable and the Disposition within this note     Time User Action Codes Description Comment    2/24/2023 12:27 PM Katrin Oakes Add [J72 901] Acute asthma exacerbation 2/24/2023 12:27 PM Kellie Nest Add [R05 1] Acute cough     2/24/2023 12:27 PM Kellie Casas Add [R06 2] Wheezing     2/27/2023  9:51 AM Kely Bonnernatalio Add [K21 9] Gastroesophageal reflux disease, unspecified whether esophagitis present       ED Disposition     ED Disposition   Admit    Condition   Stable    Date/Time   Fri Feb 24, 2023  2:45 PM    Comment   Case was discussed with RAFA and the patient's admission status was agreed to be Admission Status: observation status to the service of Dr Deysi Simms   Follow-up Information    None         Current Discharge Medication List      CONTINUE these medications which have NOT CHANGED    Details   albuterol (2 5 mg/3 mL) 0 083 % nebulizer solution 3 ml      albuterol (PROVENTIL HFA,VENTOLIN HFA) 90 mcg/act inhaler Inhale 2 puffs as needed      Blood Pressure Monitoring (Blood Pressure Monitor 3) CARLOS MANUEL Use as directed      budesonide-formoterol (SYMBICORT) 160-4 5 mcg/act inhaler Every 12 hours      !! citalopram (CeleXA) 10 mg tablet Take 10 mg by mouth daily      !! citalopram (CeleXA) 10 mg tablet every 24 hours      diphenhydrAMINE (BENADRYL) 25 mg capsule 3 (three) times a day      EPINEPHrine (EPIPEN) 0 3 mg/0 3 mL SOAJ as directed      famotidine (PEPCID) 40 MG tablet Take 1 tablet (40 mg total) by mouth 2 (two) times a day  Qty: 60 tablet, Refills: 0    Associated Diagnoses: GERD (gastroesophageal reflux disease)      fluticasone-vilanterol (Breo Ellipta) 200-25 mcg/actuation inhaler Inhale 1 puff daily Rinse mouth after use    Qty: 60 blister, Refills: 0    Associated Diagnoses: Acute asthma exacerbation      !! gabapentin (NEURONTIN) 100 mg capsule every 24 hours      !! gabapentin (NEURONTIN) 100 mg capsule every 24 hours      Glucagon 1 MG/0 2ML SOAJ 1 mg if BS is less than 60      hydrOXYzine HCL (ATARAX) 25 mg tablet Take 1 tablet (25 mg total) by mouth every 6 (six) hours as needed for anxiety for up to 10 days  Qty: 30 tablet, Refills: 0    Associated Diagnoses: Acute asthma exacerbation      insulin lispro (HumaLOG) 100 units/mL injection pen 3 (three) times a day      ipratropium (ATROVENT) 0 02 % nebulizer solution Take 2 5 mL (0 5 mg total) by nebulization 3 (three) times a day  Qty: 225 mL, Refills: 0    Associated Diagnoses: Acute asthma exacerbation      ipratropium-albuterol (DUO-NEB) 0 5-2 5 mg/3 mL nebulizer solution Take 3 mL by nebulization every 4 (four) hours as needed for wheezing or shortness of breath  Refills: 0    Associated Diagnoses: Acute asthma exacerbation      montelukast (SINGULAIR) 10 mg tablet Take 1 tablet (10 mg total) by mouth daily at bedtime  Qty: 30 tablet, Refills: 0    Associated Diagnoses: Acute asthma exacerbation      ondansetron (ZOFRAN) 4 mg tablet Take 1 tablet by mouth every 8 (eight) hours      OneTouch Ultra test strip TEST TWICE A DAY      Ozempic, 1 MG/DOSE, 4 MG/3ML SOPN injection pen INJECT 1MG SUBCUTANEOUSLY ONCE WEEKLY      pantoprazole (PROTONIX) 40 mg every 24 hours      phentermine (ADIPEX-P) 37 5 MG tablet Take 37 5 mg by mouth daily      pravastatin (PRAVACHOL) 40 mg tablet Take 1 tablet (40 mg total) by mouth daily with dinner  Qty: 30 tablet, Refills: 0    Associated Diagnoses: HLD (hyperlipidemia)      Promethazine-Phenyleph-Codeine (Promethazine VC/Codeine) 6  25-5-10 MG/5ML SYRP Every 6 hours      rosuvastatin (CRESTOR) 5 mg tablet Take 1 tablet by mouth daily      sucralfate (CARAFATE) 1 g/10 mL suspension 1 tablet on an empty stomach      torsemide (DEMADEX) 10 mg tablet Take 10 mg by mouth daily      zolpidem (AMBIEN) 10 mg tablet Take 1 tablet (10 mg total) by mouth daily at bedtime as needed for sleep  Qty: 5 tablet, Refills: 0    Associated Diagnoses: Insomnia       !! - Potential duplicate medications found  Please discuss with provider  No discharge procedures on file      PDMP Review       Value Time User    PDMP Reviewed  Yes 2/26/2023 10:43 PM Dayo Webster PA-C          ED Provider  Electronically Signed by           Wil Raphael MD  02/27/23 [id number]

## 2023-02-24 NOTE — ASSESSMENT & PLAN NOTE
Presented with shortness of breath and wheezing bilaterally  With no significant improvement with IV steroids and nebulizers in the emergency department  Currently saturating adequately on room air  We will place on scheduled IV steroids and scheduled nebulizers

## 2023-02-24 NOTE — H&P
[address]  H&P- Monica Huerta 1965, 66 y o  female MRN: [id number]  Unit/Bed#: ED 07 Encounter: [id number]  Primary Care Provider: Nalini Stevenson   Date and time admitted to hospital: 2/24/2023 11:41 AM    * Severe asthma with acute exacerbation  Assessment & Plan  Presented with shortness of breath and wheezing bilaterally  With no significant improvement with IV steroids and nebulizers in the emergency department  Currently saturating adequately on room air  We will place on scheduled IV steroids and scheduled nebulizers    Depression with anxiety  Assessment & Plan  Continue Celexa    GERD (gastroesophageal reflux disease)  Assessment & Plan  Continue PPI    Hyperlipidemia  Assessment & Plan  Continue statin      VTE Prophylaxis: Heparin  / sequential compression device   Code Status: full code  POLST: POLST form is on file already (pre-hospital)  Discussion with family: pt    Anticipated Length of Stay:  Patient will be admitted on an Observation basis with an anticipated length of stay of  < 2 midnights  Justification for Hospital Stay: Asthma exacerbation    Total Time for Visit, including Counseling / Coordination of Care: 60 minutes  Greater than 50% of this total time spent on direct patient counseling and coordination of care  Chief Complaint:   Shortness of breath    History of Present Illness:    Monica Huerta is a 66 y o  female with past medical history significant of asthma patient presented shortness of breath and wheezing  Reports symptoms began 5 days ago  Reports no significant improvement with oral steroids for home inhaler  She otherwise denies any acute complaints  Denies chest pain, fevers, chills, Hong pain, nausea, vomiting, diarrhea or any other complaints  Review of Systems:    Review of Systems   Constitutional: Negative for activity change, appetite change, chills, diaphoresis, fever and unexpected weight change     HENT: Negative for congestion, facial swelling and rhinorrhea  Eyes: Negative for photophobia and visual disturbance  Respiratory: Positive for cough, shortness of breath and wheezing  Cardiovascular: Negative for chest pain and palpitations  Gastrointestinal: Negative for abdominal pain, blood in stool, constipation, diarrhea, nausea and vomiting  Genitourinary: Negative for decreased urine volume, difficulty urinating, dysuria, flank pain, frequency, hematuria and urgency  Musculoskeletal: Negative for arthralgias, back pain, joint swelling and myalgias  Neurological: Negative for dizziness, syncope, facial asymmetry, light-headedness, numbness and headaches  Psychiatric/Behavioral: Negative for confusion and decreased concentration  The patient is not nervous/anxious  Past Medical and Surgical History:     Past Medical History:   Diagnosis Date   • Asthma    • Diabetes mellitus ([facility] [address] )    • GERD (gastroesophageal reflux disease)        History reviewed  No pertinent surgical history  Meds/Allergies:    Prior to Admission medications    Medication Sig Start Date End Date Taking?  Authorizing Provider   albuterol (2 5 mg/3 mL) 0 083 % nebulizer solution 3 ml    Historical Provider, MD   albuterol (PROVENTIL HFA,VENTOLIN HFA) 90 mcg/act inhaler Inhale 2 puffs as needed    Historical Provider, MD   Blood Pressure Monitoring (Blood Pressure Monitor 3) CARLOS MANUEL Use as directed 10/21/22   Historical Provider, MD   budesonide-formoterol (SYMBICORT) 160-4 5 mcg/act inhaler Every 12 hours    Historical Provider, MD   citalopram (CeleXA) 10 mg tablet Take 10 mg by mouth daily    Historical Provider, MD   citalopram (CeleXA) 10 mg tablet every 24 hours 12/1/22   Historical Provider, MD   diphenhydrAMINE (BENADRYL) 25 mg capsule 3 (three) times a day    Historical Provider, MD   EPINEPHrine (EPIPEN) 0 3 mg/0 3 mL SOAJ as directed    Historical Provider, MD   famotidine (PEPCID) 40 MG tablet Take 1 tablet (40 mg total) by mouth 2 (two) times a day 11/1/22 12/1/22  Al Melendrez MD   fluticasone-vilanterol [facility]) 200-25 mcg/actuation inhaler Inhale 1 puff daily Rinse mouth after use  11/1/22 1/11/23  Al Melendrez MD   gabapentin (NEURONTIN) 100 mg capsule every 24 hours 10/21/22   Historical Provider, MD   gabapentin (NEURONTIN) 100 mg capsule every 24 hours    Historical Provider, MD   Glucagon 1 MG/0 2ML SOAJ 1 mg if BS is less than 60    Historical Provider, MD   hydrOXYzine HCL (ATARAX) 25 mg tablet Take 1 tablet (25 mg total) by mouth every 6 (six) hours as needed for anxiety for up to 10 days 11/1/22 11/11/22  Al Melendrez MD   insulin lispro (HumaLOG) 100 units/mL injection pen 3 (three) times a day 8/4/22   Historical Provider, MD   ipratropium (ATROVENT) 0 02 % nebulizer solution Take 2 5 mL (0 5 mg total) by nebulization 3 (three) times a day 11/1/22 12/1/22  Al Melendrez MD   ipratropium-albuterol (DUO-NEB) 0 5-2 5 mg/3 mL nebulizer solution Take 3 mL by nebulization every 4 (four) hours as needed for wheezing or shortness of breath 11/1/22   Al Melendrez MD   montelukast (SINGULAIR) 10 mg tablet Take 1 tablet (10 mg total) by mouth daily at bedtime 11/1/22 12/1/22  Al Melendrez MD   ondansetron (ZOFRAN) 4 mg tablet Take 1 tablet by mouth every 8 (eight) hours    Historical Provider, MD   OneTouch Ultra test strip TEST TWICE A DAY 10/21/22   Historical Provider, MD   Ozempic, 1 MG/DOSE, 4 MG/3ML SOPN injection pen INJECT 1MG SUBCUTANEOUSLY ONCE WEEKLY 9/27/22   Historical Provider, MD   pantoprazole (PROTONIX) 40 mg every 24 hours    Historical Provider, MD   phentermine (ADIPEX-P) 37 5 MG tablet Take 37 5 mg by mouth daily 12/1/22   Historical Provider, MD   pravastatin (PRAVACHOL) 40 mg tablet Take 1 tablet (40 mg total) by mouth daily with dinner 11/1/22 1/11/23  Al Melendrez MD   Promethazine-Phenyleph-Codeine (Promethazine VC/Codeine) 6  25-5-10 MG/5ML SYRP Every 6 hours    Historical Provider, MD nolanastatin (CRESTOR) 5 mg tablet Take 1 tablet by mouth daily    Historical Provider, MD   sucralfate (CARAFATE) 1 g/10 mL suspension 1 tablet on an empty stomach    Historical Provider, MD   torsemide (DEMADEX) 10 mg tablet Take 10 mg by mouth daily 9/15/22   Historical Provider, MD   zolpidem (AMBIEN) 10 mg tablet Take 1 tablet (10 mg total) by mouth daily at bedtime as needed for sleep 1/11/23   EDUARDA Cruz     I have reviewed home medications with patient personally  Allergies: Allergies   Allergen Reactions   • Codeine Other (See Comments)   • Aspirin GI Intolerance and Rash   • Hydromorphone Rash and Other (See Comments)     GI upset     • Oxycodone-Acetaminophen Rash and Other (See Comments)   • Tramadol Rash and Other (See Comments)       Social History:     Marital Status: /Civil Union     Patient Pre-hospital Living Situation: home  Patient Pre-hospital Level of Mobility: idnependent  Patient Pre-hospital Diet Restrictions: none  Substance Use History:   Social History     Substance and Sexual Activity   Alcohol Use Never     Social History     Tobacco Use   Smoking Status Never   Smokeless Tobacco Never     Social History     Substance and Sexual Activity   Drug Use Never       Family History:    History reviewed  No pertinent family history  Physical Exam:     Vitals:   Blood Pressure: 145/63 (02/24/23 1332)  Pulse: 94 (02/24/23 1332)  Temperature: 97 9 °F (36 6 °C) (02/24/23 1137)  Respirations: (!) 28 (Patient complains of sob, Physical assessment was preformed wheezing in the left lower lobe of left lung and wheezing in the right upper lobe of the right lung  MD was notify and RN was notify ) (02/24/23 1332)  SpO2: 100 % (02/24/23 1417)    Physical Exam  Constitutional:       General: She is not in acute distress  Appearance: She is well-developed  She is not diaphoretic  HENT:      Head: Normocephalic and atraumatic        Nose: Nose normal       Mouth/Throat:      Pharynx: No oropharyngeal exudate  Eyes:      General: No scleral icterus  Right eye: No discharge  Left eye: No discharge  Conjunctiva/sclera: Conjunctivae normal       Pupils: Pupils are equal, round, and reactive to light  Neck:      Thyroid: No thyromegaly  Vascular: No JVD  Cardiovascular:      Rate and Rhythm: Normal rate and regular rhythm  Heart sounds: Normal heart sounds  No murmur heard  No friction rub  No gallop  Pulmonary:      Effort: Pulmonary effort is normal  No respiratory distress  Breath sounds: Wheezing present  No rales  Chest:      Chest wall: No tenderness  Abdominal:      General: Bowel sounds are normal  There is no distension  Palpations: Abdomen is soft  Tenderness: There is no abdominal tenderness  There is no guarding or rebound  Musculoskeletal:         General: No tenderness or deformity  Normal range of motion  Cervical back: Normal range of motion and neck supple  Skin:     General: Skin is warm and dry  Findings: No erythema or rash  Neurological:      Mental Status: She is alert and oriented to person, place, and time  Cranial Nerves: No cranial nerve deficit  Sensory: No sensory deficit  Motor: No abnormal muscle tone  Coordination: Coordination normal          (     Additional Data:     Lab Results: I have personally reviewed pertinent reports  Results from last 7 days   Lab Units 02/24/23  1243   WBC Thousand/uL 10 24*   HEMOGLOBIN g/dL 13 8   HEMATOCRIT % 43 2   PLATELETS Thousands/uL 381   NEUTROS PCT % 40*   LYMPHS PCT % 51*   MONOS PCT % 7   EOS PCT % 2     Results from last 7 days   Lab Units 02/24/23  1243   SODIUM mmol/L 139   POTASSIUM mmol/L 4 8   CHLORIDE mmol/L 105   CO2 mmol/L 26   BUN mg/dL 8   CREATININE mg/dL 0 72   ANION GAP mmol/L 8   CALCIUM mg/dL 9 7   GLUCOSE RANDOM mg/dL 94                       Imaging: I have personally reviewed pertinent reports        XR chest 2 views   Final Result by Doa Francis MD (02/24 4468)      No acute cardiopulmonary disease  Previous small left pleural effusion has resolved            Workstation performed: [id number]             EKG, Pathology, and Other Studies Reviewed on Admission:   · EKG: reviewed    Allscripts / Epic Records Reviewed: Yes     ** Please Note: This note has been constructed using a voice recognition system   **

## 2023-02-25 PROBLEM — R93.89 ABNORMAL CT SCAN: Status: ACTIVE | Noted: 2023-02-25

## 2023-02-25 PROBLEM — R13.10 DYSPHAGIA: Status: ACTIVE | Noted: 2023-02-25

## 2023-02-25 LAB
ANION GAP SERPL CALCULATED.3IONS-SCNC: 12 MMOL/L (ref 4–13)
BASOPHILS # BLD AUTO: 0.01 THOUSANDS/ÂΜL (ref 0–0.1)
BASOPHILS NFR BLD AUTO: 0 % (ref 0–1)
BUN SERPL-MCNC: 10 MG/DL (ref 5–25)
CALCIUM SERPL-MCNC: 9.5 MG/DL (ref 8.3–10.1)
CHLORIDE SERPL-SCNC: 102 MMOL/L (ref 96–108)
CO2 SERPL-SCNC: 23 MMOL/L (ref 21–32)
CREAT SERPL-MCNC: 0.66 MG/DL (ref 0.6–1.3)
EOSINOPHIL # BLD AUTO: 0 THOUSAND/ÂΜL (ref 0–0.61)
EOSINOPHIL NFR BLD AUTO: 0 % (ref 0–6)
ERYTHROCYTE [DISTWIDTH] IN BLOOD BY AUTOMATED COUNT: 14.3 % (ref 11.6–15.1)
GFR SERPL CREATININE-BSD FRML MDRD: 99 ML/MIN/1.73SQ M
GLUCOSE SERPL-MCNC: 159 MG/DL (ref 65–140)
GLUCOSE SERPL-MCNC: 173 MG/DL (ref 65–140)
GLUCOSE SERPL-MCNC: 193 MG/DL (ref 65–140)
GLUCOSE SERPL-MCNC: 202 MG/DL (ref 65–140)
GLUCOSE SERPL-MCNC: 291 MG/DL (ref 65–140)
HCT VFR BLD AUTO: 40.3 % (ref 34.8–46.1)
HGB BLD-MCNC: 13.4 G/DL (ref 11.5–15.4)
IMM GRANULOCYTES # BLD AUTO: 0.08 THOUSAND/UL (ref 0–0.2)
IMM GRANULOCYTES NFR BLD AUTO: 1 % (ref 0–2)
LYMPHOCYTES # BLD AUTO: 1.21 THOUSANDS/ÂΜL (ref 0.6–4.47)
LYMPHOCYTES NFR BLD AUTO: 9 % (ref 14–44)
MCH RBC QN AUTO: 30.3 PG (ref 26.8–34.3)
MCHC RBC AUTO-ENTMCNC: 33.3 G/DL (ref 31.4–37.4)
MCV RBC AUTO: 91 FL (ref 82–98)
MONOCYTES # BLD AUTO: 0.22 THOUSAND/ÂΜL (ref 0.17–1.22)
MONOCYTES NFR BLD AUTO: 2 % (ref 4–12)
NEUTROPHILS # BLD AUTO: 12.3 THOUSANDS/ÂΜL (ref 1.85–7.62)
NEUTS SEG NFR BLD AUTO: 88 % (ref 43–75)
NRBC BLD AUTO-RTO: 0 /100 WBCS
PLATELET # BLD AUTO: 447 THOUSANDS/UL (ref 149–390)
PMV BLD AUTO: 10 FL (ref 8.9–12.7)
POTASSIUM SERPL-SCNC: 3.7 MMOL/L (ref 3.5–5.3)
RBC # BLD AUTO: 4.42 MILLION/UL (ref 3.81–5.12)
SODIUM SERPL-SCNC: 137 MMOL/L (ref 135–147)
WBC # BLD AUTO: 13.82 THOUSAND/UL (ref 4.31–10.16)

## 2023-02-25 RX ORDER — METHYLPREDNISOLONE SODIUM SUCCINATE 40 MG/ML
40 INJECTION, POWDER, LYOPHILIZED, FOR SOLUTION INTRAMUSCULAR; INTRAVENOUS EVERY 12 HOURS SCHEDULED
Status: DISCONTINUED | OUTPATIENT
Start: 2023-02-25 | End: 2023-02-27

## 2023-02-25 RX ORDER — METHYLPREDNISOLONE SODIUM SUCCINATE 40 MG/ML
40 INJECTION, POWDER, LYOPHILIZED, FOR SOLUTION INTRAMUSCULAR; INTRAVENOUS EVERY 8 HOURS SCHEDULED
Status: DISCONTINUED | OUTPATIENT
Start: 2023-02-25 | End: 2023-02-25

## 2023-02-25 RX ORDER — ACETAMINOPHEN 325 MG/1
650 TABLET ORAL ONCE
Status: COMPLETED | OUTPATIENT
Start: 2023-02-25 | End: 2023-02-25

## 2023-02-25 RX ORDER — PANTOPRAZOLE SODIUM 40 MG/1
40 TABLET, DELAYED RELEASE ORAL
Status: DISCONTINUED | OUTPATIENT
Start: 2023-02-25 | End: 2023-02-26

## 2023-02-25 RX ORDER — LANOLIN ALCOHOL/MO/W.PET/CERES
3 CREAM (GRAM) TOPICAL
Status: DISCONTINUED | OUTPATIENT
Start: 2023-02-25 | End: 2023-03-01 | Stop reason: HOSPADM

## 2023-02-25 RX ORDER — LORAZEPAM 2 MG/ML
0.5 INJECTION INTRAMUSCULAR ONCE
Status: COMPLETED | OUTPATIENT
Start: 2023-02-25 | End: 2023-02-25

## 2023-02-25 RX ORDER — LORAZEPAM 0.5 MG/1
0.5 TABLET ORAL EVERY 8 HOURS PRN
Status: DISCONTINUED | OUTPATIENT
Start: 2023-02-25 | End: 2023-02-26

## 2023-02-25 RX ORDER — FAMOTIDINE 20 MG/1
20 TABLET, FILM COATED ORAL 2 TIMES DAILY
Status: DISCONTINUED | OUTPATIENT
Start: 2023-02-25 | End: 2023-02-27

## 2023-02-25 RX ORDER — INSULIN LISPRO 100 [IU]/ML
2 INJECTION, SOLUTION INTRAVENOUS; SUBCUTANEOUS
Status: DISCONTINUED | OUTPATIENT
Start: 2023-02-25 | End: 2023-03-01 | Stop reason: HOSPADM

## 2023-02-25 RX ADMIN — BUDESONIDE AND FORMOTEROL FUMARATE DIHYDRATE 2 PUFF: 160; 4.5 AEROSOL RESPIRATORY (INHALATION) at 08:39

## 2023-02-25 RX ADMIN — FAMOTIDINE 20 MG: 20 TABLET, FILM COATED ORAL at 17:36

## 2023-02-25 RX ADMIN — MONTELUKAST 10 MG: 10 TABLET, FILM COATED ORAL at 21:04

## 2023-02-25 RX ADMIN — METHYLPREDNISOLONE SODIUM SUCCINATE 40 MG: 40 INJECTION, POWDER, FOR SOLUTION INTRAMUSCULAR; INTRAVENOUS at 20:54

## 2023-02-25 RX ADMIN — INSULIN LISPRO 3 UNITS: 100 INJECTION, SOLUTION INTRAVENOUS; SUBCUTANEOUS at 08:39

## 2023-02-25 RX ADMIN — METHYLPREDNISOLONE SODIUM SUCCINATE 40 MG: 40 INJECTION, POWDER, FOR SOLUTION INTRAMUSCULAR; INTRAVENOUS at 06:20

## 2023-02-25 RX ADMIN — INSULIN LISPRO 2 UNITS: 100 INJECTION, SOLUTION INTRAVENOUS; SUBCUTANEOUS at 13:24

## 2023-02-25 RX ADMIN — HEPARIN SODIUM 5000 UNITS: 5000 INJECTION INTRAVENOUS; SUBCUTANEOUS at 13:20

## 2023-02-25 RX ADMIN — HEPARIN SODIUM 5000 UNITS: 5000 INJECTION INTRAVENOUS; SUBCUTANEOUS at 21:04

## 2023-02-25 RX ADMIN — ZOLPIDEM TARTRATE 10 MG: 5 TABLET, FILM COATED ORAL at 20:48

## 2023-02-25 RX ADMIN — Medication 3 MG: at 21:04

## 2023-02-25 RX ADMIN — GABAPENTIN 100 MG: 100 CAPSULE ORAL at 08:38

## 2023-02-25 RX ADMIN — PANTOPRAZOLE SODIUM 40 MG: 40 TABLET, DELAYED RELEASE ORAL at 17:00

## 2023-02-25 RX ADMIN — LORAZEPAM 0.5 MG: 2 INJECTION INTRAMUSCULAR; INTRAVENOUS at 11:41

## 2023-02-25 RX ADMIN — FAMOTIDINE 20 MG: 20 TABLET, FILM COATED ORAL at 13:20

## 2023-02-25 RX ADMIN — METHYLPREDNISOLONE SODIUM SUCCINATE 40 MG: 40 INJECTION, POWDER, FOR SOLUTION INTRAMUSCULAR; INTRAVENOUS at 13:20

## 2023-02-25 RX ADMIN — ACETAMINOPHEN 650 MG: 325 TABLET, FILM COATED ORAL at 06:40

## 2023-02-25 RX ADMIN — PRAVASTATIN SODIUM 40 MG: 40 TABLET ORAL at 17:27

## 2023-02-25 RX ADMIN — FLUTICASONE PROPIONATE 2 PUFF: 110 AEROSOL, METERED RESPIRATORY (INHALATION) at 20:47

## 2023-02-25 RX ADMIN — CITALOPRAM HYDROBROMIDE 10 MG: 20 TABLET, FILM COATED ORAL at 08:38

## 2023-02-25 RX ADMIN — HYDROCODONE BITARTRATE AND HOMATROPINE METHYLBROMIDE 5 ML: 5; 1.5 SYRUP ORAL at 08:45

## 2023-02-25 RX ADMIN — BUDESONIDE AND FORMOTEROL FUMARATE DIHYDRATE 2 PUFF: 160; 4.5 AEROSOL RESPIRATORY (INHALATION) at 17:30

## 2023-02-25 RX ADMIN — PANTOPRAZOLE SODIUM 40 MG: 40 TABLET, DELAYED RELEASE ORAL at 06:19

## 2023-02-25 RX ADMIN — INSULIN LISPRO 2 UNITS: 100 INJECTION, SOLUTION INTRAVENOUS; SUBCUTANEOUS at 17:28

## 2023-02-25 RX ADMIN — LORAZEPAM 0.5 MG: 0.5 TABLET ORAL at 14:53

## 2023-02-25 RX ADMIN — TORSEMIDE 10 MG: 10 TABLET ORAL at 08:38

## 2023-02-25 RX ADMIN — HEPARIN SODIUM 5000 UNITS: 5000 INJECTION INTRAVENOUS; SUBCUTANEOUS at 06:19

## 2023-02-25 RX ADMIN — INSULIN LISPRO 1 UNITS: 100 INJECTION, SOLUTION INTRAVENOUS; SUBCUTANEOUS at 17:27

## 2023-02-25 RX ADMIN — LEVALBUTEROL HYDROCHLORIDE 1.25 MG: 1.25 SOLUTION, CONCENTRATE RESPIRATORY (INHALATION) at 13:30

## 2023-02-25 RX ADMIN — METHYLPREDNISOLONE SODIUM SUCCINATE 40 MG: 40 INJECTION, POWDER, FOR SOLUTION INTRAMUSCULAR; INTRAVENOUS at 00:06

## 2023-02-25 RX ADMIN — HYDROCODONE BITARTRATE AND HOMATROPINE METHYLBROMIDE 5 ML: 5; 1.5 SYRUP ORAL at 02:32

## 2023-02-25 RX ADMIN — INSULIN LISPRO 1 UNITS: 100 INJECTION, SOLUTION INTRAVENOUS; SUBCUTANEOUS at 13:23

## 2023-02-25 RX ADMIN — IPRATROPIUM BROMIDE 0.5 MG: 0.5 SOLUTION RESPIRATORY (INHALATION) at 13:30

## 2023-02-25 NOTE — PLAN OF CARE
Problem: RESPIRATORY - ADULT  Goal: Achieves optimal ventilation and oxygenation  Description: INTERVENTIONS:  - Assess for changes in respiratory status  - Assess for changes in mentation and behavior  - Position to facilitate oxygenation and minimize respiratory effort  - Oxygen administered by appropriate delivery if ordered  - Initiate smoking cessation education as indicated  - Encourage broncho-pulmonary hygiene including cough, deep breathe, Incentive Spirometry  - Assess the need for suctioning and aspirate as needed  - Assess and instruct to report SOB or any respiratory difficulty  - Respiratory Therapy support as indicated  Outcome: Progressing     Problem: PAIN - ADULT  Goal: Verbalizes/displays adequate comfort level or baseline comfort level  Description: Interventions:  - Encourage patient to monitor pain and request assistance  - Assess pain using appropriate pain scale  - Administer analgesics based on type and severity of pain and evaluate response  - Implement non-pharmacological measures as appropriate and evaluate response  - Consider cultural and social influences on pain and pain management  - Notify physician/advanced practitioner if interventions unsuccessful or patient reports new pain  Outcome: Progressing     Problem: INFECTION - ADULT  Goal: Absence or prevention of progression during hospitalization  Description: INTERVENTIONS:  - Assess and monitor for signs and symptoms of infection  - Monitor lab/diagnostic results  - Monitor all insertion sites, i e  indwelling lines, tubes, and drains  - Monitor endotracheal if appropriate and nasal secretions for changes in amount and color  - Perry appropriate cooling/warming therapies per order  - Administer medications as ordered  - Instruct and encourage patient and family to use good hand hygiene technique  - Identify and instruct in appropriate isolation precautions for identified infection/condition  Outcome: Progressing  Goal: Absence of fever/infection during neutropenic period  Description: INTERVENTIONS:  - Monitor WBC    Outcome: Progressing     Problem: DISCHARGE PLANNING  Goal: Discharge to home or other facility with appropriate resources  Description: INTERVENTIONS:  - Identify barriers to discharge w/patient and caregiver  - Arrange for needed discharge resources and transportation as appropriate  - Identify discharge learning needs (meds, wound care, etc )  - Arrange for interpretive services to assist at discharge as needed  - Refer to Case Management Department for coordinating discharge planning if the patient needs post-hospital services based on physician/advanced practitioner order or complex needs related to functional status, cognitive ability, or social support system  Outcome: Progressing

## 2023-02-25 NOTE — ASSESSMENT & PLAN NOTE
· Follows with GI as an outpatient  · CT head/neck showing concerns for tracheobronchomalacia   · Reports worsening of symptoms and increased reflux   · Consult speech therapy

## 2023-02-25 NOTE — ASSESSMENT & PLAN NOTE
Lab Results   Component Value Date    HGBA1C 4 9 10/04/2022       Recent Labs     02/24/23 2048 02/25/23  0723 02/25/23  1103   POCGLU 218* 291* 202*       Blood Sugar Average: Last 72 hrs:  (P) 237   · Steroid induced diabetes  · Exacerbated by IV steroids  · Could be better controlled  · Add lispro 2 units with meals  · Monitor on current sliding scale

## 2023-02-25 NOTE — PROGRESS NOTES
[facility]  Progress Note - Braydon Reis 1965, 66 y o  female MRN: [id number]  Unit/Bed#: -01 Encounter: [id number]  Primary Care Provider: Mindy Appiah   Date and time admitted to hospital: 2/24/2023 11:41 AM    * Severe asthma with acute exacerbation  Assessment & Plan  · Severe persistent asthma with exacerbation  · Previously well controlled until mandeep covid in 2020  · Without oxygen requirements  · Pulmonology consulted, see recommendations  · Continue IV steroids, duonebz  · Concern for uncontrolled GERD and acid reflux as etiology   · Procalcitonin normal, monitor off antibiotics       Dysphagia  Assessment & Plan  · Follows with GI as an outpatient  · CT head/neck showing concerns for tracheobronchomalacia, but was seen by ENT and was ruled out   · Reports worsening of symptoms and increased reflux   · Consult speech therapy   · Continue home dose pepcid and protonix     Diabetes [facility])  Assessment & Plan  Lab Results   Component Value Date    HGBA1C 4 9 10/04/2022       Recent Labs     02/24/23  2048 02/25/23  0723 02/25/23  1103   POCGLU 218* 291* 202*       Blood Sugar Average: Last 72 hrs:  (P) 237   · Steroid induced diabetes  · Exacerbated by IV steroids  · Could be better controlled  · Add lispro 2 units with meals  · Monitor on current sliding scale     Depression with anxiety  Assessment & Plan  · Continue home meds          VTE Pharmacologic Prophylaxis: VTE Score: 5 High Risk (Score >/= 5) - Pharmacological DVT Prophylaxis Ordered: heparin  Sequential Compression Devices Ordered  Patient Centered Rounds: I performed bedside rounds with nursing staff today  Discussions with Specialists or Other Care Team Provider: maria elena    Education and Discussions with Family / Patient: Updated  () at bedside      Total Time Spent on Date of Encounter in care of patient: 45 minutes This time was spent on one or more of the following: performing physical exam; counseling and coordination of care; obtaining or reviewing history; documenting in the medical record; reviewing/ordering tests, medications or procedures; communicating with other healthcare professionals and discussing with patient's family/caregivers  Current Length of Stay: 0 day(s)  Current Patient Status: Observation   Certification Statement: The patient will continue to require additional inpatient hospital stay due to IV steroids, antibiotics  Discharge Plan: Anticipate discharge in 48 hrs to home  Code Status: Level 3 - DNAR and DNI    Subjective:   Patient feels better but still short of breath with dysphagia     Objective:     Vitals:   Temp (24hrs), Av 8 °F (36 6 °C), Min:97 4 °F (36 3 °C), Max:98 1 °F (36 7 °C)    Temp:  [97 4 °F (36 3 °C)-98 1 °F (36 7 °C)] 98 °F (36 7 °C)  HR:  [] 84  Resp:  [14-32] 16  BP: (121-149)/(63-81) 121/71  SpO2:  [94 %-100 %] 94 %  Body mass index is 37 49 kg/m²  Input and Output Summary (last 24 hours): Intake/Output Summary (Last 24 hours) at 2023 1205  Last data filed at 2023 1402  Gross per 24 hour   Intake 50 ml   Output --   Net 50 ml       Physical Exam:   Physical Exam  Constitutional:       General: She is not in acute distress  Appearance: Normal appearance  She is not toxic-appearing  Cardiovascular:      Rate and Rhythm: Regular rhythm  Tachycardia present  Heart sounds: Normal heart sounds  No murmur heard  Pulmonary:      Effort: No respiratory distress  Breath sounds: Wheezing present  Comments: Poor inspiratory effort  Abdominal:      General: Abdomen is flat  There is no distension  Palpations: Abdomen is soft  Tenderness: There is no abdominal tenderness  Neurological:      General: No focal deficit present  Mental Status: She is alert and oriented to person, place, and time  Mental status is at baseline  Motor: No weakness            Additional Data: Labs:  Results from last 7 days   Lab Units 02/25/23  0617   WBC Thousand/uL 13 82*   HEMOGLOBIN g/dL 13 4   HEMATOCRIT % 40 3   PLATELETS Thousands/uL 447*   NEUTROS PCT % 88*   LYMPHS PCT % 9*   MONOS PCT % 2*   EOS PCT % 0     Results from last 7 days   Lab Units 02/25/23  0617   SODIUM mmol/L 137   POTASSIUM mmol/L 3 7   CHLORIDE mmol/L 102   CO2 mmol/L 23   BUN mg/dL 10   CREATININE mg/dL 0 66   ANION GAP mmol/L 12   CALCIUM mg/dL 9 5   GLUCOSE RANDOM mg/dL 173*         Results from last 7 days   Lab Units 02/25/23  1103 02/25/23  0723 02/24/23  2048   POC GLUCOSE mg/dl 202* 291* 218*               Lines/Drains:  Invasive Devices     Peripheral Intravenous Line  Duration           Peripheral IV 02/24/23 Right;Dorsal (posterior) Hand <1 day                      Imaging: Reviewed radiology reports from this admission including: chest CT scan    Recent Cultures (last 7 days):         Last 24 Hours Medication List:   Current Facility-Administered Medications   Medication Dose Route Frequency Provider Last Rate   • albuterol  2 puff Inhalation Q4H PRN Lavell Bhakta MD     • budesonide-formoterol  2 puff Inhalation BID Lavell Bhakta MD     • citalopram  10 mg Oral Daily Lavell Bhakta MD     • fluticasone  2 puff Inhalation BID Lavell Bhakta MD     • gabapentin  100 mg Oral Daily Lavell Bhakta MD     • guaiFENesin  200 mg Oral Q4H PRN Lavell Bhakta MD     • heparin (porcine)  5,000 Units Subcutaneous Q8H [address] Lavell Bhakta MD     • HYDROcodone Bit-Homatrop MBr  5 mL Oral Q4H PRN Lavell Bhakta MD     • insulin lispro  1-5 Units Subcutaneous TID AC Lavell Bhakta MD     • insulin lispro  1-5 Units Subcutaneous HS Lavell Bhakta MD     • insulin lispro  2 Units Subcutaneous TID With Meals Antonetta Lennox, MD     • ipratropium  0 5 mg Nebulization TID Lavell Bhakta MD     • levalbuterol  1 25 mg Nebulization TID Lavell Bhakta MD     • methylPREDNISolone sodium succinate  40 mg Intravenous Q8H [address] Antonetta Lennox, MD • montelukast  10 mg Oral HS Lavell Bhakta MD     • pantoprazole  40 mg Oral Early Morning Lavell Bhakta MD     • pantoprazole  40 mg Oral Early Morning Lavell Bhakta MD     • pravastatin  40 mg Oral Daily With Angel Moctezuma MD     • torsemide  10 mg Oral Daily Lavell Bhakta MD     • zolpidem  10 mg Oral HS PRN Jena Vega MD          Today, Patient Was Seen By: Alexandr Huntley MD    **Please Note: This note may have been constructed using a voice recognition system  **

## 2023-02-25 NOTE — UTILIZATION REVIEW
NOTIFICATION OF INPATIENT ADMISSION   AUTHORIZATION REQUEST   SERVICING FACILITY:   [address] [place] [address]  Tax ID: [id number]  NPI: [id number] ATTENDING PROVIDER:  Attending Name and NPI#: Billydaisy Rebekah, Bozena Ibarra [[id number]]  Address: [address] [place] [address]  Phone: [phone]     ADMISSION INFORMATION:  Place of Service: Inpatient [facility]  [address]  Place of Service Code: 21  Inpatient Admission Date/Time: 2/25/23 12:10 PM  Discharge Date/Time: No discharge date for patient encounter  Admitting Diagnosis Code/Description:  Wheezing [R06 2]  Asthma [J45 909]  Acute asthma exacerbation [J45 901]  Acute cough [R05 1]     UTILIZATION REVIEW CONTACT:  Allison Lawrence Utilization   Network Utilization Review Department  Phone: [phone]  Fax [phone]  Email: [email or web address]  Contact for approvals/pending authorizations, clinical reviews, and discharge  PHYSICIAN ADVISORY SERVICES:  Medical Necessity Denial & [id number] Review  Phone: [phone]  Fax: [phone]  Email: [email or web address]

## 2023-02-25 NOTE — UTILIZATION REVIEW
Initial Clinical Review    Admission: Date/Time/Statement:   Admission Orders (From admission, onward)     Ordered        02/25/23 1210  Inpatient Admission  Once            02/24/23 1446  Place in Observation  Once                       Placed in observation status on 2/24 and changed to inpatient on 2/25 due to  Need for continued treatment of her asthma exacerbation  Orders Placed This Encounter   Procedures   • Inpatient Admission     Standing Status:   Standing     Number of Occurrences:   1     Order Specific Question:   Level of Care     Answer:   Med Surg [16]     Order Specific Question:   Estimated length of stay     Answer:   More than 2 Midnights     Order Specific Question:   Certification     Answer:   I certify that inpatient services are medically necessary for this patient for a duration of greater than two midnights  See H&P and MD Progress Notes for additional information about the patient's course of treatment  ED Arrival Information     Expected   -    Arrival   2/24/2023 11:31    Acuity   Urgent            Means of arrival   Walk-In    Escorted by   Self    Service   Hospitalist    Admission type   Emergency            Arrival complaint   ASTHMA           Chief Complaint   Patient presents with   • Asthma     C/o cough since Sunday states she has asthma  Initial Presentation: 66 y o  female with a pmh of asthma  She presents to the ED  With c/o SOB and wheezing  She reports symptoms started 5 days prior, and she had no improvement with oral steroids and inhaler at home  She received IV steroids and nebulizers in the ED with no significant improvement  She is admitted to observation status placed on scheduled IV steroids and scheduled nebs  Monitor Saturations and apply O2 as needed  Pulmonary Consult -2/24 -  Pt with Obesity, significant acid reflux, GERD  and severe persistent asthma with acute exacerbation    Continue her hanh treatments around the clock along with Flovent and add Symbicort  Continue Solumedrol 40 mg q6 today , decrease to 40 mg q8 on 2/25  Would need to add treatment for her GERD with PPI, she reports she used to be on Carafate  Check CT of the chest  OP PFTs as well as optimization of her asthma management with biologic agent and possible change given as she has been on tezpire and has had multiple exacerbations  Date: 2/25/2023    Day 2:  She reports she feels better, but still SOB with dysphagia  She has a history of persistent asthma, well controlled until mandeep COVID in 2020, withiout O2 requirements, Continue IV steroids, duonebs  On admit tachycardia, tachypnea, leukocytosis concern for bacterial pneumonia  Aspiration vs community acquired, Monitor  CT head & neck showing concern for tracheobronchmalacia  Speech therapy eval due to worsening symptoms and increased reflux          ED Triage Vitals   Temperature Pulse Respirations Blood Pressure SpO2   02/24/23 1137 02/24/23 1137 02/24/23 1137 02/24/23 1138 02/24/23 1137   97 9 °F (36 6 °C) (!) 113 19 146/93 97 %      Temp Source Heart Rate Source Patient Position - Orthostatic VS BP Location FiO2 (%)   02/24/23 1526 02/24/23 1137 02/24/23 1332 02/24/23 1332 --   Oral Monitor Sitting Left arm       Pain Score       02/24/23 1332       No Pain          Wt Readings from Last 1 Encounters:   02/25/23 84 2 kg (185 lb 10 oz)     Additional Vital Signs:   Date/Time Temp Pulse Resp BP MAP (mmHg) SpO2 Calculated FIO2 (%) - Nasal Cannula Nasal Cannula O2 Flow Rate (L/min) O2 Device Patient Position - Orthostatic VS   02/25/23 0737 -- 84 16 -- -- 94 % -- -- None (Room air) --   02/25/23 0730 -- -- -- 121/71 88 -- -- -- -- --   02/25/23 07:25:18 98 °F (36 7 °C) -- 14 121/71 88 -- -- -- -- --   02/25/23 0700 -- 93 -- -- -- 95 % -- -- -- --   02/24/23 21:47:26 97 4 °F (36 3 °C) Abnormal  102 16 140/80 100 95 % -- -- -- --   02/24/23 2006 -- -- -- -- -- 98 % 28 2 L/min   Nasal cannula --   Nasal Cannula O2 Flow Rate (L/min): decreased from 3L at 02/24/23 2006 02/24/23 1940 -- -- -- -- -- -- 28 2 L/min Nasal cannula --   02/24/23 1700 -- -- -- -- -- 97 % 32 3 L/min Nasal cannula --   02/24/23 16:04:19 98 1 °F (36 7 °C) 101 -- 149/81 104 94 % -- -- -- --   02/24/23 1526 97 8 °F (36 6 °C) 94 32 Abnormal   135/73 -- 99 % 32 3 L/min None (Room air) Lying   Resp: Pt states sob is improving  Pt is not in any distress at this time  at 02/24/23 1526   02/24/23 1417 -- -- -- -- -- 100 % 32 3 L/min Nasal cannula --   02/24/23 1332 -- 94 28 Abnormal   145/63 -- 100 % 32 3 L/min Nasal cannula Sitting   Resp: Patient complains of sob, Physical assessment was preformed wheezing in the left lower lobe of left lung and wheezing in the right upper lobe of the right lung  MD was notify and RN was notify  at 02/24/23 1332   02/24/23 1204 -- -- -- -- -- -- -- -- None (Room air) --   02/24/23 1138 -- -- -- 146/93 -- -- -- -- -- --   02/24/23 1137 97 9 °F (36 6 °C) 113 Abnormal  19 -- -- 97 % -- -- -- --       Pertinent Labs/Diagnostic Test Results:   XR chest 2 views   Final Result by Heather Villarreal MD (02/24 1430)      No acute cardiopulmonary disease        Previous small left pleural effusion has resolved            Workstation performed: [id number]         US Breast Axilla Left    (Results Pending)        ECG - 2/24 - Normal sinus rhythm  Cannot rule out Anterior infarct (cited on or before 06-JAN-2023)  Abnormal ECG  When compared with ECG of 06-JAN-2023 06:08,  Nonspecific T wave abnormality, worse in Anterior leads        Results from last 7 days   Lab Units 02/25/23  0617 02/24/23  1243   WBC Thousand/uL 13 82* 10 24*   HEMOGLOBIN g/dL 13 4 13 8   HEMATOCRIT % 40 3 43 2   PLATELETS Thousands/uL 447* 381   NEUTROS ABS Thousands/µL 12 30* 4 12         Results from last 7 days   Lab Units 02/25/23  0617 02/24/23  1243   SODIUM mmol/L 137 139   POTASSIUM mmol/L 3 7 4 8   CHLORIDE mmol/L 102 105   CO2 mmol/L 23 26 ANION GAP mmol/L 12 8   BUN mg/dL 10 8   CREATININE mg/dL 0 66 0 72   EGFR ml/min/1 73sq m 99 93   CALCIUM mg/dL 9 5 9 7         Results from last 7 days   Lab Units 02/25/23  1103 02/25/23  0723 02/24/23  2048   POC GLUCOSE mg/dl 202* 291* 218*     Results from last 7 days   Lab Units 02/25/23  0617 02/24/23  1243   GLUCOSE RANDOM mg/dL 173* 94       Results from last 7 days   Lab Units 02/24/23  1243   HS TNI 0HR ng/L <2       ED Treatment:   Medication Administration from 02/24/2023 1131 to 02/24/2023 1555       Date/Time Order Dose Route Action Comments     02/24/2023 1202 EST albuterol inhalation solution 10 mg 10 mg Nebulization Given --     02/24/2023 1202 EST ipratropium (ATROVENT) 0 02 % inhalation solution 1 mg 1 mg Nebulization Given --     02/24/2023 1202 EST sodium chloride 0 9 % inhalation solution 3 mL 3 mL Nebulization Given --     02/24/2023 1306 EST magnesium sulfate 2 g/50 mL IVPB (premix) 2 g 2 g Intravenous New Bag --     02/24/2023 1242 EST methylPREDNISolone sodium succinate (Solu-MEDROL) injection 62 5 mg 62 5 mg Intravenous Given --     02/24/2023 1417 EST albuterol inhalation solution 10 mg 10 mg Nebulization Given --     02/24/2023 1416 EST ipratropium (ATROVENT) 0 02 % inhalation solution 1 mg 1 mg Nebulization Given --     02/24/2023 1417 EST sodium chloride 0 9 % inhalation solution 3 mL 3 mL Nebulization Given --        Past Medical History:   Diagnosis Date   • Asthma    • Diabetes mellitus ([place] [address] )    • GERD (gastroesophageal reflux disease)      Present on Admission:  • Hyperlipidemia  • GERD (gastroesophageal reflux disease)  • Severe asthma with acute exacerbation  • Depression with anxiety  • Sepsis ([facility] 75 )      Admitting Diagnosis: Wheezing [R06 2]  Asthma [J45 909]  Acute asthma exacerbation [J45 901]  Acute cough [R05 1]  Age/Sex: 66 y o  female       Admission Orders:  Scheduled Medications:  budesonide-formoterol, 2 puff, Inhalation, BID  citalopram, 10 mg, Oral, Daily  fluticasone, 2 puff, Inhalation, BID  gabapentin, 100 mg, Oral, Daily  heparin (porcine), 5,000 Units, Subcutaneous, Q8H [facility]  insulin lispro, 1-5 Units, Subcutaneous, TID AC  insulin lispro, 1-5 Units, Subcutaneous, HS  ipratropium, 0 5 mg, Nebulization, TID  levalbuterol, 1 25 mg, Nebulization, TID  methylPREDNISolone sodium succinate, 40 mg, Intravenous, Q6H [facility] - changed to q8 on 2/25  montelukast, 10 mg, Oral, HS  pantoprazole, 40 mg, Oral, Early Morning  pantoprazole, 40 mg, Oral, Early Morning  pravastatin, 40 mg, Oral, Daily With Dinner  torsemide, 10 mg, Oral, Daily  Tylenol 650 mg po once- 2/25 x1   Ativan 0 5mg iv once - 2/25 x 1       Continuous IV Infusions:     PRN Meds:  albuterol, 2 puff, Inhalation, Q4H PRN  benzonatate, 100 mg, Oral, TID PRN  guaiFENesin, 200 mg, Oral, Q4H PRN  HYDROcodone Bit-Homatrop MBr, 5 mL, Oral syrup , Q4H PRN - 2/24 x 2 - 2/25 x 2   zolpidem, 10 mg, Oral, HS PRN - 2/24 x 1       Nursing Orders - VS - SCD's to le's - up & OOB as tolerated- I & O q shift - daily weights - Diet regular house       Network Utilization Review Department  ATTENTION: Please call with any questions or concerns to [phone] and carefully listen to the prompts so that you are directed to the right person  All voicemails are confidential   Elin Buerger all requests for admission clinical reviews, approved or denied determinations and any other requests to dedicated fax number below belonging to the campus where the patient is receiving treatment   List of dedicated fax numbers for the Facilities:  [address] [place] [address] DENIALS (Administrative/Medical Necessity) [phone]   [address] (Maternity/NICU/Pediatrics) [phone]   [facility] [facility] [address] [place] [address] [address] [facility] [address] [facility] 134 745 [facility] [phone]

## 2023-02-25 NOTE — ASSESSMENT & PLAN NOTE
· Present on admission- tachycardia, tachypnea, leukocytosis with concerns for bacterial pneumonia  · Aspiration vs community acquired given patient's complaints of progressing dysphagia  · Continue with unasyn- day 2 of antibiotics   · Monitor, follow up infectious work up  · Improving

## 2023-02-25 NOTE — ASSESSMENT & PLAN NOTE
· Severe persistent asthma with exacerbation  · Previously well controlled until mandeep covid in 2020  · Without oxygen requirements  · Pulmonology consulted, see recommendations  · Continue IV steroids, duonebz  · Procalcitonin normal, monitor off antibiotics

## 2023-02-25 NOTE — SPEECH THERAPY NOTE
Speech-Language Pathology Bedside Swallow Evaluation        Patient Name: Reece CHOWDARY Date: 2/25/2023     Problem List  Patient Active Problem List   Diagnosis   • Sepsis ([facility] )   • Severe persistent asthma with exacerbation   • Hyperlipidemia   • Diabetes ([facility] )   • Rectus sheath hematoma   • GERD (gastroesophageal reflux disease)   • Chronic cough   • Severe asthma with acute exacerbation   • Depression with anxiety   • Narrowing of airway   • Dysphagia       Past Medical History  Past Medical History:   Diagnosis Date   • Asthma    • Diabetes mellitus ([facility] )    • GERD (gastroesophageal reflux disease)        Past Surgical History  History reviewed  No pertinent surgical history  Current Medical Status  Pt is a 66 y o  female who presented to [facility] with shortness of breath and wheezing  Patient reports gradually worsening asthma despite home medications the past couple of days  Took home dose of steroids on Sunday and Monday without improvement  Reports associated chest pain with tightness sensation  Denies any exertional component or any association with radiation, nausea/vomiting, lightheadedness/syncope, diaphoresis  Denies any abdominal pain  Reports cough productive of clear sputum  Per chart review patient with h/o significant GERD and recurrent acute asthma exacerbations  Barium esophagram completed 11/22 which revealed mod-severe gastroesophageal reflux, small hiatal hernia, tertiary contractions and mild dysmotility  CT soft tissue neck completed 1/9/23 and revealed severe narrowing of oropharyngeal airway which is worse during inspiration as well as mild collapse of the posterior subglottic airway, posterior tracheal airway, and visualized proximal bilateral mainstem bronchi during expiratory images suspicious for tracheobronchomalacia  Chest CT completed at that time was also suspicious for aspiration   ST consult requested for clinical dysphagia evaluation due to reported new/worsening dysphagia  Lidya characterizes this as globus sensation with significant pain and slow motility through the esophagus primarily with solids  She admits she at times feels as though it increases her SOB and causes her to feel as though she can't breathe  Additionally, she reports only occasional benefit from liquid wash  She also reports vomiting/regurgitation episode which at times are self-induced  She admits to reduced vocal volume and hoarse quality as well as episodes of her throat closing up when she breathes in  She denies any known PNA but possible aspiration noted on CT in January  Lastly, she reports poor intake with weight loss of over 20 pounds since last year- indicated she has thought about alternative nutrition given her odynophagia  She was never seen by SLP however ENT was consulted in January during her admission and noted BOT hypertrophy as well as arytenoid erythema ( evidence of reflux) and pooling in the pyriforms  Manometry was completed with GI and was generally unremarkable  Past medical history:   Please see H&P for details    Special Studies:  2/24 CXR: No acute cardiopulmonary disease  Previous small left pleural effusion has resolved    1/9 CT Chest: Normal appearance of the trachea and bronchi on expiration with no excessive collapse; nothing to indicate tracheobronchomalacia  Bilateral dependent atelectasis in the lower lobes, possibly related to aspiration  Trace effusions  1/9 CT soft tissue neck: Severe narrowing of oropharyngeal airway at the level of palatine tonsils and [id number] aspect of uvula, which is worse during inspiration  No suspicious neck mass on this noncontrast examination  Mild collapse of the posterior subglottic airway, posterior tracheal airway, and visualized proximal bilateral mainstem bronchi during expiratory images    Findings are suspicious for tracheobronchomalacia      Additional chronic/incidental findings as detailed above  11/1/22 barium swallow/esophagram: Multiple episodes of moderate to severe gastroesophageal reflux  No obstructing or constricting lesions seen  Small hiatal hernia  No paraesophageal hernia  Esophageal tertiary contractions with mild esophageal dysmotility  No delay in emptying of the contrast from esophagus    Swallow Information   Current Risks for Dysphagia & Aspiration: change in respiratory status and worsening dysphagia with known esophageal dysphagia per esophagram     Current Symptoms/Concerns: change in respiratory status and globus sensation with retrograde flow    Current Diet: regular diet and thin liquids      Baseline Diet: regular diet and thin liquids ( but avoids certain foods) and admits to poor intake due to her odynophagia      Baseline Assessment   Behavior/Cognition: alert    Speech/Language Status: able to participate in conversation and able to follow commands    Patient Positioning: upright in bed    Swallow Mechanism Exam   Facial: symmetrical  Labial: WFL  Lingual: WFL appearing however BOT hypertrophy noted on recent larygoscopy  Velum: symmetrical  Mandible: adequate ROM  Dentition: adequate  Vocal quality:dysphonic, hoarse and strained   Volitional Cough: strong/productive   Resp: RA    Consistencies Assessed and Performance   Consistencies Administered: thin liquids and hard solids  Specific materials administered included: macaroni and cheese with thin liquids    Oral Stage:   Mastication was adequate with the materials administered today  Bolus formation and transfer were functional with nosignificant oral residue noted  No overt s/s reduced oral control  Pharyngeal Stage:   Swallowing initiation appeared prompt/effortful  Laryngeal rise was palpated and judged to be within functional limits  No coughing, throat clearing, change in vocal quality or respiratory status noted today       Esophageal Concerns: globus sensation significant pain reported with facial grimacing which resolved with time/extra hard swallows    Summary   Pts oropharyngeal swallow function appears generally WFL at this time with the materials administered today  s/s suggestive of esophogeal dysphagia given results of prior esophagram as well as reported sxs however cannot r/o a pharyngeal component or other anatomical structure impacting the swallow (I e osteophytes or CP bar not noted on laryngoscopy)  Given patients asthma and reported sudden breath loss on inspiration at times additional OP visit with ENT is recommended to further assess for PVFM and f/u on prior inpatient visit for concerns for tracheomalacia  Lastly, may consider VBS given that this barium swallow study administers solids to further assess for any anatomical variations of pharyngoesophageal component  An esophageal screen can be performed during this study to screen for dysmotility with solids  This can be done as an outpatient       Recommendations: thin liquids and soft/moist solids ( patient to choose items she can tolerate from regular menu) ( reflux diet was recommended)    Recommended Form of Meds: as tolerated ( patient reports little-no difficutly with pills)     Aspiration precautions and compensatory swallowing strategies: upright posture, slow rate of feeding, small bites/sips, alternating bites and sips and use of added moisturizers as well as smaller more frequent meals    Results Reviewed with: patient and Pulmonology service     Dysphagia Goals: pt will tolerate regular with thin lqiuids without s/s of aspiration x3 and pt will participate in VBS as indicated    Plan  Will f/u x1-2    JASON Guzman , CCC-SLP  Speech Language Pathologist   Available via [address] [facility] #[id number]  [address] #[id number]

## 2023-02-25 NOTE — RESPIRATORY THERAPY NOTE
RT Protocol Note  Robert Barragan 66 y o  female MRN: [id number]  Unit/Bed#: -01 Encounter: [id number]    Assessment    Principal Problem:    Severe asthma with acute exacerbation  Active Problems:    Hyperlipidemia    GERD (gastroesophageal reflux disease)    Depression with anxiety      Home Pulmonary Medications:  Inhalers/nebs       Past Medical History:   Diagnosis Date    Asthma     Diabetes mellitus (HCC)     GERD (gastroesophageal reflux disease)      Social History     Socioeconomic History    Marital status: /Civil Union     Spouse name: None    Number of children: None    Years of education: None    Highest education level: None   Occupational History    None   Tobacco Use    Smoking status: Never    Smokeless tobacco: Never   Vaping Use    Vaping Use: Never used   Substance and Sexual Activity    Alcohol use: Never    Drug use: Never    Sexual activity: None   Other Topics Concern    None   Social History Narrative    None     Social Determinants of Health     Financial Resource Strain: Not on file   Food Insecurity: No Food Insecurity    Worried About Running Out of Food in the Last Year: Never true    Ran Out of Food in the Last Year: Never true   Transportation Needs: No Transportation Needs    Lack of Transportation (Medical): No    Lack of Transportation (Non-Medical):  No   Physical Activity: Not on file   Stress: Not on file   Social Connections: Not on file   Intimate Partner Violence: Not on file   Housing Stability: Unknown    Unable to Pay for Housing in the Last Year: No    Number of Places Lived in the Last Year: Not on file    Unstable Housing in the Last Year: No       Subjective         Objective    Physical Exam:   Assessment Type: Assess only  General Appearance: Alert, Awake  Respiratory Pattern: Normal  Chest Assessment: Chest expansion symmetrical  Bilateral Breath Sounds: Diminished, Crackles (bases)  Cough: None  O2 Device: RA    Vitals:  Blood pressure 121/71, pulse 84, temperature 98 °F (36 7 °C), resp  rate 16, height 4' 11" (1 499 m), weight 84 2 kg (185 lb 10 oz), SpO2 94 %  Imaging and other studies: I have personally reviewed pertinent reports  O2 Device: RA     Plan    Respiratory Plan: Mild Distress pathway        Resp Comments: Pt refusing tx at this time  C/o headache and states she vomitted  Will cont w/ cynthia tx

## 2023-02-26 LAB
ANION GAP SERPL CALCULATED.3IONS-SCNC: 10 MMOL/L (ref 4–13)
BASOPHILS # BLD AUTO: 0.03 THOUSANDS/ÂΜL (ref 0–0.1)
BASOPHILS NFR BLD AUTO: 0 % (ref 0–1)
BUN SERPL-MCNC: 18 MG/DL (ref 5–25)
CALCIUM SERPL-MCNC: 9.6 MG/DL (ref 8.4–10.2)
CHLORIDE SERPL-SCNC: 103 MMOL/L (ref 96–108)
CO2 SERPL-SCNC: 26 MMOL/L (ref 21–32)
CREAT SERPL-MCNC: 0.82 MG/DL (ref 0.6–1.3)
EOSINOPHIL # BLD AUTO: 0 THOUSAND/ÂΜL (ref 0–0.61)
EOSINOPHIL NFR BLD AUTO: 0 % (ref 0–6)
ERYTHROCYTE [DISTWIDTH] IN BLOOD BY AUTOMATED COUNT: 14.6 % (ref 11.6–15.1)
GFR SERPL CREATININE-BSD FRML MDRD: 80 ML/MIN/1.73SQ M
GLUCOSE SERPL-MCNC: 109 MG/DL (ref 65–140)
GLUCOSE SERPL-MCNC: 137 MG/DL (ref 65–140)
GLUCOSE SERPL-MCNC: 142 MG/DL (ref 65–140)
GLUCOSE SERPL-MCNC: 156 MG/DL (ref 65–140)
GLUCOSE SERPL-MCNC: 200 MG/DL (ref 65–140)
HCT VFR BLD AUTO: 40.2 % (ref 34.8–46.1)
HGB BLD-MCNC: 13.5 G/DL (ref 11.5–15.4)
IMM GRANULOCYTES # BLD AUTO: 0.12 THOUSAND/UL (ref 0–0.2)
IMM GRANULOCYTES NFR BLD AUTO: 1 % (ref 0–2)
LYMPHOCYTES # BLD AUTO: 1.09 THOUSANDS/ÂΜL (ref 0.6–4.47)
LYMPHOCYTES NFR BLD AUTO: 5 % (ref 14–44)
MAGNESIUM SERPL-MCNC: 2.2 MG/DL (ref 1.9–2.7)
MCH RBC QN AUTO: 30.7 PG (ref 26.8–34.3)
MCHC RBC AUTO-ENTMCNC: 33.6 G/DL (ref 31.4–37.4)
MCV RBC AUTO: 91 FL (ref 82–98)
MONOCYTES # BLD AUTO: 0.79 THOUSAND/ÂΜL (ref 0.17–1.22)
MONOCYTES NFR BLD AUTO: 4 % (ref 4–12)
NEUTROPHILS # BLD AUTO: 18.83 THOUSANDS/ÂΜL (ref 1.85–7.62)
NEUTS SEG NFR BLD AUTO: 90 % (ref 43–75)
NRBC BLD AUTO-RTO: 0 /100 WBCS
PLATELET # BLD AUTO: 486 THOUSANDS/UL (ref 149–390)
PMV BLD AUTO: 10.2 FL (ref 8.9–12.7)
POTASSIUM SERPL-SCNC: 3.6 MMOL/L (ref 3.5–5.3)
PROCALCITONIN SERPL-MCNC: <0.05 NG/ML
RBC # BLD AUTO: 4.4 MILLION/UL (ref 3.81–5.12)
SODIUM SERPL-SCNC: 139 MMOL/L (ref 135–147)
WBC # BLD AUTO: 20.86 THOUSAND/UL (ref 4.31–10.16)

## 2023-02-26 RX ORDER — ZOLPIDEM TARTRATE 5 MG/1
10 TABLET ORAL
Status: DISCONTINUED | OUTPATIENT
Start: 2023-02-26 | End: 2023-03-01 | Stop reason: HOSPADM

## 2023-02-26 RX ORDER — PANTOPRAZOLE SODIUM 40 MG/10ML
40 INJECTION, POWDER, LYOPHILIZED, FOR SOLUTION INTRAVENOUS EVERY 12 HOURS SCHEDULED
Status: DISCONTINUED | OUTPATIENT
Start: 2023-02-26 | End: 2023-02-27

## 2023-02-26 RX ORDER — LORAZEPAM 2 MG/ML
0.5 INJECTION INTRAMUSCULAR EVERY 8 HOURS PRN
Status: DISCONTINUED | OUTPATIENT
Start: 2023-02-26 | End: 2023-03-01 | Stop reason: HOSPADM

## 2023-02-26 RX ADMIN — INSULIN LISPRO 1 UNITS: 100 INJECTION, SOLUTION INTRAVENOUS; SUBCUTANEOUS at 17:33

## 2023-02-26 RX ADMIN — LEVALBUTEROL HYDROCHLORIDE 1.25 MG: 1.25 SOLUTION, CONCENTRATE RESPIRATORY (INHALATION) at 07:36

## 2023-02-26 RX ADMIN — ALBUTEROL SULFATE 2 PUFF: 90 AEROSOL, METERED RESPIRATORY (INHALATION) at 08:23

## 2023-02-26 RX ADMIN — PRAVASTATIN SODIUM 40 MG: 40 TABLET ORAL at 17:29

## 2023-02-26 RX ADMIN — ALBUTEROL SULFATE 2 PUFF: 90 AEROSOL, METERED RESPIRATORY (INHALATION) at 05:26

## 2023-02-26 RX ADMIN — HYDROCODONE BITARTRATE AND HOMATROPINE METHYLBROMIDE 5 ML: 5; 1.5 SYRUP ORAL at 05:31

## 2023-02-26 RX ADMIN — FAMOTIDINE 20 MG: 20 TABLET, FILM COATED ORAL at 17:32

## 2023-02-26 RX ADMIN — PANTOPRAZOLE SODIUM 40 MG: 40 INJECTION, POWDER, FOR SOLUTION INTRAVENOUS at 14:00

## 2023-02-26 RX ADMIN — LORAZEPAM 0.5 MG: 2 INJECTION INTRAMUSCULAR; INTRAVENOUS at 20:41

## 2023-02-26 RX ADMIN — BUDESONIDE AND FORMOTEROL FUMARATE DIHYDRATE 2 PUFF: 160; 4.5 AEROSOL RESPIRATORY (INHALATION) at 08:23

## 2023-02-26 RX ADMIN — LORAZEPAM 0.5 MG: 0.5 TABLET ORAL at 11:45

## 2023-02-26 RX ADMIN — INSULIN LISPRO 2 UNITS: 100 INJECTION, SOLUTION INTRAVENOUS; SUBCUTANEOUS at 17:33

## 2023-02-26 RX ADMIN — TORSEMIDE 10 MG: 10 TABLET ORAL at 08:22

## 2023-02-26 RX ADMIN — Medication 3 MG: at 22:37

## 2023-02-26 RX ADMIN — METHYLPREDNISOLONE SODIUM SUCCINATE 40 MG: 40 INJECTION, POWDER, FOR SOLUTION INTRAMUSCULAR; INTRAVENOUS at 22:36

## 2023-02-26 RX ADMIN — FLUTICASONE PROPIONATE 2 PUFF: 110 AEROSOL, METERED RESPIRATORY (INHALATION) at 20:22

## 2023-02-26 RX ADMIN — IPRATROPIUM BROMIDE 0.5 MG: 0.5 SOLUTION RESPIRATORY (INHALATION) at 07:36

## 2023-02-26 RX ADMIN — FAMOTIDINE 20 MG: 20 TABLET, FILM COATED ORAL at 08:21

## 2023-02-26 RX ADMIN — HEPARIN SODIUM 5000 UNITS: 5000 INJECTION INTRAVENOUS; SUBCUTANEOUS at 13:43

## 2023-02-26 RX ADMIN — GABAPENTIN 100 MG: 100 CAPSULE ORAL at 08:22

## 2023-02-26 RX ADMIN — ZOLPIDEM TARTRATE 10 MG: 5 TABLET, COATED ORAL at 23:00

## 2023-02-26 RX ADMIN — CITALOPRAM HYDROBROMIDE 10 MG: 20 TABLET, FILM COATED ORAL at 08:21

## 2023-02-26 RX ADMIN — HEPARIN SODIUM 5000 UNITS: 5000 INJECTION INTRAVENOUS; SUBCUTANEOUS at 22:36

## 2023-02-26 RX ADMIN — PANTOPRAZOLE SODIUM 40 MG: 40 TABLET, DELAYED RELEASE ORAL at 05:26

## 2023-02-26 RX ADMIN — INSULIN LISPRO 1 UNITS: 100 INJECTION, SOLUTION INTRAVENOUS; SUBCUTANEOUS at 11:37

## 2023-02-26 RX ADMIN — BUDESONIDE AND FORMOTEROL FUMARATE DIHYDRATE 2 PUFF: 160; 4.5 AEROSOL RESPIRATORY (INHALATION) at 17:32

## 2023-02-26 RX ADMIN — INSULIN LISPRO 2 UNITS: 100 INJECTION, SOLUTION INTRAVENOUS; SUBCUTANEOUS at 11:37

## 2023-02-26 RX ADMIN — MONTELUKAST 10 MG: 10 TABLET, FILM COATED ORAL at 22:36

## 2023-02-26 RX ADMIN — METHYLPREDNISOLONE SODIUM SUCCINATE 40 MG: 40 INJECTION, POWDER, FOR SOLUTION INTRAMUSCULAR; INTRAVENOUS at 08:22

## 2023-02-26 RX ADMIN — FLUTICASONE PROPIONATE 2 PUFF: 110 AEROSOL, METERED RESPIRATORY (INHALATION) at 08:28

## 2023-02-26 RX ADMIN — PANTOPRAZOLE SODIUM 40 MG: 40 INJECTION, POWDER, FOR SOLUTION INTRAVENOUS at 22:36

## 2023-02-26 RX ADMIN — HEPARIN SODIUM 5000 UNITS: 5000 INJECTION INTRAVENOUS; SUBCUTANEOUS at 05:26

## 2023-02-26 RX ADMIN — HYDROCODONE BITARTRATE AND HOMATROPINE METHYLBROMIDE 5 ML: 5; 1.5 SYRUP ORAL at 10:09

## 2023-02-26 NOTE — PROGRESS NOTES
[address] [facility]  Progress Note - Reece Self 1965, 66 y o  female MRN: [id number]  Unit/Bed#: -01 Encounter: [id number]  Primary Care Provider: Joshua Wan   Date and time admitted to hospital: 2/24/2023 11:41 AM    * Severe asthma with acute exacerbation  Assessment & Plan  · Severe persistent asthma with exacerbation  · Previously well controlled until mandeep covid in 2020  · Also with refractory GERD, likely main exacerbating factor  · Without oxygen requirements currently  · Pulmonology consulted, see recommendations  · Continue IV steroids, duonebz  · Procalcitonin normal, monitor off antibiotics     Diabetes [facility])  Assessment & Plan  Lab Results   Component Value Date    HGBA1C 4 9 10/04/2022       Recent Labs     02/25/23  1527 02/25/23  2046 02/26/23  0747 02/26/23  1107   POCGLU 193* 159* 137 200*       Blood Sugar Average: Last 72 hrs:  (P) 200   · Steroid induced diabetes  · Exacerbated by IV steroids  · Better controlled today  · Added lispro 2 units with meals  · Monitor on current sliding scale     GERD (gastroesophageal reflux disease)  Assessment & Plan  Continue PPI          VTE Pharmacologic Prophylaxis: VTE Score: 5 High Risk (Score >/= 5) - Pharmacological DVT Prophylaxis Ordered: heparin  Sequential Compression Devices Ordered  Patient Centered Rounds: I performed bedside rounds with nursing staff today  Discussions with Specialists or Other Care Team Provider: pulmonology     Education and Discussions with Family / Patient: Patient declined call to        Total Time Spent on Date of Encounter in care of patient: 35 minutes This time was spent on one or more of the following: performing physical exam; counseling and coordination of care; obtaining or reviewing history; documenting in the medical record; reviewing/ordering tests, medications or procedures; communicating with other healthcare professionals and discussing with patient's family/caregivers  Current Length of Stay: 1 day(s)  Current Patient Status: Inpatient   Certification Statement: The patient will continue to require additional inpatient hospital stay due to IV steroids  Discharge Plan: Anticipate discharge tomorrow to home  Code Status: Level 3 - DNAR and DNI    Subjective:   Patient feels better     Objective:     Vitals:   Temp (24hrs), Av 7 °F (36 5 °C), Min:97 5 °F (36 4 °C), Max:98 1 °F (36 7 °C)    Temp:  [97 5 °F (36 4 °C)-98 1 °F (36 7 °C)] 97 5 °F (36 4 °C)  HR:  [76-97] 76  Resp:  [18] 18  BP: (116-130)/(73-85) 116/85  SpO2:  [91 %-99 %] 99 %  Body mass index is 37 18 kg/m²  Input and Output Summary (last 24 hours):   No intake or output data in the 24 hours ending 23 1147    Physical Exam:   Physical Exam  Constitutional:       General: She is not in acute distress  Appearance: Normal appearance  She is not toxic-appearing  Cardiovascular:      Rate and Rhythm: Normal rate and regular rhythm  Heart sounds: Normal heart sounds  No murmur heard  Pulmonary:      Effort: Pulmonary effort is normal  No respiratory distress  Breath sounds: Wheezing present  Abdominal:      General: Abdomen is flat  There is no distension  Palpations: Abdomen is soft  Tenderness: There is no abdominal tenderness  Neurological:      General: No focal deficit present  Mental Status: She is alert and oriented to person, place, and time  Mental status is at baseline  Motor: No weakness            Additional Data:     Labs:  Results from last 7 days   Lab Units 23  0513   WBC Thousand/uL 20 86*   HEMOGLOBIN g/dL 13 5   HEMATOCRIT % 40 2   PLATELETS Thousands/uL 486*   NEUTROS PCT % 90*   LYMPHS PCT % 5*   MONOS PCT % 4   EOS PCT % 0     Results from last 7 days   Lab Units 23  0513   SODIUM mmol/L 139   POTASSIUM mmol/L 3 6   CHLORIDE mmol/L 103   CO2 mmol/L 26   BUN mg/dL 18   CREATININE mg/dL 0 82   ANION GAP mmol/L 10 CALCIUM mg/dL 9 6   GLUCOSE RANDOM mg/dL 142*         Results from last 7 days   Lab Units 02/26/23  1107 02/26/23  0747 02/25/23  2046 02/25/23  1527 02/25/23  1103 02/25/23  0723 02/24/23 2048   POC GLUCOSE mg/dl 200* 137 159* 193* 202* 291* 218*         Results from last 7 days   Lab Units 02/26/23  0513   PROCALCITONIN ng/ml <0 05       Lines/Drains:  Invasive Devices     Peripheral Intravenous Line  Duration           Peripheral IV 02/25/23 Left Forearm <1 day                      Imaging: No pertinent imaging reviewed      Recent Cultures (last 7 days):         Last 24 Hours Medication List:   Current Facility-Administered Medications   Medication Dose Route Frequency Provider Last Rate   • albuterol  2 puff Inhalation Q4H PRN Lavell Bhakta MD     • budesonide-formoterol  2 puff Inhalation BID Lavell Bhakta MD     • citalopram  10 mg Oral Daily Lavell Bhakta MD     • famotidine  20 mg Oral BID Manuelito Espinoza MD     • fluticasone  2 puff Inhalation BID Lavell Bhakta MD     • gabapentin  100 mg Oral Daily Lavell Bhakta MD     • guaiFENesin  200 mg Oral Q4H PRN Lavell Bhakta MD     • heparin (porcine)  5,000 Units Subcutaneous Q8H [address] Lavell Bhakta MD     • HYDROcodone Bit-Homatrop MBr  5 mL Oral Q4H PRN Lavell Bhakta MD     • insulin lispro  1-5 Units Subcutaneous TID AC Lavell Bhakta MD     • insulin lispro  1-5 Units Subcutaneous HS Lavell Bhakta MD     • insulin lispro  2 Units Subcutaneous TID With Meals Manuel Miller MD     • ipratropium  0 5 mg Nebulization TID Lavell Bhakta MD     • levalbuterol  1 25 mg Nebulization TID Lavell Bhakta MD     • LORazepam  0 5 mg Intravenous Q8H PRN Manuel Miller MD     • melatonin  3 mg Oral HS PRN Mauricio Regan PA-C     • methylPREDNISolone sodium succinate  40 mg Intravenous Q12H [address] Manuel Miller MD     • montelukast  10 mg Oral HS Lavell Bhakta MD     • pantoprazole  40 mg Oral BID AC Manuelito Espinoza MD     • pravastatin  40 mg Oral Daily With Viet Grullon MD     • torsemide  10 mg Oral Daily Braxton Torres MD          Today, Patient Was Seen By: Clark Boggs MD    **Please Note: This note may have been constructed using a voice recognition system  **

## 2023-02-26 NOTE — ASSESSMENT & PLAN NOTE
· Severe persistent asthma with exacerbation  · Previously well controlled until mandeep covid in 2020  · Also with refractory GERD, likely main exacerbating factor  · Without oxygen requirements currently  · Pulmonology consulted, see recommendations  · Continue IV steroids, duonebz  · Procalcitonin normal, monitor off antibiotics

## 2023-02-26 NOTE — ASSESSMENT & PLAN NOTE
Lab Results   Component Value Date    HGBA1C 4 9 10/04/2022       Recent Labs     02/25/23  1527 02/25/23  2046 02/26/23  0747 02/26/23  1107   POCGLU 193* 159* 137 200*       Blood Sugar Average: Last 72 hrs:  (P) 200   · Steroid induced diabetes  · Exacerbated by IV steroids  · Better controlled today  · Added lispro 2 units with meals  · Monitor on current sliding scale

## 2023-02-27 ENCOUNTER — APPOINTMENT (INPATIENT)
Dept: CT IMAGING | Facility: HOSPITAL | Age: 58
End: 2023-02-27

## 2023-02-27 LAB
ANION GAP SERPL CALCULATED.3IONS-SCNC: 8 MMOL/L (ref 4–13)
BASOPHILS # BLD AUTO: 0.02 THOUSANDS/ÂΜL (ref 0–0.1)
BASOPHILS NFR BLD AUTO: 0 % (ref 0–1)
BUN SERPL-MCNC: 18 MG/DL (ref 5–25)
CALCIUM SERPL-MCNC: 9.2 MG/DL (ref 8.4–10.2)
CHLORIDE SERPL-SCNC: 100 MMOL/L (ref 96–108)
CO2 SERPL-SCNC: 29 MMOL/L (ref 21–32)
CREAT SERPL-MCNC: 0.65 MG/DL (ref 0.6–1.3)
EOSINOPHIL # BLD AUTO: 0 THOUSAND/ÂΜL (ref 0–0.61)
EOSINOPHIL NFR BLD AUTO: 0 % (ref 0–6)
ERYTHROCYTE [DISTWIDTH] IN BLOOD BY AUTOMATED COUNT: 14.2 % (ref 11.6–15.1)
GFR SERPL CREATININE-BSD FRML MDRD: 99 ML/MIN/1.73SQ M
GLUCOSE SERPL-MCNC: 109 MG/DL (ref 65–140)
GLUCOSE SERPL-MCNC: 115 MG/DL (ref 65–140)
GLUCOSE SERPL-MCNC: 126 MG/DL (ref 65–140)
GLUCOSE SERPL-MCNC: 177 MG/DL (ref 65–140)
GLUCOSE SERPL-MCNC: 187 MG/DL (ref 65–140)
HCT VFR BLD AUTO: 39.6 % (ref 34.8–46.1)
HGB BLD-MCNC: 13.2 G/DL (ref 11.5–15.4)
IMM GRANULOCYTES # BLD AUTO: 0.13 THOUSAND/UL (ref 0–0.2)
IMM GRANULOCYTES NFR BLD AUTO: 1 % (ref 0–2)
LYMPHOCYTES # BLD AUTO: 1.43 THOUSANDS/ÂΜL (ref 0.6–4.47)
LYMPHOCYTES NFR BLD AUTO: 10 % (ref 14–44)
MAGNESIUM SERPL-MCNC: 2.1 MG/DL (ref 1.9–2.7)
MCH RBC QN AUTO: 30.6 PG (ref 26.8–34.3)
MCHC RBC AUTO-ENTMCNC: 33.3 G/DL (ref 31.4–37.4)
MCV RBC AUTO: 92 FL (ref 82–98)
MONOCYTES # BLD AUTO: 0.67 THOUSAND/ÂΜL (ref 0.17–1.22)
MONOCYTES NFR BLD AUTO: 5 % (ref 4–12)
NEUTROPHILS # BLD AUTO: 12.67 THOUSANDS/ÂΜL (ref 1.85–7.62)
NEUTS SEG NFR BLD AUTO: 84 % (ref 43–75)
NRBC BLD AUTO-RTO: 0 /100 WBCS
PLATELET # BLD AUTO: 454 THOUSANDS/UL (ref 149–390)
PMV BLD AUTO: 9.8 FL (ref 8.9–12.7)
POTASSIUM SERPL-SCNC: 3.6 MMOL/L (ref 3.5–5.3)
RBC # BLD AUTO: 4.31 MILLION/UL (ref 3.81–5.12)
SODIUM SERPL-SCNC: 137 MMOL/L (ref 135–147)
WBC # BLD AUTO: 14.92 THOUSAND/UL (ref 4.31–10.16)

## 2023-02-27 RX ORDER — METHYLPREDNISOLONE SODIUM SUCCINATE 40 MG/ML
40 INJECTION, POWDER, LYOPHILIZED, FOR SOLUTION INTRAMUSCULAR; INTRAVENOUS DAILY
Status: DISCONTINUED | OUTPATIENT
Start: 2023-02-28 | End: 2023-03-01 | Stop reason: HOSPADM

## 2023-02-27 RX ORDER — PANTOPRAZOLE SODIUM 40 MG/1
40 TABLET, DELAYED RELEASE ORAL EVERY 12 HOURS SCHEDULED
Status: DISCONTINUED | OUTPATIENT
Start: 2023-02-28 | End: 2023-03-01 | Stop reason: HOSPADM

## 2023-02-27 RX ORDER — POTASSIUM CHLORIDE 20 MEQ/1
40 TABLET, EXTENDED RELEASE ORAL ONCE
Status: COMPLETED | OUTPATIENT
Start: 2023-02-27 | End: 2023-02-27

## 2023-02-27 RX ORDER — LEVALBUTEROL 1.25 MG/.5ML
1.25 SOLUTION, CONCENTRATE RESPIRATORY (INHALATION) EVERY 6 HOURS PRN
Status: DISCONTINUED | OUTPATIENT
Start: 2023-02-27 | End: 2023-02-27

## 2023-02-27 RX ORDER — METOCLOPRAMIDE 10 MG/1
10 TABLET ORAL
Status: DISCONTINUED | OUTPATIENT
Start: 2023-02-27 | End: 2023-03-01 | Stop reason: HOSPADM

## 2023-02-27 RX ORDER — FAMOTIDINE 20 MG/1
40 TABLET, FILM COATED ORAL 2 TIMES DAILY
Status: DISCONTINUED | OUTPATIENT
Start: 2023-02-27 | End: 2023-03-01 | Stop reason: HOSPADM

## 2023-02-27 RX ADMIN — MONTELUKAST 10 MG: 10 TABLET, FILM COATED ORAL at 21:28

## 2023-02-27 RX ADMIN — HEPARIN SODIUM 5000 UNITS: 5000 INJECTION INTRAVENOUS; SUBCUTANEOUS at 21:29

## 2023-02-27 RX ADMIN — CITALOPRAM HYDROBROMIDE 10 MG: 20 TABLET, FILM COATED ORAL at 08:53

## 2023-02-27 RX ADMIN — TORSEMIDE 10 MG: 10 TABLET ORAL at 08:51

## 2023-02-27 RX ADMIN — HYDROCODONE BITARTRATE AND HOMATROPINE METHYLBROMIDE 5 ML: 5; 1.5 SYRUP ORAL at 15:33

## 2023-02-27 RX ADMIN — FLUTICASONE PROPIONATE 2 PUFF: 110 AEROSOL, METERED RESPIRATORY (INHALATION) at 21:32

## 2023-02-27 RX ADMIN — METHYLPREDNISOLONE SODIUM SUCCINATE 40 MG: 40 INJECTION, POWDER, FOR SOLUTION INTRAMUSCULAR; INTRAVENOUS at 09:33

## 2023-02-27 RX ADMIN — INSULIN LISPRO 2 UNITS: 100 INJECTION, SOLUTION INTRAVENOUS; SUBCUTANEOUS at 17:22

## 2023-02-27 RX ADMIN — ALBUTEROL SULFATE 2 PUFF: 90 AEROSOL, METERED RESPIRATORY (INHALATION) at 21:28

## 2023-02-27 RX ADMIN — FLUTICASONE PROPIONATE 2 PUFF: 110 AEROSOL, METERED RESPIRATORY (INHALATION) at 08:57

## 2023-02-27 RX ADMIN — METOCLOPRAMIDE 10 MG: 10 TABLET ORAL at 17:32

## 2023-02-27 RX ADMIN — HEPARIN SODIUM 5000 UNITS: 5000 INJECTION INTRAVENOUS; SUBCUTANEOUS at 06:53

## 2023-02-27 RX ADMIN — IPRATROPIUM BROMIDE 0.5 MG: 0.5 SOLUTION RESPIRATORY (INHALATION) at 07:09

## 2023-02-27 RX ADMIN — BUDESONIDE AND FORMOTEROL FUMARATE DIHYDRATE 2 PUFF: 160; 4.5 AEROSOL RESPIRATORY (INHALATION) at 17:23

## 2023-02-27 RX ADMIN — INSULIN LISPRO 2 UNITS: 100 INJECTION, SOLUTION INTRAVENOUS; SUBCUTANEOUS at 11:57

## 2023-02-27 RX ADMIN — POTASSIUM CHLORIDE 40 MEQ: 1500 TABLET, EXTENDED RELEASE ORAL at 14:41

## 2023-02-27 RX ADMIN — HEPARIN SODIUM 5000 UNITS: 5000 INJECTION INTRAVENOUS; SUBCUTANEOUS at 15:33

## 2023-02-27 RX ADMIN — PRAVASTATIN SODIUM 40 MG: 40 TABLET ORAL at 17:20

## 2023-02-27 RX ADMIN — LORAZEPAM 0.5 MG: 2 INJECTION INTRAMUSCULAR; INTRAVENOUS at 09:39

## 2023-02-27 RX ADMIN — GUAIFENESIN 200 MG: 200 SOLUTION ORAL at 08:52

## 2023-02-27 RX ADMIN — GABAPENTIN 100 MG: 100 CAPSULE ORAL at 08:52

## 2023-02-27 RX ADMIN — PANTOPRAZOLE SODIUM 40 MG: 40 INJECTION, POWDER, FOR SOLUTION INTRAVENOUS at 21:28

## 2023-02-27 RX ADMIN — FAMOTIDINE 20 MG: 20 TABLET, FILM COATED ORAL at 08:52

## 2023-02-27 RX ADMIN — Medication 3 MG: at 23:04

## 2023-02-27 RX ADMIN — ZOLPIDEM TARTRATE 10 MG: 5 TABLET, COATED ORAL at 23:05

## 2023-02-27 RX ADMIN — PANTOPRAZOLE SODIUM 40 MG: 40 INJECTION, POWDER, FOR SOLUTION INTRAVENOUS at 09:32

## 2023-02-27 RX ADMIN — IOHEXOL 85 ML: 350 INJECTION, SOLUTION INTRAVENOUS at 14:09

## 2023-02-27 RX ADMIN — BUDESONIDE AND FORMOTEROL FUMARATE DIHYDRATE 2 PUFF: 160; 4.5 AEROSOL RESPIRATORY (INHALATION) at 08:57

## 2023-02-27 RX ADMIN — INSULIN LISPRO 1 UNITS: 100 INJECTION, SOLUTION INTRAVENOUS; SUBCUTANEOUS at 17:20

## 2023-02-27 RX ADMIN — FAMOTIDINE 40 MG: 20 TABLET, FILM COATED ORAL at 17:20

## 2023-02-27 RX ADMIN — INSULIN LISPRO 1 UNITS: 100 INJECTION, SOLUTION INTRAVENOUS; SUBCUTANEOUS at 21:28

## 2023-02-27 RX ADMIN — LEVALBUTEROL HYDROCHLORIDE 1.25 MG: 1.25 SOLUTION, CONCENTRATE RESPIRATORY (INHALATION) at 07:09

## 2023-02-27 RX ADMIN — INSULIN LISPRO 2 UNITS: 100 INJECTION, SOLUTION INTRAVENOUS; SUBCUTANEOUS at 08:54

## 2023-02-27 NOTE — NURSING NOTE
RN received patient complaint of generalized weakness which she feels is attributed to low potassium as a result on taking solu-medrol  Potassium today is 3 6  Attending Dr Nehal Harkins contacted for new orders

## 2023-02-27 NOTE — ASSESSMENT & PLAN NOTE
· Follows with GI as an outpatient  · CT head/neck showing concerns for tracheobronchomalacia   · Had previous follow up with an ENT who disagreed with findings  · Patient is in process of second opinion, further management with invasive testing is dependant on diagnosis  · Thus speech and GI consulted while admitted  · Plan for repeat CT imaging for further evaluation of tracheobronchomalacia  · Monitor

## 2023-02-27 NOTE — PROGRESS NOTES
[address]  Progress Note - Warren Sandoval 1965, 66 y o  female MRN: [id number]  Unit/Bed#: -01 Encounter: [id number]  Primary Care Provider: Donnie Wells   Date and time admitted to hospital: 2/24/2023 11:41 AM    * Severe asthma with acute exacerbation  Assessment & Plan  · Severe persistent asthma with exacerbation  · Previously well controlled until mandeep covid in 2020  · Also with refractory GERD, likely main exacerbating factor  · Without oxygen requirements currently  · Pulmonology consulted, see recommendations  · Continue IV steroids, duonebz  · Procalcitonin normal, monitor off antibiotics     Dysphagia  Assessment & Plan  · Follows with GI as an outpatient  · CT head/neck showing concerns for tracheobronchomalacia   · Had previous follow up with an ENT who disagreed with findings  · Patient is in process of second opinion, further management with invasive testing is dependant on diagnosis  · Thus speech and GI consulted while admitted  · Plan for repeat CT imaging for further evaluation of tracheobronchomalacia  · Monitor     Diabetes [facility])  Assessment & Plan  Lab Results   Component Value Date    HGBA1C 4 9 10/04/2022       Recent Labs     02/26/23  1603 02/26/23  2046 02/27/23  0718 02/27/23  1037   POCGLU 156* 109 115 109       Blood Sugar Average: Last 72 hrs:  (P) 171 [id number]   · Steroid induced diabetes  · Exacerbated by IV steroids  · Better controlled today  · Added lispro 2 units with meals  · Monitor on current sliding scale           VTE Pharmacologic Prophylaxis: VTE Score: 5 High Risk (Score >/= 5) - Pharmacological DVT Prophylaxis Ordered: heparin  Sequential Compression Devices Ordered  Patient Centered Rounds: I performed bedside rounds with nursing staff today  Discussions with Specialists or Other Care Team Provider: GI    Education and Discussions with Family / Patient: Patient declined call to        Total Time Spent on Date of Encounter in care of patient: 35 minutes This time was spent on one or more of the following: performing physical exam; counseling and coordination of care; obtaining or reviewing history; documenting in the medical record; reviewing/ordering tests, medications or procedures; communicating with other healthcare professionals and discussing with patient's family/caregivers  Current Length of Stay: 2 day(s)  Current Patient Status: Inpatient   Certification Statement: The patient will continue to require additional inpatient hospital stay due to CT neck  Discharge Plan: Anticipate discharge tomorrow to home  Code Status: Level 3 - DNAR and DNI    Subjective:   Patient feels better     Objective:     Vitals:   Temp (24hrs), Av 1 °F (36 7 °C), Min:97 9 °F (36 6 °C), Max:98 3 °F (36 8 °C)    Temp:  [97 9 °F (36 6 °C)-98 3 °F (36 8 °C)] 97 9 °F (36 6 °C)  HR:  [75-81] 81  Resp:  [18] 18  BP: (106-121)/(58-84) 121/78  SpO2:  [94 %-96 %] 95 %  Body mass index is 37 49 kg/m²  Input and Output Summary (last 24 hours): Intake/Output Summary (Last 24 hours) at 2023 1403  Last data filed at 2023 0900  Gross per 24 hour   Intake 660 ml   Output --   Net 660 ml       Physical Exam:   Physical Exam  Constitutional:       General: She is not in acute distress  Appearance: Normal appearance  She is not toxic-appearing  Cardiovascular:      Rate and Rhythm: Normal rate and regular rhythm  Heart sounds: Normal heart sounds  No murmur heard  Pulmonary:      Effort: Pulmonary effort is normal  No respiratory distress  Breath sounds: Wheezing present  Abdominal:      General: Abdomen is flat  There is no distension  Palpations: Abdomen is soft  Tenderness: There is no abdominal tenderness  Neurological:      General: No focal deficit present  Mental Status: She is alert and oriented to person, place, and time  Mental status is at baseline        Motor: No weakness  Additional Data:     Labs:  Results from last 7 days   Lab Units 02/27/23  0622   WBC Thousand/uL 14 92*   HEMOGLOBIN g/dL 13 2   HEMATOCRIT % 39 6   PLATELETS Thousands/uL 454*   NEUTROS PCT % 84*   LYMPHS PCT % 10*   MONOS PCT % 5   EOS PCT % 0     Results from last 7 days   Lab Units 02/27/23  0622   SODIUM mmol/L 137   POTASSIUM mmol/L 3 6   CHLORIDE mmol/L 100   CO2 mmol/L 29   BUN mg/dL 18   CREATININE mg/dL 0 65   ANION GAP mmol/L 8   CALCIUM mg/dL 9 2   GLUCOSE RANDOM mg/dL 126         Results from last 7 days   Lab Units 02/27/23  1037 02/27/23  0718 02/26/23  2046 02/26/23  1603 02/26/23  1107 02/26/23  0747 02/25/23  2046 02/25/23  1527 02/25/23  1103 02/25/23  0723 02/24/23  2048   POC GLUCOSE mg/dl 109 115 109 156* 200* 137 159* 193* 202* 291* 218*         Results from last 7 days   Lab Units 02/26/23  0513   PROCALCITONIN ng/ml <0 05       Lines/Drains:  Invasive Devices     Peripheral Intravenous Line  Duration           Peripheral IV 02/25/23 Left Forearm 1 day                      Imaging: No pertinent imaging reviewed      Recent Cultures (last 7 days):         Last 24 Hours Medication List:   Current Facility-Administered Medications   Medication Dose Route Frequency Provider Last Rate   • albuterol  2 puff Inhalation Q4H PRN Lavell Bhakta MD     • budesonide-formoterol  2 puff Inhalation BID Lavell Bhakta MD     • citalopram  10 mg Oral Daily Lavell Bhakta MD     • famotidine  40 mg Oral BID Corine Sidhu PA-C     • fluticasone  2 puff Inhalation BID Lavell Bhakta MD     • gabapentin  100 mg Oral Daily Lavell Bhakta MD     • guaiFENesin  200 mg Oral Q4H PRN Lavell Bhakta MD     • heparin (porcine)  5,000 Units Subcutaneous Q8H [address] Lavell Bhakta MD     • HYDROcodone Bit-Homatrop MBr  5 mL Oral Q4H PRN Lavell Bhakta MD     • insulin lispro  1-5 Units Subcutaneous TID AC Lavell Bhakta MD     • insulin lispro  1-5 Units Subcutaneous HS Lavell Bhakta MD     • insulin lispro  2 Units Subcutaneous TID With Meals Kenan Mariee MD     • ipratropium  0 5 mg Nebulization Q6H PRN Kenan Mariee MD     • LORazepam  0 5 mg Intravenous Q8H PRN Kenan Mariee MD     • melatonin  3 mg Oral HS PRN Unique Torres PA-C     • [START ON 2/28/2023] methylPREDNISolone sodium succinate  40 mg Intravenous Daily Kenan Mariee MD     • montelukast  10 mg Oral HS Lavell Bhakta MD     • pantoprazole  40 mg Intravenous Q12H [facility] Kenan Mariee MD     • pravastatin  40 mg Oral Daily With Barbie Earl MD     • torsemide  10 mg Oral Daily Lavell Bhakta MD     • zolpidem  10 mg Oral HS PRN Unique Torres PA-C          Today, Patient Was Seen By: Tavon Harmon MD    **Please Note: This note may have been constructed using a voice recognition system  **

## 2023-02-27 NOTE — PLAN OF CARE
Regular/thin (choose softer options)  Meds as tolerated  Consider GI & ENT consult  Will follow-up x1 for reinforcement of reflux precautions and safe feeding strategies as indicated

## 2023-02-27 NOTE — SPEECH THERAPY NOTE
Speech Language/Pathology     Speech/Language Pathology Progress Note     Patient Name: Shelbie BARRIGA Date: 2/27/2023     Problem List  Principal Problem:    Severe asthma with acute exacerbation  Active Problems:    Sepsis ([facility] [address] )    Hyperlipidemia    Diabetes ([facility] [address] )    GERD (gastroesophageal reflux disease)    Depression with anxiety    Dysphagia     Subjective:  Patient received awake and alert, does report some improvement following increase in PPI; consumed thin liquids, soft and regular solid textures for breakfast without issue  "I have a method"    Previous/current diet: reg/thin (choose softer)     Objective: The following consistencies were tested: thin liquids, soft solids  Patient presents with [facility] bolus containment, manipulation and control  Mastication judged to be complete, purposeful  Oral phase is grossly functional   No overt s/s of aspiration or distress  Vocal quality noted to remain clear  Assessment:  While minimal symptoms were reported at the time of this encounter; dysphagia symptoms appear largely related to severe reflux  Pt c/o food just 'wont go down' and 'become study behind my windpipe and press on it'  Recommend GI follow up; need for MBS not indicated at this time as primary focus of this study is to assess mechanics of the oropharyngeal swallow, as well as screen/note retention within the esophagus v diagnosis of esophogeal dysphagia  Suggest GI consult for further investigation of these symptoms, ENT for suspected tracheobronchomalacia (see imaging)         Plan:  Regular/thin (choose softer options)  Meds as tolerated  Consider GI & ENT consult  Will follow-up x1 for reinforcement of reflux precautions and safe feeding strategies as indicated        Mile Nguyen [address], [facility] Pathologist  [place] #[id number]  [place] #[id number]

## 2023-02-27 NOTE — ASSESSMENT & PLAN NOTE
Lab Results   Component Value Date    HGBA1C 4 9 10/04/2022       Recent Labs     02/26/23  1603 02/26/23  2046 02/27/23  0718 02/27/23  1037   POCGLU 156* 109 115 109       Blood Sugar Average: Last 72 hrs:  (P) 171 [id number]   · Steroid induced diabetes  · Exacerbated by IV steroids  · Better controlled today  · Added lispro 2 units with meals  · Monitor on current sliding scale

## 2023-02-27 NOTE — CONSULTS
Consultation - [address] [facility]  Jatin Serena 66 y o  female MRN: [id number]  Unit/Bed#: -01 Encounter: [id number]         Reason for Consult / Principal Problem: GERD    HPI: Aurelio Harper is a 45-year-old female with history of severe asthma, type 2 diabetes and GERD  Patient is currently hospitalized for severe persistent asthma with acute exacerbation  Patient also is known to us for history of GERD, being worked up for potential thoaric surgery if possible given she has long history of asthma thought to be exacerbated by GERD  She had initial work-up for several years at [facility] in [place]  Per patient, she told that because her asthma is severe she is not a candidate for surgical intervention  Patient continues to complain of dysphagia especially to solids, and regurgitation  She reports losing 20 lb because she is only eating soups and other softer foods  Patient's last EGD was performed by Dr Norris Covington revealing discopathy normal esophagus, stomach and duodenum with a 4 cm size hiatal hernia  Manometry was placed  Results show normal esophageal motility with hiatal hernia   In the past, patient did have a gastric emptying study at [facility] in [place] which was normal  She had a CT soft tissue neck without contrast performed in January, revealing severe narrowing of the oropharyngeal airway  Interestingly, during an ENT consult while hospitalized, she was noted to have no abnormality  Patient reports she is very confused by this, and is seeking surgical repair if possible  Review of Systems:    CONSTITUTIONAL: Denies any fever, chills, or rigors  Good appetite, and no recent weight loss  HEENT: No earache or tinnitus  Denies hearing loss or visual disturbances  CARDIOVASCULAR: No chest pain or palpitations  RESPIRATORY: Positive for wheezing  GASTROINTESTINAL: As noted in the History of Present Illness  GENITOURINARY: No problems with urination   Denies any hematuria or dysuria  NEUROLOGIC: No dizziness or vertigo, denies headaches  MUSCULOSKELETAL: Denies any muscle or joint pain  SKIN: Denies skin rashes or itching  ENDOCRINE: Denies excessive thirst  Denies intolerance to heat or cold  PSYCHOSOCIAL: Denies depression or anxiety  Denies any recent memory loss  Historical Information   Past Medical History:   Diagnosis Date   • Asthma    • Diabetes mellitus ([facility] [address] )    • GERD (gastroesophageal reflux disease)      History reviewed  No pertinent surgical history  Social History   Social History     Substance and Sexual Activity   Alcohol Use Never     Social History     Substance and Sexual Activity   Drug Use Never     Social History     Tobacco Use   Smoking Status Never   Smokeless Tobacco Never     History reviewed  No pertinent family history       Meds/Allergies     Current Facility-Administered Medications   Medication Dose Route Frequency   • albuterol (PROVENTIL HFA,VENTOLIN HFA) inhaler 2 puff  2 puff Inhalation Q4H PRN   • budesonide-formoterol (SYMBICORT) 160-4 5 mcg/act inhaler 2 puff  2 puff Inhalation BID   • citalopram (CeleXA) tablet 10 mg  10 mg Oral Daily   • famotidine (PEPCID) tablet 40 mg  40 mg Oral BID   • fluticasone (FLOVENT HFA) 110 MCG/ACT inhaler 2 puff  2 puff Inhalation BID   • gabapentin (NEURONTIN) capsule 100 mg  100 mg Oral Daily   • guaiFENesin (ROBITUSSIN) oral liquid 200 mg  200 mg Oral Q4H PRN   • heparin (porcine) subcutaneous injection 5,000 Units  5,000 Units Subcutaneous Q8H [address]   • HYDROcodone Bit-Homatrop MBr (HYCODAN) oral syrup 5 mL  5 mL Oral Q4H PRN   • insulin lispro (HumaLOG) 100 units/mL subcutaneous injection 1-5 Units  1-5 Units Subcutaneous TID AC   • insulin lispro (HumaLOG) 100 units/mL subcutaneous injection 1-5 Units  1-5 Units Subcutaneous HS   • insulin lispro (HumaLOG) 100 units/mL subcutaneous injection 2 Units  2 Units Subcutaneous TID With Meals   • ipratropium (ATROVENT) 0 02 % inhalation solution 0 5 mg  0 5 mg Nebulization TID   • levalbuterol (XOPENEX) inhalation solution 1 25 mg  1 25 mg Nebulization TID   • LORazepam (ATIVAN) injection 0 5 mg  0 5 mg Intravenous Q8H PRN   • melatonin tablet 3 mg  3 mg Oral HS PRN   • methylPREDNISolone sodium succinate (Solu-MEDROL) injection 40 mg  40 mg Intravenous Q12H [address]   • montelukast (SINGULAIR) tablet 10 mg  10 mg Oral HS   • pantoprazole (PROTONIX) injection 40 mg  40 mg Intravenous Q12H [address]   • pravastatin (PRAVACHOL) tablet 40 mg  40 mg Oral Daily With Dinner   • torsemide (DEMADEX) tablet 10 mg  10 mg Oral Daily   • zolpidem (AMBIEN) tablet 10 mg  10 mg Oral HS PRN       Allergies   Allergen Reactions   • Codeine Other (See Comments)   • Aspirin GI Intolerance and Rash   • Hydromorphone Rash and Other (See Comments)     GI upset     • Oxycodone-Acetaminophen Rash and Other (See Comments)   • Tramadol Rash and Other (See Comments)         Objective     Blood pressure 121/78, pulse 81, temperature 97 9 °F (36 6 °C), resp  rate 18, height 4' 11" (1 499 m), weight 84 2 kg (185 lb 10 oz), SpO2 95 %  Intake/Output Summary (Last 24 hours) at 2/27/2023 1351  Last data filed at 2/27/2023 0900  Gross per 24 hour   Intake 660 ml   Output --   Net 660 ml         PHYSICAL EXAM:      General Appearance:   Alert and oriented x 3  Cooperative, and in no respiratory distress   HEENT:   Normocephalic, atraumatic, anicteric      Neck:  Supple, symmetrical, trachea midline   Lungs:   Clear to auscultation bilaterally; no rales, rhonchi or wheezing; respirations unlabored    Heart[de-identified]   S1 and S2 normal; regular rate and rhythm; no murmur, rub, or gallop     Abdomen:   Soft, non-tender, non-distended; normal bowel sounds; no masses, no organomegaly    Genitalia:   Deferred    Rectal:   Deferred    Extremities:  No cyanosis, clubbing or edema    Pulses:  2+ and symmetric all extremities    Skin:  Skin color, texture, turgor normal, no rashes or lesions    Lymph nodes:  No palpable cervical or supraclavicular lymphadenopathy        Lab Results:   Results from last 7 days   Lab Units 02/27/23  0622   WBC Thousand/uL 14 92*   HEMOGLOBIN g/dL 13 2   HEMATOCRIT % 39 6   PLATELETS Thousands/uL 454*   NEUTROS PCT % 84*   LYMPHS PCT % 10*   MONOS PCT % 5   EOS PCT % 0     Results from last 7 days   Lab Units 02/27/23  0622   POTASSIUM mmol/L 3 6   CHLORIDE mmol/L 100   CO2 mmol/L 29   BUN mg/dL 18   CREATININE mg/dL 0 65   CALCIUM mg/dL 9 2               Imaging Studies: I have personally reviewed pertinent imaging studies  XR chest 2 views    Result Date: 2/24/2023  Impression: No acute cardiopulmonary disease  Previous small left pleural effusion has resolved Workstation performed: [id number]       ASSESSMENT and PLAN:      1) GERD, asthma and abnormal CT neck - Patient had ENT consult on 1/10/23 showed widely patent airway or flexible laryngoscopy  She had manometry showing normal esophageal motility and readings consistent with known 4 cm hiatal hernia  She unfortunately continues to be hospitalized for acute asthma exacerbations  She is concerned that she cannot move forward with a hiatal hernia repair secondary to conflicted CT imaging  It is reassuring her manometry was normal, and she had a normal scope with ENT  - She has an appointment with thoracic surgery scheduled on 3/7   - Protonix twice daily   - Increase Pepcid to 40 mg twice daily  - We cannot switch her PPI during hospitalization secondary to formulary   - Discussed with Dr Patrick Wu   - Consider repeat ENT evaluation     The patient was seen and examined by Dr Sabrina Otoole, all shepherd medical decisions were made with Dr Sabrina Otoole  Thank you for allowing us to participate in the care of this pleasant patient  We will follow up with you closely

## 2023-02-27 NOTE — CASE MANAGEMENT
Case Management Assessment & Discharge Planning Note    Patient name Braydon Reis  Location [address] [id number] MRN [id number]  : 1965 Date 2023       Current Admission Date: 2023  Current Admission Diagnosis:Severe asthma with acute exacerbation   Patient Active Problem List    Diagnosis Date Noted   • Dysphagia 2023   • Narrowing of airway 01/10/2023   • Depression with anxiety 2023   • Severe asthma with acute exacerbation 2023   • Chronic cough 2022   • GERD (gastroesophageal reflux disease) 10/31/2022   • Rectus sheath hematoma 10/29/2022   • Sepsis ([facility] [address] ) 10/24/2022   • Severe persistent asthma with exacerbation 10/24/2022   • Hyperlipidemia 10/24/2022   • Diabetes ([facility] [address] ) 10/24/2022      LOS (days): 2  Geometric Mean LOS (GMLOS) (days):   Days to GMLOS:     OBJECTIVE:    Risk of Unplanned Readmission Score: 20 4         Current admission status: Inpatient       Preferred Pharmacy:   [address] [place], [place] [address]ana [place]isa [address]  Phone: [phone] Fax: [phone]    Primary Care Provider: Mindy Appiah    Primary Insurance: BLUE CROSS  Secondary Insurance:     ASSESSMENT:  Fabio Wilkerson Proxies    There are no active Health Care Proxies on file         Advance Directives  Does patient have a [address] [place] [address] Avenue?: No  Was patient offered paperwork?: Yes (patient accepted)  Does patient currently have a Health Care decision maker?: No  Does patient have Advance Directives?: No  Was patient offered paperwork?: Yes (patient accepted paperwork)         Readmission Root Cause  30 Day Readmission: No    Patient Information  Admitted from[de-identified] Home  Mental Status: Alert  During Assessment patient was accompanied by: Not accompanied during assessment  Assessment information provided by[de-identified] Patient  Primary Caregiver: Self  Support Systems: Spouse/significant other  [place] of Residence: [address] do you live in?: [address] entry access options   Select all that apply : Stairs  Number of steps to enter home : 3  Type of Current Residence: [place]  In the last 12 months, was there a time when you were not able to pay the mortgage or rent on time?: No  In the last 12 months, how many places have you lived?: 1  In the last 12 months, was there a time when you did not have a steady place to sleep or slept in a shelter (including now)?: No  Homeless/housing insecurity resource given?: N/A  Living Arrangements: Lives w/ Spouse/significant other  Is patient a ?: No    Activities of Daily Living Prior to Admission  Functional Status: Independent  Completes ADLs independently?: Yes  Ambulates independently?: Yes  Does patient use assisted devices?: Yes  Assisted Devices (DME) used: Janine Chapa Shower Chair  Does patient currently own DME?: Yes  What DME does the patient currently own?: Catia Moon Shower Chair  Does patient have a history of Outpatient Therapy (PT/OT)?: No  Does the patient have a history of Short-Term Rehab?: No  Does patient have a history of HHC?: No  Does patient currently have [facility]?: No         Patient Information Continued  Income Source: SSI/SSD  Does patient have prescription coverage?: Yes  Within the past 12 months, you worried that your food would run out before you got the money to buy more : Never true  Within the past 12 months, the food you bought just didn't last and you didn't have money to get more : Never true  Food insecurity resource given?: N/A  Does patient receive dialysis treatments?: No  Does patient have a history of substance abuse?: No  Does patient have a history of Mental Health Diagnosis?: No    PHQ 2/9 Screening   Reviewed PHQ 2/9 Depression Screening Score?: No    Means of Transportation  Means of Transport to Appts[de-identified] Drives Self  In the past 12 months, has lack of transportation kept you from medical appointments or from getting medications?: No  In the past 12 months, has lack of transportation kept you from meetings, work, or from getting things needed for daily living?: No  Was application for public transport provided?: N/A        DISCHARGE DETAILS:    Discharge planning discussed with[de-identified] Patient at bedside  Freedom of Choice: Yes  Comments - Freedom of Choice: CM discussed freedom of choice as it pertains to discharge planning  Patient denied needing STR/VNA    CM contacted family/caregiver?: No- see comments (declined)  Were Treatment Team discharge recommendations reviewed with patient/caregiver?: Yes  Did patient/caregiver verbalize understanding of patient care needs?: Yes  Were patient/caregiver advised of the risks associated with not following Treatment Team discharge recommendations?: Yes         Requested [address]         Is the patient interested in [place] [address] at discharge?: No    DME Referral Provided  Referral made for DME?: No    Other Referral/Resources/Interventions Provided:  Interventions: None Indicated    Would you like to participate in our [address] service program?  : No - Declined    Treatment Team Recommendation: Other (TBD)  Discharge Destination Plan[de-identified] Home  Transport at Discharge : Family

## 2023-02-27 NOTE — PLAN OF CARE
Problem: RESPIRATORY - ADULT  Goal: Achieves optimal ventilation and oxygenation  Description: INTERVENTIONS:  - Assess for changes in respiratory status  - Assess for changes in mentation and behavior  - Position to facilitate oxygenation and minimize respiratory effort  - Oxygen administered by appropriate delivery if ordered  - Initiate smoking cessation education as indicated  - Encourage broncho-pulmonary hygiene including cough, deep breathe, Incentive Spirometry  - Assess the need for suctioning and aspirate as needed  - Assess and instruct to report SOB or any respiratory difficulty  - Respiratory Therapy support as indicated  Outcome: Progressing     Problem: PAIN - ADULT  Goal: Verbalizes/displays adequate comfort level or baseline comfort level  Description: Interventions:  - Encourage patient to monitor pain and request assistance  - Assess pain using appropriate pain scale  - Administer analgesics based on type and severity of pain and evaluate response  - Implement non-pharmacological measures as appropriate and evaluate response  - Consider cultural and social influences on pain and pain management  - Notify physician/advanced practitioner if interventions unsuccessful or patient reports new pain  Outcome: Progressing     Problem: INFECTION - ADULT  Goal: Absence or prevention of progression during hospitalization  Description: INTERVENTIONS:  - Assess and monitor for signs and symptoms of infection  - Monitor lab/diagnostic results  - Monitor all insertion sites, i e  indwelling lines, tubes, and drains  - Monitor endotracheal if appropriate and nasal secretions for changes in amount and color  - [place] appropriate cooling/warming therapies per order  - Administer medications as ordered  - Instruct and encourage patient and family to use good hand hygiene technique  - Identify and instruct in appropriate isolation precautions for identified infection/condition  Outcome: Progressing  Goal: Absence of fever/infection during neutropenic period  Description: INTERVENTIONS:  - Monitor WBC    Outcome: Progressing     Problem: DISCHARGE PLANNING  Goal: Discharge to home or other facility with appropriate resources  Description: INTERVENTIONS:  - Identify barriers to discharge w/patient and caregiver  - Arrange for needed discharge resources and transportation as appropriate  - Identify discharge learning needs (meds, wound care, etc )  - Arrange for interpretive services to assist at discharge as needed  - Refer to Case Management Department for coordinating discharge planning if the patient needs post-hospital services based on physician/advanced practitioner order or complex needs related to functional status, cognitive ability, or social support system  Outcome: Progressing     Problem: Nutrition/Hydration-ADULT  Goal: Nutrient/Hydration intake appropriate for improving, restoring or maintaining nutritional needs  Description: Monitor and assess patient's nutrition/hydration status for malnutrition  Collaborate with interdisciplinary team and initiate plan and interventions as ordered  Monitor patient's weight and dietary intake as ordered or per policy  Utilize nutrition screening tool and intervene as necessary  Determine patient's food preferences and provide high-protein, high-caloric foods as appropriate       INTERVENTIONS:  - Monitor oral intake, urinary output, labs, and treatment plans  - Assess nutrition and hydration status and recommend course of action  - Evaluate amount of meals eaten  - Assist patient with eating if necessary   - Allow adequate time for meals  - Recommend/ encourage appropriate diets, oral nutritional supplements, and vitamin/mineral supplements  - Order, calculate, and assess calorie counts as needed  - Recommend, monitor, and adjust tube feedings and TPN/PPN based on assessed needs  - Assess need for intravenous fluids  - Provide specific nutrition/hydration education as appropriate  - Include patient/family/caregiver in decisions related to nutrition  Outcome: Progressing

## 2023-02-28 LAB
ANION GAP SERPL CALCULATED.3IONS-SCNC: 9 MMOL/L (ref 4–13)
BASOPHILS # BLD AUTO: 0.03 THOUSANDS/ÂΜL (ref 0–0.1)
BASOPHILS NFR BLD AUTO: 0 % (ref 0–1)
BUN SERPL-MCNC: 16 MG/DL (ref 5–25)
CALCIUM SERPL-MCNC: 8.7 MG/DL (ref 8.3–10.1)
CHLORIDE SERPL-SCNC: 100 MMOL/L (ref 96–108)
CO2 SERPL-SCNC: 30 MMOL/L (ref 21–32)
CREAT SERPL-MCNC: 0.8 MG/DL (ref 0.6–1.3)
EOSINOPHIL # BLD AUTO: 0 THOUSAND/ÂΜL (ref 0–0.61)
EOSINOPHIL NFR BLD AUTO: 0 % (ref 0–6)
ERYTHROCYTE [DISTWIDTH] IN BLOOD BY AUTOMATED COUNT: 14.6 % (ref 11.6–15.1)
GFR SERPL CREATININE-BSD FRML MDRD: 82 ML/MIN/1.73SQ M
GLUCOSE SERPL-MCNC: 100 MG/DL (ref 65–140)
GLUCOSE SERPL-MCNC: 108 MG/DL (ref 65–140)
GLUCOSE SERPL-MCNC: 115 MG/DL (ref 65–140)
GLUCOSE SERPL-MCNC: 133 MG/DL (ref 65–140)
GLUCOSE SERPL-MCNC: 82 MG/DL (ref 65–140)
GLUCOSE SERPL-MCNC: 85 MG/DL (ref 65–140)
HCT VFR BLD AUTO: 39.2 % (ref 34.8–46.1)
HGB BLD-MCNC: 12.9 G/DL (ref 11.5–15.4)
IMM GRANULOCYTES # BLD AUTO: 0.13 THOUSAND/UL (ref 0–0.2)
IMM GRANULOCYTES NFR BLD AUTO: 1 % (ref 0–2)
LYMPHOCYTES # BLD AUTO: 3.59 THOUSANDS/ÂΜL (ref 0.6–4.47)
LYMPHOCYTES NFR BLD AUTO: 26 % (ref 14–44)
MAGNESIUM SERPL-MCNC: 1.9 MG/DL (ref 1.6–2.6)
MCH RBC QN AUTO: 30.6 PG (ref 26.8–34.3)
MCHC RBC AUTO-ENTMCNC: 32.9 G/DL (ref 31.4–37.4)
MCV RBC AUTO: 93 FL (ref 82–98)
MONOCYTES # BLD AUTO: 1.39 THOUSAND/ÂΜL (ref 0.17–1.22)
MONOCYTES NFR BLD AUTO: 10 % (ref 4–12)
NEUTROPHILS # BLD AUTO: 8.62 THOUSANDS/ÂΜL (ref 1.85–7.62)
NEUTS SEG NFR BLD AUTO: 63 % (ref 43–75)
NRBC BLD AUTO-RTO: 0 /100 WBCS
PLATELET # BLD AUTO: 353 THOUSANDS/UL (ref 149–390)
PMV BLD AUTO: 10.8 FL (ref 8.9–12.7)
POTASSIUM SERPL-SCNC: 3 MMOL/L (ref 3.5–5.3)
POTASSIUM SERPL-SCNC: 3.2 MMOL/L (ref 3.5–5.3)
RBC # BLD AUTO: 4.21 MILLION/UL (ref 3.81–5.12)
SODIUM SERPL-SCNC: 139 MMOL/L (ref 135–147)
WBC # BLD AUTO: 13.76 THOUSAND/UL (ref 4.31–10.16)

## 2023-02-28 RX ORDER — KETOROLAC TROMETHAMINE 30 MG/ML
15 INJECTION, SOLUTION INTRAMUSCULAR; INTRAVENOUS ONCE
Status: COMPLETED | OUTPATIENT
Start: 2023-02-28 | End: 2023-02-28

## 2023-02-28 RX ORDER — POTASSIUM CHLORIDE 20 MEQ/1
40 TABLET, EXTENDED RELEASE ORAL ONCE
Status: COMPLETED | OUTPATIENT
Start: 2023-02-28 | End: 2023-02-28

## 2023-02-28 RX ORDER — FLUCONAZOLE 100 MG/1
200 TABLET ORAL DAILY
Status: DISCONTINUED | OUTPATIENT
Start: 2023-02-28 | End: 2023-03-01 | Stop reason: HOSPADM

## 2023-02-28 RX ORDER — INSULIN LISPRO 100 [IU]/ML
1-5 INJECTION, SOLUTION INTRAVENOUS; SUBCUTANEOUS
Status: DISCONTINUED | OUTPATIENT
Start: 2023-03-01 | End: 2023-03-01 | Stop reason: HOSPADM

## 2023-02-28 RX ORDER — POTASSIUM CHLORIDE 20 MEQ/1
40 TABLET, EXTENDED RELEASE ORAL EVERY 4 HOURS
Status: COMPLETED | OUTPATIENT
Start: 2023-02-28 | End: 2023-02-28

## 2023-02-28 RX ORDER — ACETAMINOPHEN 325 MG/1
650 TABLET ORAL EVERY 6 HOURS PRN
Status: DISCONTINUED | OUTPATIENT
Start: 2023-02-28 | End: 2023-03-01 | Stop reason: HOSPADM

## 2023-02-28 RX ORDER — ONDANSETRON 2 MG/ML
4 INJECTION INTRAMUSCULAR; INTRAVENOUS EVERY 8 HOURS PRN
Status: DISCONTINUED | OUTPATIENT
Start: 2023-02-28 | End: 2023-03-01 | Stop reason: HOSPADM

## 2023-02-28 RX ORDER — MAGNESIUM SULFATE 1 G/100ML
1 INJECTION INTRAVENOUS ONCE
Status: COMPLETED | OUTPATIENT
Start: 2023-02-28 | End: 2023-02-28

## 2023-02-28 RX ORDER — POTASSIUM CHLORIDE 14.9 MG/ML
20 INJECTION INTRAVENOUS
Status: DISCONTINUED | OUTPATIENT
Start: 2023-02-28 | End: 2023-02-28

## 2023-02-28 RX ORDER — OXYCODONE HYDROCHLORIDE 5 MG/1
5 TABLET ORAL ONCE
Status: COMPLETED | OUTPATIENT
Start: 2023-02-28 | End: 2023-02-28

## 2023-02-28 RX ORDER — CALCIUM CARBONATE 200(500)MG
500 TABLET,CHEWABLE ORAL DAILY PRN
Status: DISCONTINUED | OUTPATIENT
Start: 2023-02-28 | End: 2023-03-01 | Stop reason: HOSPADM

## 2023-02-28 RX ADMIN — PANTOPRAZOLE SODIUM 40 MG: 40 TABLET, DELAYED RELEASE ORAL at 09:06

## 2023-02-28 RX ADMIN — HEPARIN SODIUM 5000 UNITS: 5000 INJECTION INTRAVENOUS; SUBCUTANEOUS at 05:54

## 2023-02-28 RX ADMIN — KETOROLAC TROMETHAMINE 15 MG: 30 INJECTION, SOLUTION INTRAMUSCULAR at 13:27

## 2023-02-28 RX ADMIN — INSULIN LISPRO 2 UNITS: 100 INJECTION, SOLUTION INTRAVENOUS; SUBCUTANEOUS at 09:08

## 2023-02-28 RX ADMIN — ACETAMINOPHEN 650 MG: 325 TABLET, FILM COATED ORAL at 12:21

## 2023-02-28 RX ADMIN — POTASSIUM CHLORIDE 40 MEQ: 1500 TABLET, EXTENDED RELEASE ORAL at 14:05

## 2023-02-28 RX ADMIN — Medication 3 MG: at 21:31

## 2023-02-28 RX ADMIN — POTASSIUM CHLORIDE 40 MEQ: 1500 TABLET, EXTENDED RELEASE ORAL at 09:06

## 2023-02-28 RX ADMIN — MONTELUKAST 10 MG: 10 TABLET, FILM COATED ORAL at 21:31

## 2023-02-28 RX ADMIN — ACETAMINOPHEN 650 MG: 325 TABLET, FILM COATED ORAL at 21:44

## 2023-02-28 RX ADMIN — FAMOTIDINE 40 MG: 20 TABLET, FILM COATED ORAL at 09:06

## 2023-02-28 RX ADMIN — MAGNESIUM SULFATE HEPTAHYDRATE 1 G: 1 INJECTION, SOLUTION INTRAVENOUS at 09:06

## 2023-02-28 RX ADMIN — BUDESONIDE AND FORMOTEROL FUMARATE DIHYDRATE 2 PUFF: 160; 4.5 AEROSOL RESPIRATORY (INHALATION) at 09:08

## 2023-02-28 RX ADMIN — METHYLPREDNISOLONE SODIUM SUCCINATE 40 MG: 40 INJECTION, POWDER, FOR SOLUTION INTRAMUSCULAR; INTRAVENOUS at 09:06

## 2023-02-28 RX ADMIN — CALCIUM CARBONATE (ANTACID) CHEW TAB 500 MG 500 MG: 500 CHEW TAB at 21:44

## 2023-02-28 RX ADMIN — TORSEMIDE 10 MG: 10 TABLET ORAL at 09:06

## 2023-02-28 RX ADMIN — CITALOPRAM HYDROBROMIDE 10 MG: 20 TABLET, FILM COATED ORAL at 09:06

## 2023-02-28 RX ADMIN — INSULIN LISPRO 2 UNITS: 100 INJECTION, SOLUTION INTRAVENOUS; SUBCUTANEOUS at 16:54

## 2023-02-28 RX ADMIN — HEPARIN SODIUM 5000 UNITS: 5000 INJECTION INTRAVENOUS; SUBCUTANEOUS at 13:27

## 2023-02-28 RX ADMIN — LORAZEPAM 0.5 MG: 2 INJECTION INTRAMUSCULAR; INTRAVENOUS at 09:13

## 2023-02-28 RX ADMIN — PANTOPRAZOLE SODIUM 40 MG: 40 TABLET, DELAYED RELEASE ORAL at 21:31

## 2023-02-28 RX ADMIN — INSULIN LISPRO 2 UNITS: 100 INJECTION, SOLUTION INTRAVENOUS; SUBCUTANEOUS at 12:11

## 2023-02-28 RX ADMIN — OXYCODONE HYDROCHLORIDE 5 MG: 5 TABLET ORAL at 16:53

## 2023-02-28 RX ADMIN — FLUTICASONE PROPIONATE 2 PUFF: 110 AEROSOL, METERED RESPIRATORY (INHALATION) at 09:08

## 2023-02-28 RX ADMIN — PRAVASTATIN SODIUM 40 MG: 40 TABLET ORAL at 16:53

## 2023-02-28 RX ADMIN — ALBUTEROL SULFATE 2 PUFF: 90 AEROSOL, METERED RESPIRATORY (INHALATION) at 09:08

## 2023-02-28 RX ADMIN — GABAPENTIN 100 MG: 100 CAPSULE ORAL at 09:06

## 2023-02-28 RX ADMIN — FAMOTIDINE 40 MG: 20 TABLET, FILM COATED ORAL at 17:00

## 2023-02-28 RX ADMIN — POTASSIUM CHLORIDE 40 MEQ: 1500 TABLET, EXTENDED RELEASE ORAL at 12:11

## 2023-02-28 RX ADMIN — METOCLOPRAMIDE 10 MG: 10 TABLET ORAL at 16:53

## 2023-02-28 RX ADMIN — ZOLPIDEM TARTRATE 10 MG: 5 TABLET, COATED ORAL at 21:31

## 2023-02-28 NOTE — ASSESSMENT & PLAN NOTE
· Sepsis ruled out   SIRS, POA, in the setting of asthma exacerbation, as evidenced by tachycardia and tachypnea in ED, which resolved with breathing treatments, mag sulfate and supportive care         Findings: , 94, 94 and RR 28 & 32 in ED meeting SIRS criteria, non-infectious

## 2023-02-28 NOTE — ASSESSMENT & PLAN NOTE
Lab Results   Component Value Date    HGBA1C 4 9 10/04/2022       Recent Labs     02/27/23 2050 02/28/23  0744 02/28/23  1104 02/28/23  1511   POCGLU 187* 85 100 133       Blood Sugar Average: Last 72 hrs:  (P) 699 [id number]   · Steroid induced diabetes  · Exacerbated by IV steroids  · Better controlled today  · Added lispro 2 units with meals  · Monitor on current sliding scale

## 2023-02-28 NOTE — UTILIZATION REVIEW
Continued Stay Review    Date: 2/28                          Current Patient Class: IP   Current Level of Care: MS    HPI:58 y o  female initially admitted on  2/25 for asthma exacerbation   Assessment/Plan: SIRS resolved   pulm was consulted for asthma exacerbation   Cont steroid taper and duonebs   monitor off abx   dinished BS   O2 sat 92-94 % on RA     2/28 GI Note   GERD , asthma and abnormal CT heck   she feels that having anti-reflux surgery vs bariatric surgery is the next step she would like to take  cont protonix , pepcid   Repeat ENT eval as OP        Vital Signs:   02/28/23 07:46:27 98 1 °F (36 7 °C) 83 16 116/70 85 94 % -- --   02/28/23 0736 -- -- -- -- -- 92 % --          Pertinent Labs/Diagnostic Results:       Results from last 7 days   Lab Units 02/28/23  0518 02/27/23  0622 02/26/23  0513 02/25/23  0617 02/24/23  1243   WBC Thousand/uL 13 76* 14 92* 20 86* 13 82* 10 24*   HEMOGLOBIN g/dL 12 9 13 2 13 5 13 4 13 8   HEMATOCRIT % 39 2 39 6 40 2 40 3 43 2   PLATELETS Thousands/uL 353 454* 486* 447* 381   NEUTROS ABS Thousands/µL 8 62* 12 67* 18 83* 12 30* 4 12     Results from last 7 days   Lab Units 02/28/23  1254 02/28/23  0518 02/27/23  0622 02/26/23  0513 02/25/23  0617 02/24/23  1243   SODIUM mmol/L  --  139 137 139 137 139   POTASSIUM mmol/L 3 2* 3 0* 3 6 3 6 3 7 4 8   CHLORIDE mmol/L  --  100 100 103 102 105   CO2 mmol/L  --  30 29 26 23 26   ANION GAP mmol/L  --  9 8 10 12 8   BUN mg/dL  --  16 18 18 10 8   CREATININE mg/dL  --  0 80 0 65 0 82 0 66 0 72   EGFR ml/min/1 73sq m  --  82 99 80 99 93   CALCIUM mg/dL  --  8 7 9 2 9 6 9 5 9 7   MAGNESIUM mg/dL  --  1 9 2 1 2 2  --   --      Results from last 7 days   Lab Units 02/28/23  1104 02/28/23  0744 02/27/23  2050 02/27/23  1533 02/27/23  1037 02/27/23  0718 02/26/23  2046 02/26/23  1603 02/26/23  1107 02/26/23  0747 02/25/23 2046 02/25/23  1527   POC GLUCOSE mg/dl 100 85 187* 177* 109 115 109 156* 200* 137 159* 193*     Results from last 7 days   Lab Units 02/28/23  0518 02/27/23  0622 02/26/23  0513 02/25/23  0617 02/24/23  1243   GLUCOSE RANDOM mg/dL 115 126 142* 173* 94       Results from last 7 days   Lab Units 02/24/23  1243   HS TNI 0HR ng/L <2       Results from last 7 days   Lab Units 02/26/23  0513   PROCALCITONIN ng/ml <0 05       Medications:   Scheduled Medications:  budesonide-formoterol, 2 puff, Inhalation, BID  citalopram, 10 mg, Oral, Daily  famotidine, 40 mg, Oral, BID  fluconazole, 200 mg, Oral, Daily  fluticasone, 2 puff, Inhalation, BID  gabapentin, 100 mg, Oral, Daily  heparin (porcine), 5,000 Units, Subcutaneous, Q8H KAYLEIGH  insulin lispro, 1-5 Units, Subcutaneous, TID AC  insulin lispro, 1-5 Units, Subcutaneous, HS  insulin lispro, 2 Units, Subcutaneous, TID With Meals  methylPREDNISolone sodium succinate, 40 mg, Intravenous, Daily  metoclopramide, 10 mg, Oral, Daily With Dinner  montelukast, 10 mg, Oral, HS  pantoprazole, 40 mg, Oral, Q12H KAYLEIGH  pravastatin, 40 mg, Oral, Daily With Dinner  torsemide, 10 mg, Oral, Daily      Continuous IV Infusions:     PRN Meds:  acetaminophen, 650 mg, Oral, Q6H PRN  albuterol, 2 puff, Inhalation, Q4H PRN  guaiFENesin, 200 mg, Oral, Q4H PRN  HYDROcodone Bit-Homatrop MBr, 5 mL, Oral, Q4H PRN  ipratropium, 0 5 mg, Nebulization, Q6H PRN  LORazepam, 0 5 mg, Intravenous, Q8H PRN  melatonin, 3 mg, Oral, HS PRN  zolpidem, 10 mg, Oral, HS PRN        Discharge Plan: D     Network Utilization Review Department  ATTENTION: Please call with any questions or concerns to [phone] and carefully listen to the prompts so that you are directed to the right person  All voicemails are confidential   Odell Webb all requests for admission clinical reviews, approved or denied determinations and any other requests to dedicated fax number below belonging to the campus where the patient is receiving treatment   List of dedicated fax numbers for the Facilities:  FACILITY NAME UR FAX NUMBER   ADMISSION DENIALS (Administrative/Medical Necessity) [phone]   [address] (Maternity/NICU/Pediatrics) [address] Gilda Keyes  [phone]   [facility] [address] [place] [address] [place] [address] [address] [facility] [employer] [address] [place] [phone]

## 2023-02-28 NOTE — PROGRESS NOTES
The pantoprazole has / have been converted to Oral per [facility]PO Auto-Conversion Protocol for Adults as approved by the Pharmacy and Therapeutics Committee  The patient met all eligible criteria:  3 Age = 27years old   2) Received at least one dose of the IV form   3) Receiving at least one other scheduled oral/enteral medication   4) Tolerating an oral/enteral diet   and did not have any exclusions:   1) Critical care patient   2) Active GI bleed (IF assessing H2RAs or PPIs)   3) Continuous tube feeding (IF assessing cipro, doxycycline, levofloxacin, minocycline, rifampin, or voriconazole)   4) Receiving PO vancomycin (IF assessing metronidazole)   5) Persistent nausea and/or vomiting   6) Ileus or gastrointestinal obstruction   7) Jay/nasogastric tube set for continuous suction   8) Specific order not to automatically convert to PO (in the order's comments or if discussed in the most recent Infectious Disease or primary team's progress notes)

## 2023-02-28 NOTE — ASSESSMENT & PLAN NOTE
Lab Results   Component Value Date    HGBA1C 4 9 10/04/2022       Recent Labs     02/27/23  1533 02/27/23 2050 02/28/23  0744 02/28/23  1104   POCGLU 177* 187* 85 100       Blood Sugar Average: Last 72 hrs:  (P) 158 [id number]   · Steroid induced diabetes  · Exacerbated by IV steroids  · Better controlled today  · Added lispro 2 units with meals  · Monitor on current sliding scale

## 2023-02-28 NOTE — ASSESSMENT & PLAN NOTE
· Follows with GI as an outpatient  · CT head/neck showing concerns for tracheobronchomalacia   · Had previous follow up with an ENT who disagreed with findings  · Patient is in process of second opinion, further management with invasive testing is dependant on diagnosis  · Thus speech and GI consulted while admitted  · repeat CT imaging for further evaluation of tracheobronchomalacia normal

## 2023-02-28 NOTE — DISCHARGE SUMMARY
[address] [facility]  Discharge- Shlomo Miller 1965, 66 y o  female MRN: [id number]  Unit/Bed#: -Zana Encounter: [id number]  Primary Care Provider: Laurie Candelaria   Date and time admitted to hospital: 2/24/2023 11:41 AM    * Severe asthma with acute exacerbation  Assessment & Plan  · Severe persistent asthma with exacerbation  · Previously well controlled until mandeep covid in 2020  · Also with refractory GERD, likely main exacerbating factor  · Without oxygen requirements currently  · Pulmonology consulted, see recommendations  · Continue steroids taper, duonebz  · Procalcitonin normal, monitor off antibiotics     Dysphagia  Assessment & Plan  · Follows with GI as an outpatient  · CT head/neck showing concerns for tracheobronchomalacia   · Had previous follow up with an ENT who disagreed with findings  · Patient is in process of second opinion, further management with invasive testing is dependant on diagnosis  · Thus speech and GI consulted while admitted  · repeat CT imaging for further evaluation of tracheobronchomalacia normal     Diabetes [facility])  Assessment & Plan  Lab Results   Component Value Date    HGBA1C 4 9 10/04/2022       Recent Labs     02/27/23  2050 02/28/23  0744 02/28/23  1104 02/28/23  1511   POCGLU 187* 85 100 133       Blood Sugar Average: Last 72 hrs:  (P) 439 [id number]   · Steroid induced diabetes  · Exacerbated by IV steroids  · Better controlled today  · Added lispro 2 units with meals  · Monitor on current sliding scale     Hyperlipidemia  Assessment & Plan  Continue statin    SIRS (systemic inflammatory response syndrome) (HCC)  Assessment & Plan  · Sepsis ruled out   SIRS, POA, in the setting of asthma exacerbation, as evidenced by tachycardia and tachypnea in ED, which resolved with breathing treatments, mag sulfate and supportive care         Findings: , 94, 94 and RR 28 & 32 in ED meeting SIRS criteria, non-infectious      Discharging Physician / Practitioner: Jazmín Guerrier MD  PCP: Donnie Wells  Admission Date:   Admission Orders (From admission, onward)     Ordered        02/25/23 1210  Inpatient Admission  Once            02/24/23 1446  Place in Observation  Once                      Discharge Date: 03/01/23    Medical Problems     Resolved Problems  Date Reviewed: 2/28/2023   None         Consultations During Hospital Stay:  · GI  · Pulmonology       Reason for Admission: shortness of breath     Hospital Course:     Warren Sandoval is a 66 y o  female patient who originally presented to the hospital on 2/24/2023 due to asthma exacerbation treated with iv steroid and nebulized therapy  She was evaluated by pulmonology team and will follow up in office  Patient seen by GI due to concern of GERD playing a role in her exacerbation and recommended evaluation by thoracic surgery outpatient for hiatal hernia surgery vs Carrie-en-Y gastric bypass  Patient symptoms significantly improved and she is medically stable or discharge today  Please see above list of diagnoses and related plan for additional information  Condition at Discharge: stable     Discharge Day Visit / Exam:     Subjective:  No complaints   Vitals: Blood Pressure: 104/69 (03/01/23 0809)  Pulse: 102 (03/01/23 0809)  Temperature: 98 8 °F (37 1 °C) (03/01/23 0809)  Temp Source: Oral (02/26/23 1524)  Respirations: 16 (03/01/23 0809)  Height: 4' 11" (149 9 cm) (02/24/23 1605)  Weight - Scale: 84 kg (185 lb 1 6 oz) (03/01/23 0600)  SpO2: 97 % (03/01/23 0809)  Exam:   Physical Exam  Vitals and nursing note reviewed  Constitutional:       General: She is not in acute distress  Appearance: She is well-developed  She is not ill-appearing or diaphoretic  HENT:      Head: Normocephalic and atraumatic  Mouth/Throat:      Mouth: Mucous membranes are moist       Pharynx: Oropharynx is clear  Eyes:      General: No scleral icterus       Conjunctiva/sclera: Conjunctivae normal    Cardiovascular:      Rate and Rhythm: Normal rate and regular rhythm  Heart sounds: No murmur heard  Pulmonary:      Effort: Pulmonary effort is normal  No respiratory distress  Breath sounds: Normal breath sounds  No wheezing or rales  Abdominal:      General: Bowel sounds are normal       Palpations: Abdomen is soft  Tenderness: There is no abdominal tenderness  Musculoskeletal:         General: No swelling  Cervical back: Normal range of motion and neck supple  Right lower leg: No edema  Left lower leg: No edema  Skin:     General: Skin is warm and dry  Capillary Refill: Capillary refill takes less than 2 seconds  Neurological:      General: No focal deficit present  Mental Status: She is alert and oriented to person, place, and time  Psychiatric:         Mood and Affect: Mood normal          Behavior: Behavior normal            Discharge instructions/Information to patient and family:   See after visit summary for information provided to patient and family  Provisions for Follow-Up Care:  See after visit summary for information related to follow-up care and any pertinent home health orders  Disposition:     Home    Planned Readmission: no     Discharge Statement:  I spent 50 minutes discharging the patient  This time was spent on the day of discharge  I had direct contact with the patient on the day of discharge  Greater than 50% of the total time was spent examining patient, answering all patient questions, arranging and discussing plan of care with patient as well as directly providing post-discharge instructions  Additional time then spent on discharge activities  Discharge Medications:  See after visit summary for reconciled discharge medications provided to patient and family        ** Please Note: This note has been constructed using a voice recognition system **

## 2023-02-28 NOTE — ASSESSMENT & PLAN NOTE
· Severe persistent asthma with exacerbation  · Previously well controlled until mandeep covid in 2020  · Also with refractory GERD, likely main exacerbating factor  · Without oxygen requirements currently  · Pulmonology consulted, see recommendations  · Continue steroids taper, duonebz  · Procalcitonin normal, monitor off antibiotics

## 2023-02-28 NOTE — PLAN OF CARE
Problem: RESPIRATORY - ADULT  Goal: Achieves optimal ventilation and oxygenation  Description: INTERVENTIONS:  - Assess for changes in respiratory status  - Assess for changes in mentation and behavior  - Position to facilitate oxygenation and minimize respiratory effort  - Oxygen administered by appropriate delivery if ordered  - Initiate smoking cessation education as indicated  - Encourage broncho-pulmonary hygiene including cough, deep breathe, Incentive Spirometry  - Assess the need for suctioning and aspirate as needed  - Assess and instruct to report SOB or any respiratory difficulty  - Respiratory Therapy support as indicated  Outcome: Progressing     Problem: PAIN - ADULT  Goal: Verbalizes/displays adequate comfort level or baseline comfort level  Description: Interventions:  - Encourage patient to monitor pain and request assistance  - Assess pain using appropriate pain scale  - Administer analgesics based on type and severity of pain and evaluate response  - Implement non-pharmacological measures as appropriate and evaluate response  - Consider cultural and social influences on pain and pain management  - Notify physician/advanced practitioner if interventions unsuccessful or patient reports new pain  Outcome: Progressing     Problem: INFECTION - ADULT  Goal: Absence or prevention of progression during hospitalization  Description: INTERVENTIONS:  - Assess and monitor for signs and symptoms of infection  - Monitor lab/diagnostic results  - Monitor all insertion sites, i e  indwelling lines, tubes, and drains  - Monitor endotracheal if appropriate and nasal secretions for changes in amount and color  - [facility] appropriate cooling/warming therapies per order  - Administer medications as ordered  - Instruct and encourage patient and family to use good hand hygiene technique  - Identify and instruct in appropriate isolation precautions for identified infection/condition  Outcome: Progressing  Goal: Absence of fever/infection during neutropenic period  Description: INTERVENTIONS:  - Monitor WBC    Outcome: Progressing     Problem: DISCHARGE PLANNING  Goal: Discharge to home or other facility with appropriate resources  Description: INTERVENTIONS:  - Identify barriers to discharge w/patient and caregiver  - Arrange for needed discharge resources and transportation as appropriate  - Identify discharge learning needs (meds, wound care, etc )  - Arrange for interpretive services to assist at discharge as needed  - Refer to Case Management Department for coordinating discharge planning if the patient needs post-hospital services based on physician/advanced practitioner order or complex needs related to functional status, cognitive ability, or social support system  Outcome: Progressing     Problem: Nutrition/Hydration-ADULT  Goal: Nutrient/Hydration intake appropriate for improving, restoring or maintaining nutritional needs  Description: Monitor and assess patient's nutrition/hydration status for malnutrition  Collaborate with interdisciplinary team and initiate plan and interventions as ordered  Monitor patient's weight and dietary intake as ordered or per policy  Utilize nutrition screening tool and intervene as necessary  Determine patient's food preferences and provide high-protein, high-caloric foods as appropriate       INTERVENTIONS:  - Monitor oral intake, urinary output, labs, and treatment plans  - Assess nutrition and hydration status and recommend course of action  - Evaluate amount of meals eaten  - Assist patient with eating if necessary   - Allow adequate time for meals  - Recommend/ encourage appropriate diets, oral nutritional supplements, and vitamin/mineral supplements  - Order, calculate, and assess calorie counts as needed  - Recommend, monitor, and adjust tube feedings and TPN/PPN based on assessed needs  - Assess need for intravenous fluids  - Provide specific nutrition/hydration education as appropriate  - Include patient/family/caregiver in decisions related to nutrition  Outcome: Progressing

## 2023-02-28 NOTE — PROGRESS NOTES
[facility]  Progress Note - Jennifer Rao 1965, 66 y o  female MRN: [id number]  Unit/Bed#: -Zana Encounter: [id number]  Primary Care Provider: Ky Haq   Date and time admitted to hospital: 2/24/2023 11:41 AM    * Severe asthma with acute exacerbation  Assessment & Plan  · Severe persistent asthma with exacerbation  · Previously well controlled until mandeep covid in 2020  · Also with refractory GERD, likely main exacerbating factor  · Without oxygen requirements currently  · Pulmonology consulted, see recommendations  · Continue steroids taper, duonebz  · Procalcitonin normal, monitor off antibiotics     Dysphagia  Assessment & Plan  · Follows with GI as an outpatient  · CT head/neck showing concerns for tracheobronchomalacia   · Had previous follow up with an ENT who disagreed with findings  · Patient is in process of second opinion, further management with invasive testing is dependant on diagnosis  · Thus speech and GI consulted while admitted  · repeat CT imaging for further evaluation of tracheobronchomalacia normal     Diabetes [facility])  Assessment & Plan  Lab Results   Component Value Date    HGBA1C 4 9 10/04/2022       Recent Labs     02/27/23  1533 02/27/23  2050 02/28/23  0744 02/28/23  1104   POCGLU 177* 187* 85 100       Blood Sugar Average: Last 72 hrs:  (P) 158 [id number]   · Steroid induced diabetes  · Exacerbated by IV steroids  · Better controlled today  · Added lispro 2 units with meals  · Monitor on current sliding scale     Hyperlipidemia  Assessment & Plan  Continue statin    SIRS (systemic inflammatory response syndrome) (HCC)  Assessment & Plan  · Sepsis ruled out   SIRS, POA, in the setting of asthma exacerbation, as evidenced by tachycardia and tachypnea in ED, which resolved with breathing treatments, mag sulfate and supportive care         Findings: , 94, 94 and RR 28 & 32 in ED meeting SIRS criteria, non-infectious          VTE Pharmacologic Prophylaxis:   Pharmacologic: Heparin  Mechanical VTE Prophylaxis in Place: Yes    Review of Systems:    Review of Systems   Constitutional: Negative for chills and fever  HENT: Negative for ear pain and sore throat  Eyes: Negative for pain and visual disturbance  Respiratory: Negative for cough and shortness of breath  Cardiovascular: Negative for chest pain and palpitations  Gastrointestinal: Negative for abdominal pain and vomiting  Genitourinary: Negative for dysuria and hematuria  Musculoskeletal: Negative for arthralgias and back pain  Skin: Negative for color change and rash  Neurological: Negative for seizures and syncope  All other systems reviewed and are negative  Past Medical and Surgical History:     Past Medical History:   Diagnosis Date   • Asthma    • Diabetes mellitus ([facility] [address] )    • GERD (gastroesophageal reflux disease)        History reviewed  No pertinent surgical history  Patient Centered Rounds: I have performed bedside rounds with nursing staff today  Discussions with Specialists or Other Care Team Provider: nursing, CM, GI    Education and Discussions with Family / Patient: patient    Time Spent for Care: 44  More than 50% of total time spent on counseling and coordination of care as described above  Current Length of Stay: 3 day(s)    Current Patient Status: Inpatient   Certification Statement: The patient will continue to require additional inpatient hospital stay due to pending clinical improvement  electrolytes repletion     Discharge Plan: tomorrow     Code Status: Level 3 - DNAR and DNI      Subjective:   No acute events     Objective:     Vitals:   Temp (24hrs), Av °F (36 7 °C), Min:97 9 °F (36 6 °C), Max:98 1 °F (36 7 °C)    Temp:  [97 9 °F (36 6 °C)-98 1 °F (36 7 °C)] 98 1 °F (36 7 °C)  HR:  [80-85] 83  Resp:  [16] 16  BP: (113-116)/(68-76) 116/70  SpO2:  [92 %-95 %] 94 %  Body mass index is 37 54 kg/m²       Input and Output Summary (last 24 hours): Intake/Output Summary (Last 24 hours) at 2/28/2023 1338  Last data filed at 2/28/2023 0600  Gross per 24 hour   Intake 480 ml   Output 1 ml   Net 479 ml       Physical Exam:     Physical Exam  Vitals and nursing note reviewed  Constitutional:       General: She is not in acute distress  Appearance: She is well-developed  She is not ill-appearing or diaphoretic  HENT:      Head: Normocephalic and atraumatic  Mouth/Throat:      Mouth: Mucous membranes are moist       Pharynx: Oropharynx is clear  Eyes:      General: No scleral icterus  Conjunctiva/sclera: Conjunctivae normal    Cardiovascular:      Rate and Rhythm: Normal rate and regular rhythm  Heart sounds: No murmur heard  Pulmonary:      Effort: Pulmonary effort is normal  No respiratory distress  Breath sounds: Normal breath sounds  No wheezing or rales  Abdominal:      General: Bowel sounds are normal       Palpations: Abdomen is soft  Tenderness: There is no abdominal tenderness  Musculoskeletal:         General: No swelling  Cervical back: Normal range of motion and neck supple  Right lower leg: No edema  Left lower leg: No edema  Skin:     General: Skin is warm and dry  Capillary Refill: Capillary refill takes less than 2 seconds  Neurological:      General: No focal deficit present  Mental Status: She is alert and oriented to person, place, and time     Psychiatric:         Mood and Affect: Mood normal          Behavior: Behavior normal            Additional Data:     Labs:    Results from last 7 days   Lab Units 02/28/23  0518   WBC Thousand/uL 13 76*   HEMOGLOBIN g/dL 12 9   HEMATOCRIT % 39 2   PLATELETS Thousands/uL 353   NEUTROS PCT % 63   LYMPHS PCT % 26   MONOS PCT % 10   EOS PCT % 0     Results from last 7 days   Lab Units 02/28/23  1254 02/28/23  0518   SODIUM mmol/L  --  139   POTASSIUM mmol/L 3 2* 3 0*   CHLORIDE mmol/L  --  100   CO2 mmol/L  --  30   BUN mg/dL  --  16   CREATININE mg/dL  --  0 80   ANION GAP mmol/L  --  9   CALCIUM mg/dL  --  8 7   GLUCOSE RANDOM mg/dL  --  115         Results from last 7 days   Lab Units 02/28/23  1104 02/28/23  0744 02/27/23  2050 02/27/23  1533 02/27/23  1037 02/27/23  0718 02/26/23  2046 02/26/23  1603 02/26/23  1107 02/26/23  0747 02/25/23 2046 02/25/23  1527   POC GLUCOSE mg/dl 100 85 187* 177* 109 115 109 156* 200* 137 159* 193*         Results from last 7 days   Lab Units 02/26/23  0513   PROCALCITONIN ng/ml <0 05           * I Have Reviewed All Lab Data Listed Above  * Additional Pertinent Lab Tests Reviewed:  Ashly 68 Admission Reviewed    Imaging:    Imaging Reports Reviewed Today Include: ct neck, CXR  Imaging Personally Reviewed by Myself Includes:  none    Recent Cultures (last 7 days):           Last 24 Hours Medication List:   Current Facility-Administered Medications   Medication Dose Route Frequency Provider Last Rate   • acetaminophen  650 mg Oral Q6H PRN Yanci Wray MD     • albuterol  2 puff Inhalation Q4H PRN Lavell Bhakta MD     • budesonide-formoterol  2 puff Inhalation BID Lavell Bhakta MD     • citalopram  10 mg Oral Daily Lavell Bhakta MD     • famotidine  40 mg Oral BID Frida Cox PA-C     • fluconazole  200 mg Oral Daily Yanci Wray MD     • fluticasone  2 puff Inhalation BID Lavell Bhakta MD     • gabapentin  100 mg Oral Daily Lavell Bhakta MD     • guaiFENesin  200 mg Oral Q4H PRN Lavell Bhakta MD     • heparin (porcine)  5,000 Units Subcutaneous Q8H [facility] Lavell Bhakta MD     • HYDROcodone Bit-Homatrop MBr  5 mL Oral Q4H PRN Lavell Bhakta MD     • insulin lispro  1-5 Units Subcutaneous TID AC Lavell Bhakta MD     • insulin lispro  1-5 Units Subcutaneous HS Lavell Bhakta MD     • insulin lispro  2 Units Subcutaneous TID With Meals Tanesha Anthony MD     • ipratropium  0 5 mg Nebulization Q6H PRN Tanesha Anthony MD     • LORazepam  0 5 mg Intravenous Q8H PRN Terrell García MD     • melatonin  3 mg Oral HS PRN Diogenes Hearn PA-C     • methylPREDNISolone sodium succinate  40 mg Intravenous Daily Terrell García MD     • metoclopramide  10 mg Oral Daily With SunshineDavid [facility] Center[facility] mig[facility] III, MD     • montelukast  10 mg Oral HS Lavell Bhakta MD     • pantoprazole  40 mg Oral Q12H [address] Terrell García MD     • potassium chloride  20 mEq Intravenous Priyanka Lee MD     • pravastatin  40 mg Oral Daily With Parker Rogers MD     • torsemide  10 mg Oral Daily Lavell Bhakta MD     • zolpidem  10 mg Oral HS PRN Diogenes Hearn PA-C          Today, Patient Was Seen By: Jazmín Guerrier MD    ** Please Note: Dictation voice to text software may have been used in the creation of this document   **

## 2023-02-28 NOTE — PROGRESS NOTES
Progress Note - Jane Roll 66 y o  female MRN: [id number]    Unit/Bed#: -Zana Encounter: [id number]        Subjective:     Patient has no new complaints  We discussed her CT soft tissue neck results, recent manometry, and next steps  ROS: As noted in the HPI, otherwise all others negative  Objective:     Vitals: Blood pressure 116/70, pulse 83, temperature 98 1 °F (36 7 °C), resp  rate 16, height 4' 11" (1 499 m), weight 84 3 kg (185 lb 13 6 oz), SpO2 94 %  ,Body mass index is 37 54 kg/m²  Intake/Output Summary (Last 24 hours) at 2/28/2023 1327  Last data filed at 2/28/2023 0600  Gross per 24 hour   Intake 480 ml   Output 1 ml   Net 479 ml       Physical Exam:     General Appearance: Alert and oriented x 3  In no respiratory distress  Lungs: Clear to auscultation bilaterally, no rales or rhonchi  Heart: Regular rate and rhythm, S1, S2 normal, no murmur, click, rub or gallop  Abdomen: Soft, non-tender, non-distended; bowel sounds normal; no masses or no organomegaly  Extremities: No cyanosis, edema    Invasive Devices     Peripheral Intravenous Line  Duration           Peripheral IV 02/25/23 Left Forearm 2 days    Peripheral IV 02/27/23 Left Antecubital <1 day                Lab Results:  Results from last 7 days   Lab Units 02/28/23  0518   WBC Thousand/uL 13 76*   HEMOGLOBIN g/dL 12 9   HEMATOCRIT % 39 2   PLATELETS Thousands/uL 353   NEUTROS PCT % 63   LYMPHS PCT % 26   MONOS PCT % 10   EOS PCT % 0     Results from last 7 days   Lab Units 02/28/23  1254 02/28/23  0518   POTASSIUM mmol/L 3 2* 3 0*   CHLORIDE mmol/L  --  100   CO2 mmol/L  --  30   BUN mg/dL  --  16   CREATININE mg/dL  --  0 80   CALCIUM mg/dL  --  8 7               Imaging Studies: I have personally reviewed pertinent imaging studies  XR chest 2 views    Result Date: 2/24/2023  Impression: No acute cardiopulmonary disease   Previous small left pleural effusion has resolved Workstation performed: [id number]     CT soft tissue neck w contrast    Result Date: 2/27/2023  Impression: No acute neck abnormality  Asymmetric enhancement of right aryepiglottic fold, indeterminate  Recommend direct visualization by ENT  Additional chronic/incidental findings as detailed above  The study was marked in [facility] for immediate notification  Workstation performed: [id number]         Assessment and Plan:     1) GERD, asthma and abnormal CT neck - Patient had ENT consult on 1/10/23 showed widely patent airway or flexible laryngoscopy  She had manometry showing normal esophageal motility and readings consistent with known 4 cm hiatal hernia  She unfortunately continues to be hospitalized for acute asthma exacerbations  She is concerned that she cannot move forward with a hiatal hernia repair secondary to conflicted CT imaging  Fortunately, we repeating soft tissue neck CT and there was no narrowing as previously mentioned  However, she will need another ENT evaluation for asymmetric enhancement of right aryepiglottic fold  It is reassuring her manometry was normal, and she had a normal scope with ENT as we discussed again today  Ultimately, she feels that having anti-reflux surgery vs bariatric surgery is the next step she would like to take  She is very concerned about her ability to undergo surgical management secondary to her history of asthma  However, she feels that the benefits may outweigh the risks at this point given this is affecting her quality of life  - She has an appointment with thoracic surgery scheduled on 3/7   - I will also message bariatric surgery to see whether or not she could be considered for repair with bypass   - Protonix twice daily   - Pepcid to 40 mg twice daily  - We cannot switch her PPI during hospitalization secondary to formulary   - Repeat ENT evaluation as an outpatient   - Continued close follow-up with pulmonary   She states she [place] [facility] [place]

## 2023-03-01 VITALS
DIASTOLIC BLOOD PRESSURE: 69 MMHG | OXYGEN SATURATION: 97 % | SYSTOLIC BLOOD PRESSURE: 104 MMHG | TEMPERATURE: 98.8 F | BODY MASS INDEX: 37.32 KG/M2 | WEIGHT: 185.1 LBS | RESPIRATION RATE: 16 BRPM | HEIGHT: 59 IN | HEART RATE: 102 BPM

## 2023-03-01 LAB
ANION GAP SERPL CALCULATED.3IONS-SCNC: 7 MMOL/L (ref 4–13)
BUN SERPL-MCNC: 23 MG/DL (ref 5–25)
CALCIUM SERPL-MCNC: 9.1 MG/DL (ref 8.4–10.2)
CHLORIDE SERPL-SCNC: 100 MMOL/L (ref 96–108)
CO2 SERPL-SCNC: 31 MMOL/L (ref 21–32)
CREAT SERPL-MCNC: 0.86 MG/DL (ref 0.6–1.3)
GFR SERPL CREATININE-BSD FRML MDRD: 75 ML/MIN/1.73SQ M
GLUCOSE SERPL-MCNC: 111 MG/DL (ref 65–140)
GLUCOSE SERPL-MCNC: 93 MG/DL (ref 65–140)
MAGNESIUM SERPL-MCNC: 2.4 MG/DL (ref 1.9–2.7)
POTASSIUM SERPL-SCNC: 4.7 MMOL/L (ref 3.5–5.3)
SODIUM SERPL-SCNC: 138 MMOL/L (ref 135–147)

## 2023-03-01 RX ORDER — SIMETHICONE 80 MG
80 TABLET,CHEWABLE ORAL EVERY 6 HOURS PRN
Status: DISCONTINUED | OUTPATIENT
Start: 2023-03-01 | End: 2023-03-01 | Stop reason: HOSPADM

## 2023-03-01 RX ORDER — FLUCONAZOLE 200 MG/1
200 TABLET ORAL DAILY
Qty: 7 TABLET | Refills: 0 | Status: SHIPPED | OUTPATIENT
Start: 2023-03-01 | End: 2023-03-08

## 2023-03-01 RX ORDER — SIMETHICONE 80 MG
TABLET,CHEWABLE ORAL
Status: COMPLETED
Start: 2023-03-01 | End: 2023-03-01

## 2023-03-01 RX ORDER — GUAIFENESIN 100 MG/5ML
200 SOLUTION ORAL EVERY 4 HOURS PRN
Qty: 118 ML | Refills: 0 | Status: SHIPPED | OUTPATIENT
Start: 2023-03-01

## 2023-03-01 RX ORDER — PANTOPRAZOLE SODIUM 40 MG/1
40 GRANULE, DELAYED RELEASE ORAL
Qty: 60 EACH | Refills: 1 | Status: SHIPPED | OUTPATIENT
Start: 2023-03-01

## 2023-03-01 RX ORDER — PREDNISONE 10 MG/1
TABLET ORAL
Qty: 30 TABLET | Refills: 0 | Status: SHIPPED | OUTPATIENT
Start: 2023-03-01 | End: 2023-03-13

## 2023-03-01 RX ORDER — HYDROCODONE BITARTRATE AND HOMATROPINE METHYLBROMIDE ORAL SOLUTION 5; 1.5 MG/5ML; MG/5ML
5 LIQUID ORAL EVERY 4 HOURS PRN
Qty: 120 ML | Refills: 0 | Status: SHIPPED | OUTPATIENT
Start: 2023-03-01 | End: 2023-03-11

## 2023-03-01 RX ORDER — METOCLOPRAMIDE 10 MG/1
10 TABLET ORAL
Qty: 30 TABLET | Refills: 0 | Status: SHIPPED | OUTPATIENT
Start: 2023-03-01 | End: 2023-03-08

## 2023-03-01 RX ADMIN — CITALOPRAM HYDROBROMIDE 10 MG: 20 TABLET, FILM COATED ORAL at 09:10

## 2023-03-01 RX ADMIN — FAMOTIDINE 40 MG: 20 TABLET, FILM COATED ORAL at 09:09

## 2023-03-01 RX ADMIN — PANTOPRAZOLE SODIUM 40 MG: 40 TABLET, DELAYED RELEASE ORAL at 09:09

## 2023-03-01 RX ADMIN — FLUCONAZOLE 200 MG: 100 TABLET ORAL at 09:09

## 2023-03-01 RX ADMIN — ONDANSETRON 4 MG: 2 INJECTION INTRAMUSCULAR; INTRAVENOUS at 02:10

## 2023-03-01 RX ADMIN — GABAPENTIN 100 MG: 100 CAPSULE ORAL at 09:14

## 2023-03-01 RX ADMIN — Medication 80 MG: at 02:10

## 2023-03-01 RX ADMIN — METHYLPREDNISOLONE SODIUM SUCCINATE 40 MG: 40 INJECTION, POWDER, FOR SOLUTION INTRAMUSCULAR; INTRAVENOUS at 09:10

## 2023-03-01 RX ADMIN — SIMETHICONE 80 MG: 80 TABLET, CHEWABLE ORAL at 02:10

## 2023-03-01 NOTE — PLAN OF CARE
Problem: DISCHARGE PLANNING  Goal: Discharge to home or other facility with appropriate resources  Description: INTERVENTIONS:  - Identify barriers to discharge w/patient and caregiver  - Arrange for needed discharge resources and transportation as appropriate  - Identify discharge learning needs (meds, wound care, etc )  - Arrange for interpretive services to assist at discharge as needed  - Refer to Case Management Department for coordinating discharge planning if the patient needs post-hospital services based on physician/advanced practitioner order or complex needs related to functional status, cognitive ability, or social support system  Outcome: Progressing     Problem: Nutrition/Hydration-ADULT  Goal: Nutrient/Hydration intake appropriate for improving, restoring or maintaining nutritional needs  Description: Monitor and assess patient's nutrition/hydration status for malnutrition  Collaborate with interdisciplinary team and initiate plan and interventions as ordered  Monitor patient's weight and dietary intake as ordered or per policy  Utilize nutrition screening tool and intervene as necessary  Determine patient's food preferences and provide high-protein, high-caloric foods as appropriate       INTERVENTIONS:  - Monitor oral intake, urinary output, labs, and treatment plans  - Assess nutrition and hydration status and recommend course of action  - Evaluate amount of meals eaten  - Assist patient with eating if necessary   - Allow adequate time for meals  - Recommend/ encourage appropriate diets, oral nutritional supplements, and vitamin/mineral supplements  - Order, calculate, and assess calorie counts as needed  - Assess need for intravenous fluids  - Provide specific nutrition/hydration education as appropriate  - Include patient/family/caregiver in decisions related to nutrition  Outcome: Progressing

## 2023-03-01 NOTE — PLAN OF CARE
Problem: RESPIRATORY - ADULT  Goal: Achieves optimal ventilation and oxygenation  Description: INTERVENTIONS:  - Assess for changes in respiratory status  - Assess for changes in mentation and behavior  - Position to facilitate oxygenation and minimize respiratory effort  - Oxygen administered by appropriate delivery if ordered  - Initiate smoking cessation education as indicated  - Encourage broncho-pulmonary hygiene including cough, deep breathe, Incentive Spirometry  - Assess the need for suctioning and aspirate as needed  - Assess and instruct to report SOB or any respiratory difficulty  - Respiratory Therapy support as indicated  Outcome: Adequate for Discharge     Problem: DISCHARGE PLANNING  Goal: Discharge to home or other facility with appropriate resources  Description: INTERVENTIONS:  - Identify barriers to discharge w/patient and caregiver  - Arrange for needed discharge resources and transportation as appropriate  - Identify discharge learning needs (meds, wound care, etc )  - Arrange for interpretive services to assist at discharge as needed  - Refer to Case Management Department for coordinating discharge planning if the patient needs post-hospital services based on physician/advanced practitioner order or complex needs related to functional status, cognitive ability, or social support system  Outcome: Adequate for Discharge

## 2023-03-02 ENCOUNTER — TELEPHONE (OUTPATIENT)
Dept: GASTROENTEROLOGY | Facility: CLINIC | Age: 58
End: 2023-03-02

## 2023-03-02 DIAGNOSIS — J45.51 SEVERE PERSISTENT ASTHMA WITH EXACERBATION: ICD-10-CM

## 2023-03-02 DIAGNOSIS — K21.9 GASTROESOPHAGEAL REFLUX DISEASE, UNSPECIFIED WHETHER ESOPHAGITIS PRESENT: Primary | ICD-10-CM

## 2023-03-02 NOTE — TELEPHONE ENCOUNTER
----- Message from Eladia Mills PA-C sent at 3/2/2023  2:22 PM EST -----  Can you call patient to provide contact info for Dr Trinity Doyle, thank you!!!  ----- Message -----  From: Gregor Chowdhury MD  Sent: 3/2/2023   2:05 PM EST  To: Brianna NascimentoEladia PA-C, #    Hi Starr,  I would be happy to see her if need be   Thank you  ----- Message -----  From: Eladia Mills PA-C  Sent: 2/28/2023  10:14 AM EST  To: Gregor Chowdhury MD    [facility] Dr Trinity Doyle,    My name is James Ji, one of the GI PAs from [facility]  I am messaging you on behalf of Dr Anthony Marmolejo as well  I have a patient that we need your help on  She is a 66year old female with long history of asthma, obesity and 4 cm hiatal hernia  She was following with Kalin Sanders for many years, but decided to come out our system as she lives in the [place] [id number]  She was told for several years by her PCP that she would be too high risk for surgery because of her asthma  So far, she has had GES, EGD and manometry  Manometry and GES were normal  She continues to have recurrent hospitalizations for asthma exacerbations, and pulmonary believes this is GERD related as well  Would you be able to see her for a bypass consult? Otherwise, she also has an appoint set up with Dr Cleo Burt  She is also established with pulmonary at [facility] in [facility]  I feel it would be best for coordination purposes       Thank you for your time,    Cyndie Linares

## 2023-03-08 ENCOUNTER — TELEPHONE (OUTPATIENT)
Dept: OTHER | Facility: OTHER | Age: 58
End: 2023-03-08

## 2023-03-08 ENCOUNTER — TELEPHONE (OUTPATIENT)
Dept: GASTROENTEROLOGY | Facility: CLINIC | Age: 58
End: 2023-03-08

## 2023-03-08 ENCOUNTER — TELEPHONE (OUTPATIENT)
Dept: PULMONOLOGY | Facility: CLINIC | Age: 58
End: 2023-03-08

## 2023-03-08 NOTE — TELEPHONE ENCOUNTER
Patient was scheduled for virtual visit today but could not connect to video, therefore we had to cancel it  She reports that she had diarrhea after she tried taking Reglan 10 mg twice  I told her to discontinue this  I represcribed her GERD regimen, which is Protonix twice a day  She will be taking this a half an hour before breakfast and dinner  She should be on Pepcid twice a day as well, 1 tablet with lunch and 1 tablet before bedtime  We will send her Phenergan instead for nausea  We will put in for stool studies in case her diarrhea returns  We we will try messaging bariatric surgery's office to get her an appointment with them  In the meantime, she will call pulmonary to see if she can get a sooner follow-up  Patient agrees with plan, and thanked me for my time

## 2023-03-08 NOTE — TELEPHONE ENCOUNTER
Patient is calling, she is in need of surgery next month and they need clearance from the Pulmonary Office as she has severe asthma  Norbert Lundy did not have any available appointments sooner than her scheduled appt on 4/25  She insists she needs Dr Hendrickson to complete this clearance for her  She is going to call our office back with the office information of where she is getting this surgery done through so we can get the proper information for next steps   Thank you

## 2023-03-08 NOTE — TELEPHONE ENCOUNTER
Patient calling stating she had a missed call from the office to schedule an appointment/or surgery with the office

## 2023-03-09 ENCOUNTER — TELEPHONE (OUTPATIENT)
Dept: BARIATRICS | Facility: CLINIC | Age: 58
End: 2023-03-09

## 2023-03-09 NOTE — TELEPHONE ENCOUNTER
Left message for patient to scheduled a 30 minute appointment as a new patient  Give us a call back to schedule soon as possible at [phone]

## 2023-03-10 ENCOUNTER — TELEMEDICINE (OUTPATIENT)
Dept: BARIATRICS | Facility: CLINIC | Age: 58
End: 2023-03-10

## 2023-03-10 VITALS — WEIGHT: 187 LBS | HEIGHT: 59 IN | BODY MASS INDEX: 37.7 KG/M2

## 2023-03-10 DIAGNOSIS — E66.9 OBESITY, CLASS II, BMI 35-39.9: Primary | ICD-10-CM

## 2023-03-10 DIAGNOSIS — K44.9 HIATAL HERNIA: ICD-10-CM

## 2023-03-10 NOTE — PROGRESS NOTES
VIRTUAL VISIT - BARIATRIC SURGERY  Sophia Jocelyne Najjar 66 y o  female MRN: [id number]  Unit/Bed#:  Encounter: [id number]      HPI:  Francisca Booth is a 66 y o  female with difficulties controlling her weight and a symptomatic hiatal hernia  Subjective   Patient states that prior to COVID, her weight was ~140 lbs  However, due to the virus, she has suffered asthma complications and hospitalizations, contributing to the weight gain  She has not been able to work for 18 months  In regards to the hernia, she endorses that she very symptomatic  After eating, she has severe reflux to the point where she is vomiting back up the acid  She has a very difficult time swallowing food  Also experiencing night time symptoms  Review of Systems   Constitutional: Negative  Respiratory: Negative  Cardiovascular: Negative  Gastrointestinal: Negative  Historical Information   Past Medical History:   Diagnosis Date   • Asthma    • Diabetes mellitus ([address] )    • GERD (gastroesophageal reflux disease)      History reviewed  No pertinent surgical history  Social History   Social History     Substance and Sexual Activity   Alcohol Use Yes    Comment: social     Social History     Substance and Sexual Activity   Drug Use Never     Social History     Tobacco Use   Smoking Status Never   Smokeless Tobacco Never       Objective       Current Vitals:   Height: 4' 11" (149 9 cm) (Virtual visit Patient stated her Wt 187lb) (03/10/23 1432)  Weight - Scale: 84 8 kg (187 lb) (Virtual visit Patient stated her Wt 187lb) (03/10/23 1432)    Invasive Devices     None                 THERE WAS NO PHYSICAL EXAM AS THIS WAS A VIRTUAL VISIT    Pathology, and Other Studies: I have personally reviewed pertinent reports  EGD  FINDINGS:  • The esophagus appeared normal but views were not perfect due to desaturation during procedure and need to terminate  • Hiatal hernia   Hill 4 hernia seen, unable to measure due to procedure being aborted due to hypoxia  • The stomach and duodenum appeared normal      SPECIMENS:  * No specimens in log *     IMPRESSION:  - Esophagus, stomach, duodenum endoscopically appeared normal  - Hill 4 hiatal hernia seen  - Procedure aborted due to patient desaturation and need for breathing treatments post procedure  Pathology from EGD   No results found for: Gaylord Angelucci Study  Esophageal manometry  esophageal motility-10 out of 10 swallows demonstrated normal esophageal contractile repair mean DCI 1670 mmHg  s cm  LES- median IRP is within normal limits  Impedance-100% complete clearance of liquid bolus swallows     Gastroesophageal junction relative LES measures 2 4 cm        Rapid swallow index is less than 1 with a normal pattern      [place] classification-normal esophageal motility with small hiatal hernia      Assessment/PLAN:    Neeraj Madera is a 66 y o  female with difficulties controlling her weight and a symptomatic hiatal hernia  --------------------------------------------------------------------    - Patient deemed to be a good candidate for Robotic RNY Gastric Bypass with Hiatal Hernia Repair  - We will refer for medical clearance preoperatively  - Continue to meet with Dieticians and Social Workers  - Follow up as directed              Otf Meza DO  Bariatric Surgery Fellow  [facility]  3/10/2023  2:47 PM

## 2023-03-13 ENCOUNTER — TELEPHONE (OUTPATIENT)
Dept: BARIATRICS | Facility: CLINIC | Age: 58
End: 2023-03-13

## 2023-03-13 NOTE — TELEPHONE ENCOUNTER
Left voice message for patient to call back to schedule a 3 hour evaluation appointment  Pt has to complete the online seminar before scheduling an appointment  Online seminar was sent to patient's e-mail on file

## 2023-03-15 DIAGNOSIS — K21.9 GASTROESOPHAGEAL REFLUX DISEASE, UNSPECIFIED WHETHER ESOPHAGITIS PRESENT: ICD-10-CM

## 2023-03-15 RX ORDER — DICYCLOMINE HCL 20 MG
TABLET ORAL
Qty: 360 TABLET | Refills: 1 | Status: SHIPPED | OUTPATIENT
Start: 2023-03-15

## 2023-03-22 ENCOUNTER — CONSULT (OUTPATIENT)
Dept: BARIATRICS | Facility: CLINIC | Age: 58
End: 2023-03-22

## 2023-03-22 VITALS
SYSTOLIC BLOOD PRESSURE: 90 MMHG | BODY MASS INDEX: 38.18 KG/M2 | HEIGHT: 59 IN | WEIGHT: 189.4 LBS | HEART RATE: 111 BPM | DIASTOLIC BLOOD PRESSURE: 68 MMHG

## 2023-03-22 DIAGNOSIS — J45.51 SEVERE PERSISTENT ASTHMA WITH EXACERBATION: ICD-10-CM

## 2023-03-22 DIAGNOSIS — E66.9 CLASS 2 OBESITY WITH BODY MASS INDEX (BMI) OF 38.0 TO 38.9 IN ADULT: Primary | ICD-10-CM

## 2023-03-22 DIAGNOSIS — K21.9 GASTROESOPHAGEAL REFLUX DISEASE, UNSPECIFIED WHETHER ESOPHAGITIS PRESENT: ICD-10-CM

## 2023-03-22 DIAGNOSIS — E09.9 DRUG OR CHEMICAL INDUCED DIABETES MELLITUS WITHOUT COMPLICATION, WITHOUT LONG-TERM CURRENT USE OF INSULIN (HCC): ICD-10-CM

## 2023-03-22 PROBLEM — E66.812 CLASS 2 OBESITY WITH BODY MASS INDEX (BMI) OF 38.0 TO 38.9 IN ADULT: Status: ACTIVE | Noted: 2023-03-22

## 2023-03-22 NOTE — PROGRESS NOTES
Assessment/Plan:    Class 2 obesity with body mass index (BMI) of 38 0 to 38 9 in adult  - Discussed options of HealthyCORE-Intensive Lifestyle Intervention Program, Very Low Calorie Diet-VLCD and Conservative Program and the role of weight loss medications  - Patient is interested in pursuing Conservative Program, Carrie-En-Y Gastric Bypass and Vertical Sleeve Gastrectomy  Will be following with [facility] provider for accountability and nutrition guidance as the surgical process is being completed  - Discussed the role of weight loss medication - appetite suppression is not appropriate at this time as she is not eating throughout the day  Marlen Hover is already in place and can be adjusted by prescribing provider - advised patient to call and see if dose will be increased to 2mg weekly  - Initial weight loss goal of 5-10% weight loss for improved health  - Weight loss can improve patient's co-morbid conditions and/or prevent weight-related complications  - Labs reviewed from 10/2022, 1/2023, 3/2023    Goals:  Calorie goal of 3762-2997 calories per day discussed and meal plan provided as a guide to help with balanced nutrition and meal choices  Do not skip meals  Begin to food log via fatou - options include  www  myfitnesspal com, sparkpeople  com, loseit com, calorieking  com, [employer]  No sugary beverages including Vitamin water  Increase water intake to at least 64oz of water daily  Increase physical activity by 10 minutes daily - as your asthma will tolerate  Severe persistent asthma with exacerbation  Patient frequently hospitalized for asthma exacerbation and steroid therapy prescribed  Has appointment with pulmonary in April to discuss further - and to discuss possibility of bariatric surgery      Diabetes [facility])  Patient with steroid induced DM  Currently on Ozempic 1mg weekly  Will follow up with prescribing doctor about increasing dose to 2mg weekly  Lab Results   Component Value Date    HGBA1C 4 9 10/04/2022 GERD (gastroesophageal reflux disease)  Considering bariatric surgery to help with GERD symptoms  Advised to stop eating late at night as this can contribute         Lidya was seen today for consult  Diagnoses and all orders for this visit:    Class 2 obesity with body mass index (BMI) of 38 0 to 38 9 in adult    Gastroesophageal reflux disease, unspecified whether esophagitis present  -     Ambulatory referral to Bariatric Surgery    Severe persistent asthma with exacerbation  -     Ambulatory referral to Bariatric Surgery    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, without long-term current use of insulin ([facility] )           Total time spent reviewing chart, interviewing patient, examining patient, discussing plan, answering all questions, and documentin min, with >50% face-to-face time spent counseling patient on nonsurgical interventions for the treatment of excess weight  Discussed in detail nonsurgical options including intensive lifestyle intervention program, very low-calorie diet program and conservative program   Discussed the role of weight loss medications  Counseled patient on diet behavior and exercise modification for weight loss  Follow up in approximately 1 month with Non-Surgical Physician/Advanced Practitioner  Subjective:   Chief Complaint   Patient presents with   • Consult     Pt is here for MWM consult  Patient ID: Alexis Gosselin  is a 66 y o  female with excess weight/obesity here to pursue weight management  Previous notes and records have been reviewed  Past Medical History:   Diagnosis Date   • Asthma    • Diabetes mellitus ([facility] [address] )    • GERD (gastroesophageal reflux disease)      History reviewed  No pertinent surgical history      HPI:  Wt Readings from Last 20 Encounters:   23 85 9 kg (189 lb 6 4 oz)   03/10/23 84 8 kg (187 lb)   23 84 kg (185 lb 1 6 oz)   23 90 2 kg (198 lb 13 7 oz)   22 86 6 kg (191 lb)   22 88 5 kg (195 lb)   10/25/22 88 7 kg (195 lb 8 8 oz)       Patient presents today to medical weight management office for consult  Patient never struggled with weight, went through IVF and gained weight but able to lose weight  During 2nd round of IVF with twins weight went up to 190s  She was able to lose the weight again and maintained around 140s  Then she got pregnant at 31 and had gestational diabetes - unable to lose the weight after that pregnancy  Patient has been on weight loss medication in the past: She was on phentermine in 2012 and did well - got down to 120lbs again  She did well on Ally as well  She tried Qsymia in 2016 but didn't feel like it helped  She recently got [place] on the internet and took it (along with her Ozempic) and ended up in the hospital   Since Covid patient has been unable to control her asthma and has had multiple hospitalizations  She has developed steroid induced diabetes for which she was placed on Ozempic for  She is currently taking 1mg weekly  Patient states she does not eat - will not eat all day and then at night will have a small dinner and then snack at night sometimes  Currently also pursuing the bariatric surgery option to help with GERD and Asthma - has appointment with pulmonary on April 25nd for clearance  Obesity/Excess Weight:  Severity: Moderate  Onset:  Since pregnancies, worse through menopause    Modifiers: Diet and Exercise, Commercial Weight Loss Programs-ie   Weight Watchers, [employer]es, Nutrisystem, etc  and Prescription Weight Loss Medications  Contributing factors: Poor Food Choices, Insufficient Physical Activity, Stress/Emotional Eating, Binge Eating and Insufficient time to make appropriate lifestyle changes  Associated symptoms: comorbid conditions, fatigue, increased joint pain, decreased exercise capacity, body image issues, decreased mobility, clothes do not fit and asthma exacerbations    Diet recall:  B: ethel  S: no  L: laurel  S: no  D: 9pm - rice or couscous, chicken noodle soup  S: popcicle  - no sugar    Hydration: very little water, juice, popcicles, vitamin water  Alcohol: 1 glass of wine at night  Smoking: no  Exercise: walk on treadmill sometimes - limited due to body aches  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  Sleep: chronic insomnia  STOP ban/8    Highest weight: 230  Current weight: 189 4 lbs    Colonoscopy: 10/2020    The following portions of the patient's history were reviewed and updated as appropriate: allergies, current medications, past family history, past medical history, past social history, past surgical history, and problem list     History reviewed  No pertinent family history  Review of Systems   Constitutional: Negative for fatigue  HENT: Negative for sore throat  Respiratory: Positive for cough (chronic) and shortness of breath (uncontrolled asthma)  Cardiovascular: Negative for chest pain, palpitations and leg swelling  Gastrointestinal: Negative for abdominal pain, constipation, diarrhea and nausea  +GERD   Genitourinary: Negative for dysuria  Musculoskeletal: Positive for arthralgias (knee pain) and back pain  Skin: Negative for rash  Neurological: Negative for headaches  Psychiatric/Behavioral: Positive for dysphoric mood  The patient is nervous/anxious  Objective:  BP 90/68   Pulse (!) 111   Ht 4' 11" (1 499 m)   Wt 85 9 kg (189 lb 6 4 oz)   LMP  (LMP Unknown)   BMI 38 25 kg/m²     Physical Exam  Vitals and nursing note reviewed  Constitutional:       Appearance: Normal appearance  She is obese  HENT:      Head: Normocephalic  Pulmonary:      Effort: Pulmonary effort is normal       Breath sounds: Wheezing present  Neurological:      General: No focal deficit present  Mental Status: She is alert and oriented to person, place, and time     Psychiatric:         Mood and Affect: Mood normal          Behavior: Behavior normal          Thought Content: Thought content normal          Judgment: Judgment normal                 Labs and Imaging  Recent labs and imaging have been personally reviewed    Lab Results   Component Value Date    WBC 13 76 (H) 02/28/2023    HGB 12 9 02/28/2023    HCT 39 2 02/28/2023    MCV 93 02/28/2023     02/28/2023     Lab Results   Component Value Date    SODIUM 138 03/01/2023    K 4 7 03/01/2023     03/01/2023    CO2 31 03/01/2023    AGAP 7 03/01/2023    BUN 23 03/01/2023    CREATININE 0 86 03/01/2023    GLUC 93 03/01/2023    CALCIUM 9 1 03/01/2023    AST 94 (H) 01/05/2023    ALT 32 01/05/2023    ALKPHOS 126 (H) 01/05/2023    TP 8 2 01/05/2023    TBILI 0 72 01/05/2023    EGFR 75 03/01/2023     Lab Results   Component Value Date    HGBA1C 4 9 10/04/2022     No results found for: [id number], TSH  No results found for: CHOLESTEROL  No results found for: HDL  No results found for: TRIG  No results found for: [facility]

## 2023-03-22 NOTE — ASSESSMENT & PLAN NOTE
Considering bariatric surgery to help with GERD symptoms  Advised to stop eating late at night as this can contribute

## 2023-03-22 NOTE — ASSESSMENT & PLAN NOTE
Patient frequently hospitalized for asthma exacerbation and steroid therapy prescribed  Has appointment with pulmonary in April to discuss further - and to discuss possibility of bariatric surgery

## 2023-03-22 NOTE — ASSESSMENT & PLAN NOTE
Patient with steroid induced DM  Currently on Ozempic 1mg weekly  Will follow up with prescribing doctor about increasing dose to 2mg weekly  Lab Results   Component Value Date    HGBA1C 4 9 10/04/2022

## 2023-03-22 NOTE — ASSESSMENT & PLAN NOTE
- Discussed options of HealthyCORE-Intensive Lifestyle Intervention Program, Very Low Calorie Diet-VLCD and Conservative Program and the role of weight loss medications  - Patient is interested in pursuing Conservative Program, Carrie-En-Y Gastric Bypass and Vertical Sleeve Gastrectomy  Will be following with MWM provider for accountability and nutrition guidance as the surgical process is being completed  - Discussed the role of weight loss medication - appetite suppression is not appropriate at this time as she is not eating throughout the day  [place] is already in place and can be adjusted by prescribing provider - advised patient to call and see if dose will be increased to 2mg weekly  - Initial weight loss goal of 5-10% weight loss for improved health  - Weight loss can improve patient's co-morbid conditions and/or prevent weight-related complications  - Labs reviewed from 10/2022, 1/2023, 3/2023    Goals:  Calorie goal of 1978-1593 calories per day discussed and meal plan provided as a guide to help with balanced nutrition and meal choices  Do not skip meals  Begin to food log via fatou - options include  www  myfitnesspal com, sparkpeople  com, loseit com, calorieking  com, [employer]  No sugary beverages including Vitamin water  Increase water intake to at least 64oz of water daily  Increase physical activity by 10 minutes daily - as your asthma will tolerate

## 2023-04-25 ENCOUNTER — HOSPITAL ENCOUNTER (EMERGENCY)
Facility: HOSPITAL | Age: 58
Discharge: HOME | End: 2023-04-25
Attending: EMERGENCY MEDICINE

## 2023-04-25 ENCOUNTER — APPOINTMENT (EMERGENCY)
Dept: VASCULAR ULTRASOUND | Facility: HOSPITAL | Age: 58
End: 2023-04-25

## 2023-04-25 VITALS
TEMPERATURE: 98.2 F | DIASTOLIC BLOOD PRESSURE: 68 MMHG | HEART RATE: 94 BPM | SYSTOLIC BLOOD PRESSURE: 122 MMHG | RESPIRATION RATE: 16 BRPM | OXYGEN SATURATION: 98 %

## 2023-04-25 DIAGNOSIS — M79.604 RIGHT LEG PAIN: Primary | ICD-10-CM

## 2023-04-25 NOTE — ED PROVIDER NOTES
History  Chief Complaint   Patient presents with    Leg Pain     Pt reports R leg pain, throbbing, painful to the touch, sent in by PCP for DVT r/o       History provided by:  Patient  Leg Pain  Location:  Leg  Time since incident:  4 days  Injury: no    Leg location:  R lower leg  Pain details:     Quality:  Cramping    Radiates to:  Does not radiate    Severity:  Mild    Onset quality:  Gradual    Duration:  4 days    Timing:  Constant    Progression:  Worsening  Chronicity:  New  Dislocation: no    Prior injury to area:  No  Relieved by:  Nothing  Worsened by:  Nothing  Ineffective treatments:  None tried  Associated symptoms: no back pain, no decreased ROM, no fatigue, no fever, no numbness, no stiffness and no swelling    Risk factors: no recent illness        Prior to Admission Medications   Prescriptions Last Dose Informant Patient Reported? Taking? Blood Pressure Monitoring (Blood Pressure Monitor 3) CARLOS MANUEL   Yes No   Sig: Use as directed   Cholecalciferol 50 MCG ( UT) CAPS   Yes No   Sig: every 24 hours   EPINEPHrine (EPIPEN) 0 3 mg/0 3 mL SOAJ   Yes No   Sig: as directed   Glucagon 1 MG/0 2ML SOAJ   Yes No   Si mg if BS is less than 60   Patient not taking: Reported on 3/10/2023   OneTouch Ultra test strip   Yes No   Sig: TEST TWICE A DAY   Ozempic, 1 MG/DOSE, 4 MG/3ML SOPN injection pen   Yes No   Sig: INJECT 1MG SUBCUTANEOUSLY ONCE WEEKLY   Promethazine-Phenyleph-Codeine (Promethazine VC/Codeine) 6  25-5-10 MG/5ML SYRP   Yes No   Sig: Every 6 hours   Patient not taking: Reported on 3/10/2023   albuterol (2 5 mg/3 mL) 0 083 % nebulizer solution   Yes No   Sig: 3 ml   albuterol (PROVENTIL HFA,VENTOLIN HFA) 90 mcg/act inhaler   Yes No   Sig: Inhale 2 puffs as needed   budesonide-formoterol (SYMBICORT) 160-4 5 mcg/act inhaler   Yes No   Sig: Every 12 hours   dicyclomine (BENTYL) 20 mg tablet   No No   Sig: TAKE 1 TABLET BY MOUTH EVERY 6 HOURS AS NEEDED FOR ABDOMINAL CRAMPING   diphenhydrAMINE (BENADRYL) 25 mg capsule   Yes No   Sig: 3 (three) times a day   Patient not taking: Reported on 3/10/2023   famotidine (PEPCID) 40 MG tablet   No No   Sig: Take 1 tablet (40 mg total) by mouth 2 (two) times a day   fluticasone-vilanterol (Breo Ellipta) 200-25 mcg/actuation inhaler   No No   Sig: Inhale 1 puff daily Rinse mouth after use     guaiFENesin (ROBITUSSIN) 200 MG/10ML oral liquid   No No   Sig: Take 10 mL (200 mg total) by mouth every 4 (four) hours as needed (cough)   Patient not taking: Reported on 3/22/2023   insulin lispro (HumaLOG) 100 units/mL injection pen   Yes No   Sig: 3 (three) times a day   ipratropium (ATROVENT) 0 02 % nebulizer solution   No No   Sig: Take 2 5 mL (0 5 mg total) by nebulization 3 (three) times a day   Patient not taking: Reported on 3/22/2023   ipratropium-albuterol (DUO-NEB) 0 5-2 5 mg/3 mL nebulizer solution   No No   Sig: Take 3 mL by nebulization every 4 (four) hours as needed for wheezing or shortness of breath   Patient not taking: Reported on 3/22/2023   montelukast (SINGULAIR) 10 mg tablet   No No   Sig: Take 1 tablet (10 mg total) by mouth daily at bedtime   ondansetron (ZOFRAN) 4 mg tablet   Yes No   Sig: Take 1 tablet by mouth every 8 (eight) hours   Patient not taking: Reported on 3/22/2023   pantoprazole (PROTONIX) 40 mg   No No   Sig: Take 1 packet (40 mg total) by mouth 2 (two) times a day before meals   pravastatin (PRAVACHOL) 40 mg tablet   No No   Sig: Take 1 tablet (40 mg total) by mouth daily with dinner   Patient not taking: Reported on 3/10/2023   promethazine (PHENERGAN) 25 mg tablet   No No   Sig: Take 1 tablet (25 mg total) by mouth every 6 (six) hours as needed for nausea or vomiting   rosuvastatin (CRESTOR) 5 mg tablet   Yes No   Sig: Take 1 tablet by mouth daily   sucralfate (CARAFATE) 1 g/10 mL suspension   Yes No   Si tablet on an empty stomach   Patient not taking: Reported on 3/10/2023   torsemide (DEMADEX) 10 mg tablet   Yes No   Sig: Take 10 mg by mouth daily   Patient not taking: Reported on 3/22/2023   zolpidem (AMBIEN) 10 mg tablet   No No   Sig: Take 1 tablet (10 mg total) by mouth daily at bedtime as needed for sleep      Facility-Administered Medications: None       Past Medical History:   Diagnosis Date    Asthma     Diabetes mellitus ([facility] [address] )     GERD (gastroesophageal reflux disease)        History reviewed  No pertinent surgical history  History reviewed  No pertinent family history  I have reviewed and agree with the history as documented  E-Cigarette/Vaping    E-Cigarette Use Never User      E-Cigarette/Vaping Substances    Nicotine No     THC No     CBD No     Flavoring No     Other No     Unknown No      Social History     Tobacco Use    Smoking status: Never    Smokeless tobacco: Never   Vaping Use    Vaping Use: Never used   Substance Use Topics    Alcohol use: Yes     Comment: social    Drug use: Never       Review of Systems   Constitutional: Negative for fatigue and fever  Musculoskeletal: Negative for back pain and stiffness  All other systems reviewed and are negative  Physical Exam  Physical Exam  Vitals and nursing note reviewed  HENT:      Head: Normocephalic and atraumatic  Eyes:      Extraocular Movements: Extraocular movements intact  Pupils: Pupils are equal, round, and reactive to light  Cardiovascular:      Rate and Rhythm: Normal rate  Pulmonary:      Effort: Pulmonary effort is normal       Breath sounds: Normal breath sounds  Abdominal:      General: There is no distension  Palpations: Abdomen is soft  Tenderness: There is no abdominal tenderness  Musculoskeletal:         General: No swelling or tenderness  Cervical back: Neck supple  Right lower leg: No edema  Skin:     General: Skin is warm  Findings: No erythema or rash  Neurological:      General: No focal deficit present        Mental Status: She is alert and oriented to person, place, and time  Mental status is at baseline  Cranial Nerves: No cranial nerve deficit  Motor: No weakness  Vital Signs  ED Triage Vitals [04/25/23 1514]   Temperature Pulse Respirations Blood Pressure SpO2   98 2 °F (36 8 °C) 94 16 122/68 98 %      Temp src Heart Rate Source Patient Position - Orthostatic VS BP Location FiO2 (%)   -- Monitor Sitting Left arm --      Pain Score       --           Vitals:    04/25/23 1514   BP: 122/68   Pulse: 94   Patient Position - Orthostatic VS: Sitting         Visual Acuity      ED Medications  Medications - No data to display    Diagnostic Studies  Results Reviewed     None                 VAS lower limb venous duplex study, unilateral/limited    (Results Pending)              Procedures  Procedures         ED Course  ED Course as of 04/25/23 1704   Tue Apr 25, 2023   1646 Verbal report from [facility] u/s negative for DVT  SBIRT 22yo+    Flowsheet Row Most Recent Value   Initial Alcohol Screen: US AUDIT-C     1  How often do you have a drink containing alcohol? 1 Filed at: 04/25/2023 1515   2  How many drinks containing alcohol do you have on a typical day you are drinking? 0 Filed at: 04/25/2023 1515   3a  Male UNDER 65: How often do you have five or more drinks on one occasion? 0 Filed at: 04/25/2023 1515   3b  FEMALE Any Age, or MALE 65+: How often do you have 4 or more drinks on one occassion? 0 Filed at: 04/25/2023 1515   Audit-C Score 1 Filed at: 04/25/2023 1515   JANNETH: How many times in the past year have you    Used an illegal drug or used a prescription medication for non-medical reasons? Never Filed at: 04/25/2023 1515                    Medical Decision Making  64yo female sent by outpt PCP for c/o RLE cramping for the past few days, cramps and feeling like skin in sensitive, no swelling/redness/tightness  No trauma  No rash/skin changes  Thought it was restless leg but since it was one leg PCP sent here to r/o DVT   Will get duplex to r/o dVT  No trauma to need xrays  Pulses present and leg warm and no rash  Outpt labs done in last few months and has appt with PCP in 2 days so will get duplex and have her f/u with PCP  Right leg pain: acute illness or injury  Amount and/or Complexity of Data Reviewed  External Data Reviewed: labs  Details: reviewed outpt Chem and CBC and wnl if DVT were positive  Radiology: ordered  Disposition  Final diagnoses:   Right leg pain     Time reflects when diagnosis was documented in both MDM as applicable and the Disposition within this note     Time User Action Codes Description Comment    4/25/2023  4:52 PM Larry Martin 89, [address] [A29 [id number]] Right leg pain       ED Disposition     ED Disposition   Discharge    Condition   Stable    Date/Time   Tue Apr 25, 2023  4:52 PM    Stephanie Loza Retort discharge to home/self care  Follow-up Information     Follow up With Specialties Details Why Contact Info    Marvern Forward  Go to  If symptoms worsen, and keep your follow-up appointment [address] [facility] [address]            Patient's Medications   Discharge Prescriptions    No medications on file       No discharge procedures on file      PDMP Review       Value Time User    PDMP Reviewed  Yes 2/26/2023 10:43 PM Diana Joshua PA-C          ED Provider  Electronically Signed by           Ladarius Earl MD  04/25/23 2462

## 2023-06-02 ENCOUNTER — TELEMEDICINE (OUTPATIENT)
Dept: BARIATRICS | Facility: CLINIC | Age: 58
End: 2023-06-02

## 2023-06-02 VITALS — BODY MASS INDEX: 36.12 KG/M2 | HEIGHT: 59 IN | WEIGHT: 179.2 LBS

## 2023-06-02 DIAGNOSIS — E66.01 MORBID (SEVERE) OBESITY DUE TO EXCESS CALORIES (HCC): Primary | ICD-10-CM

## 2023-06-02 NOTE — PROGRESS NOTES
Virtual Brief Visit    This Visit is being completed by telephone  The Patient is located at Home and in the following state in which I hold an active license [place]    The patient was identified by name and date of birth  Marta Cloud was informed that this is a telemedicine visit and that the visit is being conducted through Telephone  My office door was closed  No one else was in the room  She acknowledged consent and understanding of privacy and security of the video platform  The patient has agreed to participate and understands they can discontinue the visit at any time  Patient is aware this is a billable service  Assessment/Plan:  Patient is a 65 yo female who was supposed to undergo a PEH repair in combination with a RYGB  Patient is reporting severe dysphagia, chronic nausea and vomiting and she would like to proceed with PEH repair at this time because she can not wait to complete the preoperative requirements for a RYGB in addition patient believes that she may have to complete a 3-6 months insurance mandated program  We will bring the patient in to meet with the  to review her options and her insurance requirements  Problem List Items Addressed This Visit    None      Recent Visits  No visits were found meeting these conditions  Showing recent visits within past 7 days and meeting all other requirements  Today's Visits  Date Type Provider Dept   06/02/23 Telemedicine Mariana Ortiz MD [facility]   Showing today's visits and meeting all other requirements  Future Appointments  No visits were found meeting these conditions    Showing future appointments within next 150 days and meeting all other requirements         Visit Time  Total Visit Duration: 10 minutes

## 2023-06-05 RX ORDER — GABAPENTIN 100 MG/1
100 CAPSULE ORAL 3 TIMES DAILY PRN
COMMUNITY
Start: 2023-05-02

## 2023-06-05 RX ORDER — DEXTROMETHORPHAN HYDROBROMIDE AND PROMETHAZINE HYDROCHLORIDE 15; 6.25 MG/5ML; MG/5ML
SYRUP ORAL
COMMUNITY
Start: 2023-03-25 | End: 2023-06-16

## 2023-06-05 RX ORDER — MELATONIN 10 MG
10 CAPSULE ORAL
COMMUNITY
Start: 2023-03-25

## 2023-06-05 RX ORDER — PANTOPRAZOLE SODIUM 40 MG/1
TABLET, DELAYED RELEASE ORAL
COMMUNITY
Start: 2023-05-31 | End: 2023-06-08 | Stop reason: SDUPTHER

## 2023-06-06 ENCOUNTER — OFFICE VISIT (OUTPATIENT)
Dept: GASTROENTEROLOGY | Facility: CLINIC | Age: 58
End: 2023-06-06
Payer: COMMERCIAL

## 2023-06-06 VITALS
SYSTOLIC BLOOD PRESSURE: 107 MMHG | WEIGHT: 179 LBS | HEIGHT: 59 IN | BODY MASS INDEX: 36.08 KG/M2 | OXYGEN SATURATION: 100 % | DIASTOLIC BLOOD PRESSURE: 73 MMHG | HEART RATE: 102 BPM

## 2023-06-06 DIAGNOSIS — K21.9 GASTROESOPHAGEAL REFLUX DISEASE WITHOUT ESOPHAGITIS: ICD-10-CM

## 2023-06-06 DIAGNOSIS — R13.10 DYSPHAGIA, UNSPECIFIED TYPE: Primary | ICD-10-CM

## 2023-06-06 DIAGNOSIS — E66.9 OBESITY, CLASS II, BMI 35-39.9: Primary | ICD-10-CM

## 2023-06-06 DIAGNOSIS — Z01.818 PREOP TESTING: ICD-10-CM

## 2023-06-06 PROCEDURE — 99214 OFFICE O/P EST MOD 30 MIN: CPT | Performed by: INTERNAL MEDICINE

## 2023-06-06 RX ORDER — PANTOPRAZOLE SODIUM 40 MG/1
40 TABLET, DELAYED RELEASE ORAL
Qty: 62 TABLET | Refills: 6 | Status: SHIPPED | OUTPATIENT
Start: 2023-06-06 | End: 2023-06-08 | Stop reason: SDUPTHER

## 2023-06-06 NOTE — PROGRESS NOTES
[facility] - Outpatient Follow-up Note  Rosalie Roberts 66 y o  female MRN: [id number]  Encounter: [id number]          ASSESSMENT AND PLAN:      1  Dysphagia, unspecified type  - EGD; Future  I am planning on an empiric dilation at this procedure    2  Gastroesophageal reflux disease without esophagitis  - pantoprazole (PROTONIX) 40 mg tablet; Take 1 tablet (40 mg total) by mouth 2 (two) times a day before meals  Dispense: 62 tablet; Refill: 6    ______________________________________________________________________    SUBJECTIVE: Lidya is a 16-year-old female who has been evaluated multiple times in the recent past by gastroenterology for Gastrosoft reflux disease and dysphagia  She states that she has been told that she has no abnormality of the esophagus to account for the dysphagia however she is convinced that there is something there  She is also being evaluated by bariatric surgery for consideration of a gastric bypass in addition to repair of a hiatal hernia  This surgery however, according to the last note, is not scheduled until August 2023  She states that she cannot wait this long  She underwent an attempted EGD in January 5, 2023  The esophagus was reported to be normal   There was a hiatal hernia that was seen  The procedure however, had to be aborted because the patient desaturated and the patient was sent out for breathing treatments  Esophageal manometry was performed on January 12, 2023 and reported to be normal   Barium swallow was performed on this patient on October 31, 2022 showing multiple episodes of moderate to severe gastroesophageal reflux, no obstructing or constricting lesions identified, small hiatal hernia, no paraesophageal hernia, and esophageal tertiary contractions with mid esophageal dysmotility  REVIEW OF SYSTEMS IS OTHERWISE NEGATIVE        Historical Information   Past Medical History:   Diagnosis Date   • Asthma    • Diabetes mellitus ([facility] [address] )    • GERD (gastroesophageal reflux disease)      History reviewed  No pertinent surgical history  Social History   Social History     Substance and Sexual Activity   Alcohol Use Yes    Comment: social     Social History     Substance and Sexual Activity   Drug Use Never     Social History     Tobacco Use   Smoking Status Never   Smokeless Tobacco Never     History reviewed  No pertinent family history      Meds/Allergies       Current Outpatient Medications:   •  albuterol (2 5 mg/3 mL) 0 083 % nebulizer solution  •  albuterol (PROVENTIL HFA,VENTOLIN HFA) 90 mcg/act inhaler  •  Blood Pressure Monitoring (Blood Pressure Monitor 3) CARLOS MANUEL  •  budesonide-formoterol (SYMBICORT) 160-4 5 mcg/act inhaler  •  Cholecalciferol 50 MCG (2000 UT) CAPS  •  dicyclomine (BENTYL) 20 mg tablet  •  diphenhydrAMINE (BENADRYL) 25 mg capsule  •  EPINEPHrine (EPIPEN) 0 3 mg/0 3 mL SOAJ  •  famotidine (PEPCID) 40 MG tablet  •  fluticasone-vilanterol (Breo Ellipta) 200-25 mcg/actuation inhaler  •  gabapentin (NEURONTIN) 100 mg capsule  •  Glucagon 1 MG/0 2ML SOAJ  •  guaiFENesin (ROBITUSSIN) 200 MG/10ML oral liquid  •  insulin lispro (HumaLOG) 100 units/mL injection pen  •  ipratropium (ATROVENT) 0 02 % nebulizer solution  •  ipratropium-albuterol (DUO-NEB) 0 5-2 5 mg/3 mL nebulizer solution  •  Melatonin 10 MG CAPS  •  montelukast (SINGULAIR) 10 mg tablet  •  ondansetron (ZOFRAN) 4 mg tablet  •  OneTouch Ultra test strip  •  Ozempic, 1 MG/DOSE, 4 MG/3ML SOPN injection pen  •  pantoprazole (PROTONIX) 40 mg tablet  •  pantoprazole (PROTONIX) 40 mg tablet  •  pantoprazole (PROTONIX) 40 mg  •  pravastatin (PRAVACHOL) 40 mg tablet  •  promethazine (PHENERGAN) 25 mg tablet  •  semaglutide, 2 mg/dose, (Ozempic, 2 MG/DOSE,) 8 mg/ mL injection pen  •  torsemide (DEMADEX) 10 mg tablet  •  zolpidem (AMBIEN) 10 mg tablet  •  promethazine-dextromethorphan (PHENERGAN-DM) 6 25-15 mg/5 mL oral syrup  •  Promethazine-Phenyleph-Codeine (Promethazine "VC/Codeine) 6  25-5-10 MG/5ML SYRP  •  rosuvastatin (CRESTOR) 5 mg tablet  •  sucralfate (CARAFATE) 1 g/10 mL suspension    Allergies   Allergen Reactions   • Codeine Other (See Comments)   • Aspirin GI Intolerance and Rash   • Hydromorphone Rash and Other (See Comments)     GI upset     • Oxycodone-Acetaminophen Rash and Other (See Comments)   • Tramadol Rash and Other (See Comments)           Objective     Blood pressure 107/73, pulse 102, height 4' 11\" (1 499 m), weight 81 2 kg (179 lb), SpO2 100 %  Body mass index is 36 15 kg/m²  PHYSICAL EXAM:      General Appearance:   Alert, cooperative, no distress   HEENT:   Normocephalic, atraumatic, anicteric      Neck:  Supple, symmetrical, trachea midline   Lungs:   Clear to auscultation bilaterally; no rales, rhonchi or wheezing; respirations unlabored    Heart[de-identified]   Regular rate and rhythm; no murmur, rub, or gallop  Abdomen:   Soft, non-tender, non-distended; normal bowel sounds; no masses, no organomegaly    Genitalia:   Deferred    Rectal:   Deferred    Extremities:  No cyanosis, clubbing or edema    Pulses:  2+ and symmetric    Skin:  No jaundice, rashes, or lesions    Lymph nodes:  No palpable cervical lymphadenopathy        Lab Results:   No visits with results within 1 Day(s) from this visit  Latest known visit with results is:   No results displayed because visit has over 200 results  Radiology Results:   No results found    "

## 2023-06-08 ENCOUNTER — ANESTHESIA EVENT (OUTPATIENT)
Dept: GASTROENTEROLOGY | Facility: HOSPITAL | Age: 58
End: 2023-06-08

## 2023-06-08 ENCOUNTER — ANESTHESIA (OUTPATIENT)
Dept: GASTROENTEROLOGY | Facility: HOSPITAL | Age: 58
End: 2023-06-08

## 2023-06-08 ENCOUNTER — HOSPITAL ENCOUNTER (OUTPATIENT)
Dept: GASTROENTEROLOGY | Facility: HOSPITAL | Age: 58
Setting detail: OUTPATIENT SURGERY
End: 2023-06-08
Attending: INTERNAL MEDICINE
Payer: COMMERCIAL

## 2023-06-08 VITALS
WEIGHT: 178.35 LBS | BODY MASS INDEX: 35.96 KG/M2 | HEIGHT: 59 IN | OXYGEN SATURATION: 100 % | TEMPERATURE: 99 F | SYSTOLIC BLOOD PRESSURE: 128 MMHG | HEART RATE: 104 BPM | DIASTOLIC BLOOD PRESSURE: 58 MMHG | RESPIRATION RATE: 17 BRPM

## 2023-06-08 DIAGNOSIS — R13.10 DYSPHAGIA, UNSPECIFIED TYPE: ICD-10-CM

## 2023-06-08 LAB — GLUCOSE SERPL-MCNC: 93 MG/DL (ref 65–140)

## 2023-06-08 PROCEDURE — 82948 REAGENT STRIP/BLOOD GLUCOSE: CPT

## 2023-06-08 RX ORDER — SODIUM CHLORIDE, SODIUM LACTATE, POTASSIUM CHLORIDE, CALCIUM CHLORIDE 600; 310; 30; 20 MG/100ML; MG/100ML; MG/100ML; MG/100ML
INJECTION, SOLUTION INTRAVENOUS CONTINUOUS PRN
Status: DISCONTINUED | OUTPATIENT
Start: 2023-06-08 | End: 2023-06-08

## 2023-06-08 RX ORDER — LIDOCAINE HYDROCHLORIDE 10 MG/ML
INJECTION, SOLUTION EPIDURAL; INFILTRATION; INTRACAUDAL; PERINEURAL AS NEEDED
Status: DISCONTINUED | OUTPATIENT
Start: 2023-06-08 | End: 2023-06-08

## 2023-06-08 RX ORDER — PROPOFOL 10 MG/ML
INJECTION, EMULSION INTRAVENOUS AS NEEDED
Status: DISCONTINUED | OUTPATIENT
Start: 2023-06-08 | End: 2023-06-08

## 2023-06-08 RX ORDER — MIDAZOLAM HYDROCHLORIDE 2 MG/2ML
INJECTION, SOLUTION INTRAMUSCULAR; INTRAVENOUS AS NEEDED
Status: DISCONTINUED | OUTPATIENT
Start: 2023-06-08 | End: 2023-06-08

## 2023-06-08 RX ORDER — IPRATROPIUM BROMIDE AND ALBUTEROL SULFATE 2.5; .5 MG/3ML; MG/3ML
SOLUTION RESPIRATORY (INHALATION)
Status: COMPLETED
Start: 2023-06-08 | End: 2023-06-08

## 2023-06-08 RX ORDER — ALBUTEROL SULFATE 90 UG/1
AEROSOL, METERED RESPIRATORY (INHALATION) AS NEEDED
Status: DISCONTINUED | OUTPATIENT
Start: 2023-06-08 | End: 2023-06-08

## 2023-06-08 RX ORDER — GLYCOPYRROLATE 0.2 MG/ML
INJECTION INTRAMUSCULAR; INTRAVENOUS AS NEEDED
Status: DISCONTINUED | OUTPATIENT
Start: 2023-06-08 | End: 2023-06-08

## 2023-06-08 RX ORDER — IPRATROPIUM BROMIDE AND ALBUTEROL SULFATE 2.5; .5 MG/3ML; MG/3ML
3 SOLUTION RESPIRATORY (INHALATION) ONCE
Status: COMPLETED | OUTPATIENT
Start: 2023-06-08 | End: 2023-06-08

## 2023-06-08 RX ORDER — METHYLPREDNISOLONE SODIUM SUCCINATE 125 MG/2ML
INJECTION, POWDER, LYOPHILIZED, FOR SOLUTION INTRAMUSCULAR; INTRAVENOUS AS NEEDED
Status: DISCONTINUED | OUTPATIENT
Start: 2023-06-08 | End: 2023-06-08

## 2023-06-08 RX ADMIN — ALBUTEROL SULFATE 2 PUFF: 90 AEROSOL, METERED RESPIRATORY (INHALATION) at 11:38

## 2023-06-08 RX ADMIN — PROPOFOL 100 MG: 10 INJECTION, EMULSION INTRAVENOUS at 11:42

## 2023-06-08 RX ADMIN — GLYCOPYRROLATE 0.2 MCG: 0.2 INJECTION INTRAMUSCULAR; INTRAVENOUS at 11:40

## 2023-06-08 RX ADMIN — LIDOCAINE HYDROCHLORIDE 80 MG: 10 INJECTION, SOLUTION EPIDURAL; INFILTRATION; INTRACAUDAL; PERINEURAL at 11:41

## 2023-06-08 RX ADMIN — MIDAZOLAM HYDROCHLORIDE 2 MG: 1 INJECTION, SOLUTION INTRAMUSCULAR; INTRAVENOUS at 12:06

## 2023-06-08 RX ADMIN — PROPOFOL 200 MG: 10 INJECTION, EMULSION INTRAVENOUS at 11:41

## 2023-06-08 RX ADMIN — SODIUM CHLORIDE, SODIUM LACTATE, POTASSIUM CHLORIDE, AND CALCIUM CHLORIDE: .6; .31; .03; .02 INJECTION, SOLUTION INTRAVENOUS at 11:34

## 2023-06-08 RX ADMIN — METHYLPREDNISOLONE SODIUM SUCCINATE 60 MG: 125 INJECTION, POWDER, FOR SOLUTION INTRAMUSCULAR; INTRAVENOUS at 12:06

## 2023-06-08 RX ADMIN — IPRATROPIUM BROMIDE AND ALBUTEROL SULFATE 3 ML: 2.5; .5 SOLUTION RESPIRATORY (INHALATION) at 12:01

## 2023-06-08 NOTE — H&P
"History and Physical -  [facility]  Lidya Ridley 66 y o  female MRN: [id number]      HPI: Simba Howard is a 66y o  year old female who presents for evaluation of gastroesophageal reflux disease and dysphagia      REVIEW OF SYSTEMS: Per the HPI, and otherwise unremarkable  Historical Information   Past Medical History:   Diagnosis Date   • Asthma    • Diabetes mellitus ([address] )    • GERD (gastroesophageal reflux disease)    • Hyperlipidemia    • Hypertension      Past Surgical History:   Procedure Laterality Date   •  SECTION     • COLONOSCOPY     • HYSTERECTOMY     • OVARIAN CYST REMOVAL       Social History   Social History     Substance and Sexual Activity   Alcohol Use Yes   • Alcohol/week: 7 0 standard drinks of alcohol   • Types: 7 Glasses of wine per week     Social History     Substance and Sexual Activity   Drug Use Never     Social History     Tobacco Use   Smoking Status Never   Smokeless Tobacco Never     History reviewed  No pertinent family history  Meds/Allergies     (Not in a hospital admission)      Allergies   Allergen Reactions   • Codeine Other (See Comments)   • Aspirin GI Intolerance and Rash   • Hydromorphone Rash and Other (See Comments)     GI upset     • Oxycodone-Acetaminophen Rash and Other (See Comments)   • Tramadol Rash and Other (See Comments)       Objective     Blood pressure 123/76, pulse 103, temperature (!) 97 3 °F (36 3 °C), temperature source Temporal, resp  rate 20, height 4' 11\" (1 499 m), weight 80 9 kg (178 lb 5 6 oz), SpO2 99 %  PHYSICAL EXAM    Gen: NAD  CV: RRR  CHEST: Clear  ABD: soft, NT/ND  EXT: no edema      ASSESSMENT/PLAN:  This is a 66y o  year old female here for EGD with possible biopsy and dilation, and she is stable and optimized for her procedure          "

## 2023-06-08 NOTE — ANESTHESIA POSTPROCEDURE EVALUATION
Post-Op Assessment Note    CV Status:  Stable    Pain management: adequate     Mental Status:  Alert and awake   Hydration Status:  Euvolemic   PONV Controlled:  Controlled   Airway Patency:  Patent      Post Op Vitals Reviewed: Yes            No notable events documented      /63 (06/08/23 1153)    Temp 99 °F (37 2 °C) (06/08/23 1153)    Pulse 100 (06/08/23 1153)102   Resp 18   SpO2 98 % (06/08/23 1153) 100

## 2023-06-08 NOTE — ANESTHESIA PREPROCEDURE EVALUATION
Severe asthma history    Procedure:  EGD    Relevant Problems   ANESTHESIA (within normal limits)      CARDIO   (+) Hyperlipidemia      GI/HEPATIC   (+) Dysphagia   (+) GERD (gastroesophageal reflux disease)      NEURO/PSYCH   (+) Depression with anxiety      PULMONARY   (+) Severe asthma with acute exacerbation   (+) Severe persistent asthma with exacerbation        Physical Exam    Airway    Mallampati score: II  TM Distance: >3 FB  Neck ROM: full     Dental       Cardiovascular  Rate: normal,     Pulmonary  Pulmonary exam normal     Other Findings  Per pt denies anything remaining that is loose or removeable      Anesthesia Plan  ASA Score- 3     Anesthesia Type- IV sedation with anesthesia with ASA Monitors  Additional Monitors:   Airway Plan:     Comment: Per patient, appropriately NPO, denies active CP/SOB/wheezing/symptoms related to heartburn/nausea/vomiting  Plan Factors-Exercise tolerance (METS): >4 METS  Chart reviewed  Patient summary reviewed  Patient is not a current smoker  Induction- intravenous  Postoperative Plan-     Informed Consent- Anesthetic plan and risks discussed with patient  I personally reviewed this patient with the CRNA  Discussed and agreed on the Anesthesia Plan with the CRNA  Mando Rosa

## 2023-06-10 NOTE — TELEPHONE ENCOUNTER
Lashawn Ovalles pt-  Patient would like to discuss her recent test results    She needs to know what the plan is now or what the next step will be   Please phone [phone] to advise
Routing to Dr Dwain Alcazar office
Routing to clerical
Spoke with patient  Relayed message, provided phone number 
Spoke with patient at length  History of dysphagia, GERD, asthma    Patient c/o continued symptoms, asthma exacerbations from GERD, dysphagia, "raw feeling in neck", depending on the day she feels her airway is blocked when eating foods  Occasional nausea, and vomiting 1-2 episodes a week   +constipation, passing a BM every 3-4days (unknown the samples she took that caused diarrhea)  Manometry was normal with small hiatal hernia  Taking pantoprazole 40mg BID, and pepcid 40mg at HS  Feels the pantoprazole helped but not enough  Patient is scheduled for an OV 3/6   She would like to know what next steps are?
The next step was seeing thoracic surgery to see if she is a candidate for the hiatal hernia repair  Thanks!
no weight-bearing restrictions

## 2023-06-11 ENCOUNTER — HOSPITAL ENCOUNTER (INPATIENT)
Facility: HOSPITAL | Age: 58
LOS: 3 days | Discharge: HOME | DRG: 203 | End: 2023-06-16
Attending: EMERGENCY MEDICINE | Admitting: STUDENT IN AN ORGANIZED HEALTH CARE EDUCATION/TRAINING PROGRAM
Payer: COMMERCIAL

## 2023-06-11 ENCOUNTER — APPOINTMENT (EMERGENCY)
Dept: RADIOLOGY | Facility: HOSPITAL | Age: 58
DRG: 203 | End: 2023-06-11
Payer: COMMERCIAL

## 2023-06-11 DIAGNOSIS — B37.0 OROPHARYNGEAL CANDIDIASIS: ICD-10-CM

## 2023-06-11 DIAGNOSIS — G47.00 INSOMNIA: ICD-10-CM

## 2023-06-11 DIAGNOSIS — J45.901 ACUTE ASTHMA EXACERBATION: Primary | ICD-10-CM

## 2023-06-11 DIAGNOSIS — K21.9 GASTROESOPHAGEAL REFLUX DISEASE WITHOUT ESOPHAGITIS: ICD-10-CM

## 2023-06-11 DIAGNOSIS — R06.03 RESPIRATORY DISTRESS: ICD-10-CM

## 2023-06-11 DIAGNOSIS — L29.9 ITCHING: ICD-10-CM

## 2023-06-11 DIAGNOSIS — R05.3 CHRONIC COUGH: ICD-10-CM

## 2023-06-11 DIAGNOSIS — E09.9 DRUG OR CHEMICAL INDUCED DIABETES MELLITUS WITHOUT COMPLICATION, WITHOUT LONG-TERM CURRENT USE OF INSULIN (HCC): ICD-10-CM

## 2023-06-11 DIAGNOSIS — J45.901 EXACERBATION OF PERSISTENT ASTHMA, UNSPECIFIED ASTHMA SEVERITY: ICD-10-CM

## 2023-06-11 PROBLEM — Z79.899 POLYPHARMACY: Status: ACTIVE | Noted: 2023-06-11

## 2023-06-11 LAB
2HR DELTA HS TROPONIN: 1 NG/L
ALBUMIN SERPL BCP-MCNC: 3.8 G/DL (ref 3.5–5)
ALP SERPL-CCNC: 110 U/L (ref 34–104)
ALT SERPL W P-5'-P-CCNC: 19 U/L (ref 7–52)
ANION GAP SERPL CALCULATED.3IONS-SCNC: 9 MMOL/L (ref 4–13)
AST SERPL W P-5'-P-CCNC: 23 U/L (ref 13–39)
BASOPHILS # BLD AUTO: 0.03 THOUSANDS/ÂΜL (ref 0–0.1)
BASOPHILS NFR BLD AUTO: 0 % (ref 0–1)
BILIRUB SERPL-MCNC: 0.63 MG/DL (ref 0.2–1)
BUN SERPL-MCNC: 8 MG/DL (ref 5–25)
CALCIUM SERPL-MCNC: 9.2 MG/DL (ref 8.4–10.2)
CARDIAC TROPONIN I PNL SERPL HS: 2 NG/L
CARDIAC TROPONIN I PNL SERPL HS: 3 NG/L
CHLORIDE SERPL-SCNC: 104 MMOL/L (ref 96–108)
CO2 SERPL-SCNC: 24 MMOL/L (ref 21–32)
CREAT SERPL-MCNC: 0.86 MG/DL (ref 0.6–1.3)
EOSINOPHIL # BLD AUTO: 0.26 THOUSAND/ÂΜL (ref 0–0.61)
EOSINOPHIL NFR BLD AUTO: 2 % (ref 0–6)
ERYTHROCYTE [DISTWIDTH] IN BLOOD BY AUTOMATED COUNT: 13.6 % (ref 11.6–15.1)
GFR SERPL CREATININE-BSD FRML MDRD: 75 ML/MIN/1.73SQ M
GLUCOSE SERPL-MCNC: 92 MG/DL (ref 65–140)
GLUCOSE SERPL-MCNC: 95 MG/DL (ref 65–140)
HCT VFR BLD AUTO: 40.2 % (ref 34.8–46.1)
HGB BLD-MCNC: 13.3 G/DL (ref 11.5–15.4)
IMM GRANULOCYTES # BLD AUTO: 0.05 THOUSAND/UL (ref 0–0.2)
IMM GRANULOCYTES NFR BLD AUTO: 0 % (ref 0–2)
LYMPHOCYTES # BLD AUTO: 3.94 THOUSANDS/ÂΜL (ref 0.6–4.47)
LYMPHOCYTES NFR BLD AUTO: 34 % (ref 14–44)
MCH RBC QN AUTO: 31.4 PG (ref 26.8–34.3)
MCHC RBC AUTO-ENTMCNC: 33.1 G/DL (ref 31.4–37.4)
MCV RBC AUTO: 95 FL (ref 82–98)
MONOCYTES # BLD AUTO: 0.81 THOUSAND/ÂΜL (ref 0.17–1.22)
MONOCYTES NFR BLD AUTO: 7 % (ref 4–12)
NEUTROPHILS # BLD AUTO: 6.53 THOUSANDS/ÂΜL (ref 1.85–7.62)
NEUTS SEG NFR BLD AUTO: 57 % (ref 43–75)
NRBC BLD AUTO-RTO: 0 /100 WBCS
PLATELET # BLD AUTO: 513 THOUSANDS/UL (ref 149–390)
PMV BLD AUTO: 9.9 FL (ref 8.9–12.7)
POTASSIUM SERPL-SCNC: 3.6 MMOL/L (ref 3.5–5.3)
PROT SERPL-MCNC: 7.9 G/DL (ref 6.4–8.4)
RBC # BLD AUTO: 4.23 MILLION/UL (ref 3.81–5.12)
SODIUM SERPL-SCNC: 137 MMOL/L (ref 135–147)
WBC # BLD AUTO: 11.62 THOUSAND/UL (ref 4.31–10.16)

## 2023-06-11 PROCEDURE — 99223 1ST HOSP IP/OBS HIGH 75: CPT | Performed by: INTERNAL MEDICINE

## 2023-06-11 PROCEDURE — 85025 COMPLETE CBC W/AUTO DIFF WBC: CPT | Performed by: EMERGENCY MEDICINE

## 2023-06-11 PROCEDURE — 96375 TX/PRO/DX INJ NEW DRUG ADDON: CPT

## 2023-06-11 PROCEDURE — 94644 CONT INHLJ TX 1ST HOUR: CPT

## 2023-06-11 PROCEDURE — 99291 CRITICAL CARE FIRST HOUR: CPT | Performed by: EMERGENCY MEDICINE

## 2023-06-11 PROCEDURE — 94760 N-INVAS EAR/PLS OXIMETRY 1: CPT

## 2023-06-11 PROCEDURE — 82948 REAGENT STRIP/BLOOD GLUCOSE: CPT

## 2023-06-11 PROCEDURE — 94664 DEMO&/EVAL PT USE INHALER: CPT

## 2023-06-11 PROCEDURE — 93005 ELECTROCARDIOGRAM TRACING: CPT

## 2023-06-11 PROCEDURE — 99285 EMERGENCY DEPT VISIT HI MDM: CPT

## 2023-06-11 PROCEDURE — 71045 X-RAY EXAM CHEST 1 VIEW: CPT

## 2023-06-11 PROCEDURE — 36415 COLL VENOUS BLD VENIPUNCTURE: CPT | Performed by: EMERGENCY MEDICINE

## 2023-06-11 PROCEDURE — 96365 THER/PROPH/DIAG IV INF INIT: CPT

## 2023-06-11 PROCEDURE — 80053 COMPREHEN METABOLIC PANEL: CPT | Performed by: EMERGENCY MEDICINE

## 2023-06-11 PROCEDURE — 84484 ASSAY OF TROPONIN QUANT: CPT | Performed by: EMERGENCY MEDICINE

## 2023-06-11 PROCEDURE — 96372 THER/PROPH/DIAG INJ SC/IM: CPT

## 2023-06-11 RX ORDER — HYDROCODONE BITARTRATE AND HOMATROPINE METHYLBROMIDE ORAL SOLUTION 5; 1.5 MG/5ML; MG/5ML
5 LIQUID ORAL EVERY 4 HOURS PRN
Status: DISCONTINUED | OUTPATIENT
Start: 2023-06-11 | End: 2023-06-16 | Stop reason: HOSPADM

## 2023-06-11 RX ORDER — ENOXAPARIN SODIUM 100 MG/ML
40 INJECTION SUBCUTANEOUS DAILY
Status: DISCONTINUED | OUTPATIENT
Start: 2023-06-12 | End: 2023-06-16 | Stop reason: HOSPADM

## 2023-06-11 RX ORDER — METHYLPREDNISOLONE SODIUM SUCCINATE 125 MG/2ML
125 INJECTION, POWDER, LYOPHILIZED, FOR SOLUTION INTRAMUSCULAR; INTRAVENOUS ONCE
Status: COMPLETED | OUTPATIENT
Start: 2023-06-11 | End: 2023-06-11

## 2023-06-11 RX ORDER — MAGNESIUM SULFATE HEPTAHYDRATE 40 MG/ML
2 INJECTION, SOLUTION INTRAVENOUS ONCE
Status: COMPLETED | OUTPATIENT
Start: 2023-06-11 | End: 2023-06-11

## 2023-06-11 RX ORDER — TERBUTALINE SULFATE 1 MG/ML
0.25 INJECTION, SOLUTION SUBCUTANEOUS ONCE
Status: COMPLETED | OUTPATIENT
Start: 2023-06-11 | End: 2023-06-11

## 2023-06-11 RX ORDER — GABAPENTIN 100 MG/1
100 CAPSULE ORAL 3 TIMES DAILY
Status: DISCONTINUED | OUTPATIENT
Start: 2023-06-11 | End: 2023-06-16 | Stop reason: HOSPADM

## 2023-06-11 RX ORDER — ZOLPIDEM TARTRATE 5 MG/1
10 TABLET ORAL
Status: DISCONTINUED | OUTPATIENT
Start: 2023-06-11 | End: 2023-06-16 | Stop reason: HOSPADM

## 2023-06-11 RX ORDER — LORAZEPAM 2 MG/ML
0.5 INJECTION INTRAMUSCULAR ONCE
Status: COMPLETED | OUTPATIENT
Start: 2023-06-11 | End: 2023-06-11

## 2023-06-11 RX ORDER — ATORVASTATIN CALCIUM 10 MG/1
10 TABLET, FILM COATED ORAL
Status: DISCONTINUED | OUTPATIENT
Start: 2023-06-11 | End: 2023-06-16 | Stop reason: HOSPADM

## 2023-06-11 RX ORDER — ALBUTEROL SULFATE 2.5 MG/3ML
2.5 SOLUTION RESPIRATORY (INHALATION) 4 TIMES DAILY PRN
Status: DISCONTINUED | OUTPATIENT
Start: 2023-06-11 | End: 2023-06-13

## 2023-06-11 RX ORDER — ACETAMINOPHEN 325 MG/1
975 TABLET ORAL ONCE
Status: COMPLETED | OUTPATIENT
Start: 2023-06-11 | End: 2023-06-11

## 2023-06-11 RX ORDER — METHYLPREDNISOLONE SODIUM SUCCINATE 40 MG/ML
40 INJECTION, POWDER, LYOPHILIZED, FOR SOLUTION INTRAMUSCULAR; INTRAVENOUS EVERY 8 HOURS SCHEDULED
Status: DISCONTINUED | OUTPATIENT
Start: 2023-06-12 | End: 2023-06-14

## 2023-06-11 RX ORDER — MONTELUKAST SODIUM 10 MG/1
10 TABLET ORAL
Status: DISCONTINUED | OUTPATIENT
Start: 2023-06-11 | End: 2023-06-16 | Stop reason: HOSPADM

## 2023-06-11 RX ORDER — DICYCLOMINE HCL 20 MG
20 TABLET ORAL 3 TIMES DAILY PRN
Status: DISCONTINUED | OUTPATIENT
Start: 2023-06-11 | End: 2023-06-16 | Stop reason: HOSPADM

## 2023-06-11 RX ORDER — SODIUM CHLORIDE FOR INHALATION 0.9 %
3 VIAL, NEBULIZER (ML) INHALATION ONCE
Status: COMPLETED | OUTPATIENT
Start: 2023-06-11 | End: 2023-06-11

## 2023-06-11 RX ORDER — LEVALBUTEROL INHALATION SOLUTION 1.25 MG/3ML
1.25 SOLUTION RESPIRATORY (INHALATION)
Status: DISCONTINUED | OUTPATIENT
Start: 2023-06-11 | End: 2023-06-16 | Stop reason: HOSPADM

## 2023-06-11 RX ORDER — TORSEMIDE 10 MG/1
10 TABLET ORAL DAILY
Status: DISCONTINUED | OUTPATIENT
Start: 2023-06-12 | End: 2023-06-16 | Stop reason: HOSPADM

## 2023-06-11 RX ORDER — PROMETHAZINE HYDROCHLORIDE 6.25 MG/5ML
12.5 SYRUP ORAL EVERY 6 HOURS PRN
Status: DISCONTINUED | OUTPATIENT
Start: 2023-06-11 | End: 2023-06-14

## 2023-06-11 RX ORDER — PANTOPRAZOLE SODIUM 40 MG/1
40 TABLET, DELAYED RELEASE ORAL
Status: DISCONTINUED | OUTPATIENT
Start: 2023-06-12 | End: 2023-06-16 | Stop reason: HOSPADM

## 2023-06-11 RX ORDER — ONDANSETRON 2 MG/ML
4 INJECTION INTRAMUSCULAR; INTRAVENOUS EVERY 6 HOURS PRN
Status: DISCONTINUED | OUTPATIENT
Start: 2023-06-11 | End: 2023-06-16 | Stop reason: HOSPADM

## 2023-06-11 RX ADMIN — TERBUTALINE SULFATE 0.25 MG: 1 INJECTION SUBCUTANEOUS at 17:49

## 2023-06-11 RX ADMIN — METHYLPREDNISOLONE SODIUM SUCCINATE 125 MG: 125 INJECTION, POWDER, FOR SOLUTION INTRAMUSCULAR; INTRAVENOUS at 16:33

## 2023-06-11 RX ADMIN — DICYCLOMINE HYDROCHLORIDE 20 MG: 20 TABLET ORAL at 22:05

## 2023-06-11 RX ADMIN — MAGNESIUM SULFATE HEPTAHYDRATE 2 G: 40 INJECTION, SOLUTION INTRAVENOUS at 16:36

## 2023-06-11 RX ADMIN — Medication 3 ML: at 16:36

## 2023-06-11 RX ADMIN — ONDANSETRON 4 MG: 2 INJECTION INTRAMUSCULAR; INTRAVENOUS at 22:03

## 2023-06-11 RX ADMIN — ALBUTEROL SULFATE 10 MG: 2.5 SOLUTION RESPIRATORY (INHALATION) at 16:36

## 2023-06-11 RX ADMIN — ZOLPIDEM TARTRATE 10 MG: 5 TABLET, COATED ORAL at 22:05

## 2023-06-11 RX ADMIN — LORAZEPAM 0.5 MG: 2 INJECTION INTRAMUSCULAR; INTRAVENOUS at 17:49

## 2023-06-11 RX ADMIN — GABAPENTIN 100 MG: 100 CAPSULE ORAL at 23:03

## 2023-06-11 RX ADMIN — ATORVASTATIN CALCIUM 10 MG: 10 TABLET, FILM COATED ORAL at 20:02

## 2023-06-11 RX ADMIN — MONTELUKAST 10 MG: 10 TABLET, FILM COATED ORAL at 22:05

## 2023-06-11 RX ADMIN — ACETAMINOPHEN 975 MG: 325 TABLET, FILM COATED ORAL at 18:11

## 2023-06-11 RX ADMIN — IPRATROPIUM BROMIDE 1 MG: 0.5 SOLUTION RESPIRATORY (INHALATION) at 16:36

## 2023-06-11 RX ADMIN — PROMETHAZINE HYDROCHLORIDE 12.5 MG: 6.25 SYRUP ORAL at 22:05
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
## 2023-06-11 NOTE — RESPIRATORY THERAPY NOTE
RT Protocol Note  Rosalie Cancel 66 y o  female MRN: [id number]  Unit/Bed#: ED 08 Encounter: [id number]    Assessment    Principal Problem:    Asthma exacerbation  Active Problems:    Hyperlipidemia    Diabetes ([address] )    GERD (gastroesophageal reflux disease)    Class 2 obesity with body mass index (BMI) of 38 0 to 38 9 in adult    Polypharmacy      Home Pulmonary Medications:     06/11/23 1837   Respiratory Protocol   Protocol Initiated? Yes   Protocol Selection Respiratory   Language Barrier? No   Medical & Social History Reviewed? Yes   Diagnostic Studies Reviewed? Yes   Physical Assessment Performed? Yes   Respiratory Plan Moderate/Severe Distress pathway   Respiratory Assessment   Assessment Type Assess only   General Appearance Alert; Awake   Respiratory Pattern Dyspnea with exertion   Chest Assessment Chest expansion symmetrical   Bilateral Breath Sounds Expiratory wheezes;Scattered   Resp Comments resp protocol completed, pt hx asthma,takes mdi's and nebs at home, room air, scattered wheezes, continue with nebs TID   Additional Assessments   Pulse 103   Respirations (!) 26   SpO2 93 %            Past Medical History:   Diagnosis Date    Asthma     Diabetes mellitus (HCC)     GERD (gastroesophageal reflux disease)     Hyperlipidemia     Hypertension      Social History     Socioeconomic History    Marital status: /Civil Union     Spouse name: None    Number of children: None    Years of education: None    Highest education level: None   Occupational History    None   Tobacco Use    Smoking status: Never    Smokeless tobacco: Never   Vaping Use    Vaping Use: Never used   Substance and Sexual Activity    Alcohol use:  Yes     Alcohol/week: 7 0 standard drinks of alcohol     Types: 7 Glasses of wine per week    Drug use: Never    Sexual activity: None   Other Topics Concern    None   Social History Narrative    None     Social Determinants of Health     Financial Resource Strain: Not on file   Food "Insecurity: No Food Insecurity (2/27/2023)    Hunger Vital Sign     Worried About Running Out of Food in the Last Year: Never true     Ran Out of Food in the Last Year: Never true   Transportation Needs: No Transportation Needs (2/27/2023)    PRAPARE - Transportation     Lack of Transportation (Medical): No     Lack of Transportation (Non-Medical): No   Physical Activity: Not on file   Stress: Not on file   Social Connections: Not on file   Intimate Partner Violence: Not on file   Housing Stability: Low Risk  (2/27/2023)    Housing Stability Vital Sign     Unable to Pay for Housing in the Last Year: No     Number of Places Lived in the Last Year: 1     Unstable Housing in the Last Year: No       Subjective         Objective    Physical Exam:   Assessment Type: Assess only  General Appearance: Alert, Awake  Respiratory Pattern: Dyspnea with exertion  Chest Assessment: Chest expansion symmetrical  Bilateral Breath Sounds: Expiratory wheezes, Scattered  O2 Device: ra    Vitals:  Blood pressure 100/56, pulse 103, temperature 97 7 °F (36 5 °C), temperature source Temporal, resp  rate (!) 26, height 4' 11\" (1 499 m), weight 80 7 kg (178 lb), SpO2 93 %  Imaging and other studies: I have personally reviewed pertinent reports  O2 Device: ra     Plan    Respiratory Plan:  Moderate/Severe Distress pathway        Resp Comments: resp protocol completed, pt hx asthma,takes mdi's and nebs at home, room air, scattered wheezes, continue with nebs TID   "

## 2023-06-11 NOTE — ASSESSMENT & PLAN NOTE
Lab Results   Component Value Date    HGBA1C 4 9 10/04/2022       Recent Labs     06/11/23  1631   POCGLU 95       Blood Sugar Average: Last 72 hrs:  (P) 95     · Hold previous antidiabetic medications  · Monitor blood glucose  · Sliding scale insulin for corrections
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
## 2023-06-12 PROBLEM — R09.02 HYPOXIA: Status: ACTIVE | Noted: 2023-06-12

## 2023-06-12 LAB
ANION GAP SERPL CALCULATED.3IONS-SCNC: 10 MMOL/L (ref 4–13)
ATRIAL RATE: 104 BPM
BASOPHILS # BLD AUTO: 0.01 THOUSANDS/ÂΜL (ref 0–0.1)
BASOPHILS NFR BLD AUTO: 0 % (ref 0–1)
BUN SERPL-MCNC: 9 MG/DL (ref 5–25)
CALCIUM SERPL-MCNC: 9.6 MG/DL (ref 8.4–10.2)
CHLORIDE SERPL-SCNC: 102 MMOL/L (ref 96–108)
CO2 SERPL-SCNC: 23 MMOL/L (ref 21–32)
CREAT SERPL-MCNC: 0.71 MG/DL (ref 0.6–1.3)
EOSINOPHIL # BLD AUTO: 0 THOUSAND/ÂΜL (ref 0–0.61)
EOSINOPHIL NFR BLD AUTO: 0 % (ref 0–6)
ERYTHROCYTE [DISTWIDTH] IN BLOOD BY AUTOMATED COUNT: 13.5 % (ref 11.6–15.1)
GFR SERPL CREATININE-BSD FRML MDRD: 95 ML/MIN/1.73SQ M
GLUCOSE P FAST SERPL-MCNC: 182 MG/DL (ref 65–99)
GLUCOSE SERPL-MCNC: 163 MG/DL (ref 65–140)
GLUCOSE SERPL-MCNC: 182 MG/DL (ref 65–140)
GLUCOSE SERPL-MCNC: 216 MG/DL (ref 65–140)
GLUCOSE SERPL-MCNC: 218 MG/DL (ref 65–140)
GLUCOSE SERPL-MCNC: 296 MG/DL (ref 65–140)
HCT VFR BLD AUTO: 43.2 % (ref 34.8–46.1)
HGB BLD-MCNC: 14.2 G/DL (ref 11.5–15.4)
IMM GRANULOCYTES # BLD AUTO: 0.08 THOUSAND/UL (ref 0–0.2)
IMM GRANULOCYTES NFR BLD AUTO: 1 % (ref 0–2)
LYMPHOCYTES # BLD AUTO: 0.95 THOUSANDS/ÂΜL (ref 0.6–4.47)
LYMPHOCYTES NFR BLD AUTO: 7 % (ref 14–44)
MAGNESIUM SERPL-MCNC: 2.5 MG/DL (ref 1.9–2.7)
MCH RBC QN AUTO: 31.6 PG (ref 26.8–34.3)
MCHC RBC AUTO-ENTMCNC: 32.9 G/DL (ref 31.4–37.4)
MCV RBC AUTO: 96 FL (ref 82–98)
MONOCYTES # BLD AUTO: 0.36 THOUSAND/ÂΜL (ref 0.17–1.22)
MONOCYTES NFR BLD AUTO: 3 % (ref 4–12)
NEUTROPHILS # BLD AUTO: 13.09 THOUSANDS/ÂΜL (ref 1.85–7.62)
NEUTS SEG NFR BLD AUTO: 89 % (ref 43–75)
NRBC BLD AUTO-RTO: 0 /100 WBCS
P AXIS: 65 DEGREES
PLATELET # BLD AUTO: 363 THOUSANDS/UL (ref 149–390)
PMV BLD AUTO: 11.5 FL (ref 8.9–12.7)
POTASSIUM SERPL-SCNC: 4.4 MMOL/L (ref 3.5–5.3)
PR INTERVAL: 126 MS
QRS AXIS: 27 DEGREES
QRSD INTERVAL: 58 MS
QT INTERVAL: 320 MS
QTC INTERVAL: 420 MS
RBC # BLD AUTO: 4.5 MILLION/UL (ref 3.81–5.12)
SODIUM SERPL-SCNC: 135 MMOL/L (ref 135–147)
T WAVE AXIS: 47 DEGREES
VENTRICULAR RATE: 104 BPM
WBC # BLD AUTO: 14.49 THOUSAND/UL (ref 4.31–10.16)

## 2023-06-12 PROCEDURE — 99233 SBSQ HOSP IP/OBS HIGH 50: CPT | Performed by: NURSE PRACTITIONER

## 2023-06-12 PROCEDURE — 85025 COMPLETE CBC W/AUTO DIFF WBC: CPT | Performed by: INTERNAL MEDICINE

## 2023-06-12 PROCEDURE — 83735 ASSAY OF MAGNESIUM: CPT | Performed by: INTERNAL MEDICINE

## 2023-06-12 PROCEDURE — 94640 AIRWAY INHALATION TREATMENT: CPT

## 2023-06-12 PROCEDURE — 93010 ELECTROCARDIOGRAM REPORT: CPT | Performed by: INTERNAL MEDICINE

## 2023-06-12 PROCEDURE — 94760 N-INVAS EAR/PLS OXIMETRY 1: CPT

## 2023-06-12 PROCEDURE — 80048 BASIC METABOLIC PNL TOTAL CA: CPT | Performed by: INTERNAL MEDICINE

## 2023-06-12 PROCEDURE — 82948 REAGENT STRIP/BLOOD GLUCOSE: CPT

## 2023-06-12 PROCEDURE — 94150 VITAL CAPACITY TEST: CPT

## 2023-06-12 PROCEDURE — 94664 DEMO&/EVAL PT USE INHALER: CPT

## 2023-06-12 PROCEDURE — 83036 HEMOGLOBIN GLYCOSYLATED A1C: CPT | Performed by: NURSE PRACTITIONER

## 2023-06-12 RX ORDER — INSULIN LISPRO 100 [IU]/ML
1-5 INJECTION, SOLUTION INTRAVENOUS; SUBCUTANEOUS
Status: DISCONTINUED | OUTPATIENT
Start: 2023-06-12 | End: 2023-06-16 | Stop reason: HOSPADM

## 2023-06-12 RX ORDER — MAGNESIUM SULFATE HEPTAHYDRATE 40 MG/ML
2 INJECTION, SOLUTION INTRAVENOUS ONCE
Status: COMPLETED | OUTPATIENT
Start: 2023-06-12 | End: 2023-06-13

## 2023-06-12 RX ORDER — INSULIN LISPRO 100 [IU]/ML
3 INJECTION, SOLUTION INTRAVENOUS; SUBCUTANEOUS
Status: DISCONTINUED | OUTPATIENT
Start: 2023-06-12 | End: 2023-06-16 | Stop reason: HOSPADM

## 2023-06-12 RX ORDER — FAMOTIDINE 20 MG/1
20 TABLET, FILM COATED ORAL 2 TIMES DAILY
Status: DISCONTINUED | OUTPATIENT
Start: 2023-06-12 | End: 2023-06-15

## 2023-06-12 RX ADMIN — INSULIN LISPRO 2 UNITS: 100 INJECTION, SOLUTION INTRAVENOUS; SUBCUTANEOUS at 12:06

## 2023-06-12 RX ADMIN — GABAPENTIN 100 MG: 100 CAPSULE ORAL at 16:06

## 2023-06-12 RX ADMIN — METHYLPREDNISOLONE SODIUM SUCCINATE 40 MG: 40 INJECTION, POWDER, FOR SOLUTION INTRAMUSCULAR; INTRAVENOUS at 05:07

## 2023-06-12 RX ADMIN — METHYLPREDNISOLONE SODIUM SUCCINATE 40 MG: 40 INJECTION, POWDER, FOR SOLUTION INTRAMUSCULAR; INTRAVENOUS at 21:25

## 2023-06-12 RX ADMIN — LEVALBUTEROL HYDROCHLORIDE 1.25 MG: 1.25 SOLUTION RESPIRATORY (INHALATION) at 07:30

## 2023-06-12 RX ADMIN — HYDROCODONE BITARTRATE AND HOMATROPINE METHYLBROMIDE 5 ML: 5; 1.5 SYRUP ORAL at 14:16

## 2023-06-12 RX ADMIN — ATORVASTATIN CALCIUM 10 MG: 10 TABLET, FILM COATED ORAL at 16:56

## 2023-06-12 RX ADMIN — IPRATROPIUM BROMIDE 0.5 MG: 0.5 SOLUTION RESPIRATORY (INHALATION) at 19:34

## 2023-06-12 RX ADMIN — MAGNESIUM HYDROXIDE 30 ML: 400 SUSPENSION ORAL at 17:57

## 2023-06-12 RX ADMIN — PROMETHAZINE HYDROCHLORIDE 12.5 MG: 6.25 SYRUP ORAL at 21:00

## 2023-06-12 RX ADMIN — INSULIN LISPRO 3 UNITS: 100 INJECTION, SOLUTION INTRAVENOUS; SUBCUTANEOUS at 16:58

## 2023-06-12 RX ADMIN — IPRATROPIUM BROMIDE 0.5 MG: 0.5 SOLUTION RESPIRATORY (INHALATION) at 07:30

## 2023-06-12 RX ADMIN — INSULIN LISPRO 3 UNITS: 100 INJECTION, SOLUTION INTRAVENOUS; SUBCUTANEOUS at 12:06

## 2023-06-12 RX ADMIN — ENOXAPARIN SODIUM 40 MG: 40 INJECTION SUBCUTANEOUS at 08:40

## 2023-06-12 RX ADMIN — HYDROCODONE BITARTRATE AND HOMATROPINE METHYLBROMIDE 5 ML: 5; 1.5 SYRUP ORAL at 08:52

## 2023-06-12 RX ADMIN — GABAPENTIN 100 MG: 100 CAPSULE ORAL at 08:40

## 2023-06-12 RX ADMIN — INSULIN LISPRO 1 UNITS: 100 INJECTION, SOLUTION INTRAVENOUS; SUBCUTANEOUS at 16:58

## 2023-06-12 RX ADMIN — IPRATROPIUM BROMIDE 0.5 MG: 0.5 SOLUTION RESPIRATORY (INHALATION) at 13:49

## 2023-06-12 RX ADMIN — LEVALBUTEROL HYDROCHLORIDE 1.25 MG: 1.25 SOLUTION RESPIRATORY (INHALATION) at 19:34

## 2023-06-12 RX ADMIN — FAMOTIDINE 20 MG: 20 TABLET, FILM COATED ORAL at 13:13

## 2023-06-12 RX ADMIN — MAGNESIUM SULFATE HEPTAHYDRATE 2 G: 40 INJECTION, SOLUTION INTRAVENOUS at 13:17

## 2023-06-12 RX ADMIN — ZOLPIDEM TARTRATE 10 MG: 5 TABLET, COATED ORAL at 21:24

## 2023-06-12 RX ADMIN — PANTOPRAZOLE SODIUM 40 MG: 40 TABLET, DELAYED RELEASE ORAL at 16:06

## 2023-06-12 RX ADMIN — METHYLPREDNISOLONE SODIUM SUCCINATE 40 MG: 40 INJECTION, POWDER, FOR SOLUTION INTRAMUSCULAR; INTRAVENOUS at 14:16

## 2023-06-12 RX ADMIN — MONTELUKAST 10 MG: 10 TABLET, FILM COATED ORAL at 21:24

## 2023-06-12 RX ADMIN — PANTOPRAZOLE SODIUM 40 MG: 40 TABLET, DELAYED RELEASE ORAL at 08:40

## 2023-06-12 RX ADMIN — TORSEMIDE 10 MG: 10 TABLET ORAL at 08:40

## 2023-06-12 RX ADMIN — LEVALBUTEROL HYDROCHLORIDE 1.25 MG: 1.25 SOLUTION RESPIRATORY (INHALATION) at 13:49

## 2023-06-12 RX ADMIN — FAMOTIDINE 20 MG: 20 TABLET, FILM COATED ORAL at 21:30

## 2023-06-12 RX ADMIN — GABAPENTIN 100 MG: 100 CAPSULE ORAL at 21:00

## 2023-06-12 NOTE — RESPIRATORY THERAPY NOTE
"Pt declines aerosol therapy at this time, stating she was given an hour long treatment in the ED, when asked how she felt she stated \"not 100% but I'm comfortable,\" I explained TID frequency to pt and she responded \"they can start me in the morning,\" advised pt to notify RN if her breathing worsens  "

## 2023-06-12 NOTE — ASSESSMENT & PLAN NOTE
· Patient per nursing with reported hypoxia overnight 88-89%   · Patient currently on 2L via NC  · Continue to wean maintain >90%

## 2023-06-12 NOTE — ASSESSMENT & PLAN NOTE
· She would benefit from weight loss after resolution of acute illness  · bmi improved from previous patient to continue follow up with weight management
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
## 2023-06-12 NOTE — RESPIRATORY THERAPY NOTE
RT Protocol Note  Manoj Tran 66 y o  female MRN: [id number]  Unit/Bed#: -01 Encounter: [id number]    Assessment    Principal Problem:    Asthma exacerbation  Active Problems:    Hyperlipidemia    Diabetes ([address] )    GERD (gastroesophageal reflux disease)    Class 2 obesity with body mass index (BMI) of 35 0 to 35 9 in adult    Polypharmacy    Hypoxia      Home Pulmonary Medications:     06/12/23 1349   Respiratory Protocol   Protocol Initiated? No   Protocol Selection Respiratory   Language Barrier? No   Medical & Social History Reviewed? Yes   Diagnostic Studies Reviewed? Yes   Physical Assessment Performed? Yes   Respiratory Plan Mild Distress pathway   Respiratory Assessment   Assessment Type Pre-treatment   General Appearance Alert; Awake   Respiratory Pattern Dyspnea with exertion   Chest Assessment Chest expansion symmetrical   Resp Comments continue current therapy   O2 Device ra   Additional Assessments   Pulse 96   SpO2 94 %            Past Medical History:   Diagnosis Date    Asthma     Diabetes mellitus (HCC)     GERD (gastroesophageal reflux disease)     Hyperlipidemia     Hypertension      Social History     Socioeconomic History    Marital status: /Civil Union     Spouse name: None    Number of children: None    Years of education: None    Highest education level: None   Occupational History    None   Tobacco Use    Smoking status: Never    Smokeless tobacco: Never   Vaping Use    Vaping Use: Never used   Substance and Sexual Activity    Alcohol use:  Yes     Alcohol/week: 7 0 standard drinks of alcohol     Types: 7 Glasses of wine per week    Drug use: Never    Sexual activity: None   Other Topics Concern    None   Social History Narrative    None     Social Determinants of Health     Financial Resource Strain: Not on file   Food Insecurity: No Food Insecurity (2/27/2023)    Hunger Vital Sign     Worried About Running Out of Food in the Last Year: Never true     Ran Out of Food in the "Last Year: Never true   Transportation Needs: No Transportation Needs (2/27/2023)    PRAPARE - Transportation     Lack of Transportation (Medical): No     Lack of Transportation (Non-Medical): No   Physical Activity: Not on file   Stress: Not on file   Social Connections: Not on file   Intimate Partner Violence: Not on file   Housing Stability: Low Risk  (2/27/2023)    Housing Stability Vital Sign     Unable to Pay for Housing in the Last Year: No     Number of Places Lived in the Last Year: 1     Unstable Housing in the Last Year: No       Subjective         Objective    Physical Exam:   Assessment Type: Pre-treatment  General Appearance: Alert, Awake  Respiratory Pattern: Dyspnea with exertion  Chest Assessment: Chest expansion symmetrical  Bilateral Breath Sounds: Expiratory wheezes, Diminished  O2 Device: ra    Vitals:  Blood pressure 113/70, pulse 102, temperature 98 4 °F (36 9 °C), resp  rate (!) 26, height 4' 11\" (1 499 m), weight 80 7 kg (178 lb), SpO2 92 %  Imaging and other studies: I have personally reviewed pertinent reports        O2 Device: ra     Plan    Respiratory Plan: Mild Distress pathway        Resp Comments: continue current therapy   "

## 2023-06-12 NOTE — ASSESSMENT & PLAN NOTE
Lab Results   Component Value Date    HGBA1C 4 9 10/04/2022       Recent Labs     06/11/23  1631 06/12/23  0750   POCGLU 95 216*       Blood Sugar Average: Last 72 hrs:  (P) 155 5     · Hold previous antidiabetic medications  · Repeat a1c  · RN checked am glucose noted to be 216  · Although a1c controlled given steroid use reasonable to order mealtime and SSI while on steroids  · Add lispro 3 units TID with meals and algorithm 1 SSI   · Adjust per blood glucose to maintain control 140-180

## 2023-06-12 NOTE — UTILIZATION REVIEW
Initial Clinical Review    Admission: Date/Time/Statement:   Admission Orders (From admission, onward)     Ordered        06/11/23 1819  Place in Observation  Once                      Orders Placed This Encounter   Procedures   • Place in Observation     Standing Status:   Standing     Number of Occurrences:   1     Order Specific Question:   Level of Care     Answer:   Med Surg [16]     ED Arrival Information     Expected   -    Arrival   6/11/2023 16:14    Acuity   Emergent            Means of arrival   Walk-In    Escorted by   Spouse    Service   Hospitalist    Admission type   Emergency            Arrival complaint   ASTHMA           Chief Complaint   Patient presents with   • Asthma     Pt c/o increasing wheezing and sob since Tuesday  Initial Presentation: 66 y o  female who presented self from home to [facility] ED  Admitted in observation status for evaluation and treatment of asthma exacerbation  PMHx: asthma, T2DM, GERD, HLD, HTN  Presented w/ SOB worsening since the [address] affected air quality in the area  States had wheezing, not able to complete full sentences  On exam, wheezing, diaphoretic, increased work of breathing  Plan: IV solu-medrol q8h, nebs, continue singulair, monitor peak flow, continuous pulse ox, PPI, SSI w/ accuchecks ACHS, continue home statin      06/12/23: Reports ongoing SOB, cough, minimal improvement of symptoms  On exam, wheezing, decreased breath sounds  Plan: continue IV solu-medrol, nebs, continue current meds, Supplemental O2, weaned as tolerated; PPI, SSI w/ accuchecks ACHS  Supportive care, Trend labs, replete electrolytes as needed      ED Triage Vitals   Temperature Pulse Respirations Blood Pressure SpO2   06/11/23 1620 06/11/23 1620 06/11/23 1620 06/11/23 1620 06/11/23 1620   97 7 °F (36 5 °C) 100 22 134/71 98 %      Temp Source Heart Rate Source Patient Position - Orthostatic VS BP Location FiO2 (%)   06/11/23 1620 06/11/23 1620 06/11/23 1620 06/11/23 1620 --   Temporal Monitor Sitting Right arm       Pain Score       06/11/23 1811       8          Wt Readings from Last 1 Encounters:   06/11/23 80 7 kg (178 lb)     Additional Vital Signs:   Date/Time Temp Pulse Resp BP MAP (mmHg) SpO2 Calculated FIO2 (%) - Nasal Cannula Nasal Cannula O2 Flow Rate (L/min) O2 Device   06/12/23 1349 -- -- -- -- -- 94 % -- -- None (Room air)   06/12/23 07:49:17 97 8 °F (36 6 °C) 95 -- 116/71 86 99 % -- -- --   06/12/23 0732 -- -- -- -- -- 94 % 28 2 L/min Nasal cannula   06/11/23 22:17:13 97 7 °F (36 5 °C) 89 -- 139/87 104 93 % -- -- --   06/11/23 1952 -- -- -- -- -- -- 28 2 L/min Nasal cannula   06/11/23 18:57:27 97 6 °F (36 4 °C) 100 -- 97/60 72 90 % -- -- --   06/11/23 1841 -- -- -- -- -- 96 % -- -- --   06/11/23 1837 -- 103 26 Abnormal  -- -- 93 % -- -- --   06/11/23 1730 -- 104 22 100/56 73 94 % -- -- None (Room air)     Pertinent Labs/Diagnostic Test Results:   XR chest 1 view portable   ED Interpretation by Tl Starr MD (06/11 1806)   No acute cardiopulmonary process or osseous abnormality  Final Result by Juve Nieto MD (44/51 0178)      No acute cardiopulmonary disease        Findings are stable            Workstation performed: [id number]               Results from last 7 days   Lab Units 06/12/23  0449 06/11/23  1639   HEMATOCRIT % 43 2 40 2   HEMOGLOBIN g/dL 14 2 13 3   NEUTROS ABS Thousands/µL 13 09* 6 53   PLATELETS Thousands/uL 363 513*   WBC Thousand/uL 14 49* 11 62*         Results from last 7 days   Lab Units 06/12/23  0449 06/11/23  1639   ANION GAP mmol/L 10 9   BUN mg/dL 9 8   CALCIUM mg/dL 9 6 9 2   CHLORIDE mmol/L 102 104   CO2 mmol/L 23 24   CREATININE mg/dL 0 71 0 86   EGFR ml/min/1 73sq m 95 75   POTASSIUM mmol/L 4 4 3 6   MAGNESIUM mg/dL 2 5  --    SODIUM mmol/L 135 137     Results from last 7 days   Lab Units 06/11/23  1639   ALBUMIN g/dL 3 8   ALK PHOS U/L 110*   ALT U/L 19   AST U/L 23   TOTAL BILIRUBIN mg/dL 0 63 TOTAL PROTEIN g/dL 7 9     Results from last 7 days   Lab Units 06/12/23  0750 06/11/23  1631 06/08/23  1054   POC GLUCOSE mg/dl 216* 95 93     Results from last 7 days   Lab Units 06/12/23  0449 06/11/23  1639   GLUCOSE RANDOM mg/dL 182* 92     Results from last 7 days   Lab Units 06/11/23  1955 06/11/23  1639   HS TNI 0HR ng/L  --  2   HS TNI 2HR ng/L 3  --    HSTNI D2 ng/L 1  --          ED Treatment:   Medication Administration from 06/11/2023 1614 to 06/11/2023 1854       Date/Time Order Dose Route Action     06/11/2023 1633 EDT methylPREDNISolone sodium succinate (Solu-MEDROL) injection 125 mg 125 mg Intravenous Given     06/11/2023 1636 EDT albuterol inhalation solution 10 mg 10 mg Nebulization Given     06/11/2023 1636 EDT ipratropium (ATROVENT) 0 02 % inhalation solution 1 mg 1 mg Nebulization Given     06/11/2023 1636 EDT sodium chloride 0 9 % inhalation solution 3 mL 3 mL Nebulization Given     06/11/2023 1636 EDT magnesium sulfate 2 g/50 mL IVPB (premix) 2 g 2 g Intravenous New Bag     06/11/2023 1749 EDT LORazepam (ATIVAN) injection 0 5 mg 0 5 mg Intravenous Given     06/11/2023 1749 EDT terbutaline (BRETHINE) injection 0 25 mg 0 25 mg Subcutaneous Given     06/11/2023 1811 EDT acetaminophen (TYLENOL) tablet 975 mg 975 mg Oral Given        Past Medical History:   Diagnosis Date   • Asthma    • Diabetes mellitus ([facility] )    • GERD (gastroesophageal reflux disease)    • Hyperlipidemia    • Hypertension      Present on Admission:  • Hyperlipidemia  • Diabetes ([facility] )  • GERD (gastroesophageal reflux disease)      Admitting Diagnosis: Respiratory distress [R06 03]  Asthma [J45 909]  Acute asthma exacerbation [J45 901]  Age/Sex: 66 y o  female  Admission Orders:  Regular Diet  SCDs  Peak Flow Daily      Scheduled Medications:  atorvastatin, 10 mg, Oral, Daily With Dinner  enoxaparin, 40 mg, Subcutaneous, Daily  gabapentin, 100 mg, Oral, TID  ipratropium, 0 5 mg, Nebulization, TID  levalbuterol, 1 25 mg, Nebulization, TID  methylPREDNISolone sodium succinate, 40 mg, Intravenous, Q8H KAYLEIGH  montelukast, 10 mg, Oral, HS  pantoprazole, 40 mg, Oral, BID AC  torsemide, 10 mg, Oral, Daily      Continuous IV Infusions:     PRN Meds:  albuterol, 2 5 mg, Nebulization, 4x Daily PRN  dicyclomine, 20 mg, Oral, TID PRN  HYDROcodone Bit-Homatrop MBr, 5 mL, Oral, Q4H PRN  ondansetron, 4 mg, Intravenous, Q6H PRN  promethazine, 12 5 mg, Oral, Q6H PRN  zolpidem, 10 mg, Oral, HS PRN          Network Utilization Review Department  ATTENTION: Please call with any questions or concerns to [phone] and carefully listen to the prompts so that you are directed to the right person  All voicemails are confidential   Jasmine Hansen all requests for admission clinical reviews, approved or denied determinations and any other requests to dedicated fax number below belonging to the campus where the patient is receiving treatment   List of dedicated fax numbers for the Facilities:  [address] DENIALS (Administrative/Medical Necessity) [phone]   [address] (Maternity/NICU/Pediatrics) [phone]    Kate Vo [phone]   [facility]  [phone]   [address] [address] Diallo Wilks 28 [phone]   1556 [facility] Sus[facility]15 [facility] [phone]
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
## 2023-06-12 NOTE — RESPIRATORY THERAPY NOTE
RT Protocol Note  Sissy Seip 66 y o  female MRN: [id number]  Unit/Bed#: -01 Encounter: [id number]    Assessment    Principal Problem:    Asthma exacerbation  Active Problems:    Hyperlipidemia    Diabetes ([address] )    GERD (gastroesophageal reflux disease)    Class 2 obesity with body mass index (BMI) of 35 0 to 35 9 in adult    Polypharmacy    Hypoxia      Home Pulmonary Medications:     06/12/23 1934   Respiratory Protocol   Protocol Initiated? No   Protocol Selection Respiratory   Language Barrier? No   Medical & Social History Reviewed? Yes   Diagnostic Studies Reviewed? Yes   Physical Assessment Performed? Yes   Respiratory Plan Mild Distress pathway   Respiratory Assessment   Assessment Type During-treatment   General Appearance Alert; Awake   Respiratory Pattern Dyspnea with exertion   Chest Assessment Chest expansion symmetrical   Bilateral Breath Sounds Expiratory wheezes   R Breath Sounds Expiratory wheezes   L Breath Sounds Expiratory wheezes   Location Specific No   Cough None   Resp Comments Resp protocol completed  Will continue with current tx plan   O2 Device RA   Cough Description   Sputum Amount None   Additional Assessments   Pulse 105   Respirations 20   SpO2 96 %            Past Medical History:   Diagnosis Date    Asthma     Diabetes mellitus (HCC)     GERD (gastroesophageal reflux disease)     Hyperlipidemia     Hypertension      Social History     Socioeconomic History    Marital status: /Civil Union     Spouse name: None    Number of children: None    Years of education: None    Highest education level: None   Occupational History    None   Tobacco Use    Smoking status: Never    Smokeless tobacco: Never   Vaping Use    Vaping Use: Never used   Substance and Sexual Activity    Alcohol use:  Yes     Alcohol/week: 7 0 standard drinks of alcohol     Types: 7 Glasses of wine per week    Drug use: Never    Sexual activity: None   Other Topics Concern    None   Social History Narrative "   None     Social Determinants of Health     Financial Resource Strain: Not on file   Food Insecurity: No Food Insecurity (2/27/2023)    Hunger Vital Sign     Worried About Running Out of Food in the Last Year: Never true     Ran Out of Food in the Last Year: Never true   Transportation Needs: No Transportation Needs (2/27/2023)    PRAPARE - Transportation     Lack of Transportation (Medical): No     Lack of Transportation (Non-Medical): No   Physical Activity: Not on file   Stress: Not on file   Social Connections: Not on file   Intimate Partner Violence: Not on file   Housing Stability: Low Risk  (2/27/2023)    Housing Stability Vital Sign     Unable to Pay for Housing in the Last Year: No     Number of Places Lived in the Last Year: 1     Unstable Housing in the Last Year: No       Subjective         Objective    Physical Exam:   Assessment Type: During-treatment  General Appearance: Alert, Awake  Respiratory Pattern: Dyspnea with exertion  Chest Assessment: Chest expansion symmetrical  Bilateral Breath Sounds: Expiratory wheezes  R Breath Sounds: Expiratory wheezes  L Breath Sounds: Expiratory wheezes  Location Specific: No  Cough: None  O2 Device: RA    Vitals:  Blood pressure 113/70, pulse 105, temperature 98 4 °F (36 9 °C), resp  rate 20, height 4' 11\" (1 499 m), weight 80 7 kg (178 lb), SpO2 96 %  Imaging and other studies: I have personally reviewed pertinent reports  O2 Device: RA     Plan    Respiratory Plan: Mild Distress pathway        Resp Comments: Resp protocol completed   Will continue with current tx plan   "

## 2023-06-13 LAB
ANION GAP SERPL CALCULATED.3IONS-SCNC: 10 MMOL/L (ref 4–13)
BUN SERPL-MCNC: 11 MG/DL (ref 5–25)
CALCIUM SERPL-MCNC: 9.1 MG/DL (ref 8.4–10.2)
CHLORIDE SERPL-SCNC: 100 MMOL/L (ref 96–108)
CO2 SERPL-SCNC: 27 MMOL/L (ref 21–32)
CREAT SERPL-MCNC: 0.93 MG/DL (ref 0.6–1.3)
EST. AVERAGE GLUCOSE BLD GHB EST-MCNC: 91 MG/DL
GFR SERPL CREATININE-BSD FRML MDRD: 68 ML/MIN/1.73SQ M
GLUCOSE SERPL-MCNC: 164 MG/DL (ref 65–140)
GLUCOSE SERPL-MCNC: 166 MG/DL (ref 65–140)
GLUCOSE SERPL-MCNC: 203 MG/DL (ref 65–140)
GLUCOSE SERPL-MCNC: 207 MG/DL (ref 65–140)
GLUCOSE SERPL-MCNC: 213 MG/DL (ref 65–140)
GLUCOSE SERPL-MCNC: 220 MG/DL (ref 65–140)
HBA1C MFR BLD: 4.8 %
POTASSIUM SERPL-SCNC: 3.9 MMOL/L (ref 3.5–5.3)
SODIUM SERPL-SCNC: 137 MMOL/L (ref 135–147)

## 2023-06-13 PROCEDURE — 99233 SBSQ HOSP IP/OBS HIGH 50: CPT | Performed by: NURSE PRACTITIONER

## 2023-06-13 PROCEDURE — 94664 DEMO&/EVAL PT USE INHALER: CPT

## 2023-06-13 PROCEDURE — 82948 REAGENT STRIP/BLOOD GLUCOSE: CPT

## 2023-06-13 PROCEDURE — 94640 AIRWAY INHALATION TREATMENT: CPT

## 2023-06-13 PROCEDURE — 94150 VITAL CAPACITY TEST: CPT

## 2023-06-13 PROCEDURE — 80048 BASIC METABOLIC PNL TOTAL CA: CPT | Performed by: NURSE PRACTITIONER

## 2023-06-13 PROCEDURE — 94760 N-INVAS EAR/PLS OXIMETRY 1: CPT

## 2023-06-13 PROCEDURE — 99223 1ST HOSP IP/OBS HIGH 75: CPT | Performed by: PHYSICIAN ASSISTANT

## 2023-06-13 RX ORDER — BUDESONIDE 0.5 MG/2ML
0.5 INHALANT ORAL
Status: DISCONTINUED | OUTPATIENT
Start: 2023-06-13 | End: 2023-06-16 | Stop reason: HOSPADM

## 2023-06-13 RX ORDER — ALBUTEROL SULFATE 2.5 MG/3ML
2.5 SOLUTION RESPIRATORY (INHALATION) EVERY 4 HOURS PRN
Status: DISCONTINUED | OUTPATIENT
Start: 2023-06-13 | End: 2023-06-16 | Stop reason: HOSPADM

## 2023-06-13 RX ORDER — BUDESONIDE AND FORMOTEROL FUMARATE DIHYDRATE 160; 4.5 UG/1; UG/1
2 AEROSOL RESPIRATORY (INHALATION) 2 TIMES DAILY
Status: DISCONTINUED | OUTPATIENT
Start: 2023-06-13 | End: 2023-06-16 | Stop reason: HOSPADM

## 2023-06-13 RX ORDER — DIPHENHYDRAMINE HYDROCHLORIDE 50 MG/ML
25 INJECTION INTRAMUSCULAR; INTRAVENOUS ONCE
Status: COMPLETED | OUTPATIENT
Start: 2023-06-13 | End: 2023-06-13

## 2023-06-13 RX ORDER — ACETAMINOPHEN 325 MG/1
650 TABLET ORAL EVERY 6 HOURS PRN
Status: DISCONTINUED | OUTPATIENT
Start: 2023-06-13 | End: 2023-06-16 | Stop reason: HOSPADM

## 2023-06-13 RX ORDER — POLYETHYLENE GLYCOL 3350 17 G/17G
17 POWDER, FOR SOLUTION ORAL DAILY
Status: DISCONTINUED | OUTPATIENT
Start: 2023-06-13 | End: 2023-06-16 | Stop reason: HOSPADM

## 2023-06-13 RX ADMIN — METHYLPREDNISOLONE SODIUM SUCCINATE 40 MG: 40 INJECTION, POWDER, FOR SOLUTION INTRAMUSCULAR; INTRAVENOUS at 21:05

## 2023-06-13 RX ADMIN — BUDESONIDE AND FORMOTEROL FUMARATE DIHYDRATE 2 PUFF: 160; 4.5 AEROSOL RESPIRATORY (INHALATION) at 12:08

## 2023-06-13 RX ADMIN — GABAPENTIN 100 MG: 100 CAPSULE ORAL at 09:01

## 2023-06-13 RX ADMIN — PANTOPRAZOLE SODIUM 40 MG: 40 TABLET, DELAYED RELEASE ORAL at 09:00

## 2023-06-13 RX ADMIN — GABAPENTIN 100 MG: 100 CAPSULE ORAL at 17:22

## 2023-06-13 RX ADMIN — FAMOTIDINE 20 MG: 20 TABLET, FILM COATED ORAL at 09:01

## 2023-06-13 RX ADMIN — INSULIN LISPRO 3 UNITS: 100 INJECTION, SOLUTION INTRAVENOUS; SUBCUTANEOUS at 12:08

## 2023-06-13 RX ADMIN — TORSEMIDE 10 MG: 10 TABLET ORAL at 09:02

## 2023-06-13 RX ADMIN — HYDROCODONE BITARTRATE AND HOMATROPINE METHYLBROMIDE 5 ML: 5; 1.5 SYRUP ORAL at 17:23

## 2023-06-13 RX ADMIN — GABAPENTIN 100 MG: 100 CAPSULE ORAL at 20:56

## 2023-06-13 RX ADMIN — INSULIN LISPRO 2 UNITS: 100 INJECTION, SOLUTION INTRAVENOUS; SUBCUTANEOUS at 17:21

## 2023-06-13 RX ADMIN — INSULIN LISPRO 1 UNITS: 100 INJECTION, SOLUTION INTRAVENOUS; SUBCUTANEOUS at 08:46

## 2023-06-13 RX ADMIN — MONTELUKAST 10 MG: 10 TABLET, FILM COATED ORAL at 21:05

## 2023-06-13 RX ADMIN — IPRATROPIUM BROMIDE 0.5 MG: 0.5 SOLUTION RESPIRATORY (INHALATION) at 07:26

## 2023-06-13 RX ADMIN — ATORVASTATIN CALCIUM 10 MG: 10 TABLET, FILM COATED ORAL at 17:22

## 2023-06-13 RX ADMIN — HYDROCODONE BITARTRATE AND HOMATROPINE METHYLBROMIDE 5 ML: 5; 1.5 SYRUP ORAL at 01:17

## 2023-06-13 RX ADMIN — LEVALBUTEROL HYDROCHLORIDE 1.25 MG: 1.25 SOLUTION RESPIRATORY (INHALATION) at 07:26

## 2023-06-13 RX ADMIN — INSULIN LISPRO 3 UNITS: 100 INJECTION, SOLUTION INTRAVENOUS; SUBCUTANEOUS at 08:47

## 2023-06-13 RX ADMIN — BUDESONIDE AND FORMOTEROL FUMARATE DIHYDRATE 2 PUFF: 160; 4.5 AEROSOL RESPIRATORY (INHALATION) at 17:21

## 2023-06-13 RX ADMIN — INSULIN LISPRO 3 UNITS: 100 INJECTION, SOLUTION INTRAVENOUS; SUBCUTANEOUS at 17:21

## 2023-06-13 RX ADMIN — METHYLPREDNISOLONE SODIUM SUCCINATE 40 MG: 40 INJECTION, POWDER, FOR SOLUTION INTRAMUSCULAR; INTRAVENOUS at 05:25

## 2023-06-13 RX ADMIN — PROMETHAZINE HYDROCHLORIDE 12.5 MG: 6.25 SYRUP ORAL at 05:28

## 2023-06-13 RX ADMIN — ZOLPIDEM TARTRATE 10 MG: 5 TABLET, COATED ORAL at 20:56

## 2023-06-13 RX ADMIN — INSULIN LISPRO 2 UNITS: 100 INJECTION, SOLUTION INTRAVENOUS; SUBCUTANEOUS at 12:07

## 2023-06-13 RX ADMIN — IPRATROPIUM BROMIDE 0.5 MG: 0.5 SOLUTION RESPIRATORY (INHALATION) at 13:53

## 2023-06-13 RX ADMIN — POLYETHYLENE GLYCOL 3350 17 G: 17 POWDER, FOR SOLUTION ORAL at 13:24

## 2023-06-13 RX ADMIN — DIPHENHYDRAMINE HYDROCHLORIDE 25 MG: 50 INJECTION, SOLUTION INTRAMUSCULAR; INTRAVENOUS at 22:52

## 2023-06-13 RX ADMIN — FAMOTIDINE 20 MG: 20 TABLET, FILM COATED ORAL at 17:22

## 2023-06-13 RX ADMIN — METHYLPREDNISOLONE SODIUM SUCCINATE 40 MG: 40 INJECTION, POWDER, FOR SOLUTION INTRAMUSCULAR; INTRAVENOUS at 13:24

## 2023-06-13 RX ADMIN — LEVALBUTEROL HYDROCHLORIDE 1.25 MG: 1.25 SOLUTION RESPIRATORY (INHALATION) at 13:53

## 2023-06-13 RX ADMIN — ENOXAPARIN SODIUM 40 MG: 40 INJECTION SUBCUTANEOUS at 09:01

## 2023-06-13 RX ADMIN — PROMETHAZINE HYDROCHLORIDE 12.5 MG: 6.25 SYRUP ORAL at 14:00

## 2023-06-13 RX ADMIN — PANTOPRAZOLE SODIUM 40 MG: 40 TABLET, DELAYED RELEASE ORAL at 17:22

## 2023-06-13 NOTE — CASE MANAGEMENT
Case Management Discharge Planning Note    Patient name Francis Sim  Location [address] 320/-32 MRN [id number]  : 1965 Date 2023       Current Admission Date: 2023  Current Admission Diagnosis:Asthma exacerbation   Patient Active Problem List    Diagnosis Date Noted   • Hypoxia 2023   • Polypharmacy 2023   • Class 2 obesity with body mass index (BMI) of 35 0 to 35 9 in adult 2023   • Dysphagia 2023   • Narrowing of airway 01/10/2023   • Depression with anxiety 2023   • Asthma exacerbation 2023   • Chronic cough 2022   • GERD (gastroesophageal reflux disease) 10/31/2022   • Rectus sheath hematoma 10/29/2022   • SIRS (systemic inflammatory response syndrome) ([facility] ) 10/24/2022   • Severe persistent asthma with exacerbation 10/24/2022   • Hyperlipidemia 10/24/2022   • Diabetes ([facility] ) 10/24/2022      LOS (days): 0  Geometric Mean LOS (GMLOS) (days):   Days to GMLOS:     OBJECTIVE:            Current admission status: Inpatient   Preferred Pharmacy:   [address] [place] [address]  Phone: [phone] Fax: [phone]    Primary Care Provider: Jasmina Lu    Primary Insurance: BLUE CROSS  Secondary Insurance:     DISCHARGE DETAILS:     CM following for any needs that may require CM involvment

## 2023-06-13 NOTE — ASSESSMENT & PLAN NOTE
Lab Results   Component Value Date    HGBA1C 4 8 06/12/2023       Recent Labs     06/12/23  2110 06/13/23  0101 06/13/23  0725 06/13/23  1122   POCGLU 296* 164* 166* 213*       Blood Sugar Average: Last 72 hrs:  (P) 191 375     · Hold previous antidiabetic medications  · Repeat a1c 4 8  · RN checked am glucose noted to be 216  · Although a1c controlled given steroid use reasonable to order mealtime and SSI while on steroids  · Add lispro 3 units TID with meals and algorithm 1 SSI   · Adjust per blood glucose to maintain control 140-180
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
## 2023-06-13 NOTE — PLAN OF CARE
Problem: Potential for Falls  Goal: Patient will remain free of falls  Description: INTERVENTIONS:  - Educate patient/family on patient safety including physical limitations  - Instruct patient to call for assistance with activity   - Consult OT/PT to assist with strengthening/mobility   - Keep Call bell within reach  - Keep bed low and locked with side rails adjusted as appropriate  - Keep care items and personal belongings within reach  - Initiate and maintain comfort rounds  - Make Fall Risk Sign visible to staff  - Offer Toileting every 2 Hours, in advance of need  - Initiate/Maintain bed alarm  - Obtain necessary fall risk management equipment:    - Apply yellow socks and bracelet for high fall risk patients  - Consider moving patient to room near nurses station  Outcome: Progressing     Problem: PAIN - ADULT  Goal: Verbalizes/displays adequate comfort level or baseline comfort level  Description: Interventions:  - Encourage patient to monitor pain and request assistance  - Assess pain using appropriate pain scale  - Administer analgesics based on type and severity of pain and evaluate response  - Implement non-pharmacological measures as appropriate and evaluate response  - Consider cultural and social influences on pain and pain management  - Notify physician/advanced practitioner if interventions unsuccessful or patient reports new pain  Outcome: Progressing     Problem: INFECTION - ADULT  Goal: Absence or prevention of progression during hospitalization  Description: INTERVENTIONS:  - Assess and monitor for signs and symptoms of infection  - Monitor lab/diagnostic results  - Monitor all insertion sites, i e  indwelling lines, tubes, and drains  - Monitor endotracheal if appropriate and nasal secretions for changes in amount and color  - [place] appropriate cooling/warming therapies per order  - Administer medications as ordered  - Instruct and encourage patient and family to use good hand hygiene technique  - Identify and instruct in appropriate isolation precautions for identified infection/condition  Outcome: Progressing     Problem: SAFETY ADULT  Goal: Patient will remain free of falls  Description: INTERVENTIONS:  - Educate patient/family on patient safety including physical limitations  - Instruct patient to call for assistance with activity   - Consult OT/PT to assist with strengthening/mobility   - Keep Call bell within reach  - Keep bed low and locked with side rails adjusted as appropriate  - Keep care items and personal belongings within reach  - Initiate and maintain comfort rounds  - Make Fall Risk Sign visible to staff  - Offer Toileting every 2 Hours, in advance of need  - Initiate/Maintain bed alarm  - Obtain necessary fall risk management equipment:   - Apply yellow socks and bracelet for high fall risk patients  - Consider moving patient to room near nurses station  Outcome: Progressing  Goal: Maintain or return to baseline ADL function  Description: INTERVENTIONS:  -  Assess patient's ability to carry out ADLs; assess patient's baseline for ADL function and identify physical deficits which impact ability to perform ADLs (bathing, care of mouth/teeth, toileting, grooming, dressing, etc )  - Assess/evaluate cause of self-care deficits   - Assess range of motion  - Assess patient's mobility; develop plan if impaired  - Assess patient's need for assistive devices and provide as appropriate  - Encourage maximum independence but intervene and supervise when necessary  - Involve family in performance of ADLs  - Assess for home care needs following discharge   - Consider OT consult to assist with ADL evaluation and planning for discharge  - Provide patient education as appropriate  Outcome: Progressing  Goal: Maintains/Returns to pre admission functional level  Description: INTERVENTIONS:  - Perform BMAT or MOVE assessment daily    - Set and communicate daily mobility goal to care team and patient/family/caregiver  - Collaborate with rehabilitation services on mobility goals if consulted  - Perform Range of Motion 3 times a day  - Reposition patient every 2 hours  - Dangle patient 3 times a day  - Stand patient 3 times a day  - Ambulate patient 3 times a day  - Out of bed to chair 3 times a day   - Out of bed for meals 3 times a day  - Out of bed for toileting  - Record patient progress and toleration of activity level   Outcome: Progressing     Problem: DISCHARGE PLANNING  Goal: Discharge to home or other facility with appropriate resources  Description: INTERVENTIONS:  - Identify barriers to discharge w/patient and caregiver  - Arrange for needed discharge resources and transportation as appropriate  - Identify discharge learning needs (meds, wound care, etc )  - Arrange for interpretive services to assist at discharge as needed  - Refer to Case Management Department for coordinating discharge planning if the patient needs post-hospital services based on physician/advanced practitioner order or complex needs related to functional status, cognitive ability, or social support system  Outcome: Progressing     Problem: Knowledge Deficit  Goal: Patient/family/caregiver demonstrates understanding of disease process, treatment plan, medications, and discharge instructions  Description: Complete learning assessment and assess knowledge base    Interventions:  - Provide teaching at level of understanding  - Provide teaching via preferred learning methods  Outcome: Progressing     Problem: RESPIRATORY - ADULT  Goal: Achieves optimal ventilation and oxygenation  Description: INTERVENTIONS:  - Assess for changes in respiratory status  - Assess for changes in mentation and behavior  - Position to facilitate oxygenation and minimize respiratory effort  - Oxygen administered by appropriate delivery if ordered  - Initiate smoking cessation education as indicated  - Encourage broncho-pulmonary hygiene including cough, deep breathe, Incentive Spirometry  - Assess the need for suctioning and aspirate as needed  - Assess and instruct to report SOB or any respiratory difficulty  - Respiratory Therapy support as indicated  Outcome: Progressing

## 2023-06-13 NOTE — RESPIRATORY THERAPY NOTE
"RT Protocol Note  Colletta [place] 66 y o  female MRN: [id number]  Unit/Bed#: -01 Encounter: [id number]    Assessment    Principal Problem:    Asthma exacerbation  Active Problems:    Hyperlipidemia    Diabetes ([address] )    GERD (gastroesophageal reflux disease)    Class 2 obesity with body mass index (BMI) of 35 0 to 35 9 in adult    Polypharmacy    Hypoxia      Objective    Physical Exam:   Assessment Type: Post-treatment  General Appearance: Alert, Awake  Respiratory Pattern: Normal  Chest Assessment: Chest expansion symmetrical  Bilateral Breath Sounds: Diminished  O2 Device: ra    Vitals:  Blood pressure 114/73, pulse 102, temperature 98 5 °F (36 9 °C), resp  rate 20, height 4' 11\" (1 499 m), weight 80 7 kg (178 lb), SpO2 94 %  O2 Device: ra     Plan    Respiratory Plan: Mild Distress pathway        Resp Comments: pt has hx of asthma will cont tx as scheduled        06/13/23 0740   Respiratory Protocol   Protocol Initiated? No   Protocol Selection Respiratory   Language Barrier? No   Medical & Social History Reviewed? Yes   Diagnostic Studies Reviewed? Yes   Physical Assessment Performed? Yes   Respiratory Plan Mild Distress pathway   Respiratory Assessment   Assessment Type Post-treatment   General Appearance Alert; Awake   Respiratory Pattern Normal   Chest Assessment Chest expansion symmetrical   Bilateral Breath Sounds Diminished   Resp Comments pt has hx of asthma will cont tx as scheduled   O2 Device ra   Additional Assessments   Pulse 102   Respirations 20   SpO2 94 %   Post Peak Expiratory Flow Rate  LPM   $ Vital Capacity Mech/Peak Flow Yes       "

## 2023-06-14 PROBLEM — L29.9 ITCHING: Status: ACTIVE | Noted: 2023-06-14

## 2023-06-14 LAB
ANION GAP SERPL CALCULATED.3IONS-SCNC: 10 MMOL/L (ref 4–13)
BUN SERPL-MCNC: 14 MG/DL (ref 5–25)
CALCIUM SERPL-MCNC: 9.1 MG/DL (ref 8.4–10.2)
CHLORIDE SERPL-SCNC: 99 MMOL/L (ref 96–108)
CO2 SERPL-SCNC: 27 MMOL/L (ref 21–32)
CREAT SERPL-MCNC: 0.84 MG/DL (ref 0.6–1.3)
ERYTHROCYTE [DISTWIDTH] IN BLOOD BY AUTOMATED COUNT: 14 % (ref 11.6–15.1)
GFR SERPL CREATININE-BSD FRML MDRD: 77 ML/MIN/1.73SQ M
GLUCOSE SERPL-MCNC: 164 MG/DL (ref 65–140)
GLUCOSE SERPL-MCNC: 170 MG/DL (ref 65–140)
GLUCOSE SERPL-MCNC: 176 MG/DL (ref 65–140)
GLUCOSE SERPL-MCNC: 185 MG/DL (ref 65–140)
GLUCOSE SERPL-MCNC: 288 MG/DL (ref 65–140)
HCT VFR BLD AUTO: 39.9 % (ref 34.8–46.1)
HGB BLD-MCNC: 12.9 G/DL (ref 11.5–15.4)
MCH RBC QN AUTO: 31.3 PG (ref 26.8–34.3)
MCHC RBC AUTO-ENTMCNC: 32.3 G/DL (ref 31.4–37.4)
MCV RBC AUTO: 97 FL (ref 82–98)
PLATELET # BLD AUTO: 530 THOUSANDS/UL (ref 149–390)
PMV BLD AUTO: 10.3 FL (ref 8.9–12.7)
POTASSIUM SERPL-SCNC: 3.8 MMOL/L (ref 3.5–5.3)
RBC # BLD AUTO: 4.12 MILLION/UL (ref 3.81–5.12)
SODIUM SERPL-SCNC: 136 MMOL/L (ref 135–147)
WBC # BLD AUTO: 19.53 THOUSAND/UL (ref 4.31–10.16)

## 2023-06-14 PROCEDURE — 85027 COMPLETE CBC AUTOMATED: CPT | Performed by: NURSE PRACTITIONER

## 2023-06-14 PROCEDURE — 99232 SBSQ HOSP IP/OBS MODERATE 35: CPT | Performed by: PHYSICIAN ASSISTANT

## 2023-06-14 PROCEDURE — 82948 REAGENT STRIP/BLOOD GLUCOSE: CPT

## 2023-06-14 PROCEDURE — 99233 SBSQ HOSP IP/OBS HIGH 50: CPT | Performed by: NURSE PRACTITIONER

## 2023-06-14 PROCEDURE — 94760 N-INVAS EAR/PLS OXIMETRY 1: CPT

## 2023-06-14 PROCEDURE — 80048 BASIC METABOLIC PNL TOTAL CA: CPT | Performed by: NURSE PRACTITIONER

## 2023-06-14 PROCEDURE — 94640 AIRWAY INHALATION TREATMENT: CPT

## 2023-06-14 RX ORDER — DIPHENHYDRAMINE HYDROCHLORIDE 50 MG/ML
25 INJECTION INTRAMUSCULAR; INTRAVENOUS ONCE
Status: COMPLETED | OUTPATIENT
Start: 2023-06-14 | End: 2023-06-14

## 2023-06-14 RX ORDER — METHYLPREDNISOLONE SODIUM SUCCINATE 40 MG/ML
40 INJECTION, POWDER, LYOPHILIZED, FOR SOLUTION INTRAMUSCULAR; INTRAVENOUS EVERY 12 HOURS SCHEDULED
Status: DISCONTINUED | OUTPATIENT
Start: 2023-06-14 | End: 2023-06-15

## 2023-06-14 RX ORDER — BISACODYL 10 MG
10 SUPPOSITORY, RECTAL RECTAL DAILY PRN
Status: DISCONTINUED | OUTPATIENT
Start: 2023-06-14 | End: 2023-06-16 | Stop reason: HOSPADM

## 2023-06-14 RX ORDER — DIPHENHYDRAMINE HYDROCHLORIDE 50 MG/ML
25 INJECTION INTRAMUSCULAR; INTRAVENOUS EVERY 6 HOURS PRN
Status: COMPLETED | OUTPATIENT
Start: 2023-06-14 | End: 2023-06-15

## 2023-06-14 RX ADMIN — INSULIN LISPRO 1 UNITS: 100 INJECTION, SOLUTION INTRAVENOUS; SUBCUTANEOUS at 17:39

## 2023-06-14 RX ADMIN — DIPHENHYDRAMINE HYDROCHLORIDE 25 MG: 50 INJECTION, SOLUTION INTRAMUSCULAR; INTRAVENOUS at 17:38

## 2023-06-14 RX ADMIN — INSULIN LISPRO 1 UNITS: 100 INJECTION, SOLUTION INTRAVENOUS; SUBCUTANEOUS at 08:26

## 2023-06-14 RX ADMIN — ZOLPIDEM TARTRATE 10 MG: 5 TABLET, COATED ORAL at 22:35

## 2023-06-14 RX ADMIN — MONTELUKAST 10 MG: 10 TABLET, FILM COATED ORAL at 21:19

## 2023-06-14 RX ADMIN — INSULIN LISPRO 3 UNITS: 100 INJECTION, SOLUTION INTRAVENOUS; SUBCUTANEOUS at 12:38

## 2023-06-14 RX ADMIN — DIPHENHYDRAMINE HYDROCHLORIDE 25 MG: 50 INJECTION, SOLUTION INTRAMUSCULAR; INTRAVENOUS at 12:38

## 2023-06-14 RX ADMIN — BUDESONIDE 0.5 MG: 0.5 INHALANT RESPIRATORY (INHALATION) at 07:40

## 2023-06-14 RX ADMIN — INSULIN LISPRO 3 UNITS: 100 INJECTION, SOLUTION INTRAVENOUS; SUBCUTANEOUS at 12:37

## 2023-06-14 RX ADMIN — FAMOTIDINE 20 MG: 20 TABLET, FILM COATED ORAL at 08:22

## 2023-06-14 RX ADMIN — DIPHENHYDRAMINE HYDROCHLORIDE 25 MG: 50 INJECTION, SOLUTION INTRAMUSCULAR; INTRAVENOUS at 03:31

## 2023-06-14 RX ADMIN — GABAPENTIN 100 MG: 100 CAPSULE ORAL at 21:19

## 2023-06-14 RX ADMIN — INSULIN LISPRO 3 UNITS: 100 INJECTION, SOLUTION INTRAVENOUS; SUBCUTANEOUS at 17:39

## 2023-06-14 RX ADMIN — FAMOTIDINE 20 MG: 20 TABLET, FILM COATED ORAL at 17:38

## 2023-06-14 RX ADMIN — ATORVASTATIN CALCIUM 10 MG: 10 TABLET, FILM COATED ORAL at 17:38

## 2023-06-14 RX ADMIN — BUDESONIDE AND FORMOTEROL FUMARATE DIHYDRATE 2 PUFF: 160; 4.5 AEROSOL RESPIRATORY (INHALATION) at 17:39

## 2023-06-14 RX ADMIN — IPRATROPIUM BROMIDE 0.5 MG: 0.5 SOLUTION RESPIRATORY (INHALATION) at 07:40

## 2023-06-14 RX ADMIN — PANTOPRAZOLE SODIUM 40 MG: 40 TABLET, DELAYED RELEASE ORAL at 06:58

## 2023-06-14 RX ADMIN — INSULIN LISPRO 3 UNITS: 100 INJECTION, SOLUTION INTRAVENOUS; SUBCUTANEOUS at 08:27

## 2023-06-14 RX ADMIN — GABAPENTIN 100 MG: 100 CAPSULE ORAL at 08:22

## 2023-06-14 RX ADMIN — METHYLPREDNISOLONE SODIUM SUCCINATE 40 MG: 40 INJECTION, POWDER, FOR SOLUTION INTRAMUSCULAR; INTRAVENOUS at 21:18

## 2023-06-14 RX ADMIN — ACETAMINOPHEN 650 MG: 325 TABLET ORAL at 12:47

## 2023-06-14 RX ADMIN — HYDROCODONE BITARTRATE AND HOMATROPINE METHYLBROMIDE 5 ML: 5; 1.5 SYRUP ORAL at 09:58

## 2023-06-14 RX ADMIN — TORSEMIDE 10 MG: 10 TABLET ORAL at 08:24

## 2023-06-14 RX ADMIN — LEVALBUTEROL HYDROCHLORIDE 1.25 MG: 1.25 SOLUTION RESPIRATORY (INHALATION) at 07:40

## 2023-06-14 RX ADMIN — METHYLPREDNISOLONE SODIUM SUCCINATE 40 MG: 40 INJECTION, POWDER, FOR SOLUTION INTRAMUSCULAR; INTRAVENOUS at 06:58

## 2023-06-14 RX ADMIN — BUDESONIDE AND FORMOTEROL FUMARATE DIHYDRATE 2 PUFF: 160; 4.5 AEROSOL RESPIRATORY (INHALATION) at 08:26

## 2023-06-14 RX ADMIN — ENOXAPARIN SODIUM 40 MG: 40 INJECTION SUBCUTANEOUS at 08:22

## 2023-06-14 RX ADMIN — BISACODYL 10 MG: 10 SUPPOSITORY RECTAL at 12:47

## 2023-06-14 RX ADMIN — POLYETHYLENE GLYCOL 3350 17 G: 17 POWDER, FOR SOLUTION ORAL at 08:22

## 2023-06-14 RX ADMIN — PANTOPRAZOLE SODIUM 40 MG: 40 TABLET, DELAYED RELEASE ORAL at 17:38

## 2023-06-14 RX ADMIN — GABAPENTIN 100 MG: 100 CAPSULE ORAL at 17:39

## 2023-06-14 NOTE — UTILIZATION REVIEW
NOTIFICATION OF INPATIENT ADMISSION   AUTHORIZATION REQUEST   SERVICING FACILITY:   [address] [place] [address]  Tax ID: [id number]  NPI: [id number] ATTENDING PROVIDER:  Attending Name and NPI#: Leonardo Atkinson [[id number]]  Address: [address] [place] [address]  Phone: [phone]     ADMISSION INFORMATION:  Place of Service: Inpatient [facility]  [address]  Place of Service Code: 21  Inpatient Admission Date/Time: 6/13/23  2:38 PM  Discharge Date/Time: No discharge date for patient encounter  Admitting Diagnosis Code/Description:  Respiratory distress [R06 03]  Asthma [J45 909]  Acute asthma exacerbation [J45 901]     UTILIZATION REVIEW CONTACT:  Fernando Bravo Utilization   Network Utilization Review Department  Phone: [phone]  Fax [phone]  Email: [email or web address]  Contact for approvals/pending authorizations, clinical reviews, and discharge  PHYSICIAN ADVISORY SERVICES:  Medical Necessity Denial & [employer] Review  Phone: [phone]  Fax: [phone]  Email: [email or web address]
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
## 2023-06-14 NOTE — ASSESSMENT & PLAN NOTE
· Patient per nursing with reported hypoxia by nursing none documented  · Was on oxygen 2L now on RA with sat 95% or greater

## 2023-06-14 NOTE — ASSESSMENT & PLAN NOTE
· Patient with complaints of itching overnight  No new medications given  · Unclear etiology  · No rash visualized    · Will add 2 doses prn benadryl

## 2023-06-14 NOTE — ASSESSMENT & PLAN NOTE
Lab Results   Component Value Date    HGBA1C 4 8 06/12/2023       Recent Labs     06/13/23  1122 06/13/23  1532 06/13/23  2121 06/14/23  0725   POCGLU 213* 220* 203* 164*       Blood Sugar Average: Last 72 hrs:  (P) 003 [id number]     · Hold previous antidiabetic medications  · Repeat a1c 4 8  · RN checked am glucose noted to be 216  · Although a1c controlled given steroid use reasonable to order mealtime and SSI while on steroids  · Add lispro 3 units TID with meals and algorithm 1 SSI   · Adjust per blood glucose to maintain control 140-180
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
## 2023-06-14 NOTE — PLAN OF CARE
Problem: PAIN - ADULT  Goal: Verbalizes/displays adequate comfort level or baseline comfort level  Description: Interventions:  - Encourage patient to monitor pain and request assistance  - Assess pain using appropriate pain scale  - Administer analgesics based on type and severity of pain and evaluate response  - Implement non-pharmacological measures as appropriate and evaluate response  - Consider cultural and social influences on pain and pain management  - Notify physician/advanced practitioner if interventions unsuccessful or patient reports new pain  Outcome: Progressing     Problem: INFECTION - ADULT  Goal: Absence or prevention of progression during hospitalization  Description: INTERVENTIONS:  - Assess and monitor for signs and symptoms of infection  - Monitor lab/diagnostic results  - Monitor all insertion sites, i e  indwelling lines, tubes, and drains  - Monitor endotracheal if appropriate and nasal secretions for changes in amount and color  - [place] appropriate cooling/warming therapies per order  - Administer medications as ordered  - Instruct and encourage patient and family to use good hand hygiene technique  - Identify and instruct in appropriate isolation precautions for identified infection/condition  Outcome: Progressing     Problem: DISCHARGE PLANNING  Goal: Discharge to home or other facility with appropriate resources  Description: INTERVENTIONS:  - Identify barriers to discharge w/patient and caregiver  - Arrange for needed discharge resources and transportation as appropriate  - Identify discharge learning needs (meds, wound care, etc )  - Arrange for interpretive services to assist at discharge as needed  - Refer to Case Management Department for coordinating discharge planning if the patient needs post-hospital services based on physician/advanced practitioner order or complex needs related to functional status, cognitive ability, or social support system  Outcome: Progressing Problem: Knowledge Deficit  Goal: Patient/family/caregiver demonstrates understanding of disease process, treatment plan, medications, and discharge instructions  Description: Complete learning assessment and assess knowledge base    Interventions:  - Provide teaching at level of understanding  - Provide teaching via preferred learning methods  Outcome: Progressing     Problem: RESPIRATORY - ADULT  Goal: Achieves optimal ventilation and oxygenation  Description: INTERVENTIONS:  - Assess for changes in respiratory status  - Assess for changes in mentation and behavior  - Position to facilitate oxygenation and minimize respiratory effort  - Oxygen administered by appropriate delivery if ordered  - Initiate smoking cessation education as indicated  - Encourage broncho-pulmonary hygiene including cough, deep breathe, Incentive Spirometry  - Assess the need for suctioning and aspirate as needed  - Assess and instruct to report SOB or any respiratory difficulty  - Respiratory Therapy support as indicated  Outcome: Progressing

## 2023-06-15 LAB
ANION GAP SERPL CALCULATED.3IONS-SCNC: 11 MMOL/L (ref 4–13)
ANION GAP SERPL CALCULATED.3IONS-SCNC: 11 MMOL/L (ref 4–13)
BUN SERPL-MCNC: 18 MG/DL (ref 5–25)
BUN SERPL-MCNC: 20 MG/DL (ref 5–25)
CALCIUM SERPL-MCNC: 9.1 MG/DL (ref 8.4–10.2)
CALCIUM SERPL-MCNC: 9.2 MG/DL (ref 8.4–10.2)
CHLORIDE SERPL-SCNC: 97 MMOL/L (ref 96–108)
CHLORIDE SERPL-SCNC: 97 MMOL/L (ref 96–108)
CO2 SERPL-SCNC: 27 MMOL/L (ref 21–32)
CO2 SERPL-SCNC: 29 MMOL/L (ref 21–32)
CREAT SERPL-MCNC: 0.92 MG/DL (ref 0.6–1.3)
CREAT SERPL-MCNC: 0.95 MG/DL (ref 0.6–1.3)
ERYTHROCYTE [DISTWIDTH] IN BLOOD BY AUTOMATED COUNT: 13.9 % (ref 11.6–15.1)
GFR SERPL CREATININE-BSD FRML MDRD: 67 ML/MIN/1.73SQ M
GFR SERPL CREATININE-BSD FRML MDRD: 69 ML/MIN/1.73SQ M
GLUCOSE SERPL-MCNC: 122 MG/DL (ref 65–140)
GLUCOSE SERPL-MCNC: 218 MG/DL (ref 65–140)
GLUCOSE SERPL-MCNC: 242 MG/DL (ref 65–140)
GLUCOSE SERPL-MCNC: 251 MG/DL (ref 65–140)
GLUCOSE SERPL-MCNC: 253 MG/DL (ref 65–140)
GLUCOSE SERPL-MCNC: 254 MG/DL (ref 65–140)
HCT VFR BLD AUTO: 42.7 % (ref 34.8–46.1)
HGB BLD-MCNC: 13.9 G/DL (ref 11.5–15.4)
MAGNESIUM SERPL-MCNC: 2.2 MG/DL (ref 1.9–2.7)
MCH RBC QN AUTO: 31.6 PG (ref 26.8–34.3)
MCHC RBC AUTO-ENTMCNC: 32.6 G/DL (ref 31.4–37.4)
MCV RBC AUTO: 97 FL (ref 82–98)
PLATELET # BLD AUTO: 485 THOUSANDS/UL (ref 149–390)
PMV BLD AUTO: 10.7 FL (ref 8.9–12.7)
POTASSIUM SERPL-SCNC: 3.4 MMOL/L (ref 3.5–5.3)
POTASSIUM SERPL-SCNC: 3.5 MMOL/L (ref 3.5–5.3)
RBC # BLD AUTO: 4.4 MILLION/UL (ref 3.81–5.12)
SODIUM SERPL-SCNC: 135 MMOL/L (ref 135–147)
SODIUM SERPL-SCNC: 137 MMOL/L (ref 135–147)
WBC # BLD AUTO: 19.09 THOUSAND/UL (ref 4.31–10.16)

## 2023-06-15 PROCEDURE — 80048 BASIC METABOLIC PNL TOTAL CA: CPT | Performed by: NURSE PRACTITIONER

## 2023-06-15 PROCEDURE — 94640 AIRWAY INHALATION TREATMENT: CPT

## 2023-06-15 PROCEDURE — 99232 SBSQ HOSP IP/OBS MODERATE 35: CPT | Performed by: NURSE PRACTITIONER

## 2023-06-15 PROCEDURE — 83735 ASSAY OF MAGNESIUM: CPT | Performed by: NURSE PRACTITIONER

## 2023-06-15 PROCEDURE — 85027 COMPLETE CBC AUTOMATED: CPT | Performed by: NURSE PRACTITIONER

## 2023-06-15 PROCEDURE — 82948 REAGENT STRIP/BLOOD GLUCOSE: CPT

## 2023-06-15 PROCEDURE — 94760 N-INVAS EAR/PLS OXIMETRY 1: CPT

## 2023-06-15 PROCEDURE — 99232 SBSQ HOSP IP/OBS MODERATE 35: CPT | Performed by: PHYSICIAN ASSISTANT

## 2023-06-15 RX ORDER — POTASSIUM CHLORIDE 20 MEQ/1
40 TABLET, EXTENDED RELEASE ORAL ONCE
Status: COMPLETED | OUTPATIENT
Start: 2023-06-15 | End: 2023-06-15

## 2023-06-15 RX ORDER — FAMOTIDINE 20 MG/1
40 TABLET, FILM COATED ORAL 2 TIMES DAILY
Status: DISCONTINUED | OUTPATIENT
Start: 2023-06-15 | End: 2023-06-16 | Stop reason: HOSPADM

## 2023-06-15 RX ORDER — ENEMA 19; 7 G/133ML; G/133ML
1 ENEMA RECTAL ONCE
Status: COMPLETED | OUTPATIENT
Start: 2023-06-15 | End: 2023-06-15

## 2023-06-15 RX ORDER — METHYLPREDNISOLONE SODIUM SUCCINATE 40 MG/ML
40 INJECTION, POWDER, LYOPHILIZED, FOR SOLUTION INTRAMUSCULAR; INTRAVENOUS DAILY
Status: DISCONTINUED | OUTPATIENT
Start: 2023-06-16 | End: 2023-06-16 | Stop reason: HOSPADM

## 2023-06-15 RX ORDER — POTASSIUM CHLORIDE 20 MEQ/1
40 TABLET, EXTENDED RELEASE ORAL 2 TIMES DAILY
Status: COMPLETED | OUTPATIENT
Start: 2023-06-15 | End: 2023-06-16

## 2023-06-15 RX ORDER — HYDROXYZINE HYDROCHLORIDE 25 MG/1
25 TABLET, FILM COATED ORAL EVERY 6 HOURS PRN
Status: DISCONTINUED | OUTPATIENT
Start: 2023-06-15 | End: 2023-06-16 | Stop reason: HOSPADM

## 2023-06-15 RX ADMIN — IPRATROPIUM BROMIDE 0.5 MG: 0.5 SOLUTION RESPIRATORY (INHALATION) at 19:31

## 2023-06-15 RX ADMIN — FAMOTIDINE 40 MG: 20 TABLET, FILM COATED ORAL at 17:29

## 2023-06-15 RX ADMIN — HYDROXYZINE HYDROCHLORIDE 25 MG: 25 TABLET ORAL at 11:13

## 2023-06-15 RX ADMIN — MONTELUKAST 10 MG: 10 TABLET, FILM COATED ORAL at 21:11

## 2023-06-15 RX ADMIN — GABAPENTIN 100 MG: 100 CAPSULE ORAL at 08:43

## 2023-06-15 RX ADMIN — INSULIN LISPRO 3 UNITS: 100 INJECTION, SOLUTION INTRAVENOUS; SUBCUTANEOUS at 17:35

## 2023-06-15 RX ADMIN — FAMOTIDINE 20 MG: 20 TABLET, FILM COATED ORAL at 08:42

## 2023-06-15 RX ADMIN — ATORVASTATIN CALCIUM 10 MG: 10 TABLET, FILM COATED ORAL at 17:29

## 2023-06-15 RX ADMIN — HYDROCODONE BITARTRATE AND HOMATROPINE METHYLBROMIDE 5 ML: 5; 1.5 SYRUP ORAL at 17:30

## 2023-06-15 RX ADMIN — METHYLPREDNISOLONE SODIUM SUCCINATE 40 MG: 40 INJECTION, POWDER, FOR SOLUTION INTRAMUSCULAR; INTRAVENOUS at 08:43

## 2023-06-15 RX ADMIN — HYDROCODONE BITARTRATE AND HOMATROPINE METHYLBROMIDE 5 ML: 5; 1.5 SYRUP ORAL at 03:21

## 2023-06-15 RX ADMIN — LEVALBUTEROL HYDROCHLORIDE 1.25 MG: 1.25 SOLUTION RESPIRATORY (INHALATION) at 07:27

## 2023-06-15 RX ADMIN — PANTOPRAZOLE SODIUM 40 MG: 40 TABLET, DELAYED RELEASE ORAL at 17:28

## 2023-06-15 RX ADMIN — INSULIN LISPRO 3 UNITS: 100 INJECTION, SOLUTION INTRAVENOUS; SUBCUTANEOUS at 12:18

## 2023-06-15 RX ADMIN — TORSEMIDE 10 MG: 10 TABLET ORAL at 08:43

## 2023-06-15 RX ADMIN — ENOXAPARIN SODIUM 40 MG: 40 INJECTION SUBCUTANEOUS at 08:42

## 2023-06-15 RX ADMIN — POTASSIUM CHLORIDE 40 MEQ: 1500 TABLET, EXTENDED RELEASE ORAL at 11:08

## 2023-06-15 RX ADMIN — SODIUM PHOSPHATE, DIBASIC AND SODIUM PHOSPHATE, MONOBASIC 1 ENEMA: 7; 19 ENEMA RECTAL at 11:08

## 2023-06-15 RX ADMIN — ZOLPIDEM TARTRATE 10 MG: 5 TABLET, COATED ORAL at 21:11

## 2023-06-15 RX ADMIN — INSULIN LISPRO 3 UNITS: 100 INJECTION, SOLUTION INTRAVENOUS; SUBCUTANEOUS at 08:59

## 2023-06-15 RX ADMIN — INSULIN LISPRO 2 UNITS: 100 INJECTION, SOLUTION INTRAVENOUS; SUBCUTANEOUS at 17:33

## 2023-06-15 RX ADMIN — BUDESONIDE AND FORMOTEROL FUMARATE DIHYDRATE 2 PUFF: 160; 4.5 AEROSOL RESPIRATORY (INHALATION) at 08:55

## 2023-06-15 RX ADMIN — INSULIN LISPRO 2 UNITS: 100 INJECTION, SOLUTION INTRAVENOUS; SUBCUTANEOUS at 12:15

## 2023-06-15 RX ADMIN — DIPHENHYDRAMINE HYDROCHLORIDE 25 MG: 50 INJECTION, SOLUTION INTRAMUSCULAR; INTRAVENOUS at 04:43

## 2023-06-15 RX ADMIN — BUDESONIDE AND FORMOTEROL FUMARATE DIHYDRATE 2 PUFF: 160; 4.5 AEROSOL RESPIRATORY (INHALATION) at 17:30

## 2023-06-15 RX ADMIN — GABAPENTIN 100 MG: 100 CAPSULE ORAL at 17:29

## 2023-06-15 RX ADMIN — POTASSIUM CHLORIDE 40 MEQ: 1500 TABLET, EXTENDED RELEASE ORAL at 21:13

## 2023-06-15 RX ADMIN — HYDROXYZINE HYDROCHLORIDE 25 MG: 25 TABLET ORAL at 17:29

## 2023-06-15 RX ADMIN — LEVALBUTEROL HYDROCHLORIDE 1.25 MG: 1.25 SOLUTION RESPIRATORY (INHALATION) at 13:23

## 2023-06-15 RX ADMIN — BUDESONIDE 0.5 MG: 0.5 INHALANT RESPIRATORY (INHALATION) at 07:27

## 2023-06-15 RX ADMIN — IPRATROPIUM BROMIDE 0.5 MG: 0.5 SOLUTION RESPIRATORY (INHALATION) at 07:27

## 2023-06-15 RX ADMIN — MAGNESIUM HYDROXIDE 30 ML: 400 SUSPENSION ORAL at 11:08

## 2023-06-15 RX ADMIN — ACETAMINOPHEN 650 MG: 325 TABLET ORAL at 15:22

## 2023-06-15 RX ADMIN — PANTOPRAZOLE SODIUM 40 MG: 40 TABLET, DELAYED RELEASE ORAL at 06:26

## 2023-06-15 RX ADMIN — IPRATROPIUM BROMIDE 0.5 MG: 0.5 SOLUTION RESPIRATORY (INHALATION) at 13:23

## 2023-06-15 RX ADMIN — BUDESONIDE 0.5 MG: 0.5 INHALANT RESPIRATORY (INHALATION) at 19:31

## 2023-06-15 RX ADMIN — INSULIN LISPRO 2 UNITS: 100 INJECTION, SOLUTION INTRAVENOUS; SUBCUTANEOUS at 08:57

## 2023-06-15 RX ADMIN — POLYETHYLENE GLYCOL 3350 17 G: 17 POWDER, FOR SOLUTION ORAL at 08:59

## 2023-06-15 RX ADMIN — GABAPENTIN 100 MG: 100 CAPSULE ORAL at 21:11

## 2023-06-15 RX ADMIN — LEVALBUTEROL HYDROCHLORIDE 1.25 MG: 1.25 SOLUTION RESPIRATORY (INHALATION) at 19:31

## 2023-06-15 NOTE — ASSESSMENT & PLAN NOTE
Lab Results   Component Value Date    HGBA1C 4 8 06/12/2023       Recent Labs     06/14/23  1117 06/14/23  1704 06/14/23  2027 06/15/23  0725   POCGLU 288* 170* 185* 251*       Blood Sugar Average: Last 72 hrs:  (P) 208 [id number]     · Hemoglobin A1C controlled; 4 8  · Elevated blood sugar levels likely in setting of steroids  · Discharge home with SQ Insulin while weaning from steroids    · Goal glucose levels 140-180
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
## 2023-06-15 NOTE — RESPIRATORY THERAPY NOTE
Went to give patient evening aerosol treatment patient refused  Patient did however complain of noticing her oxygen saturation has been dropping throughout the day and has required oxygen to maintain spo2 above 89% as reported by the patient  patient instructed on importance of taking bronchodilator to improve pulmonary function  Patient states she is afraid to take to much medication as she has reported that she almost always leave the hospital with Dene Courser after taking too much aerosol medication  Patient informed that current aerosol therapy that she has been ordered this admission does not cause Thrush by it self  Patient continued to refuse treatment and instructed on importance of deep breathing and cough in attempt to improve pulmonary function and minimize atelectasis due to inactivity  Patient has marquez refusing a minimum of 1 dose up to 2 doses of aerosol medication the past 2 days

## 2023-06-15 NOTE — RESPIRATORY THERAPY NOTE
RT Protocol Note  Deepti Becerra 66 y o  female MRN: [id number]  Unit/Bed#: -01 Encounter: [id number]    Assessment    Principal Problem:    Asthma exacerbation  Active Problems:    Hyperlipidemia    Diabetes ([address] )    GERD (gastroesophageal reflux disease)    Polypharmacy    Hypoxia    Itching      Home Pulmonary Medications:     06/15/23 1931   Respiratory Protocol   Protocol Initiated? No   Protocol Selection Respiratory   Language Barrier? No   Medical & Social History Reviewed? Yes   Diagnostic Studies Reviewed? Yes   Physical Assessment Performed? Yes   Respiratory Plan Mild Distress pathway   Respiratory Assessment   Assessment Type During-treatment   General Appearance Alert; Awake   Respiratory Pattern Normal   Chest Assessment Chest expansion symmetrical   Bilateral Breath Sounds Expiratory wheezes   R Breath Sounds Expiratory wheezes   L Breath Sounds Expiratory wheezes   Location Specific No   Cough Non-productive   Resp Comments Resp protocol completed  Will continue with current tx plan   O2 Device RA   Cough Description   Sputum Amount None   Additional Assessments   Pulse 96   Respirations 18   SpO2 96 %            Past Medical History:   Diagnosis Date    Asthma     Diabetes mellitus (HCC)     GERD (gastroesophageal reflux disease)     Hyperlipidemia     Hypertension      Social History     Socioeconomic History    Marital status: /Civil Union     Spouse name: None    Number of children: None    Years of education: None    Highest education level: None   Occupational History    None   Tobacco Use    Smoking status: Never    Smokeless tobacco: Never   Vaping Use    Vaping Use: Never used   Substance and Sexual Activity    Alcohol use:  Yes     Alcohol/week: 7 0 standard drinks of alcohol     Types: 7 Glasses of wine per week    Drug use: Never    Sexual activity: None   Other Topics Concern    None   Social History Narrative    None     Social Determinants of Health     Financial Resource "Strain: Not on file   Food Insecurity: No Food Insecurity (2/27/2023)    Hunger Vital Sign     Worried About Running Out of Food in the Last Year: Never true     Ran Out of Food in the Last Year: Never true   Transportation Needs: No Transportation Needs (2/27/2023)    PRAPARE - Transportation     Lack of Transportation (Medical): No     Lack of Transportation (Non-Medical): No   Physical Activity: Not on file   Stress: Not on file   Social Connections: Not on file   Intimate Partner Violence: Not on file   Housing Stability: Low Risk  (2/27/2023)    Housing Stability Vital Sign     Unable to Pay for Housing in the Last Year: No     Number of Places Lived in the Last Year: 1     Unstable Housing in the Last Year: No       Subjective         Objective    Physical Exam:   Assessment Type: During-treatment  General Appearance: Alert, Awake  Respiratory Pattern: Normal  Chest Assessment: Chest expansion symmetrical  Bilateral Breath Sounds: Expiratory wheezes  R Breath Sounds: Expiratory wheezes  L Breath Sounds: Expiratory wheezes  Location Specific: No  Cough: Non-productive  O2 Device: RA    Vitals:  Blood pressure 126/80, pulse 96, temperature 98 7 °F (37 1 °C), resp  rate 18, height 4' 11\" (1 499 m), weight 80 7 kg (178 lb), SpO2 96 %  Imaging and other studies: I have personally reviewed pertinent reports  O2 Device: RA     Plan    Respiratory Plan: Mild Distress pathway        Resp Comments: Resp protocol completed   Will continue with current tx plan   "

## 2023-06-15 NOTE — PROGRESS NOTES
"Progress Note - Pulmonary   Lidya Rasheed 66 y o  female MRN: [id number]  Unit/Bed#: -01 Encounter: [id number]    Assessment:  1   Severe persistent asthma with acute exacerbation  2   GERD/dysphagia  3   Obesity  4   Likely HEATHER    Plan:  Severe persistent asthma with acute exacerbation  Continued improvement in air movement  Will decrease Solu-Medrol to 40 daily, if she is discharged home today can transition to prednisone 40 mg and decrease by 10 mg every 3 days  Continue Symbicort, budesonide twice per day, Xopenex and Atrovent, Singulair  Home regimen is Symbicort twice per day, Flovent, albuterol 4 times per day, Singulair  She will need to follow-up with her outpatient pulmonologist in [place] to restart Tezspire  Will see as needed, please call with questions  Anticipate discharge home today vs tomorrow    Subjective:   Patient resting in bed  She is doing ok with her breathing this morning  She is complaining of joint aches and constipation  Feels her potassium may be decreasing causing her joint aches - she states this happens every time she is on solu-medrol  Objective:     Vitals: Blood pressure 129/81, pulse (!) 107, temperature 98 3 °F (36 8 °C), resp  rate 18, height 4' 11\" (1 499 m), weight 80 7 kg (178 lb), SpO2 94 %  ,Body mass index is 35 95 kg/m²  Intake/Output Summary (Last 24 hours) at 6/15/2023 0911  Last data filed at 6/15/2023 0845  Gross per 24 hour   Intake 240 ml   Output --   Net 240 ml       Invasive Devices     Peripheral Intravenous Line  Duration           Peripheral IV 06/14/23 Dorsal (posterior); Left Hand 1 day                Physical Exam: /81   Pulse (!) 107   Temp 98 3 °F (36 8 °C)   Resp 18   Ht 4' 11\" (1 499 m)   Wt 80 7 kg (178 lb)   LMP  (LMP Unknown)   SpO2 94%   BMI 35 95 kg/m²   General appearance: alert and oriented, in no acute distress  Head: Normocephalic, without obvious abnormality, atraumatic  Eyes: negative findings: conjunctivae and " sclerae normal  Lungs: improving air movement with minimal expiratory wheeze  Heart: regular rate and rhythm  Abdomen: normal findings: soft, non-tender  Extremities: no edema  Skin: warm and dry  Neurologic: Mental status: Alert, oriented, thought content appropriate     Labs: I have personally reviewed pertinent lab results  , CBC:   Lab Results   Component Value Date    HCT 42 7 06/15/2023    HGB 13 9 06/15/2023    MCH 31 6 06/15/2023    MCHC 32 6 06/15/2023    MCV 97 06/15/2023    MPV 10 7 06/15/2023     (H) 06/15/2023    RBC 4 40 06/15/2023    RDW 13 9 06/15/2023    WBC 19 09 (H) 06/15/2023   , CMP:   Lab Results   Component Value Date    BUN 20 06/15/2023    CALCIUM 9 2 06/15/2023    CL 97 06/15/2023    CO2 27 06/15/2023    CREATININE 0 92 06/15/2023    EGFR 69 06/15/2023    K 3 5 06/15/2023    SODIUM 135 06/15/2023     Imaging and other studies: I have personally reviewed pertinent reports     and I have personally reviewed pertinent films in PACS
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
## 2023-06-16 VITALS
OXYGEN SATURATION: 95 % | HEART RATE: 89 BPM | RESPIRATION RATE: 18 BRPM | TEMPERATURE: 98.5 F | WEIGHT: 178 LBS | HEIGHT: 59 IN | SYSTOLIC BLOOD PRESSURE: 120 MMHG | DIASTOLIC BLOOD PRESSURE: 78 MMHG | BODY MASS INDEX: 35.88 KG/M2

## 2023-06-16 PROBLEM — R09.02 HYPOXIA: Status: RESOLVED | Noted: 2023-06-12 | Resolved: 2023-06-16

## 2023-06-16 LAB
ANION GAP SERPL CALCULATED.3IONS-SCNC: 9 MMOL/L (ref 4–13)
BUN SERPL-MCNC: 20 MG/DL (ref 5–25)
CALCIUM SERPL-MCNC: 8.7 MG/DL (ref 8.4–10.2)
CHLORIDE SERPL-SCNC: 99 MMOL/L (ref 96–108)
CO2 SERPL-SCNC: 28 MMOL/L (ref 21–32)
CREAT SERPL-MCNC: 0.95 MG/DL (ref 0.6–1.3)
GFR SERPL CREATININE-BSD FRML MDRD: 67 ML/MIN/1.73SQ M
GLUCOSE SERPL-MCNC: 171 MG/DL (ref 65–140)
GLUCOSE SERPL-MCNC: 182 MG/DL (ref 65–140)
GLUCOSE SERPL-MCNC: 93 MG/DL (ref 65–140)
MAGNESIUM SERPL-MCNC: 2.2 MG/DL (ref 1.9–2.7)
POTASSIUM SERPL-SCNC: 3.9 MMOL/L (ref 3.5–5.3)
SODIUM SERPL-SCNC: 136 MMOL/L (ref 135–147)

## 2023-06-16 PROCEDURE — 94760 N-INVAS EAR/PLS OXIMETRY 1: CPT

## 2023-06-16 PROCEDURE — 99254 IP/OBS CNSLTJ NEW/EST MOD 60: CPT | Performed by: INTERNAL MEDICINE

## 2023-06-16 PROCEDURE — 99239 HOSP IP/OBS DSCHRG MGMT >30: CPT | Performed by: NURSE PRACTITIONER

## 2023-06-16 PROCEDURE — 82948 REAGENT STRIP/BLOOD GLUCOSE: CPT

## 2023-06-16 PROCEDURE — 83735 ASSAY OF MAGNESIUM: CPT | Performed by: NURSE PRACTITIONER

## 2023-06-16 PROCEDURE — 80048 BASIC METABOLIC PNL TOTAL CA: CPT | Performed by: NURSE PRACTITIONER

## 2023-06-16 RX ORDER — PREDNISONE 20 MG/1
TABLET ORAL
Qty: 15 TABLET | Refills: 0 | Status: SHIPPED | OUTPATIENT
Start: 2023-06-16 | End: 2023-06-28

## 2023-06-16 RX ORDER — FLUCONAZOLE 100 MG/1
100 TABLET ORAL DAILY
Qty: 7 TABLET | Refills: 0 | Status: SHIPPED | OUTPATIENT
Start: 2023-06-16 | End: 2023-06-23

## 2023-06-16 RX ORDER — HYDROCODONE BITARTRATE AND HOMATROPINE METHYLBROMIDE ORAL SOLUTION 5; 1.5 MG/5ML; MG/5ML
5 LIQUID ORAL 4 TIMES DAILY PRN
Qty: 140 ML | Refills: 0 | Status: SHIPPED | OUTPATIENT
Start: 2023-06-16 | End: 2023-06-23

## 2023-06-16 RX ORDER — INSULIN ASPART 100 [IU]/ML
3 INJECTION, SOLUTION INTRAVENOUS; SUBCUTANEOUS
Qty: 15 ML | Refills: 0 | Status: SHIPPED | OUTPATIENT
Start: 2023-06-16

## 2023-06-16 RX ORDER — ZOLPIDEM TARTRATE 10 MG/1
10 TABLET ORAL
Qty: 7 TABLET | Refills: 0 | Status: SHIPPED | OUTPATIENT
Start: 2023-06-16 | End: 2023-06-23

## 2023-06-16 RX ORDER — HYDROXYZINE HYDROCHLORIDE 25 MG/1
25 TABLET, FILM COATED ORAL EVERY 6 HOURS PRN
Qty: 56 TABLET | Refills: 0 | Status: SHIPPED | OUTPATIENT
Start: 2023-06-16 | End: 2023-06-30

## 2023-06-16 RX ORDER — POLYETHYLENE GLYCOL 3350 17 G/17G
238 POWDER, FOR SOLUTION ORAL ONCE
Status: DISCONTINUED | OUTPATIENT
Start: 2023-06-16 | End: 2023-06-16 | Stop reason: HOSPADM

## 2023-06-16 RX ADMIN — ENOXAPARIN SODIUM 40 MG: 40 INJECTION SUBCUTANEOUS at 08:43

## 2023-06-16 RX ADMIN — FAMOTIDINE 40 MG: 20 TABLET, FILM COATED ORAL at 08:41

## 2023-06-16 RX ADMIN — TORSEMIDE 10 MG: 10 TABLET ORAL at 08:41

## 2023-06-16 RX ADMIN — PANTOPRAZOLE SODIUM 40 MG: 40 TABLET, DELAYED RELEASE ORAL at 06:15

## 2023-06-16 RX ADMIN — INSULIN LISPRO 1 UNITS: 100 INJECTION, SOLUTION INTRAVENOUS; SUBCUTANEOUS at 12:51

## 2023-06-16 RX ADMIN — GABAPENTIN 100 MG: 100 CAPSULE ORAL at 08:41

## 2023-06-16 RX ADMIN — POTASSIUM CHLORIDE 40 MEQ: 1500 TABLET, EXTENDED RELEASE ORAL at 08:41

## 2023-06-16 RX ADMIN — INSULIN LISPRO 3 UNITS: 100 INJECTION, SOLUTION INTRAVENOUS; SUBCUTANEOUS at 12:51

## 2023-06-16 RX ADMIN — BUDESONIDE AND FORMOTEROL FUMARATE DIHYDRATE 2 PUFF: 160; 4.5 AEROSOL RESPIRATORY (INHALATION) at 08:43

## 2023-06-16 RX ADMIN — METHYLPREDNISOLONE SODIUM SUCCINATE 40 MG: 40 INJECTION, POWDER, FOR SOLUTION INTRAMUSCULAR; INTRAVENOUS at 09:50

## 2023-06-16 RX ADMIN — HYDROCODONE BITARTRATE AND HOMATROPINE METHYLBROMIDE 5 ML: 5; 1.5 SYRUP ORAL at 04:04

## 2023-06-16 NOTE — RESPIRATORY THERAPY NOTE
06/16/23 1322   Inhalation Therapy Tx   $ Pulse Oximetry Spot Check Charge Completed   SpO2 95 %   Pre-Treatment Pulse 82   Pre-Treatment Respirations 16   Duration 10   Resp Comments pt dressed and ready for discharge did not want breathing tx at this time

## 2023-06-16 NOTE — ASSESSMENT & PLAN NOTE
· Patient with complaints of itching overnight  No new medications given  · Unclear etiology  · No rash visualized    · Atarax ordered

## 2023-06-16 NOTE — ASSESSMENT & PLAN NOTE
Lab Results   Component Value Date    HGBA1C 4 8 06/12/2023       Recent Labs     06/15/23  1207 06/15/23  1612 06/15/23  2057 06/16/23  0719   POCGLU 242* 253* 122 93       Blood Sugar Average: Last 72 hrs:  (P) 195 [id number]     · Hemoglobin A1C controlled; 4 8  · Elevated blood sugar levels likely in setting of steroids    · Blood sugars are improving while weaning from steroids  · Goal glucose levels 140-180
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
## 2023-06-16 NOTE — CONSULTS
Consultation - [facility]) [facility]  Misty Cortes 66 y o  female MRN: [id number]  Unit/Bed#: -01 Encounter: [id number]        Consults    Reason for Consult / Principal Problem: Constipation    HPI: Opal Alston is a 67 yo F with a PMH of GERD, asthma, HTN, HLD, DM2, who is here with an asthma exacerbation  GI is consulted due to the patient's request due to significant constipation over the past 9 days  She reports she finally had a small BM this morning but is still having a lot of lower abdominal cramping due to her constipation  She has tried taking miralax daily, several enemas, and multiple doses of milk of magnesia without much improvement  She does have baseline constipation but does not typically go this long in between BMs  She believes this is worse as she was not eating well prior to her EGD on  due to dysphagia but this has significantly improved since she underwent an empiric dilation during the EGD  REVIEW OF SYSTEMS: Negative except for as stated above      Historical Information   Past Medical History:   Diagnosis Date   • Asthma    • Diabetes mellitus ([address] )    • GERD (gastroesophageal reflux disease)    • Hyperlipidemia    • Hypertension      Past Surgical History:   Procedure Laterality Date   •  SECTION     • COLONOSCOPY     • HYSTERECTOMY     • OVARIAN CYST REMOVAL       Social History   Social History     Substance and Sexual Activity   Alcohol Use Yes   • Alcohol/week: 7 0 standard drinks of alcohol   • Types: 7 Glasses of wine per week     Social History     Substance and Sexual Activity   Drug Use Never     Social History     Tobacco Use   Smoking Status Never   Smokeless Tobacco Never     History reviewed  No pertinent family history      Meds/Allergies     Medications Prior to Admission   Medication   • famotidine (PEPCID) 40 MG tablet   • gabapentin (NEURONTIN) 100 mg capsule   • ipratropium-albuterol (DUO-NEB) 0 5-2 5 mg/3 mL nebulizer solution   • montelukast (SINGULAIR) 10 mg tablet   • pantoprazole (PROTONIX) 40 mg   • rosuvastatin (CRESTOR) 5 mg tablet   • torsemide (DEMADEX) 10 mg tablet   • zolpidem (AMBIEN) 10 mg tablet   • albuterol (2 5 mg/3 mL) 0 083 % nebulizer solution   • albuterol (PROVENTIL HFA,VENTOLIN HFA) 90 mcg/act inhaler   • Blood Pressure Monitoring (Blood Pressure Monitor 3) CARLOS MANUEL   • budesonide-formoterol (SYMBICORT) 160-4 5 mcg/act inhaler   • Cholecalciferol 50 MCG (2000 UT) CAPS   • dicyclomine (BENTYL) 20 mg tablet   • diphenhydrAMINE (BENADRYL) 25 mg capsule   • EPINEPHrine (EPIPEN) 0 3 mg/0 3 mL SOAJ   • fluticasone-vilanterol (Breo Ellipta) 200-25 mcg/actuation inhaler   • Glucagon 1 MG/0 2ML SOAJ   • guaiFENesin (ROBITUSSIN) 200 MG/10ML oral liquid   • insulin lispro (HumaLOG) 100 units/mL injection pen   • ipratropium (ATROVENT) 0 02 % nebulizer solution   • Melatonin 10 MG CAPS   • ondansetron (ZOFRAN) 4 mg tablet   • OneTouch Ultra test strip   • Ozempic, 1 MG/DOSE, 4 MG/3ML SOPN injection pen   • pravastatin (PRAVACHOL) 40 mg tablet   • promethazine (PHENERGAN) 25 mg tablet   • promethazine-dextromethorphan (PHENERGAN-DM) 6 25-15 mg/5 mL oral syrup   • Promethazine-Phenyleph-Codeine (Promethazine VC/Codeine) 6  25-5-10 MG/5ML SYRP   • semaglutide, 2 mg/dose, (Ozempic, 2 MG/DOSE,) 8 mg/ mL injection pen   • sucralfate (CARAFATE) 1 g/10 mL suspension     Current Facility-Administered Medications   Medication Dose Route Frequency   • acetaminophen (TYLENOL) tablet 650 mg  650 mg Oral Q6H PRN   • albuterol inhalation solution 2 5 mg  2 5 mg Nebulization Q4H PRN   • atorvastatin (LIPITOR) tablet 10 mg  10 mg Oral Daily With Dinner   • bisacodyl (DULCOLAX) rectal suppository 10 mg  10 mg Rectal Daily PRN   • budesonide (PULMICORT) inhalation solution 0 5 mg  0 5 mg Nebulization Q12H   • budesonide-formoterol (SYMBICORT) 160-4 5 mcg/act inhaler 2 puff  2 puff Inhalation BID   • dicyclomine (BENTYL) tablet 20 mg  20 mg Oral TID PRN   • enoxaparin "(LOVENOX) subcutaneous injection 40 mg  40 mg Subcutaneous Daily   • famotidine (PEPCID) tablet 40 mg  40 mg Oral BID   • gabapentin (NEURONTIN) capsule 100 mg  100 mg Oral TID   • HYDROcodone Bit-Homatrop MBr (HYCODAN) oral syrup 5 mL  5 mL Oral Q4H PRN   • hydrOXYzine HCL (ATARAX) tablet 25 mg  25 mg Oral Q6H PRN   • insulin lispro (HumaLOG) 100 units/mL subcutaneous injection 1-5 Units  1-5 Units Subcutaneous TID AC   • insulin lispro (HumaLOG) 100 units/mL subcutaneous injection 3 Units  3 Units Subcutaneous TID With Meals   • ipratropium (ATROVENT) 0 02 % inhalation solution 0 5 mg  0 5 mg Nebulization TID   • levalbuterol (XOPENEX) inhalation solution 1 25 mg  1 25 mg Nebulization TID   • magnesium hydroxide (MILK OF MAGNESIA) oral suspension 30 mL  30 mL Oral BID PRN   • methylPREDNISolone sodium succinate (Solu-MEDROL) injection 40 mg  40 mg Intravenous Daily   • montelukast (SINGULAIR) tablet 10 mg  10 mg Oral HS   • ondansetron (ZOFRAN) injection 4 mg  4 mg Intravenous Q6H PRN   • pantoprazole (PROTONIX) EC tablet 40 mg  40 mg Oral BID AC   • polyethylene glycol (GLYCOLAX) bowel prep 238 g  238 g Oral Once   • polyethylene glycol (MIRALAX) packet 17 g  17 g Oral Daily   • torsemide (DEMADEX) tablet 10 mg  10 mg Oral Daily   • zolpidem (AMBIEN) tablet 10 mg  10 mg Oral HS PRN       Allergies   Allergen Reactions   • Codeine Other (See Comments)   • Aspirin GI Intolerance and Rash   • Hydromorphone Rash and Other (See Comments)     GI upset     • Oxycodone-Acetaminophen Rash and Other (See Comments)   • Tramadol Rash and Other (See Comments)           Objective     Blood pressure 120/78, pulse 91, temperature 98 5 °F (36 9 °C), resp  rate 18, height 4' 11\" (1 499 m), weight 80 7 kg (178 lb), SpO2 95 %        Intake/Output Summary (Last 24 hours) at 6/16/2023 1002  Last data filed at 6/16/2023 0300  Gross per 24 hour   Intake 480 ml   Output --   Net 480 ml         PHYSICAL EXAM:      General Appearance:   " Alert, cooperative, no distress, appears stated age    HEENT:   Normocephalic, atraumatic, anicteric      Neck:  Supple, symmetrical, trachea midline, no adenopathy;    thyroid: no enlargement/tenderness/nodules; no carotid  bruit or JVD    Lungs:   Clear to auscultation bilaterally; no rales, rhonchi or wheezing; respirations unlabored    Heart[de-identified]   S1 and S2 normal; regular rate and rhythm; no murmur, rub, or gallop  Abdomen:   Soft, non-tender, non-distended; normal bowel sounds; no masses, no organomegaly    Genitalia:   Deferred    Rectal:   Deferred    Extremities:  No cyanosis, clubbing or edema    Pulses:  2+ and symmetric all extremities    Skin:  Skin color, texture, turgor normal, no rashes or lesions    Lymph nodes:  No palpable cervical, axillary or inguinal lymphadenopathy        Lab Results:   Results from last 7 days   Lab Units 06/15/23  0454 06/14/23  0501 06/12/23  0449   WBC Thousand/uL 19 09*   < > 14 49*   HEMOGLOBIN g/dL 13 9   < > 14 2   HEMATOCRIT % 42 7   < > 43 2   PLATELETS Thousands/uL 485*   < > 363   NEUTROS PCT %  --   --  89*   LYMPHS PCT %  --   --  7*   MONOS PCT %  --   --  3*   EOS PCT %  --   --  0    < > = values in this interval not displayed  Results from last 7 days   Lab Units 06/16/23  0514 06/12/23  0449 06/11/23  1639   POTASSIUM mmol/L 3 9   < > 3 6   CHLORIDE mmol/L 99   < > 104   CO2 mmol/L 28   < > 24   BUN mg/dL 20   < > 8   CREATININE mg/dL 0 95   < > 0 86   CALCIUM mg/dL 8 7   < > 9 2   ALK PHOS U/L  --   --  110*   ALT U/L  --   --  19   AST U/L  --   --  23    < > = values in this interval not displayed  Imaging Studies: I have personally reviewed pertinent imaging studies  XR chest 1 view portable    Result Date: 6/12/2023  Impression: No acute cardiopulmonary disease   Findings are stable Workstation performed: [id number]       ASSESSMENT and PLAN:      Acute on Chronic Constipation  - She reports chronic constipation issues but has been acutely constipated for about 9 days, finally with a small BM this morning, along with lower abdominal cramping; no significant response with using miralax 17 g daily, several enemas, and several doses of milk of magnesia  - We will give her a miralax bowel prep to drink today prior to discharge and then she shuld continue miralax 17 g BID at home  - Bentyl as needed  - Advised her to call our office next week for further recommendations if she is still having trouble with her constipation        The patient's care will be discussed with Dr Cuco Bobo

## 2023-06-16 NOTE — PLAN OF CARE
Problem: Potential for Falls  Goal: Patient will remain free of falls  Description: INTERVENTIONS:  - Educate patient/family on patient safety including physical limitations  - Instruct patient to call for assistance with activity   - Consult OT/PT to assist with strengthening/mobility   - Keep Call bell within reach  - Keep bed low and locked with side rails adjusted as appropriate  - Keep care items and personal belongings within reach  - Initiate and maintain comfort rounds  - Make Fall Risk Sign visible to staff  - Offer Toileting every 2 Hours, in advance of need  - Initiate/Maintain bed/chair alarm  - Obtain necessary fall risk management equipment  - Apply yellow socks and bracelet for high fall risk patients  - Consider moving patient to room near nurses station  Outcome: Progressing     Problem: PAIN - ADULT  Goal: Verbalizes/displays adequate comfort level or baseline comfort level  Description: Interventions:  - Encourage patient to monitor pain and request assistance  - Assess pain using appropriate pain scale  - Administer analgesics based on type and severity of pain and evaluate response  - Implement non-pharmacological measures as appropriate and evaluate response  - Consider cultural and social influences on pain and pain management  - Notify physician/advanced practitioner if interventions unsuccessful or patient reports new pain  Outcome: Progressing     Problem: INFECTION - ADULT  Goal: Absence or prevention of progression during hospitalization  Description: INTERVENTIONS:  - Assess and monitor for signs and symptoms of infection  - Monitor lab/diagnostic results  - Monitor all insertion sites, i e  indwelling lines, tubes, and drains  - Monitor endotracheal if appropriate and nasal secretions for changes in amount and color  - [place] appropriate cooling/warming therapies per order  - Administer medications as ordered  - Instruct and encourage patient and family to use good hand hygiene technique  - Identify and instruct in appropriate isolation precautions for identified infection/condition  Outcome: Progressing     Problem: SAFETY ADULT  Goal: Patient will remain free of falls  Description: INTERVENTIONS:  - Educate patient/family on patient safety including physical limitations  - Instruct patient to call for assistance with activity   - Consult OT/PT to assist with strengthening/mobility   - Keep Call bell within reach  - Keep bed low and locked with side rails adjusted as appropriate  - Keep care items and personal belongings within reach  - Initiate and maintain comfort rounds  - Make Fall Risk Sign visible to staff  - Offer Toileting every 2 Hours, in advance of need  - Initiate/Maintain bed/chair alarm  - Obtain necessary fall risk management equipment  - Apply yellow socks and bracelet for high fall risk patients  - Consider moving patient to room near nurses station  Outcome: Progressing  Goal: Maintain or return to baseline ADL function  Description: INTERVENTIONS:  -  Assess patient's ability to carry out ADLs; assess patient's baseline for ADL function and identify physical deficits which impact ability to perform ADLs (bathing, care of mouth/teeth, toileting, grooming, dressing, etc )  - Assess/evaluate cause of self-care deficits   - Assess range of motion  - Assess patient's mobility; develop plan if impaired  - Assess patient's need for assistive devices and provide as appropriate  - Encourage maximum independence but intervene and supervise when necessary  - Involve family in performance of ADLs  - Assess for home care needs following discharge   - Consider OT consult to assist with ADL evaluation and planning for discharge  - Provide patient education as appropriate  Outcome: Progressing  Goal: Maintains/Returns to pre admission functional level  Description: INTERVENTIONS:  - Perform BMAT or MOVE assessment daily    - Set and communicate daily mobility goal to care team and patient/family/caregiver  - Collaborate with rehabilitation services on mobility goals if consulted  - Perform Range of Motion 2 times a day  - Reposition patient every 2 hours  - Dangle patient 3 times a day  - Stand patient 3 times a day  - Ambulate patient 3 times a day  - Out of bed to chair 3 times a day   - Out of bed for meals 3 times a day  - Out of bed for toileting  - Record patient progress and toleration of activity level   Outcome: Progressing     Problem: DISCHARGE PLANNING  Goal: Discharge to home or other facility with appropriate resources  Description: INTERVENTIONS:  - Identify barriers to discharge w/patient and caregiver  - Arrange for needed discharge resources and transportation as appropriate  - Identify discharge learning needs (meds, wound care, etc )  - Arrange for interpretive services to assist at discharge as needed  - Refer to Case Management Department for coordinating discharge planning if the patient needs post-hospital services based on physician/advanced practitioner order or complex needs related to functional status, cognitive ability, or social support system  Outcome: Progressing     Problem: Knowledge Deficit  Goal: Patient/family/caregiver demonstrates understanding of disease process, treatment plan, medications, and discharge instructions  Description: Complete learning assessment and assess knowledge base    Interventions:  - Provide teaching at level of understanding  - Provide teaching via preferred learning methods  Outcome: Progressing     Problem: RESPIRATORY - ADULT  Goal: Achieves optimal ventilation and oxygenation  Description: INTERVENTIONS:  - Assess for changes in respiratory status  - Assess for changes in mentation and behavior  - Position to facilitate oxygenation and minimize respiratory effort  - Oxygen administered by appropriate delivery if ordered  - Initiate smoking cessation education as indicated  - Encourage broncho-pulmonary hygiene including cough, deep breathe, Incentive Spirometry  - Assess the need for suctioning and aspirate as needed  - Assess and instruct to report SOB or any respiratory difficulty  - Respiratory Therapy support as indicated  Outcome: Progressing
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
## 2023-06-16 NOTE — RESPIRATORY THERAPY NOTE
06/16/23 0719   Inhalation Therapy Tx   $ Pulse Oximetry Spot Check Charge Completed   SpO2 95 %   Pre-Treatment Pulse 90   Pre-Treatment Respirations 17   Breath Sounds Pre-Treatment Bilateral Diminished; Expiratory wheeze   Resp Comments pt refused tx she is being d/c  educated pt on using peak flow

## 2023-08-11 ENCOUNTER — APPOINTMENT (EMERGENCY)
Dept: RADIOLOGY | Facility: HOSPITAL | Age: 58
DRG: 202 | End: 2023-08-11
Payer: COMMERCIAL

## 2023-08-11 ENCOUNTER — HOSPITAL ENCOUNTER (INPATIENT)
Facility: HOSPITAL | Age: 58
LOS: 4 days | Discharge: HOME | DRG: 202 | End: 2023-08-15
Attending: EMERGENCY MEDICINE | Admitting: INTERNAL MEDICINE
Payer: COMMERCIAL

## 2023-08-11 DIAGNOSIS — K21.9 GASTROESOPHAGEAL REFLUX DISEASE WITHOUT ESOPHAGITIS: ICD-10-CM

## 2023-08-11 DIAGNOSIS — R05.3 CHRONIC COUGH: ICD-10-CM

## 2023-08-11 DIAGNOSIS — R13.10 DYSPHAGIA, UNSPECIFIED TYPE: ICD-10-CM

## 2023-08-11 DIAGNOSIS — J45.51 SEVERE PERSISTENT ASTHMA WITH EXACERBATION: Primary | ICD-10-CM

## 2023-08-11 LAB
BASOPHILS # BLD AUTO: 0.03 THOUSANDS/ÂΜL (ref 0–0.1)
BASOPHILS NFR BLD AUTO: 0 % (ref 0–1)
EOSINOPHIL # BLD AUTO: 0.19 THOUSAND/ÂΜL (ref 0–0.61)
EOSINOPHIL NFR BLD AUTO: 2 % (ref 0–6)
ERYTHROCYTE [DISTWIDTH] IN BLOOD BY AUTOMATED COUNT: 12.6 % (ref 11.6–15.1)
GLUCOSE SERPL-MCNC: 109 MG/DL (ref 65–140)
HCT VFR BLD AUTO: 42.4 % (ref 34.8–46.1)
HGB BLD-MCNC: 14.1 G/DL (ref 11.5–15.4)
IMM GRANULOCYTES # BLD AUTO: 0.04 THOUSAND/UL (ref 0–0.2)
IMM GRANULOCYTES NFR BLD AUTO: 0 % (ref 0–2)
LYMPHOCYTES # BLD AUTO: 5.08 THOUSANDS/ÂΜL (ref 0.6–4.47)
LYMPHOCYTES NFR BLD AUTO: 53 % (ref 14–44)
MCH RBC QN AUTO: 31.4 PG (ref 26.8–34.3)
MCHC RBC AUTO-ENTMCNC: 33.3 G/DL (ref 31.4–37.4)
MCV RBC AUTO: 94 FL (ref 82–98)
MONOCYTES # BLD AUTO: 0.71 THOUSAND/ÂΜL (ref 0.17–1.22)
MONOCYTES NFR BLD AUTO: 7 % (ref 4–12)
NEUTROPHILS # BLD AUTO: 3.73 THOUSANDS/ÂΜL (ref 1.85–7.62)
NEUTS SEG NFR BLD AUTO: 38 % (ref 43–75)
NRBC BLD AUTO-RTO: 0 /100 WBCS
PLATELET # BLD AUTO: 464 THOUSANDS/UL (ref 149–390)
PMV BLD AUTO: 9.6 FL (ref 8.9–12.7)
RBC # BLD AUTO: 4.49 MILLION/UL (ref 3.81–5.12)
WBC # BLD AUTO: 9.78 THOUSAND/UL (ref 4.31–10.16)

## 2023-08-11 PROCEDURE — 94640 AIRWAY INHALATION TREATMENT: CPT

## 2023-08-11 PROCEDURE — 99285 EMERGENCY DEPT VISIT HI MDM: CPT | Performed by: EMERGENCY MEDICINE

## 2023-08-11 PROCEDURE — 99285 EMERGENCY DEPT VISIT HI MDM: CPT

## 2023-08-11 PROCEDURE — 96365 THER/PROPH/DIAG IV INF INIT: CPT

## 2023-08-11 PROCEDURE — 85025 COMPLETE CBC W/AUTO DIFF WBC: CPT | Performed by: EMERGENCY MEDICINE

## 2023-08-11 PROCEDURE — 99222 1ST HOSP IP/OBS MODERATE 55: CPT

## 2023-08-11 PROCEDURE — 94664 DEMO&/EVAL PT USE INHALER: CPT

## 2023-08-11 PROCEDURE — 71045 X-RAY EXAM CHEST 1 VIEW: CPT

## 2023-08-11 PROCEDURE — 36415 COLL VENOUS BLD VENIPUNCTURE: CPT | Performed by: EMERGENCY MEDICINE

## 2023-08-11 PROCEDURE — 96375 TX/PRO/DX INJ NEW DRUG ADDON: CPT

## 2023-08-11 PROCEDURE — 94644 CONT INHLJ TX 1ST HOUR: CPT

## 2023-08-11 PROCEDURE — 82948 REAGENT STRIP/BLOOD GLUCOSE: CPT

## 2023-08-11 PROCEDURE — 96361 HYDRATE IV INFUSION ADD-ON: CPT

## 2023-08-11 RX ORDER — SODIUM CHLORIDE FOR INHALATION 0.9 %
3 VIAL, NEBULIZER (ML) INHALATION ONCE
Status: COMPLETED | OUTPATIENT
Start: 2023-08-11 | End: 2023-08-11

## 2023-08-11 RX ORDER — LORAZEPAM 2 MG/ML
1 INJECTION INTRAMUSCULAR ONCE
Status: COMPLETED | OUTPATIENT
Start: 2023-08-11 | End: 2023-08-11

## 2023-08-11 RX ORDER — METHYLPREDNISOLONE SODIUM SUCCINATE 125 MG/2ML
125 INJECTION, POWDER, LYOPHILIZED, FOR SOLUTION INTRAMUSCULAR; INTRAVENOUS ONCE
Status: COMPLETED | OUTPATIENT
Start: 2023-08-11 | End: 2023-08-11

## 2023-08-11 RX ORDER — IPRATROPIUM BROMIDE AND ALBUTEROL SULFATE 2.5; .5 MG/3ML; MG/3ML
3 SOLUTION RESPIRATORY (INHALATION) ONCE
Status: COMPLETED | OUTPATIENT
Start: 2023-08-11 | End: 2023-08-11

## 2023-08-11 RX ORDER — LEVALBUTEROL INHALATION SOLUTION 1.25 MG/3ML
1.25 SOLUTION RESPIRATORY (INHALATION) EVERY 6 HOURS
Status: DISCONTINUED | OUTPATIENT
Start: 2023-08-12 | End: 2023-08-12

## 2023-08-11 RX ORDER — MAGNESIUM SULFATE HEPTAHYDRATE 40 MG/ML
2 INJECTION, SOLUTION INTRAVENOUS ONCE
Status: COMPLETED | OUTPATIENT
Start: 2023-08-11 | End: 2023-08-11

## 2023-08-11 RX ORDER — IPRATROPIUM BROMIDE AND ALBUTEROL SULFATE 2.5; .5 MG/3ML; MG/3ML
3 SOLUTION RESPIRATORY (INHALATION) EVERY 6 HOURS PRN
Status: DISCONTINUED | OUTPATIENT
Start: 2023-08-11 | End: 2023-08-12

## 2023-08-11 RX ADMIN — ALBUTEROL SULFATE 10 MG: 2.5 SOLUTION RESPIRATORY (INHALATION) at 20:17

## 2023-08-11 RX ADMIN — Medication 3 ML: at 20:17

## 2023-08-11 RX ADMIN — IPRATROPIUM BROMIDE 1 MG: 0.5 SOLUTION RESPIRATORY (INHALATION) at 20:17

## 2023-08-11 RX ADMIN — SODIUM CHLORIDE 1000 ML: 0.9 INJECTION, SOLUTION INTRAVENOUS at 21:53

## 2023-08-11 RX ADMIN — METHYLPREDNISOLONE SODIUM SUCCINATE 125 MG: 125 INJECTION, POWDER, FOR SOLUTION INTRAMUSCULAR; INTRAVENOUS at 20:36

## 2023-08-11 RX ADMIN — IPRATROPIUM BROMIDE AND ALBUTEROL SULFATE 3 ML: 2.5; .5 SOLUTION RESPIRATORY (INHALATION) at 23:51

## 2023-08-11 RX ADMIN — MAGNESIUM SULFATE HEPTAHYDRATE 2 G: 40 INJECTION, SOLUTION INTRAVENOUS at 20:36

## 2023-08-11 RX ADMIN — LORAZEPAM 1 MG: 2 INJECTION INTRAMUSCULAR; INTRAVENOUS at 21:26

## 2023-08-12 LAB
ALBUMIN SERPL BCP-MCNC: 4.4 G/DL (ref 3.5–5)
ALP SERPL-CCNC: 112 U/L (ref 34–104)
ALT SERPL W P-5'-P-CCNC: 18 U/L (ref 7–52)
ANION GAP SERPL CALCULATED.3IONS-SCNC: 10 MMOL/L
ANION GAP SERPL CALCULATED.3IONS-SCNC: 10 MMOL/L
AST SERPL W P-5'-P-CCNC: 22 U/L (ref 13–39)
BASOPHILS # BLD AUTO: 0.01 THOUSANDS/ÂΜL (ref 0–0.1)
BASOPHILS NFR BLD AUTO: 0 % (ref 0–1)
BILIRUB SERPL-MCNC: 0.46 MG/DL (ref 0.2–1)
BUN SERPL-MCNC: 8 MG/DL (ref 5–25)
BUN SERPL-MCNC: 9 MG/DL (ref 5–25)
CALCIUM SERPL-MCNC: 9.7 MG/DL (ref 8.4–10.2)
CALCIUM SERPL-MCNC: 9.8 MG/DL (ref 8.4–10.2)
CHLORIDE SERPL-SCNC: 104 MMOL/L (ref 96–108)
CHLORIDE SERPL-SCNC: 105 MMOL/L (ref 96–108)
CO2 SERPL-SCNC: 20 MMOL/L (ref 21–32)
CO2 SERPL-SCNC: 23 MMOL/L (ref 21–32)
CREAT SERPL-MCNC: 0.78 MG/DL (ref 0.6–1.3)
CREAT SERPL-MCNC: 0.87 MG/DL (ref 0.6–1.3)
EOSINOPHIL # BLD AUTO: 0 THOUSAND/ÂΜL (ref 0–0.61)
EOSINOPHIL NFR BLD AUTO: 0 % (ref 0–6)
ERYTHROCYTE [DISTWIDTH] IN BLOOD BY AUTOMATED COUNT: 12.4 % (ref 11.6–15.1)
FLUAV RNA RESP QL NAA+PROBE: NEGATIVE
FLUBV RNA RESP QL NAA+PROBE: NEGATIVE
GFR SERPL CREATININE-BSD FRML MDRD: 74 ML/MIN/1.73SQ M
GFR SERPL CREATININE-BSD FRML MDRD: 85 ML/MIN/1.73SQ M
GLUCOSE SERPL-MCNC: 148 MG/DL (ref 65–140)
GLUCOSE SERPL-MCNC: 152 MG/DL (ref 65–140)
GLUCOSE SERPL-MCNC: 159 MG/DL (ref 65–140)
GLUCOSE SERPL-MCNC: 162 MG/DL (ref 65–140)
GLUCOSE SERPL-MCNC: 166 MG/DL (ref 65–140)
GLUCOSE SERPL-MCNC: 239 MG/DL (ref 65–140)
HCT VFR BLD AUTO: 43.9 % (ref 34.8–46.1)
HGB BLD-MCNC: 14.5 G/DL (ref 11.5–15.4)
IMM GRANULOCYTES # BLD AUTO: 0.03 THOUSAND/UL (ref 0–0.2)
IMM GRANULOCYTES NFR BLD AUTO: 0 % (ref 0–2)
LYMPHOCYTES # BLD AUTO: 0.6 THOUSANDS/ÂΜL (ref 0.6–4.47)
LYMPHOCYTES NFR BLD AUTO: 7 % (ref 14–44)
MCH RBC QN AUTO: 30.8 PG (ref 26.8–34.3)
MCHC RBC AUTO-ENTMCNC: 33 G/DL (ref 31.4–37.4)
MCV RBC AUTO: 93 FL (ref 82–98)
MONOCYTES # BLD AUTO: 0.04 THOUSAND/ÂΜL (ref 0.17–1.22)
MONOCYTES NFR BLD AUTO: 1 % (ref 4–12)
NEUTROPHILS # BLD AUTO: 7.61 THOUSANDS/ÂΜL (ref 1.85–7.62)
NEUTS SEG NFR BLD AUTO: 92 % (ref 43–75)
NRBC BLD AUTO-RTO: 0 /100 WBCS
PLATELET # BLD AUTO: 464 THOUSANDS/UL (ref 149–390)
PMV BLD AUTO: 9.6 FL (ref 8.9–12.7)
POTASSIUM SERPL-SCNC: 3.4 MMOL/L (ref 3.5–5.3)
POTASSIUM SERPL-SCNC: 3.9 MMOL/L (ref 3.5–5.3)
PROT SERPL-MCNC: 8.7 G/DL (ref 6.4–8.4)
RBC # BLD AUTO: 4.71 MILLION/UL (ref 3.81–5.12)
RSV RNA RESP QL NAA+PROBE: NEGATIVE
SARS-COV-2 RNA RESP QL NAA+PROBE: NEGATIVE
SODIUM SERPL-SCNC: 135 MMOL/L (ref 135–147)
SODIUM SERPL-SCNC: 137 MMOL/L (ref 135–147)
WBC # BLD AUTO: 8.29 THOUSAND/UL (ref 4.31–10.16)

## 2023-08-12 PROCEDURE — 99232 SBSQ HOSP IP/OBS MODERATE 35: CPT | Performed by: INTERNAL MEDICINE

## 2023-08-12 PROCEDURE — 80053 COMPREHEN METABOLIC PANEL: CPT | Performed by: EMERGENCY MEDICINE

## 2023-08-12 PROCEDURE — 94640 AIRWAY INHALATION TREATMENT: CPT

## 2023-08-12 PROCEDURE — 80048 BASIC METABOLIC PNL TOTAL CA: CPT

## 2023-08-12 PROCEDURE — 0241U HB NFCT DS VIR RESP RNA 4 TRGT: CPT

## 2023-08-12 PROCEDURE — 85025 COMPLETE CBC W/AUTO DIFF WBC: CPT

## 2023-08-12 PROCEDURE — 94664 DEMO&/EVAL PT USE INHALER: CPT

## 2023-08-12 PROCEDURE — 82948 REAGENT STRIP/BLOOD GLUCOSE: CPT

## 2023-08-12 PROCEDURE — 36415 COLL VENOUS BLD VENIPUNCTURE: CPT | Performed by: EMERGENCY MEDICINE

## 2023-08-12 PROCEDURE — 94760 N-INVAS EAR/PLS OXIMETRY 1: CPT

## 2023-08-12 RX ORDER — INSULIN LISPRO 100 [IU]/ML
1-6 INJECTION, SOLUTION INTRAVENOUS; SUBCUTANEOUS
Status: DISCONTINUED | OUTPATIENT
Start: 2023-08-12 | End: 2023-08-15 | Stop reason: HOSPADM

## 2023-08-12 RX ORDER — ACETAMINOPHEN 325 MG/1
650 TABLET ORAL EVERY 6 HOURS PRN
Status: DISCONTINUED | OUTPATIENT
Start: 2023-08-12 | End: 2023-08-15 | Stop reason: HOSPADM

## 2023-08-12 RX ORDER — TORSEMIDE 10 MG/1
10 TABLET ORAL DAILY
Status: DISCONTINUED | OUTPATIENT
Start: 2023-08-12 | End: 2023-08-15 | Stop reason: HOSPADM

## 2023-08-12 RX ORDER — FAMOTIDINE 20 MG/1
20 TABLET, FILM COATED ORAL 2 TIMES DAILY
Status: DISCONTINUED | OUTPATIENT
Start: 2023-08-12 | End: 2023-08-12

## 2023-08-12 RX ORDER — BUPROPION HYDROCHLORIDE 150 MG/1
150 TABLET ORAL DAILY
Status: DISCONTINUED | OUTPATIENT
Start: 2023-08-12 | End: 2023-08-12

## 2023-08-12 RX ORDER — FLUTICASONE FUROATE AND VILANTEROL 200; 25 UG/1; UG/1
1 POWDER RESPIRATORY (INHALATION) DAILY
Status: DISCONTINUED | OUTPATIENT
Start: 2023-08-12 | End: 2023-08-13

## 2023-08-12 RX ORDER — ATORVASTATIN CALCIUM 10 MG/1
10 TABLET, FILM COATED ORAL
Status: DISCONTINUED | OUTPATIENT
Start: 2023-08-12 | End: 2023-08-15 | Stop reason: HOSPADM

## 2023-08-12 RX ORDER — PANTOPRAZOLE SODIUM 40 MG/1
40 TABLET, DELAYED RELEASE ORAL
Status: DISCONTINUED | OUTPATIENT
Start: 2023-08-12 | End: 2023-08-15 | Stop reason: HOSPADM

## 2023-08-12 RX ORDER — METHYLPREDNISOLONE SODIUM SUCCINATE 40 MG/ML
40 INJECTION, POWDER, LYOPHILIZED, FOR SOLUTION INTRAMUSCULAR; INTRAVENOUS EVERY 6 HOURS SCHEDULED
Status: DISCONTINUED | OUTPATIENT
Start: 2023-08-12 | End: 2023-08-13

## 2023-08-12 RX ORDER — ALBUTEROL SULFATE 2.5 MG/3ML
2.5 SOLUTION RESPIRATORY (INHALATION) EVERY 6 HOURS PRN
Status: DISCONTINUED | OUTPATIENT
Start: 2023-08-12 | End: 2023-08-15 | Stop reason: HOSPADM

## 2023-08-12 RX ORDER — INSULIN LISPRO 100 [IU]/ML
3 INJECTION, SOLUTION INTRAVENOUS; SUBCUTANEOUS
Status: DISCONTINUED | OUTPATIENT
Start: 2023-08-12 | End: 2023-08-15 | Stop reason: HOSPADM

## 2023-08-12 RX ORDER — HYDROXYZINE HYDROCHLORIDE 25 MG/1
25 TABLET, FILM COATED ORAL EVERY 6 HOURS PRN
Status: DISCONTINUED | OUTPATIENT
Start: 2023-08-12 | End: 2023-08-15 | Stop reason: HOSPADM

## 2023-08-12 RX ORDER — GABAPENTIN 100 MG/1
100 CAPSULE ORAL 3 TIMES DAILY PRN
Status: DISCONTINUED | OUTPATIENT
Start: 2023-08-12 | End: 2023-08-12

## 2023-08-12 RX ORDER — LEVALBUTEROL INHALATION SOLUTION 1.25 MG/3ML
1.25 SOLUTION RESPIRATORY (INHALATION)
Status: DISCONTINUED | OUTPATIENT
Start: 2023-08-12 | End: 2023-08-13

## 2023-08-12 RX ORDER — HYDROCODONE BITARTRATE AND HOMATROPINE METHYLBROMIDE ORAL SOLUTION 5; 1.5 MG/5ML; MG/5ML
5 LIQUID ORAL EVERY 4 HOURS PRN
Status: DISCONTINUED | OUTPATIENT
Start: 2023-08-12 | End: 2023-08-15 | Stop reason: HOSPADM

## 2023-08-12 RX ORDER — BUPROPION HYDROCHLORIDE 150 MG/1
150 TABLET ORAL 2 TIMES DAILY
Status: DISCONTINUED | OUTPATIENT
Start: 2023-08-12 | End: 2023-08-15 | Stop reason: HOSPADM

## 2023-08-12 RX ORDER — ZOLPIDEM TARTRATE 5 MG/1
10 TABLET ORAL
Status: DISCONTINUED | OUTPATIENT
Start: 2023-08-12 | End: 2023-08-15 | Stop reason: HOSPADM

## 2023-08-12 RX ORDER — FAMOTIDINE 20 MG/1
40 TABLET, FILM COATED ORAL 2 TIMES DAILY
Status: DISCONTINUED | OUTPATIENT
Start: 2023-08-12 | End: 2023-08-15 | Stop reason: HOSPADM

## 2023-08-12 RX ORDER — GABAPENTIN 100 MG/1
100 CAPSULE ORAL 3 TIMES DAILY
Status: DISCONTINUED | OUTPATIENT
Start: 2023-08-12 | End: 2023-08-15 | Stop reason: HOSPADM

## 2023-08-12 RX ORDER — BUDESONIDE AND FORMOTEROL FUMARATE DIHYDRATE 160; 4.5 UG/1; UG/1
2 AEROSOL RESPIRATORY (INHALATION) 2 TIMES DAILY
Status: DISCONTINUED | OUTPATIENT
Start: 2023-08-12 | End: 2023-08-13

## 2023-08-12 RX ORDER — METHYLPREDNISOLONE SODIUM SUCCINATE 40 MG/ML
40 INJECTION, POWDER, LYOPHILIZED, FOR SOLUTION INTRAMUSCULAR; INTRAVENOUS EVERY 6 HOURS SCHEDULED
Status: DISCONTINUED | OUTPATIENT
Start: 2023-08-13 | End: 2023-08-12

## 2023-08-12 RX ORDER — MONTELUKAST SODIUM 10 MG/1
10 TABLET ORAL
Status: DISCONTINUED | OUTPATIENT
Start: 2023-08-12 | End: 2023-08-15 | Stop reason: HOSPADM

## 2023-08-12 RX ORDER — FAMOTIDINE 20 MG/1
20 TABLET, FILM COATED ORAL ONCE
Status: COMPLETED | OUTPATIENT
Start: 2023-08-12 | End: 2023-08-12

## 2023-08-12 RX ADMIN — LEVALBUTEROL HYDROCHLORIDE 1.25 MG: 1.25 SOLUTION RESPIRATORY (INHALATION) at 21:36

## 2023-08-12 RX ADMIN — TORSEMIDE 10 MG: 10 TABLET ORAL at 09:26

## 2023-08-12 RX ADMIN — INSULIN LISPRO 1 UNITS: 100 INJECTION, SOLUTION INTRAVENOUS; SUBCUTANEOUS at 17:19

## 2023-08-12 RX ADMIN — HYDROCODONE BITARTRATE AND HOMATROPINE METHYLBROMIDE ORAL SOLUTION 5 ML: 5; 1.5 LIQUID ORAL at 09:35

## 2023-08-12 RX ADMIN — INSULIN LISPRO 3 UNITS: 100 INJECTION, SOLUTION INTRAVENOUS; SUBCUTANEOUS at 12:09

## 2023-08-12 RX ADMIN — GABAPENTIN 100 MG: 100 CAPSULE ORAL at 21:41

## 2023-08-12 RX ADMIN — FLUTICASONE FUROATE AND VILANTEROL TRIFENATATE 1 PUFF: 200; 25 POWDER RESPIRATORY (INHALATION) at 09:26

## 2023-08-12 RX ADMIN — FAMOTIDINE 20 MG: 20 TABLET, FILM COATED ORAL at 09:26

## 2023-08-12 RX ADMIN — LEVALBUTEROL HYDROCHLORIDE 1.25 MG: 1.25 SOLUTION RESPIRATORY (INHALATION) at 07:42

## 2023-08-12 RX ADMIN — IPRATROPIUM BROMIDE 0.5 MG: 0.5 SOLUTION RESPIRATORY (INHALATION) at 13:41

## 2023-08-12 RX ADMIN — INSULIN LISPRO 3 UNITS: 100 INJECTION, SOLUTION INTRAVENOUS; SUBCUTANEOUS at 09:30

## 2023-08-12 RX ADMIN — IPRATROPIUM BROMIDE 0.5 MG: 0.5 SOLUTION RESPIRATORY (INHALATION) at 21:36

## 2023-08-12 RX ADMIN — PANTOPRAZOLE SODIUM 40 MG: 40 TABLET, DELAYED RELEASE ORAL at 09:26

## 2023-08-12 RX ADMIN — ACETAMINOPHEN 650 MG: 325 TABLET, FILM COATED ORAL at 14:52

## 2023-08-12 RX ADMIN — GABAPENTIN 100 MG: 100 CAPSULE ORAL at 09:35

## 2023-08-12 RX ADMIN — INSULIN LISPRO 1 UNITS: 100 INJECTION, SOLUTION INTRAVENOUS; SUBCUTANEOUS at 09:29

## 2023-08-12 RX ADMIN — HYDROCODONE BITARTRATE AND HOMATROPINE METHYLBROMIDE ORAL SOLUTION 5 ML: 5; 1.5 LIQUID ORAL at 15:13

## 2023-08-12 RX ADMIN — INSULIN LISPRO 1 UNITS: 100 INJECTION, SOLUTION INTRAVENOUS; SUBCUTANEOUS at 21:41

## 2023-08-12 RX ADMIN — ATORVASTATIN CALCIUM 10 MG: 10 TABLET, FILM COATED ORAL at 17:21

## 2023-08-12 RX ADMIN — ZOLPIDEM TARTRATE 10 MG: 5 TABLET, COATED ORAL at 00:57

## 2023-08-12 RX ADMIN — METHYLPREDNISOLONE SODIUM SUCCINATE 40 MG: 40 INJECTION, POWDER, FOR SOLUTION INTRAMUSCULAR; INTRAVENOUS at 09:36

## 2023-08-12 RX ADMIN — INSULIN LISPRO 1 UNITS: 100 INJECTION, SOLUTION INTRAVENOUS; SUBCUTANEOUS at 12:09

## 2023-08-12 RX ADMIN — METHYLPREDNISOLONE SODIUM SUCCINATE 40 MG: 40 INJECTION, POWDER, FOR SOLUTION INTRAMUSCULAR; INTRAVENOUS at 17:22

## 2023-08-12 RX ADMIN — IPRATROPIUM BROMIDE 0.5 MG: 0.5 SOLUTION RESPIRATORY (INHALATION) at 07:42

## 2023-08-12 RX ADMIN — METHYLPREDNISOLONE SODIUM SUCCINATE 40 MG: 40 INJECTION, POWDER, FOR SOLUTION INTRAMUSCULAR; INTRAVENOUS at 23:49

## 2023-08-12 RX ADMIN — FAMOTIDINE 20 MG: 20 TABLET, FILM COATED ORAL at 11:15

## 2023-08-12 RX ADMIN — INSULIN LISPRO 3 UNITS: 100 INJECTION, SOLUTION INTRAVENOUS; SUBCUTANEOUS at 17:20

## 2023-08-12 RX ADMIN — PANTOPRAZOLE SODIUM 40 MG: 40 TABLET, DELAYED RELEASE ORAL at 17:21

## 2023-08-12 RX ADMIN — HYDROCODONE BITARTRATE AND HOMATROPINE METHYLBROMIDE ORAL SOLUTION 5 ML: 5; 1.5 LIQUID ORAL at 00:45

## 2023-08-12 RX ADMIN — BUPROPION HYDROCHLORIDE 150 MG: 150 TABLET, EXTENDED RELEASE ORAL at 11:15

## 2023-08-12 RX ADMIN — GABAPENTIN 100 MG: 100 CAPSULE ORAL at 17:21

## 2023-08-12 RX ADMIN — HYDROCODONE BITARTRATE AND HOMATROPINE METHYLBROMIDE ORAL SOLUTION 5 ML: 5; 1.5 LIQUID ORAL at 23:49

## 2023-08-12 RX ADMIN — ZOLPIDEM TARTRATE 10 MG: 5 TABLET, COATED ORAL at 21:41

## 2023-08-12 RX ADMIN — HYDROXYZINE HYDROCHLORIDE 25 MG: 25 TABLET ORAL at 23:50

## 2023-08-12 RX ADMIN — FAMOTIDINE 40 MG: 20 TABLET, FILM COATED ORAL at 17:21

## 2023-08-12 RX ADMIN — BUPROPION HYDROCHLORIDE 150 MG: 150 TABLET, EXTENDED RELEASE ORAL at 21:41

## 2023-08-12 RX ADMIN — LEVALBUTEROL HYDROCHLORIDE 1.25 MG: 1.25 SOLUTION RESPIRATORY (INHALATION) at 13:41

## 2023-08-12 RX ADMIN — BUDESONIDE AND FORMOTEROL FUMARATE DIHYDRATE 2 PUFF: 160; 4.5 AEROSOL RESPIRATORY (INHALATION) at 09:25

## 2023-08-12 RX ADMIN — MONTELUKAST 10 MG: 10 TABLET, FILM COATED ORAL at 21:41

## 2023-08-12 NOTE — H&P
[address]  H&P  Name: Fabio Cates 66 y.o. female I MRN: [id number]  Unit/Bed#: ED 15 I Date of Admission: 8/11/2023   Date of Service: 8/12/2023 I Hospital Day: 1      Assessment/Plan   * Asthma exacerbation  Assessment & Plan  Patient reports increasing shortness of breath, wheezing, and cough since yesterday. Home neb treatments and 1 pill of 40mg prednisone (reports left over from prior treatment) taken at home have not been improving her symptoms, prompting ED visit. Reports improvement after ED interventions, however still reports some SOB and does not at baseline. Denies recent sick contact, allergen contact, or other acute symptoms/complaint at this time. /65   Pulse 100   Temp 97.9 °F (36.6 °C) (Temporal)   Resp 20   LMP  (LMP Unknown)   SpO2 98%     · Increasing SOB and wheeze since yesterday; not improved w/ home nebs + 40mg PO prednisone   · CXR wet read w/o acute abnormality   · Lower suspicion of acute infectious cause   · Reports improvement after nebs, IV steroids, and IV mag given in ED; however still not at baseline  · Respiratory protocol ordered  · Continue scheduled IV solumedrol and neb treatments   · To check viral swab for completeness   · PRN hycodan cough syrup for cough ordered per patient request   · Continuous O2 monitoring     Diabetes [facility])  Assessment & Plan    Lab Results   Component Value Date    HGBA1C 4.8 06/12/2023     · Continue home NovoLog 3 units 3 times daily with meals  · [employer] SSI  · Diabetic diet  · Hypoglycemia protocol  · Monitor in setting of IV steroid administration as above     Class 2 obesity with body mass index (BMI) of 35.0 to 35.9 in adult  Assessment & Plan  · Recommend weight loss via healthy diet and exercise    Hyperlipidemia  Assessment & Plan  · Continue home statin    VTE Pharmacologic Prophylaxis: VTE Score: 2 Low Risk (Score 0-2) - Encourage Ambulation.   Code Status: Level 1 - Full Code   Discussion with family: update in AM.     Anticipated Length of Stay: Patient will be admitted on an inpatient basis with an anticipated length of stay of greater than 2 midnights secondary to acute asthma exacerbation. Chief Complaint: SOB    History of Present Illness:  Rosalinda Resendiz is a 66 y.o. female with a PMH of asthma, diabetes, obesity, and HLD who presents with SOB. Patient reports increasing shortness of breath, wheezing, and cough since yesterday. Home neb treatments and 1 pill of 40mg prednisone (reports left over from prior treatment) taken at home have not been improving her symptoms, prompting ED visit. Reports improvement after ED interventions, however still reports some SOB and does not at baseline. Denies recent sick contact, allergen contact, or other acute symptoms/complaint at this time. All questions answered at the bedside to the best of my ability. Review of Systems:  Review of Systems   Constitutional: Negative for activity change, appetite change, chills, diaphoresis, fatigue and fever. HENT: Negative for congestion, ear pain, nosebleeds and trouble swallowing. Eyes: Negative for pain and visual disturbance. Respiratory: Positive for cough, shortness of breath and wheezing. Negative for apnea and chest tightness. Cardiovascular: Negative for chest pain, palpitations and leg swelling. Gastrointestinal: Negative for abdominal distention, abdominal pain, blood in stool, constipation, diarrhea, nausea and vomiting. Endocrine: Negative for cold intolerance, heat intolerance and polyuria. Genitourinary: Negative for difficulty urinating, dysuria, flank pain and hematuria. Musculoskeletal: Negative for arthralgias, neck pain and neck stiffness. Skin: Negative for color change, rash and wound. Neurological: Negative for dizziness, tremors, syncope, weakness, light-headedness, numbness and headaches. Hematological: Negative for adenopathy.    All other systems reviewed and are negative. Past Medical and Surgical History:   Past Medical History:   Diagnosis Date   • Asthma    • Diabetes mellitus ([address])    • GERD (gastroesophageal reflux disease)    • Hyperlipidemia    • Hypertension        Past Surgical History:   Procedure Laterality Date   •  SECTION     • COLONOSCOPY     • HYSTERECTOMY     • OVARIAN CYST REMOVAL         Meds/Allergies:  Prior to Admission medications    Medication Sig Start Date End Date Taking? Authorizing Provider   albuterol (2.5 mg/3 mL) 0.083 % nebulizer solution 3 ml    Historical Provider, MD   albuterol (PROVENTIL HFA,VENTOLIN HFA) 90 mcg/act inhaler Inhale 2 puffs as needed    Historical Provider, MD   Blood Pressure Monitoring (Blood Pressure Monitor 3) CARLOS MANUEL Use as directed 10/21/22   Historical Provider, MD   budesonide-formoterol (SYMBICORT) 160-4.5 mcg/act inhaler Every 12 hours    Historical Provider, MD   Cholecalciferol 50 MCG (2000 UT) CAPS every 24 hours    Historical Provider, MD   dicyclomine (BENTYL) 20 mg tablet TAKE 1 TABLET BY MOUTH EVERY 6 HOURS AS NEEDED FOR ABDOMINAL CRAMPING 3/15/23   Felton Mann PA-C   diphenhydrAMINE (BENADRYL) 25 mg capsule 3 (three) times a day    Historical Provider, MD   EPINEPHrine (EPIPEN) 0.3 mg/0.3 mL SOAJ as directed    Historical Provider, MD   famotidine (PEPCID) 40 MG tablet Take 1 tablet (40 mg total) by mouth 2 (two) times a day 22  Danis Ramirez MD   fluticasone-vilanterol (Breo Ellipta) 200-25 mcg/actuation inhaler Inhale 1 puff daily Rinse mouth after use.  22  Danis Ramirez MD   gabapentin (NEURONTIN) 100 mg capsule Take 100 mg by mouth 3 (three) times a day as needed 23   Historical Provider, MD   Glucagon 1 MG/0.2ML SOAJ     Historical Provider, MD   hydrOXYzine HCL (ATARAX) 25 mg tablet Take 1 tablet (25 mg total) by mouth every 6 (six) hours as needed for itching for up to 14 days 23  EDUARDA Pandya   insulin aspart (NovoLOG FlexPen) 100 UNIT/ML injection pen Inject 3 Units under the skin 3 (three) times a day with meals 6/16/23   EDUARDA Mcneil   ipratropium (ATROVENT) 0.02 % nebulizer solution Take 2.5 mL (0.5 mg total) by nebulization 3 (three) times a day 11/1/22 6/8/23  Naomie Smith MD   ipratropium-albuterol (DUO-NEB) 0.5-2.5 mg/3 mL nebulizer solution Take 3 mL by nebulization every 4 (four) hours as needed for wheezing or shortness of breath 11/1/22   Naomie Smith MD   Melatonin 10 MG CAPS Take 10 mg by mouth daily at bedtime as needed 3/25/23   Historical Provider, MD   montelukast (SINGULAIR) 10 mg tablet Take 1 tablet (10 mg total) by mouth daily at bedtime 11/1/22 6/11/23  Naomie Smith MD   ondansetron (ZOFRAN) 4 mg tablet Take 1 tablet by mouth every 8 (eight) hours    Historical Provider, MD   OneTouch Ultra test strip TEST TWICE A DAY 10/21/22   Historical Provider, MD   pantoprazole (PROTONIX) 40 mg Take 1 packet (40 mg total) by mouth 2 (two) times a day before meals 3/1/23   Thanh Holley MD   pravastatin (PRAVACHOL) 40 mg tablet Take 1 tablet (40 mg total) by mouth daily with dinner 11/1/22 6/6/23  Naomie Smith MD   promethazine (PHENERGAN) 25 mg tablet Take 1 tablet (25 mg total) by mouth every 6 (six) hours as needed for nausea or vomiting 3/8/23   [place] LANA Allen   rosuvastatin (CRESTOR) 5 mg tablet Take 1 tablet by mouth daily    Historical Provider, MD   semaglutide, 2 mg/dose, (Ozempic, 2 MG/DOSE,) 8 mg/ mL injection pen 2 mg  10/21/23  Historical Provider, MD   torsemide (DEMADEX) 10 mg tablet Take 10 mg by mouth daily 9/15/22   Historical Provider, MD   zolpidem (AMBIEN) 10 mg tablet Take 1 tablet (10 mg total) by mouth daily at bedtime as needed for sleep for up to 7 days 6/16/23 6/23/23  EDUARDA Mcneil     I have reviewed home medications with patient personally. Allergies:    Allergies   Allergen Reactions   • Codeine Other (See Comments)   • Aspirin GI Intolerance and Rash   • Hydromorphone Rash and Other (See Comments)     GI upset     • Oxycodone-Acetaminophen Rash and Other (See Comments)   • Tramadol Rash and Other (See Comments)       Social History:  Marital Status: /Civil Union   Occupation: N/A  Patient Pre-hospital Living Situation: Home  Patient Pre-hospital Level of Mobility: walks  Patient Pre-hospital Diet Restrictions: Diabetic   Substance Use History:   Social History     Substance and Sexual Activity   Alcohol Use Yes   • Alcohol/week: 7.0 standard drinks of alcohol   • Types: 7 Glasses of wine per week     Social History     Tobacco Use   Smoking Status Never   Smokeless Tobacco Never     Social History     Substance and Sexual Activity   Drug Use Never       Family History:  History reviewed. No pertinent family history. Physical Exam:     Vitals:   Blood Pressure: 112/65 (08/11/23 2330)  Pulse: 100 (08/11/23 2330)  Temperature: 97.9 °F (36.6 °C) (08/11/23 2000)  Temp Source: Temporal (08/11/23 2000)  Respirations: 20 (08/11/23 2330)  SpO2: 98 % (08/11/23 2330)    Physical Exam  Vitals and nursing note reviewed. Constitutional:       General: She is not in acute distress. Appearance: Normal appearance. HENT:      Head: Normocephalic and atraumatic. Right Ear: External ear normal.      Left Ear: External ear normal.      Nose: Nose normal.      Mouth/Throat:      Mouth: Mucous membranes are moist.   Eyes:      Pupils: Pupils are equal, round, and reactive to light. Cardiovascular:      Rate and Rhythm: Normal rate and regular rhythm. Pulses: Normal pulses. Heart sounds: Normal heart sounds. No murmur heard. Pulmonary:      Effort: Pulmonary effort is normal. No respiratory distress. Breath sounds: Wheezing (BL scattered ) present. No rales. Chest:      Chest wall: No tenderness. Abdominal:      General: Bowel sounds are normal. There is no distension. Palpations: Abdomen is soft. There is no mass. Tenderness:  There is no abdominal tenderness. There is no guarding. Musculoskeletal:         General: No swelling or tenderness. Cervical back: Normal range of motion and neck supple. No rigidity or tenderness. Right lower leg: No edema. Left lower leg: No edema. Skin:     General: Skin is warm and dry. Capillary Refill: Capillary refill takes less than 2 seconds. Findings: No lesion or rash. Neurological:      General: No focal deficit present. Mental Status: She is alert. Psychiatric:         Mood and Affect: Mood normal.          Additional Data:     Lab Results:  Results from last 7 days   Lab Units 08/11/23  2126   WBC Thousand/uL 9.78   HEMOGLOBIN g/dL 14.1   HEMATOCRIT % 42.4   PLATELETS Thousands/uL 464*   NEUTROS PCT % 38*   LYMPHS PCT % 53*   MONOS PCT % 7   EOS PCT % 2             Results from last 7 days   Lab Units 08/11/23  2306   POC GLUCOSE mg/dl 109               Lines/Drains:  Invasive Devices     Peripheral Intravenous Line  Duration           Peripheral IV 08/11/23 Right Antecubital <1 day                    Imaging: Personally reviewed the following imaging: chest xray  XR chest 1 view portable    (Results Pending)       EKG and Other Studies Reviewed on Admission:   · EKG: Sinus Tachycardia. . ** Please Note: This note has been constructed using a voice recognition system.  **

## 2023-08-12 NOTE — PROGRESS NOTES
[address]  Progress Note  Name: Nish Acevedo  MRN: [id number]  Unit/Bed#: -01 I Date of Admission: 8/11/2023   Date of Service: 8/12/2023 I Hospital Day: 1    Assessment/Plan   * Asthma exacerbation  Assessment & Plan  Patient reports increasing shortness of breath, wheezing, and cough since yesterday. Home neb treatments and 1 pill of 40mg prednisone (reports left over from prior treatment) taken at home have not been improving her symptoms, prompting ED visit. Reports improvement after ED interventions, however still reports some SOB and does not at baseline. Denies recent sick contact, allergen contact, or other acute symptoms/complaint at this time. /77   Pulse 99   Temp 98 °F (36.7 °C)   Resp 20   Ht 4' 11" (1.499 m)   Wt 81.6 kg (180 lb)   LMP  (LMP Unknown)   SpO2 94%   BMI 36.36 kg/m²     · Increasing SOB and wheeze since yesterday; not improved w/ home nebs + 40mg PO prednisone   · CXR wet read w/o acute abnormality   · Lower suspicion of acute infectious cause   · Reports improvement after nebs, IV steroids, and IV mag given in ED; however still not at baseline  · Respiratory protocol ordered  · Continue scheduled steroids and nebs  · Wean as able    Class 2 obesity with body mass index (BMI) of 35.0 to 35.9 in adult  Assessment & Plan  · Recommend weight loss via healthy diet and exercise    Diabetes [facility])  Assessment & Plan    Lab Results   Component Value Date    HGBA1C 4.8 06/12/2023     · Continue home NovoLog 3 units 3 times daily with meals  · Continue sliding scale insulin  · Consider discontinuation of insulin given well-controlled A1c on discharge.     Hyperlipidemia  Assessment & Plan  · Continue statin           VTE Pharmacologic Prophylaxis:   Pharmacologic: Pharmacologic VTE Prophylaxis contraindicated due to low risk  Mechanical VTE Prophylaxis in Place: Yes    Patient Centered Rounds: I have performed bedside rounds with nursing staff today.    Discussions with Specialists or Other Care Team Provider: cm, nursing    Education and Discussions with Family / Patient: pt    Time Spent for Care: 30 minutes. More than 50% of total time spent on counseling and coordination of care as described above. Current Length of Stay: 1 day(s)    Current Patient Status: Inpatient   Certification Statement: The patient will continue to require additional inpatient hospital stay due to see below    Discharge Plan: Still requiring IV steroids. Anticipate 24 to 48 hours    Code Status: Level 1 - Full Code      Subjective:   Denies fevers, chills, cough. Reports breathing improved    Objective:     Vitals:   Temp (24hrs), Av °F (36.7 °C), Min:97.9 °F (36.6 °C), Max:98 °F (36.7 °C)    Temp:  [97.9 °F (36.6 °C)-98 °F (36.7 °C)] 98 °F (36.7 °C)  HR:  [] 99  Resp:  [20-24] 20  BP: (108-133)/(63-82) 115/77  SpO2:  [93 %-99 %] 94 %  Body mass index is 36.36 kg/m². Input and Output Summary (last 24 hours): Intake/Output Summary (Last 24 hours) at 2023 0803  Last data filed at 2023 2135  Gross per 24 hour   Intake 50 ml   Output --   Net 50 ml       Physical Exam:     Physical Exam  Constitutional:       General: She is not in acute distress. Appearance: She is well-developed. She is not diaphoretic. HENT:      Head: Normocephalic and atraumatic. Nose: Nose normal.      Mouth/Throat:      Pharynx: No oropharyngeal exudate. Eyes:      General: No scleral icterus. Right eye: No discharge. Left eye: No discharge. Conjunctiva/sclera: Conjunctivae normal.   Neck:      Thyroid: No thyromegaly. Vascular: No JVD. Cardiovascular:      Rate and Rhythm: Normal rate and regular rhythm. Heart sounds: Normal heart sounds. No murmur heard. No friction rub. No gallop. Pulmonary:      Effort: Pulmonary effort is normal. No respiratory distress. Breath sounds: Normal breath sounds. No wheezing or rales. Chest:      Chest wall: No tenderness. Abdominal:      General: Bowel sounds are normal. There is no distension. Palpations: Abdomen is soft. Tenderness: There is no abdominal tenderness. There is no guarding or rebound. Musculoskeletal:         General: No tenderness or deformity. Normal range of motion. Cervical back: Normal range of motion and neck supple. Skin:     General: Skin is warm and dry. Findings: No erythema or rash. Neurological:      Mental Status: She is alert. Mental status is at baseline. Cranial Nerves: No cranial nerve deficit. Sensory: No sensory deficit. Motor: No abnormal muscle tone. Coordination: Coordination normal.       (    Additional Data:     Labs:    Results from last 7 days   Lab Units 08/12/23  0504   WBC Thousand/uL 8.29   HEMOGLOBIN g/dL 14.5   HEMATOCRIT % 43.9   PLATELETS Thousands/uL 464*   NEUTROS PCT % 92*   LYMPHS PCT % 7*   MONOS PCT % 1*   EOS PCT % 0     Results from last 7 days   Lab Units 08/12/23  0504 08/12/23  0001   SODIUM mmol/L 135 137   POTASSIUM mmol/L 3.9 3.4*   CHLORIDE mmol/L 105 104   CO2 mmol/L 20* 23   BUN mg/dL 9 8   CREATININE mg/dL 0.87 0.78   ANION GAP mmol/L 10 10   CALCIUM mg/dL 9.7 9.8   ALBUMIN g/dL  --  4.4   TOTAL BILIRUBIN mg/dL  --  0.46   ALK PHOS U/L  --  112*   ALT U/L  --  18   AST U/L  --  22   GLUCOSE RANDOM mg/dL 239* 148*         Results from last 7 days   Lab Units 08/12/23  0715 08/11/23  2306   POC GLUCOSE mg/dl 166* 109                   * I Have Reviewed All Lab Data Listed Above. * Additional Pertinent Lab Tests Reviewed:  All Labs Within Last 24 Hours Reviewed    Imaging:    Imaging Reports Reviewed Today Include: na  Imaging Personally Reviewed by Myself Includes:  na    Recent Cultures (last 7 days):           Last 24 Hours Medication List:   Current Facility-Administered Medications   Medication Dose Route Frequency Provider Last Rate   • albuterol  2.5 mg Nebulization Q6H PRN Jasmeet Edmond PA-C     • atorvastatin  10 mg Oral Daily With [address] [place], [place]     • budesonide-formoterol  2 puff [facility]halation BID [facility] [place]     • famotidine  20 mg Oral BID [facility]     • fluticasone-vilanterol  1 puff Inhalation Daily [facility] [place]     • gabapentin  100 mg Oral TID PRN Jasmeet Edmond PA-C     • HYDROcodone Bit-[place]omatrop MBr  5 mL Oral Q4H PRN Jasmeet Edmond PA-C     • hydrOXYzine HCL  25 mg Oral Q6H PRN Jasmeet Edmond PA-C     • insulin lispro  1-6 Units Subcutaneous TID AC mEily Hopkins PA-C     • insulin lispro  1-6 Units Subcutaneous HS Jasmeet Edmond PA-C     • insulin lispro  3 Units Subcutaneous TID With Meals Jasmeet Edmond PA-C     • ipratropium  0.5 mg Nebulization Q6H Lavell Bhakta MD     • levalbuterol  1.25 mg Nebulization Q6H Lavell Bhakta MD     • [START ON 8/13/2023] methylPREDNISolone sodium succinate  40 mg [facility]venous Q6H [facility]     • montelukast  10 mg Oral HS [facility] LANA Hopkins     • pantoprazole  40 mg Oral BID AC [facility] [place]     • torsemide  10 mg Oral Daily [facility]     • zolpidem  10 mg Oral HS PRN Jasmeet Edmond PA-C          Today, Patient Was Seen By: Leesa Wiley MD    ** Please Note: Dictation voice to text software may have been used in the creation of this document.  **    Continue

## 2023-08-12 NOTE — ASSESSMENT & PLAN NOTE
Patient reports increasing shortness of breath, wheezing, and cough since yesterday. Home neb treatments and 1 pill of 40mg prednisone (reports left over from prior treatment) taken at home have not been improving her symptoms, prompting ED visit. Reports improvement after ED interventions, however still reports some SOB and does not at baseline. Denies recent sick contact, allergen contact, or other acute symptoms/complaint at this time.     /65   Pulse 100   Temp 97.9 °F (36.6 °C) (Temporal)   Resp 20   LMP  (LMP Unknown)   SpO2 98%     · Increasing SOB and wheeze since yesterday; not improved w/ home nebs + 40mg PO prednisone   · CXR wet read w/o acute abnormality   · Lower suspicion of acute infectious cause   · Reports improvement after nebs, IV steroids, and IV mag given in ED; however still not at baseline  · Respiratory protocol ordered  · Continue scheduled IV solumedrol and neb treatments   · To check viral swab for completeness   · PRN hycodan cough syrup for cough ordered per patient request   · Continuous O2 monitoring

## 2023-08-12 NOTE — UTILIZATION REVIEW
Initial Clinical Review    Admission: Date/Time/Statement:   Admission Orders (From admission, onward)     Ordered        08/11/23 2348  INPATIENT ADMISSION  Once                      Orders Placed This Encounter   Procedures   • INPATIENT ADMISSION     Standing Status:   Standing     Number of Occurrences:   1     Order Specific Question:   Level of Care     Answer:   Med Surg [16]     Order Specific Question:   Estimated length of stay     Answer:   More than 2 Midnights     Order Specific Question:   Certification     Answer:   I certify that inpatient services are medically necessary for this patient for a duration of greater than two midnights. See H&P and MD Progress Notes for additional information about the patient's course of treatment. ED Arrival Information     Expected   -    Arrival   8/11/2023 19:51    Acuity   Emergent            Means of arrival   Walk-In    Escorted by   Spouse    Service   Hospitalist    Admission type   Emergency            Arrival complaint   asthma           Chief Complaint   Patient presents with   • Asthma     Pt arrives ambulatory with a c/o an asthma exasperation since yesterday. Neb txs and rescue inhaler not helping. Auditory wheezing noted in triage. Initial Presentation: 66 y.o. female presents to ED from home  With shortness of breath, wheezing and cough for past day. Used home nebs and prednisone without improvement. Felt better after ED treatment, still with SOB, not at baseline. PMH is  Asthma, DM and obesity. CXR shows  No abnormality. Admit  Ip with  Asthma Exacerbation  And plan is   IV  S/medrol, nebulizers,  IV  MG,  monitor labs and respiratory status and continue home meds. Date:   8/12       Day 2:   Continue IV  S/medrol and nebulizers. Feels breathing improved. Sats  93  %  RA. No respiratory distress noted.       ED Triage Vitals   Temperature Pulse Respirations Blood Pressure SpO2   08/11/23 2000 08/11/23 2000 08/11/23 2000 08/11/23 2004 08/11/23 2000   97.9 °F (36.6 °C) 88 20 133/82 98 %      Temp Source Heart Rate Source Patient Position - Orthostatic VS BP Location FiO2 (%)   08/11/23 2000 08/11/23 2000 08/11/23 2000 08/11/23 2000 --   Temporal Monitor Sitting Left arm       Pain Score       08/12/23 0038       No Pain          Wt Readings from Last 1 Encounters:   08/12/23 81.6 kg (180 lb)     Additional Vital Signs:    -- -- -- -- 93 % None (Room air) --    08/12/23 0937 -- -- -- -- -- 93 % -- --   08/12/23 09:28:03 -- 95 -- 112/74 87 88 % Abnormal  -- --   08/12/23 0745 -- -- -- -- -- 97 % None (Room air) --   08/12/23 0742 -- -- -- -- -- 94 % None (Room air) --   08/12/23 07:16:06 -- 99 -- 115/77 90 93 % -- --   08/12/23 00:40:50 98 °F (36.7 °C) 104 20 111/72 85 96 % -- --   08/11/23 2330 -- 100 20 112/65 81 98 % None (Room air) --   08/11/23 2315 -- 98 20 108/63 81 98 % None (Room air) --   08/11/23 2200 -- 96 -- -- -- 98 % None (Room air) --   08/11/23 2100 -- 97 24 Abnormal  -- -- 99 % -- --   08/11/23 2036 -- -- -- -- -- -- None (Room air) --   08/11/23 2018 -- -- -- -- -- 97 % -- --   08/11/23 2004 -- -- -- 133/82 -- -- -- Sitting   08/11/23 2000 97.9 °F (36.6 °C) 88 20 -- -- 98 % None (Room air) Sitting       Pertinent Labs/Diagnostic Test Results:   XR chest 1 view portable   Final Result by Eyad Cota MD (08/12 0614)      No acute cardiopulmonary disease.                   Workstation performed: [id number]           Results from last 7 days   Lab Units 08/12/23  0011   SARS-COV-2  Negative     Results from last 7 days   Lab Units 08/12/23  0504 08/11/23  2126   WBC Thousand/uL 8.29 9.78   HEMOGLOBIN g/dL 14.5 14.1   HEMATOCRIT % 43.9 42.4   PLATELETS Thousands/uL 464* 464*   NEUTROS ABS Thousands/µL 7.61 3.73         Results from last 7 days   Lab Units 08/12/23  0504 08/12/23  0001   SODIUM mmol/L 135 137   POTASSIUM mmol/L 3.9 3.4*   CHLORIDE mmol/L 105 104   CO2 mmol/L 20* 23   ANION GAP mmol/L 10 10   BUN mg/dL 9 8   CREATININE mg/dL 0.87 0.78   EGFR ml/min/1.73sq m 74 85   CALCIUM mg/dL 9.7 9.8     Results from last 7 days   Lab Units 08/12/23  0001   AST U/L 22   ALT U/L 18   ALK PHOS U/L 112*   TOTAL PROTEIN g/dL 8.7*   ALBUMIN g/dL 4.4   TOTAL BILIRUBIN mg/dL 0.46     Results from last 7 days   Lab Units 08/12/23  1106 08/12/23  0715 08/11/23  2306   POC GLUCOSE mg/dl 162* 166* 109     Results from last 7 days   Lab Units 08/12/23  0504 08/12/23  0001   GLUCOSE RANDOM mg/dL 239* 148*                   Results from last 7 days   Lab Units 08/12/23  0011   INFLUENZA A PCR  Negative   INFLUENZA B PCR  Negative   RSV PCR  Negative         ED Treatment:   Medication Administration from 08/11/2023 1951 to 08/12/2023 0031       Date/Time Order Dose Route Action Comments     08/11/2023 2017 EDT albuterol inhalation solution 10 mg 10 mg Nebulization Given --     08/11/2023 2017 EDT ipratropium (ATROVENT) 0.02 % inhalation solution 1 mg 1 mg Nebulization Given --     08/11/2023 2017 EDT sodium chloride 0.9 % inhalation solution 3 mL 3 mL Nebulization Given --     08/11/2023 2135 EDT magnesium sulfate 2 g/50 mL IVPB (premix) 2 g 0 g Intravenous Stopped --     08/11/2023 2036 EDT magnesium sulfate 2 g/50 mL IVPB (premix) 2 g 2 g Intravenous New Bag --     08/11/2023 2036 EDT methylPREDNISolone sodium succinate (Solu-MEDROL) injection 125 mg 125 mg Intravenous Given --     08/11/2023 2126 EDT LORazepam (ATIVAN) injection 1 mg 1 mg Intravenous Given --     08/11/2023 2153 EDT sodium chloride 0.9 % bolus 1,000 mL 1,000 mL Intravenous New Bag --     08/11/2023 2351 EDT ipratropium-albuterol (DUO-NEB) 0.5-2.5 mg/3 mL inhalation solution 3 mL 3 mL Nebulization Given --        Present on Admission:  • Asthma exacerbation  • Diabetes ([address])  • Hyperlipidemia      Admitting Diagnosis: Asthma [J45.909]  Severe persistent asthma with exacerbation [J45.51]  Age/Sex: 66 y.o. female  Admission Orders:  Scheduled Medications:  atorvastatin, 10 mg, Oral, Daily With Dinner  budesonide-formoterol, 2 puff, Inhalation, BID  buPROPion, 150 mg, Oral, BID  famotidine, 40 mg, Oral, BID  fluticasone-vilanterol, 1 puff, Inhalation, Daily  gabapentin, 100 mg, Oral, TID  insulin lispro, 1-6 Units, Subcutaneous, TID AC  insulin lispro, 1-6 Units, Subcutaneous, HS  insulin lispro, 3 Units, Subcutaneous, TID With Meals  ipratropium, 0.5 mg, Nebulization, Q6H  levalbuterol, 1.25 mg, Nebulization, Q6H  methylPREDNISolone sodium succinate, 40 mg, Intravenous, Q6H KAYLEIGH  montelukast, 10 mg, Oral, HS  pantoprazole, 40 mg, Oral, BID AC  torsemide, 10 mg, Oral, Daily      Continuous IV Infusions:     PRN Meds:  acetaminophen, 650 mg, Oral, Q6H PRN  albuterol, 2.5 mg, Nebulization, Q6H PRN  HYDROcodone Bit-Homatrop MBr, 5 mL, Oral, Q4H PRN  hydrOXYzine HCL, 25 mg, Oral, Q6H PRN  zolpidem, 10 mg, Oral, HS PRN        IP CONSULT TO PULMONOLOGY    Network Utilization Review Department  ATTENTION: Please call with any questions or concerns to [phone] and carefully listen to the prompts so that you are directed to the right person. All voicemails are confidential.  Maryan Gitelman all requests for admission clinical reviews, approved or denied determinations and any other requests to dedicated fax number below belonging to the campus where the patient is receiving treatment.  List of dedicated fax numbers for the Facilities:  [place] DENIALS (Administrative/Medical Necessity) [phone] [facility] [facility] [phone] [address] [place] [address] [address]  [facility] [phone]

## 2023-08-12 NOTE — ASSESSMENT & PLAN NOTE
Lab Results   Component Value Date    HGBA1C 4.8 06/12/2023     · Continue home NovoLog 3 units 3 times daily with meals  · [employer]al SSI  · Diabetic diet  · Hypoglycemia protocol  · Monitor in setting of IV steroid administration as above

## 2023-08-12 NOTE — ED PROVIDER NOTES
Pt Name: Sp Billingsley  MRN: [id number]  [address] [facility] [place] 1965  Age/Sex: 66 y.o. female  Date of evaluation: 8/11/2023  PCP: Melinda Drew    CHIEF COMPLAINT    Chief Complaint   Patient presents with   • Asthma     Pt arrives ambulatory with a c/o an asthma exasperation since yesterday. Neb txs and rescue inhaler not helping. Auditory wheezing noted in triage. HPI and MDM    66 y.o. female presenting with shortness of breath. Patient with a history of severe asthma, has been hospitalized multiple times for asthma exacerbation. Shortness of breath started yesterday, worse today. No fevers or chills. Does have productive cough which she states is common with asthma exacerbations for her. Has been using her nebulizers. States she took 40 mg of prednisone that she had leftover yesterday. No known sick contacts. No chest pain. Differential diagnosis considered includes but not limited to acute severe asthma exacerbation, pneumothorax, pneumonia. ED Course as of 08/11/23 2350   Fri Aug 11, 2023   2111 XR chest 1 view portable  No acute cardiopulmonary processes per my independent interpretation. 2112 Upon reevaluation, patient's lungs are improved, still has wheezing, although air movement has significantly improved. Not requiring any supplemental oxygen. She feels very shaky and jittery, does not want any further nebulizers currently. She states she feels lightheaded. We will give a dose of Ativan, give IV fluids. Patient is feeling better upon reevaluation, however still complaining of shortness of breath, still has a wheeze and a cough. Discussed with internal medicine for hospitalization.     Medications   ipratropium-albuterol (DUO-NEB) 0.5-2.5 mg/3 mL inhalation solution 3 mL (has no administration in time range)   albuterol inhalation solution 10 mg (10 mg Nebulization Given 8/11/23 2017)     And   ipratropium (ATROVENT) 0.02 % inhalation solution 1 mg (1 mg Nebulization Given 23)     And   sodium chloride 0.9 % inhalation solution 3 mL (3 mL Nebulization Given 23)   magnesium sulfate 2 g/50 mL IVPB (premix) 2 g (0 g Intravenous Stopped 23)   methylPREDNISolone sodium succinate (Solu-MEDROL) injection 125 mg (125 mg Intravenous Given 23)   LORazepam (ATIVAN) injection 1 mg (1 mg Intravenous Given 23)   sodium chloride 0.9 % bolus 1,000 mL (1,000 mL Intravenous New Bag 23)         Past Medical and Surgical History    Past Medical History:   Diagnosis Date   • Asthma    • Diabetes mellitus ([address])    • GERD (gastroesophageal reflux disease)    • Hyperlipidemia    • Hypertension        Past Surgical History:   Procedure Laterality Date   •  SECTION     • COLONOSCOPY     • HYSTERECTOMY     • OVARIAN CYST REMOVAL         History reviewed. No pertinent family history. Social History     Tobacco Use   • Smoking status: Never   • Smokeless tobacco: Never   Vaping Use   • Vaping Use: Never used   Substance Use Topics   • Alcohol use: Yes     Alcohol/week: 7.0 standard drinks of alcohol     Types: 7 Glasses of wine per week   • Drug use: Never           Allergies    Allergies   Allergen Reactions   • Codeine Other (See Comments)   • Aspirin GI Intolerance and Rash   • Hydromorphone Rash and Other (See Comments)     GI upset     • Oxycodone-Acetaminophen Rash and Other (See Comments)   • Tramadol Rash and Other (See Comments)       Home Medications    Prior to Admission medications    Medication Sig Start Date End Date Taking?  Authorizing Provider   albuterol (2.5 mg/3 mL) 0.083 % nebulizer solution 3 ml    Historical Provider, MD   albuterol (PROVENTIL HFA,VENTOLIN HFA) 90 mcg/act inhaler Inhale 2 puffs as needed    Historical Provider, MD   Blood Pressure Monitoring (Blood Pressure Monitor 3) CARLOS MANUEL Use as directed 10/21/22   Historical Provider, MD   budesonide-formoterol (SYMBICORT) 160-4.5 mcg/act inhaler Every 12 hours Historical Provider, MD   Cholecalciferol 50 MCG (2000 UT) CAPS every 24 hours    Historical Provider, MD   dicyclomine (BENTYL) 20 mg tablet TAKE 1 TABLET BY MOUTH EVERY 6 HOURS AS NEEDED FOR ABDOMINAL CRAMPING 3/15/23   Ruby Walsh PA-C   diphenhydrAMINE (BENADRYL) 25 mg capsule 3 (three) times a day    Historical Provider, MD   EPINEPHrine (EPIPEN) 0.3 mg/0.3 mL SOAJ as directed    Historical Provider, MD   famotidine (PEPCID) 40 MG tablet Take 1 tablet (40 mg total) by mouth 2 (two) times a day 11/1/22 6/11/23  Grace Perla MD   fluticasone-vilanterol (Breo Ellipta) 200-25 mcg/actuation inhaler Inhale 1 puff daily Rinse mouth after use.  11/1/22 6/8/23  Grace Perla MD   gabapentin (NEURONTIN) 100 mg capsule Take 100 mg by mouth 3 (three) times a day as needed 5/2/23   Historical Provider, MD   Glucagon 1 MG/0.2ML [facility] Provider, MD   hydrOXYzine HCL (ATARAX) 25 mg tablet Take 1 tablet (25 mg total) by mouth every 6 (six) hours as needed for itching for up to 14 days 6/16/23 6/30/23  EDUARDA Selby   insulin aspart (NovoLOG FlexPen) 100 UNIT/ML injection pen Inject 3 Units under the skin 3 (three) times a day with meals 6/16/23   EDUARDA Selby   ipratropium (ATROVENT) 0.02 % nebulizer solution Take 2.5 mL (0.5 mg total) by nebulization 3 (three) times a day 11/1/22 6/8/23  Grace Perla MD   ipratropium-albuterol (DUO-NEB) 0.5-2.5 mg/3 mL nebulizer solution Take 3 mL by nebulization every 4 (four) hours as needed for wheezing or shortness of breath 11/1/22   Grace Perla MD   Melatonin 10 MG CAPS Take 10 mg by mouth daily at bedtime as needed 3/25/23   Historical Provider, MD   montelukast (SINGULAIR) 10 mg tablet Take 1 tablet (10 mg total) by mouth daily at bedtime 11/1/22 6/11/23  Grace Perla MD   ondansetron (ZOFRAN) 4 mg tablet Take 1 tablet by mouth every 8 (eight) hours    Historical Provider, MD   OneTouch Ultra test strip TEST TWICE A DAY 10/21/22   Historical Provider, MD   pantoprazole (PROTONIX) 40 mg Take 1 packet (40 mg total) by mouth 2 (two) times a day before meals 3/1/23   Sonya Opitz, MD   pravastatin (PRAVACHOL) 40 mg tablet Take 1 tablet (40 mg total) by mouth daily with dinner 11/1/22 6/6/23  Pal Beckham MD   promethazine (PHENERGAN) 25 mg tablet Take 1 tablet (25 mg total) by mouth every 6 (six) hours as needed for nausea or vomiting 3/8/23   Rolan Batista PA-C   rosuvastatin (CRESTOR) 5 mg tablet Take 1 tablet by mouth daily    Historical Provider, MD   semaglutide, 2 mg/dose, (Ozempic, 2 MG/DOSE,) 8 mg/ mL injection pen 2 mg  10/21/23  Historical Provider, MD   torsemide (DEMADEX) 10 mg tablet Take 10 mg by mouth daily 9/15/22   Historical Provider, MD   zolpidem (AMBIEN) 10 mg tablet Take 1 tablet (10 mg total) by mouth daily at bedtime as needed for sleep for up to 7 days 6/16/23 6/23/23  Frankey Armor, CRNP           Physical Exam      ED Triage Vitals   Temperature Pulse Respirations Blood Pressure SpO2   08/11/23 2000 08/11/23 2000 08/11/23 2000 08/11/23 2004 08/11/23 2000   97.9 °F (36.6 °C) 88 20 133/82 98 %      Temp Source Heart Rate Source Patient Position - Orthostatic VS BP Location FiO2 (%)   08/11/23 2000 08/11/23 2000 08/11/23 2000 08/11/23 2000 --   Temporal Monitor Sitting Left arm       Pain Score       --                      Physical Exam  Constitutional:       Appearance: She is not ill-appearing. HENT:      Head: Normocephalic and atraumatic. Nose: Nose normal.      Mouth/Throat:      Mouth: Mucous membranes are moist.   Eyes:      Extraocular Movements: Extraocular movements intact. Pupils: Pupils are equal, round, and reactive to light. Cardiovascular:      Rate and Rhythm: Regular rhythm. Tachycardia present. Pulmonary:      Effort: Respiratory distress present. Breath sounds: Wheezing present. Abdominal:      General: There is no distension. Musculoskeletal:         General: No swelling. Cervical back: Normal range of motion and neck supple. Skin:     General: Skin is warm. Findings: No erythema. Neurological:      Mental Status: She is alert and oriented to person, place, and time. Mental status is at baseline.               Diagnostic Results      Labs:    Results Reviewed     Procedure Component Value Units Date/Time    Comprehensive metabolic panel [[id number]] Updated: 08/11/23 2313    Lab Status: No result Specimen: Blood from Arm, Right     Fingerstick Glucose (POCT) [[id number]]  (Normal) Collected: 08/11/23 2306    Lab Status: Final result Updated: 08/11/23 2307     POC Glucose 109 mg/dl     CBC and differential [[id number]]  (Abnormal) Collected: 08/11/23 2126    Lab Status: Final result Specimen: Blood from Arm, Left Updated: 08/11/23 2153     WBC 9.78 Thousand/uL      RBC 4.49 Million/uL      Hemoglobin 14.1 g/dL      Hematocrit 42.4 %      MCV 94 fL      MCH 31.4 pg      MCHC 33.3 g/dL      RDW 12.6 %      MPV 9.6 fL      Platelets 993 Thousands/uL      nRBC 0 /100 WBCs      Neutrophils Relative 38 %      Immat GRANS % 0 %      Lymphocytes Relative 53 %      Monocytes Relative 7 %      Eosinophils Relative 2 %      Basophils Relative 0 %      Neutrophils Absolute 3.73 Thousands/µL      Immature Grans Absolute 0.04 Thousand/uL      Lymphocytes Absolute 5.08 Thousands/µL      Monocytes Absolute 0.71 Thousand/µL      Eosinophils Absolute 0.19 Thousand/µL      Basophils Absolute 0.03 Thousands/µL           All labs reviewed and utilized in the medical decision making process    Radiology:    XR chest 1 view portable    (Results Pending)       All radiology studies independently viewed by me and interpreted by the radiologist.    Procedure    Procedures        FINAL IMPRESSION    Final diagnoses:   Severe persistent asthma with exacerbation         DISPOSITION    Time reflects when diagnosis was documented in both MDM as applicable and the Disposition within this note     Time User Action Codes Description Comment    8/11/2023 11:48 PM [facility] Add [J45.51] Severe persistent asthma with exacerbation       ED Disposition     ED Disposition   Admit    Condition   Stable    Date/Time   Fri Aug 11, 2023 11:48 PM    Comment   Case was discussed with Koby Cabrera and the patient's admission status was agreed to be Admission Status: inpatient status to the service of Dr. Barrington Ferraro. Follow-up Information    None           PATIENT REFERRED TO:    No follow-up provider specified. DISCHARGE MEDICATIONS:    Patient's Medications   Discharge Prescriptions    No medications on file       No discharge procedures on file. Matt Lehman DO        This note was partially completed using voice recognition technology, and was scanned for gross errors; however some errors may still exist. Please contact the author with any questions or requests for clarification.       Matt Lehman DO  08/11/23 0896

## 2023-08-12 NOTE — PLAN OF CARE
Problem: PAIN - ADULT  Goal: Verbalizes/displays adequate comfort level or baseline comfort level  Description: Interventions:  - Encourage patient to monitor pain and request assistance  - Assess pain using appropriate pain scale  - Administer analgesics based on type and severity of pain and evaluate response  - Implement non-pharmacological measures as appropriate and evaluate response  - Consider cultural and social influences on pain and pain management  - Notify physician/advanced practitioner if interventions unsuccessful or patient reports new pain  Outcome: Progressing     Problem: INFECTION - ADULT  Goal: Absence or prevention of progression during hospitalization  Description: INTERVENTIONS:  - Assess and monitor for signs and symptoms of infection  - Monitor lab/diagnostic results  - Monitor all insertion sites, i.e. indwelling lines, tubes, and drains  - Monitor endotracheal if appropriate and nasal secretions for changes in amount and color  - [place] appropriate cooling/warming therapies per order  - Administer medications as ordered  - Instruct and encourage patient and family to use good hand hygiene technique  - Identify and instruct in appropriate isolation precautions for identified infection/condition  Outcome: Progressing  Goal: Absence of fever/infection during neutropenic period  Description: INTERVENTIONS:  - Monitor WBC    Outcome: Progressing     Problem: SAFETY ADULT  Goal: Patient will remain free of falls  Description: INTERVENTIONS:  - Educate patient/family on patient safety including physical limitations  - Instruct patient to call for assistance with activity   - Consult OT/PT to assist with strengthening/mobility   - Keep Call bell within reach  - Keep bed low and locked with side rails adjusted as appropriate  - Keep care items and personal belongings within reach  - Initiate and maintain comfort rounds  - Make Fall Risk Sign visible to staff  - Offer Toileting every  Hours, in advance of need  - Initiate/Maintain alarm  - Obtain necessary fall risk management equipment:  - Apply yellow socks and bracelet for high fall risk patients  - Consider moving patient to room near nurses station  Outcome: Progressing  Goal: Maintain or return to baseline ADL function  Description: INTERVENTIONS:  -  Assess patient's ability to carry out ADLs; assess patient's baseline for ADL function and identify physical deficits which impact ability to perform ADLs (bathing, care of mouth/teeth, toileting, grooming, dressing, etc.)  - Assess/evaluate cause of self-care deficits   - Assess range of motion  - Assess patient's mobility; develop plan if impaired  - Assess patient's need for assistive devices and provide as appropriate  - Encourage maximum independence but intervene and supervise when necessary  - Involve family in performance of ADLs  - Assess for home care needs following discharge   - Consider OT consult to assist with ADL evaluation and planning for discharge  - Provide patient education as appropriate  Outcome: Progressing  Goal: Maintains/Returns to pre admission functional level  Description: INTERVENTIONS:  - Perform BMAT or MOVE assessment daily.   - Set and communicate daily mobility goal to care team and patient/family/caregiver. - Collaborate with rehabilitation services on mobility goals if consulted  - Perform Range of Motion  times a day. - Reposition patient every  hours.   - Dangle patient  times a day  - Stand patient times a day  - Ambulate patient  times a day  - Out of bed to chair  times a day   - Out of bed for meals times a day  - Out of bed for toileting  - Record patient progress and toleration of activity level   Outcome: Progressing     Problem: DISCHARGE PLANNING  Goal: Discharge to home or other facility with appropriate resources  Description: INTERVENTIONS:  - Identify barriers to discharge w/patient and caregiver  - Arrange for needed discharge resources and transportation as appropriate  - Identify discharge learning needs (meds, wound care, etc.)  - Arrange for interpretive services to assist at discharge as needed  - Refer to Case Management Department for coordinating discharge planning if the patient needs post-hospital services based on physician/advanced practitioner order or complex needs related to functional status, cognitive ability, or social support system  Outcome: Progressing     Problem: Knowledge Deficit  Goal: Patient/family/caregiver demonstrates understanding of disease process, treatment plan, medications, and discharge instructions  Description: Complete learning assessment and assess knowledge base.   Interventions:  - Provide teaching at level of understanding  - Provide teaching via preferred learning methods  Outcome: Progressing     Problem: RESPIRATORY - ADULT  Goal: Achieves optimal ventilation and oxygenation  Description: INTERVENTIONS:  - Assess for changes in respiratory status  - Assess for changes in mentation and behavior  - Position to facilitate oxygenation and minimize respiratory effort  - Oxygen administered by appropriate delivery if ordered  - Initiate smoking cessation education as indicated  - Encourage broncho-pulmonary hygiene including cough, deep breathe, Incentive Spirometry  - Assess the need for suctioning and aspirate as needed  - Assess and instruct to report SOB or any respiratory difficulty  - Respiratory Therapy support as indicated  Outcome: Progressing

## 2023-08-12 NOTE — PLAN OF CARE
Problem: PAIN - ADULT  Goal: Verbalizes/displays adequate comfort level or baseline comfort level  Description: Interventions:  - Encourage patient to monitor pain and request assistance  - Assess pain using appropriate pain scale  - Administer analgesics based on type and severity of pain and evaluate response  - Implement non-pharmacological measures as appropriate and evaluate response  - Consider cultural and social influences on pain and pain management  - Notify physician/advanced practitioner if interventions unsuccessful or patient reports new pain  Outcome: Progressing     Problem: INFECTION - ADULT  Goal: Absence or prevention of progression during hospitalization  Description: INTERVENTIONS:  - Assess and monitor for signs and symptoms of infection  - Monitor lab/diagnostic results  - Monitor all insertion sites, i.e. indwelling lines, tubes, and drains  - Monitor endotracheal if appropriate and nasal secretions for changes in amount and color  - [place] appropriate cooling/warming therapies per order  - Administer medications as ordered  - Instruct and encourage patient and family to use good hand hygiene technique  - Identify and instruct in appropriate isolation precautions for identified infection/condition  Outcome: Progressing  Goal: Absence of fever/infection during neutropenic period  Description: INTERVENTIONS:  - Monitor WBC    Outcome: Progressing     Problem: SAFETY ADULT  Goal: Patient will remain free of falls  Description: INTERVENTIONS:  - Educate patient/family on patient safety including physical limitations  - Instruct patient to call for assistance with activity   - Consult OT/PT to assist with strengthening/mobility   - Keep Call bell within reach  - Keep bed low and locked with side rails adjusted as appropriate  - Keep care items and personal belongings within reach  - Initiate and maintain comfort rounds  - Make Fall Risk Sign visible to staff  - Offer Toileting every 2 Hours, in advance of need  - Obtain necessary fall risk management equipment: nonskid socks  - Apply yellow socks and bracelet for high fall risk patients  - Consider moving patient to room near nurses station  Outcome: Progressing  Goal: Maintain or return to baseline ADL function  Description: INTERVENTIONS:  -  Assess patient's ability to carry out ADLs; assess patient's baseline for ADL function and identify physical deficits which impact ability to perform ADLs (bathing, care of mouth/teeth, toileting, grooming, dressing, etc.)  - Assess/evaluate cause of self-care deficits   - Assess range of motion  - Assess patient's mobility; develop plan if impaired  - Assess patient's need for assistive devices and provide as appropriate  - Encourage maximum independence but intervene and supervise when necessary  - Involve family in performance of ADLs  - Assess for home care needs following discharge   - Consider OT consult to assist with ADL evaluation and planning for discharge  - Provide patient education as appropriate  Outcome: Progressing  Goal: Maintains/Returns to pre admission functional level  Description: INTERVENTIONS:  - Perform BMAT or MOVE assessment daily.   - Set and communicate daily mobility goal to care team and patient/family/caregiver. - Collaborate with rehabilitation services on mobility goals if consulted  - Perform Range of Motion 4 times a day. - Reposition patient every 4 hours.   - Dangle patient 4 times a day  - Stand patient 4 times a day  - Ambulate patient 4 times a day  - Out of bed to chair 3 times a day   - Out of bed for meals 3 times a day  - Out of bed for toileting  - Record patient progress and toleration of activity level   Outcome: Progressing     Problem: DISCHARGE PLANNING  Goal: Discharge to home or other facility with appropriate resources  Description: INTERVENTIONS:  - Identify barriers to discharge w/patient and caregiver  - Arrange for needed discharge resources and transportation as appropriate  - Identify discharge learning needs (meds, wound care, etc.)  - Arrange for interpretive services to assist at discharge as needed  - Refer to Case Management Department for coordinating discharge planning if the patient needs post-hospital services based on physician/advanced practitioner order or complex needs related to functional status, cognitive ability, or social support system  Outcome: Progressing     Problem: Knowledge Deficit  Goal: Patient/family/caregiver demonstrates understanding of disease process, treatment plan, medications, and discharge instructions  Description: Complete learning assessment and assess knowledge base.   Interventions:  - Provide teaching at level of understanding  - Provide teaching via preferred learning methods  Outcome: Progressing     Problem: RESPIRATORY - ADULT  Goal: Achieves optimal ventilation and oxygenation  Description: INTERVENTIONS:  - Assess for changes in respiratory status  - Assess for changes in mentation and behavior  - Position to facilitate oxygenation and minimize respiratory effort  - Oxygen administered by appropriate delivery if ordered  - Initiate smoking cessation education as indicated  - Encourage broncho-pulmonary hygiene including cough, deep breathe, Incentive Spirometry  - Assess the need for suctioning and aspirate as needed  - Assess and instruct to report SOB or any respiratory difficulty  - Respiratory Therapy support as indicated  Outcome: Progressing

## 2023-08-12 NOTE — ASSESSMENT & PLAN NOTE
Patient reports increasing shortness of breath, wheezing, and cough since yesterday. Home neb treatments and 1 pill of 40mg prednisone (reports left over from prior treatment) taken at home have not been improving her symptoms, prompting ED visit. Reports improvement after ED interventions, however still reports some SOB and does not at baseline. Denies recent sick contact, allergen contact, or other acute symptoms/complaint at this time.     /77   Pulse 99   Temp 98 °F (36.7 °C)   Resp 20   Ht 4' 11" (1.499 m)   Wt 81.6 kg (180 lb)   LMP  (LMP Unknown)   SpO2 94%   BMI 36.36 kg/m²     · Increasing SOB and wheeze since yesterday; not improved w/ home nebs + 40mg PO prednisone   · CXR wet read w/o acute abnormality   · Lower suspicion of acute infectious cause   · Reports improvement after nebs, IV steroids, and IV mag given in ED; however still not at baseline  · Respiratory protocol ordered  · Continue scheduled steroids and nebs  · Wean as able

## 2023-08-12 NOTE — UTILIZATION REVIEW
NOTIFICATION OF INPATIENT ADMISSION   AUTHORIZATION REQUEST   SERVICING FACILITY:   [address] [place], [place] [address]Sakina Savage[address]  Tax ID: [id number]  NPI: [id number] ATTENDING PROVIDER:  Attending Name and NPI#: Ray Mayra, [id number] [place] [address] [[id number]]  Address: George Alba[address]  Phone: [phone]     ADMISSION INFORMATION:  Place of Service: Inpatient [facility]  Place of Service Code: 21  Inpatient Admission Date/Time: 8/11/23 11:48 PM  Discharge Date/Time: No discharge date for patient encounter. Admitting Diagnosis Code/Description:  Asthma [J45.909]  Severe persistent asthma with exacerbation [J45.51]     UTILIZATION REVIEW CONTACT:  Pinky Martinez Utilization   Network Utilization Review Department  Phone: [phone]  Fax [phone]  Email: [email or web address]  Contact for approvals/pending authorizations, clinical reviews, and discharge. PHYSICIAN ADVISORY SERVICES:  Medical Necessity Denial & [employer] Review  Phone: [phone]  Fax: [phone]  Email: [email or web address]

## 2023-08-12 NOTE — ASSESSMENT & PLAN NOTE
Lab Results   Component Value Date    HGBA1C 4.8 06/12/2023     · Continue home NovoLog 3 units 3 times daily with meals  · Continue sliding scale insulin  · Consider discontinuation of insulin given well-controlled A1c on discharge.

## 2023-08-12 NOTE — RESPIRATORY THERAPY NOTE
RT Protocol Note  Willard Nam 66 y.o. female MRN: [id number]  Unit/Bed#: -01 Encounter: [id number]    Assessment    Principal Problem:    Asthma exacerbation  Active Problems:    Hyperlipidemia    Diabetes ([address] )    Class 2 obesity with body mass index (BMI) of 35.0 to 35.9 in adult      Home Pulmonary Medications:         Past Medical History:   Diagnosis Date   • Asthma    • Diabetes mellitus ([address])    • GERD (gastroesophageal reflux disease)    • Hyperlipidemia    • Hypertension      Social History     Socioeconomic History   • Marital status: /Civil Union     Spouse name: None   • Number of children: None   • Years of education: None   • Highest education level: None   Occupational History   • None   Tobacco Use   • Smoking status: Never   • Smokeless tobacco: Never   Vaping Use   • Vaping Use: Never used   Substance and Sexual Activity   • Alcohol use: Yes     Alcohol/week: 7.0 standard drinks of alcohol     Types: 7 Glasses of wine per week   • Drug use: Never   • Sexual activity: None   Other Topics Concern   • None   Social History Narrative   • None     Social Determinants of Health     Financial Resource Strain: Not on file   Food Insecurity: No Food Insecurity (2/27/2023)    Hunger Vital Sign    • Worried About Running Out of Food in the Last Year: Never true    • Ran Out of Food in the Last Year: Never true   Transportation Needs: No Transportation Needs (2/27/2023)    PRAPARE - Transportation    • Lack of Transportation (Medical): No    • Lack of Transportation (Non-Medical):  No   Physical Activity: Not on file   Stress: Not on file   Social Connections: Not on file   Intimate Partner Violence: Not on file   Housing Stability: Low Risk  (2/27/2023)    Housing Stability Vital Sign    • Unable to Pay for Housing in the Last Year: No    • Number of Places Lived in the Last Year: 1    • Unstable Housing in the Last Year: No       Subjective         Objective    Physical Exam: Vitals:  Blood pressure 112/74, pulse 95, temperature 98 °F (36.7 °C), resp. rate 20, height 4' 11" (1.499 m), weight 81.6 kg (180 lb), SpO2 93 %. Imaging and other studies: I have personally reviewed pertinent reports.       O2 Device: room air     Plan    Respiratory Plan: Home Bronchodilator Patient pathway        Resp Comments: will continue with Q6 tx

## 2023-08-13 LAB
ANION GAP SERPL CALCULATED.3IONS-SCNC: 11 MMOL/L
BASOPHILS # BLD AUTO: 0.01 THOUSANDS/ÂΜL (ref 0–0.1)
BASOPHILS NFR BLD AUTO: 0 % (ref 0–1)
BUN SERPL-MCNC: 11 MG/DL (ref 5–25)
CALCIUM SERPL-MCNC: 9.9 MG/DL (ref 8.4–10.2)
CHLORIDE SERPL-SCNC: 103 MMOL/L (ref 96–108)
CO2 SERPL-SCNC: 23 MMOL/L (ref 21–32)
CREAT SERPL-MCNC: 0.86 MG/DL (ref 0.6–1.3)
EOSINOPHIL # BLD AUTO: 0 THOUSAND/ÂΜL (ref 0–0.61)
EOSINOPHIL NFR BLD AUTO: 0 % (ref 0–6)
ERYTHROCYTE [DISTWIDTH] IN BLOOD BY AUTOMATED COUNT: 12.6 % (ref 11.6–15.1)
GFR SERPL CREATININE-BSD FRML MDRD: 75 ML/MIN/1.73SQ M
GLUCOSE SERPL-MCNC: 114 MG/DL (ref 65–140)
GLUCOSE SERPL-MCNC: 150 MG/DL (ref 65–140)
GLUCOSE SERPL-MCNC: 155 MG/DL (ref 65–140)
GLUCOSE SERPL-MCNC: 163 MG/DL (ref 65–140)
GLUCOSE SERPL-MCNC: 186 MG/DL (ref 65–140)
HCT VFR BLD AUTO: 42.3 % (ref 34.8–46.1)
HGB BLD-MCNC: 14.1 G/DL (ref 11.5–15.4)
IMM GRANULOCYTES # BLD AUTO: 0.08 THOUSAND/UL (ref 0–0.2)
IMM GRANULOCYTES NFR BLD AUTO: 1 % (ref 0–2)
LYMPHOCYTES # BLD AUTO: 0.9 THOUSANDS/ÂΜL (ref 0.6–4.47)
LYMPHOCYTES NFR BLD AUTO: 6 % (ref 14–44)
MCH RBC QN AUTO: 30.7 PG (ref 26.8–34.3)
MCHC RBC AUTO-ENTMCNC: 33.3 G/DL (ref 31.4–37.4)
MCV RBC AUTO: 92 FL (ref 82–98)
MONOCYTES # BLD AUTO: 0.55 THOUSAND/ÂΜL (ref 0.17–1.22)
MONOCYTES NFR BLD AUTO: 4 % (ref 4–12)
NEUTROPHILS # BLD AUTO: 14.27 THOUSANDS/ÂΜL (ref 1.85–7.62)
NEUTS SEG NFR BLD AUTO: 89 % (ref 43–75)
NRBC BLD AUTO-RTO: 0 /100 WBCS
PLATELET # BLD AUTO: 483 THOUSANDS/UL (ref 149–390)
PMV BLD AUTO: 9.9 FL (ref 8.9–12.7)
POTASSIUM SERPL-SCNC: 3.8 MMOL/L (ref 3.5–5.3)
RBC # BLD AUTO: 4.59 MILLION/UL (ref 3.81–5.12)
SODIUM SERPL-SCNC: 137 MMOL/L (ref 135–147)
WBC # BLD AUTO: 15.81 THOUSAND/UL (ref 4.31–10.16)

## 2023-08-13 PROCEDURE — 85025 COMPLETE CBC W/AUTO DIFF WBC: CPT | Performed by: INTERNAL MEDICINE

## 2023-08-13 PROCEDURE — 94640 AIRWAY INHALATION TREATMENT: CPT

## 2023-08-13 PROCEDURE — 80048 BASIC METABOLIC PNL TOTAL CA: CPT | Performed by: INTERNAL MEDICINE

## 2023-08-13 PROCEDURE — 94664 DEMO&/EVAL PT USE INHALER: CPT

## 2023-08-13 PROCEDURE — 94760 N-INVAS EAR/PLS OXIMETRY 1: CPT

## 2023-08-13 PROCEDURE — 99233 SBSQ HOSP IP/OBS HIGH 50: CPT | Performed by: STUDENT IN AN ORGANIZED HEALTH CARE EDUCATION/TRAINING PROGRAM

## 2023-08-13 PROCEDURE — 82948 REAGENT STRIP/BLOOD GLUCOSE: CPT

## 2023-08-13 PROCEDURE — 99223 1ST HOSP IP/OBS HIGH 75: CPT | Performed by: NURSE PRACTITIONER

## 2023-08-13 RX ORDER — BUDESONIDE 0.5 MG/2ML
0.5 INHALANT ORAL
Status: COMPLETED | OUTPATIENT
Start: 2023-08-13 | End: 2023-08-14

## 2023-08-13 RX ORDER — DIAZEPAM 5 MG/1
5 TABLET ORAL EVERY 6 HOURS PRN
Status: DISCONTINUED | OUTPATIENT
Start: 2023-08-13 | End: 2023-08-15 | Stop reason: HOSPADM

## 2023-08-13 RX ORDER — FUROSEMIDE 10 MG/ML
40 INJECTION INTRAMUSCULAR; INTRAVENOUS ONCE
Status: COMPLETED | OUTPATIENT
Start: 2023-08-13 | End: 2023-08-13

## 2023-08-13 RX ORDER — LEVALBUTEROL INHALATION SOLUTION 1.25 MG/3ML
SOLUTION RESPIRATORY (INHALATION)
Status: DISPENSED
Start: 2023-08-13 | End: 2023-08-14

## 2023-08-13 RX ORDER — LEVALBUTEROL INHALATION SOLUTION 1.25 MG/3ML
1.25 SOLUTION RESPIRATORY (INHALATION)
Status: DISCONTINUED | OUTPATIENT
Start: 2023-08-13 | End: 2023-08-15 | Stop reason: HOSPADM

## 2023-08-13 RX ORDER — METHYLPREDNISOLONE SODIUM SUCCINATE 40 MG/ML
40 INJECTION, POWDER, LYOPHILIZED, FOR SOLUTION INTRAMUSCULAR; INTRAVENOUS EVERY 12 HOURS SCHEDULED
Status: COMPLETED | OUTPATIENT
Start: 2023-08-13 | End: 2023-08-14

## 2023-08-13 RX ADMIN — IPRATROPIUM BROMIDE 0.5 MG: 0.5 SOLUTION RESPIRATORY (INHALATION) at 07:45

## 2023-08-13 RX ADMIN — FLUTICASONE FUROATE AND VILANTEROL TRIFENATATE 1 PUFF: 200; 25 POWDER RESPIRATORY (INHALATION) at 08:20

## 2023-08-13 RX ADMIN — HYDROXYZINE HYDROCHLORIDE 25 MG: 25 TABLET ORAL at 11:23

## 2023-08-13 RX ADMIN — HYDROCODONE BITARTRATE AND HOMATROPINE METHYLBROMIDE ORAL SOLUTION 5 ML: 5; 1.5 LIQUID ORAL at 05:28

## 2023-08-13 RX ADMIN — BUDESONIDE AND FORMOTEROL FUMARATE DIHYDRATE 2 PUFF: 160; 4.5 AEROSOL RESPIRATORY (INHALATION) at 08:20

## 2023-08-13 RX ADMIN — MONTELUKAST 10 MG: 10 TABLET, FILM COATED ORAL at 22:12

## 2023-08-13 RX ADMIN — ATORVASTATIN CALCIUM 10 MG: 10 TABLET, FILM COATED ORAL at 16:43

## 2023-08-13 RX ADMIN — FAMOTIDINE 40 MG: 20 TABLET, FILM COATED ORAL at 08:18

## 2023-08-13 RX ADMIN — BUPROPION HYDROCHLORIDE 150 MG: 150 TABLET, EXTENDED RELEASE ORAL at 08:19

## 2023-08-13 RX ADMIN — BUPROPION HYDROCHLORIDE 150 MG: 150 TABLET, EXTENDED RELEASE ORAL at 22:12

## 2023-08-13 RX ADMIN — GABAPENTIN 100 MG: 100 CAPSULE ORAL at 22:11

## 2023-08-13 RX ADMIN — FAMOTIDINE 40 MG: 20 TABLET, FILM COATED ORAL at 17:59

## 2023-08-13 RX ADMIN — LEVALBUTEROL HYDROCHLORIDE 1.25 MG: 1.25 SOLUTION RESPIRATORY (INHALATION) at 07:45

## 2023-08-13 RX ADMIN — INSULIN LISPRO 3 UNITS: 100 INJECTION, SOLUTION INTRAVENOUS; SUBCUTANEOUS at 12:33

## 2023-08-13 RX ADMIN — IPRATROPIUM BROMIDE 0.5 MG: 0.5 SOLUTION RESPIRATORY (INHALATION) at 18:21

## 2023-08-13 RX ADMIN — METHYLPREDNISOLONE SODIUM SUCCINATE 40 MG: 40 INJECTION, POWDER, FOR SOLUTION INTRAMUSCULAR; INTRAVENOUS at 22:11

## 2023-08-13 RX ADMIN — LEVALBUTEROL HYDROCHLORIDE 1.25 MG: 1.25 SOLUTION RESPIRATORY (INHALATION) at 18:22

## 2023-08-13 RX ADMIN — INSULIN LISPRO 1 UNITS: 100 INJECTION, SOLUTION INTRAVENOUS; SUBCUTANEOUS at 22:14

## 2023-08-13 RX ADMIN — METHYLPREDNISOLONE SODIUM SUCCINATE 40 MG: 40 INJECTION, POWDER, FOR SOLUTION INTRAMUSCULAR; INTRAVENOUS at 05:29

## 2023-08-13 RX ADMIN — DIAZEPAM 5 MG: 5 TABLET ORAL at 22:49

## 2023-08-13 RX ADMIN — INSULIN LISPRO 1 UNITS: 100 INJECTION, SOLUTION INTRAVENOUS; SUBCUTANEOUS at 12:30

## 2023-08-13 RX ADMIN — PANTOPRAZOLE SODIUM 40 MG: 40 TABLET, DELAYED RELEASE ORAL at 08:00

## 2023-08-13 RX ADMIN — GABAPENTIN 100 MG: 100 CAPSULE ORAL at 08:19

## 2023-08-13 RX ADMIN — ACETAMINOPHEN 650 MG: 325 TABLET, FILM COATED ORAL at 05:30

## 2023-08-13 RX ADMIN — PANTOPRAZOLE SODIUM 40 MG: 40 TABLET, DELAYED RELEASE ORAL at 16:43

## 2023-08-13 RX ADMIN — FUROSEMIDE 40 MG: 10 INJECTION, SOLUTION INTRAMUSCULAR; INTRAVENOUS at 09:18

## 2023-08-13 RX ADMIN — BUDESONIDE 0.5 MG: 0.5 INHALANT ORAL at 20:38

## 2023-08-13 RX ADMIN — INSULIN LISPRO 3 UNITS: 100 INJECTION, SOLUTION INTRAVENOUS; SUBCUTANEOUS at 08:00

## 2023-08-13 RX ADMIN — INSULIN LISPRO 1 UNITS: 100 INJECTION, SOLUTION INTRAVENOUS; SUBCUTANEOUS at 08:00

## 2023-08-13 RX ADMIN — HYDROCODONE BITARTRATE AND HOMATROPINE METHYLBROMIDE ORAL SOLUTION 5 ML: 5; 1.5 LIQUID ORAL at 16:43

## 2023-08-13 RX ADMIN — GABAPENTIN 100 MG: 100 CAPSULE ORAL at 16:43

## 2023-08-13 RX ADMIN — TORSEMIDE 10 MG: 10 TABLET ORAL at 08:19

## 2023-08-13 NOTE — PROGRESS NOTES
[address]  Progress Note  Name: Cherelle Lu  MRN: [id number]  Unit/Bed#: -01 I Date of Admission: 8/11/2023   Date of Service: 8/13/2023 I Hospital Day: 2    Assessment/Plan   Class 2 obesity with body mass index (BMI) of 35.0 to 35.9 in adult  Assessment & Plan  · Recommend weight loss via healthy diet and exercise    Diabetes [facility])  Assessment & Plan    Lab Results   Component Value Date    HGBA1C 4.8 06/12/2023     · Continue home NovoLog 3 units 3 times daily with meals  · Continue sliding scale insulin   · recommend discontinuation of insulin given well-controlled A1c on discharge. Hyperlipidemia  Assessment & Plan  · Continue statin    * Asthma exacerbation  Assessment & Plan  Patient reports increasing shortness of breath, wheezing, and cough since yesterday. Home neb treatments and 1 pill of 40mg prednisone (reports left over from prior treatment) taken at home have not been improving her symptoms, prompting ED visit. Reports improvement after ED interventions, however still reports some SOB and does not at baseline. Denies recent sick contact, allergen contact, or other acute symptoms/complaint at this time. · WBC 15,000 suspect 2/2 to steroid use. Lower suspicion of acute infectious cause   · Improving overall  · Respiratory protocol ordered  · Pulmonology consulted  · Continue scheduled steroids and nebs,taper today            VTE Pharmacologic Prophylaxis: VTE Score: 2 Low Risk (Score 0-2) - Encourage Ambulation. Patient Centered Rounds: I performed bedside rounds with nursing staff today. Discussions with Specialists or Other Care Team Provider: CM    Education and Discussions with Family / Patient: PT.      Total Time Spent on Date of Encounter in care of patient: 35 minutes This time was spent on one or more of the following: performing physical exam; counseling and coordination of care; obtaining or reviewing history; documenting in the medical record; reviewing/ordering tests, medications or procedures; communicating with other healthcare professionals and discussing with patient's family/caregivers. Current Length of Stay: 2 day(s)  Current Patient Status: Inpatient   Certification Statement: The patient will continue to require additional inpatient hospital stay due to IV steroids  Discharge Plan: Anticipate discharge in 24-48 hrs to home. Code Status: Level 1 - Full Code    Subjective:   Pt reports ongoing sob, overall improving. Does not increase in le swelling. Objective:     Vitals:   Temp (24hrs), Av.2 °F (36.8 °C), Min:98 °F (36.7 °C), Max:98.5 °F (36.9 °C)    Temp:  [98 °F (36.7 °C)-98.5 °F (36.9 °C)] 98.5 °F (36.9 °C)  HR:  [] 96  Resp:  [20] 20  BP: (113-126)/(64-79) 117/75  SpO2:  [90 %-97 %] 92 %  Body mass index is 36.36 kg/m². Input and Output Summary (last 24 hours):   No intake or output data in the 24 hours ending 23 1106    Physical Exam:   Physical Exam  Constitutional:       General: She is not in acute distress. Appearance: She is well-developed. She is not diaphoretic. HENT:      Head: Normocephalic and atraumatic. Nose: Nose normal.      Mouth/Throat:      Pharynx: No oropharyngeal exudate. Eyes:      General: No scleral icterus. Right eye: No discharge. Left eye: No discharge. Conjunctiva/sclera: Conjunctivae normal.   Cardiovascular:      Rate and Rhythm: Regular rhythm. Tachycardia present. Heart sounds: Normal heart sounds. No murmur heard. No friction rub. No gallop. Pulmonary:      Effort: Pulmonary effort is normal. No respiratory distress. Breath sounds: Normal breath sounds. No wheezing or rales. Abdominal:      General: Bowel sounds are normal. There is no distension. Palpations: Abdomen is soft. Tenderness: There is no abdominal tenderness. There is no guarding. Musculoskeletal:         General: Normal range of motion.       Cervical back: Normal range of motion and neck supple. Right lower leg: Edema present. Left lower leg: Edema present. Skin:     Findings: No erythema. Neurological:      Mental Status: She is alert and oriented to person, place, and time. Psychiatric:         Behavior: Behavior normal.         Additional Data:     Labs:  Results from last 7 days   Lab Units 08/13/23  0446   WBC Thousand/uL 15.81*   HEMOGLOBIN g/dL 14.1   HEMATOCRIT % 42.3   PLATELETS Thousands/uL 483*   NEUTROS PCT % 89*   LYMPHS PCT % 6*   MONOS PCT % 4   EOS PCT % 0     Results from last 7 days   Lab Units 08/13/23  0446 08/12/23  0504 08/12/23  0001   SODIUM mmol/L 137   < > 137   POTASSIUM mmol/L 3.8   < > 3.4*   CHLORIDE mmol/L 103   < > 104   CO2 mmol/L 23   < > 23   BUN mg/dL 11   < > 8   CREATININE mg/dL 0.86   < > 0.78   ANION GAP mmol/L 11   < > 10   CALCIUM mg/dL 9.9   < > 9.8   ALBUMIN g/dL  --   --  4.4   TOTAL BILIRUBIN mg/dL  --   --  0.46   ALK PHOS U/L  --   --  112*   ALT U/L  --   --  18   AST U/L  --   --  22   GLUCOSE RANDOM mg/dL 150*   < > 148*    < > = values in this interval not displayed. Results from last 7 days   Lab Units 08/13/23  0800 08/12/23  2041 08/12/23  1632 08/12/23  1106 08/12/23  0715 08/11/23  2306   POC GLUCOSE mg/dl 155* 152* 159* 162* 166* 109               Lines/Drains:  Invasive Devices     Peripheral Intravenous Line  Duration           Peripheral IV 08/13/23 Dorsal (posterior); Right Hand <1 day                      Imaging: No pertinent imaging reviewed.     Recent Cultures (last 7 days):         Last 24 Hours Medication List:   Current Facility-Administered Medications   Medication Dose Route Frequency Provider Last Rate   • acetaminophen  650 mg Oral Q6H PRN Lavell Bhakta MD     • albuterol  2.5 mg Nebulization Q6H PRN Yulisa Jasso PA-C     • atorvastatin  10 mg Oral Daily With [address] Vo     • budesonide-formoterol  2 puff Inhalation BID Noemi Shipman • buPROPion  150 mg Oral BID Lavell Bhakta MD     • famotidine  40 mg Oral BID Lavell Bhakta MD     • fluticasone-vilanterol  1 puff Inhalation Daily Mayra Tierney PA-C     • gabapentin  100 mg Oral TID Yobany Madera MD     • HYDROcodone Bit-[place] MBr  5 mL Oral Q4H PRN Mayra Tierney PA-C     • hydrOXYzine HCL  25 mg Oral Q6H PRN Mayra Tierney PA-C     • insulin lispro  1-6 Units Subcutaneous  Nolaharshad Coppola PA-C     • insulin lispro  1-6 Units Subcutaneous HS Mayra Tierney PA-C     • insulin lispro  3 Units Subcutaneous TID With Meals Mayra Tierney PA-C     • ipratropium  0.5 mg Nebulization BID [facility]     • levalbuterol  1.25 mg Nebulization BID [facility] Suyapaya, DO     • methylPREDNISolone sodium succinate  40 mg Intravenous Q12H [address] [facility] Sebas, DO     • montelukast  10 mg Oral HS Mariel Hopkins PA-C     • pantoprazole  40 mg Oral BID AC Mayra Tierney PA-C     • torsemide  10 mg Oral Daily Noemi August     • zolpidem  10 mg Oral HS PRN Mayra Tierney PA-C          Today, Patient Was Seen By: Tomás Chaves DO    **Please Note: This note may have been constructed using a voice recognition system. **

## 2023-08-13 NOTE — ASSESSMENT & PLAN NOTE
Lab Results   Component Value Date    HGBA1C 4.8 06/12/2023     · Continue home NovoLog 3 units 3 times daily with meals  · Continue sliding scale insulin   · recommend discontinuation of insulin given well-controlled A1c on discharge.

## 2023-08-13 NOTE — RESPIRATORY THERAPY NOTE
RT Protocol Note  Bridgette Portillo 66 y.o. female MRN: [id number]  Unit/Bed#: -01 Encounter: [id number]    Assessment    Principal Problem:    Asthma exacerbation  Active Problems:    Hyperlipidemia    Diabetes ([address])    Class 2 obesity with body mass index (BMI) of 35.0 to 35.9 in adult      Home Pulmonary Medications:     08/12/23 2137   Respiratory Protocol   Protocol Initiated? No   Protocol Selection Respiratory   Language Barrier? No   Medical & Social History Reviewed? Yes   Diagnostic Studies Reviewed? Yes   Physical Assessment Performed? Yes   Respiratory Plan Home Bronchodilator Patient pathway   Respiratory Assessment   Assessment Type Assess only   General Appearance Alert; Awake   Respiratory Pattern Dyspnea with exertion   Chest Assessment Chest expansion symmetrical   Bilateral Breath Sounds Diminished; Expiratory wheezes   Location Specific No   Cough Non-productive   Resp Comments Resp protocol completed. Will continue with current tx plan   O2 Device RA   Additional Assessments   Pulse 81   Respirations 20   SpO2 92 %            Past Medical History:   Diagnosis Date    Asthma     Diabetes mellitus (HCC)     GERD (gastroesophageal reflux disease)     Hyperlipidemia     Hypertension      Social History     Socioeconomic History    Marital status: /Civil Union     Spouse name: None    Number of children: None    Years of education: None    Highest education level: None   Occupational History    None   Tobacco Use    Smoking status: Never    Smokeless tobacco: Never   Vaping Use    Vaping Use: Never used   Substance and Sexual Activity    Alcohol use:  Yes     Alcohol/week: 7.0 standard drinks of alcohol     Types: 7 Glasses of wine per week    Drug use: Never    Sexual activity: None   Other Topics Concern    None   Social History Narrative    None     Social Determinants of Health     Financial Resource Strain: Not on file   Food Insecurity: No Food Insecurity (2/27/2023)    Hunger Vital Sign Worried About [place] in the Last Year: Never true     [address] in the Last Year: Never true   Transportation Needs: No Transportation Needs (2/27/2023)    PRAPARE - Transportation     Lack of Transportation (Medical): No     Lack of Transportation (Non-Medical): No   Physical Activity: Not on file   Stress: Not on file   Social Connections: Not on file   Intimate Partner Violence: Not on file   Housing Stability: Low Risk  (2/27/2023)    Housing Stability Vital Sign     Unable to Pay for Housing in the Last Year: No     Number of Places Lived in the Last Year: 1     Unstable Housing in the Last Year: No       Subjective         Objective    Physical Exam:   Assessment Type: Assess only  General Appearance: Alert, Awake  Respiratory Pattern: Dyspnea with exertion  Chest Assessment: Chest expansion symmetrical  Bilateral Breath Sounds: Diminished, Expiratory wheezes  Location Specific: No  Cough: Non-productive  O2 Device: RA    Vitals:  Blood pressure 113/64, pulse 81, temperature 98 °F (36.7 °C), resp. rate 20, height 4' 11" (1.499 m), weight 81.6 kg (180 lb), SpO2 92 %. Imaging and other studies: I have personally reviewed pertinent reports. O2 Device: RA     Plan    Respiratory Plan: Home Bronchodilator Patient pathway        Resp Comments: Resp protocol completed.  Will continue with current tx plan

## 2023-08-13 NOTE — PROGRESS NOTES
Consultation - Pulmonary Medicine   Rachel Mcconnell 66 y.o. female MRN: [id number]  Unit/Bed#: -01 Encounter: [id number]      Assessment/Plan:    1. Acute pulmonary insufficiency likely multifaceted as listed below        -Currently on room air-94%, does not wear home O2        -Maintain saturations greater than 88%        -Pulmonary toileting: Deep breathing cough, OOB as tolerated, IS Q 1 hr      2. Severe persistent asthma with acute exacerbation        -260--190        -PFTs do show moderate restrictive ventilation with air trapping        -FEV1 did increase to 20% after bronchodilator        -Inpatient: We will decrease to IV Solu-Medrol 40 mg twice daily-transition to prednisone tomorrow 40 mg decrease by 10 mg every 3 days        -Initiate budesonide twice daily, Xopenex/Atrovent 3 times daily        -Home regimen: Breo 200 over 25 mcg 1 puff daily, and DuoNeb every 6 as needed        -Unfortunately patient continues to have multiple hospitalizations for her asthma-unable to obtain any office notes from her primary pulmonologist in [facility] to confirm that she is following up         -Patient will need to follow-up with his pulmonologist to restart transpire    3. Suspected HEATHER w/ [facility]         -Will order overnight pulse ox        -will need formal PSG testing    4. Obesity        Patient reports that she-has gained 80 pounds since COVID        -Currently maintained on Ozempic        -Encourage and educated on dietary and lifestyle modification      History of Present Illness   Physician Requesting Consult: Henna Agee DO  Reason for Consult / Principal Problem: Asthma  Hx and PE limited by: Nothing  Chief Complaint: " Keep coughing"  HPI: Rachel Mcconnell is a 66 y.o.  female who presented to [address] [place] [address] with complaints of shortness of breath. Patient has past medical history of asthma, diabetes, GERD, seasonal allergies.   Patient reports that she began having increasing shortness of breath yesterday with cough. Upon ED admission patient was administered IV Solu-Medrol. Pulmonary is consulted for asthma exacerbation. Today upon examination patient had very clear breath sounds. Patient was more concerned about her abdominal pain and some pleuritic pain secondary to coughing. Patient reports that she is following up with her pulmonologist in [facility] however I am unable to view the office notes. Currently denying any fevers, chills, abscess, headaches overnight sweats, pleuritic chest pain, or palpations. From pulmonary standpoint, patient follows with pulmonologist from [facility]. Patient reports that she was diagnosed with asthma as a child however has never been intubated. Patient been a lifelong non-smoker. Patient is currently maintained on Symbicort 160-4.5 mcg 1 puff twice daily, and DuoNeb every 6 as needed. Patient is currently not maintained on oxygen therapy. 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 Patient denies having any pets. Patient reports that she was previously diagnosed with suspected HEATHER however has never received any formal diagnosis. Patient currently denying any symptoms of postnasal drip. Patient reports history of GERD in which she is maintained on Protonix. Patient reports history of seasonal allergies in which she is maintained on Singulair. Patient currently denying any acute exposures to dust, mold, spices, or silica. Consults    Review of Systems   Constitutional: Negative for chills and fever. HENT: Negative for ear pain and sore throat. Eyes: Negative for pain and visual disturbance. Respiratory: Positive for cough and shortness of breath. Negative for apnea, choking, chest tightness, wheezing and stridor. Cardiovascular: Negative for chest pain and palpitations. Gastrointestinal: Negative for abdominal pain and vomiting. Genitourinary: Negative for dysuria and hematuria.    Musculoskeletal: Negative for arthralgias and back pain. Skin: Negative for color change and rash. Neurological: Negative for seizures and syncope. Psychiatric/Behavioral: Negative for agitation and behavioral problems. All other systems reviewed and are negative. Historical Information   Past Medical History:   Diagnosis Date   • Asthma    • Diabetes mellitus ([address])    • GERD (gastroesophageal reflux disease)    • Hyperlipidemia    • Hypertension      Past Surgical History:   Procedure Laterality Date   •  SECTION     • COLONOSCOPY     • HYSTERECTOMY     • OVARIAN CYST REMOVAL       Social History   Social History     Substance and Sexual Activity   Alcohol Use Yes   • Alcohol/week: 7.0 standard drinks of alcohol   • Types: 7 Glasses of wine per week     Social History     Substance and Sexual Activity   Drug Use Never     Social History     Tobacco Use   Smoking Status Never   Smokeless Tobacco Never     E-Cigarette/Vaping   • E-Cigarette Use Never User      E-Cigarette/Vaping Substances   • Nicotine No    • THC No    • CBD No    • Flavoring No    • Other No    • Unknown No      Occupational History: Noncontributory    Family History: History reviewed. No pertinent family history. Meds/Allergies   pertinent pulmonary meds have been reviewed    Allergies   Allergen Reactions   • Codeine Other (See Comments)   • Aspirin GI Intolerance and Rash   • Hydromorphone Rash and Other (See Comments)     GI upset     • Oxycodone-Acetaminophen Rash and Other (See Comments)   • Tramadol Rash and Other (See Comments)       Objective   Vitals: Blood pressure 117/75, pulse 96, temperature 98.5 °F (36.9 °C), temperature source Oral, resp. rate 20, height 4' 11" (1.499 m), weight 81.6 kg (180 lb), SpO2 92 %. RA,Body mass index is 36.36 kg/m². No intake or output data in the 24 hours ending 23 1331  Invasive Devices     Peripheral Intravenous Line  Duration           Peripheral IV 23 Dorsal (posterior); Right Hand <1 day                Physical Exam  Constitutional:       General: She is not in acute distress. Appearance: Normal appearance. She is obese. She is not ill-appearing. HENT:      Head: Normocephalic and atraumatic. Nose: Nose normal. No congestion or rhinorrhea. Mouth/Throat:      Mouth: Mucous membranes are dry. Pharynx: Oropharynx is clear. No oropharyngeal exudate or posterior oropharyngeal erythema. Cardiovascular:      Rate and Rhythm: Normal rate and regular rhythm. Pulses: Normal pulses. Heart sounds: Normal heart sounds. No murmur heard. No friction rub. No gallop. Pulmonary:      Effort: Pulmonary effort is normal. No tachypnea, bradypnea, accessory muscle usage or respiratory distress. Breath sounds: Decreased air movement present. No stridor. Decreased breath sounds present. No wheezing, rhonchi or rales. Comments: Overall clear aeration throughout lung fields  Chest:      Chest wall: No tenderness. Abdominal:      General: Abdomen is flat. Bowel sounds are normal. There is no distension. Palpations: Abdomen is soft. There is no mass. Musculoskeletal:         General: No swelling or tenderness. Normal range of motion. Cervical back: Normal range of motion. No rigidity or tenderness. Skin:     General: Skin is warm and dry. Coloration: Skin is not jaundiced or pale. Neurological:      General: No focal deficit present. Mental Status: She is alert and oriented to person, place, and time. Mental status is at baseline. Psychiatric:         Mood and Affect: Mood normal.         Behavior: Behavior normal.         Lab Results:   I have personally reviewed pertinent lab results. , ABG: No results found for: "PHART", "MMA6IJO", "PO2ART", "MRK7VKF", "E5ILTPZJ", "BEART", "SOURCE", BNP: No results found for: "BNP", CBC:   Lab Results   Component Value Date    WBC 15.81 (H) 08/13/2023    HGB 14.1 08/13/2023    HCT 42.3 08/13/2023    MCV 92 08/13/2023     (H) 08/13/2023    RBC 4.59 08/13/2023    MCH 30.7 08/13/2023    MCHC 33.3 08/13/2023    RDW 12.6 08/13/2023    MPV 9.9 08/13/2023    NRBC 0 08/13/2023   , CMP:   Lab Results   Component Value Date    SODIUM 137 08/13/2023    K 3.8 08/13/2023     08/13/2023    CO2 23 08/13/2023    BUN 11 08/13/2023    CREATININE 0.86 08/13/2023    CALCIUM 9.9 08/13/2023    EGFR 75 08/13/2023   , PT/INR: No results found for: "PT", "INR"        Imaging Studies: I have personally reviewed pertinent films in PACS     Chest x-ray-acute cardiopulmonary disease    EKG, Pathology, and Other Studies: I have personally reviewed pertinent films in PACS     EKG-sinus rhythm      Pulmonary Results (PFTs, PSG): I have personally reviewed pertinent films in PACS   No PFTs recorded    VTE Prophylaxis: Sequential compression device (Venodyne)     Code Status: Level 1 - Full Code    None    Portions of the record may have been created with voice recognition software. Occasional wrong word or "sound a like" substitutions may have occurred due to the inherent limitations of voice recognition software. Read the chart carefully and recognize, using context, where substitutions have occurred.

## 2023-08-13 NOTE — PLAN OF CARE
Problem: PAIN - ADULT  Goal: Verbalizes/displays adequate comfort level or baseline comfort level  Description: Interventions:  - Encourage patient to monitor pain and request assistance  - Assess pain using appropriate pain scale  - Administer analgesics based on type and severity of pain and evaluate response  - Implement non-pharmacological measures as appropriate and evaluate response  - Consider cultural and social influences on pain and pain management  - Notify physician/advanced practitioner if interventions unsuccessful or patient reports new pain  Outcome: Progressing     Problem: INFECTION - ADULT  Goal: Absence or prevention of progression during hospitalization  Description: INTERVENTIONS:  - Assess and monitor for signs and symptoms of infection  - Monitor lab/diagnostic results  - Monitor all insertion sites, i.e. indwelling lines, tubes, and drains  - Monitor endotracheal if appropriate and nasal secretions for changes in amount and color  - [place] appropriate cooling/warming therapies per order  - Administer medications as ordered  - Instruct and encourage patient and family to use good hand hygiene technique  - Identify and instruct in appropriate isolation precautions for identified infection/condition  Outcome: Progressing  Goal: Absence of fever/infection during neutropenic period  Description: INTERVENTIONS:  - Monitor WBC    Outcome: Progressing     Problem: SAFETY ADULT  Goal: Patient will remain free of falls  Description: INTERVENTIONS:  - Educate patient/family on patient safety including physical limitations  - Instruct patient to call for assistance with activity   - Consult OT/PT to assist with strengthening/mobility   - Keep Call bell within reach  - Keep bed low and locked with side rails adjusted as appropriate  - Keep care items and personal belongings within reach  - Initiate and maintain comfort rounds  - Make Fall Risk Sign visible to staff  - Offer Toileting every    Hours, in advance of need  - Initiate/Maintain alarm  - Obtain necessary fall risk management equipment:   - Apply yellow socks and bracelet for high fall risk patients  - Consider moving patient to room near nurses station  Outcome: Progressing  Goal: Maintain or return to baseline ADL function  Description: INTERVENTIONS:  -  Assess patient's ability to carry out ADLs; assess patient's baseline for ADL function and identify physical deficits which impact ability to perform ADLs (bathing, care of mouth/teeth, toileting, grooming, dressing, etc.)  - Assess/evaluate cause of self-care deficits   - Assess range of motion  - Assess patient's mobility; develop plan if impaired  - Assess patient's need for assistive devices and provide as appropriate  - Encourage maximum independence but intervene and supervise when necessary  - Involve family in performance of ADLs  - Assess for home care needs following discharge   - Consider OT consult to assist with ADL evaluation and planning for discharge  - Provide patient education as appropriate  Outcome: Progressing  Goal: Maintains/Returns to pre admission functional level  Description: INTERVENTIONS:  - Perform BMAT or MOVE assessment daily.   - Set and communicate daily mobility goal to care team and patient/family/caregiver. - Collaborate with rehabilitation services on mobility goals if consulted  - Perform Range of Motion  times a day. - Reposition patient every  hours.   - Dangle patient  times a day  - Stand patient  times a day  - Ambulate patient  times a day  - Out of bed to chair  times a day   - Out of bed for meals  times a day  - Out of bed for toileting  - Record patient progress and toleration of activity level   Outcome: Progressing     Problem: DISCHARGE PLANNING  Goal: Discharge to home or other facility with appropriate resources  Description: INTERVENTIONS:  - Identify barriers to discharge w/patient and caregiver  - Arrange for needed discharge resources and transportation as appropriate  - Identify discharge learning needs (meds, wound care, etc.)  - Arrange for interpretive services to assist at discharge as needed  - Refer to Case Management Department for coordinating discharge planning if the patient needs post-hospital services based on physician/advanced practitioner order or complex needs related to functional status, cognitive ability, or social support system  Outcome: Progressing     Problem: Knowledge Deficit  Goal: Patient/family/caregiver demonstrates understanding of disease process, treatment plan, medications, and discharge instructions  Description: Complete learning assessment and assess knowledge base.   Interventions:  - Provide teaching at level of understanding  - Provide teaching via preferred learning methods  Outcome: Progressing     Problem: RESPIRATORY - ADULT  Goal: Achieves optimal ventilation and oxygenation  Description: INTERVENTIONS:  - Assess for changes in respiratory status  - Assess for changes in mentation and behavior  - Position to facilitate oxygenation and minimize respiratory effort  - Oxygen administered by appropriate delivery if ordered  - Initiate smoking cessation education as indicated  - Encourage broncho-pulmonary hygiene including cough, deep breathe, Incentive Spirometry  - Assess the need for suctioning and aspirate as needed  - Assess and instruct to report SOB or any respiratory difficulty  - Respiratory Therapy support as indicated  Outcome: Progressing     Problem: PAIN - ADULT  Goal: Verbalizes/displays adequate comfort level or baseline comfort level  Description: Interventions:  - Encourage patient to monitor pain and request assistance  - Assess pain using appropriate pain scale  - Administer analgesics based on type and severity of pain and evaluate response  - Implement non-pharmacological measures as appropriate and evaluate response  - Consider cultural and social influences on pain and pain management  - Notify physician/advanced practitioner if interventions unsuccessful or patient reports new pain  Outcome: Progressing     Problem: INFECTION - ADULT  Goal: Absence or prevention of progression during hospitalization  Description: INTERVENTIONS:  - Assess and monitor for signs and symptoms of infection  - Monitor lab/diagnostic results  - Monitor all insertion sites, i.e. indwelling lines, tubes, and drains  - Monitor endotracheal if appropriate and nasal secretions for changes in amount and color  - [place] appropriate cooling/warming therapies per order  - Administer medications as ordered  - Instruct and encourage patient and family to use good hand hygiene technique  - Identify and instruct in appropriate isolation precautions for identified infection/condition  Outcome: Progressing  Goal: Absence of fever/infection during neutropenic period  Description: INTERVENTIONS:  - Monitor WBC    Outcome: Progressing     Problem: SAFETY ADULT  Goal: Patient will remain free of falls  Description: INTERVENTIONS:  - Educate patient/family on patient safety including physical limitations  - Instruct patient to call for assistance with activity   - Consult OT/PT to assist with strengthening/mobility   - Keep Call bell within reach  - Keep bed low and locked with side rails adjusted as appropriate  - Keep care items and personal belongings within reach  - Initiate and maintain comfort rounds  - Make Fall Risk Sign visible to staff  - Offer Toileting every  Hours, in advance of need  - Initiate/Maintain alarm  - Obtain necessary fall risk management equipment:   - Apply yellow socks and bracelet for high fall risk patients  - Consider moving patient to room near nurses station  Outcome: Progressing  Goal: Maintain or return to baseline ADL function  Description: INTERVENTIONS:  -  Assess patient's ability to carry out ADLs; assess patient's baseline for ADL function and identify physical deficits which impact ability to perform ADLs (bathing, care of mouth/teeth, toileting, grooming, dressing, etc.)  - Assess/evaluate cause of self-care deficits   - Assess range of motion  - Assess patient's mobility; develop plan if impaired  - Assess patient's need for assistive devices and provide as appropriate  - Encourage maximum independence but intervene and supervise when necessary  - Involve family in performance of ADLs  - Assess for home care needs following discharge   - Consider OT consult to assist with ADL evaluation and planning for discharge  - Provide patient education as appropriate  Outcome: Progressing  Goal: Maintains/Returns to pre admission functional level  Description: INTERVENTIONS:  - Perform BMAT or MOVE assessment daily.   - Set and communicate daily mobility goal to care team and patient/family/caregiver. - Collaborate with rehabilitation services on mobility goals if consulted  - Perform Range of Motion  times a day. - Reposition patient every  hours.   - Dangle patient  times a day  - Stand patient  times a day  - Ambulate patient  times a day  - Out of bed to chair  times a day   - Out of bed for meals  times a day  - Out of bed for toileting  - Record patient progress and toleration of activity level   Outcome: Progressing     Problem: DISCHARGE PLANNING  Goal: Discharge to home or other facility with appropriate resources  Description: INTERVENTIONS:  - Identify barriers to discharge w/patient and caregiver  - Arrange for needed discharge resources and transportation as appropriate  - Identify discharge learning needs (meds, wound care, etc.)  - Arrange for interpretive services to assist at discharge as needed  - Refer to Case Management Department for coordinating discharge planning if the patient needs post-hospital services based on physician/advanced practitioner order or complex needs related to functional status, cognitive ability, or social support system  Outcome: Progressing     Problem: Knowledge Deficit  Goal: Patient/family/caregiver demonstrates understanding of disease process, treatment plan, medications, and discharge instructions  Description: Complete learning assessment and assess knowledge base.   Interventions:  - Provide teaching at level of understanding  - Provide teaching via preferred learning methods  Outcome: Progressing     Problem: RESPIRATORY - ADULT  Goal: Achieves optimal ventilation and oxygenation  Description: INTERVENTIONS:  - Assess for changes in respiratory status  - Assess for changes in mentation and behavior  - Position to facilitate oxygenation and minimize respiratory effort  - Oxygen administered by appropriate delivery if ordered  - Initiate smoking cessation education as indicated  - Encourage broncho-pulmonary hygiene including cough, deep breathe, Incentive Spirometry  - Assess the need for suctioning and aspirate as needed  - Assess and instruct to report SOB or any respiratory difficulty  - Respiratory Therapy support as indicated  Outcome: Progressing     Problem: PAIN - ADULT  Goal: Verbalizes/displays adequate comfort level or baseline comfort level  Description: Interventions:  - Encourage patient to monitor pain and request assistance  - Assess pain using appropriate pain scale  - Administer analgesics based on type and severity of pain and evaluate response  - Implement non-pharmacological measures as appropriate and evaluate response  - Consider cultural and social influences on pain and pain management  - Notify physician/advanced practitioner if interventions unsuccessful or patient reports new pain  Outcome: Progressing     Problem: INFECTION - ADULT  Goal: Absence or prevention of progression during hospitalization  Description: INTERVENTIONS:  - Assess and monitor for signs and symptoms of infection  - Monitor lab/diagnostic results  - Monitor all insertion sites, i.e. indwelling lines, tubes, and drains  - Monitor endotracheal if appropriate and nasal secretions for changes in amount and color  - [place] appropriate cooling/warming therapies per order  - Administer medications as ordered  - Instruct and encourage patient and family to use good hand hygiene technique  - Identify and instruct in appropriate isolation precautions for identified infection/condition  Outcome: Progressing  Goal: Absence of fever/infection during neutropenic period  Description: INTERVENTIONS:  - Monitor WBC    Outcome: Progressing     Problem: SAFETY ADULT  Goal: Patient will remain free of falls  Description: INTERVENTIONS:  - Educate patient/family on patient safety including physical limitations  - Instruct patient to call for assistance with activity   - Consult OT/PT to assist with strengthening/mobility   - Keep Call bell within reach  - Keep bed low and locked with side rails adjusted as appropriate  - Keep care items and personal belongings within reach  - Initiate and maintain comfort rounds  - Make Fall Risk Sign visible to staff  - Offer Toileting every  Hours, in advance of need  - Initiate/Maintain alarm  - Obtain necessary fall risk management equipment:   - Apply yellow socks and bracelet for high fall risk patients  - Consider moving patient to room near nurses station  Outcome: Progressing  Goal: Maintain or return to baseline ADL function  Description: INTERVENTIONS:  -  Assess patient's ability to carry out ADLs; assess patient's baseline for ADL function and identify physical deficits which impact ability to perform ADLs (bathing, care of mouth/teeth, toileting, grooming, dressing, etc.)  - Assess/evaluate cause of self-care deficits   - Assess range of motion  - Assess patient's mobility; develop plan if impaired  - Assess patient's need for assistive devices and provide as appropriate  - Encourage maximum independence but intervene and supervise when necessary  - Involve family in performance of ADLs  - Assess for home care needs following discharge   - Consider OT consult to assist with ADL evaluation and planning for discharge  - Provide patient education as appropriate  Outcome: Progressing  Goal: Maintains/Returns to pre admission functional level  Description: INTERVENTIONS:  - Perform BMAT or MOVE assessment daily.   - Set and communicate daily mobility goal to care team and patient/family/caregiver. - Collaborate with rehabilitation services on mobility goals if consulted  - Perform Range of Motion  times a day. - Reposition patient every  hours. - Dangle patient  times a day  - Stand patient  times a day  - Ambulate patient  times a day  - Out of bed to chair  times a day   - Out of bed for meals times a day  - Out of bed for toileting  - Record patient progress and toleration of activity level   Outcome: Progressing     Problem: DISCHARGE PLANNING  Goal: Discharge to home or other facility with appropriate resources  Description: INTERVENTIONS:  - Identify barriers to discharge w/patient and caregiver  - Arrange for needed discharge resources and transportation as appropriate  - Identify discharge learning needs (meds, wound care, etc.)  - Arrange for interpretive services to assist at discharge as needed  - Refer to Case Management Department for coordinating discharge planning if the patient needs post-hospital services based on physician/advanced practitioner order or complex needs related to functional status, cognitive ability, or social support system  Outcome: Progressing     Problem: Knowledge Deficit  Goal: Patient/family/caregiver demonstrates understanding of disease process, treatment plan, medications, and discharge instructions  Description: Complete learning assessment and assess knowledge base.   Interventions:  - Provide teaching at level of understanding  - Provide teaching via preferred learning methods  Outcome: Progressing     Problem: RESPIRATORY - ADULT  Goal: Achieves optimal ventilation and oxygenation  Description: INTERVENTIONS:  - Assess for changes in respiratory status  - Assess for changes in mentation and behavior  - Position to facilitate oxygenation and minimize respiratory effort  - Oxygen administered by appropriate delivery if ordered  - Initiate smoking cessation education as indicated  - Encourage broncho-pulmonary hygiene including cough, deep breathe, Incentive Spirometry  - Assess the need for suctioning and aspirate as needed  - Assess and instruct to report SOB or any respiratory difficulty  - Respiratory Therapy support as indicated  Outcome: Progressing

## 2023-08-13 NOTE — PLAN OF CARE
Problem: PAIN - ADULT  Goal: Verbalizes/displays adequate comfort level or baseline comfort level  Description: Interventions:  - Encourage patient to monitor pain and request assistance  - Assess pain using appropriate pain scale  - Administer analgesics based on type and severity of pain and evaluate response  - Implement non-pharmacological measures as appropriate and evaluate response  - Consider cultural and social influences on pain and pain management  - Notify physician/advanced practitioner if interventions unsuccessful or patient reports new pain  Outcome: Progressing     Problem: INFECTION - ADULT  Goal: Absence or prevention of progression during hospitalization  Description: INTERVENTIONS:  - Assess and monitor for signs and symptoms of infection  - Monitor lab/diagnostic results  - Monitor all insertion sites, i.e. indwelling lines, tubes, and drains  - Monitor endotracheal if appropriate and nasal secretions for changes in amount and color  - [place] appropriate cooling/warming therapies per order  - Administer medications as ordered  - Instruct and encourage patient and family to use good hand hygiene technique  - Identify and instruct in appropriate isolation precautions for identified infection/condition  Outcome: Progressing  Goal: Absence of fever/infection during neutropenic period  Description: INTERVENTIONS:  - Monitor WBC    Outcome: Progressing     Problem: SAFETY ADULT  Goal: Patient will remain free of falls  Description: INTERVENTIONS:  - Educate patient/family on patient safety including physical limitations  - Instruct patient to call for assistance with activity   - Consult OT/PT to assist with strengthening/mobility   - Keep Call bell within reach  - Keep bed low and locked with side rails adjusted as appropriate  - Keep care items and personal belongings within reach  - Initiate and maintain comfort rounds  - Make Fall Risk Sign visible to staff  - Offer Toileting every 2 Hours, in advance of need  - Obtain necessary fall risk management equipment: nonskid socks  - Apply yellow socks and bracelet for high fall risk patients  - Consider moving patient to room near nurses station  Outcome: Progressing  Goal: Maintain or return to baseline ADL function  Description: INTERVENTIONS:  -  Assess patient's ability to carry out ADLs; assess patient's baseline for ADL function and identify physical deficits which impact ability to perform ADLs (bathing, care of mouth/teeth, toileting, grooming, dressing, etc.)  - Assess/evaluate cause of self-care deficits   - Assess range of motion  - Assess patient's mobility; develop plan if impaired  - Assess patient's need for assistive devices and provide as appropriate  - Encourage maximum independence but intervene and supervise when necessary  - Involve family in performance of ADLs  - Assess for home care needs following discharge   - Consider OT consult to assist with ADL evaluation and planning for discharge  - Provide patient education as appropriate  Outcome: Progressing  Goal: Maintains/Returns to pre admission functional level  Description: INTERVENTIONS:  - Perform BMAT or MOVE assessment daily.   - Set and communicate daily mobility goal to care team and patient/family/caregiver. - Collaborate with rehabilitation services on mobility goals if consulted  - Perform Range of Motion 4 times a day. - Reposition patient every 4 hours.   - Dangle patient 4 times a day  - Stand patient 4 times a day  - Ambulate patient 4 times a day  - Out of bed to chair 3 times a day   - Out of bed for meals 3 times a day  - Out of bed for toileting  - Record patient progress and toleration of activity level   Outcome: Progressing     Problem: DISCHARGE PLANNING  Goal: Discharge to home or other facility with appropriate resources  Description: INTERVENTIONS:  - Identify barriers to discharge w/patient and caregiver  - Arrange for needed discharge resources and transportation as appropriate  - Identify discharge learning needs (meds, wound care, etc.)  - Arrange for interpretive services to assist at discharge as needed  - Refer to Case Management Department for coordinating discharge planning if the patient needs post-hospital services based on physician/advanced practitioner order or complex needs related to functional status, cognitive ability, or social support system  Outcome: Progressing     Problem: Knowledge Deficit  Goal: Patient/family/caregiver demonstrates understanding of disease process, treatment plan, medications, and discharge instructions  Description: Complete learning assessment and assess knowledge base.   Interventions:  - Provide teaching at level of understanding  - Provide teaching via preferred learning methods  Outcome: Progressing     Problem: RESPIRATORY - ADULT  Goal: Achieves optimal ventilation and oxygenation  Description: INTERVENTIONS:  - Assess for changes in respiratory status  - Assess for changes in mentation and behavior  - Position to facilitate oxygenation and minimize respiratory effort  - Oxygen administered by appropriate delivery if ordered  - Initiate smoking cessation education as indicated  - Encourage broncho-pulmonary hygiene including cough, deep breathe, Incentive Spirometry  - Assess the need for suctioning and aspirate as needed  - Assess and instruct to report SOB or any respiratory difficulty  - Respiratory Therapy support as indicated  Outcome: Progressing

## 2023-08-13 NOTE — RESPIRATORY THERAPY NOTE
RT Protocol Note  Elise Blum 66 y.o. female MRN: [id number]  Unit/Bed#: -01 Encounter: [id number]    Assessment    Principal Problem:    Asthma exacerbation  Active Problems:    Hyperlipidemia    Diabetes ([address] [facility])    Class 2 obesity with body mass index (BMI) of 35.0 to 35.9 in adult      Home Pulmonary Medications:  Prn nebs       Past Medical History:   Diagnosis Date   • Asthma    • Diabetes mellitus ([address] [facility])    • GERD (gastroesophageal reflux disease)    • Hyperlipidemia    • Hypertension      Social History     Socioeconomic History   • Marital status: /Civil Union     Spouse name: None   • Number of children: None   • Years of education: None   • Highest education level: None   Occupational History   • None   Tobacco Use   • Smoking status: Never   • Smokeless tobacco: Never   Vaping Use   • Vaping Use: Never used   Substance and Sexual Activity   • Alcohol use: Yes     Alcohol/week: 7.0 standard drinks of alcohol     Types: 7 Glasses of wine per week   • Drug use: Never   • Sexual activity: None   Other Topics Concern   • None   Social History Narrative   • None     Social Determinants of Health     Financial Resource Strain: Not on file   Food Insecurity: No Food Insecurity (2/27/2023)    Hunger Vital Sign    • Worried About Running Out of Food in the Last Year: Never true    • Ran Out of Food in the Last Year: Never true   Transportation Needs: No Transportation Needs (2/27/2023)    PRAPARE - Transportation    • Lack of Transportation (Medical): No    • Lack of Transportation (Non-Medical):  No   Physical Activity: Not on file   Stress: Not on file   Social Connections: Not on file   Intimate Partner Violence: Not on file   Housing Stability: Low Risk  (2/27/2023)    Housing Stability Vital Sign    • Unable to Pay for Housing in the Last Year: No    • Number of Places Lived in the Last Year: 1    • Unstable Housing in the Last Year: No       Subjective         Objective    Physical Exam: Assessment Type: Assess only  General Appearance: Alert, Awake  Respiratory Pattern: Dyspnea with exertion  Chest Assessment: Chest expansion symmetrical  Bilateral Breath Sounds: Diminished, Expiratory wheezes  Location Specific: No  Cough: Non-productive  O2 Device: RA    Vitals:  Blood pressure 117/75, pulse 96, temperature 98.5 °F (36.9 °C), temperature source Oral, resp. rate 20, height 4' 11" (1.499 m), weight 81.6 kg (180 lb), SpO2 92 %. Imaging and other studies: I have personally reviewed pertinent reports.       O2 Device: RA     Plan    Respiratory Plan: Home Bronchodilator Patient pathway        Resp Comments: pt states only takes nebs @ home prn,  changed her to bid per her request

## 2023-08-13 NOTE — ASSESSMENT & PLAN NOTE
Patient reports increasing shortness of breath, wheezing, and cough since yesterday. Home neb treatments and 1 pill of 40mg prednisone (reports left over from prior treatment) taken at home have not been improving her symptoms, prompting ED visit. Reports improvement after ED interventions, however still reports some SOB and does not at baseline. Denies recent sick contact, allergen contact, or other acute symptoms/complaint at this time. · WBC 15,000 suspect 2/2 to steroid use.  Lower suspicion of acute infectious cause   · Improving overall  · Respiratory protocol ordered  · Pulmonology consulted  · Continue scheduled steroids and nebs,taper today

## 2023-08-14 ENCOUNTER — APPOINTMENT (INPATIENT)
Dept: RADIOLOGY | Facility: HOSPITAL | Age: 58
DRG: 202 | End: 2023-08-14
Payer: COMMERCIAL

## 2023-08-14 ENCOUNTER — APPOINTMENT (INPATIENT)
Dept: CT IMAGING | Facility: HOSPITAL | Age: 58
DRG: 202 | End: 2023-08-14
Payer: COMMERCIAL

## 2023-08-14 LAB
ANION GAP SERPL CALCULATED.3IONS-SCNC: 9 MMOL/L
BASOPHILS # BLD AUTO: 0.02 THOUSANDS/ÂΜL (ref 0–0.1)
BASOPHILS NFR BLD AUTO: 0 % (ref 0–1)
BUN SERPL-MCNC: 15 MG/DL (ref 5–25)
CALCIUM SERPL-MCNC: 9.3 MG/DL (ref 8.4–10.2)
CHLORIDE SERPL-SCNC: 99 MMOL/L (ref 96–108)
CO2 SERPL-SCNC: 27 MMOL/L (ref 21–32)
CREAT SERPL-MCNC: 0.77 MG/DL (ref 0.6–1.3)
EOSINOPHIL # BLD AUTO: 0 THOUSAND/ÂΜL (ref 0–0.61)
EOSINOPHIL NFR BLD AUTO: 0 % (ref 0–6)
ERYTHROCYTE [DISTWIDTH] IN BLOOD BY AUTOMATED COUNT: 12.9 % (ref 11.6–15.1)
GFR SERPL CREATININE-BSD FRML MDRD: 86 ML/MIN/1.73SQ M
GLUCOSE SERPL-MCNC: 131 MG/DL (ref 65–140)
GLUCOSE SERPL-MCNC: 138 MG/DL (ref 65–140)
GLUCOSE SERPL-MCNC: 145 MG/DL (ref 65–140)
GLUCOSE SERPL-MCNC: 178 MG/DL (ref 65–140)
GLUCOSE SERPL-MCNC: 200 MG/DL (ref 65–140)
HCT VFR BLD AUTO: 43.1 % (ref 34.8–46.1)
HGB BLD-MCNC: 14.2 G/DL (ref 11.5–15.4)
IMM GRANULOCYTES # BLD AUTO: 0.05 THOUSAND/UL (ref 0–0.2)
IMM GRANULOCYTES NFR BLD AUTO: 0 % (ref 0–2)
LYMPHOCYTES # BLD AUTO: 0.84 THOUSANDS/ÂΜL (ref 0.6–4.47)
LYMPHOCYTES NFR BLD AUTO: 6 % (ref 14–44)
MCH RBC QN AUTO: 30.5 PG (ref 26.8–34.3)
MCHC RBC AUTO-ENTMCNC: 32.9 G/DL (ref 31.4–37.4)
MCV RBC AUTO: 93 FL (ref 82–98)
MONOCYTES # BLD AUTO: 0.32 THOUSAND/ÂΜL (ref 0.17–1.22)
MONOCYTES NFR BLD AUTO: 2 % (ref 4–12)
NEUTROPHILS # BLD AUTO: 12.88 THOUSANDS/ÂΜL (ref 1.85–7.62)
NEUTS SEG NFR BLD AUTO: 92 % (ref 43–75)
NRBC BLD AUTO-RTO: 0 /100 WBCS
PLATELET # BLD AUTO: 499 THOUSANDS/UL (ref 149–390)
PMV BLD AUTO: 9.9 FL (ref 8.9–12.7)
POTASSIUM SERPL-SCNC: 3.6 MMOL/L (ref 3.5–5.3)
RBC # BLD AUTO: 4.66 MILLION/UL (ref 3.81–5.12)
SODIUM SERPL-SCNC: 135 MMOL/L (ref 135–147)
WBC # BLD AUTO: 14.11 THOUSAND/UL (ref 4.31–10.16)

## 2023-08-14 PROCEDURE — 80048 BASIC METABOLIC PNL TOTAL CA: CPT | Performed by: INTERNAL MEDICINE

## 2023-08-14 PROCEDURE — 99232 SBSQ HOSP IP/OBS MODERATE 35: CPT | Performed by: INTERNAL MEDICINE

## 2023-08-14 PROCEDURE — 94640 AIRWAY INHALATION TREATMENT: CPT

## 2023-08-14 PROCEDURE — 94760 N-INVAS EAR/PLS OXIMETRY 1: CPT

## 2023-08-14 PROCEDURE — 85025 COMPLETE CBC W/AUTO DIFF WBC: CPT | Performed by: INTERNAL MEDICINE

## 2023-08-14 PROCEDURE — 99233 SBSQ HOSP IP/OBS HIGH 50: CPT | Performed by: STUDENT IN AN ORGANIZED HEALTH CARE EDUCATION/TRAINING PROGRAM

## 2023-08-14 PROCEDURE — 94664 DEMO&/EVAL PT USE INHALER: CPT

## 2023-08-14 PROCEDURE — 71250 CT THORAX DX C-: CPT

## 2023-08-14 PROCEDURE — 74018 RADEX ABDOMEN 1 VIEW: CPT

## 2023-08-14 PROCEDURE — G1004 CDSM NDSC: HCPCS

## 2023-08-14 PROCEDURE — 82948 REAGENT STRIP/BLOOD GLUCOSE: CPT

## 2023-08-14 RX ORDER — LANOLIN ALCOHOL/MO/W.PET/CERES
6 CREAM (GRAM) TOPICAL
Status: DISCONTINUED | OUTPATIENT
Start: 2023-08-14 | End: 2023-08-15 | Stop reason: HOSPADM

## 2023-08-14 RX ORDER — FLUTICASONE FUROATE AND VILANTEROL 200; 25 UG/1; UG/1
1 POWDER RESPIRATORY (INHALATION) DAILY
Status: DISCONTINUED | OUTPATIENT
Start: 2023-08-15 | End: 2023-08-15 | Stop reason: HOSPADM

## 2023-08-14 RX ORDER — PREDNISONE 20 MG/1
40 TABLET ORAL DAILY
Status: DISCONTINUED | OUTPATIENT
Start: 2023-08-15 | End: 2023-08-15 | Stop reason: HOSPADM

## 2023-08-14 RX ADMIN — METHYLPREDNISOLONE SODIUM SUCCINATE 40 MG: 40 INJECTION, POWDER, FOR SOLUTION INTRAMUSCULAR; INTRAVENOUS at 08:40

## 2023-08-14 RX ADMIN — LEVALBUTEROL HYDROCHLORIDE 1.25 MG: 1.25 SOLUTION RESPIRATORY (INHALATION) at 07:06

## 2023-08-14 RX ADMIN — ZOLPIDEM TARTRATE 10 MG: 5 TABLET, COATED ORAL at 22:43

## 2023-08-14 RX ADMIN — BUPROPION HYDROCHLORIDE 150 MG: 150 TABLET, EXTENDED RELEASE ORAL at 21:54

## 2023-08-14 RX ADMIN — ZOLPIDEM TARTRATE 10 MG: 5 TABLET, COATED ORAL at 01:21

## 2023-08-14 RX ADMIN — Medication 6 MG: at 22:43

## 2023-08-14 RX ADMIN — BUPROPION HYDROCHLORIDE 150 MG: 150 TABLET, EXTENDED RELEASE ORAL at 08:41

## 2023-08-14 RX ADMIN — IPRATROPIUM BROMIDE 0.5 MG: 0.5 SOLUTION RESPIRATORY (INHALATION) at 07:06

## 2023-08-14 RX ADMIN — GABAPENTIN 100 MG: 100 CAPSULE ORAL at 08:41

## 2023-08-14 RX ADMIN — IPRATROPIUM BROMIDE 0.5 MG: 0.5 SOLUTION RESPIRATORY (INHALATION) at 20:02

## 2023-08-14 RX ADMIN — INSULIN LISPRO 3 UNITS: 100 INJECTION, SOLUTION INTRAVENOUS; SUBCUTANEOUS at 16:30

## 2023-08-14 RX ADMIN — BUDESONIDE 0.5 MG: 0.5 INHALANT ORAL at 20:02

## 2023-08-14 RX ADMIN — FAMOTIDINE 40 MG: 20 TABLET, FILM COATED ORAL at 17:13

## 2023-08-14 RX ADMIN — HYDROCODONE BITARTRATE AND HOMATROPINE METHYLBROMIDE ORAL SOLUTION 5 ML: 5; 1.5 LIQUID ORAL at 05:51

## 2023-08-14 RX ADMIN — METHYLPREDNISOLONE SODIUM SUCCINATE 40 MG: 40 INJECTION, POWDER, FOR SOLUTION INTRAMUSCULAR; INTRAVENOUS at 21:54

## 2023-08-14 RX ADMIN — GABAPENTIN 100 MG: 100 CAPSULE ORAL at 21:54

## 2023-08-14 RX ADMIN — HYDROCODONE BITARTRATE AND HOMATROPINE METHYLBROMIDE ORAL SOLUTION 5 ML: 5; 1.5 LIQUID ORAL at 17:13

## 2023-08-14 RX ADMIN — MONTELUKAST 10 MG: 10 TABLET, FILM COATED ORAL at 21:54

## 2023-08-14 RX ADMIN — INSULIN LISPRO 3 UNITS: 100 INJECTION, SOLUTION INTRAVENOUS; SUBCUTANEOUS at 08:42

## 2023-08-14 RX ADMIN — LEVALBUTEROL HYDROCHLORIDE 1.25 MG: 1.25 SOLUTION RESPIRATORY (INHALATION) at 20:02

## 2023-08-14 RX ADMIN — ATORVASTATIN CALCIUM 10 MG: 10 TABLET, FILM COATED ORAL at 17:12

## 2023-08-14 RX ADMIN — TORSEMIDE 10 MG: 10 TABLET ORAL at 08:41

## 2023-08-14 RX ADMIN — GABAPENTIN 100 MG: 100 CAPSULE ORAL at 16:30

## 2023-08-14 RX ADMIN — BUDESONIDE 0.5 MG: 0.5 INHALANT ORAL at 07:06

## 2023-08-14 RX ADMIN — FAMOTIDINE 40 MG: 20 TABLET, FILM COATED ORAL at 08:41

## 2023-08-14 RX ADMIN — ACETAMINOPHEN 650 MG: 325 TABLET, FILM COATED ORAL at 05:56

## 2023-08-14 RX ADMIN — PANTOPRAZOLE SODIUM 40 MG: 40 TABLET, DELAYED RELEASE ORAL at 16:30

## 2023-08-14 RX ADMIN — HYDROXYZINE HYDROCHLORIDE 25 MG: 25 TABLET ORAL at 08:47

## 2023-08-14 RX ADMIN — INSULIN LISPRO 2 UNITS: 100 INJECTION, SOLUTION INTRAVENOUS; SUBCUTANEOUS at 08:42

## 2023-08-14 RX ADMIN — PANTOPRAZOLE SODIUM 40 MG: 40 TABLET, DELAYED RELEASE ORAL at 08:41

## 2023-08-14 RX ADMIN — ACETAMINOPHEN 650 MG: 325 TABLET, FILM COATED ORAL at 22:40

## 2023-08-14 NOTE — PLAN OF CARE
Problem: INFECTION - ADULT  Goal: Absence or prevention of progression during hospitalization  Description: INTERVENTIONS:  - Assess and monitor for signs and symptoms of infection  - Monitor lab/diagnostic results  - Monitor all insertion sites, i.e. indwelling lines, tubes, and drains  - Monitor endotracheal if appropriate and nasal secretions for changes in amount and color  - [place] appropriate cooling/warming therapies per order  - Administer medications as ordered  - Instruct and encourage patient and family to use good hand hygiene technique  - Identify and instruct in appropriate isolation precautions for identified infection/condition  Outcome: Progressing  Goal: Absence of fever/infection during neutropenic period  Description: INTERVENTIONS:  - Monitor WBC    Outcome: Progressing

## 2023-08-14 NOTE — NURSING NOTE
Patient rang call bell with concern of her breathing; O2 saturation. States, "something feels off; I feel like my breathing is different; it hurts to inhale/exhale." On 2 L NC; maintaining O2 saturation of 92%. Respirations 22, pulse 92. Afebrile; anxious. Upper lobes clear, bilaterally; crackles present in R/L lower lobes. Notified on call MD. [facility] came bedside; assessed patient; placed new orders.   ==================  Administered PRN anxiolytic. Maintained oxygen 2 L NC, per SLIM. Repositioned patient with pillows for support; 45 degrees; semi fowlers. Encouraged patient to inhale through nose; deep breathe; call if feeling symptomatic. - Expressed understanding r/t interventions; feeling "at ease; more safe than before". O2 saturation unchanged; respirations 18. Visibly more relaxed. - Call Zoey Dick; belongings within reach. SLIM made aware.

## 2023-08-14 NOTE — RESPIRATORY THERAPY NOTE
RT Protocol Note  Willard Nam 66 y.o. female MRN: [id number]  Unit/Bed#: -01 Encounter: [id number]    Assessment    Principal Problem:    Asthma exacerbation  Active Problems:    Hyperlipidemia    Diabetes ([address] )    Class 2 obesity with body mass index (BMI) of 35.0 to 35.9 in adult      Home Pulmonary Medications:         Past Medical History:   Diagnosis Date   • Asthma    • Diabetes mellitus ([address])    • GERD (gastroesophageal reflux disease)    • Hyperlipidemia    • Hypertension      Social History     Socioeconomic History   • Marital status: /Civil Union     Spouse name: None   • Number of children: None   • Years of education: None   • Highest education level: None   Occupational History   • None   Tobacco Use   • Smoking status: Never   • Smokeless tobacco: Never   Vaping Use   • Vaping Use: Never used   Substance and Sexual Activity   • Alcohol use: Yes     Alcohol/week: 7.0 standard drinks of alcohol     Types: 7 Glasses of wine per week   • Drug use: Never   • Sexual activity: None   Other Topics Concern   • None   Social History Narrative   • None     Social Determinants of Health     Financial Resource Strain: Not on file   Food Insecurity: No Food Insecurity (8/14/2023)    Hunger Vital Sign    • Worried About Running Out of Food in the Last Year: Never true    • Ran Out of Food in the Last Year: Never true   Transportation Needs: No Transportation Needs (8/14/2023)    PRAPARE - Transportation    • Lack of Transportation (Medical): No    • Lack of Transportation (Non-Medical):  No   Physical Activity: Not on file   Stress: Not on file   Social Connections: Not on file   Intimate Partner Violence: Not on file   Housing Stability: Low Risk  (8/14/2023)    Housing Stability Vital Sign    • Unable to Pay for Housing in the Last Year: No    • Number of Places Lived in the Last Year: 1    • Unstable Housing in the Last Year: No       Subjective         Objective    Physical Exam:   General Appearance: Awake, Alert  Respiratory Pattern: Tachypneic  Chest Assessment: Chest expansion symmetrical  Bilateral Breath Sounds: Coarse, Scattered    Vitals:  Blood pressure 117/89, pulse 91, temperature 97.9 °F (36.6 °C), resp. rate 18, height 4' 11" (1.499 m), weight 81.6 kg (180 lb), SpO2 91 %. Imaging and other studies: I have personally reviewed pertinent reports.       O2 Device: RA     Plan    Respiratory Plan: Home Bronchodilator Patient pathway        Resp Comments: will continue with current tx plan

## 2023-08-14 NOTE — CASE MANAGEMENT
Case Management Assessment & Discharge Planning Note    Patient name Latrell Loo  Location [address] [facility] 236/-[id number] MRN [id number]  : 1965 Date 2023       Current Admission Date: 2023  Current Admission Diagnosis:Asthma exacerbation   Patient Active Problem List    Diagnosis Date Noted   • Itching 2023   • Polypharmacy 2023   • Class 2 obesity with body mass index (BMI) of 35.0 to 35.9 in adult 2023   • Dysphagia 2023   • Narrowing of airway 01/10/2023   • Depression with anxiety 2023   • Asthma exacerbation 2023   • Chronic cough 2022   • GERD (gastroesophageal reflux disease) 10/31/2022   • Rectus sheath hematoma 10/29/2022   • SIRS (systemic inflammatory response syndrome) ([address]) 10/24/2022   • Severe persistent asthma with exacerbation 10/24/2022   • Hyperlipidemia 10/24/2022   • Diabetes ([address]) 10/24/2022      LOS (days): 3  Geometric Mean LOS (GMLOS) (days):   Days to GMLOS:     OBJECTIVE:    Risk of Unplanned Readmission Score: 19.68         Current admission status: Inpatient       Preferred Pharmacy:   [facility], [address] [place] [address]  Phone: [phone] Fax: [phone]    Primary Care Provider: Beth Portillo    Primary Insurance: BLUE CROSS  Secondary Insurance:     ASSESSMENT:  Brown[place] Proxies    There are no active Health Care Proxies on file.        Advance Directives  Does patient have a [address] [place] [address] Avenue?: No  Was patient offered paperwork?: Yes (declined)  Does patient currently have a Health Care decision maker?: Yes, please see Health Care Proxy section  Does patient have Advance Directives?: No  Was patient offered paperwork?: Yes (declined)  Primary Contact:  Jose         Readmission Root Cause  30 Day Readmission: No    Patient Information  Admitted from[de-identified] Home  Mental Status: Alert  During Assessment patient was accompanied by: Not accompanied during assessment  Assessment information provided by[de-identified] Patient  Primary Caregiver: Self  Support Systems: Spouse/significant other  [place] of Residence: [address] [facility] do you live in?: [address] [facility] entry access options. Select all that apply.: No steps to enter home  Type of Current Residence: 2 story home  Upon entering residence, is there a bedroom on the main floor (no further steps)?: No  A bedroom is located on the following floor levels of residence (select all that apply):: 2nd Floor  Upon entering residence, is there a bathroom on the main floor (no further steps)?: Yes  Number of steps to 2nd floor from main floor: One Flight  In the last 12 months, was there a time when you were not able to pay the mortgage or rent on time?: No  In the last 12 months, how many places have you lived?: 1  In the last 12 months, was there a time when you did not have a steady place to sleep or slept in a shelter (including now)?: No  Homeless/housing insecurity resource given?: No  Living Arrangements: Lives w/ Spouse/significant other (lives with  Jose and 3 children)  Is patient a ?: No    Activities of Daily Living Prior to Admission  Functional Status: Independent  Completes ADLs independently?: Yes  Ambulates independently?: Yes  Does patient use assisted devices?: Yes  Assisted Devices (DME) used:  Shower Chair, Nebulizer  Does patient currently own DME?: Yes  What DME does the patient currently own?: Shower Chair, Nebulizer  Does patient have a history of Outpatient Therapy (PT/OT)?: No  Does the patient have a history of Short-Term Rehab?: No  Does patient have a history of HHC?: No  Does patient currently have [address] [facility]?: No         Patient Information Continued  Income Source: SSI/SSD  Does patient have prescription coverage?: Yes  Within the past 12 months, you worried that your food would run out before you got the money to buy more.: Never true  Within the past 12 months, the food you bought just didn't last and you didn't have money to get more.: Never true  Food insecurity resource given?: No  Does patient receive dialysis treatments?: No  Does patient have a history of substance abuse?: No  Does patient have a history of Mental Health Diagnosis?: No    PHQ 2/9 Screening   Reviewed PHQ 2/9 Depression Screening Score?: No    Means of Transportation  Means of Transport to Appts[de-identified] Drives Self  In the past 12 months, has lack of transportation kept you from medical appointments or from getting medications?: No  In the past 12 months, has lack of transportation kept you from meetings, work, or from getting things needed for daily living?: No  Was application for public transport provided?: No        DISCHARGE DETAILS:    Discharge planning discussed with[de-identified] patient  Freedom of Choice: Yes  Comments - Freedom of Choice: No anticipated d/c needs-pt refusing VNA services  CM contacted family/caregiver?: No- see comments (pt will update her  herself)  Were Treatment Team discharge recommendations reviewed with patient/caregiver?: Yes  Did patient/caregiver verbalize understanding of patient care needs?: Yes  Were patient/caregiver advised of the risks associated with not following Treatment Team discharge recommendations?: Yes    Contacts  Patient Contacts: Jose  Relationship to Patient[de-identified] [facility]          Is the patient interested in [address] [facility] at discharge?: No    DME Referral Provided  Referral made for DME?: No    Other Referral/Resources/Interventions Provided:  Referral Comments: No anticipated d/c needs-pt refusing VNA services    Would you like to participate in our [address] service program?  : No - Declined    Treatment Team Recommendation: Home  Discharge Destination Plan[de-identified] Home  Transport at Discharge : Family

## 2023-08-14 NOTE — PROGRESS NOTES
[address]  Progress Note  Name: Francisca Alfredo  MRN: [id number]  Unit/Bed#: -01 I Date of Admission: 8/11/2023   Date of Service: 8/14/2023 I Hospital Day: 3    Assessment/Plan   * Asthma exacerbation  Assessment & Plan  Patient reports increasing shortness of breath, wheezing, and cough since yesterday. Home neb treatments and 1 pill of 40mg prednisone (reports left over from prior treatment) taken at home have not been improving her symptoms, prompting ED visit. Reports improvement after ED interventions, however still reports some SOB and does not at baseline. Denies recent sick contact, allergen contact, or other acute symptoms/complaint at this time. · WBC 15,000 suspect 2/2 to steroid use. Lower suspicion of acute infectious cause   · Improving overall  · Respiratory protocol ordered  · Pulmonology consulted  · Continue scheduled steroids and nebs,taper today. Hopeful to transition to po prednisone tomorrow  · Plan for overnight pulse ox given suspicion for undiagnosed HEATHER/OHS    Class 2 obesity with body mass index (BMI) of 35.0 to 35.9 in adult  Assessment & Plan  · Recommend weight loss via healthy diet and exercise    Diabetes [facility])  Assessment & Plan    Lab Results   Component Value Date    HGBA1C 4.8 06/12/2023     · Continue home NovoLog 3 units 3 times daily with meals  · Continue sliding scale insulin   · recommend discontinuation of insulin given well-controlled A1c on discharge. Hyperlipidemia  Assessment & Plan  · Continue statin             VTE Pharmacologic Prophylaxis: VTE Score: 2 Low Risk (Score 0-2) - Encourage Ambulation. Patient Centered Rounds: I performed bedside rounds with nursing staff today. Discussions with Specialists or Other Care Team Provider: MARSHA    Education and Discussions with Family / Patient: PT.      Total Time Spent on Date of Encounter in care of patient: 35 minutes This time was spent on one or more of the following: performing physical exam; counseling and coordination of care; obtaining or reviewing history; documenting in the medical record; reviewing/ordering tests, medications or procedures; communicating with other healthcare professionals and discussing with patient's family/caregivers. Current Length of Stay: 3 day(s)  Current Patient Status: Inpatient   Certification Statement: The patient will continue to require additional inpatient hospital stay due to IV steroids  Discharge Plan: Anticipate discharge tomorrow to home with home services. Code Status: Level 1 - Full Code    Subjective:   Pt reports worsening shortness of breath overnight. Also reports abdominal pain which has been stable over the past 2 weeks complaining of constipation. Otherwise ROS negative. Objective:     Vitals:   Temp (24hrs), Av.9 °F (36.6 °C), Min:97.7 °F (36.5 °C), Max:98.1 °F (36.7 °C)    Temp:  [97.7 °F (36.5 °C)-98.1 °F (36.7 °C)] 97.9 °F (36.6 °C)  HR:  [81-92] 91  Resp:  [18-22] 18  BP: (112-117)/(72-89) 117/89  SpO2:  [88 %-95 %] 91 %  Body mass index is 36.36 kg/m². Input and Output Summary (last 24 hours):   No intake or output data in the 24 hours ending 23 1046    Physical Exam:   Physical Exam  Vitals reviewed. Constitutional:       General: She is not in acute distress. Appearance: She is well-developed. She is not diaphoretic. HENT:      Head: Normocephalic and atraumatic. Nose: Nose normal.      Mouth/Throat:      Pharynx: No oropharyngeal exudate. Eyes:      General: No scleral icterus. Right eye: No discharge. Left eye: No discharge. Conjunctiva/sclera: Conjunctivae normal.   Cardiovascular:      Rate and Rhythm: Normal rate and regular rhythm. Heart sounds: Normal heart sounds. No murmur heard. No friction rub. No gallop. Pulmonary:      Effort: Pulmonary effort is normal. No respiratory distress. Breath sounds: Normal breath sounds. No wheezing or rales. Abdominal:      General: Bowel sounds are normal. There is no distension. Palpations: Abdomen is soft. Tenderness: There is no abdominal tenderness. There is no guarding. Musculoskeletal:         General: Normal range of motion. Cervical back: Normal range of motion and neck supple. Skin:     General: Skin is warm and dry. Findings: No erythema. Neurological:      Mental Status: She is alert and oriented to person, place, and time. Psychiatric:         Behavior: Behavior normal.          Additional Data:     Labs:  Results from last 7 days   Lab Units 08/14/23  0458   WBC Thousand/uL 14.11*   HEMOGLOBIN g/dL 14.2   HEMATOCRIT % 43.1   PLATELETS Thousands/uL 499*   NEUTROS PCT % 92*   LYMPHS PCT % 6*   MONOS PCT % 2*   EOS PCT % 0     Results from last 7 days   Lab Units 08/14/23  0458 08/12/23  0504 08/12/23  0001   SODIUM mmol/L 135   < > 137   POTASSIUM mmol/L 3.6   < > 3.4*   CHLORIDE mmol/L 99   < > 104   CO2 mmol/L 27   < > 23   BUN mg/dL 15   < > 8   CREATININE mg/dL 0.77   < > 0.78   ANION GAP mmol/L 9   < > 10   CALCIUM mg/dL 9.3   < > 9.8   ALBUMIN g/dL  --   --  4.4   TOTAL BILIRUBIN mg/dL  --   --  0.46   ALK PHOS U/L  --   --  112*   ALT U/L  --   --  18   AST U/L  --   --  22   GLUCOSE RANDOM mg/dL 178*   < > 148*    < > = values in this interval not displayed. Results from last 7 days   Lab Units 08/14/23  0739 08/13/23  2040 08/13/23  1609 08/13/23  1114 08/13/23  0800 08/12/23  2041 08/12/23  1632 08/12/23  1106 08/12/23  0715 08/11/23  2306   POC GLUCOSE mg/dl 200* 163* 114 186* 155* 152* 159* 162* 166* 109               Lines/Drains:  Invasive Devices     Peripheral Intravenous Line  Duration           Peripheral IV 08/13/23 Dorsal (posterior); Right Hand 1 day                      Imaging: Reviewed radiology reports from this admission including: xray(s)    Recent Cultures (last 7 days):         Last 24 Hours Medication List:   Current Facility-Administered Medications   Medication Dose Route Frequency Provider Last Rate   • acetaminophen  650 mg Oral Q6H PRN Lavell Bhakta MD     • albuterol  2.5 mg Nebulization Q6H PRN Beryle Balling, PA-C     • atorvastatin  10 mg Oral Daily With [address] PA-C     • budesonide  0.5 mg Nebulization Q12H Corrinne Cones, CRNP     • buPROPion  150 mg Oral BID Lavell Bhakta MD     • diazepam  5 mg Oral Q6H PRN EDUARDA Hairston     • famotidine  40 mg Oral BID Lavell Bhakta MD     • gabapentin  100 mg Oral TID Misael Leger MD     • HYDROcodone Bit-Homatrop MBr  5 mL Oral Q4H PRN Beryle Balling, PA-C     • hydrOXYzine HCL  25 mg Oral Q6H PRN Beryle Balling, PA-C     • insulin lispro  1-6 Units Subcutaneous TID AC Maddy Hopkins PA-C     • insulin lispro  1-6 Units Subcutaneous HS Maddy Hopkins PA-C     • insulin lispro  3 Units Subcutaneous TID With Meals Beryle Balling, PA-C     • ipratropium  0.5 mg Nebulization BID Perlita Lili Saraiya, DO     • levalbuterol  1.25 mg Nebulization BID Perlita Lili Saraiya, DO     • melatonin  6 mg Oral HS Perlita Lili Saraiya, DO     • methylPREDNISolone sodium succinate  40 mg Intravenous Q12H [address] [facility], DO     • montelukast  10 mg Oral HS Maddykandice Hopkins PA-C     • pantoprazole  40 mg Oral BID AC Maddy Hopkins PA-C     • torsemide  10 mg Oral Daily Beryle Balling, Antoinette Lazier     • zolpidem  10 mg Oral HS PRN Beryle Balling, PA-C          Today, Patient Was Seen By: Lelon Schilder, DO    **Please Note: This note may have been constructed using a voice recognition system. **

## 2023-08-14 NOTE — ASSESSMENT & PLAN NOTE
Patient reports increasing shortness of breath, wheezing, and cough since yesterday. Home neb treatments and 1 pill of 40mg prednisone (reports left over from prior treatment) taken at home have not been improving her symptoms, prompting ED visit. Reports improvement after ED interventions, however still reports some SOB and does not at baseline. Denies recent sick contact, allergen contact, or other acute symptoms/complaint at this time. · WBC 15,000 suspect 2/2 to steroid use. Lower suspicion of acute infectious cause   · Improving overall  · Respiratory protocol ordered  · Pulmonology consulted  · Continue scheduled steroids and nebs,taper today.  Hopeful to transition to po prednisone tomorrow  · Plan for overnight pulse ox given suspicion for undiagnosed HEATHER/OHS

## 2023-08-14 NOTE — RESPIRATORY THERAPY NOTE
08/13/23 2232   Respiratory Assessment   General Appearance Awake; Alert   Respiratory Pattern Tachypneic   Chest Assessment Chest expansion symmetrical   Bilateral Breath Sounds Coarse;Scattered   Resp Comments pt has complaints of chest pain/tightness, per RN aslo had onset of sustained desaturation on room air, pt presents wearing 2L o2 nc with even although slightly labored respirations, she is able to speak in full sentences and does not appear to be in distress at this time   Oxygen Therapy/Pulse Ox   O2 Device Nasal cannula   O2 Therapy Oxygen   Nasal Cannula O2 Flow Rate (L/min) 2 L/min   Calculated FIO2 (%) - Nasal Cannula 28   SpO2 95 %   SpO2 Activity At Rest   $ Pulse Oximetry Spot Check Charge Completed     Due to change in pt's oxygen needs and her perceived change in respiratory status the overnight Spo2 study will be held, discussed with RN at bedside & she is agreeable, current RR is 20-22

## 2023-08-14 NOTE — PLAN OF CARE
Problem: PAIN - ADULT  Goal: Verbalizes/displays adequate comfort level or baseline comfort level  Description: Interventions:  - Encourage patient to monitor pain and request assistance  - Assess pain using appropriate pain scale  - Administer analgesics based on type and severity of pain and evaluate response  - Implement non-pharmacological measures as appropriate and evaluate response  - Consider cultural and social influences on pain and pain management  - Notify physician/advanced practitioner if interventions unsuccessful or patient reports new pain  Outcome: Progressing     Problem: INFECTION - ADULT  Goal: Absence or prevention of progression during hospitalization  Description: INTERVENTIONS:  - Assess and monitor for signs and symptoms of infection  - Monitor lab/diagnostic results  - Monitor all insertion sites, i.e. indwelling lines, tubes, and drains  - Monitor endotracheal if appropriate and nasal secretions for changes in amount and color  - [place] appropriate cooling/warming therapies per order  - Administer medications as ordered  - Instruct and encourage patient and family to use good hand hygiene technique  - Identify and instruct in appropriate isolation precautions for identified infection/condition  Outcome: Progressing  Goal: Absence of fever/infection during neutropenic period  Description: INTERVENTIONS:  - Monitor WBC    Outcome: Progressing     Problem: SAFETY ADULT  Goal: Patient will remain free of falls  Description: INTERVENTIONS:  - Educate patient/family on patient safety including physical limitations  - Instruct patient to call for assistance with activity   - Consult OT/PT to assist with strengthening/mobility   - Keep Call bell within reach  - Keep bed low and locked with side rails adjusted as appropriate  - Keep care items and personal belongings within reach  - Initiate and maintain comfort rounds  - Make Fall Risk Sign visible to staff  - Offer Toileting every 3 Hours, in advance of need  - Initiate/Maintain bed alarm  - Obtain necessary fall risk management equipment:   - Apply yellow socks and bracelet for high fall risk patients  - Consider moving patient to room near nurses station  Outcome: Progressing  Goal: Maintain or return to baseline ADL function  Description: INTERVENTIONS:  -  Assess patient's ability to carry out ADLs; assess patient's baseline for ADL function and identify physical deficits which impact ability to perform ADLs (bathing, care of mouth/teeth, toileting, grooming, dressing, etc.)  - Assess/evaluate cause of self-care deficits   - Assess range of motion  - Assess patient's mobility; develop plan if impaired  - Assess patient's need for assistive devices and provide as appropriate  - Encourage maximum independence but intervene and supervise when necessary  - Involve family in performance of ADLs  - Assess for home care needs following discharge   - Consider OT consult to assist with ADL evaluation and planning for discharge  - Provide patient education as appropriate  Outcome: Progressing  Goal: Maintains/Returns to pre admission functional level  Description: INTERVENTIONS:  - Perform BMAT or MOVE assessment daily.   - Set and communicate daily mobility goal to care team and patient/family/caregiver. - Collaborate with rehabilitation services on mobility goals if consulted  - Perform Range of Motion 3 times a day. - Reposition patient every 2 hours.   - Dangle patient 3 times a day  - Stand patient 3 times a day  - Ambulate patient 3 times a day  - Out of bed to chair 3 times a day   - Out of bed for meals 3 times a day  - Out of bed for toileting  - Record patient progress and toleration of activity level   Outcome: Progressing     Problem: DISCHARGE PLANNING  Goal: Discharge to home or other facility with appropriate resources  Description: INTERVENTIONS:  - Identify barriers to discharge w/patient and caregiver  - Arrange for needed discharge resources and transportation as appropriate  - Identify discharge learning needs (meds, wound care, etc.)  - Arrange for interpretive services to assist at discharge as needed  - Refer to Case Management Department for coordinating discharge planning if the patient needs post-hospital services based on physician/advanced practitioner order or complex needs related to functional status, cognitive ability, or social support system  Outcome: Progressing     Problem: Knowledge Deficit  Goal: Patient/family/caregiver demonstrates understanding of disease process, treatment plan, medications, and discharge instructions  Description: Complete learning assessment and assess knowledge base.   Interventions:  - Provide teaching at level of understanding  - Provide teaching via preferred learning methods  Outcome: Progressing     Problem: RESPIRATORY - ADULT  Goal: Achieves optimal ventilation and oxygenation  Description: INTERVENTIONS:  - Assess for changes in respiratory status  - Assess for changes in mentation and behavior  - Position to facilitate oxygenation and minimize respiratory effort  - Oxygen administered by appropriate delivery if ordered  - Initiate smoking cessation education as indicated  - Encourage broncho-pulmonary hygiene including cough, deep breathe, Incentive Spirometry  - Assess the need for suctioning and aspirate as needed  - Assess and instruct to report SOB or any respiratory difficulty  - Respiratory Therapy support as indicated  Outcome: Progressing

## 2023-08-14 NOTE — RESPIRATORY THERAPY NOTE
RT Protocol Note  Giovanni Salazar 66 y.o. female MRN: [id number]  Unit/Bed#: -01 Encounter: [id number]    Assessment    Principal Problem:    Asthma exacerbation  Active Problems:    Hyperlipidemia    Diabetes ([address])    Class 2 obesity with body mass index (BMI) of 35.0 to 35.9 in adult      Home Pulmonary Medications:     08/13/23 2039   Respiratory Protocol   Protocol Initiated? No   Protocol Selection Respiratory   Language Barrier? No   Medical & Social History Reviewed? Yes   Diagnostic Studies Reviewed? Yes   Physical Assessment Performed? Yes   Respiratory Plan Home Bronchodilator Patient pathway   Respiratory Assessment   Assessment Type During-treatment   General Appearance Alert; Awake   Respiratory Pattern Dyspnea at rest   Chest Assessment Chest expansion symmetrical   Bilateral Breath Sounds Expiratory wheezes   Location Specific No   Cough None   Resp Comments Resp protocol complete. Will continue with current tx plan   O2 Device RA   Additional Assessments   Pulse 81   Respirations 20   SpO2 95 %            Past Medical History:   Diagnosis Date    Asthma     Diabetes mellitus (HCC)     GERD (gastroesophageal reflux disease)     Hyperlipidemia     Hypertension      Social History     Socioeconomic History    Marital status: /Civil Union     Spouse name: None    Number of children: None    Years of education: None    Highest education level: None   Occupational History    None   Tobacco Use    Smoking status: Never    Smokeless tobacco: Never   Vaping Use    Vaping Use: Never used   Substance and Sexual Activity    Alcohol use:  Yes     Alcohol/week: 7.0 standard drinks of alcohol     Types: 7 Glasses of wine per week    Drug use: Never    Sexual activity: None   Other Topics Concern    None   Social History Narrative    None     Social Determinants of Health     Financial Resource Strain: Not on file   Food Insecurity: No Food Insecurity (2/27/2023)    Hunger Vital Sign     Worried About Running Out of Food in the Last Year: Never true     [address] in the Last Year: Never true   Transportation Needs: No Transportation Needs (2/27/2023)    PRAPARE - Transportation     Lack of Transportation (Medical): No     Lack of Transportation (Non-Medical): No   Physical Activity: Not on file   Stress: Not on file   Social Connections: Not on file   Intimate Partner Violence: Not on file   Housing Stability: Low Risk  (2/27/2023)    Housing Stability Vital Sign     Unable to Pay for Housing in the Last Year: No     Number of Places Lived in the Last Year: 1     Unstable Housing in the Last Year: No       Subjective         Objective    Physical Exam:   Assessment Type: During-treatment  General Appearance: Alert, Awake  Respiratory Pattern: Dyspnea at rest  Chest Assessment: Chest expansion symmetrical  Bilateral Breath Sounds: Expiratory wheezes  Location Specific: No  Cough: None  O2 Device: RA    Vitals:  Blood pressure 112/73, pulse 81, temperature 97.7 °F (36.5 °C), resp. rate 20, height 4' 11" (1.499 m), weight 81.6 kg (180 lb), SpO2 95 %. Imaging and other studies: I have personally reviewed pertinent reports. O2 Device: RA     Plan    Respiratory Plan: Home Bronchodilator Patient pathway        Resp Comments: Resp protocol complete.  Will continue with current tx plan

## 2023-08-14 NOTE — QUICK NOTE
Patient complains of persistent chest tightness and jitteriness following the nebulizer treatments. She states when she gets like this, sometimes she requires bipap but is not quite at that point yet. She is very anxious about possibly going home tomorrow because she does not feel well. She understands her oxygen level is acceptable but is concerned it will drop and feels better with the oxygen on. Reviewed imaging. CXR without acute pathology. Last CT chest was in January. Given patient's history and continued symptoms, it is reasonable to recheck a CT scan during this admission. Ordered for tomorrow morning. Patient agreeable to plan and also requesting something for anxiety. Discussed with nurse at bedside.

## 2023-08-14 NOTE — PROGRESS NOTES
Progress Note - Pulmonary   Lidya Griffin 66 y.o. female MRN: [id number]  Unit/Bed#: -01 Encounter: [id number]    Assessment:  1. Acute pulmonary insufficiency  2. Severe persistent asthma with acute exacerbation  3. Suspected HEATHER with OHS    Plan:  Acute pulm insufficiency secondary to severe persistent asthma with acute exacerbation  She remains on room air although she does note sats in the low 90s, does use oxygen as needed while here in the hospital and has been using it at night  Will continue solu-medrol same dose today, can hopefully cut down tomorrow  Continue budesonide BID, Xopenex/Atrovent  Home regimen is Breo 200/25 with duoneb as needed  She has not followed up to restart her [place] with her pulmonologist in [place] - has had multiple hospitalizations in the past, last in June  Overnight pulse ox ordered, suspect she has HEATHER/OHS, will need outpatient sleep study. Will continue to follow    Subjective:   Patient resting in bed. She is still feeling tight and short of breath. She notes her oxygen dropping to around 90 at times, did wear the O2 all night. Objective:     Vitals: Blood pressure 117/89, pulse 91, temperature 97.9 °F (36.6 °C), resp. rate 18, height 4' 11" (1.499 m), weight 81.6 kg (180 lb), SpO2 91 %. ,Body mass index is 36.36 kg/m². No intake or output data in the 24 hours ending 08/14/23 0955    Invasive Devices     Peripheral Intravenous Line  Duration           Peripheral IV 08/13/23 Dorsal (posterior); Right Hand 1 day                Physical Exam: /89   Pulse 91   Temp 97.9 °F (36.6 °C)   Resp 18   Ht 4' 11" (1.499 m)   Wt 81.6 kg (180 lb)   LMP  (LMP Unknown)   SpO2 91%   BMI 36.36 kg/m²   General appearance: alert and oriented, in no acute distress  Head: Normocephalic, without obvious abnormality, atraumatic  Eyes: negative findings: conjunctivae and sclerae normal  Lungs: Diminished breath sounds, cough with deep inspiration, expiratory wheeze  Heart: regular rate and rhythm  Abdomen: normal findings: soft, non-tender  Extremities: no edema  Skin: warm and dry  Neurologic: Mental status: Alert, oriented, thought content appropriate     Labs: I have personally reviewed pertinent lab results. , CBC:   Lab Results   Component Value Date    WBC 14.11 (H) 08/14/2023    HGB 14.2 08/14/2023    HCT 43.1 08/14/2023    MCV 93 08/14/2023     (H) 08/14/2023    RBC 4.66 08/14/2023    MCH 30.5 08/14/2023    MCHC 32.9 08/14/2023    RDW 12.9 08/14/2023    MPV 9.9 08/14/2023    NRBC 0 08/14/2023   , CMP:   Lab Results   Component Value Date    SODIUM 135 08/14/2023    K 3.6 08/14/2023    CL 99 08/14/2023    CO2 27 08/14/2023    BUN 15 08/14/2023    CREATININE 0.77 08/14/2023    CALCIUM 9.3 08/14/2023    EGFR 86 08/14/2023     Imaging and other studies: I have personally reviewed pertinent reports.    and I have personally reviewed pertinent films in PACS

## 2023-08-15 ENCOUNTER — TELEPHONE (OUTPATIENT)
Dept: GASTROENTEROLOGY | Facility: CLINIC | Age: 58
End: 2023-08-15

## 2023-08-15 VITALS
TEMPERATURE: 97.6 F | WEIGHT: 180 LBS | HEIGHT: 59 IN | RESPIRATION RATE: 18 BRPM | HEART RATE: 80 BPM | SYSTOLIC BLOOD PRESSURE: 110 MMHG | DIASTOLIC BLOOD PRESSURE: 70 MMHG | OXYGEN SATURATION: 91 % | BODY MASS INDEX: 36.29 KG/M2

## 2023-08-15 PROBLEM — R15.9 FECAL INCONTINENCE: Status: ACTIVE | Noted: 2023-08-15

## 2023-08-15 PROBLEM — R06.09 COVID-19 LONG HAULER MANIFESTING CHRONIC DYSPNEA: Status: ACTIVE | Noted: 2023-08-15

## 2023-08-15 PROBLEM — U09.9 COVID-19 LONG HAULER MANIFESTING CHRONIC DYSPNEA: Status: ACTIVE | Noted: 2023-08-15

## 2023-08-15 LAB
ANION GAP SERPL CALCULATED.3IONS-SCNC: 11 MMOL/L
BASOPHILS # BLD AUTO: 0.01 THOUSANDS/ÂΜL (ref 0–0.1)
BASOPHILS NFR BLD AUTO: 0 % (ref 0–1)
BUN SERPL-MCNC: 15 MG/DL (ref 5–25)
CALCIUM SERPL-MCNC: 9.6 MG/DL (ref 8.4–10.2)
CHLORIDE SERPL-SCNC: 98 MMOL/L (ref 96–108)
CO2 SERPL-SCNC: 27 MMOL/L (ref 21–32)
CREAT SERPL-MCNC: 0.82 MG/DL (ref 0.6–1.3)
EOSINOPHIL # BLD AUTO: 0 THOUSAND/ÂΜL (ref 0–0.61)
EOSINOPHIL NFR BLD AUTO: 0 % (ref 0–6)
ERYTHROCYTE [DISTWIDTH] IN BLOOD BY AUTOMATED COUNT: 12.6 % (ref 11.6–15.1)
GFR SERPL CREATININE-BSD FRML MDRD: 80 ML/MIN/1.73SQ M
GLUCOSE SERPL-MCNC: 141 MG/DL (ref 65–140)
GLUCOSE SERPL-MCNC: 151 MG/DL (ref 65–140)
GLUCOSE SERPL-MCNC: 168 MG/DL (ref 65–140)
GLUCOSE SERPL-MCNC: 191 MG/DL (ref 65–140)
HCT VFR BLD AUTO: 42.1 % (ref 34.8–46.1)
HGB BLD-MCNC: 13.8 G/DL (ref 11.5–15.4)
IMM GRANULOCYTES # BLD AUTO: 0.07 THOUSAND/UL (ref 0–0.2)
IMM GRANULOCYTES NFR BLD AUTO: 1 % (ref 0–2)
LYMPHOCYTES # BLD AUTO: 1.08 THOUSANDS/ÂΜL (ref 0.6–4.47)
LYMPHOCYTES NFR BLD AUTO: 10 % (ref 14–44)
MCH RBC QN AUTO: 30.5 PG (ref 26.8–34.3)
MCHC RBC AUTO-ENTMCNC: 32.8 G/DL (ref 31.4–37.4)
MCV RBC AUTO: 93 FL (ref 82–98)
MONOCYTES # BLD AUTO: 0.65 THOUSAND/ÂΜL (ref 0.17–1.22)
MONOCYTES NFR BLD AUTO: 6 % (ref 4–12)
NEUTROPHILS # BLD AUTO: 9.11 THOUSANDS/ÂΜL (ref 1.85–7.62)
NEUTS SEG NFR BLD AUTO: 83 % (ref 43–75)
NRBC BLD AUTO-RTO: 0 /100 WBCS
PLATELET # BLD AUTO: 453 THOUSANDS/UL (ref 149–390)
PMV BLD AUTO: 10 FL (ref 8.9–12.7)
POTASSIUM SERPL-SCNC: 3.6 MMOL/L (ref 3.5–5.3)
RBC # BLD AUTO: 4.52 MILLION/UL (ref 3.81–5.12)
SODIUM SERPL-SCNC: 136 MMOL/L (ref 135–147)
WBC # BLD AUTO: 10.92 THOUSAND/UL (ref 4.31–10.16)

## 2023-08-15 PROCEDURE — 94640 AIRWAY INHALATION TREATMENT: CPT

## 2023-08-15 PROCEDURE — 94664 DEMO&/EVAL PT USE INHALER: CPT

## 2023-08-15 PROCEDURE — 99232 SBSQ HOSP IP/OBS MODERATE 35: CPT | Performed by: INTERNAL MEDICINE

## 2023-08-15 PROCEDURE — 92610 EVALUATE SWALLOWING FUNCTION: CPT

## 2023-08-15 PROCEDURE — 85025 COMPLETE CBC W/AUTO DIFF WBC: CPT | Performed by: INTERNAL MEDICINE

## 2023-08-15 PROCEDURE — 82948 REAGENT STRIP/BLOOD GLUCOSE: CPT

## 2023-08-15 PROCEDURE — 99239 HOSP IP/OBS DSCHRG MGMT >30: CPT | Performed by: NURSE PRACTITIONER

## 2023-08-15 PROCEDURE — 80048 BASIC METABOLIC PNL TOTAL CA: CPT | Performed by: INTERNAL MEDICINE

## 2023-08-15 PROCEDURE — 94760 N-INVAS EAR/PLS OXIMETRY 1: CPT

## 2023-08-15 RX ORDER — PREDNISONE 10 MG/1
TABLET ORAL
Qty: 30 TABLET | Refills: 0 | Status: SHIPPED | OUTPATIENT
Start: 2023-08-15 | End: 2023-08-26

## 2023-08-15 RX ORDER — HYDROCODONE BITARTRATE AND HOMATROPINE METHYLBROMIDE ORAL SOLUTION 5; 1.5 MG/5ML; MG/5ML
5 LIQUID ORAL EVERY 4 HOURS PRN
Qty: 120 ML | Refills: 0 | Status: SHIPPED | OUTPATIENT
Start: 2023-08-15 | End: 2023-08-25

## 2023-08-15 RX ORDER — LANOLIN ALCOHOL/MO/W.PET/CERES
6 CREAM (GRAM) TOPICAL
Qty: 60 TABLET | Refills: 0 | Status: SHIPPED | OUTPATIENT
Start: 2023-08-15 | End: 2023-09-14

## 2023-08-15 RX ADMIN — INSULIN LISPRO 1 UNITS: 100 INJECTION, SOLUTION INTRAVENOUS; SUBCUTANEOUS at 16:31

## 2023-08-15 RX ADMIN — ATORVASTATIN CALCIUM 10 MG: 10 TABLET, FILM COATED ORAL at 16:30

## 2023-08-15 RX ADMIN — PANTOPRAZOLE SODIUM 40 MG: 40 TABLET, DELAYED RELEASE ORAL at 16:30

## 2023-08-15 RX ADMIN — HYDROCODONE BITARTRATE AND HOMATROPINE METHYLBROMIDE ORAL SOLUTION 5 ML: 5; 1.5 LIQUID ORAL at 15:26

## 2023-08-15 RX ADMIN — HYDROXYZINE HYDROCHLORIDE 25 MG: 25 TABLET ORAL at 08:59

## 2023-08-15 RX ADMIN — GABAPENTIN 100 MG: 100 CAPSULE ORAL at 16:30

## 2023-08-15 RX ADMIN — FLUTICASONE FUROATE AND VILANTEROL TRIFENATATE 1 PUFF: 200; 25 POWDER RESPIRATORY (INHALATION) at 08:59

## 2023-08-15 RX ADMIN — PANTOPRAZOLE SODIUM 40 MG: 40 TABLET, DELAYED RELEASE ORAL at 08:59

## 2023-08-15 RX ADMIN — PREDNISONE 40 MG: 20 TABLET ORAL at 08:59

## 2023-08-15 RX ADMIN — HYDROCODONE BITARTRATE AND HOMATROPINE METHYLBROMIDE ORAL SOLUTION 5 ML: 5; 1.5 LIQUID ORAL at 01:23

## 2023-08-15 RX ADMIN — TORSEMIDE 10 MG: 10 TABLET ORAL at 08:59

## 2023-08-15 RX ADMIN — IPRATROPIUM BROMIDE 0.5 MG: 0.5 SOLUTION RESPIRATORY (INHALATION) at 07:25

## 2023-08-15 RX ADMIN — BUPROPION HYDROCHLORIDE 150 MG: 150 TABLET, EXTENDED RELEASE ORAL at 08:59

## 2023-08-15 RX ADMIN — LEVALBUTEROL HYDROCHLORIDE 1.25 MG: 1.25 SOLUTION RESPIRATORY (INHALATION) at 07:24

## 2023-08-15 RX ADMIN — INSULIN LISPRO 3 UNITS: 100 INJECTION, SOLUTION INTRAVENOUS; SUBCUTANEOUS at 09:00

## 2023-08-15 RX ADMIN — GABAPENTIN 100 MG: 100 CAPSULE ORAL at 08:59

## 2023-08-15 RX ADMIN — FAMOTIDINE 40 MG: 20 TABLET, FILM COATED ORAL at 08:59

## 2023-08-15 RX ADMIN — INSULIN LISPRO 3 UNITS: 100 INJECTION, SOLUTION INTRAVENOUS; SUBCUTANEOUS at 16:31

## 2023-08-15 RX ADMIN — INSULIN LISPRO 1 UNITS: 100 INJECTION, SOLUTION INTRAVENOUS; SUBCUTANEOUS at 09:00

## 2023-08-15 NOTE — ASSESSMENT & PLAN NOTE
Lab Results   Component Value Date    HGBA1C 4.8 06/12/2023     · Patient takes ozempic as an outpatient but states not currently available due to shortage  · Continue home NovoLog 3 units 3 times daily with meals  · Continue sliding scale insulin   · recommend discontinuation of insulin given well-controlled A1c on discharge.

## 2023-08-15 NOTE — PLAN OF CARE
Problem: PAIN - ADULT  Goal: Verbalizes/displays adequate comfort level or baseline comfort level  Description: Interventions:  - Encourage patient to monitor pain and request assistance  - Assess pain using appropriate pain scale  - Administer analgesics based on type and severity of pain and evaluate response  - Implement non-pharmacological measures as appropriate and evaluate response  - Consider cultural and social influences on pain and pain management  - Notify physician/advanced practitioner if interventions unsuccessful or patient reports new pain  Outcome: Progressing     Problem: INFECTION - ADULT  Goal: Absence or prevention of progression during hospitalization  Description: INTERVENTIONS:  - Assess and monitor for signs and symptoms of infection  - Monitor lab/diagnostic results  - Monitor all insertion sites, i.e. indwelling lines, tubes, and drains  - Monitor endotracheal if appropriate and nasal secretions for changes in amount and color  - [place] appropriate cooling/warming therapies per order  - Administer medications as ordered  - Instruct and encourage patient and family to use good hand hygiene technique  - Identify and instruct in appropriate isolation precautions for identified infection/condition  Outcome: Progressing  Goal: Absence of fever/infection during neutropenic period  Description: INTERVENTIONS:  - Monitor WBC    Outcome: Progressing     Problem: SAFETY ADULT  Goal: Patient will remain free of falls  Description: INTERVENTIONS:  - Educate patient/family on patient safety including physical limitations  - Instruct patient to call for assistance with activity   - Consult OT/PT to assist with strengthening/mobility   - Keep Call bell within reach  - Keep bed low and locked with side rails adjusted as appropriate  - Keep care items and personal belongings within reach  - Initiate and maintain comfort rounds  - Make Fall Risk Sign visible to staff  - Offer Toileting every 3 Hours, in advance of need  - Initiate/Maintain bed alarm  - Obtain necessary fall risk management equipment:   - Apply yellow socks and bracelet for high fall risk patients  - Consider moving patient to room near nurses station  Outcome: Progressing  Goal: Maintain or return to baseline ADL function  Description: INTERVENTIONS:  -  Assess patient's ability to carry out ADLs; assess patient's baseline for ADL function and identify physical deficits which impact ability to perform ADLs (bathing, care of mouth/teeth, toileting, grooming, dressing, etc.)  - Assess/evaluate cause of self-care deficits   - Assess range of motion  - Assess patient's mobility; develop plan if impaired  - Assess patient's need for assistive devices and provide as appropriate  - Encourage maximum independence but intervene and supervise when necessary  - Involve family in performance of ADLs  - Assess for home care needs following discharge   - Consider OT consult to assist with ADL evaluation and planning for discharge  - Provide patient education as appropriate  Outcome: Progressing  Goal: Maintains/Returns to pre admission functional level  Description: INTERVENTIONS:  - Perform BMAT or MOVE assessment daily.   - Set and communicate daily mobility goal to care team and patient/family/caregiver. - Collaborate with rehabilitation services on mobility goals if consulted  - Perform Range of Motion 3 times a day. - Reposition patient every 2 hours.   - Dangle patient 3 times a day  - Stand patient 3 times a day  - Ambulate patient 3 times a day  - Out of bed to chair 3 times a day   - Out of bed for meals 3 times a day  - Out of bed for toileting  - Record patient progress and toleration of activity level   Outcome: Progressing     Problem: DISCHARGE PLANNING  Goal: Discharge to home or other facility with appropriate resources  Description: INTERVENTIONS:  - Identify barriers to discharge w/patient and caregiver  - Arrange for needed discharge resources and transportation as appropriate  - Identify discharge learning needs (meds, wound care, etc.)  - Arrange for interpretive services to assist at discharge as needed  - Refer to Case Management Department for coordinating discharge planning if the patient needs post-hospital services based on physician/advanced practitioner order or complex needs related to functional status, cognitive ability, or social support system  Outcome: Progressing     Problem: Knowledge Deficit  Goal: Patient/family/caregiver demonstrates understanding of disease process, treatment plan, medications, and discharge instructions  Description: Complete learning assessment and assess knowledge base.   Interventions:  - Provide teaching at level of understanding  - Provide teaching via preferred learning methods  Outcome: Progressing     Problem: RESPIRATORY - ADULT  Goal: Achieves optimal ventilation and oxygenation  Description: INTERVENTIONS:  - Assess for changes in respiratory status  - Assess for changes in mentation and behavior  - Position to facilitate oxygenation and minimize respiratory effort  - Oxygen administered by appropriate delivery if ordered  - Initiate smoking cessation education as indicated  - Encourage broncho-pulmonary hygiene including cough, deep breathe, Incentive Spirometry  - Assess the need for suctioning and aspirate as needed  - Assess and instruct to report SOB or any respiratory difficulty  - Respiratory Therapy support as indicated  Outcome: Progressing

## 2023-08-15 NOTE — PLAN OF CARE
Problem: INFECTION - ADULT  Goal: Absence or prevention of progression during hospitalization  Description: INTERVENTIONS:  - Assess and monitor for signs and symptoms of infection  - Monitor lab/diagnostic results  - Monitor all insertion sites, i.e. indwelling lines, tubes, and drains  - Monitor endotracheal if appropriate and nasal secretions for changes in amount and color  - [place] appropriate cooling/warming therapies per order  - Administer medications as ordered  - Instruct and encourage patient and family to use good hand hygiene technique  - Identify and instruct in appropriate isolation precautions for identified infection/condition  Outcome: Progressing  Goal: Absence of fever/infection during neutropenic period  Description: INTERVENTIONS:  - Monitor WBC    Outcome: Progressing

## 2023-08-15 NOTE — ASSESSMENT & PLAN NOTE
Patient reports loss of bowel control with fecal incontinence for slimy, mucoid-like green/brown stool with onset 1 month ago without chronic use of laxatives which has significantly impacted quality of life  Associated abdominal cramping and fullness  Denies recent lumbosaccral back injury or trauma suggesting cauda equina syndrome, does endorse chronic neuropathy relieved with gabapentin  Previous hx of treatment for chronic constipation  KUB obtained and notable for a nonobstructive bowel gas pattern, no free air  Follows with GI as an outpatient

## 2023-08-15 NOTE — ASSESSMENT & PLAN NOTE
Patient reports increasing shortness of breath, wheezing, and cough on admission. Home neb treatments and 1 pill of 40mg prednisone (reports left over from prior treatment) taken at home have not been improving her symptoms, prompting ED visit. Reports improvement after ED interventions, however still reports some SOB and is not at baseline. Denies recent sick contact, allergen contact, or other acute symptoms/complaint at this time. · WBC 15,000 suspect 2/2 to steroid use. Lower suspicion of acute infectious cause   · Respiratory protocol ordered  · Pulmonology consulted, has now signed off  · Continue scheduled steroids currently transitioned to oral dosing prednisone 40 mg daily per pulmonology, prednisone taper at discharge  · O2 saturations stable on room air while awake at rest  · Overnight O2 sats did drop to 88-89%  · Suspicion for undiagnosed HEATHER/OHS, patient agreeable to outpatient sleep study  · Persistent increased work of breathing and conversational dyspnea and very diminished air exchange throughout lung fields  · CT with chronic bilateral atelectasis L>R.    · IS and Flutter valve q1 hour while awake  · Outpatient follow up with primary pulmonologist in [facility]   · Outpatient peak flow measurements daily

## 2023-08-15 NOTE — DISCHARGE SUMMARY
[address]  Discharge- Fabio Cates 1965, 66 y.o. female MRN: [id number]  Unit/Bed#: -01 Encounter: [id number]  Primary Care Provider: Destini Hurtado   Date and time admitted to hospital: 8/11/2023  8:03 PM    * Asthma exacerbation  Assessment & Plan  Patient reports increasing shortness of breath, wheezing, and cough on admission. Home neb treatments and 1 pill of 40mg prednisone (reports left over from prior treatment) taken at home have not been improving her symptoms, prompting ED visit. Reports improvement after ED interventions, however still reports some SOB and is not at baseline. Denies recent sick contact, allergen contact, or other acute symptoms/complaint at this time. · WBC 15,000 suspect 2/2 to steroid use. Lower suspicion of acute infectious cause   · Respiratory protocol ordered  · Pulmonology consulted, has now signed off  · Continue scheduled steroids currently transitioned to oral dosing prednisone 40 mg daily per pulmonology, prednisone taper at discharge  · O2 saturations stable on room air while awake at rest  · Overnight O2 sats did drop to 88-89%  · Suspicion for undiagnosed HEATHER/OHS, patient agreeable to outpatient sleep study  · Persistent increased work of breathing and conversational dyspnea and very diminished air exchange throughout lung fields  · CT with chronic bilateral atelectasis L>R.    · IS and Flutter valve q1 hour while awake  · Outpatient follow up with primary pulmonologist in [facility]   · Outpatient peak flow measurements daily    Fecal incontinence  Assessment & Plan  Patient reports loss of bowel control with fecal incontinence for slimy, mucoid-like green/brown stool with onset 1 month ago without chronic use of laxatives which has significantly impacted quality of life  Associated abdominal cramping and fullness  Denies recent lumbosaccral back injury or trauma suggesting cauda equina syndrome, does endorse chronic neuropathy relieved with gabapentin  Previous hx of treatment for chronic constipation  KUB obtained and notable for a nonobstructive bowel gas pattern, no free air  Follows with GI as an outpatient       Class 2 obesity with body mass index (BMI) of 35.0 to 35.9 in adult  Assessment & Plan  · Recommend weight loss via healthy diet and exercise  · Patient reports good weight loss with ozempic use but not currently taking due to shortage  · Continue to encourage lifestyle modifications      Dysphagia  Assessment & Plan  Patient reports hx of chronic dysphagia with EGDs and esophageal dilatations previously   Patient reports persistent dysphagia symptoms   · Previous CT imaging  From 1/9/23 significant for: Severe narrowing of oropharyngeal airway at the level of palatine tonsils and [id number] aspect of uvula, which is worse during inspiration. No suspicious neck mass on this noncontrast examination. · Was evaluated by ENT at this time and  trachea widely patent. No evidence of vocal cord dysfunction  · Per pulmonary note: CT chest 3/14/2023 shows bibasilar but left greater than right sided infiltrate and bronchiectasis. Note, esophagus appears to be significantly dilated and thickened  · Follow up with GI as outpatient  · Per pulmonary recommendations will pursue work-up for aspiration  · Speech therapy consulted,  swallowing evaluation completed  · Intact oropharyngeal swallow function  · Education provided on reflux precautions        Diabetes [facility])  Assessment & Plan    Lab Results   Component Value Date    HGBA1C 4.8 06/12/2023     · Patient takes ozempic as an outpatient but states not currently available due to shortage  · Continue home NovoLog 3 units 3 times daily with meals  · Continue sliding scale insulin   · recommend discontinuation of insulin given well-controlled A1c on discharge.     Hyperlipidemia  Assessment & Plan  · Continue statin      Medical Problems     Resolved Problems  Date Reviewed: 8/14/2023   None       Discharging Physician / Practitioner: Odalys Hahn, [address]  PCP: Charito Jara  Admission Date:   Admission Orders (From admission, onward)     Ordered        08/11/23 2348  INPATIENT ADMISSION  Once                      Discharge Date: 08/15/23    Consultations During Hospital Stay:  · Pulmonary  · Speech Therapy    Procedures Performed:   · None    Significant Findings / Test Results:   · CT chest imaging with chronic bilateral atelectasis left > right    Incidental Findings:   · None    Test Results Pending at Discharge (will require follow up): · None     Outpatient Tests Requested:  · None    Complications:  None     Reason for Admission: Shortness of breath and wheezing    Hospital Course:   Sabas Kelly is a 66 y.o. female patient who originally presented to the hospital on 8/11/2023 due to persistent and progressively worsening shortness of breath and wheezing unrelieved by home nebulizer treatment and 1 time dose of oral prednisone. She was admitted for an acute exacerbation of asthma. CXR and CT chest imaging were negative for infiltrates however noted chronic bilateral atelectasis L > R was noted. She was received systemic IV steroids with scheduled LABA/LAMA/ICS and prn JESUS and hycodan. Pulmonary consultation was completed. Her room air oxygen saturations became stable with supportive care. Per pulmonary recommendations she was evaluated by speech therapy for possible aspiration which was negative and she received recommendations and education for reflux precautions. She also reported persistent GI symptoms of dysphagia, abdominal fullness, and fecal incontinence with onset 1 month ago. She follows with GI as an outpatient and has had extensive GI workup by history. She is scheduled to see GI as an outpatient on 8/16/23. She has a primary pulmonologist in [facility] that she is scheduled to follow up with next week. She will be discharged home on an oral steroid taper. Please see above list of diagnoses and related plan for additional information. Condition at Discharge: fair    Discharge Day Visit / Exam:   Subjective:  "I know my asthma is better but I still feel that I need to think about my breathing all the time. I know my body and feel that there is something more going on."    Vitals: Blood Pressure: 110/70 (08/15/23 1503)  Pulse: 80 (08/15/23 1503)  Temperature: 97.6 °F (36.4 °C) (08/15/23 1503)  Temp Source: Oral (08/15/23 1503)  Respirations: 18 (08/15/23 1503)  Height: 4' 11" (149.9 cm) (08/12/23 0125)  Weight - Scale: 81.6 kg (180 lb) (08/12/23 0125)  SpO2: 91 % (08/15/23 1503)  Exam:   Physical Exam  Vitals reviewed. Constitutional:       General: She is awake. Appearance: She is obese. She is not toxic-appearing. HENT:      Head: Normocephalic and atraumatic. Eyes:      General: No scleral icterus. Extraocular Movements: Extraocular movements intact. Cardiovascular:      Rate and Rhythm: Normal rate and regular rhythm. Pulses: Normal pulses. Radial pulses are 2+ on the right side and 2+ on the left side. Dorsalis pedis pulses are 2+ on the right side and 2+ on the left side. Heart sounds: Normal heart sounds, S1 normal and S2 normal. No murmur heard. No friction rub. No gallop. Pulmonary:      Effort: Accessory muscle usage and prolonged expiration present. Breath sounds: Decreased air movement present. Examination of the right-upper field reveals decreased breath sounds. Examination of the left-upper field reveals decreased breath sounds. Examination of the right-middle field reveals decreased breath sounds. Examination of the left-middle field reveals decreased breath sounds. Examination of the right-lower field reveals decreased breath sounds. Examination of the left-lower field reveals decreased breath sounds. Decreased breath sounds present. No wheezing, rhonchi or rales.       Comments: Conversational dyspnea and increased work of breathing, non-productive cough which worsens with deep breathing. Abdominal:      General: There is no distension. Palpations: Abdomen is soft. Tenderness: There is abdominal tenderness in the epigastric area. There is no rebound. Musculoskeletal:      Right lower leg: No edema. Left lower leg: No edema. Skin:     General: Skin is warm and dry. Coloration: Skin is not jaundiced. Nails: There is no clubbing. Neurological:      General: No focal deficit present. Mental Status: She is alert. Psychiatric:         Attention and Perception: Attention and perception normal.         Mood and Affect: Mood is depressed. Affect is tearful. Speech: Speech normal.         Behavior: Behavior normal. Behavior is cooperative. Thought Content: Thought content normal.         Cognition and Memory: Cognition normal.         Judgment: Judgment normal.          Discussion with Family: Patient declined call to . Discharge instructions/Information to patient and family:   See after visit summary for information provided to patient and family. Provisions for Follow-Up Care:  See after visit summary for information related to follow-up care and any pertinent home health orders. Disposition:   Home    Planned Readmission: none     Discharge Statement:  I spent 60 minutes discharging the patient. This time was spent on the day of discharge. I had direct contact with the patient on the day of discharge. Greater than 50% of the total time was spent examining patient, answering all patient questions, arranging and discussing plan of care with patient as well as directly providing post-discharge instructions. Additional time then spent on discharge activities. Discharge Medications:  See after visit summary for reconciled discharge medications provided to patient and/or family.       **Please Note: This note may have been constructed using a voice recognition system**

## 2023-08-15 NOTE — ASSESSMENT & PLAN NOTE
Patient reports hx of chronic dysphagia with EGDs and esophageal dilatations previously   Patient reports persistent dysphagia symptoms   · Previous CT imaging  From 1/9/23 significant for: Severe narrowing of oropharyngeal airway at the level of palatine tonsils and anq-vi-avvaoqhn aspect of uvula, which is worse during inspiration. No suspicious neck mass on this noncontrast examination. · Was evaluated by ENT at this time and  trachea widely patent. No evidence of vocal cord dysfunction  · Per pulmonary note: CT chest 3/14/2023 shows bibasilar but left greater than right sided infiltrate and bronchiectasis.   Note, esophagus appears to be significantly dilated and thickened  · Follow up with GI as outpatient  · Per pulmonary recommendations will pursue work-up for aspiration  · Speech therapy consulted,  swallowing evaluation completed  · Intact oropharyngeal swallow function  · Education provided on reflux precautions

## 2023-08-15 NOTE — RESPIRATORY THERAPY NOTE
RT Protocol Note  Isidoro Blocker 66 y.o. female MRN: [id number]  Unit/Bed#: -01 Encounter: [id number]    Assessment    Principal Problem:    Asthma exacerbation  Active Problems:    Hyperlipidemia    Diabetes ([address] )    Class 2 obesity with body mass index (BMI) of 35.0 to 35.9 in adult      Home Pulmonary Medications:         Past Medical History:   Diagnosis Date   • Asthma    • Diabetes mellitus ([address])    • GERD (gastroesophageal reflux disease)    • Hyperlipidemia    • Hypertension      Social History     Socioeconomic History   • Marital status: /Civil Union     Spouse name: None   • Number of children: None   • Years of education: None   • Highest education level: None   Occupational History   • None   Tobacco Use   • Smoking status: Never   • Smokeless tobacco: Never   Vaping Use   • Vaping Use: Never used   Substance and Sexual Activity   • Alcohol use: Yes     Alcohol/week: 7.0 standard drinks of alcohol     Types: 7 Glasses of wine per week   • Drug use: Never   • Sexual activity: None   Other Topics Concern   • None   Social History Narrative   • None     Social Determinants of Health     Financial Resource Strain: Not on file   Food Insecurity: No Food Insecurity (8/14/2023)    Hunger Vital Sign    • Worried About Running Out of Food in the Last Year: Never true    • Ran Out of Food in the Last Year: Never true   Transportation Needs: No Transportation Needs (8/14/2023)    PRAPARE - Transportation    • Lack of Transportation (Medical): No    • Lack of Transportation (Non-Medical):  No   Physical Activity: Not on file   Stress: Not on file   Social Connections: Not on file   Intimate Partner Violence: Not on file   Housing Stability: Low Risk  (8/14/2023)    Housing Stability Vital Sign    • Unable to Pay for Housing in the Last Year: No    • Number of Places Lived in the Last Year: 1    • Unstable Housing in the Last Year: No       Subjective         Objective    Physical Exam: Vitals:  Blood pressure 111/70, pulse 78, temperature 98.1 °F (36.7 °C), temperature source Oral, resp. rate 18, height 4' 11" (1.499 m), weight 81.6 kg (180 lb), SpO2 93 %. Imaging and other studies: I have personally reviewed pertinent reports.       O2 Device: room air     Plan    Respiratory Plan: Mild Distress pathway        Resp Comments: will continue with bid tx

## 2023-08-15 NOTE — TELEPHONE ENCOUNTER
----- Message from Axel Camacho PA-C sent at 8/15/2023 12:41 PM EDT -----  Patient was supposed to have an appointment with Amor Fisher tomorrow but will still be in the hospital for an asthma exacerbation per [facility]IM. Can you please call her to reschedule the appointment, thanks!

## 2023-08-15 NOTE — CASE MANAGEMENT
Case Management Discharge Planning Note    Patient name Willard Nam  Location [address] [facility] [place] [id number] MRN [id number]  : 1965 Date 8/15/2023       Current Admission Date: 2023  Current Admission Diagnosis:Asthma exacerbation   Patient Active Problem List    Diagnosis Date Noted   • Fecal incontinence 08/15/2023   • Itching 2023   • Polypharmacy 2023   • Class 2 obesity with body mass index (BMI) of 35.0 to 35.9 in adult 2023   • Dysphagia 2023   • Narrowing of airway 01/10/2023   • Depression with anxiety 2023   • Asthma exacerbation 2023   • Chronic cough 2022   • GERD (gastroesophageal reflux disease) 10/31/2022   • Rectus sheath hematoma 10/29/2022   • SIRS (systemic inflammatory response syndrome) ([address]) 10/24/2022   • Severe persistent asthma with exacerbation 10/24/2022   • Hyperlipidemia 10/24/2022   • Diabetes ([address]) 10/24/2022      LOS (days): 4  Geometric Mean LOS (GMLOS) (days):   Days to GMLOS:     OBJECTIVE:  Risk of Unplanned Readmission Score: 20.01         Current admission status: Inpatient   Preferred Pharmacy:   [facility]pharm[facility] [address] [facility] [address] [place], [place] [address] [place] [address]  Phone: [phone] Fax: [phone]    Primary Care Provider: Luiz Whitaker    Primary Insurance: BLUE CROSS  Secondary Insurance:     DISCHARGE DETAILS:                                          Other Referral/Resources/Interventions Provided:  Referral Comments: Per [facility]IM rounds, pt is anticipated d/c to home tomorrow.   No d/c needs identified by tx team.

## 2023-08-15 NOTE — ASSESSMENT & PLAN NOTE
Patient endorse 2 episodes of active Covid-19 infection in 2020  Dyspnea has been chronic since, must consider sequela from associated lung damage  Routine outpatient follow up with pulmonary

## 2023-08-15 NOTE — ASSESSMENT & PLAN NOTE
· Recommend weight loss via healthy diet and exercise  · Patient reports good weight loss with ozempic use but not currently taking due to shortage  · Continue to encourage lifestyle modifications

## 2023-08-15 NOTE — PROGRESS NOTES
Progress Note - Pulmonary   Lidya Becerra 66 y.o. female MRN: [id number]  Unit/Bed#: -01 Encounter: [id number]    Assessment:  1. Acute pulmonary insufficiency  2. Severe persistent asthma with acute exacerbation  3. Suspected HEATHER with OHS    Plan:  Acute pulmonary insufficiency secondary to severe persistent asthma with acute exacerbation  She remains on room air although she does note sats in the low 90s when resting in bed - suspect some restriction from obesity as well as atelectasis/relative immobility contributing to this  Had overnight pulse oximetry which showed only a drop to 88% for 1 minute throughout the night. Do not think she needs home O2 at this time, would benefit from polysomnography as there is high suspicion for obstructive sleep apnea which is likely causing the mild hypoxia  She has been switched to prednisone, can taper by 10 mg every 3 days  Continue Breo, Xopenex and Atrovent  Has had issues with dysphagia and GERD, in June she did have an esophageal dilation which she feels has improved her symptoms, possible GERD may be contributing to her ongoing asthma exacerbations  She will need to follow-up with her pulmonologist in [facility], had been [place] in the past.   Anticipate discharge home within 24 hours. Subjective:   Patient resting in bed. She is feeling better but still concerned with her O2 sats sometimes being in the low 90s. Objective:     Vitals: Blood pressure 111/70, pulse 78, temperature 98.1 °F (36.7 °C), temperature source Oral, resp. rate 18, height 4' 11" (1.499 m), weight 81.6 kg (180 lb), SpO2 93 %. ,Body mass index is 36.36 kg/m². No intake or output data in the 24 hours ending 08/15/23 1014    Invasive Devices     Peripheral Intravenous Line  Duration           Peripheral IV 08/13/23 Dorsal (posterior); Right Hand 2 days                Physical Exam: /70 (BP Location: Left arm)   Pulse 78   Temp 98.1 °F (36.7 °C) (Oral)   Resp 18   Ht 4' 11" (1.499 m)   Wt 81.6 kg (180 lb)   LMP  (LMP Unknown)   SpO2 93%   BMI 36.36 kg/m²   General appearance: alert and oriented, in no acute distress  Head: Normocephalic, without obvious abnormality, atraumatic  Eyes: negative findings: conjunctivae and sclerae normal  Lungs: diminshed, no wheezing  Heart: regular rate and rhythm  Abdomen: normal findings: soft, non-tender  Extremities: no edema  Skin: warm and dry  Neurologic: Mental status: Alert, oriented, thought content appropriate     Labs: I have personally reviewed pertinent lab results. , CBC:   Lab Results   Component Value Date    WBC 10.92 (H) 08/15/2023    HGB 13.8 08/15/2023    HCT 42.1 08/15/2023    MCV 93 08/15/2023     (H) 08/15/2023    RBC 4.52 08/15/2023    MCH 30.5 08/15/2023    MCHC 32.8 08/15/2023    RDW 12.6 08/15/2023    MPV 10.0 08/15/2023    NRBC 0 08/15/2023   , CMP:   Lab Results   Component Value Date    SODIUM 136 08/15/2023    K 3.6 08/15/2023    CL 98 08/15/2023    CO2 27 08/15/2023    BUN 15 08/15/2023    CREATININE 0.82 08/15/2023    CALCIUM 9.6 08/15/2023    EGFR 80 08/15/2023     Imaging and other studies: I have personally reviewed pertinent reports.    and I have personally reviewed pertinent films in PACS

## 2023-08-15 NOTE — SPEECH THERAPY NOTE
Speech-Language Pathology Bedside Swallow Evaluation        Patient Name: Carlyle Colindres    XNEIF'I Date: 8/15/2023     Problem List  Principal Problem:    Asthma exacerbation  Active Problems:    Diabetes ([address])    Dysphagia    Class 2 obesity with body mass index (BMI) of 35.0 to 35.9 in adult    Fecal incontinence         Past Medical History  Past Medical History:   Diagnosis Date   • Asthma    • Diabetes mellitus ([address])    • GERD (gastroesophageal reflux disease)    • Hyperlipidemia    • Hypertension        Past Surgical History  Past Surgical History:   Procedure Laterality Date   •  SECTION     • COLONOSCOPY     • HYSTERECTOMY     • OVARIAN CYST REMOVAL         Summary/Impressions:   Bedside observations support grossly intact oropharyngeal swallow function across all consistencies tested. Self-fed solid and liquid trials with no s/s of dysphagia or distress. Patient denies difficulties re: swallow function though does endorse significant reflux symptoms. Occasional coughing at night w/ retrograde flow. Cannot r/o aspiration of same. Education re: reflux precautions provided at length. Modifying diet, timing of eating (not near bedtime) and head of bed elevation while sleeping (pt with electronic bed at home - not yet set up). Pt verbalized understanding of all information presented today. Recommendations:   Diet: regular diet and thin liquids   Meds: as preferred/tolerated   Feeding assistance: independent   Frequent Oral care: 2-4x/day  Aspiration precautions and compensatory swallowing strategies: upright posture, slow rate of feeding, small bites/sips and smaller, more frequent meals  Other Recommendations/ considerations: No further ST follow up. Please reorder should symptoms arise.         Current Medical Status  Pt is a 66 y.o. female who presented to [address] [facility] with acute pulmonary insufficiency secondary to severe persistent asthma with acute exacerbation  She remains on room air although she does note sats in the low 90s when resting in bed - suspect some restriction from obesity as well as atelectasis/relative immobility contributing to this  Had overnight pulse oximetry which showed only a drop to 88% for 1 minute throughout the night. Do not think she needs home O2 at this time, would benefit from polysomnography as there is high suspicion for obstructive sleep apnea which is likely causing the mild hypoxia  She has been switched to prednisone, can taper by 10 mg every 3 days  Continue Breo, Xopenex and Atrovent  Has had issues with dysphagia and GERD, in June she did have an esophageal dilation which she feels has improved her symptoms, possible GERD may be contributing to her ongoing asthma exacerbations  She will need to follow-up with her pulmonologist in [facility], had been [place] in the past.     Past medical history:  Please see H&P for details    Special Studies:  CT chest 8/14/23:  Chronic atelectasis and/or parenchymal scarring at both lung bases, greatest at the left lower lobe. No obvious central obstructive endobronchial lesions. Consider respiratory therapy or pulmonology consultation, if not already being followed.     No acute pneumonic consolidation or significant change since January.     Social/Education/Vocational Hx:  Pt lives with family    Swallow Information   Current Risks for Dysphagia & Aspiration: known history of dysphagia  Current Symptoms/Concerns: change in respiratory status  Current Diet: regular diet and thin liquids   Baseline Diet: regular diet and thin liquids    Baseline Assessment   Behavior/Cognition: alert  Speech/Language Status: able to participate in conversation  Patient Positioning: upright in bed    Swallow Mechanism Exam   Facial: symmetrical  Labial: WFL  Lingual: WFL  Velum: symmetrical  Mandible: adequate ROM  Dentition: adequate  Vocal quality:clear/adequate   Volitional Cough: strong/productive   Respiratory: RA    Consistencies Assessed and Performance   Consistencies Administered: thin liquids and hard solids    Oral Stage: WFL. Patient presents with adequate bolus acceptance, containment and manipulation. Mastication judged to be efficient and complete. No oral residue. Pharyngeal Stage: Appears functional.  Laryngeal rise noted upon palpation. Swallow initiation appears timely. No overt s/s of aspiration or distress. Vocal quality remains clear and dry. Esophageal Concerns: Hx GERD      Plan  No additional follow-up at this time. Please re-order should pt exhibit change in status or concerns arise.      Results Reviewed with: patient and Joel Santa, [address] [address]  Speech-Language Pathologist  [place] #[id number]  [place] #[id number]

## 2023-08-16 NOTE — UTILIZATION REVIEW
NOTIFICATION OF ADMISSION DISCHARGE   This is a Notification of Discharge from [address] [place] [address]. Please be advised that this patient has been discharge from our facility. Below you will find the admission and discharge date and time including the patient’s disposition. UTILIZATION REVIEW CONTACT:  Jazmyn Flores  Utilization   Network Utilization Review Department  Phone: [phone] x carefully listen to the prompts. All voicemails are confidential.  Email: [email or web address]     ADMISSION INFORMATION  PRESENTATION DATE: 8/11/2023  8:03 PM  OBERVATION ADMISSION DATE:   INPATIENT ADMISSION DATE: 8/11/23 11:48 PM   DISCHARGE DATE: 8/15/2023  5:28 PM   DISPOSITION:Home/Self Care    IMPORTANT INFORMATION:  Send all requests for admission clinical reviews, approved or denied determinations and any other requests to dedicated fax number below belonging to the campus where the patient is receiving treatment.  List of dedicated fax numbers:  [place] DENIALS (Administrative/Medical Necessity) [phone] [address] [facility] (Maternity/NICU/Pediatrics) [phone]   [facility] [phone]   [facility] [phone] 1636 [facility] [phone]   [address] [place] [address] [phone]   [facility] [phone]   [address] [place] [address] [address] [place] [address] [phone]   [address] [place] [address] [phone]   [address] [facility] [phone] 2720 [facility] 3000 [facility] [phone]

## 2023-08-17 ENCOUNTER — TELEPHONE (OUTPATIENT)
Dept: GASTROENTEROLOGY | Facility: CLINIC | Age: 58
End: 2023-08-17

## 2023-10-10 DIAGNOSIS — K21.9 GASTROESOPHAGEAL REFLUX DISEASE, UNSPECIFIED WHETHER ESOPHAGITIS PRESENT: ICD-10-CM

## 2023-10-10 RX ORDER — FAMOTIDINE 40 MG/1
TABLET, FILM COATED ORAL
Qty: 60 TABLET | Refills: 2 | Status: SHIPPED | OUTPATIENT
Start: 2023-10-10

## 2023-10-10 RX ORDER — DICYCLOMINE HCL 20 MG
TABLET ORAL
Qty: 360 TABLET | Refills: 1 | Status: SHIPPED | OUTPATIENT
Start: 2023-10-10

## 2023-10-24 DIAGNOSIS — K21.9 GASTROESOPHAGEAL REFLUX DISEASE, UNSPECIFIED WHETHER ESOPHAGITIS PRESENT: ICD-10-CM

## 2023-10-24 RX ORDER — FAMOTIDINE 40 MG/1
TABLET, FILM COATED ORAL
Qty: 180 TABLET | Refills: 1 | Status: SHIPPED | OUTPATIENT
Start: 2023-10-24

## 2023-11-14 ENCOUNTER — APPOINTMENT (EMERGENCY)
Dept: RADIOLOGY | Facility: HOSPITAL | Age: 58
DRG: 202 | End: 2023-11-14
Payer: MEDICARE

## 2023-11-14 ENCOUNTER — HOSPITAL ENCOUNTER (INPATIENT)
Facility: HOSPITAL | Age: 58
LOS: 1 days | Discharge: HOME HEALTH | DRG: 202 | End: 2023-11-16
Attending: EMERGENCY MEDICINE | Admitting: STUDENT IN AN ORGANIZED HEALTH CARE EDUCATION/TRAINING PROGRAM
Payer: MEDICARE

## 2023-11-14 DIAGNOSIS — J45.901 ASTHMA EXACERBATION: ICD-10-CM
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
## 2023-11-14 PROBLEM — R07.81 RIB PAIN ON LEFT SIDE: Status: ACTIVE | Noted: 2023-11-14

## 2023-11-14 PROBLEM — G62.9 NEUROPATHY: Status: ACTIVE | Noted: 2023-11-14

## 2023-11-14 PROBLEM — E55.9 VITAMIN D DEFICIENCY: Status: ACTIVE | Noted: 2023-11-14

## 2023-11-14 PROBLEM — R06.02 SHORTNESS OF BREATH: Status: ACTIVE | Noted: 2023-11-14

## 2023-11-14 LAB
ALBUMIN SERPL BCP-MCNC: 4.3 G/DL (ref 3.5–5)
ALP SERPL-CCNC: 171 U/L (ref 34–104)
ALT SERPL W P-5'-P-CCNC: 24 U/L (ref 7–52)
ANION GAP SERPL CALCULATED.3IONS-SCNC: 10 MMOL/L
AST SERPL W P-5'-P-CCNC: 33 U/L (ref 13–39)
BASOPHILS # BLD AUTO: 0.03 THOUSANDS/ÂΜL (ref 0–0.1)
BASOPHILS NFR BLD AUTO: 0 % (ref 0–1)
BILIRUB SERPL-MCNC: 0.47 MG/DL (ref 0.2–1)
BNP SERPL-MCNC: 51 PG/ML (ref 0–100)
BUN SERPL-MCNC: 12 MG/DL (ref 5–25)
CALCIUM SERPL-MCNC: 9.4 MG/DL (ref 8.4–10.2)
CARDIAC TROPONIN I PNL SERPL HS: <2 NG/L
CHLORIDE SERPL-SCNC: 103 MMOL/L (ref 96–108)
CO2 SERPL-SCNC: 25 MMOL/L (ref 21–32)
CREAT SERPL-MCNC: 0.77 MG/DL (ref 0.6–1.3)
EOSINOPHIL # BLD AUTO: 0.26 THOUSAND/ÂΜL (ref 0–0.61)
EOSINOPHIL NFR BLD AUTO: 3 % (ref 0–6)
ERYTHROCYTE [DISTWIDTH] IN BLOOD BY AUTOMATED COUNT: 17.7 % (ref 11.6–15.1)
FLUAV RNA RESP QL NAA+PROBE: NEGATIVE
FLUBV RNA RESP QL NAA+PROBE: NEGATIVE
GFR SERPL CREATININE-BSD FRML MDRD: 86 ML/MIN/1.73SQ M
GLUCOSE SERPL-MCNC: 162 MG/DL (ref 65–140)
GLUCOSE SERPL-MCNC: 98 MG/DL (ref 65–140)
HCT VFR BLD AUTO: 45.4 % (ref 34.8–46.1)
HGB BLD-MCNC: 15.1 G/DL (ref 11.5–15.4)
IMM GRANULOCYTES # BLD AUTO: 0.04 THOUSAND/UL (ref 0–0.2)
IMM GRANULOCYTES NFR BLD AUTO: 1 % (ref 0–2)
LYMPHOCYTES # BLD AUTO: 2.96 THOUSANDS/ÂΜL (ref 0.6–4.47)
LYMPHOCYTES NFR BLD AUTO: 34 % (ref 14–44)
MCH RBC QN AUTO: 33.1 PG (ref 26.8–34.3)
MCHC RBC AUTO-ENTMCNC: 33.3 G/DL (ref 31.4–37.4)
MCV RBC AUTO: 100 FL (ref 82–98)
MONOCYTES # BLD AUTO: 0.71 THOUSAND/ÂΜL (ref 0.17–1.22)
MONOCYTES NFR BLD AUTO: 8 % (ref 4–12)
NEUTROPHILS # BLD AUTO: 4.65 THOUSANDS/ÂΜL (ref 1.85–7.62)
NEUTS SEG NFR BLD AUTO: 54 % (ref 43–75)
NRBC BLD AUTO-RTO: 0 /100 WBCS
PLATELET # BLD AUTO: 409 THOUSANDS/UL (ref 149–390)
PMV BLD AUTO: 9.6 FL (ref 8.9–12.7)
POTASSIUM SERPL-SCNC: 3.5 MMOL/L (ref 3.5–5.3)
PROT SERPL-MCNC: 8.2 G/DL (ref 6.4–8.4)
RBC # BLD AUTO: 4.56 MILLION/UL (ref 3.81–5.12)
RSV RNA RESP QL NAA+PROBE: NEGATIVE
SARS-COV-2 RNA RESP QL NAA+PROBE: NEGATIVE
SODIUM SERPL-SCNC: 138 MMOL/L (ref 135–147)
WBC # BLD AUTO: 8.65 THOUSAND/UL (ref 4.31–10.16)

## 2023-11-14 PROCEDURE — 99223 1ST HOSP IP/OBS HIGH 75: CPT | Performed by: FAMILY MEDICINE

## 2023-11-14 PROCEDURE — 85025 COMPLETE CBC W/AUTO DIFF WBC: CPT | Performed by: PHYSICIAN ASSISTANT

## 2023-11-14 PROCEDURE — 94644 CONT INHLJ TX 1ST HOUR: CPT

## 2023-11-14 PROCEDURE — 99285 EMERGENCY DEPT VISIT HI MDM: CPT | Performed by: PHYSICIAN ASSISTANT

## 2023-11-14 PROCEDURE — 71046 X-RAY EXAM CHEST 2 VIEWS: CPT

## 2023-11-14 PROCEDURE — 96374 THER/PROPH/DIAG INJ IV PUSH: CPT

## 2023-11-14 PROCEDURE — 84484 ASSAY OF TROPONIN QUANT: CPT | Performed by: PHYSICIAN ASSISTANT

## 2023-11-14 PROCEDURE — 82948 REAGENT STRIP/BLOOD GLUCOSE: CPT

## 2023-11-14 PROCEDURE — 0241U HB NFCT DS VIR RESP RNA 4 TRGT: CPT | Performed by: FAMILY MEDICINE

## 2023-11-14 PROCEDURE — 96375 TX/PRO/DX INJ NEW DRUG ADDON: CPT

## 2023-11-14 PROCEDURE — 36415 COLL VENOUS BLD VENIPUNCTURE: CPT | Performed by: PHYSICIAN ASSISTANT

## 2023-11-14 PROCEDURE — 80053 COMPREHEN METABOLIC PANEL: CPT | Performed by: PHYSICIAN ASSISTANT

## 2023-11-14 PROCEDURE — 99285 EMERGENCY DEPT VISIT HI MDM: CPT

## 2023-11-14 PROCEDURE — 83880 ASSAY OF NATRIURETIC PEPTIDE: CPT | Performed by: FAMILY MEDICINE

## 2023-11-14 PROCEDURE — 93005 ELECTROCARDIOGRAM TRACING: CPT

## 2023-11-14 RX ORDER — METHYLPREDNISOLONE SODIUM SUCCINATE 40 MG/ML
40 INJECTION, POWDER, LYOPHILIZED, FOR SOLUTION INTRAMUSCULAR; INTRAVENOUS EVERY 8 HOURS
Status: DISCONTINUED | OUTPATIENT
Start: 2023-11-14 | End: 2023-11-15

## 2023-11-14 RX ORDER — METHYLPREDNISOLONE SODIUM SUCCINATE 125 MG/2ML
80 INJECTION, POWDER, LYOPHILIZED, FOR SOLUTION INTRAMUSCULAR; INTRAVENOUS ONCE
Status: COMPLETED | OUTPATIENT
Start: 2023-11-14 | End: 2023-11-14

## 2023-11-14 RX ORDER — LANOLIN ALCOHOL/MO/W.PET/CERES
6 CREAM (GRAM) TOPICAL
Status: DISCONTINUED | OUTPATIENT
Start: 2023-11-14 | End: 2023-11-16 | Stop reason: HOSPADM

## 2023-11-14 RX ORDER — MELATONIN
2000 DAILY
Status: DISCONTINUED | OUTPATIENT
Start: 2023-11-15 | End: 2023-11-16 | Stop reason: HOSPADM

## 2023-11-14 RX ORDER — IPRATROPIUM BROMIDE AND ALBUTEROL SULFATE 2.5; .5 MG/3ML; MG/3ML
3 SOLUTION RESPIRATORY (INHALATION)
Status: DISCONTINUED | OUTPATIENT
Start: 2023-11-14 | End: 2023-11-15

## 2023-11-14 RX ORDER — GABAPENTIN 100 MG/1
100 CAPSULE ORAL 3 TIMES DAILY
Status: DISCONTINUED | OUTPATIENT
Start: 2023-11-14 | End: 2023-11-16 | Stop reason: HOSPADM

## 2023-11-14 RX ORDER — ATORVASTATIN CALCIUM 40 MG/1
40 TABLET, FILM COATED ORAL
Status: DISCONTINUED | OUTPATIENT
Start: 2023-11-15 | End: 2023-11-16 | Stop reason: HOSPADM

## 2023-11-14 RX ORDER — LIDOCAINE 50 MG/G
1 PATCH TOPICAL ONCE
Status: COMPLETED | OUTPATIENT
Start: 2023-11-14 | End: 2023-11-15

## 2023-11-14 RX ORDER — ONDANSETRON 2 MG/ML
4 INJECTION INTRAMUSCULAR; INTRAVENOUS ONCE
Status: COMPLETED | OUTPATIENT
Start: 2023-11-14 | End: 2023-11-14

## 2023-11-14 RX ORDER — TORSEMIDE 10 MG/1
10 TABLET ORAL DAILY
Status: DISCONTINUED | OUTPATIENT
Start: 2023-11-15 | End: 2023-11-16 | Stop reason: HOSPADM

## 2023-11-14 RX ORDER — MONTELUKAST SODIUM 10 MG/1
10 TABLET ORAL
Status: DISCONTINUED | OUTPATIENT
Start: 2023-11-14 | End: 2023-11-16 | Stop reason: HOSPADM

## 2023-11-14 RX ORDER — PANTOPRAZOLE SODIUM 40 MG/1
40 TABLET, DELAYED RELEASE ORAL
Status: DISCONTINUED | OUTPATIENT
Start: 2023-11-15 | End: 2023-11-16 | Stop reason: HOSPADM

## 2023-11-14 RX ORDER — ZOLPIDEM TARTRATE 5 MG/1
10 TABLET ORAL
Status: DISCONTINUED | OUTPATIENT
Start: 2023-11-14 | End: 2023-11-16 | Stop reason: HOSPADM

## 2023-11-14 RX ORDER — MORPHINE SULFATE 10 MG/ML
6 INJECTION, SOLUTION INTRAMUSCULAR; INTRAVENOUS ONCE
Status: COMPLETED | OUTPATIENT
Start: 2023-11-14 | End: 2023-11-14

## 2023-11-14 RX ORDER — ACETAMINOPHEN 325 MG/1
650 TABLET ORAL EVERY 6 HOURS PRN
Status: DISCONTINUED | OUTPATIENT
Start: 2023-11-14 | End: 2023-11-16 | Stop reason: HOSPADM

## 2023-11-14 RX ORDER — BUPROPION HYDROCHLORIDE 150 MG/1
150 TABLET ORAL DAILY
Status: DISCONTINUED | OUTPATIENT
Start: 2023-11-15 | End: 2023-11-16 | Stop reason: HOSPADM

## 2023-11-14 RX ORDER — INSULIN LISPRO 100 [IU]/ML
3 INJECTION, SOLUTION INTRAVENOUS; SUBCUTANEOUS
Status: DISCONTINUED | OUTPATIENT
Start: 2023-11-15 | End: 2023-11-14

## 2023-11-14 RX ORDER — SODIUM CHLORIDE FOR INHALATION 0.9 %
12 VIAL, NEBULIZER (ML) INHALATION ONCE
Status: COMPLETED | OUTPATIENT
Start: 2023-11-14 | End: 2023-11-14

## 2023-11-14 RX ORDER — FAMOTIDINE 20 MG/1
40 TABLET, FILM COATED ORAL 2 TIMES DAILY
Status: DISCONTINUED | OUTPATIENT
Start: 2023-11-14 | End: 2023-11-16 | Stop reason: HOSPADM

## 2023-11-14 RX ORDER — MORPHINE SULFATE 4 MG/ML
4 INJECTION, SOLUTION INTRAMUSCULAR; INTRAVENOUS ONCE
Status: COMPLETED | OUTPATIENT
Start: 2023-11-14 | End: 2023-11-14

## 2023-11-14 RX ORDER — INSULIN LISPRO 100 [IU]/ML
1-6 INJECTION, SOLUTION INTRAVENOUS; SUBCUTANEOUS
Status: DISCONTINUED | OUTPATIENT
Start: 2023-11-14 | End: 2023-11-16 | Stop reason: HOSPADM

## 2023-11-14 RX ORDER — ENOXAPARIN SODIUM 100 MG/ML
40 INJECTION SUBCUTANEOUS DAILY
Status: DISCONTINUED | OUTPATIENT
Start: 2023-11-15 | End: 2023-11-16 | Stop reason: HOSPADM

## 2023-11-14 RX ADMIN — MORPHINE SULFATE 4 MG: 4 INJECTION INTRAVENOUS at 20:39

## 2023-11-14 RX ADMIN — ONDANSETRON 4 MG: 2 INJECTION INTRAMUSCULAR; INTRAVENOUS at 19:14

## 2023-11-14 RX ADMIN — MONTELUKAST 10 MG: 10 TABLET, FILM COATED ORAL at 22:15

## 2023-11-14 RX ADMIN — MORPHINE SULFATE 6 MG: 10 INJECTION INTRAVENOUS at 16:33

## 2023-11-14 RX ADMIN — LIDOCAINE 1 PATCH: 50 PATCH CUTANEOUS at 16:27

## 2023-11-14 RX ADMIN — LEVOFLOXACIN 750 MG: 500 TABLET, FILM COATED ORAL at 20:37

## 2023-11-14 RX ADMIN — GABAPENTIN 100 MG: 100 CAPSULE ORAL at 20:46

## 2023-11-14 RX ADMIN — ALBUTEROL SULFATE 10 MG: 2.5 SOLUTION RESPIRATORY (INHALATION) at 16:26

## 2023-11-14 RX ADMIN — IPRATROPIUM BROMIDE AND ALBUTEROL SULFATE 3 ML: 2.5; .5 SOLUTION RESPIRATORY (INHALATION) at 20:38

## 2023-11-14 RX ADMIN — IPRATROPIUM BROMIDE 1 MG: 0.5 SOLUTION RESPIRATORY (INHALATION) at 16:26

## 2023-11-14 RX ADMIN — METHYLPREDNISOLONE SODIUM SUCCINATE 40 MG: 40 INJECTION, POWDER, FOR SOLUTION INTRAMUSCULAR; INTRAVENOUS at 20:38

## 2023-11-14 RX ADMIN — FAMOTIDINE 40 MG: 20 TABLET, FILM COATED ORAL at 20:37

## 2023-11-14 RX ADMIN — Medication 12 ML: at 16:29

## 2023-11-14 RX ADMIN — METHYLPREDNISOLONE SODIUM SUCCINATE 80 MG: 125 INJECTION, POWDER, FOR SOLUTION INTRAMUSCULAR; INTRAVENOUS at 16:27

## 2023-11-14 RX ADMIN — ZOLPIDEM TARTRATE 10 MG: 5 TABLET, COATED ORAL at 22:15

## 2023-11-14 NOTE — ED PROVIDER NOTES
History  Chief Complaint   Patient presents with    Asthma     Cough, asthma exac, finished zithro and started steroids; long hx requiring admissions for asthma    Fall     Pt reports while reaching for something while sitting on bed she fell to the ground injuring left side, afraid that she broke a rib and then coughing with the asthma exacerbates it, Denies hitting head or thinners/asa      67 yo here with two complaints    First she reports left sided rib/chest pain after she fell from her bed earlier today. States her  jumped onto the bed causing her to roll off. Landed on left sided. Having tenderness at about the level of T5-T6 left lateral chest. She feels short of breath but ongoing prior to that related to her asthma which is her second complaint    She reports since last Monday she has been feeling short of breath. Consistent with her asthma. Constant wheezing. PCP started her on steroids and azithro and she has had no relief. Still using home nebs. No fever or chills. She states her symptoms are typical of her asthma. History provided by:  Patient   used: No        Prior to Admission Medications   Prescriptions Last Dose Informant Patient Reported? Taking?    Alpha-Lipoic Acid 600 MG CAPS   Yes No   Sig: every 24 hours   Blood Pressure Monitoring (Blood Pressure Monitor 3) CARLOS MANUEL  Self Yes No   Sig: Use as directed   Cholecalciferol 50 MCG (2000 UT) CAPS  Self Yes No   Sig: every 24 hours   EPINEPHrine (EPIPEN) 0.3 mg/0.3 mL SOAJ  Self Yes No   Sig: as directed   Glucagon 1 MG/0.2ML SOAJ  Self Yes No   Melatonin 10 MG CAPS  Self Yes No   Sig: Take 10 mg by mouth daily at bedtime as needed   Melatonin 10 MG/ML LIQD   Yes No   Sig: as directed   Multiple Vitamins-Minerals (HIGH POT MULTIVITAMIN/BETA-CAR PO)   Yes No   Sig: every 24 hours   OneTouch Ultra test strip  Self Yes No   Sig: TEST TWICE A DAY   Rosuvastatin Calcium 5 MG CPSP   Yes No   Sig: Take 1 tablet by mouth daily Semaglutide-Weight Management [facility]) 2.4 MG/0.75ML   Yes No   Si.75 mL   albuterol (2.5 mg/3 mL) 0.083 % nebulizer solution  Self Yes No   Sig: 3 ml   albuterol (PROVENTIL HFA,VENTOLIN HFA) 90 mcg/act inhaler  Self Yes No   Sig: Inhale 2 puffs as needed   buPROPion (Wellbutrin SR) 150 mg 12 hr tablet   Yes No   Sig: Every 12 hours   budesonide-formoterol (SYMBICORT) 160-4.5 mcg/act inhaler  Self Yes No   Sig: Every 12 hours   dicyclomine (BENTYL) 20 mg tablet   No No   Sig: TAKE 1 TABLET BY MOUTH EVERY 6 HOURS AS NEEDED FOR ABDOMINAL CRAMPING   diphenhydrAMINE (BENADRYL) 25 mg capsule  Self Yes No   Sig: 3 (three) times a day   famotidine (PEPCID) 40 MG tablet   No No   Sig: TAKE 1 TABLET BY MOUTH TWICE A DAY WITH LUNCH AND BEFORE BEDTIME   fluticasone-vilanterol (Breo Ellipta) 200-25 mcg/actuation inhaler  Self No No   Sig: Inhale 1 puff daily Rinse mouth after use.   gabapentin (NEURONTIN) 100 mg capsule  Self Yes No   Sig: Take 100 mg by mouth 3 (three) times a day as needed   hydrOXYzine HCL (ATARAX) 25 mg tablet   No No   Sig: Take 1 tablet (25 mg total) by mouth every 6 (six) hours as needed for itching for up to 14 days   insulin aspart (NovoLOG FlexPen) 100 UNIT/ML injection pen   No No   Sig: Inject 3 Units under the skin 3 (three) times a day with meals   ipratropium (ATROVENT) 0.02 % nebulizer solution  Self No No   Sig: Take 2.5 mL (0.5 mg total) by nebulization 3 (three) times a day   ipratropium-albuterol (DUO-NEB) 0.5-2.5 mg/3 mL nebulizer solution  Self No No   Sig: Take 3 mL by nebulization every 4 (four) hours as needed for wheezing or shortness of breath   montelukast (SINGULAIR) 10 mg tablet  Self No No   Sig: Take 1 tablet (10 mg total) by mouth daily at bedtime   ondansetron (ZOFRAN) 4 mg tablet  Self Yes No   Sig: Take 1 tablet by mouth every 8 (eight) hours   pantoprazole (PROTONIX) 40 mg  Self No No   Sig: Take 1 packet (40 mg total) by mouth 2 (two) times a day before meals pravastatin (PRAVACHOL) 40 mg tablet  Self No No   Sig: Take 1 tablet (40 mg total) by mouth daily with dinner   promethazine (PHENERGAN) 25 mg tablet  Self No No   Sig: Take 1 tablet (25 mg total) by mouth every 6 (six) hours as needed for nausea or vomiting   rosuvastatin (CRESTOR) 5 mg tablet  Self Yes No   Sig: Take 1 tablet by mouth daily   torsemide (DEMADEX) 10 mg tablet  Self Yes No   Sig: Take 10 mg by mouth daily   zolpidem (AMBIEN) 10 mg tablet   No No   Sig: Take 1 tablet (10 mg total) by mouth daily at bedtime as needed for sleep for up to 7 days      Facility-Administered Medications: None       Past Medical History:   Diagnosis Date    Asthma     Diabetes mellitus ([address])     GERD (gastroesophageal reflux disease)     Hyperlipidemia     Hypertension        Past Surgical History:   Procedure Laterality Date     SECTION      COLONOSCOPY      HYSTERECTOMY      OVARIAN CYST REMOVAL         No family history on file. I have reviewed and agree with the history as documented. E-Cigarette/Vaping    E-Cigarette Use Never User      E-Cigarette/Vaping Substances    Nicotine No     THC No     CBD No     Flavoring No     Other No     Unknown No      Social History     Tobacco Use    Smoking status: Never    Smokeless tobacco: Never   Vaping Use    Vaping Use: Never used   Substance Use Topics    Alcohol use: Yes     Alcohol/week: 7.0 standard drinks of alcohol     Types: 7 Glasses of wine per week    Drug use: Never       Review of Systems   Constitutional:  Negative for chills and fever. HENT:  Negative for ear pain and sore throat. Eyes:  Negative for pain and visual disturbance. Respiratory:  Positive for cough, shortness of breath and wheezing. Cardiovascular:  Positive for chest pain. Negative for palpitations. Gastrointestinal:  Negative for abdominal pain and vomiting. Genitourinary:  Negative for dysuria and hematuria. Musculoskeletal:  Negative for arthralgias and back pain. Skin:  Negative for color change and rash. Neurological:  Negative for seizures and syncope. All other systems reviewed and are negative. Physical Exam  Physical Exam  Vitals and nursing note reviewed. Constitutional:       General: She is not in acute distress. Appearance: She is well-developed. HENT:      Head: Normocephalic and atraumatic. Eyes:      Conjunctiva/sclera: Conjunctivae normal.   Cardiovascular:      Rate and Rhythm: Normal rate and regular rhythm. Heart sounds: No murmur heard. Pulmonary:      Effort: Pulmonary effort is normal. No respiratory distress. Breath sounds: Normal breath sounds. Chest:       Abdominal:      Palpations: Abdomen is soft. Tenderness: There is no abdominal tenderness. Musculoskeletal:         General: No swelling. Cervical back: Neck supple. Skin:     General: Skin is warm and dry. Capillary Refill: Capillary refill takes less than 2 seconds. Neurological:      Mental Status: She is alert and oriented to person, place, and time. GCS: GCS eye subscore is 4. GCS verbal subscore is 5. GCS motor subscore is 6. Comments: GCS 15. AAOx3. Ambulating in department without difficulty. CN II-XII grossly intact. No focal neuro deficits.      Psychiatric:         Mood and Affect: Mood normal.         Vital Signs  ED Triage Vitals   Temperature Pulse Respirations Blood Pressure SpO2   11/14/23 1318 11/14/23 1318 11/14/23 1318 11/14/23 1318 11/14/23 1318   98 °F (36.7 °C) 104 22 137/66 100 %      Temp Source Heart Rate Source Patient Position - Orthostatic VS BP Location FiO2 (%)   11/14/23 2118 11/14/23 1603 11/14/23 2118 11/14/23 2118 --   Oral Monitor Sitting Right arm       Pain Score       11/14/23 1633       8           Vitals:    11/14/23 1930 11/14/23 2000 11/14/23 2115 11/14/23 2118   BP: 136/65 138/75 149/85    Pulse: 95 90 98    Patient Position - Orthostatic VS:    Sitting         Visual Acuity  Visual Acuity Flowsheet Row Most Recent Value   L Pupil Size (mm) 3   R Pupil Size (mm) 3            ED Medications  Medications   lidocaine (LIDODERM) 5 % patch 1 patch (1 patch Topical Medication Applied 11/14/23 1627)   cholecalciferol (VITAMIN D3) tablet 2,000 Units (has no administration in time range)   buPROPion (WELLBUTRIN XL) 24 hr tablet 150 mg (has no administration in time range)   famotidine (PEPCID) tablet 40 mg (40 mg Oral Given 11/14/23 2037)   gabapentin (NEURONTIN) capsule 100 mg (100 mg Oral Given 11/14/23 2046)   melatonin tablet 6 mg (has no administration in time range)   montelukast (SINGULAIR) tablet 10 mg (10 mg Oral Given 11/14/23 2215)   pantoprazole (PROTONIX) EC tablet 40 mg (has no administration in time range)   atorvastatin (LIPITOR) tablet 40 mg (has no administration in time range)   torsemide (DEMADEX) tablet 10 mg (has no administration in time range)   zolpidem (AMBIEN) tablet 10 mg (10 mg Oral Given 11/14/23 2215)   acetaminophen (TYLENOL) tablet 650 mg (has no administration in time range)   methylPREDNISolone sodium succinate (Solu-MEDROL) injection 40 mg (40 mg Intravenous Given 11/14/23 2038)   enoxaparin (LOVENOX) subcutaneous injection 40 mg (has no administration in time range)   ipratropium-albuterol (DUO-NEB) 0.5-2.5 mg/3 mL inhalation solution 3 mL (3 mL Nebulization Given 11/14/23 2038)   levofloxacin (LEVAQUIN) tablet 750 mg (750 mg Oral Given 11/14/23 2037)   insulin lispro (HumaLOG) 100 units/mL subcutaneous injection 1-6 Units ( Subcutaneous Not Given 11/14/23 2215)   morphine injection 6 mg (6 mg Intravenous Given 11/14/23 1633)   albuterol inhalation solution 10 mg (10 mg Nebulization Given 11/14/23 1626)   ipratropium (ATROVENT) 0.02 % inhalation solution 1 mg (1 mg Nebulization Given 11/14/23 1626)   sodium chloride 0.9 % inhalation solution 12 mL (12 mL Nebulization Given 11/14/23 1629)   methylPREDNISolone sodium succinate (Solu-MEDROL) injection 80 mg (80 mg Intravenous Given 11/14/23 1627)   ondansetron (ZOFRAN) injection 4 mg (4 mg Intravenous Given 11/14/23 1914)   morphine injection 4 mg (4 mg Intravenous Given 11/14/23 2039)       Diagnostic Studies  Results Reviewed       Procedure Component Value Units Date/Time    FLU/RSV/COVID - if FLU/RSV clinically relevant [[id number]]  (Normal) Collected: 11/14/23 2041    Lab Status: Final result Specimen: Nares from Nasopharyngeal Swab Updated: 11/14/23 2135     SARS-CoV-2 Negative     INFLUENZA A PCR Negative     INFLUENZA B PCR Negative     RSV PCR Negative    Narrative:      FOR PEDIATRIC PATIENTS - copy/paste COVID Guidelines URL to browser: [email or web address]    SARS-CoV-2 assay is a Nucleic Acid Amplification assay intended for the  qualitative detection of nucleic acid from SARS-CoV-2 in nasopharyngeal  swabs. Results are for the presumptive identification of SARS-CoV-2 RNA. Positive results are indicative of infection with SARS-CoV-2, the virus  causing COVID-19, but do not rule out bacterial infection or co-infection  with other viruses. Laboratories within the [facility] and its  territories are required to report all positive results to the appropriate  public health authorities. Negative results do not preclude SARS-CoV-2  infection and should not be used as the sole basis for treatment or other  patient management decisions. Negative results must be combined with  clinical observations, patient history, and epidemiological information. This test has not been FDA cleared or approved. This test has been authorized by FDA under an Emergency Use Authorization  (EUA).  This test is only authorized for the duration of time the  declaration that circumstances exist justifying the authorization of the  emergency use of an in vitro diagnostic tests for detection of SARS-CoV-2  virus and/or diagnosis of COVID-19 infection under section 564(b)(1) of  the Act, 21 U. S.C. 629LSF-4(F)(2), unless the authorization is terminated  or revoked sooner. The test has been validated but independent review by FDA  and CLIA is pending. Test performed using [employer]: This RT-PCR assay targets N2,  a region unique to SARS-CoV-2. A conserved region in the E-gene was chosen  for pan-Sarbecovirus detection which includes SARS-CoV-2. According to CMS-2020-01-R, this platform meets the definition of high-throughput technology.     B-Type Natriuretic Peptide(BNP) [[id number]]  (Normal) Collected: 11/14/23 2041    Lab Status: Final result Specimen: Blood from Arm, Right Updated: 11/14/23 2113     BNP 51 pg/mL     HS Troponin I 4hr [[id number]] Collected: 11/14/23 2041    Lab Status: Final result Specimen: Blood from Arm, Right Updated: 11/14/23 2111     hs TnI 4hr <2 ng/L      Delta 4hr hsTnI --    HS Troponin I 2hr [[id number]] Collected: 11/14/23 1821    Lab Status: Final result Specimen: Blood from Arm, Right Updated: 11/14/23 1852     hs TnI 2hr <2 ng/L      Delta 2hr hsTnI --    HS Troponin 0hr (reflex protocol) [[id number]]  (Normal) Collected: 11/14/23 1641    Lab Status: Final result Specimen: Blood from Arm, Right Updated: 11/14/23 1714     hs TnI 0hr <2 ng/L     Comprehensive metabolic panel [[id number]]  (Abnormal) Collected: 11/14/23 1641    Lab Status: Final result Specimen: Blood from Arm, Right Updated: 11/14/23 1704     Sodium 138 mmol/L      Potassium 3.5 mmol/L      Chloride 103 mmol/L      CO2 25 mmol/L      ANION GAP 10 mmol/L      BUN 12 mg/dL      Creatinine 0.77 mg/dL      Glucose 98 mg/dL      Calcium 9.4 mg/dL      AST 33 U/L      ALT 24 U/L      Alkaline Phosphatase 171 U/L      Total Protein 8.2 g/dL      Albumin 4.3 g/dL      Total Bilirubin 0.47 mg/dL      eGFR 86 ml/min/1.73sq m     Narrative:      [place] guidelines for Chronic Kidney Disease (CKD):     Stage 1 with normal or high GFR (GFR > 90 mL/min/1.73 square meters) Stage 2 Mild CKD (GFR = 60-89 mL/min/1.73 square meters)    Stage 3A Moderate CKD (GFR = 45-59 mL/min/1.73 square meters)    Stage 3B Moderate CKD (GFR = 30-44 mL/min/1.73 square meters)    Stage 4 Severe CKD (GFR = 15-29 mL/min/1.73 square meters)    Stage 5 End Stage CKD (GFR <15 mL/min/1.73 square meters)  Note: GFR calculation is accurate only with a steady state creatinine    CBC and differential [[id number]]  (Abnormal) Collected: 11/14/23 1641    Lab Status: Final result Specimen: Blood from Arm, Right Updated: 11/14/23 1647     WBC 8.65 Thousand/uL      RBC 4.56 Million/uL      Hemoglobin 15.1 g/dL      Hematocrit 45.4 %       fL      MCH 33.1 pg      MCHC 33.3 g/dL      RDW 17.7 %      MPV 9.6 fL      Platelets 254 Thousands/uL      nRBC 0 /100 WBCs      Neutrophils Relative 54 %      Immat GRANS % 1 %      Lymphocytes Relative 34 %      Monocytes Relative 8 %      Eosinophils Relative 3 %      Basophils Relative 0 %      Neutrophils Absolute 4.65 Thousands/µL      Immature Grans Absolute 0.04 Thousand/uL      Lymphocytes Absolute 2.96 Thousands/µL      Monocytes Absolute 0.71 Thousand/µL      Eosinophils Absolute 0.26 Thousand/µL      Basophils Absolute 0.03 Thousands/µL                    XR chest 2 views    (Results Pending)              Procedures  ECG 12 Lead Documentation Only    Date/Time: 11/14/2023 4:54 PM    Performed by: Silviano Wolf PA-C  Authorized by: Silviano Wolf PA-C    ECG reviewed by me, the ED Provider: yes    Patient location:  ED  Interpretation:     Interpretation: normal    Quality:     Tracing quality:  Limited by artifact  Rate:     ECG rate:  100    ECG rate assessment: tachycardic    Rhythm:     Rhythm: sinus rhythm    Ectopy:     Ectopy: none    QRS:     QRS axis:  Normal    QRS intervals:  Normal  Conduction:     Conduction: normal    ST segments:     ST segments:  Normal  T waves:     T waves: non-specific             ED Course SBIRT 22yo+      Flowsheet Row Most Recent Value   Initial Alcohol Screen: US AUDIT-C     1. How often do you have a drink containing alcohol? 0 Filed at: 11/14/2023 1318   2. How many drinks containing alcohol do you have on a typical day you are drinking? 0 Filed at: 11/14/2023 1318   3a. Male UNDER 65: How often do you have five or more drinks on one occasion? 0 Filed at: 11/14/2023 1318   3b. FEMALE Any Age, or MALE 65+: How often do you have 4 or more drinks on one occassion? 0 Filed at: 11/14/2023 1318   Audit-C Score 0 Filed at: 11/14/2023 1318   JANNETH: How many times in the past year have you. .. Used an illegal drug or used a prescription medication for non-medical reasons? Never Filed at: 11/14/2023 1318                      Medical Decision Making  DDX including but not limited to: pneumonia, pleural effusion, CHF, PE, PTX, ACS, MI, asthma exacerbation, COPD exacerbation, COVID 19, EVALI, anemia, metabolic abnormality, renal failure. Plan: XR. Neb. Steroids. Labs. MDM: 65 yo with wheezing. Improved but still wheezing after hour neb. Feels better. We discussed admission for her asthma and is in agreement. In regards to her chest/rib injury the xray looks unremarkable. No obvious rib fracture. Could just be contusion injury. Symptomatic management. Pt in agreement. Amount and/or Complexity of Data Reviewed  Labs: ordered. Radiology: ordered. Risk  Prescription drug management. Decision regarding hospitalization. Disposition  Final diagnoses:   Fall, initial encounter   Asthma exacerbation     Time reflects when diagnosis was documented in both MDM as applicable and the Disposition within this note       Time User Action Codes Description Comment    11/14/2023  7:18 PM Priyanka Silva Add [A56. KJBC] Fall, initial encounter     11/14/2023  7:18 PM Madeline KIM Add [Q91.617] Asthma exacerbation           ED Disposition       ED Disposition Admit    Condition   Stable    Date/Time   Tue Nov 14, 2023 1918    Comment   Case was discussed with Lucho Chadwick and the patient's admission status was agreed to be Admission Status: observation status to the service of Dr. Deysi Grider . Follow-up Information    None         Current Discharge Medication List        CONTINUE these medications which have NOT CHANGED    Details   albuterol (2.5 mg/3 mL) 0.083 % nebulizer solution 3 ml      albuterol (PROVENTIL HFA,VENTOLIN HFA) 90 mcg/act inhaler Inhale 2 puffs as needed      Alpha-Lipoic Acid 600 MG CAPS every 24 hours      Blood Pressure Monitoring (Blood Pressure Monitor 3) CARLOS MANUEL Use as directed      budesonide-formoterol (SYMBICORT) 160-4.5 mcg/act inhaler Every 12 hours      buPROPion (Wellbutrin SR) 150 mg 12 hr tablet Every 12 hours      Cholecalciferol 50 MCG (2000 UT) CAPS every 24 hours      dicyclomine (BENTYL) 20 mg tablet TAKE 1 TABLET BY MOUTH EVERY 6 HOURS AS NEEDED FOR ABDOMINAL CRAMPING  Qty: 360 tablet, Refills: 1    Associated Diagnoses: Gastroesophageal reflux disease, unspecified whether esophagitis present      diphenhydrAMINE (BENADRYL) 25 mg capsule 3 (three) times a day      EPINEPHrine (EPIPEN) 0.3 mg/0.3 mL SOAJ as directed      famotidine (PEPCID) 40 MG tablet TAKE 1 TABLET BY MOUTH TWICE A DAY WITH LUNCH AND BEFORE BEDTIME  Qty: 180 tablet, Refills: 1    Associated Diagnoses: Gastroesophageal reflux disease, unspecified whether esophagitis present      fluticasone-vilanterol (Breo Ellipta) 200-25 mcg/actuation inhaler Inhale 1 puff daily Rinse mouth after use.   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, without long-term current use of insulin (HCC)      ipratropium (ATROVENT) 0.02 % nebulizer solution Take 2.5 mL (0.5 mg total) by nebulization 3 (three) times a day  Qty: 225 mL, Refills: 0    Associated Diagnoses: Acute asthma exacerbation      ipratropium-albuterol (DUO-NEB) 0.5-2.5 mg/3 mL nebulizer solution Take 3 mL by nebulization every 4 (four) hours as needed for wheezing or shortness of breath  Refills: 0    Associated Diagnoses: Acute asthma exacerbation      Melatonin 10 MG CAPS Take 10 mg by mouth daily at bedtime as needed      Melatonin 10 MG/ML LIQD as directed      montelukast (SINGULAIR) 10 mg tablet Take 1 tablet (10 mg total) by mouth daily at bedtime  Qty: 30 tablet, Refills: 0    Associated Diagnoses: Acute asthma exacerbation      Multiple Vitamins-Minerals (HIGH POT MULTIVITAMIN/BETA-CAR PO) every 24 hours      ondansetron (ZOFRAN) 4 mg tablet Take 1 tablet by mouth every 8 (eight) hours      OneTouch Ultra test strip TEST TWICE A DAY      pantoprazole (PROTONIX) 40 mg Take 1 packet (40 mg total) by mouth 2 (two) times a day before meals  Qty: 60 each, Refills: 1    Associated Diagnoses: Gastroesophageal reflux disease, unspecified whether esophagitis present      pravastatin (PRAVACHOL) 40 mg tablet Take 1 tablet (40 mg total) by mouth daily with dinner  Qty: 30 tablet, Refills: 0    Associated Diagnoses: HLD (hyperlipidemia)      promethazine (PHENERGAN) 25 mg tablet Take 1 tablet (25 mg total) by mouth every 6 (six) hours as needed for nausea or vomiting  Qty: 30 tablet, Refills: 2    Associated Diagnoses: Gastroesophageal reflux disease, unspecified whether esophagitis present      rosuvastatin (CRESTOR) 5 mg tablet Take 1 tablet by mouth daily      Rosuvastatin Calcium 5 MG CPSP Take 1 tablet by mouth daily      Semaglutide-Weight Management [facility]) 2.4 MG/0.75ML 0.75 mL      torsemide (DEMADEX) 10 mg tablet Take 10 mg by mouth daily      zolpidem (AMBIEN) 10 mg tablet Take 1 tablet (10 mg total) by mouth daily at bedtime as needed for sleep for up to 7 days  Qty: 7 tablet, Refills: 0    Associated Diagnoses: Insomnia             No discharge procedures on file.     PDMP Review         Value Time User    PDMP Reviewed  Yes 8/12/2023 12:50 AM Fawn Saini PA-C            ED Provider  Electronically Signed by             Mario Rao PA-C  11/14/23 0136

## 2023-11-15 ENCOUNTER — APPOINTMENT (OUTPATIENT)
Dept: NON INVASIVE DIAGNOSTICS | Facility: HOSPITAL | Age: 58
DRG: 202 | End: 2023-11-15
Payer: MEDICARE

## 2023-11-15 DIAGNOSIS — G47.33 OSA (OBSTRUCTIVE SLEEP APNEA): Primary | ICD-10-CM

## 2023-11-15 PROBLEM — I10 ESSENTIAL HYPERTENSION: Status: ACTIVE | Noted: 2022-02-21

## 2023-11-15 PROBLEM — N95.1 SYMPTOMATIC MENOPAUSAL OR FEMALE CLIMACTERIC STATES: Status: ACTIVE | Noted: 2018-11-09

## 2023-11-15 PROBLEM — I05.9 MITRAL VALVE DISORDER: Status: ACTIVE | Noted: 2022-04-21

## 2023-11-15 PROBLEM — I51.7 CARDIOMEGALY: Status: ACTIVE | Noted: 2023-11-15

## 2023-11-15 PROBLEM — I50.30 DIASTOLIC HEART FAILURE (HCC): Status: ACTIVE | Noted: 2022-04-21

## 2023-11-15 LAB
AORTIC ROOT: 2.8 CM
APICAL FOUR CHAMBER EJECTION FRACTION: 77 %
ATRIAL RATE: 100 BPM
E WAVE DECELERATION TIME: 121 MS
E/A RATIO: 0.68
ERYTHROCYTE [DISTWIDTH] IN BLOOD BY AUTOMATED COUNT: 17.3 % (ref 11.6–15.1)
FRACTIONAL SHORTENING: 58 (ref 28–44)
GLUCOSE SERPL-MCNC: 174 MG/DL (ref 65–140)
GLUCOSE SERPL-MCNC: 181 MG/DL (ref 65–140)
GLUCOSE SERPL-MCNC: 209 MG/DL (ref 65–140)
GLUCOSE SERPL-MCNC: 250 MG/DL (ref 65–140)
HCT VFR BLD AUTO: 43.1 % (ref 34.8–46.1)
HGB BLD-MCNC: 14.3 G/DL (ref 11.5–15.4)
INTERVENTRICULAR SEPTUM IN DIASTOLE (PARASTERNAL SHORT AXIS VIEW): 0.9 CM
INTERVENTRICULAR SEPTUM: 0.9 CM (ref 0.6–1.1)
LAAS-AP2: 14.2 CM2
LAAS-AP4: 14.5 CM2
LEFT ATRIUM AREA SYSTOLE SINGLE PLANE A4C: 13.4 CM2
LEFT ATRIUM SIZE: 3.4 CM
LEFT ATRIUM VOLUME (MOD BIPLANE): 36 ML
LEFT ATRIUM VOLUME INDEX (MOD BIPLANE): 20.5 ML/M2
LEFT INTERNAL DIMENSION IN SYSTOLE: 1.7 CM (ref 2.1–4)
LEFT VENTRICULAR INTERNAL DIMENSION IN DIASTOLE: 4 CM (ref 3.5–6)
LEFT VENTRICULAR POSTERIOR WALL IN END DIASTOLE: 0.8 CM
LEFT VENTRICULAR STROKE VOLUME: 63 ML
LVSV (TEICH): 63 ML
MCH RBC QN AUTO: 32.8 PG (ref 26.8–34.3)
MCHC RBC AUTO-ENTMCNC: 33.2 G/DL (ref 31.4–37.4)
MCV RBC AUTO: 99 FL (ref 82–98)
MV E'TISSUE VEL-SEP: 10 CM/S
MV PEAK A VEL: 1.11 M/S
MV PEAK E VEL: 76 CM/S
MV STENOSIS PRESSURE HALF TIME: 35 MS
MV VALVE AREA P 1/2 METHOD: 6.29
P AXIS: 66 DEGREES
PLATELET # BLD AUTO: 433 THOUSANDS/UL (ref 149–390)
PMV BLD AUTO: 9.6 FL (ref 8.9–12.7)
PR INTERVAL: 134 MS
QRS AXIS: 27 DEGREES
QRSD INTERVAL: 58 MS
QT INTERVAL: 356 MS
QTC INTERVAL: 459 MS
RBC # BLD AUTO: 4.36 MILLION/UL (ref 3.81–5.12)
RIGHT ATRIUM AREA SYSTOLE A4C: 10.6 CM2
RIGHT VENTRICLE ID DIMENSION: 2.4 CM
SL CV LEFT ATRIUM LENGTH A2C: 4.6 CM
SL CV LV EF: 70
SL CV PED ECHO LEFT VENTRICLE DIASTOLIC VOLUME (MOD BIPLANE) 2D: 72 ML
SL CV PED ECHO LEFT VENTRICLE SYSTOLIC VOLUME (MOD BIPLANE) 2D: 8 ML
T WAVE AXIS: 26 DEGREES
TR MAX PG: 19 MMHG
TR PEAK VELOCITY: 2.2 M/S
TRICUSPID ANNULAR PLANE SYSTOLIC EXCURSION: 2.1 CM
TRICUSPID VALVE PEAK REGURGITATION VELOCITY: 2.16 M/S
VENTRICULAR RATE: 100 BPM
WBC # BLD AUTO: 15.67 THOUSAND/UL (ref 4.31–10.16)

## 2023-11-15 PROCEDURE — 94664 DEMO&/EVAL PT USE INHALER: CPT

## 2023-11-15 PROCEDURE — 93010 ELECTROCARDIOGRAM REPORT: CPT | Performed by: INTERNAL MEDICINE

## 2023-11-15 PROCEDURE — 85027 COMPLETE CBC AUTOMATED: CPT | Performed by: STUDENT IN AN ORGANIZED HEALTH CARE EDUCATION/TRAINING PROGRAM

## 2023-11-15 PROCEDURE — 94760 N-INVAS EAR/PLS OXIMETRY 1: CPT

## 2023-11-15 PROCEDURE — 82948 REAGENT STRIP/BLOOD GLUCOSE: CPT

## 2023-11-15 PROCEDURE — 93306 TTE W/DOPPLER COMPLETE: CPT | Performed by: INTERNAL MEDICINE

## 2023-11-15 PROCEDURE — 99232 SBSQ HOSP IP/OBS MODERATE 35: CPT | Performed by: NURSE PRACTITIONER

## 2023-11-15 PROCEDURE — 94640 AIRWAY INHALATION TREATMENT: CPT

## 2023-11-15 PROCEDURE — 93306 TTE W/DOPPLER COMPLETE: CPT

## 2023-11-15 PROCEDURE — 99223 1ST HOSP IP/OBS HIGH 75: CPT | Performed by: INTERNAL MEDICINE

## 2023-11-15 RX ORDER — BENZONATATE 100 MG/1
200 CAPSULE ORAL 3 TIMES DAILY PRN
Status: DISCONTINUED | OUTPATIENT
Start: 2023-11-15 | End: 2023-11-16 | Stop reason: HOSPADM

## 2023-11-15 RX ORDER — ONDANSETRON 2 MG/ML
4 INJECTION INTRAMUSCULAR; INTRAVENOUS EVERY 6 HOURS PRN
Status: DISCONTINUED | OUTPATIENT
Start: 2023-11-15 | End: 2023-11-16 | Stop reason: HOSPADM

## 2023-11-15 RX ORDER — POLYETHYLENE GLYCOL 3350 17 G/17G
17 POWDER, FOR SOLUTION ORAL DAILY PRN
Status: DISCONTINUED | OUTPATIENT
Start: 2023-11-15 | End: 2023-11-16 | Stop reason: HOSPADM

## 2023-11-15 RX ORDER — BENZONATATE 100 MG/1
200 CAPSULE ORAL 3 TIMES DAILY PRN
Status: DISCONTINUED | OUTPATIENT
Start: 2023-11-15 | End: 2023-11-15

## 2023-11-15 RX ORDER — FLUTICASONE FUROATE AND VILANTEROL 200; 25 UG/1; UG/1
1 POWDER RESPIRATORY (INHALATION) DAILY
Status: DISCONTINUED | OUTPATIENT
Start: 2023-11-15 | End: 2023-11-15

## 2023-11-15 RX ORDER — METHYLPREDNISOLONE SODIUM SUCCINATE 40 MG/ML
40 INJECTION, POWDER, LYOPHILIZED, FOR SOLUTION INTRAMUSCULAR; INTRAVENOUS EVERY 12 HOURS SCHEDULED
Status: DISCONTINUED | OUTPATIENT
Start: 2023-11-15 | End: 2023-11-16 | Stop reason: HOSPADM

## 2023-11-15 RX ORDER — ALBUTEROL SULFATE 90 UG/1
2 AEROSOL, METERED RESPIRATORY (INHALATION) EVERY 4 HOURS PRN
Status: DISCONTINUED | OUTPATIENT
Start: 2023-11-15 | End: 2023-11-16 | Stop reason: HOSPADM

## 2023-11-15 RX ORDER — HYDROCODONE BITARTRATE AND HOMATROPINE METHYLBROMIDE ORAL SOLUTION 5; 1.5 MG/5ML; MG/5ML
5 LIQUID ORAL EVERY 4 HOURS PRN
Status: DISCONTINUED | OUTPATIENT
Start: 2023-11-15 | End: 2023-11-16 | Stop reason: HOSPADM

## 2023-11-15 RX ORDER — LEVALBUTEROL INHALATION SOLUTION 1.25 MG/3ML
1.25 SOLUTION RESPIRATORY (INHALATION)
Status: DISCONTINUED | OUTPATIENT
Start: 2023-11-15 | End: 2023-11-16 | Stop reason: HOSPADM

## 2023-11-15 RX ORDER — BUDESONIDE AND FORMOTEROL FUMARATE DIHYDRATE 160; 4.5 UG/1; UG/1
2 AEROSOL RESPIRATORY (INHALATION) 2 TIMES DAILY
Status: DISCONTINUED | OUTPATIENT
Start: 2023-11-15 | End: 2023-11-16 | Stop reason: HOSPADM

## 2023-11-15 RX ORDER — LEVALBUTEROL INHALATION SOLUTION 1.25 MG/3ML
SOLUTION RESPIRATORY (INHALATION)
Status: COMPLETED
Start: 2023-11-15 | End: 2023-11-15

## 2023-11-15 RX ORDER — LEVALBUTEROL INHALATION SOLUTION 1.25 MG/3ML
1.25 SOLUTION RESPIRATORY (INHALATION)
Status: DISCONTINUED | OUTPATIENT
Start: 2023-11-15 | End: 2023-11-15

## 2023-11-15 RX ORDER — DICYCLOMINE HYDROCHLORIDE 10 MG/1
20 CAPSULE ORAL EVERY 8 HOURS PRN
Status: DISCONTINUED | OUTPATIENT
Start: 2023-11-15 | End: 2023-11-16 | Stop reason: HOSPADM

## 2023-11-15 RX ORDER — GUAIFENESIN 600 MG/1
600 TABLET, EXTENDED RELEASE ORAL EVERY 12 HOURS SCHEDULED
Status: DISCONTINUED | OUTPATIENT
Start: 2023-11-15 | End: 2023-11-16 | Stop reason: HOSPADM

## 2023-11-15 RX ORDER — BISACODYL 10 MG
10 SUPPOSITORY, RECTAL RECTAL ONCE
Status: DISCONTINUED | OUTPATIENT
Start: 2023-11-15 | End: 2023-11-15

## 2023-11-15 RX ORDER — KETOROLAC TROMETHAMINE 30 MG/ML
15 INJECTION, SOLUTION INTRAMUSCULAR; INTRAVENOUS EVERY 6 HOURS SCHEDULED
Status: DISCONTINUED | OUTPATIENT
Start: 2023-11-15 | End: 2023-11-16 | Stop reason: HOSPADM

## 2023-11-15 RX ORDER — ENEMA 19; 7 G/133ML; G/133ML
1 ENEMA RECTAL ONCE
Status: CANCELLED | OUTPATIENT
Start: 2023-11-15

## 2023-11-15 RX ADMIN — FLUTICASONE FUROATE AND VILANTEROL TRIFENATATE 1 PUFF: 200; 25 POWDER RESPIRATORY (INHALATION) at 09:22

## 2023-11-15 RX ADMIN — INSULIN LISPRO 3 UNITS: 100 INJECTION, SOLUTION INTRAVENOUS; SUBCUTANEOUS at 12:15

## 2023-11-15 RX ADMIN — METHYLPREDNISOLONE SODIUM SUCCINATE 40 MG: 40 INJECTION, POWDER, FOR SOLUTION INTRAMUSCULAR; INTRAVENOUS at 04:21

## 2023-11-15 RX ADMIN — Medication 6 MG: at 18:27

## 2023-11-15 RX ADMIN — ATORVASTATIN CALCIUM 40 MG: 40 TABLET, FILM COATED ORAL at 18:03

## 2023-11-15 RX ADMIN — DICYCLOMINE HYDROCHLORIDE 20 MG: 10 CAPSULE ORAL at 22:07

## 2023-11-15 RX ADMIN — LEVALBUTEROL HYDROCHLORIDE 1.25 MG: 1.25 SOLUTION RESPIRATORY (INHALATION) at 00:29

## 2023-11-15 RX ADMIN — ONDANSETRON 4 MG: 2 INJECTION INTRAMUSCULAR; INTRAVENOUS at 22:07

## 2023-11-15 RX ADMIN — PANTOPRAZOLE SODIUM 40 MG: 40 TABLET, DELAYED RELEASE ORAL at 18:02

## 2023-11-15 RX ADMIN — INSULIN LISPRO 1 UNITS: 100 INJECTION, SOLUTION INTRAVENOUS; SUBCUTANEOUS at 21:38

## 2023-11-15 RX ADMIN — PANTOPRAZOLE SODIUM 40 MG: 40 TABLET, DELAYED RELEASE ORAL at 09:23

## 2023-11-15 RX ADMIN — IPRATROPIUM BROMIDE 0.5 MG: 0.5 SOLUTION RESPIRATORY (INHALATION) at 00:28

## 2023-11-15 RX ADMIN — BUDESONIDE AND FORMOTEROL FUMARATE DIHYDRATE 2 PUFF: 160; 4.5 AEROSOL RESPIRATORY (INHALATION) at 18:04

## 2023-11-15 RX ADMIN — LEVALBUTEROL HYDROCHLORIDE 1.25 MG: 1.25 SOLUTION RESPIRATORY (INHALATION) at 14:00

## 2023-11-15 RX ADMIN — HYDROCODONE BITARTRATE AND HOMATROPINE METHYLBROMIDE ORAL SOLUTION 5 ML: 5; 1.5 LIQUID ORAL at 01:48

## 2023-11-15 RX ADMIN — IPRATROPIUM BROMIDE 0.5 MG: 0.5 SOLUTION RESPIRATORY (INHALATION) at 14:00

## 2023-11-15 RX ADMIN — BUPROPION HYDROCHLORIDE 150 MG: 150 TABLET, EXTENDED RELEASE ORAL at 09:22

## 2023-11-15 RX ADMIN — Medication 2000 UNITS: at 09:22

## 2023-11-15 RX ADMIN — ACETAMINOPHEN 650 MG: 325 TABLET, FILM COATED ORAL at 12:16

## 2023-11-15 RX ADMIN — GUAIFENESIN 600 MG: 600 TABLET ORAL at 14:58

## 2023-11-15 RX ADMIN — FAMOTIDINE 40 MG: 20 TABLET, FILM COATED ORAL at 09:27

## 2023-11-15 RX ADMIN — KETOROLAC TROMETHAMINE 15 MG: 30 INJECTION, SOLUTION INTRAMUSCULAR; INTRAVENOUS at 14:58

## 2023-11-15 RX ADMIN — FAMOTIDINE 40 MG: 20 TABLET, FILM COATED ORAL at 18:02

## 2023-11-15 RX ADMIN — HYDROCODONE BITARTRATE AND HOMATROPINE METHYLBROMIDE ORAL SOLUTION 5 ML: 5; 1.5 LIQUID ORAL at 09:29

## 2023-11-15 RX ADMIN — METHYLPREDNISOLONE SODIUM SUCCINATE 40 MG: 40 INJECTION, POWDER, FOR SOLUTION INTRAMUSCULAR; INTRAVENOUS at 22:08

## 2023-11-15 RX ADMIN — KETOROLAC TROMETHAMINE 15 MG: 30 INJECTION, SOLUTION INTRAMUSCULAR; INTRAVENOUS at 18:01

## 2023-11-15 RX ADMIN — ALBUTEROL SULFATE 2 PUFF: 90 AEROSOL, METERED RESPIRATORY (INHALATION) at 04:22

## 2023-11-15 RX ADMIN — GABAPENTIN 100 MG: 100 CAPSULE ORAL at 18:02

## 2023-11-15 RX ADMIN — TORSEMIDE 10 MG: 10 TABLET ORAL at 09:22

## 2023-11-15 RX ADMIN — IPRATROPIUM BROMIDE 0.5 MG: 0.5 SOLUTION RESPIRATORY (INHALATION) at 07:14

## 2023-11-15 RX ADMIN — GABAPENTIN 100 MG: 100 CAPSULE ORAL at 09:22

## 2023-11-15 RX ADMIN — LEVALBUTEROL HYDROCHLORIDE 1.25 MG: 1.25 SOLUTION RESPIRATORY (INHALATION) at 07:14

## 2023-11-15 RX ADMIN — METHYLPREDNISOLONE SODIUM SUCCINATE 40 MG: 40 INJECTION, POWDER, FOR SOLUTION INTRAMUSCULAR; INTRAVENOUS at 12:16

## 2023-11-15 RX ADMIN — ENOXAPARIN SODIUM 40 MG: 40 INJECTION SUBCUTANEOUS at 09:22

## 2023-11-15 RX ADMIN — INSULIN LISPRO 2 UNITS: 100 INJECTION, SOLUTION INTRAVENOUS; SUBCUTANEOUS at 18:03

## 2023-11-15 NOTE — H&P
[address]  H&P  Name: Marina Pierre 66 y.o. female I MRN: [id number]  Unit/Bed#: ED 09 I Date of Admission: 11/14/2023   Date of Service: 11/14/2023 I Hospital Day: 0      Assessment/Plan   * Shortness of breath  Assessment & Plan  Patient presents with shortness of breath, completed a course of Zithromax and prednisone by her PCP from November 6, 2023. She continues to have cough and mostly wheezing at nighttime. At this time will treat for asthma but will also obtain an echocardiogram.  Could be a component of cardiac issue including CHF. She takes torsemide at home. Does not complain of bilateral lower extremity edema. Severe persistent asthma with exacerbation  Assessment & Plan  Diagnosed back in 2004 when she was pregnant with twins. She is currently on inhalers and Singulair. She was diagnosed with COVID in 2020 and since then has not been doing well and has been in and out of hospital for asthma exacerbation. She does have sputum production but it is clear. We will do DuoNebs every 6 hours and Solu-Medrol 40 mg IV every 8 hours, Levaquin 750 p.o. daily  Obtain RSV, flu and COVID-19 swab  Consult pulmonary. She completed a course of Zithromax, will place on Levaquin therapy. Pulmonary function test from [facility].  March 22, 2022  SPIROMETRY: The FEV1 was moderately decreased 45%. FVC moderately  decreased 45%. FEV1/FVC was normal 83%. Following the bronchodilator, the  FEV1 increased to 20%, FVC increased 21%. LUNG VOLUMES: The total lung capacity was decreased 54%. RV was decreased  70%. RV/TLC was increased 46%. DIFFUSION: The DLCO was moderately decreased 44%. DLCO/VA was normal  119%. IMPRESSION: Moderate restrictive ventilatory defect. Significant  bronchodilator response. Air trapping is present. Moderately reduced  diffusion capacity, though normal for alveolar volume. No central airway  obstruction.       Rib pain on left side  Assessment & Plan  Secondary to rolling off the bed on her  came on the bed. Continue with supportive care    COVID-19 long hauler manifesting chronic dyspnea  Assessment & Plan  Diagnosed with COVID-19 back in 2020, was in the intensive care unit. Since then has not been doing well and out of the hospital has been exacerbation. 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 She no longer can do that due to fatigue and body aches. GERD (gastroesophageal reflux disease)  Assessment & Plan  Has severe acid reflux. Continue Protonix twice daily and Pepcid twice daily. Hyperlipidemia  Assessment & Plan  Patient at home takes Crestor, will convert to formulary to Lipitor 40 mg daily    Vitamin D deficiency  Assessment & Plan  Continue vitamin D supplement    Class 2 obesity with body mass index (BMI) of 35.0 to 35.9 in adult  Assessment & Plan  Currently taking Wegovy/Ozempic. She states that due to being on steroids due to her asthma she had gained 80 pounds. Her A1c is 4.8. Disposition  #1 IV steroids, DuoNebs and Levaquin therapy  #2 pulmonary consultation  #3 echocardiogram        VTE Prophylaxis: Enoxaparin (Lovenox)  / sequential compression device   Code Status: Level 1 - Full Code discussed with patient and  at bedside      Anticipated Length of Stay:  Patient will be admitted on an Observation basis with an anticipated length of stay of less than 2 midnights. Justification for Hospital Stay: Please see detailed plans noted above. Chief Complaint:     Shortness of breath  History of Present Illness:  Maria Elena Torres is a 66 y.o. female who has past medical history significant for severe asthma, long-haul COVID, hyperlipidemia, vitamin D deficiency, neuropathy, obesity due to steroids comes in for shortness of breath that has been going on since last Monday, November 6, 2023.   She was seen by her PCP and placed on Zithromax and prednisone taper which does not seem to be improving. She states she is wheezing at nighttime and a lot of coughing. She is short of breath at nighttime and cannot lay flat. She has clear phlegm that comes up. No fevers chills at this time. When she takes a deep breath she has coughing fits. The other issue is that she fell from her bed earlier today as the  jumped on the bed causing her to roll off. She landed on her left side having tenderness around T5 and T6 on the left lateral chest.      Review of Systems:    Constitutional:  Denies fever or chills   Eyes:  Denies change in visual acuity   HENT:  Denies nasal congestion or sore throat   Respiratory:  cough, shortness of breath, wheezing at nighttime, cannot lay flat,  Cardiovascular:  Denies chest pain or edema   GI:  Denies abdominal pain or bloody stools  :  Denies dysuria   Musculoskeletal: Left-sided rib pain  Integument:  Denies rash   Neurologic:  Denies headache or sensory changes   Endocrine:  Denies polyuria or polydipsia   Lymphatic:  Denies swollen glands   Psychiatric:  Denies depression or anxiety     Past Medical and Surgical History:   Past Medical History:   Diagnosis Date    Asthma     Diabetes mellitus ([address])     GERD (gastroesophageal reflux disease)     Hyperlipidemia     Hypertension      Past Surgical History:   Procedure Laterality Date     SECTION      COLONOSCOPY      HYSTERECTOMY      OVARIAN CYST REMOVAL         Meds/Allergies:  (Not in a hospital admission)      Allergies:    Allergies   Allergen Reactions    Codeine Other (See Comments)    Aspirin GI Intolerance and Rash    Hydromorphone Rash and Other (See Comments)     GI upset      Oxycodone-Acetaminophen Rash and Other (See Comments)    Tramadol Rash and Other (See Comments)       History:  Marital Status: /Civil Union     Substance Use History:   Social History     Substance and Sexual Activity   Alcohol Use Yes    Alcohol/week: 7.0 standard drinks of alcohol Types: 7 Glasses of wine per week     Social History     Tobacco Use   Smoking Status Never   Smokeless Tobacco Never     Social History     Substance and Sexual Activity   Drug Use Never       Family History:  No family history on file. Physical Exam:     Vitals:   Blood Pressure: 138/75 (11/14/23 2000)  Pulse: 90 (11/14/23 2000)  Temperature: 98 °F (36.7 °C) (11/14/23 2000)  Respirations: 20 (11/14/23 2000)  SpO2: 92 % (11/14/23 2000)    Constitutional:  Non-toxic appearance  Eyes:  EOMI, No scleral icterus   HENT:   oropharynx moist, external ears normal, external nose normal, atraumatic normocephalic  Respiratory: Shallow breath sounds, every times take deep breaths she would have a coughing fit. No wheezing or rhonchi noted. Cardiovascular:  Normal rate, no murmurs   GI:  Soft, nondistended, no guarding   :  No costovertebral angle tenderness   Musculoskeletal:  no tenderness, no deformities. Back- no tenderness  Integument:  no jaundice, no rash   Neurologic:  Alert &awake, communicative, CN 2-12 normal,  no focal deficits noted   Psychiatric:  Speech and behavior appropriate       Lab Results: I have personally reviewed pertinent reports. and I have personally reviewed pertinent films in PACS    Results from last 7 days   Lab Units 11/14/23  1641   WBC Thousand/uL 8.65   HEMOGLOBIN g/dL 15.1   HEMATOCRIT % 45.4   PLATELETS Thousands/uL 409*   NEUTROS PCT % 54   LYMPHS PCT % 34   MONOS PCT % 8   EOS PCT % 3     Results from last 7 days   Lab Units 11/14/23  1641   POTASSIUM mmol/L 3.5   CHLORIDE mmol/L 103   CO2 mmol/L 25   BUN mg/dL 12   CREATININE mg/dL 0.77   CALCIUM mg/dL 9.4   ALK PHOS U/L 171*   ALT U/L 24   AST U/L 33             Imaging: I have personally reviewed pertinent reports. No results found. Total time for visit, including counseling/coordination of care: 65 minutes. Greater than 50% of this total time spent on direct patient counseling and coorination of care.   I reviewed her checks x-ray myself, I also reviewed previous records from [address] [place] [address] for pulmonary function test.    Epic Records Reviewed as well as Records in Care Everywhere    ** Please Note: Dragon 360 Dictation voice to text software was used in the creation of this document.  **

## 2023-11-15 NOTE — CONSULTS
Consultation - Pulmonary Medicine   Lidya Sanchez 66 y.o. female MRN: [id number]  Unit/Bed#: -01 Encounter: [id number]      Assessment:  Shortness of breath  Severe persistent asthma with acute exacerbation  COVID-19 long-hauler  GERD  Likely obstructive sleep apnea    Plan:   Severe persistent asthma with acute exacerbation  Not improving with outpatient steroid course and Z-Alen  Chest x-ray on admission shows no pneumonia, she has no leukocytosis and she is afebrile  Will discontinue Levaquin, do not think she needs further antibiotics  Continue Solu-Medrol 40 every 8 hours  Continue Breo, Xopenex and Atrovent, Singulair  Echo pending today  ? GERD also contributing to her frequent exacerbations although she states symptoms improved after dilatation in June. She follows with a pulmonologist in [facility], has been recommended to follow-up with them to restart a biologic - had been on Tezspire in the past.  Just received a new insurance in October and needs to start prior authorization again  Also high suspicion for obstructive sleep apnea on prior admissions, has not yet had a sleep study  Continue Hycodan as needed for the cough  Will continue to follow    History of Present Illness   Physician Requesting Consult: Stephani Daigle DO  Reason for Consult / Principal Problem: asthma exacerbation  Hx and PE limited by: None  HPI: Marina Pierre is a 66y.o. year old female non-smoker with past medical history of severe persistent asthma, diabetes, GERD who presents with complaint of ongoing cough and shortness of breath despite outpatient treatment with prednisone and Z-Alen. Symptoms have been ongoing for the past 2 weeks or so. She continues with her Symbicort twice per day, albuterol up to 4 times per day. She had been on Tezspire in the past.  Also has a history of severe GERD and follows with GI for dilatations. This is her fifth hospitalization this year for asthma exacerbation.   She follows with a pulmonologist in [facility], has not yet been restarted on Tezspire as she recently obtained new insurance. She has had asthma for many years although has been much more poorly controlled since having COVID in 2020. Inpatient consult to Pulmonology  Consult performed by: Alley Wan PA-C  Consult ordered by: Lisseth Macias MD          Review of Systems   Constitutional: Negative. HENT: Negative. Respiratory:  Positive for cough and shortness of breath. Cardiovascular: Negative. Gastrointestinal: Negative. Genitourinary: Negative. Musculoskeletal: Negative. Skin: Negative. Allergic/Immunologic: Negative. Neurological: Negative. Psychiatric/Behavioral: Negative. Historical Information   Past Medical History:   Diagnosis Date    Asthma     Diabetes mellitus (HCC)     GERD (gastroesophageal reflux disease)     Hyperlipidemia     Hypertension      Past Surgical History:   Procedure Laterality Date     SECTION      COLONOSCOPY      HYSTERECTOMY      OVARIAN CYST REMOVAL       Social History   Social History     Substance and Sexual Activity   Alcohol Use Yes    Alcohol/week: 7.0 standard drinks of alcohol    Types: 7 Glasses of wine per week     Social History     Substance and Sexual Activity   Drug Use Never     E-Cigarette/Vaping    E-Cigarette Use Never User      E-Cigarette/Vaping Substances    Nicotine No     THC No     CBD No     Flavoring No     Other No     Unknown No      Social History     Tobacco Use   Smoking Status Never   Smokeless Tobacco Never     Occupational History:     Family History: No family history on file.     Meds/Allergies   all current active meds have been reviewed, pertinent pulmonary meds have been reviewed, and current meds:   Current Facility-Administered Medications   Medication Dose Route Frequency    acetaminophen (TYLENOL) tablet 650 mg  650 mg Oral Q6H PRN    albuterol (PROVENTIL HFA,VENTOLIN HFA) inhaler 2 puff  2 puff Inhalation Q4H PRN    atorvastatin (LIPITOR) tablet 40 mg  40 mg Oral Daily With Dinner    benzonatate (TESSALON PERLES) capsule 200 mg  200 mg Oral TID PRN    buPROPion (WELLBUTRIN XL) 24 hr tablet 150 mg  150 mg Oral Daily    cholecalciferol (VITAMIN D3) tablet 2,000 Units  2,000 Units Oral Daily    enoxaparin (LOVENOX) subcutaneous injection 40 mg  40 mg Subcutaneous Daily    famotidine (PEPCID) tablet 40 mg  40 mg Oral BID    fluticasone-vilanterol 200-25 mcg/actuation 1 puff  1 puff Inhalation Daily    gabapentin (NEURONTIN) capsule 100 mg  100 mg Oral TID    HYDROcodone Bit-Homatrop MBr (HYCODAN) oral syrup 5 mL  5 mL Oral Q4H PRN    insulin lispro (HumaLOG) 100 units/mL subcutaneous injection 1-6 Units  1-6 Units Subcutaneous 4x Daily (AC & HS)    ipratropium (ATROVENT) 0.02 % inhalation solution 0.5 mg  0.5 mg Nebulization Q6H    levalbuterol (XOPENEX) inhalation solution 1.25 mg  1.25 mg Nebulization Q6H    melatonin tablet 6 mg  6 mg Oral HS PRN    methylPREDNISolone sodium succinate (Solu-MEDROL) injection 40 mg  40 mg Intravenous Q8H    montelukast (SINGULAIR) tablet 10 mg  10 mg Oral HS    pantoprazole (PROTONIX) EC tablet 40 mg  40 mg Oral BID AC    torsemide (DEMADEX) tablet 10 mg  10 mg Oral Daily    zolpidem (AMBIEN) tablet 10 mg  10 mg Oral HS       Allergies   Allergen Reactions    Codeine Other (See Comments)    Aspirin GI Intolerance and Rash    Hydromorphone Rash and Other (See Comments)     GI upset      Oxycodone-Acetaminophen Rash and Other (See Comments)    Tramadol Rash and Other (See Comments)       Objective   Vitals: Blood pressure 134/79, pulse 101, temperature 97.5 °F (36.4 °C), temperature source Oral, resp. rate 18, height 4' 11" (1.499 m), weight 81.6 kg (179 lb 14.3 oz), SpO2 92 %. ,Body mass index is 36.33 kg/m².   No intake or output data in the 24 hours ending 11/15/23 1041  Invasive Devices       Peripheral Intravenous Line  Duration             Peripheral IV 11/14/23 Left Antecubital <1 day    Peripheral IV 11/14/23 Right;Ventral (anterior) Forearm <1 day                    Physical Exam  Vitals reviewed. Constitutional:       Appearance: Normal appearance. HENT:      Head: Normocephalic and atraumatic. Mouth/Throat:      Pharynx: Oropharynx is clear. Eyes:      Conjunctiva/sclera: Conjunctivae normal.   Cardiovascular:      Rate and Rhythm: Normal rate and regular rhythm. Pulmonary:      Effort: Pulmonary effort is normal. No respiratory distress. Breath sounds: Decreased breath sounds and wheezing present. Abdominal:      General: Abdomen is flat. There is no distension. Musculoskeletal:         General: Normal range of motion. Cervical back: Normal range of motion. Right lower leg: No edema. Left lower leg: No edema. Skin:     General: Skin is warm and dry. Neurological:      Mental Status: She is alert and oriented to person, place, and time. Psychiatric:         Mood and Affect: Mood normal.         Behavior: Behavior normal.         Lab Results: I have personally reviewed pertinent lab results. , CBC:   Lab Results   Component Value Date    WBC 15.67 (H) 11/15/2023    HGB 14.3 11/15/2023    HCT 43.1 11/15/2023    MCV 99 (H) 11/15/2023     (H) 11/15/2023    RBC 4.36 11/15/2023    MCH 32.8 11/15/2023    MCHC 33.2 11/15/2023    RDW 17.3 (H) 11/15/2023    MPV 9.6 11/15/2023    NRBC 0 11/14/2023   , CMP:   Lab Results   Component Value Date    SODIUM 138 11/14/2023    K 3.5 11/14/2023     11/14/2023    CO2 25 11/14/2023    BUN 12 11/14/2023    CREATININE 0.77 11/14/2023    CALCIUM 9.4 11/14/2023    AST 33 11/14/2023    ALT 24 11/14/2023    ALKPHOS 171 (H) 11/14/2023    EGFR 86 11/14/2023     Imaging Studies: I have personally reviewed pertinent reports. and I have personally reviewed pertinent films in PACS  EKG, Pathology, and Other Studies: I have personally reviewed pertinent reports.     VTE Prophylaxis: Sequential compression device (Venodyne)  and Enoxaparin (Lovenox)    Code Status: Level 1 - Full Code  Advance Directive and Living Will:      Power of :    POLST:

## 2023-11-15 NOTE — ASSESSMENT & PLAN NOTE
Diagnosed back in 2004 when she was pregnant with twins. She is currently on inhalers and Singulair. She was diagnosed with COVID in 2020 and since then has not been doing well and has been in and out of hospital for asthma exacerbation. She does have sputum production but it is clear. We will do DuoNebs every 6 hours and Solu-Medrol 40 mg IV every 8 hours, Levaquin 750 p.o. daily  Obtain RSV, flu and COVID-19 swab  Consult pulmonary. She completed a course of Zithromax, will place on Levaquin therapy. Pulmonary function test from [facility].  March 22, 2022  SPIROMETRY: The FEV1 was moderately decreased 45%. FVC moderately  decreased 45%. FEV1/FVC was normal 83%. Following the bronchodilator, the  FEV1 increased to 20%, FVC increased 21%. LUNG VOLUMES: The total lung capacity was decreased 54%. RV was decreased  70%. RV/TLC was increased 46%. DIFFUSION: The DLCO was moderately decreased 44%. DLCO/VA was normal  119%. IMPRESSION: Moderate restrictive ventilatory defect. Significant  bronchodilator response. Air trapping is present. Moderately reduced  diffusion capacity, though normal for alveolar volume. No central airway  obstruction.

## 2023-11-15 NOTE — CASE MANAGEMENT
Case Management Assessment & Discharge Planning Note    Patient name Sarwat An  Location [address] [facility] [place] 234/-[id number] MRN [id number]  : 1965 Date 11/15/2023       Current Admission Date: 2023  Current Admission Diagnosis:Severe persistent asthma with exacerbation   Patient Active Problem List    Diagnosis Date Noted    Shortness of breath 2023    Neuropathy 2023    Vitamin D deficiency 2023    Rib pain on left side 2023    Fecal incontinence 08/15/2023    COVID-19 long hauler manifesting chronic dyspnea 08/15/2023    Itching 2023    Polypharmacy 2023    Class 2 obesity with body mass index (BMI) of 35.0 to 35.9 in adult 2023    Dysphagia 2023    Narrowing of airway 01/10/2023    Depression with anxiety 2023    Asthma exacerbation 2023    Chronic cough 2022    GERD (gastroesophageal reflux disease) 10/31/2022    Rectus sheath hematoma 10/29/2022    SIRS (systemic inflammatory response syndrome) ([address]) 10/24/2022    Severe persistent asthma with exacerbation 10/24/2022    Hyperlipidemia 10/24/2022    Diabetes ([address]) 10/24/2022      LOS (days): 0  Geometric Mean LOS (GMLOS) (days):   Days to GMLOS:     OBJECTIVE:      Current admission status: Observation     Preferred Pharmacy:   [facility] [facility], [place], [address] [facility]  [address] [place] [address]  Phone: [phone] Fax: [phone]    Primary Care Provider: Anthony Rodriguez    Primary Insurance: MEDICARE  Secondary Insurance:     ASSESSMENT:  [address], [address] [facility] Representative - Spouse   Primary Phone: [phone] (Mobile)                 Advance Directives  Does patient have a [address]?: Yes  Does patient have Advance Directives?: Yes  Advance Directives: Power of  for health care  Primary Contact: Spouse - Jose Ansari    Readmission Root Cause  30 Day Readmission: No    Patient Information  Admitted from[de-identified] Home  Mental Status: Alert  During Assessment patient was accompanied by: Spouse  Assessment information provided by[de-identified] Patient  Primary Caregiver: Self  Support Systems: Spouse/significant other  [place] of Residence: [facility] do you live in?: [address] [facility] entry access options.  Select all that apply.: Stairs  Number of steps to enter home.: 4  Do the steps have railings?: Yes  Type of Current Residence: [address] [place] home  Upon entering residence, is there a bedroom on the main floor (no further steps)?: Yes  Upon entering residence, is there a bathroom on the main floor (no further steps)?: Yes  In the last 12 months, was there a time when you were not able to pay the mortgage or rent on time?: No  In the last 12 months, how many places have you lived?: 1  In the last 12 months, was there a time when you did not have a steady place to sleep or slept in a shelter (including now)?: No  Homeless/housing insecurity resource given?: N/A    Activities of Daily Living Prior to Admission  Functional Status: Assistance  Completes ADLs independently?: No  Level of ADL dependence: Assistance  Ambulates independently?: No  Level of ambulatory dependence: Assistance  Does patient use assisted devices?: Yes  Assisted Devices (DME) used: Jorge Hawk  Does patient currently own DME?: Yes  What DME does the patient currently own?: Natasha Lombardi  Does patient have a history of Outpatient Therapy (PT/OT)?: No  Does the patient have a history of Short-Term Rehab?: Yes  Does patient have a history of HHC?: No  Does patient currently have [address]?: No    Patient Information Continued  Income Source: SSI/SSD  Does patient have prescription coverage?: Yes  Within the past 12 months, you worried that your food would run out before you got the money to buy more.: Never true  Within the past 12 months, the food you bought just didn't last and you didn't have money to get more.: Never true  Food insecurity resource given?: N/A  Does patient receive dialysis treatments?: No  Does patient have a history of substance abuse?: No  Does patient have a history of Mental Health Diagnosis?: Yes  Is patient receiving treatment for mental health?: Yes (Antidepressants - Dr. Kelechi Melendrez)  Has patient received inpatient treatment related to mental health in the last 2 years?: No    Means of Transportation  Means of Transport to Appts[de-identified] Drives Self  In the past 12 months, has lack of transportation kept you from medical appointments or from getting medications?: No  In the past 12 months, has lack of transportation kept you from meetings, work, or from getting things needed for daily living?: No  Was application for public transport provided?: N/A    DISCHARGE DETAILS:    Discharge planning discussed with[de-identified] Patient and spouse  Freedom of Choice: Yes  Comments - Freedom of Choice: CM discussed discharge planning and freedom of choice if recommended by [facility]IM. Patient requested [address] upon discharge. CM contacted family/caregiver?: Yes  Were Treatment Team discharge recommendations reviewed with patient/caregiver?: Yes  Did patient/caregiver verbalize understanding of patient care needs?: Yes  Were patient/caregiver advised of the risks associated with not following Treatment Team discharge recommendations?: Yes    Contacts  Patient Contacts: [place]  Relationship to Patient[de-identified] Family  Contact Method:  In Person  Reason/Outcome: Continuity of Care, Discharge [facility]         Is the patient interested in [address] at discharge?: Yes    DME Referral Provided  Referral made for DME?: No    Other Referral/Resources/Interventions Provided:  Interventions: [facility]  Referral Comments: Patient requested [address]    Would you like to participate in our [address] [facility] [address] service program?  : No - Declined    Treatment Team Recommendation: Home with [address] [facility]  Discharge Destination Plan[de-identified] Home with [facility]     [place] referral sent for [facility] via [employer].

## 2023-11-15 NOTE — RESPIRATORY THERAPY NOTE
RT Protocol Note  Sarwat Luna 66 y.o. female MRN: [id number]  Unit/Bed#: -01 Encounter: [id number]    Assessment    Principal Problem:    Shortness of breath  Active Problems:    Severe persistent asthma with exacerbation    Hyperlipidemia    GERD (gastroesophageal reflux disease)    Depression with anxiety    Class 2 obesity with body mass index (BMI) of 35.0 to 35.9 in adult    COVID-19 long hauler manifesting chronic dyspnea    Neuropathy    Vitamin D deficiency    Rib pain on left side      Home Pulmonary Medications:  Inhalers/neb       Past Medical History:   Diagnosis Date    Asthma     Diabetes mellitus (HCC)     GERD (gastroesophageal reflux disease)     Hyperlipidemia     Hypertension      Social History     Socioeconomic History    Marital status: /Civil Union     Spouse name: Not on file    Number of children: Not on file    Years of education: Not on file    Highest education level: Not on file   Occupational History    Not on file   Tobacco Use    Smoking status: Never    Smokeless tobacco: Never   Vaping Use    Vaping Use: Never used   Substance and Sexual Activity    Alcohol use: Yes     Alcohol/week: 7.0 standard drinks of alcohol     Types: 7 Glasses of wine per week    Drug use: Never    Sexual activity: Not on file   Other Topics Concern    Not on file   Social History Narrative    Not on file     Social Determinants of Health     Financial Resource Strain: Not on file   Food Insecurity: No Food Insecurity (8/14/2023)    Hunger Vital Sign     Worried About Running Out of Food in the Last Year: Never true     Ran Out of Food in the Last Year: Never true   Transportation Needs: No Transportation Needs (8/14/2023)    PRAPARE - Transportation     Lack of Transportation (Medical): No     Lack of Transportation (Non-Medical):  No   Physical Activity: Not on file   Stress: Not on file   Social Connections: Not on file   Intimate Partner Violence: Not on file   Housing Stability: Low Risk (8/14/2023)    Housing Stability Vital Sign     Unable to Pay for Housing in the Last Year: No     Number of Places Lived in the Last Year: 1     Unstable Housing in the Last Year: No       Subjective         Objective    Physical Exam:   Assessment Type: During-treatment  General Appearance: Awake, Alert  Respiratory Pattern: Normal  Chest Assessment: Chest expansion symmetrical  Bilateral Breath Sounds: Diminished, Crackles, Scattered, Expiratory wheezes (crackles in the bases)  Cough: Non-productive  O2 Device: RA    Vitals:  Blood pressure 134/79, pulse 101, temperature 97.5 °F (36.4 °C), resp. rate 18, height 4' 11" (1.499 m), weight 81.6 kg (179 lb 14.3 oz), SpO2 92 %. Imaging and other studies: I have personally reviewed pertinent reports. O2 Device: RA     Plan    Respiratory Plan: Moderate/Severe Distress pathway        Resp Comments: Pt w/ hx of asthma. Uses inhalers at home. In w/ an exacerbation. Will cont w/ cynthia therapy.

## 2023-11-15 NOTE — ASSESSMENT & PLAN NOTE
Patient presented to the ED with complaints of shortness of breath with ongoing cough/wheezing   Recently completed Azithromycin/Prednisone by her PCP. Chest x-ray (11/14/23): Scarring and/or atelectasis at the left lung base.   BNP 51

## 2023-11-15 NOTE — ASSESSMENT & PLAN NOTE
Diagnosed with COVID-19 back in 2020, was in the intensive care unit. Since then has not been doing well and out of the hospital has been exacerbation. 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 She no longer can do that due to fatigue and body aches.

## 2023-11-15 NOTE — PROGRESS NOTES
Suspect HEATHER in additional to uncontrolled asthma  Needs outpatient sleep study and sleep follow up

## 2023-11-15 NOTE — ASSESSMENT & PLAN NOTE
Currently taking Wegovy/Ozempic. She states that due to being on steroids due to her asthma she had gained 80 pounds. Her A1c is 4.8.

## 2023-11-15 NOTE — ASSESSMENT & PLAN NOTE
Patient presents with shortness of breath, completed a course of Zithromax and prednisone by her PCP from November 6, 2023. She continues to have cough and mostly wheezing at nighttime. At this time will treat for asthma but will also obtain an echocardiogram.  Could be a component of cardiac issue including CHF. She takes torsemide at home. Does not complain of bilateral lower extremity edema.

## 2023-11-15 NOTE — PROGRESS NOTES
[address]  Progress Note  Name: Anette Oquendo  MRN: [id number]  Unit/Bed#: -01 I Date of Admission: 11/14/2023   Date of Service: 11/15/2023 I Hospital Day: 0    Assessment/Plan   * Severe persistent asthma with exacerbation  Assessment & Plan  History of severe persistent asthma  Covid/RSV/Flu negative  Continue with Nebs, Inhalers  Hold off on any further doses of Levaquin  Pulmonology consult, appreciate input  Continue with IV Solu-Medrol 40 mg every 8 hours  Hycodan as needed for cough  Continue with inhalers    Shortness of breath  Assessment & Plan  Patient presented to the ED with complaints of shortness of breath with ongoing cough/wheezing   Recently completed Azithromycin/Prednisone by her PCP. Chest x-ray (11/14/23): Scarring and/or atelectasis at the left lung base. BNP 51    Class 2 obesity with body mass index (BMI) of 35.0 to 35.9 in adult  Assessment & Plan  Currently taking Wegovy/Ozempic. She states that due to being on steroids due to her asthma she had gained 80 pounds. Her A1c is 4.8. Depression with anxiety  Assessment & Plan  Chronic  Continue home medication regimen  Mood Stable    GERD (gastroesophageal reflux disease)  Assessment & Plan  Has severe acid reflux. Continue Protonix twice daily and Pepcid twice daily. Hyperlipidemia  Assessment & Plan  Patient at home takes Crestor, will convert to formulary to Lipitor 40 mg daily         VTE Pharmacologic Prophylaxis: VTE Score: 4 Moderate Risk (Score 3-4) - Pharmacological DVT Prophylaxis Ordered: enoxaparin (Lovenox). Mobility:   Basic Mobility Inpatient Raw Score: 22  -HLM Goal: 7: Walk 25 feet or more  JH-HLM Achieved: 1: Laying in bed  [facility] Goal NOT achieved. Continue with multidisciplinary rounding and encourage appropriate mobility to improve upon [facility] goals. Patient Centered Rounds: I performed bedside rounds with nursing staff today.    Discussions with Specialists or Other Care Team Provider: Case Management    Education and Discussions with Family / Patient: Patient declined call to . Total Time Spent on Date of Encounter in care of patient: 25 mins. This time was spent on one or more of the following: performing physical exam; counseling and coordination of care; obtaining or reviewing history; documenting in the medical record; reviewing/ordering tests, medications or procedures; communicating with other healthcare professionals and discussing with patient's family/caregivers. Current Length of Stay: 0 day(s)  Current Patient Status: Observation   Certification Statement: The patient will continue to require additional inpatient hospital stay due to ongoing treatment in setting of asthma exacerbation  Discharge Plan: Anticipate discharge in 48-72 hrs to home. Code Status: Level 1 - Full Code    Subjective:   Patient resting in bed, just completed echocardiogram.  Discussed plan of continuing IV Steroids, pulmonology consult. Objective:     Vitals:   Temp (24hrs), Av °F (36.7 °C), Min:97.5 °F (36.4 °C), Max:98.7 °F (37.1 °C)    Temp:  [97.5 °F (36.4 °C)-98.7 °F (37.1 °C)] 97.5 °F (36.4 °C)  HR:  [] 101  Resp:  [15-22] 18  BP: (128-149)/(65-85) 134/79  SpO2:  [90 %-100 %] 92 %  Body mass index is 36.33 kg/m². Input and Output Summary (last 24 hours):   No intake or output data in the 24 hours ending 11/15/23 1100    Physical Exam:   Physical Exam  Vitals and nursing note reviewed. Constitutional:       General: She is not in acute distress. Appearance: She is normal weight. She is ill-appearing. Cardiovascular:      Rate and Rhythm: Normal rate. Pulses: Normal pulses. Heart sounds: Normal heart sounds. Pulmonary:      Effort: Pulmonary effort is normal. No tachypnea, bradypnea or respiratory distress. Breath sounds: Decreased breath sounds present.    Abdominal:      General: Bowel sounds are normal.      Palpations: Abdomen is soft. Musculoskeletal:         General: Normal range of motion. Right lower leg: No edema. Left lower leg: No edema. Skin:     General: Skin is warm and dry. Neurological:      Mental Status: She is alert and oriented to person, place, and time. Psychiatric:         Mood and Affect: Mood normal.          Additional Data:     Labs:  Results from last 7 days   Lab Units 11/15/23  1035 11/14/23  1641   WBC Thousand/uL 15.67* 8.65   HEMOGLOBIN g/dL 14.3 15.1   HEMATOCRIT % 43.1 45.4   PLATELETS Thousands/uL 433* 409*   NEUTROS PCT %  --  54   LYMPHS PCT %  --  34   MONOS PCT %  --  8   EOS PCT %  --  3     Results from last 7 days   Lab Units 11/14/23  1641   SODIUM mmol/L 138   POTASSIUM mmol/L 3.5   CHLORIDE mmol/L 103   CO2 mmol/L 25   BUN mg/dL 12   CREATININE mg/dL 0.77   ANION GAP mmol/L 10   CALCIUM mg/dL 9.4   ALBUMIN g/dL 4.3   TOTAL BILIRUBIN mg/dL 0.47   ALK PHOS U/L 171*   ALT U/L 24   AST U/L 33   GLUCOSE RANDOM mg/dL 98         Results from last 7 days   Lab Units 11/15/23  0708 11/14/23  2119   POC GLUCOSE mg/dl 174* 162*               Lines/Drains:  Invasive Devices       Peripheral Intravenous Line  Duration             Peripheral IV 11/14/23 Left Antecubital <1 day    Peripheral IV 11/14/23 Right;Ventral (anterior) Forearm <1 day                      Imaging: No pertinent imaging reviewed.     Recent Cultures (last 7 days):         Last 24 Hours Medication List:   Current Facility-Administered Medications   Medication Dose Route Frequency Provider Last Rate    acetaminophen  650 mg Oral Q6H PRN Gadiel Mccord MD      albuterol  2 puff Inhalation Q4H PRN Brian Jaquez MD      atorvastatin  40 mg Oral Daily With Wilma Weir MD      benzonatate  200 mg Oral TID PRN Lauri Hauser PA-C      buPROPion  150 mg Oral Daily Brian Jaquez MD      cholecalciferol  2,000 Units Oral Daily Brian Jaquez MD      enoxaparin  40 mg Subcutaneous Daily Vilma Hilario MD      famotidine  40 mg Oral BID Vilma Hilario MD      fluticasone-vilanterol  1 puff Inhalation Daily Vilma Hilario MD      gabapentin  100 mg Oral TID Vilma Hilario MD      HYDROcodone Bit-Homatrop MBr  5 mL Oral Q4H PRN EDUARDA Virk      insulin lispro  1-6 Units Subcutaneous 4x Daily (AC & HS) Vilma Hilario MD      ipratropium  0.5 mg Nebulization Q6H Gadiel Jody Garcia MD      levalbuterol  1.25 mg Nebulization Q6H Vilma Hilario MD      melatonin  6 mg Oral HS PRN Vilma Hilario MD      methylPREDNISolone sodium succinate  40 mg Intravenous Q8H Vilma Hilario MD      montelukast  10 mg Oral HS Vilma Hilario MD      pantoprazole  40 mg Oral BID AC Vilma Hilario MD      torsemide  10 mg Oral Daily Vilma Hilario MD      zolpidem  10 mg Oral HS Vilma Hilario MD          Today, Patient Was Seen By: EDUARDA Castanon    **Please Note: This note may have been constructed using a voice recognition system. **

## 2023-11-15 NOTE — ASSESSMENT & PLAN NOTE
History of severe persistent asthma  Covid/RSV/Flu negative  Continue with Nebs, Inhalers  Hold off on any further doses of Levaquin  Pulmonology consult, appreciate input  Continue with IV Solu-Medrol 40 mg every 8 hours  Hycodan as needed for cough  Continue with inhalers

## 2023-11-15 NOTE — PLAN OF CARE
Problem: PAIN - ADULT  Goal: Verbalizes/displays adequate comfort level or baseline comfort level  Description: Interventions:  - Encourage patient to monitor pain and request assistance  - Assess pain using appropriate pain scale  - Administer analgesics based on type and severity of pain and evaluate response  - Implement non-pharmacological measures as appropriate and evaluate response  - Consider cultural and social influences on pain and pain management  - Notify physician/advanced practitioner if interventions unsuccessful or patient reports new pain  Outcome: Progressing     Problem: INFECTION - ADULT  Goal: Absence or prevention of progression during hospitalization  Description: INTERVENTIONS:  - Assess and monitor for signs and symptoms of infection  - Monitor lab/diagnostic results  - Monitor all insertion sites, i.e. indwelling lines, tubes, and drains  - Monitor endotracheal if appropriate and nasal secretions for changes in amount and color  - [place] appropriate cooling/warming therapies per order  - Administer medications as ordered  - Instruct and encourage patient and family to use good hand hygiene technique  - Identify and instruct in appropriate isolation precautions for identified infection/condition  Outcome: Progressing  Goal: Absence of fever/infection during neutropenic period  Description: INTERVENTIONS:  - Monitor WBC    Outcome: Progressing     Problem: SAFETY ADULT  Goal: Patient will remain free of falls  Description: INTERVENTIONS:  - Educate patient/family on patient safety including physical limitations  - Instruct patient to call for assistance with activity   - Consult OT/PT to assist with strengthening/mobility   - Keep Call bell within reach  - Keep bed low and locked with side rails adjusted as appropriate  - Keep care items and personal belongings within reach  - Initiate and maintain comfort rounds  - Make Fall Risk Sign visible to staff  - Offer Toileting every  Hours, in advance of need  - Initiate/Maintain alarm  - Obtain necessary fall risk management equipment:   - Apply yellow socks and bracelet for high fall risk patients  - Consider moving patient to room near nurses station  Outcome: Progressing  Goal: Maintain or return to baseline ADL function  Description: INTERVENTIONS:  -  Assess patient's ability to carry out ADLs; assess patient's baseline for ADL function and identify physical deficits which impact ability to perform ADLs (bathing, care of mouth/teeth, toileting, grooming, dressing, etc.)  - Assess/evaluate cause of self-care deficits   - Assess range of motion  - Assess patient's mobility; develop plan if impaired  - Assess patient's need for assistive devices and provide as appropriate  - Encourage maximum independence but intervene and supervise when necessary  - Involve family in performance of ADLs  - Assess for home care needs following discharge   - Consider OT consult to assist with ADL evaluation and planning for discharge  - Provide patient education as appropriate  Outcome: Progressing  Goal: Maintains/Returns to pre admission functional level  Description: INTERVENTIONS:  - Perform AM-PAC 6 Click Basic Mobility/ Daily Activity assessment daily.  - Set and communicate daily mobility goal to care team and patient/family/caregiver. - Collaborate with rehabilitation services on mobility goals if consulted  - Perform Range of Motion  times a day. - Reposition patient every  hours.   - Dangle patient  times a day  - Stand patient  times a day  - Ambulate patient  times a day  - Out of bed to chair  times a day   - Out of bed for pj times a day  - Out of bed for toileting  - Record patient progress and toleration of activity level   Outcome: Progressing     Problem: DISCHARGE PLANNING  Goal: Discharge to home or other facility with appropriate resources  Description: INTERVENTIONS:  - Identify barriers to discharge w/patient and caregiver  - Arrange for needed discharge resources and transportation as appropriate  - Identify discharge learning needs (meds, wound care, etc.)  - Arrange for interpretive services to assist at discharge as needed  - Refer to Case Management Department for coordinating discharge planning if the patient needs post-hospital services based on physician/advanced practitioner order or complex needs related to functional status, cognitive ability, or social support system  Outcome: Progressing     Problem: Knowledge Deficit  Goal: Patient/family/caregiver demonstrates understanding of disease process, treatment plan, medications, and discharge instructions  Description: Complete learning assessment and assess knowledge base.   Interventions:  - Provide teaching at level of understanding  - Provide teaching via preferred learning methods  Outcome: Progressing     Problem: RESPIRATORY - ADULT  Goal: Achieves optimal ventilation and oxygenation  Description: INTERVENTIONS:  - Assess for changes in respiratory status  - Assess for changes in mentation and behavior  - Position to facilitate oxygenation and minimize respiratory effort  - Oxygen administered by appropriate delivery if ordered  - Initiate smoking cessation education as indicated  - Encourage broncho-pulmonary hygiene including cough, deep breathe, Incentive Spirometry  - Assess the need for suctioning and aspirate as needed  - Assess and instruct to report SOB or any respiratory difficulty  - Respiratory Therapy support as indicated  Outcome: Progressing

## 2023-11-16 VITALS
DIASTOLIC BLOOD PRESSURE: 62 MMHG | TEMPERATURE: 98.1 F | BODY MASS INDEX: 36.08 KG/M2 | OXYGEN SATURATION: 94 % | WEIGHT: 179 LBS | HEART RATE: 103 BPM | HEIGHT: 59 IN | SYSTOLIC BLOOD PRESSURE: 105 MMHG | RESPIRATION RATE: 18 BRPM

## 2023-11-16 PROBLEM — D72.829 LEUKOCYTOSIS: Status: ACTIVE | Noted: 2023-11-16

## 2023-11-16 LAB
ANION GAP SERPL CALCULATED.3IONS-SCNC: 9 MMOL/L
BUN SERPL-MCNC: 23 MG/DL (ref 5–25)
CALCIUM SERPL-MCNC: 9.6 MG/DL (ref 8.4–10.2)
CHLORIDE SERPL-SCNC: 101 MMOL/L (ref 96–108)
CO2 SERPL-SCNC: 26 MMOL/L (ref 21–32)
CREAT SERPL-MCNC: 0.8 MG/DL (ref 0.6–1.3)
ERYTHROCYTE [DISTWIDTH] IN BLOOD BY AUTOMATED COUNT: 17.4 % (ref 11.6–15.1)
GFR SERPL CREATININE-BSD FRML MDRD: 82 ML/MIN/1.73SQ M
GLUCOSE SERPL-MCNC: 150 MG/DL (ref 65–140)
GLUCOSE SERPL-MCNC: 155 MG/DL (ref 65–140)
GLUCOSE SERPL-MCNC: 220 MG/DL (ref 65–140)
HCT VFR BLD AUTO: 41.8 % (ref 34.8–46.1)
HGB BLD-MCNC: 13.9 G/DL (ref 11.5–15.4)
MAGNESIUM SERPL-MCNC: 2.6 MG/DL (ref 1.9–2.7)
MCH RBC QN AUTO: 32.6 PG (ref 26.8–34.3)
MCHC RBC AUTO-ENTMCNC: 33.3 G/DL (ref 31.4–37.4)
MCV RBC AUTO: 98 FL (ref 82–98)
PLATELET # BLD AUTO: 418 THOUSANDS/UL (ref 149–390)
PMV BLD AUTO: 10 FL (ref 8.9–12.7)
POTASSIUM SERPL-SCNC: 4.4 MMOL/L (ref 3.5–5.3)
PROCALCITONIN SERPL-MCNC: 0.19 NG/ML
RBC # BLD AUTO: 4.26 MILLION/UL (ref 3.81–5.12)
SODIUM SERPL-SCNC: 136 MMOL/L (ref 135–147)
WBC # BLD AUTO: 20.58 THOUSAND/UL (ref 4.31–10.16)

## 2023-11-16 PROCEDURE — 80048 BASIC METABOLIC PNL TOTAL CA: CPT | Performed by: NURSE PRACTITIONER

## 2023-11-16 PROCEDURE — 94640 AIRWAY INHALATION TREATMENT: CPT

## 2023-11-16 PROCEDURE — 82785 ASSAY OF IGE: CPT | Performed by: INTERNAL MEDICINE

## 2023-11-16 PROCEDURE — 84145 PROCALCITONIN (PCT): CPT | Performed by: NURSE PRACTITIONER

## 2023-11-16 PROCEDURE — 85027 COMPLETE CBC AUTOMATED: CPT | Performed by: NURSE PRACTITIONER

## 2023-11-16 PROCEDURE — 94760 N-INVAS EAR/PLS OXIMETRY 1: CPT

## 2023-11-16 PROCEDURE — 94664 DEMO&/EVAL PT USE INHALER: CPT

## 2023-11-16 PROCEDURE — 99239 HOSP IP/OBS DSCHRG MGMT >30: CPT | Performed by: NURSE PRACTITIONER

## 2023-11-16 PROCEDURE — 82948 REAGENT STRIP/BLOOD GLUCOSE: CPT

## 2023-11-16 PROCEDURE — 86003 ALLG SPEC IGE CRUDE XTRC EA: CPT | Performed by: INTERNAL MEDICINE

## 2023-11-16 PROCEDURE — 83735 ASSAY OF MAGNESIUM: CPT | Performed by: NURSE PRACTITIONER

## 2023-11-16 PROCEDURE — 99222 1ST HOSP IP/OBS MODERATE 55: CPT | Performed by: INTERNAL MEDICINE

## 2023-11-16 RX ORDER — GUAIFENESIN 600 MG/1
600 TABLET, EXTENDED RELEASE ORAL EVERY 12 HOURS SCHEDULED
Qty: 60 TABLET | Refills: 0 | Status: SHIPPED | OUTPATIENT
Start: 2023-11-16 | End: 2023-12-16

## 2023-11-16 RX ORDER — HYDROCODONE BITARTRATE AND HOMATROPINE METHYLBROMIDE ORAL SOLUTION 5; 1.5 MG/5ML; MG/5ML
5 LIQUID ORAL EVERY 4 HOURS PRN
Qty: 150 ML | Refills: 0 | Status: SHIPPED | OUTPATIENT
Start: 2023-11-16 | End: 2023-11-21

## 2023-11-16 RX ORDER — KETOROLAC TROMETHAMINE 10 MG/1
10 TABLET, FILM COATED ORAL EVERY 6 HOURS PRN
Qty: 20 TABLET | Refills: 0 | Status: SHIPPED | OUTPATIENT
Start: 2023-11-16 | End: 2023-11-21

## 2023-11-16 RX ORDER — ENEMA 19; 7 G/133ML; G/133ML
1 ENEMA RECTAL ONCE AS NEEDED
Status: DISCONTINUED | OUTPATIENT
Start: 2023-11-16 | End: 2023-11-16 | Stop reason: HOSPADM

## 2023-11-16 RX ORDER — PREDNISONE 20 MG/1
TABLET ORAL
Qty: 15 TABLET | Refills: 0
Start: 2023-11-16 | End: 2023-11-28

## 2023-11-16 RX ORDER — BENZONATATE 200 MG/1
200 CAPSULE ORAL 3 TIMES DAILY PRN
Qty: 20 CAPSULE | Refills: 0 | Status: SHIPPED | OUTPATIENT
Start: 2023-11-16

## 2023-11-16 RX ADMIN — BUPROPION HYDROCHLORIDE 150 MG: 150 TABLET, EXTENDED RELEASE ORAL at 09:15

## 2023-11-16 RX ADMIN — Medication 2000 UNITS: at 09:15

## 2023-11-16 RX ADMIN — PANTOPRAZOLE SODIUM 40 MG: 40 TABLET, DELAYED RELEASE ORAL at 09:16

## 2023-11-16 RX ADMIN — KETOROLAC TROMETHAMINE 15 MG: 30 INJECTION, SOLUTION INTRAMUSCULAR; INTRAVENOUS at 12:53

## 2023-11-16 RX ADMIN — LEVALBUTEROL HYDROCHLORIDE 1.25 MG: 1.25 SOLUTION RESPIRATORY (INHALATION) at 07:55

## 2023-11-16 RX ADMIN — MONTELUKAST 10 MG: 10 TABLET, FILM COATED ORAL at 00:04

## 2023-11-16 RX ADMIN — INSULIN LISPRO 1 UNITS: 100 INJECTION, SOLUTION INTRAVENOUS; SUBCUTANEOUS at 12:51

## 2023-11-16 RX ADMIN — GABAPENTIN 100 MG: 100 CAPSULE ORAL at 00:04

## 2023-11-16 RX ADMIN — BUDESONIDE AND FORMOTEROL FUMARATE DIHYDRATE 2 PUFF: 160; 4.5 AEROSOL RESPIRATORY (INHALATION) at 09:16

## 2023-11-16 RX ADMIN — GUAIFENESIN 600 MG: 600 TABLET ORAL at 09:16

## 2023-11-16 RX ADMIN — FAMOTIDINE 40 MG: 20 TABLET, FILM COATED ORAL at 09:15

## 2023-11-16 RX ADMIN — HYDROCODONE BITARTRATE AND HOMATROPINE METHYLBROMIDE ORAL SOLUTION 5 ML: 5; 1.5 LIQUID ORAL at 09:26

## 2023-11-16 RX ADMIN — KETOROLAC TROMETHAMINE 15 MG: 30 INJECTION, SOLUTION INTRAMUSCULAR; INTRAVENOUS at 05:15

## 2023-11-16 RX ADMIN — ENOXAPARIN SODIUM 40 MG: 40 INJECTION SUBCUTANEOUS at 09:17

## 2023-11-16 RX ADMIN — IPRATROPIUM BROMIDE 0.5 MG: 0.5 SOLUTION RESPIRATORY (INHALATION) at 07:55

## 2023-11-16 RX ADMIN — METHYLPREDNISOLONE SODIUM SUCCINATE 40 MG: 40 INJECTION, POWDER, FOR SOLUTION INTRAMUSCULAR; INTRAVENOUS at 09:17

## 2023-11-16 RX ADMIN — GUAIFENESIN 600 MG: 600 TABLET ORAL at 00:04

## 2023-11-16 RX ADMIN — ZOLPIDEM TARTRATE 10 MG: 5 TABLET, COATED ORAL at 00:04

## 2023-11-16 RX ADMIN — GABAPENTIN 100 MG: 100 CAPSULE ORAL at 09:16

## 2023-11-16 RX ADMIN — INSULIN LISPRO 2 UNITS: 100 INJECTION, SOLUTION INTRAVENOUS; SUBCUTANEOUS at 09:18

## 2023-11-16 NOTE — ASSESSMENT & PLAN NOTE
Patient presented to the ED with complaints of shortness of breath with ongoing cough/wheezing   In setting of asthma exacerbation  Recently completed Azithromycin/Prednisone by her PCP. Chest x-ray (11/14/23): Scarring and/or atelectasis at the left lung base.   BNP 51

## 2023-11-16 NOTE — ASSESSMENT & PLAN NOTE
Developing on day two of admission, with tachycardia and leukocytosis of 20.58 today  Likely in setting of steroid use/asthma exacerbation  Afebrile, negative procalcitonin level  No concern for active infection at this time

## 2023-11-16 NOTE — CASE MANAGEMENT
Case Management Discharge Planning Note    Patient name Jasmin Morel  Location [address] [facility] [place] 234/-91 MRN [id number]  : 1965 Date 2023       Current Admission Date: 2023  Current Admission Diagnosis:Severe persistent asthma with exacerbation   Patient Active Problem List    Diagnosis Date Noted    Leukocytosis 2023    Cardiomegaly 11/15/2023    Shortness of breath 2023    Neuropathy 2023    Vitamin D deficiency 2023    Rib pain on left side 2023    Fecal incontinence 08/15/2023    COVID-19 long hauler manifesting chronic dyspnea 08/15/2023    Itching 2023    Polypharmacy 2023    Class 2 obesity with body mass index (BMI) of 35.0 to 35.9 in adult 2023    Dysphagia 2023    Narrowing of airway 01/10/2023    Depression with anxiety 2023    Asthma exacerbation 2023    Chronic cough 2022    GERD (gastroesophageal reflux disease) 10/31/2022    Rectus sheath hematoma 10/29/2022    SIRS (systemic inflammatory response syndrome) ([address]) 10/24/2022    Severe persistent asthma with exacerbation 10/24/2022    Hyperlipidemia 10/24/2022    Diabetes ([address])     Diastolic heart failure ([address]) 2022    Mitral valve disorder 2022    Essential hypertension 2022    Symptomatic menopausal or female climacteric states 2018      LOS (days): 1  Geometric Mean LOS (GMLOS) (days): 2.90  Days to GMLOS:1.9     OBJECTIVE:  Risk of Unplanned Readmission Score: 15.92         Current admission status: Inpatient   Preferred Pharmacy:   [id number], [address]  Phone: [phone] Fax: [phone]    Primary Care Provider: Linsey Jacobs    Primary Insurance: MEDICARE  Secondary Insurance:     DISCHARGE DETAILS:  [facility] [address] , [address] [place]  Phone: [phone]  Fax: [phone]

## 2023-11-16 NOTE — ASSESSMENT & PLAN NOTE
Developing on day two of admission  Likely in setting of steroid use.   Noted to be 20.58  Afebrile, negative procalcitonin level  No

## 2023-11-16 NOTE — CONSULTS
Consultation - Pulmonary Medicine   Lidya Gill 66 y.o. female MRN: [id number]  Unit/Bed#: -01 Encounter: [id number]      8F hx severe uncontrolled asthma (previously on Teszpire), frequent admissions for asthma, post acute sequelae of COVID, HTN, neuropathy, obesity, presenting with asthma exacerbation. # Acute exacerbation of severe uncontrolled asthma  # GERD  Patient with severe asthma, eosinophilic based on historic eos 390. Poorly controlled with 5x hospitalizations this year already. Previoulsy on biologic Tezepelumab for 6 months, last in march had to stop due to problems with insurance but workin davion getting that restarted, however per pt did not significantly decrease exacerbations while on the [facility]. Comorbidities of severe GERD and suspected HEATHER (never diagnosed) as well as obesity likely playing a role in poor control. Also I believe her Raymond Villanueva has working suboptimally since she states it does not enter her lungs as well as her flovent, recommend changing inhaler delivery as below. - change Breo to symbicort  - discharge with prednisone taper: 40mg decrease by 10mg every 3 days  - continue standing inhalers  - continue follow up with [place] pulm, rec changing inhaler from DPI (Breo) to MDI (symbicort) with spacer to ensure mediation is beig appropriately delivered  --- planned to restart biologic as outpatient    Suspected HEATHER  Pt reports snoring, witnessed apneic events, daytime sleepiness and has BMI 36, suspect HEATHER which can contribute to poor asthma control  - never tested for HEATHER, would be willing to do here and follow with sleep at [facility] but prefers to continue PCP and Pulm fu with team in [place], will order home sleep study and sleep follow up    Subjective  Pt reports breathing significantly improved. No wheezing or cough. Objective   Vitals: Blood pressure 105/62, pulse 103, temperature 98.1 °F (36.7 °C), resp.  rate 18, height 4' 11" (1.499 m), weight 81.2 kg (179 lb), SpO2 94 %. ,Body mass index is 36.15 kg/m². Intake/Output Summary (Last 24 hours) at 11/16/2023 1322  Last data filed at 11/16/2023 1300  Gross per 24 hour   Intake 660 ml   Output --   Net 660 ml     Invasive Devices       None                   Physical Exam  Vitals reviewed. Constitutional:       Appearance: Normal appearance. HENT:      Head: Normocephalic and atraumatic. Mouth/Throat:      Pharynx: Oropharynx is clear. Eyes:      Conjunctiva/sclera: Conjunctivae normal.   Cardiovascular:      Rate and Rhythm: Normal rate and regular rhythm. Pulmonary:      Effort: Pulmonary effort is normal. No respiratory distress. Breath sounds: Decreased breath sounds present. No wheezing. Abdominal:      General: Abdomen is flat. There is no distension. Musculoskeletal:         General: Normal range of motion. Cervical back: Normal range of motion. Right lower leg: No edema. Left lower leg: No edema. Skin:     General: Skin is warm and dry. Neurological:      Mental Status: She is alert and oriented to person, place, and time. Psychiatric:         Mood and Affect: Mood normal.         Behavior: Behavior normal.         Lab Results: I have personally reviewed pertinent lab results. , CBC:   Lab Results   Component Value Date    WBC 20.58 (H) 11/16/2023    HGB 13.9 11/16/2023    HCT 41.8 11/16/2023    MCV 98 11/16/2023     (H) 11/16/2023    RBC 4.26 11/16/2023    MCH 32.6 11/16/2023    MCHC 33.3 11/16/2023    RDW 17.4 (H) 11/16/2023    MPV 10.0 11/16/2023   , CMP:   Lab Results   Component Value Date    SODIUM 136 11/16/2023    K 4.4 11/16/2023     11/16/2023    CO2 26 11/16/2023    BUN 23 11/16/2023    CREATININE 0.80 11/16/2023    CALCIUM 9.6 11/16/2023    EGFR 82 11/16/2023     Imaging Studies: I have personally reviewed pertinent reports.    and I have personally reviewed pertinent films in PACS  EKG, Pathology, and Other Studies: I have personally reviewed pertinent reports.     VTE Prophylaxis: Sequential compression device (Venodyne)  and Enoxaparin (Lovenox)    Code Status: Level 1 - Full Code  Advance Directive and Living Will:      Power of :    POLST:

## 2023-11-16 NOTE — PLAN OF CARE
Problem: PAIN - ADULT  Goal: Verbalizes/displays adequate comfort level or baseline comfort level  Description: Interventions:  - Encourage patient to monitor pain and request assistance  - Assess pain using appropriate pain scale  - Administer analgesics based on type and severity of pain and evaluate response  - Implement non-pharmacological measures as appropriate and evaluate response  - Consider cultural and social influences on pain and pain management  - Notify physician/advanced practitioner if interventions unsuccessful or patient reports new pain  Outcome: Progressing     Problem: INFECTION - ADULT  Goal: Absence or prevention of progression during hospitalization  Description: INTERVENTIONS:  - Assess and monitor for signs and symptoms of infection  - Monitor lab/diagnostic results  - Monitor all insertion sites, i.e. indwelling lines, tubes, and drains  - Monitor endotracheal if appropriate and nasal secretions for changes in amount and color  - [place] appropriate cooling/warming therapies per order  - Administer medications as ordered  - Instruct and encourage patient and family to use good hand hygiene technique  - Identify and instruct in appropriate isolation precautions for identified infection/condition  Outcome: Progressing  Goal: Absence of fever/infection during neutropenic period  Description: INTERVENTIONS:  - Monitor WBC    Outcome: Progressing     Problem: SAFETY ADULT  Goal: Patient will remain free of falls  Description: INTERVENTIONS:  - Educate patient/family on patient safety including physical limitations  - Instruct patient to call for assistance with activity   - Consult OT/PT to assist with strengthening/mobility   - Keep Call bell within reach  - Keep bed low and locked with side rails adjusted as appropriate  - Keep care items and personal belongings within reach  - Initiate and maintain comfort rounds  - Make Fall Risk Sign visible to staff  - Offer Toileting every Hours, in advance of need  - Initiate/Maintain alarm  - Obtain necessary fall risk management equipment:   - Apply yellow socks and bracelet for high fall risk patients  - Consider moving patient to room near nurses station  Outcome: Progressing  Goal: Maintain or return to baseline ADL function  Description: INTERVENTIONS:  -  Assess patient's ability to carry out ADLs; assess patient's baseline for ADL function and identify physical deficits which impact ability to perform ADLs (bathing, care of mouth/teeth, toileting, grooming, dressing, etc.)  - Assess/evaluate cause of self-care deficits   - Assess range of motion  - Assess patient's mobility; develop plan if impaired  - Assess patient's need for assistive devices and provide as appropriate  - Encourage maximum independence but intervene and supervise when necessary  - Involve family in performance of ADLs  - Assess for home care needs following discharge   - Consider OT consult to assist with ADL evaluation and planning for discharge  - Provide patient education as appropriate  Outcome: Progressing  Goal: Maintains/Returns to pre admission functional level  Description: INTERVENTIONS:  - Perform AM-PAC 6 Click Basic Mobility/ Daily Activity assessment daily.  - Set and communicate daily mobility goal to care team and patient/family/caregiver. - Collaborate with rehabilitation services on mobility goals if consulted  - Perform Range of Motion  times a day. - Reposition patient every  hours.   - Dangle patient  times a day  - Stand patient  times a day  - Ambulate patient times a day  - Out of bed to chair  times a day   - Out of bed for meals times a day  - Out of bed for toileting  - Record patient progress and toleration of activity level   Outcome: Progressing     Problem: DISCHARGE PLANNING  Goal: Discharge to home or other facility with appropriate resources  Description: INTERVENTIONS:  - Identify barriers to discharge w/patient and caregiver  - Arrange for needed discharge resources and transportation as appropriate  - Identify discharge learning needs (meds, wound care, etc.)  - Arrange for interpretive services to assist at discharge as needed  - Refer to Case Management Department for coordinating discharge planning if the patient needs post-hospital services based on physician/advanced practitioner order or complex needs related to functional status, cognitive ability, or social support system  Outcome: Progressing     Problem: Knowledge Deficit  Goal: Patient/family/caregiver demonstrates understanding of disease process, treatment plan, medications, and discharge instructions  Description: Complete learning assessment and assess knowledge base.   Interventions:  - Provide teaching at level of understanding  - Provide teaching via preferred learning methods  Outcome: Progressing     Problem: RESPIRATORY - ADULT  Goal: Achieves optimal ventilation and oxygenation  Description: INTERVENTIONS:  - Assess for changes in respiratory status  - Assess for changes in mentation and behavior  - Position to facilitate oxygenation and minimize respiratory effort  - Oxygen administered by appropriate delivery if ordered  - Initiate smoking cessation education as indicated  - Encourage broncho-pulmonary hygiene including cough, deep breathe, Incentive Spirometry  - Assess the need for suctioning and aspirate as needed  - Assess and instruct to report SOB or any respiratory difficulty  - Respiratory Therapy support as indicated  Outcome: Progressing

## 2023-11-16 NOTE — RESPIRATORY THERAPY NOTE
11/15/23 2036   Respiratory Assessment   Assessment Type Assess only   General Appearance Awake; Alert   Respiratory Pattern Normal   Chest Assessment Chest expansion symmetrical   Cough Dry;Non-productive   Resp Comments Refused evening treatment.  States it gives her jittersand keeps her awake   Additional Assessments   Pulse 94   Respirations 18   SpO2 94 %

## 2023-11-16 NOTE — DISCHARGE SUMMARY
[address]  Discharge- Jasmin Morel 1965, 66 y.o. female MRN: [id number]  Unit/Bed#: -01 Encounter: [id number]  Primary Care Provider: Linsey Jacobs   Date and time admitted to hospital: 11/14/2023  3:44 PM    * Severe persistent asthma with exacerbation  Assessment & Plan  History of severe persistent asthma  Covid/RSV/Flu negative  Continue with Nebs, Inhalers  Hold off on any further doses of Levaquin  Pulmonology consult, appreciate input  Prednisone taper dosing; decrease by 10 mg every 3 days  Hycodan as needed for cough  Continue with inhalers  Transition patient from Breo to Symbicort at recommendation of Pulmonologist    Shortness of breath  Assessment & Plan  Patient presented to the ED with complaints of shortness of breath with ongoing cough/wheezing   In setting of asthma exacerbation  Recently completed Azithromycin/Prednisone by her PCP. Chest x-ray (11/14/23): Scarring and/or atelectasis at the left lung base. BNP 51    Rib pain on left side  Assessment & Plan  Secondary to rolling off the bed on her  came on the bed. Continue with supportive care  Continue Toradol    COVID-19 long hauler manifesting chronic dyspnea  Assessment & Plan  Diagnosed with COVID-19 back in 2020, was in the intensive care unit. Since then has not been doing well and out of the hospital has been exacerbation. 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 Class 2 obesity with body mass index (BMI) of 35.0 to 35.9 in adult  Assessment & Plan  Currently taking Wegovy/Ozempic. She states that due to being on steroids due to her asthma she had gained 80 pounds. Her A1c is 4.8. Depression with anxiety  Assessment & Plan  Chronic  Continue home medication regimen  Mood Stable    GERD (gastroesophageal reflux disease)  Assessment & Plan  Has severe acid reflux.     Continue Protonix twice daily and Pepcid twice daily. Hyperlipidemia  Assessment & Plan  Patient at home takes Crestor, will convert to formulary to Lipitor 40 mg daily    SIRS (systemic inflammatory response syndrome) (HCC)  Assessment & Plan  Developing on day two of admission, with tachycardia and leukocytosis of 20.58 today  Likely in setting of steroid use/asthma exacerbation  Afebrile, negative procalcitonin level  No concern for active infection at this time      Medical Problems       Resolved Problems  Date Reviewed: 11/16/2023   None       Discharging Physician / Practitioner: Marlen Boggs [place]  PCP: Carlee Grayson  Admission Date:   Admission Orders (From admission, onward)       Ordered        11/15/23 1404  Inpatient Admission  Once            11/14/23 1919  Place in Observation  Once                          Discharge Date: 11/16/23    Consultations During Hospital Stay:  IP CONSULT TO PULMONOLOGY    Procedures Performed:   None    Significant Findings / Test Results:   Chest x-ray (11/14/23): Scarring and/or atelectasis at the left lung base. Echo (11/15/23): Left Ventricle: Left ventricular cavity size is normal. Wall thickness is normal. The left ventricular ejection fraction is 70%. Systolic function is vigorous. Wall motion is normal.     Incidental Findings:   None    Test Results Pending at Discharge (will require follow up): None     Outpatient Tests Requested: Follow up with PCP within 1 week    Complications:  None    Reason for Admission: Asthma exacerbation    Hospital Course:   Mirta Marin is a 66 y.o. female patient with past medical history of asthma, diabetes mellitus, GERD, HLD, HTN who originally presented to the hospital on 11/14/2023 due to shortness of breath, had been seen by Azithromycin/Prednisone taper without improvement. Patient initiated on IV Solu-Medrol on admission, pulmonology consulted and an echocardiogram was completed.   She received two doses of Levaquin and then it was discontinued at discretion of Pulmonology. She improved with IV Solu-Medrol and requested to go home with PO prednisone. She was also initiated on Symbicort and Breo discontinued. She will also be given an order for a sleep study. Vital signs are stable. Labs are stable. Please see above list of diagnoses and related plan for additional information. Condition at Discharge: stable    Discharge Day Visit / Exam:   Subjective:  Patient resting in bed, feels well. Denies complaints of shortness of breath, fever or chills. Vitals: Blood Pressure: 105/62 (11/16/23 0913)  Pulse: 103 (11/16/23 0913)  Temperature: 98.1 °F (36.7 °C) (11/16/23 0728)  Temp Source: Oral (11/15/23 1451)  Respirations: 18 (11/16/23 0728)  Height: 4' 11" (149.9 cm) (11/15/23 0845)  Weight - Scale: 81.2 kg (179 lb) (11/15/23 0845)  SpO2: 94 % (11/16/23 1011)    Exam:   Physical Exam  Vitals and nursing note reviewed. Constitutional:       General: She is not in acute distress. Appearance: She is obese. She is ill-appearing. Cardiovascular:      Rate and Rhythm: Normal rate. Pulses: Normal pulses. Heart sounds: Normal heart sounds. Pulmonary:      Effort: Pulmonary effort is normal. No tachypnea, bradypnea or respiratory distress. Breath sounds: Normal breath sounds. No wheezing. Comments: RA 97%  Abdominal:      General: Bowel sounds are normal.      Palpations: Abdomen is soft. Musculoskeletal:         General: Normal range of motion. Right lower leg: No edema. Left lower leg: No edema. Skin:     General: Skin is warm and dry. Neurological:      Mental Status: She is alert and oriented to person, place, and time. Psychiatric:         Mood and Affect: Mood normal.        Discussion with Family: Patient declined call to . Discharge instructions/Information to patient and family:   See after visit summary for information provided to patient and family. Provisions for Follow-Up Care:  See after visit summary for information related to follow-up care and any pertinent home health orders. Mobility at time of Discharge:   Basic Mobility Inpatient Raw Score: 22  JH-HLM Goal: 7: Walk 25 feet or more  JH-HLM Achieved: 1: Laying in bed  [facility] Goal achieved. Continue to encourage appropriate mobility. Disposition:   Home with VNA Services (Reminder: Complete face to face encounter)    Planned Readmission: None     Discharge Statement:  I spent 45 minutes discharging the patient. This time was spent on the day of discharge. I had direct contact with the patient on the day of discharge. Greater than 50% of the total time was spent examining patient, answering all patient questions, arranging and discussing plan of care with patient as well as directly providing post-discharge instructions. Additional time then spent on discharge activities. Discharge Medications:  See after visit summary for reconciled discharge medications provided to patient and/or family.       **Please Note: This note may have been constructed using a voice recognition system**

## 2023-11-16 NOTE — RESPIRATORY THERAPY NOTE
RT Protocol Note  Whitney Frost 66 y.o. female MRN: [id number]  Unit/Bed#: -01 Encounter: [id number]    Assessment    Principal Problem:    Severe persistent asthma with exacerbation  Active Problems:    Hyperlipidemia    GERD (gastroesophageal reflux disease)    Depression with anxiety    Class 2 obesity with body mass index (BMI) of 35.0 to 35.9 in adult    COVID-19 long hauler manifesting chronic dyspnea    Shortness of breath    Neuropathy    Vitamin D deficiency    Rib pain on left side      Home Pulmonary Medications:  MDI    Past Medical History:   Diagnosis Date    Asthma     Diabetes mellitus ([address])     GERD (gastroesophageal reflux disease)     Hyperlipidemia     Hypertension            Subjective         Objective    Physical Exam:   Assessment Type: Assess only  General Appearance: Awake, Alert  Respiratory Pattern: Normal  Chest Assessment: Chest expansion symmetrical  Bilateral Breath Sounds: Diminished, Crackles, Scattered  Cough: Dry, Non-productive    Vitals:  Blood pressure 130/77, pulse 94, temperature 98.3 °F (36.8 °C), temperature source Oral, resp. rate 18, height 4' 11" (1.499 m), weight 81.2 kg (179 lb), SpO2 94 %. O2 Device: RA     Plan    Respiratory Plan: Moderate/Severe Distress pathway        Resp Comments: Refused evening treatment. She will call for PRN does if needed. Continue with treatments as ordered for asthma exacerbation.

## 2023-11-16 NOTE — ASSESSMENT & PLAN NOTE
Secondary to rolling off the bed on her  came on the bed.   Continue with supportive care  Continue Toradol

## 2023-11-16 NOTE — PROGRESS NOTES
Patient:    MRN:  [id number]    Erasmo Request ID:  [id number]    Level of care reserved:  [address]    Partner Reserved:  [facility] , [facility] , [facility] [address] [facility] [address] [facility] [phone]    Clinical needs requested:    Geography searched:  [phone]    Start of Service:    Request sent:  12:48pm EST on 11/15/2023 by Isidro Betts    Partner reserved:  1:21pm EST on 11/16/2023 by Isidro Betts    Choice list shared:  1:13pm EST on 11/16/2023 by Isidro Betts

## 2023-11-16 NOTE — ASSESSMENT & PLAN NOTE
Diagnosed with COVID-19 back in 2020, was in the intensive care unit. Since then has not been doing well and out of the hospital has been exacerbation. 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

## 2023-11-16 NOTE — ASSESSMENT & PLAN NOTE
History of severe persistent asthma  Covid/RSV/Flu negative  Continue with Nebs, Inhalers  Hold off on any further doses of Levaquin  Pulmonology consult, appreciate input  Prednisone taper dosing; decrease by 10 mg every 3 days  Hycodan as needed for cough  Continue with inhalers  Transition patient from Breo to Symbicort at recommendation of Pulmonologist

## 2023-11-16 NOTE — RESPIRATORY THERAPY NOTE
RT Protocol Note  Dee Infguanaco 66 y.o. female MRN: [id number]  Unit/Bed#: -01 Encounter: [id number]    Assessment    Principal Problem:    Severe persistent asthma with exacerbation  Active Problems:    SIRS (systemic inflammatory response syndrome) (HCC)    Hyperlipidemia    GERD (gastroesophageal reflux disease)    Depression with anxiety    Class 2 obesity with body mass index (BMI) of 35.0 to 35.9 in adult    COVID-19 long hauler manifesting chronic dyspnea    Shortness of breath    Rib pain on left side      Home Pulmonary Medications:         Past Medical History:   Diagnosis Date    Asthma     Diabetes mellitus (HCC)     GERD (gastroesophageal reflux disease)     Hyperlipidemia     Hypertension      Social History     Socioeconomic History    Marital status: /Civil Union     Spouse name: Not on file    Number of children: Not on file    Years of education: Not on file    Highest education level: Not on file   Occupational History    Not on file   Tobacco Use    Smoking status: Never    Smokeless tobacco: Never   Vaping Use    Vaping Use: Never used   Substance and Sexual Activity    Alcohol use: Yes     Alcohol/week: 7.0 standard drinks of alcohol     Types: 7 Glasses of wine per week    Drug use: Never    Sexual activity: Not on file   Other Topics Concern    Not on file   Social History Narrative    Not on file     Social Determinants of Health     Financial Resource Strain: Not on file   Food Insecurity: No Food Insecurity (11/15/2023)    Hunger Vital Sign     Worried About Running Out of Food in the Last Year: Never true     Ran Out of Food in the Last Year: Never true   Transportation Needs: No Transportation Needs (11/15/2023)    PRAPARE - Transportation     Lack of Transportation (Medical): No     Lack of Transportation (Non-Medical):  No   Physical Activity: Not on file   Stress: Not on file   Social Connections: Not on file   Intimate Partner Violence: Not on file   Housing Stability: Low Risk  (11/15/2023)    Housing Stability Vital Sign     Unable to Pay for Housing in the Last Year: No     Number of Places Lived in the Last Year: 1     Unstable Housing in the Last Year: No       Subjective         Objective    Physical Exam:        Vitals:  Blood pressure 105/62, pulse 103, temperature 98.1 °F (36.7 °C), resp. rate 18, height 4' 11" (1.499 m), weight 81.2 kg (179 lb), SpO2 94 %. Imaging and other studies: I have personally reviewed pertinent reports. O2 Device: RA     Plan    Respiratory Plan:  Moderate/Severe Distress pathway        Resp Comments: will continue with atr/xop Q6

## 2023-11-16 NOTE — PLAN OF CARE
Problem: PAIN - ADULT  Goal: Verbalizes/displays adequate comfort level or baseline comfort level  Description: Interventions:  - Encourage patient to monitor pain and request assistance  - Assess pain using appropriate pain scale  - Administer analgesics based on type and severity of pain and evaluate response  - Implement non-pharmacological measures as appropriate and evaluate response  - Consider cultural and social influences on pain and pain management  - Notify physician/advanced practitioner if interventions unsuccessful or patient reports new pain  Outcome: Progressing     Problem: INFECTION - ADULT  Goal: Absence or prevention of progression during hospitalization  Description: INTERVENTIONS:  - Assess and monitor for signs and symptoms of infection  - Monitor lab/diagnostic results  - Monitor all insertion sites, i.e. indwelling lines, tubes, and drains  - Monitor endotracheal if appropriate and nasal secretions for changes in amount and color  - [place] appropriate cooling/warming therapies per order  - Administer medications as ordered  - Instruct and encourage patient and family to use good hand hygiene technique  - Identify and instruct in appropriate isolation precautions for identified infection/condition  Outcome: Progressing  Goal: Absence of fever/infection during neutropenic period  Description: INTERVENTIONS:  - Monitor WBC    Outcome: Progressing     Problem: SAFETY ADULT  Goal: Patient will remain free of falls  Description: INTERVENTIONS:  - Educate patient/family on patient safety including physical limitations  - Instruct patient to call for assistance with activity   - Consult OT/PT to assist with strengthening/mobility   - Keep Call bell within reach  - Keep bed low and locked with side rails adjusted as appropriate  - Keep care items and personal belongings within reach  - Initiate and maintain comfort rounds  - Make Fall Risk Sign visible to staff  - Offer Toileting every  Hours, in advance of need  - Initiate/Maintain alarm  - Obtain necessary fall risk management equipment:   - Apply yellow socks and bracelet for high fall risk patients  - Consider moving patient to room near nurses station  Outcome: Progressing  Goal: Maintain or return to baseline ADL function  Description: INTERVENTIONS:  -  Assess patient's ability to carry out ADLs; assess patient's baseline for ADL function and identify physical deficits which impact ability to perform ADLs (bathing, care of mouth/teeth, toileting, grooming, dressing, etc.)  - Assess/evaluate cause of self-care deficits   - Assess range of motion  - Assess patient's mobility; develop plan if impaired  - Assess patient's need for assistive devices and provide as appropriate  - Encourage maximum independence but intervene and supervise when necessary  - Involve family in performance of ADLs  - Assess for home care needs following discharge   - Consider OT consult to assist with ADL evaluation and planning for discharge  - Provide patient education as appropriate  Outcome: Progressing  Goal: Maintains/Returns to pre admission functional level  Description: INTERVENTIONS:  - Perform AM-PAC 6 Click Basic Mobility/ Daily Activity assessment daily.  - Set and communicate daily mobility goal to care team and patient/family/caregiver. - Collaborate with rehabilitation services on mobility goals if consulted  - Perform Range of Motion  times a day. - Reposition patient every  hours.   - Dangle patient  times a day  - Stand patient  times a day  - Ambulate patient  times a day  - Out of bed to chair  times a day   - Out of bed for meals times a day  - Out of bed for toileting  - Record patient progress and toleration of activity level   Outcome: Progressing     Problem: DISCHARGE PLANNING  Goal: Discharge to home or other facility with appropriate resources  Description: INTERVENTIONS:  - Identify barriers to discharge w/patient and caregiver  - Arrange for needed discharge resources and transportation as appropriate  - Identify discharge learning needs (meds, wound care, etc.)  - Arrange for interpretive services to assist at discharge as needed  - Refer to Case Management Department for coordinating discharge planning if the patient needs post-hospital services based on physician/advanced practitioner order or complex needs related to functional status, cognitive ability, or social support system  Outcome: Progressing     Problem: Knowledge Deficit  Goal: Patient/family/caregiver demonstrates understanding of disease process, treatment plan, medications, and discharge instructions  Description: Complete learning assessment and assess knowledge base.   Interventions:  - Provide teaching at level of understanding  - Provide teaching via preferred learning methods  Outcome: Progressing     Problem: RESPIRATORY - ADULT  Goal: Achieves optimal ventilation and oxygenation  Description: INTERVENTIONS:  - Assess for changes in respiratory status  - Assess for changes in mentation and behavior  - Position to facilitate oxygenation and minimize respiratory effort  - Oxygen administered by appropriate delivery if ordered  - Initiate smoking cessation education as indicated  - Encourage broncho-pulmonary hygiene including cough, deep breathe, Incentive Spirometry  - Assess the need for suctioning and aspirate as needed  - Assess and instruct to report SOB or any respiratory difficulty  - Respiratory Therapy support as indicated  Outcome: Progressing

## 2023-11-18 ENCOUNTER — APPOINTMENT (EMERGENCY)
Dept: CT IMAGING | Facility: HOSPITAL | Age: 58
End: 2023-11-18
Payer: MEDICARE

## 2023-11-18 ENCOUNTER — HOSPITAL ENCOUNTER (EMERGENCY)
Facility: HOSPITAL | Age: 58
Discharge: HOME | End: 2023-11-18
Attending: EMERGENCY MEDICINE
Payer: MEDICARE

## 2023-11-18 VITALS
HEART RATE: 98 BPM | TEMPERATURE: 97.7 F | RESPIRATION RATE: 18 BRPM | OXYGEN SATURATION: 95 % | SYSTOLIC BLOOD PRESSURE: 133 MMHG | DIASTOLIC BLOOD PRESSURE: 73 MMHG

## 2023-11-18 DIAGNOSIS — S20.212A RIB CONTUSION, LEFT, INITIAL ENCOUNTER: Primary | ICD-10-CM
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
## 2023-11-18 LAB
ALBUMIN SERPL BCP-MCNC: 3.6 G/DL (ref 3.5–5)
ALP SERPL-CCNC: 132 U/L (ref 34–104)
ALT SERPL W P-5'-P-CCNC: 46 U/L (ref 7–52)
ANION GAP SERPL CALCULATED.3IONS-SCNC: 6 MMOL/L
AST SERPL W P-5'-P-CCNC: 63 U/L (ref 13–39)
BACTERIA UR QL AUTO: ABNORMAL /HPF
BASOPHILS # BLD AUTO: 0.03 THOUSANDS/ÂΜL (ref 0–0.1)
BASOPHILS NFR BLD AUTO: 0 % (ref 0–1)
BILIRUB DIRECT SERPL-MCNC: 0.09 MG/DL (ref 0–0.2)
BILIRUB SERPL-MCNC: 0.5 MG/DL (ref 0.2–1)
BILIRUB UR QL STRIP: NEGATIVE
BUN SERPL-MCNC: 22 MG/DL (ref 5–25)
CALCIUM SERPL-MCNC: 8.5 MG/DL (ref 8.4–10.2)
CHLORIDE SERPL-SCNC: 103 MMOL/L (ref 96–108)
CLARITY UR: CLEAR
CO2 SERPL-SCNC: 29 MMOL/L (ref 21–32)
COLOR UR: ABNORMAL
CREAT SERPL-MCNC: 0.84 MG/DL (ref 0.6–1.3)
EOSINOPHIL # BLD AUTO: 0.31 THOUSAND/ÂΜL (ref 0–0.61)
EOSINOPHIL NFR BLD AUTO: 2 % (ref 0–6)
ERYTHROCYTE [DISTWIDTH] IN BLOOD BY AUTOMATED COUNT: 17.4 % (ref 11.6–15.1)
GFR SERPL CREATININE-BSD FRML MDRD: 77 ML/MIN/1.73SQ M
GLUCOSE SERPL-MCNC: 121 MG/DL (ref 65–140)
GLUCOSE UR STRIP-MCNC: NEGATIVE MG/DL
HCT VFR BLD AUTO: 42.9 % (ref 34.8–46.1)
HGB BLD-MCNC: 14.1 G/DL (ref 11.5–15.4)
HGB UR QL STRIP.AUTO: ABNORMAL
IMM GRANULOCYTES # BLD AUTO: 0.11 THOUSAND/UL (ref 0–0.2)
IMM GRANULOCYTES NFR BLD AUTO: 1 % (ref 0–2)
KETONES UR STRIP-MCNC: NEGATIVE MG/DL
LEUKOCYTE ESTERASE UR QL STRIP: NEGATIVE
LYMPHOCYTES # BLD AUTO: 4.82 THOUSANDS/ÂΜL (ref 0.6–4.47)
LYMPHOCYTES NFR BLD AUTO: 36 % (ref 14–44)
MCH RBC QN AUTO: 32.8 PG (ref 26.8–34.3)
MCHC RBC AUTO-ENTMCNC: 32.9 G/DL (ref 31.4–37.4)
MCV RBC AUTO: 100 FL (ref 82–98)
MONOCYTES # BLD AUTO: 1.08 THOUSAND/ÂΜL (ref 0.17–1.22)
MONOCYTES NFR BLD AUTO: 8 % (ref 4–12)
NEUTROPHILS # BLD AUTO: 7.15 THOUSANDS/ÂΜL (ref 1.85–7.62)
NEUTS SEG NFR BLD AUTO: 53 % (ref 43–75)
NITRITE UR QL STRIP: NEGATIVE
NON-SQ EPI CELLS URNS QL MICRO: ABNORMAL /HPF
NRBC BLD AUTO-RTO: 0 /100 WBCS
PH UR STRIP.AUTO: 6 [PH]
PLATELET # BLD AUTO: 412 THOUSANDS/UL (ref 149–390)
PMV BLD AUTO: 9.7 FL (ref 8.9–12.7)
POTASSIUM SERPL-SCNC: 3.6 MMOL/L (ref 3.5–5.3)
PROT SERPL-MCNC: 6.5 G/DL (ref 6.4–8.4)
PROT UR STRIP-MCNC: NEGATIVE MG/DL
RBC # BLD AUTO: 4.3 MILLION/UL (ref 3.81–5.12)
RBC #/AREA URNS AUTO: ABNORMAL /HPF
SODIUM SERPL-SCNC: 138 MMOL/L (ref 135–147)
SP GR UR STRIP.AUTO: 1.02 (ref 1–1.03)
UROBILINOGEN UR STRIP-ACNC: <2 MG/DL
WBC # BLD AUTO: 13.5 THOUSAND/UL (ref 4.31–10.16)
WBC #/AREA URNS AUTO: ABNORMAL /HPF

## 2023-11-18 PROCEDURE — 99285 EMERGENCY DEPT VISIT HI MDM: CPT | Performed by: EMERGENCY MEDICINE

## 2023-11-18 PROCEDURE — 74177 CT ABD & PELVIS W/CONTRAST: CPT

## 2023-11-18 PROCEDURE — 96361 HYDRATE IV INFUSION ADD-ON: CPT

## 2023-11-18 PROCEDURE — 96374 THER/PROPH/DIAG INJ IV PUSH: CPT

## 2023-11-18 PROCEDURE — 80048 BASIC METABOLIC PNL TOTAL CA: CPT | Performed by: EMERGENCY MEDICINE

## 2023-11-18 PROCEDURE — 71260 CT THORAX DX C+: CPT

## 2023-11-18 PROCEDURE — 81001 URINALYSIS AUTO W/SCOPE: CPT | Performed by: EMERGENCY MEDICINE

## 2023-11-18 PROCEDURE — 99285 EMERGENCY DEPT VISIT HI MDM: CPT

## 2023-11-18 PROCEDURE — 96376 TX/PRO/DX INJ SAME DRUG ADON: CPT

## 2023-11-18 PROCEDURE — 96375 TX/PRO/DX INJ NEW DRUG ADDON: CPT

## 2023-11-18 PROCEDURE — 80076 HEPATIC FUNCTION PANEL: CPT | Performed by: EMERGENCY MEDICINE

## 2023-11-18 PROCEDURE — G1004 CDSM NDSC: HCPCS

## 2023-11-18 PROCEDURE — 36415 COLL VENOUS BLD VENIPUNCTURE: CPT | Performed by: EMERGENCY MEDICINE

## 2023-11-18 PROCEDURE — 85025 COMPLETE CBC W/AUTO DIFF WBC: CPT | Performed by: EMERGENCY MEDICINE

## 2023-11-18 RX ORDER — KETOROLAC TROMETHAMINE 30 MG/ML
15 INJECTION, SOLUTION INTRAMUSCULAR; INTRAVENOUS ONCE
Status: COMPLETED | OUTPATIENT
Start: 2023-11-18 | End: 2023-11-18

## 2023-11-18 RX ORDER — MORPHINE SULFATE 4 MG/ML
4 INJECTION, SOLUTION INTRAMUSCULAR; INTRAVENOUS ONCE
Status: COMPLETED | OUTPATIENT
Start: 2023-11-18 | End: 2023-11-18

## 2023-11-18 RX ORDER — MORPHINE SULFATE 15 MG/1
TABLET ORAL
Qty: 15 TABLET | Refills: 0 | Status: SHIPPED | OUTPATIENT
Start: 2023-11-18

## 2023-11-18 RX ORDER — LIDOCAINE 50 MG/G
1 PATCH TOPICAL ONCE
Status: DISCONTINUED | OUTPATIENT
Start: 2023-11-18 | End: 2023-11-18 | Stop reason: HOSPADM

## 2023-11-18 RX ORDER — MORPHINE SULFATE 10 MG/ML
6 INJECTION, SOLUTION INTRAMUSCULAR; INTRAVENOUS ONCE
Status: COMPLETED | OUTPATIENT
Start: 2023-11-18 | End: 2023-11-18

## 2023-11-18 RX ORDER — IPRATROPIUM BROMIDE AND ALBUTEROL SULFATE 2.5; .5 MG/3ML; MG/3ML
3 SOLUTION RESPIRATORY (INHALATION) ONCE
Status: COMPLETED | OUTPATIENT
Start: 2023-11-18 | End: 2023-11-18

## 2023-11-18 RX ADMIN — KETOROLAC TROMETHAMINE 15 MG: 30 INJECTION, SOLUTION INTRAMUSCULAR; INTRAVENOUS at 13:36

## 2023-11-18 RX ADMIN — IPRATROPIUM BROMIDE 0.5 MG: 0.5 SOLUTION RESPIRATORY (INHALATION) at 10:13

## 2023-11-18 RX ADMIN — IPRATROPIUM BROMIDE AND ALBUTEROL SULFATE 3 ML: 2.5; .5 SOLUTION RESPIRATORY (INHALATION) at 12:21

## 2023-11-18 RX ADMIN — ALBUTEROL SULFATE 5 MG: 2.5 SOLUTION RESPIRATORY (INHALATION) at 10:13

## 2023-11-18 RX ADMIN — LIDOCAINE 1 PATCH: 50 PATCH CUTANEOUS at 12:06

## 2023-11-18 RX ADMIN — MORPHINE SULFATE 6 MG: 10 INJECTION INTRAVENOUS at 10:26

## 2023-11-18 RX ADMIN — MORPHINE SULFATE 4 MG: 4 INJECTION INTRAVENOUS at 13:36

## 2023-11-18 RX ADMIN — IOHEXOL 100 ML: 350 INJECTION, SOLUTION INTRAVENOUS at 11:59

## 2023-11-18 RX ADMIN — SODIUM CHLORIDE 500 ML: 0.9 INJECTION, SOLUTION INTRAVENOUS at 10:26

## 2023-11-18 NOTE — ED NOTES
1 unsuccessful attempt made by this RN to insert peripheral IV, charge RN at bedside with ultrasound guidance for IV insertion      Cher Linares RN  11/18/23 3822

## 2023-11-18 NOTE — ED NOTES
Pt educated on incentive spirometry use. Pt expressed understanding, no further questions at this time.      Victor Manuel Estrada  11/18/23 9228

## 2023-11-18 NOTE — ED PROVIDER NOTES
History  Chief Complaint   Patient presents with    Flank Pain     Patient reports "she was seen 4 days ago for fall and c/o of increasing left sided flank pain"     Patient is a 51-year-old female with past medical history of asthma, diabetes, hypertension, hyperlipidemia, GERD, presents to the emergency department complaining of worsening and severe pain in her left ribs. Patient states that she fell out of bed on Monday and landed on her left side. She states she has been having pain in the left ribs since. She was evaluated in the ED on 11/14 for this pain as well as for asthma exacerbation and was ultimately admitted to the hospital for her asthma, discharged this past Thursday. She states that overall her asthma is getting better however due to the pain, she feels as though she cannot take a good deep breath because it hurts her ribs. She also notices that when the pain becomes very severe she starts wheezing. She is currently taking steroids for her asthma. She was prescribed oral Toradol which initially helped her pain but now it is not helping. When she was evaluated in the ED, she had a chest x-ray but did not get any type of CT scan for trauma. She denies any significant head strike or loss of consciousness. She localizes the rib pain to just lateral and underneath her left breast.  She denies any neck or back pain, palpitations, headache, dizziness or near syncope, cough or hemoptysis, URI symptoms, abdominal pain, nausea, vomiting, change in bowel habits (has chronic constipation), dysuria, change in frequency, gross hematuria, skin rash or color change, leg pain or swelling, extremity weakness or paresthesia or other focal neurologic deficits. She denies being on any blood thinners.       History provided by:  Patient and medical records   used: No    Flank Pain  Associated symptoms: constipation and shortness of breath    Associated symptoms: no chest pain, no chills, no cough, no diarrhea, no dysuria, no fever, no hematuria, no nausea, no sore throat and no vomiting        Prior to Admission Medications   Prescriptions Last Dose Informant Patient Reported? Taking?    Alpha-Lipoic Acid 600 MG CAPS   Yes No   Sig: every 24 hours   Blood Pressure Monitoring (Blood Pressure Monitor 3) CARLOS MANUEL  Self Yes No   Sig: Use as directed   Cholecalciferol 50 MCG (2000 UT) CAPS  Self Yes No   Sig: every 24 hours   EPINEPHrine (EPIPEN) 0.3 mg/0.3 mL SOAJ  Self Yes No   Sig: as directed   Glucagon 1 MG/0.2ML SOAJ  Self Yes No   HYDROcodone Bit-Homatrop MBr (HYCODAN) 5-1.5 mg/5 mL syrup   No No   Sig: Take 5 mL by mouth every 4 (four) hours as needed for cough for up to 5 days Max Daily Amount: 30 mL   Melatonin 10 MG CAPS  Self Yes No   Sig: Take 10 mg by mouth daily at bedtime as needed   Melatonin 10 MG/ML LIQD   Yes No   Sig: as directed   Multiple Vitamins-Minerals (HIGH POT MULTIVITAMIN/BETA-CAR PO)   Yes No   Sig: every 24 hours   OneTouch Ultra test strip  Self Yes No   Sig: TEST TWICE A DAY   Promethazine-Phenyleph-Codeine 6.25-5-10 MG/5ML SYRP   Yes No   Sig: Every 6 hours   Rosuvastatin Calcium 5 MG CPSP   Yes No   Sig: Take 1 tablet by mouth daily   Semaglutide-Weight Management [facility]) 2.4 MG/0.75ML   Yes No   Si.75 mL   albuterol (2.5 mg/3 mL) 0.083 % nebulizer solution  Self Yes No   Sig: 3 ml   albuterol (PROVENTIL HFA,VENTOLIN HFA) 90 mcg/act inhaler  Self Yes No   Sig: Inhale 2 puffs as needed   benzonatate (TESSALON) 200 MG capsule   No No   Sig: Take 1 capsule (200 mg total) by mouth 3 (three) times a day as needed for cough (2nd line)   buPROPion (Wellbutrin SR) 150 mg 12 hr tablet   Yes No   Sig: Every 12 hours   budesonide-formoterol (SYMBICORT) 160-4.5 mcg/act inhaler  Self Yes No   Sig: Every 12 hours   dicyclomine (BENTYL) 20 mg tablet   No No   Sig: TAKE 1 TABLET BY MOUTH EVERY 6 HOURS AS NEEDED FOR ABDOMINAL CRAMPING   diphenhydrAMINE (BENADRYL) 25 mg capsule  Self Yes No   Sig: 3 (three) times a day   famotidine (PEPCID) 40 MG tablet   No No   Sig: TAKE 1 TABLET BY MOUTH TWICE A DAY WITH LUNCH AND BEFORE BEDTIME   fluticasone-vilanterol 200-25 mcg/actuation inhaler   Yes No   Si puff every 24 hours   gabapentin (NEURONTIN) 100 mg capsule  Self Yes No   Sig: Take 100 mg by mouth 3 (three) times a day as needed   guaiFENesin (MUCINEX) 600 mg 12 hr tablet   No No   Sig: Take 1 tablet (600 mg total) by mouth every 12 (twelve) hours   hydrOXYzine HCL (ATARAX) 25 mg tablet   No No   Sig: Take 1 tablet (25 mg total) by mouth every 6 (six) hours as needed for itching for up to 14 days   insulin aspart (NovoLOG FlexPen) 100 UNIT/ML injection pen   No No   Sig: Inject 3 Units under the skin 3 (three) times a day with meals   ipratropium (ATROVENT) 0.02 % nebulizer solution  Self No No   Sig: Take 2.5 mL (0.5 mg total) by nebulization 3 (three) times a day   ipratropium-albuterol (DUO-NEB) 0.5-2.5 mg/3 mL nebulizer solution  Self No No   Sig: Take 3 mL by nebulization every 4 (four) hours as needed for wheezing or shortness of breath   ketorolac (TORADOL) 10 mg tablet   No No   Sig: Take 1 tablet (10 mg total) by mouth every 6 (six) hours as needed for moderate pain for up to 5 days   montelukast (SINGULAIR) 10 mg tablet  Self No No   Sig: Take 1 tablet (10 mg total) by mouth daily at bedtime   ondansetron (ZOFRAN) 4 mg tablet  Self Yes No   Sig: Take 1 tablet by mouth every 8 (eight) hours   pantoprazole (PROTONIX) 40 mg  Self No No   Sig: Take 1 packet (40 mg total) by mouth 2 (two) times a day before meals   pravastatin (PRAVACHOL) 40 mg tablet  Self No No   Sig: Take 1 tablet (40 mg total) by mouth daily with dinner   predniSONE 20 mg tablet   No No   Sig: Take 2 tablets (40 mg total) by mouth daily for 3 days, THEN 1.5 tablets (30 mg total) daily for 3 days, THEN 1 tablet (20 mg total) daily for 3 days, THEN 0.5 tablets (10 mg total) daily for 3 days.    promethazine (PHENERGAN) 25 mg tablet  Self No No   Sig: Take 1 tablet (25 mg total) by mouth every 6 (six) hours as needed for nausea or vomiting   rosuvastatin (CRESTOR) 5 mg tablet  Self Yes No   Sig: Take 1 tablet by mouth daily   torsemide (DEMADEX) 10 mg tablet  Self Yes No   Sig: Take 10 mg by mouth daily   zolpidem (AMBIEN) 10 mg tablet   No No   Sig: Take 1 tablet (10 mg total) by mouth daily at bedtime as needed for sleep for up to 7 days      Facility-Administered Medications: None       Past Medical History:   Diagnosis Date    Asthma     Diabetes mellitus ([address])     GERD (gastroesophageal reflux disease)     Hyperlipidemia     Hypertension        Past Surgical History:   Procedure Laterality Date     SECTION      COLONOSCOPY      HYSTERECTOMY      OVARIAN CYST REMOVAL         No family history on file. I have reviewed and agree with the history as documented. E-Cigarette/Vaping    E-Cigarette Use Never User      E-Cigarette/Vaping Substances    Nicotine No     THC No     CBD No     Flavoring No     Other No     Unknown No      Social History     Tobacco Use    Smoking status: Never    Smokeless tobacco: Never   Vaping Use    Vaping Use: Never used   Substance Use Topics    Alcohol use: Yes     Alcohol/week: 7.0 standard drinks of alcohol     Types: 7 Glasses of wine per week    Drug use: Never       Review of Systems   Constitutional:  Negative for chills and fever. HENT:  Negative for congestion, ear pain, rhinorrhea and sore throat. Respiratory:  Positive for shortness of breath and wheezing. Negative for cough and chest tightness. Cardiovascular:  Negative for chest pain and palpitations. +Left rib pain s/p fall   Gastrointestinal:  Positive for constipation. Negative for abdominal distention, abdominal pain, diarrhea, nausea and vomiting. Genitourinary:  Positive for flank pain. Negative for dysuria, frequency and hematuria. Musculoskeletal:  Negative for back pain, neck pain and neck stiffness. Skin:  Negative for color change, pallor, rash and wound. Allergic/Immunologic: Negative for immunocompromised state. Neurological:  Negative for dizziness, syncope, weakness, light-headedness, numbness and headaches. Hematological:  Negative for adenopathy. Psychiatric/Behavioral:  Negative for confusion and decreased concentration. All other systems reviewed and are negative. Physical Exam  Physical Exam  Vitals and nursing note reviewed. Constitutional:       General: She is not in acute distress. Appearance: Normal appearance. She is well-developed. She is not ill-appearing, toxic-appearing or diaphoretic. HENT:      Head: Normocephalic and atraumatic. Right Ear: External ear normal.      Left Ear: External ear normal.      Nose: Nose normal.      Mouth/Throat:      Mouth: Mucous membranes are moist.      Pharynx: Oropharynx is clear. Eyes:      Extraocular Movements: Extraocular movements intact. Conjunctiva/sclera: Conjunctivae normal.   Neck:      Vascular: No JVD. Cardiovascular:      Rate and Rhythm: Normal rate and regular rhythm. Pulses: Normal pulses. Heart sounds: Normal heart sounds. No murmur heard. No friction rub. No gallop. Pulmonary:      Effort: Pulmonary effort is normal. No respiratory distress. Breath sounds: Wheezing present. No rhonchi or rales. Comments: Poor inspiratory effort due to acute rib pain. After patient tried to maneuver on the stretcher, she had audible wheezing. +Tenderness over left lower lateral/anterior ribs. Chest:      Chest wall: Tenderness present. Abdominal:      General: There is no distension. Palpations: Abdomen is soft. Tenderness: There is abdominal tenderness. There is no right CVA tenderness, left CVA tenderness, guarding or rebound. Comments: +LUQ abdominal tenderness. Musculoskeletal:         General: No deformity. Normal range of motion.       Cervical back: Normal range of motion and neck supple. No rigidity or tenderness. Comments: No midline cervical, thoracic or lumbar spine tenderness. No step-offs. No posterior rib tenderness. No CVA tenderness. Skin:     General: Skin is warm and dry. Coloration: Skin is not pale. Findings: No erythema or rash. Neurological:      General: No focal deficit present. Mental Status: She is alert and oriented to person, place, and time. Sensory: No sensory deficit. Motor: No weakness.    Psychiatric:         Mood and Affect: Mood normal.         Behavior: Behavior normal.         Vital Signs  ED Triage Vitals   Temperature Pulse Respirations Blood Pressure SpO2   11/18/23 0934 11/18/23 0934 11/18/23 0934 11/18/23 0934 11/18/23 0934   97.7 °F (36.5 °C) 88 18 126/78 99 %      Temp Source Heart Rate Source Patient Position - Orthostatic VS BP Location FiO2 (%)   11/18/23 0934 11/18/23 0934 -- 11/18/23 0934 --   Oral Monitor  Right arm       Pain Score       11/18/23 1026       9         Vitals:    11/18/23 0934 11/18/23 1100 11/18/23 1215   BP: 126/78 132/80 133/73   BP Location: Right arm Left arm Left arm   Pulse: 88 101 98   Resp: 18 18 18   Temp: 97.7 °F (36.5 °C)     TempSrc: Oral     SpO2: 99% 95% 95%          Visual Acuity      ED Medications  Medications   lidocaine (LIDODERM) 5 % patch 1 patch (1 patch Topical Medication Applied 11/18/23 1206)   sodium chloride 0.9 % bolus 500 mL (0 mL Intravenous Stopped 11/18/23 1334)   morphine injection 6 mg (6 mg Intravenous Given 11/18/23 1026)   ipratropium (ATROVENT) 0.02 % inhalation solution 0.5 mg (0.5 mg Nebulization Given 11/18/23 1013)   albuterol inhalation solution 5 mg (5 mg Nebulization Given 11/18/23 1013)   iohexol (OMNIPAQUE) 350 MG/ML injection (MULTI-DOSE) 100 mL (100 mL Intravenous Given 11/18/23 1159)   ipratropium-albuterol (DUO-NEB) 0.5-2.5 mg/3 mL inhalation solution 3 mL (3 mL Nebulization Given 11/18/23 1221)   ketorolac (TORADOL) injection 15 mg (15 mg Intravenous Given 11/18/23 1336)   morphine injection 4 mg (4 mg Intravenous Given 11/18/23 1336)       Diagnostic Studies  Results Reviewed       Procedure Component Value Units Date/Time    Basic metabolic panel [[id number]] Collected: 11/18/23 1024    Lab Status: Final result Specimen: Blood from Arm, Right Updated: 11/18/23 1050     Sodium 138 mmol/L      Potassium 3.6 mmol/L      Chloride 103 mmol/L      CO2 29 mmol/L      ANION GAP 6 mmol/L      BUN 22 mg/dL      Creatinine 0.84 mg/dL      Glucose 121 mg/dL      Calcium 8.5 mg/dL      eGFR 77 ml/min/1.73sq m     Narrative:      [facility] guidelines for [facility] Kid[facility] Disease (CKD):     Stage 1 with normal or high GFR (GFR > 90 mL/min/1.73 square meters)    Stage 2 Mild CKD (GFR = 60-89 mL/min/1.73 square meters)    Stage 3A Moderate CKD (GFR = 45-59 mL/min/1.73 square meters)    Stage 3B Moderate CKD (GFR = 30-44 mL/min/1.73 square meters)    Stage 4 Severe CKD (GFR = 15-29 mL/min/1.73 square meters)    Stage 5 End Stage CKD (GFR <15 mL/min/1.73 square meters)  Note: GFR calculation is accurate only with a steady state creatinine    Hepatic function panel [[id number]]  (Abnormal) Collected: 11/18/23 1024    Lab Status: Final result Specimen: Blood from Arm, Right Updated: 11/18/23 1050     Total Bilirubin 0.50 mg/dL      Bilirubin, Direct 0.09 mg/dL      Alkaline Phosphatase 132 U/L      AST 63 U/L      ALT 46 U/L      Total Protein 6.5 g/dL      Albumin 3.6 g/dL     Urine Microscopic [[id number]]  (Abnormal) Collected: 11/18/23 1024    Lab Status: Final result Specimen: Urine, Clean Catch Updated: 11/18/23 1044     RBC, UA 2-4 /hpf      WBC, UA 1-2 /hpf      Epithelial Cells Occasional /hpf      Bacteria, UA Occasional /hpf     UA (URINE) with reflex to Scope [[id number]]  (Abnormal) Collected: 11/18/23 1024    Lab Status: Final result Specimen: Urine, Clean Catch Updated: 11/18/23 1043     Color, UA Light Yellow     Clarity, UA Clear     Specific Gravity, UA 1.025     pH, UA 6.0     Leukocytes, UA Negative     Nitrite, UA Negative     Protein, UA Negative mg/dl      Glucose, UA Negative mg/dl      Ketones, UA Negative mg/dl      Urobilinogen, UA <2.0 mg/dl      Bilirubin, UA Negative     Occult Blood, UA Small    CBC and differential [[id number]]  (Abnormal) Collected: 11/18/23 1024    Lab Status: Final result Specimen: Blood from Arm, Right Updated: 11/18/23 1030     WBC 13.50 Thousand/uL      RBC 4.30 Million/uL      Hemoglobin 14.1 g/dL      Hematocrit 42.9 %       fL      MCH 32.8 pg      MCHC 32.9 g/dL      RDW 17.4 %      MPV 9.7 fL      Platelets 262 Thousands/uL      nRBC 0 /100 WBCs      Neutrophils Relative 53 %      Immat GRANS % 1 %      Lymphocytes Relative 36 %      Monocytes Relative 8 %      Eosinophils Relative 2 %      Basophils Relative 0 %      Neutrophils Absolute 7.15 Thousands/µL      Immature Grans Absolute 0.11 Thousand/uL      Lymphocytes Absolute 4.82 Thousands/µL      Monocytes Absolute 1.08 Thousand/µL      Eosinophils Absolute 0.31 Thousand/µL      Basophils Absolute 0.03 Thousands/µL                    CT chest abdomen pelvis w contrast   Final Result by Paulo Rubinstein, MD (11/18 1244)   No evidence of left-sided rib fracture or other acute posttraumatic injury throughout the chest, abdomen or pelvis. Stable bibasilar atelectasis. Workstation performed: [id number]                    Procedures  Procedures         ED Course  ED Course as of 11/18/23 1340   Sat Nov 18, 2023   1109 WBC(!): 13.50  WBC decreased from recent labs during hospitalization. 1110 Alkaline Phosphatase(!): 132  Improved from 4 days ago. [id number] Updated patient about normal CT scan and diagnosis of rib contusion. Patient still having pain so we will give a dose of IV Toradol as well as additional morphine.   Will prescribe oral morphine and advised her to continue taking her oral Toradol and add Tylenol and to save the morphine for severe pain. Patient to be sent home with an incentive spirometer and I advised her to use this multiple times daily. Discussed ED return parameters. SBIRT 22yo+      Flowsheet Row Most Recent Value   Initial Alcohol Screen: US AUDIT-C     1. How often do you have a drink containing alcohol? 0 Filed at: 11/18/2023 0937   2. How many drinks containing alcohol do you have on a typical day you are drinking? 0 Filed at: 11/18/2023 0937   3b. FEMALE Any Age, or MALE 65+: How often do you have 4 or more drinks on one occassion? 0 Filed at: 11/18/2023 8775   Audit-C Score 0 Filed at: 11/18/2023 4831   JANNETH: How many times in the past year have you. .. Used an illegal drug or used a prescription medication for non-medical reasons? Never Filed at: 11/18/2023 0205                      Medical Decision Making  24-year-old female with history of asthma, presents to the ED for worsening pain related to a fall out of bed 5 days ago resulting in left rib injury. She was evaluated in the ED with a chest x-ray which was unremarkable and ultimately admitted for asthma exacerbation. She states her pain is getting progressively worse which is causing her asthma to flareup however she does report overall her asthma is improved. Will obtain CT scan of the chest, abdomen and pelvis to rule out rib injury, lung contusion, splenic or renal injury. Will check basic labs, will give a breathing treatment for her wheezing as well as IV morphine and lidocaine patch for pain control. Amount and/or Complexity of Data Reviewed  Labs: ordered. Decision-making details documented in ED Course. Radiology: ordered. Decision-making details documented in ED Course. Risk  Prescription drug management.              Disposition  Final diagnoses:   Rib contusion, left, initial encounter   Fall, subsequent encounter     Time reflects when diagnosis was documented in both MDM as applicable and the Disposition within this note       Time User Action Codes Description Comment    11/18/2023  1:34 PM Jake Spring Add [S20.212A] Rib contusion, left, initial encounter     11/18/2023  1:34 PM Jake Esteban LLANOS Add [S34. XXXD] Fall, subsequent encounter           ED Disposition       ED Disposition   Discharge    Condition   Stable    Date/Time   Sat Nov 18, 2023 [facility] discharge to home/self care. Follow-up Information       Follow up With Specialties Details Why Contact Info Additional Information    Edmundo Kingston  Schedule an appointment as soon as possible for a visit   [address] [facility] Emergency Department Emergency Medicine Go to  If symptoms worsen [address] [facility] Emergency Department, [place], [place], [address]            Patient's Medications   Discharge Prescriptions    MORPHINE (MSIR) 15 MG TABLET    Take half a tablet and up to 1 full tablet every 4-6 hours as needed for severe pain       Start Date: 11/18/2023End Date: --       Order Dose: --       Quantity: 15 tablet    Refills: 0       No discharge procedures on file.     PDMP Review         Value Time User    PDMP Reviewed  Yes 11/15/2023  2:21 PM Gage Wu, [address] [facility]            ED Provider  Electronically Signed by             Kira Casillas DO  11/18/23 6889

## 2023-11-21 ENCOUNTER — TELEPHONE (OUTPATIENT)
Dept: GASTROENTEROLOGY | Facility: CLINIC | Age: 58
End: 2023-11-21

## 2023-11-21 LAB

## 2023-12-14 ENCOUNTER — TELEPHONE (OUTPATIENT)
Dept: SLEEP CENTER | Facility: CLINIC | Age: 58
End: 2023-12-14

## 2023-12-14 DIAGNOSIS — G47.33 OSA (OBSTRUCTIVE SLEEP APNEA): Primary | ICD-10-CM

## 2023-12-14 PROBLEM — I50.30 DIASTOLIC HEART FAILURE (HCC): Status: RESOLVED | Noted: 2022-04-21 | Resolved: 2023-12-14

## 2023-12-14 NOTE — TELEPHONE ENCOUNTER
----- Message from Joo Delvalle MD sent at 12/13/2023  5:00 PM EST -----  Per Medicare rules, with heart failure, test must be completed in sleep lab. Cannot have HST  Please expedite   ----- Message -----  From: Pily Donohue  Sent: 12/13/2023   9:01 AM EST  To: Sleep Medicine [facility] Provider    This home sleep study needs approval.     If approved please sign and return to clerical pool. If denied please include reasons why. Also provide alternative testing if warranted. Please sign and return to clerical pool.

## 2023-12-14 NOTE — TELEPHONE ENCOUNTER
Got it, I think you wrote CHF on the initial order? (Problem list mentions diastolic heart failure). If that is not accurate, can you resubmit the order and/or remove that from the problem list if you feel that would be appropriate? You may have heard that the ordering system just changed so it is possible you may need to use the new system. I am looping in my nurse to let her know that the patient does not have congestive heart failure.

## 2024-01-08 ENCOUNTER — HOSPITAL ENCOUNTER (OUTPATIENT)
Facility: CLINIC | Age: 59
Discharge: HOME | End: 2024-01-08
Payer: MEDICARE

## 2024-01-08 DIAGNOSIS — G47.33 OSA (OBSTRUCTIVE SLEEP APNEA): ICD-10-CM

## 2024-01-08 PROCEDURE — G0399 HOME SLEEP TEST/TYPE 3 PORTA: HCPCS

## 2024-01-09 PROBLEM — G47.33 OSA (OBSTRUCTIVE SLEEP APNEA): Status: ACTIVE | Noted: 2024-01-09

## 2024-01-09 NOTE — PROGRESS NOTES
Home Sleep Study Documentation    HOME STUDY DEVICE: Noxturnal yes                                           Essence G3 no      Pre-Sleep Home Study:    Set-up and instructions performed by: T Tech    Technician performed demonstration for Patient: yes    Return demonstration performed by Patient: yes    Written instructions provided to Patient: yes    Patient signed consent form: yes        Post-Sleep Home Study:    Additional comments by Patient: pending    Home Sleep Study Failed:no:    Failure reason: N/A    Reported or Detected: N/A    Scored by: DAVID Howell

## 2024-01-10 DIAGNOSIS — G47.33 OSA (OBSTRUCTIVE SLEEP APNEA): Primary | ICD-10-CM

## 2024-01-10 PROCEDURE — 95806 SLEEP STUDY UNATT&RESP EFFT: CPT | Performed by: INTERNAL MEDICINE

## 2024-01-17 ENCOUNTER — APPOINTMENT (EMERGENCY)
Dept: RADIOLOGY | Facility: HOSPITAL | Age: 59
DRG: 871 | End: 2024-01-17
Payer: MEDICARE

## 2024-01-17 ENCOUNTER — HOSPITAL ENCOUNTER (INPATIENT)
Facility: HOSPITAL | Age: 59
LOS: 1 days | Discharge: HOME | DRG: 871 | End: 2024-01-19
Attending: EMERGENCY MEDICINE | Admitting: HOSPITALIST
Payer: MEDICARE

## 2024-01-17 DIAGNOSIS — J45.901 ASTHMA EXACERBATION: ICD-10-CM

## 2024-01-17 DIAGNOSIS — U07.1 COVID-19: ICD-10-CM

## 2024-01-17 DIAGNOSIS — R09.02 HYPOXIA: ICD-10-CM

## 2024-01-17 DIAGNOSIS — U07.1 COVID: Primary | ICD-10-CM

## 2024-01-17 PROBLEM — R65.21 SEPTIC SHOCK (HCC): Status: ACTIVE | Noted: 2024-01-17

## 2024-01-17 PROBLEM — A41.9 SEVERE SEPSIS (HCC): Status: ACTIVE | Noted: 2024-01-17

## 2024-01-17 PROBLEM — R65.20 SEVERE SEPSIS (HCC): Status: ACTIVE | Noted: 2024-01-17

## 2024-01-17 LAB
ALBUMIN SERPL BCP-MCNC: 3.7 G/DL (ref 3.5–5)
ALP SERPL-CCNC: 111 U/L (ref 34–104)
ALT SERPL W P-5'-P-CCNC: 12 U/L (ref 7–52)
ANION GAP SERPL CALCULATED.3IONS-SCNC: 10 MMOL/L
AST SERPL W P-5'-P-CCNC: 17 U/L (ref 13–39)
ATRIAL RATE: 105 BPM
ATRIAL RATE: 105 BPM
ATRIAL RATE: 120 BPM
BASE EX.OXY STD BLDV CALC-SCNC: 94.6 % (ref 60–80)
BASE EXCESS BLDV CALC-SCNC: -2.2 MMOL/L
BASOPHILS # BLD AUTO: 0.02 THOUSANDS/ÂΜL (ref 0–0.1)
BASOPHILS NFR BLD AUTO: 0 % (ref 0–1)
BETA-HYDROXYBUTYRATE: 0.1 MMOL/L
BILIRUB SERPL-MCNC: 0.6 MG/DL (ref 0.2–1)
BUN SERPL-MCNC: 10 MG/DL (ref 5–25)
CALCIUM SERPL-MCNC: 9.2 MG/DL (ref 8.4–10.2)
CARDIAC TROPONIN I PNL SERPL HS: 2 NG/L
CHLORIDE SERPL-SCNC: 105 MMOL/L (ref 96–108)
CO2 SERPL-SCNC: 22 MMOL/L (ref 21–32)
CREAT SERPL-MCNC: 0.88 MG/DL (ref 0.6–1.3)
EOSINOPHIL # BLD AUTO: 0.12 THOUSAND/ÂΜL (ref 0–0.61)
EOSINOPHIL NFR BLD AUTO: 1 % (ref 0–6)
ERYTHROCYTE [DISTWIDTH] IN BLOOD BY AUTOMATED COUNT: 13.3 % (ref 11.6–15.1)
FLUAV RNA RESP QL NAA+PROBE: NEGATIVE
FLUBV RNA RESP QL NAA+PROBE: NEGATIVE
GFR SERPL CREATININE-BSD FRML MDRD: 73 ML/MIN/1.73SQ M
GLUCOSE SERPL-MCNC: 148 MG/DL (ref 65–140)
GLUCOSE SERPL-MCNC: 182 MG/DL (ref 65–140)
GLUCOSE SERPL-MCNC: 189 MG/DL (ref 65–140)
HCG SERPL QL: NEGATIVE
HCO3 BLDV-SCNC: 21.6 MMOL/L (ref 24–30)
HCT VFR BLD AUTO: 42.5 % (ref 34.8–46.1)
HGB BLD-MCNC: 14.3 G/DL (ref 11.5–15.4)
IMM GRANULOCYTES # BLD AUTO: 0.06 THOUSAND/UL (ref 0–0.2)
IMM GRANULOCYTES NFR BLD AUTO: 1 % (ref 0–2)
LACTATE SERPL-SCNC: 3.5 MMOL/L (ref 0.5–2)
LACTATE SERPL-SCNC: 3.5 MMOL/L (ref 0.5–2)
LACTATE SERPL-SCNC: 4.4 MMOL/L (ref 0.5–2)
LACTATE SERPL-SCNC: 4.9 MMOL/L (ref 0.5–2)
LACTATE SERPL-SCNC: 5.5 MMOL/L (ref 0.5–2)
LYMPHOCYTES # BLD AUTO: 1.76 THOUSANDS/ÂΜL (ref 0.6–4.47)
LYMPHOCYTES NFR BLD AUTO: 15 % (ref 14–44)
MCH RBC QN AUTO: 32.2 PG (ref 26.8–34.3)
MCHC RBC AUTO-ENTMCNC: 33.6 G/DL (ref 31.4–37.4)
MCV RBC AUTO: 96 FL (ref 82–98)
MONOCYTES # BLD AUTO: 0.72 THOUSAND/ÂΜL (ref 0.17–1.22)
MONOCYTES NFR BLD AUTO: 6 % (ref 4–12)
NEUTROPHILS # BLD AUTO: 8.79 THOUSANDS/ÂΜL (ref 1.85–7.62)
NEUTS SEG NFR BLD AUTO: 77 % (ref 43–75)
NRBC BLD AUTO-RTO: 0 /100 WBCS
O2 CT BLDV-SCNC: 19 ML/DL
P AXIS: 49 DEGREES
P AXIS: 60 DEGREES
PCO2 BLDV: 34.2 MM HG (ref 42–50)
PH BLDV: 7.42 [PH] (ref 7.3–7.4)
PLATELET # BLD AUTO: 418 THOUSANDS/UL (ref 149–390)
PMV BLD AUTO: 10.6 FL (ref 8.9–12.7)
PO2 BLDV: 95.7 MM HG (ref 35–45)
POTASSIUM SERPL-SCNC: 3.6 MMOL/L (ref 3.5–5.3)
PR INTERVAL: 104 MS
PR INTERVAL: 142 MS
PR INTERVAL: 146 MS
PROCALCITONIN SERPL-MCNC: 0.12 NG/ML
PROT SERPL-MCNC: 7.5 G/DL (ref 6.4–8.4)
QRS AXIS: 12 DEGREES
QRS AXIS: 16 DEGREES
QRS AXIS: 28 DEGREES
QRSD INTERVAL: 66 MS
QT INTERVAL: 332 MS
QT INTERVAL: 368 MS
QT INTERVAL: 428 MS
QTC INTERVAL: 438 MS
QTC INTERVAL: 486 MS
QTC INTERVAL: 604 MS
RBC # BLD AUTO: 4.44 MILLION/UL (ref 3.81–5.12)
RSV RNA RESP QL NAA+PROBE: NEGATIVE
SARS-COV-2 RNA RESP QL NAA+PROBE: POSITIVE
SODIUM SERPL-SCNC: 137 MMOL/L (ref 135–147)
T WAVE AXIS: 12 DEGREES
T WAVE AXIS: 14 DEGREES
T WAVE AXIS: 28 DEGREES
VENTRICULAR RATE: 105 BPM
VENTRICULAR RATE: 105 BPM
VENTRICULAR RATE: 120 BPM
WBC # BLD AUTO: 11.47 THOUSAND/UL (ref 4.31–10.16)

## 2024-01-17 PROCEDURE — 84484 ASSAY OF TROPONIN QUANT: CPT | Performed by: EMERGENCY MEDICINE

## 2024-01-17 PROCEDURE — 71046 X-RAY EXAM CHEST 2 VIEWS: CPT

## 2024-01-17 PROCEDURE — 84703 CHORIONIC GONADOTROPIN ASSAY: CPT | Performed by: EMERGENCY MEDICINE

## 2024-01-17 PROCEDURE — 94664 DEMO&/EVAL PT USE INHALER: CPT

## 2024-01-17 PROCEDURE — 99223 1ST HOSP IP/OBS HIGH 75: CPT | Performed by: STUDENT IN AN ORGANIZED HEALTH CARE EDUCATION/TRAINING PROGRAM

## 2024-01-17 PROCEDURE — 93005 ELECTROCARDIOGRAM TRACING: CPT

## 2024-01-17 PROCEDURE — 83605 ASSAY OF LACTIC ACID: CPT | Performed by: STUDENT IN AN ORGANIZED HEALTH CARE EDUCATION/TRAINING PROGRAM

## 2024-01-17 PROCEDURE — 82010 KETONE BODYS QUAN: CPT | Performed by: EMERGENCY MEDICINE

## 2024-01-17 PROCEDURE — 96365 THER/PROPH/DIAG IV INF INIT: CPT

## 2024-01-17 PROCEDURE — 82805 BLOOD GASES W/O2 SATURATION: CPT | Performed by: EMERGENCY MEDICINE

## 2024-01-17 PROCEDURE — 80053 COMPREHEN METABOLIC PANEL: CPT | Performed by: EMERGENCY MEDICINE

## 2024-01-17 PROCEDURE — 99284 EMERGENCY DEPT VISIT MOD MDM: CPT

## 2024-01-17 PROCEDURE — 36415 COLL VENOUS BLD VENIPUNCTURE: CPT | Performed by: EMERGENCY MEDICINE

## 2024-01-17 PROCEDURE — 96361 HYDRATE IV INFUSION ADD-ON: CPT

## 2024-01-17 PROCEDURE — 94640 AIRWAY INHALATION TREATMENT: CPT

## 2024-01-17 PROCEDURE — 84145 PROCALCITONIN (PCT): CPT | Performed by: STUDENT IN AN ORGANIZED HEALTH CARE EDUCATION/TRAINING PROGRAM

## 2024-01-17 PROCEDURE — 96375 TX/PRO/DX INJ NEW DRUG ADDON: CPT

## 2024-01-17 PROCEDURE — 0241U HB NFCT DS VIR RESP RNA 4 TRGT: CPT | Performed by: EMERGENCY MEDICINE

## 2024-01-17 PROCEDURE — 85025 COMPLETE CBC W/AUTO DIFF WBC: CPT | Performed by: EMERGENCY MEDICINE

## 2024-01-17 PROCEDURE — 82948 REAGENT STRIP/BLOOD GLUCOSE: CPT

## 2024-01-17 PROCEDURE — 94760 N-INVAS EAR/PLS OXIMETRY 1: CPT

## 2024-01-17 PROCEDURE — 87040 BLOOD CULTURE FOR BACTERIA: CPT | Performed by: STUDENT IN AN ORGANIZED HEALTH CARE EDUCATION/TRAINING PROGRAM

## 2024-01-17 PROCEDURE — 99285 EMERGENCY DEPT VISIT HI MDM: CPT | Performed by: EMERGENCY MEDICINE

## 2024-01-17 RX ORDER — ONDANSETRON 2 MG/ML
4 INJECTION INTRAMUSCULAR; INTRAVENOUS ONCE
Status: COMPLETED | OUTPATIENT
Start: 2024-01-17 | End: 2024-01-17

## 2024-01-17 RX ORDER — MONTELUKAST SODIUM 10 MG/1
10 TABLET ORAL
Status: DISCONTINUED | OUTPATIENT
Start: 2024-01-17 | End: 2024-01-19 | Stop reason: HOSPADM

## 2024-01-17 RX ORDER — SODIUM CHLORIDE, SODIUM LACTATE, POTASSIUM CHLORIDE, CALCIUM CHLORIDE 600; 310; 30; 20 MG/100ML; MG/100ML; MG/100ML; MG/100ML
125 INJECTION, SOLUTION INTRAVENOUS CONTINUOUS
Status: DISPENSED | OUTPATIENT
Start: 2024-01-17 | End: 2024-01-18

## 2024-01-17 RX ORDER — SODIUM CHLORIDE FOR INHALATION 0.9 %
3 VIAL, NEBULIZER (ML) INHALATION
Status: DISCONTINUED | OUTPATIENT
Start: 2024-01-17 | End: 2024-01-17

## 2024-01-17 RX ORDER — METHYLPREDNISOLONE SODIUM SUCCINATE 40 MG/ML
40 INJECTION, POWDER, LYOPHILIZED, FOR SOLUTION INTRAMUSCULAR; INTRAVENOUS EVERY 6 HOURS SCHEDULED
Status: DISCONTINUED | OUTPATIENT
Start: 2024-01-17 | End: 2024-01-18

## 2024-01-17 RX ORDER — PANTOPRAZOLE SODIUM 40 MG/1
40 TABLET, DELAYED RELEASE ORAL
Status: DISCONTINUED | OUTPATIENT
Start: 2024-01-17 | End: 2024-01-19 | Stop reason: HOSPADM

## 2024-01-17 RX ORDER — SODIUM CHLORIDE 9 MG/ML
3 INJECTION INTRAVENOUS
Status: DISCONTINUED | OUTPATIENT
Start: 2024-01-17 | End: 2024-01-19 | Stop reason: HOSPADM

## 2024-01-17 RX ORDER — KETOROLAC TROMETHAMINE 30 MG/ML
15 INJECTION, SOLUTION INTRAMUSCULAR; INTRAVENOUS ONCE
Status: COMPLETED | OUTPATIENT
Start: 2024-01-17 | End: 2024-01-17

## 2024-01-17 RX ORDER — BUPROPION HYDROCHLORIDE 150 MG/1
150 TABLET ORAL DAILY
Status: DISCONTINUED | OUTPATIENT
Start: 2024-01-17 | End: 2024-01-19 | Stop reason: HOSPADM

## 2024-01-17 RX ORDER — MAGNESIUM SULFATE HEPTAHYDRATE 40 MG/ML
2 INJECTION, SOLUTION INTRAVENOUS ONCE
Status: COMPLETED | OUTPATIENT
Start: 2024-01-17 | End: 2024-01-17

## 2024-01-17 RX ORDER — ENOXAPARIN SODIUM 100 MG/ML
30 INJECTION SUBCUTANEOUS EVERY 12 HOURS SCHEDULED
Status: DISCONTINUED | OUTPATIENT
Start: 2024-01-17 | End: 2024-01-19 | Stop reason: HOSPADM

## 2024-01-17 RX ORDER — LANOLIN ALCOHOL/MO/W.PET/CERES
6 CREAM (GRAM) TOPICAL
Status: DISCONTINUED | OUTPATIENT
Start: 2024-01-17 | End: 2024-01-19 | Stop reason: HOSPADM

## 2024-01-17 RX ORDER — PRAVASTATIN SODIUM 40 MG
40 TABLET ORAL
Status: DISCONTINUED | OUTPATIENT
Start: 2024-01-17 | End: 2024-01-19 | Stop reason: HOSPADM

## 2024-01-17 RX ORDER — METHYLPREDNISOLONE SODIUM SUCCINATE 125 MG/2ML
125 INJECTION, POWDER, LYOPHILIZED, FOR SOLUTION INTRAMUSCULAR; INTRAVENOUS ONCE
Status: COMPLETED | OUTPATIENT
Start: 2024-01-17 | End: 2024-01-17

## 2024-01-17 RX ORDER — GABAPENTIN 100 MG/1
100 CAPSULE ORAL 3 TIMES DAILY PRN
Status: DISCONTINUED | OUTPATIENT
Start: 2024-01-17 | End: 2024-01-19 | Stop reason: HOSPADM

## 2024-01-17 RX ORDER — CEFTRIAXONE 2 G/50ML
2000 INJECTION, SOLUTION INTRAVENOUS EVERY 24 HOURS
Status: DISCONTINUED | OUTPATIENT
Start: 2024-01-17 | End: 2024-01-18

## 2024-01-17 RX ORDER — ZOLPIDEM TARTRATE 5 MG/1
10 TABLET ORAL
Status: DISCONTINUED | OUTPATIENT
Start: 2024-01-17 | End: 2024-01-19 | Stop reason: HOSPADM

## 2024-01-17 RX ORDER — ALBUTEROL SULFATE 2.5 MG/3ML
5 SOLUTION RESPIRATORY (INHALATION) ONCE
Status: COMPLETED | OUTPATIENT
Start: 2024-01-17 | End: 2024-01-17

## 2024-01-17 RX ORDER — LEVALBUTEROL INHALATION SOLUTION 1.25 MG/3ML
1.25 SOLUTION RESPIRATORY (INHALATION)
Status: DISCONTINUED | OUTPATIENT
Start: 2024-01-17 | End: 2024-01-19 | Stop reason: HOSPADM

## 2024-01-17 RX ORDER — DICYCLOMINE HCL 20 MG
20 TABLET ORAL 4 TIMES DAILY PRN
Status: DISCONTINUED | OUTPATIENT
Start: 2024-01-17 | End: 2024-01-19 | Stop reason: HOSPADM

## 2024-01-17 RX ORDER — FAMOTIDINE 20 MG/1
40 TABLET, FILM COATED ORAL 2 TIMES DAILY
Status: DISCONTINUED | OUTPATIENT
Start: 2024-01-17 | End: 2024-01-19 | Stop reason: HOSPADM

## 2024-01-17 RX ORDER — INSULIN LISPRO 100 [IU]/ML
1-5 INJECTION, SOLUTION INTRAVENOUS; SUBCUTANEOUS
Status: DISCONTINUED | OUTPATIENT
Start: 2024-01-17 | End: 2024-01-19 | Stop reason: HOSPADM

## 2024-01-17 RX ORDER — HYDROCODONE BITARTRATE AND HOMATROPINE METHYLBROMIDE ORAL SOLUTION 5; 1.5 MG/5ML; MG/5ML
5 LIQUID ORAL EVERY 4 HOURS PRN
Status: DISCONTINUED | OUTPATIENT
Start: 2024-01-17 | End: 2024-01-19 | Stop reason: HOSPADM

## 2024-01-17 RX ORDER — IPRATROPIUM BROMIDE AND ALBUTEROL SULFATE 2.5; .5 MG/3ML; MG/3ML
3 SOLUTION RESPIRATORY (INHALATION) EVERY 4 HOURS PRN
Status: DISCONTINUED | OUTPATIENT
Start: 2024-01-17 | End: 2024-01-19

## 2024-01-17 RX ORDER — ONDANSETRON 4 MG/1
8 TABLET, ORALLY DISINTEGRATING ORAL EVERY 8 HOURS PRN
Status: DISCONTINUED | OUTPATIENT
Start: 2024-01-17 | End: 2024-01-19 | Stop reason: HOSPADM

## 2024-01-17 RX ADMIN — FAMOTIDINE 40 MG: 20 TABLET ORAL at 17:53

## 2024-01-17 RX ADMIN — SODIUM CHLORIDE, SODIUM LACTATE, POTASSIUM CHLORIDE, AND CALCIUM CHLORIDE 125 ML/HR: .6; .31; .03; .02 INJECTION, SOLUTION INTRAVENOUS at 22:08

## 2024-01-17 RX ADMIN — BUPROPION HYDROCHLORIDE 150 MG: 150 TABLET, EXTENDED RELEASE ORAL at 17:53

## 2024-01-17 RX ADMIN — KETOROLAC TROMETHAMINE 15 MG: 30 INJECTION, SOLUTION INTRAMUSCULAR; INTRAVENOUS at 08:37

## 2024-01-17 RX ADMIN — ONDANSETRON 4 MG: 2 INJECTION INTRAMUSCULAR; INTRAVENOUS at 08:36

## 2024-01-17 RX ADMIN — ONDANSETRON 8 MG: 4 TABLET, ORALLY DISINTEGRATING ORAL at 17:22

## 2024-01-17 RX ADMIN — REMDESIVIR 200 MG: 100 INJECTION, POWDER, LYOPHILIZED, FOR SOLUTION INTRAVENOUS at 16:54

## 2024-01-17 RX ADMIN — HYDROCODONE BITARTRATE AND HOMATROPINE METHYLBROMIDE ORAL SOLUTION 5 ML: 5; 1.5 LIQUID ORAL at 17:22

## 2024-01-17 RX ADMIN — SODIUM CHLORIDE, SODIUM LACTATE, POTASSIUM CHLORIDE, AND CALCIUM CHLORIDE 1000 ML: .6; .31; .03; .02 INJECTION, SOLUTION INTRAVENOUS at 17:32

## 2024-01-17 RX ADMIN — MAGNESIUM SULFATE HEPTAHYDRATE 2 G: 40 INJECTION, SOLUTION INTRAVENOUS at 08:36

## 2024-01-17 RX ADMIN — LEVALBUTEROL HYDROCHLORIDE 1.25 MG: 1.25 SOLUTION RESPIRATORY (INHALATION) at 20:04

## 2024-01-17 RX ADMIN — METHYLPREDNISOLONE SODIUM SUCCINATE 40 MG: 40 INJECTION, POWDER, FOR SOLUTION INTRAMUSCULAR; INTRAVENOUS at 17:53

## 2024-01-17 RX ADMIN — ENOXAPARIN SODIUM 30 MG: 30 INJECTION SUBCUTANEOUS at 20:47

## 2024-01-17 RX ADMIN — SODIUM CHLORIDE 1000 ML: 0.9 INJECTION, SOLUTION INTRAVENOUS at 08:35

## 2024-01-17 RX ADMIN — IPRATROPIUM BROMIDE 0.5 MG: 0.5 SOLUTION RESPIRATORY (INHALATION) at 08:37

## 2024-01-17 RX ADMIN — IPRATROPIUM BROMIDE 0.5 MG: 0.5 SOLUTION RESPIRATORY (INHALATION) at 20:04

## 2024-01-17 RX ADMIN — Medication 6 MG: at 22:16

## 2024-01-17 RX ADMIN — PRAVASTATIN SODIUM 40 MG: 40 TABLET ORAL at 17:52

## 2024-01-17 RX ADMIN — ALBUTEROL SULFATE 5 MG: 2.5 SOLUTION RESPIRATORY (INHALATION) at 08:37

## 2024-01-17 RX ADMIN — MONTELUKAST 10 MG: 10 TABLET, FILM COATED ORAL at 22:05

## 2024-01-17 RX ADMIN — CEFTRIAXONE 2000 MG: 2 INJECTION, SOLUTION INTRAVENOUS at 16:29

## 2024-01-17 RX ADMIN — PANTOPRAZOLE SODIUM 40 MG: 40 TABLET, DELAYED RELEASE ORAL at 17:53

## 2024-01-17 RX ADMIN — ZOLPIDEM TARTRATE 10 MG: 5 TABLET, COATED ORAL at 22:05

## 2024-01-17 RX ADMIN — SODIUM CHLORIDE, SODIUM LACTATE, POTASSIUM CHLORIDE, AND CALCIUM CHLORIDE 1000 ML: .6; .31; .03; .02 INJECTION, SOLUTION INTRAVENOUS at 15:34

## 2024-01-17 RX ADMIN — METHYLPREDNISOLONE SODIUM SUCCINATE 125 MG: 125 INJECTION, POWDER, FOR SOLUTION INTRAMUSCULAR; INTRAVENOUS at 08:37

## 2024-01-17 NOTE — ASSESSMENT & PLAN NOTE
Sepsis criteria: Tachycardia, fever, tachypnea  Suspected source: COVID-19  Meeting criteria for severe sepsis/septic shock due to lactic acidosis >4  Low suspicion for acute bacterial source as procalcitonin is negative. Suspect the lactic acidosis is at least partially due to asthma exacerbation. Will cover with ceftriaxone for now and obtain blood cultures   Will give 30cc/kg fluid bolus now   Trend lactic acid until normalized

## 2024-01-17 NOTE — H&P
[facility]  H&P  Name: Lidya Ansari 58 y.o. female I MRN: [id number]  Unit/Bed#: ED 24 I Date of Admission: 1/17/2024   Date of Service: 1/17/2024 I Hospital Day: 0      Assessment/Plan   * Septic shock (Formerly [facility])  Assessment & Plan  Sepsis criteria: Tachycardia, fever, tachypnea  Suspected source: COVID-19  Meeting criteria for severe sepsis/septic shock due to lactic acidosis >4  Low suspicion for acute bacterial source as procalcitonin is negative. Suspect the lactic acidosis is at least partially due to asthma exacerbation. Will cover with ceftriaxone for now and obtain blood cultures   Will give 30cc/kg fluid bolus now   Trend lactic acid until normalized    COVID-19  Assessment & Plan  Patient meeting criteria for prophylactic remdesivir given BMI and chronic lung disease (asthma)  Was initially requiring O2 in the ED, however weaned off this to does not meet criteria for baricitinib or steroids for this (still getting steroids for asthma)    GERD (gastroesophageal reflux disease)  Assessment & Plan  Continue famotidine and pantoprazole twice daily    Type 2 diabetes mellitus without complication, with long-term current use of insulin (Formerly [facility])  Assessment & Plan  Lab Results   Component Value Date    HGBA1C 4.8 06/12/2023       Recent Labs     01/17/24  1255   POCGLU 189*       Blood Sugar Average: Last 72 hrs:  (P) 189  Uses premeal sliding scale at home, continue here     Severe persistent asthma with exacerbation  Assessment & Plan  Exacerbation likely secondary to COVID-19  IV Solu-Medrol, scheduled nebulizers, continue Singulair  Patient requested Hycodan for cough as other medications have not worked previously         VTE Pharmacologic Prophylaxis: VTE Score: 4 Moderate Risk (Score 3-4) - Pharmacological DVT Prophylaxis Ordered: enoxaparin (Lovenox).  Code Status: Level 1 - Full Code   Discussion with family: Patient declined call to .     Anticipated Length of Stay:  Patient will be admitted on an observation basis with an anticipated length of stay of less than 2 midnights secondary to asthma exacerbation, sepsis.    Total Time Spent on Date of Encounter in care of patient: 80 mins. This time was spent on one or more of the following: performing physical exam; counseling and coordination of care; obtaining or reviewing history; documenting in the medical record; reviewing/ordering tests, medications or procedures; communicating with other healthcare professionals and discussing with patient's family/caregivers.    Chief Complaint: Breathing difficulty    History of Present Illness:  Lidya Ansari is a 58 y.o. female with a PMH of persistent asthma, diabetes, GERD, multiple COVID infections in the past who presents with worsening breathing symptoms.  She reports having COVID-19 in 2020 and since that time her asthma has been very poorly controlled requiring multiple hospitalizations.  She states that on Saturday, her son was febrile at home.  His COVID test was negative.  Patient began having sore throat, myalgias, and a fever up to 102 earlier this week.  Last night she appreciated increased wheezing as well as vomiting up phlegm.  This prompted her presentation to the hospital today.  Denies any current acute chest pain.  She was initially requiring supplemental oxygen in the ED, but this is since been weaned back to room air and she is tolerating this well currently.    Review of Systems:  Review of Systems   Constitutional:  Positive for fever. Negative for chills.   HENT:  Negative for ear pain and sore throat.    Eyes:  Negative for pain and visual disturbance.   Respiratory:  Positive for cough and shortness of breath.    Cardiovascular:  Negative for chest pain and palpitations.   Gastrointestinal:  Positive for vomiting. Negative for abdominal pain.   Genitourinary:  Negative for dysuria and hematuria.   Musculoskeletal:  Negative for arthralgias and back pain.   Skin:   Negative for color change and rash.   Neurological:  Negative for seizures and syncope.   All other systems reviewed and are negative.      Past Medical and Surgical History:   Past Medical History:   Diagnosis Date    Asthma     Diabetes mellitus (HCC)     GERD (gastroesophageal reflux disease)     Hyperlipidemia     Hypertension        Past Surgical History:   Procedure Laterality Date     SECTION      COLONOSCOPY      HYSTERECTOMY      OVARIAN CYST REMOVAL         Meds/Allergies:  Prior to Admission medications    Medication Sig Start Date End Date Taking? Authorizing Provider   budesonide-formoterol (SYMBICORT) 160-4.5 mcg/act inhaler Every 12 hours   Yes Historical Provider, MD   buPROPion (Wellbutrin SR) 150 mg 12 hr tablet Every 12 hours 23  Yes Historical Provider, MD   dicyclomine (BENTYL) 20 mg tablet TAKE 1 TABLET BY MOUTH EVERY 6 HOURS AS NEEDED FOR ABDOMINAL CRAMPING 10/10/23  Yes Corine Sidhu PA-C   EPINEPHrine (EPIPEN) 0.3 mg/0.3 mL SOAJ as directed   Yes Historical Provider, MD   famotidine (PEPCID) 40 MG tablet TAKE 1 TABLET BY MOUTH TWICE A DAY WITH LUNCH AND BEFORE BEDTIME 10/24/23  Yes Corine Sidhu PA-C   fluticasone-vilanterol 200-25 mcg/actuation inhaler 1 puff every 24 hours   Yes Historical Provider, MD   gabapentin (NEURONTIN) 100 mg capsule Take 100 mg by mouth 3 (three) times a day as needed 23  Yes Historical Provider, MD   insulin aspart (NovoLOG FlexPen) 100 UNIT/ML injection pen Inject 3 Units under the skin 3 (three) times a day with meals 23  Yes EDUARDA Parker   ipratropium-albuterol (DUO-NEB) 0.5-2.5 mg/3 mL nebulizer solution Take 3 mL by nebulization every 4 (four) hours as needed for wheezing or shortness of breath 22  Yes Sumit Garcia MD   Melatonin 10 MG CAPS Take 10 mg by mouth daily at bedtime as needed 3/25/23  Yes Historical Provider, MD   montelukast (SINGULAIR) 10 mg tablet Take 1 tablet (10 mg total) by mouth daily at  bedtime 11/1/22 1/17/24 Yes Sumit Garcia MD   Multiple Vitamins-Minerals (HIGH POT MULTIVITAMIN/BETA-CAR PO) every 24 hours 6/24/23  Yes Historical Provider, MD   ondansetron (ZOFRAN) 4 mg tablet Take 1 tablet by mouth every 8 (eight) hours   Yes Historical Provider, MD   pantoprazole (PROTONIX) 40 mg Take 1 packet (40 mg total) by mouth 2 (two) times a day before meals 3/1/23  Yes Laurence Painting MD   promethazine (PHENERGAN) 25 mg tablet Take 1 tablet (25 mg total) by mouth every 6 (six) hours as needed for nausea or vomiting 3/8/23  Yes Corine Sidhu PA-C   Promethazine-Phenyleph-Codeine 6.25-5-10 MG/5ML SYRP Every 6 hours 11/2/23  Yes Historical Provider, MD   rosuvastatin (CRESTOR) 5 mg tablet Take 1 tablet by mouth daily   Yes Historical Provider, MD   zolpidem (AMBIEN) 10 mg tablet Take 1 tablet (10 mg total) by mouth daily at bedtime as needed for sleep for up to 7 days 6/16/23 1/17/24 Yes EDUARDA Parker   Blood Pressure Monitoring (Blood Pressure Monitor 3) CARLOS MANUEL Use as directed 10/21/22   Historical Provider, MD   Glucagon 1 MG/0.2ML SOAJ     Historical Provider, MD   hydrOXYzine HCL (ATARAX) 25 mg tablet Take 1 tablet (25 mg total) by mouth every 6 (six) hours as needed for itching for up to 14 days 6/16/23 6/30/23  EDUARDA Parker   ipratropium (ATROVENT) 0.02 % nebulizer solution Take 2.5 mL (0.5 mg total) by nebulization 3 (three) times a day 11/1/22 6/8/23  Sumit Garcia MD   ketorolac (TORADOL) 10 mg tablet Take 1 tablet (10 mg total) by mouth every 6 (six) hours as needed for moderate pain for up to 5 days 11/16/23 11/21/23  EDUARDA Parker   OneTouch Ultra test strip TEST TWICE A DAY 10/21/22   Historical Provider, MD   pravastatin (PRAVACHOL) 40 mg tablet Take 1 tablet (40 mg total) by mouth daily with dinner 11/1/22 6/6/23  Sumit Garcia MD   albuterol (2.5 mg/3 mL) 0.083 % nebulizer solution 3 ml  1/17/24  Historical Provider, MD   albuterol (PROVENTIL HFA,VENTOLIN  HFA) 90 mcg/act inhaler Inhale 2 puffs as needed  1/17/24  Historical Provider, MD   benzonatate (TESSALON) 200 MG capsule Take 1 capsule (200 mg total) by mouth 3 (three) times a day as needed for cough (2nd line) 11/16/23 1/17/24  EDUARDA Parker   Cholecalciferol 50 MCG (2000 UT) CAPS every 24 hours  1/17/24  Historical Provider, MD   diphenhydrAMINE (BENADRYL) 25 mg capsule 3 (three) times a day  1/17/24  Historical Provider, MD   Melatonin 10 MG/ML LIQD as directed 6/24/23 1/17/24  Historical Provider, MD   morphine (MSIR) 15 mg tablet Take half a tablet and up to 1 full tablet every 4-6 hours as needed for severe pain 11/18/23 1/17/24  Leighann Fofana,    Rosuvastatin Calcium 5 MG CPSP Take 1 tablet by mouth daily  1/17/24  Historical Provider, MD   torsemide (DEMADEX) 10 mg tablet Take 10 mg by mouth daily 9/15/22 1/17/24  Historical Provider, MD     I have reviewed home medications with patient personally.    Allergies:   Allergies   Allergen Reactions    Codeine Other (See Comments)    Aspirin GI Intolerance and Rash    Hydromorphone Rash and Other (See Comments)     GI upset      Oxycodone-Acetaminophen Rash and Other (See Comments)    Tramadol Rash and Other (See Comments)       Social History:  Marital Status: /Civil Union   Occupation: Nurse  Patient Pre-hospital Living Situation: Home  Patient Pre-hospital Level of Mobility: walks  Patient Pre-hospital Diet Restrictions: Diabetic  Substance Use History:   Social History     Substance and Sexual Activity   Alcohol Use Yes    Alcohol/week: 7.0 standard drinks of alcohol    Types: 7 Glasses of wine per week     Social History     Tobacco Use   Smoking Status Never   Smokeless Tobacco Never     Social History     Substance and Sexual Activity   Drug Use Never       Family History:  History reviewed. No pertinent family history.    Physical Exam:     Vitals:   Blood Pressure: 110/59 (01/17/24 1415)  Pulse: 95 (01/17/24  1415)  Temperature: 98.5 °F (36.9 °C) (01/17/24 1015)  Temp Source: Oral (01/17/24 1015)  Respirations: 18 (01/17/24 1415)  SpO2: 95 % (01/17/24 1415)    Physical Exam  Vitals and nursing note reviewed.   Constitutional:       Appearance: Normal appearance.   HENT:      Head: Normocephalic and atraumatic.      Mouth/Throat:      Mouth: Mucous membranes are moist.   Eyes:      Conjunctiva/sclera: Conjunctivae normal.      Pupils: Pupils are equal, round, and reactive to light.   Cardiovascular:      Rate and Rhythm: Normal rate and regular rhythm.      Pulses: Normal pulses.      Heart sounds: Normal heart sounds. No murmur heard.     No gallop.   Pulmonary:      Effort: No respiratory distress.      Breath sounds: No rales.      Comments: Deep breathing elicits significant coughing.  Still able to appreciate expiratory wheezing in bilateral lung fields.  Able to speak in full sentences without difficulty.  Abdominal:      General: Abdomen is flat. Bowel sounds are normal. There is no distension.      Palpations: Abdomen is soft.      Tenderness: There is no abdominal tenderness. There is no guarding or rebound.   Musculoskeletal:         General: No swelling. Normal range of motion.   Skin:     General: Skin is warm and dry.      Capillary Refill: Capillary refill takes less than 2 seconds.   Neurological:      General: No focal deficit present.      Mental Status: She is alert. Mental status is at baseline.   Psychiatric:         Mood and Affect: Mood normal.         Additional Data:     Lab Results:  Results from last 7 days   Lab Units 01/17/24  0832   WBC Thousand/uL 11.47*   HEMOGLOBIN g/dL 14.3   HEMATOCRIT % 42.5   PLATELETS Thousands/uL 418*   NEUTROS PCT % 77*   LYMPHS PCT % 15   MONOS PCT % 6   EOS PCT % 1     Results from last 7 days   Lab Units 01/17/24  0940   SODIUM mmol/L 137   POTASSIUM mmol/L 3.6   CHLORIDE mmol/L 105   CO2 mmol/L 22   BUN mg/dL 10   CREATININE mg/dL 0.88   ANION GAP mmol/L 10    CALCIUM mg/dL 9.2   ALBUMIN g/dL 3.7   TOTAL BILIRUBIN mg/dL 0.60   ALK PHOS U/L 111*   ALT U/L 12   AST U/L 17   GLUCOSE RANDOM mg/dL 182*         Results from last 7 days   Lab Units 01/17/24  1255   POC GLUCOSE mg/dl 189*         Results from last 7 days   Lab Units 01/17/24  1424 01/17/24  1107 01/17/24  0940   LACTIC ACID mmol/L 5.5* 3.5*  --    PROCALCITONIN ng/ml  --   --  0.12       Lines/Drains:  Invasive Devices       Peripheral Intravenous Line  Duration             Peripheral IV 01/17/24 Left;Upper;Ventral (anterior) Arm <1 day                        Imaging: Reviewed radiology reports from this admission including: chest xray  X-ray chest 2 views   Final Result by James Andrea MD (01/17 0909)      No acute cardiopulmonary disease.      Findings are stable            Workstation performed: [id number]             EKG and Other Studies Reviewed on Admission:   EKG: Sinus Tachycardia. .    ** Please Note: This note has been constructed using a voice recognition system. **

## 2024-01-17 NOTE — ASSESSMENT & PLAN NOTE
Lab Results   Component Value Date    HGBA1C 4.8 06/12/2023       Recent Labs     01/17/24  1255   POCGLU 189*       Blood Sugar Average: Last 72 hrs:  (P) 189  Uses premeal sliding scale at home, continue here

## 2024-01-17 NOTE — ASSESSMENT & PLAN NOTE
Exacerbation likely secondary to COVID-19  IV Solu-Medrol, scheduled nebulizers, continue Singulair  Patient requested Hycodan for cough as other medications have not worked previously

## 2024-01-17 NOTE — SEPSIS NOTE
"  Sepsis Note   Lidya Ansari 58 y.o. female MRN: [id number]  Unit/Bed#: ED 24 Encounter: [id number]       Initial Sepsis Screening       Row Name 01/17/24 1546                Is the patient's history suggestive of a new or worsening infection? Yes (Proceed)  -TH        Suspected source of infection pneumonia  -TH        Indicate SIRS criteria Hyperthemia > 38.3C (100.9F) OR Hypothermia <36C (96.8F);Tachypnea > 20 resp per min;Tachycardia > 90 bpm  -TH        Are two or more of the above signs & symptoms of infection both present and new to the patient? Yes (Proceed)  -TH        Assess for evidence of organ dysfunction: Are any of the below criteria present within 6 hours of suspected infection and SIRS criteria that are NOT considered to be chronic conditions? Lactate >/equal 4.0  -TH        Date of presentation of septic shock --        Time of presentation of septic shock --        Fluid Resuscitation: 30 ml/kg IV fluid bolus will be given based on actual body weight  -TH        Is the patient is persistently hypotensive in the hour after fluid bolus administration? If yes, patient meets criteria for vasopressor use. NO  -TH        Sepsis Note: Click \"NEXT\" below (NOT \"close\") to generate sepsis note based on above information. YES (proceed by clicking \"NEXT\")  -TH                  User Key  (r) = Recorded By, (t) = Taken By, (c) = Cosigned By      Initials Name Provider Type    TH Chago Griffiths MD Physician                        There is no height or weight on file to calculate BMI.  Wt Readings from Last 1 Encounters:   11/15/23 81.2 kg (179 lb)        Ideal body weight: 48.8 kg (107 lb 8.4 oz)  Adjusted ideal body weight: 61.7 kg (136 lb 1.8 oz)    "

## 2024-01-17 NOTE — ED PROVIDER NOTES
History  Chief Complaint   Patient presents with    Flu Symptoms    Fever     Flu symptoms with SOB and fevers that began yesterday. Tylenol taken at 0300 this am.      57 y/o female, hx of asthma and DM, presents to the ED for cough, wheezing and fever. She states that symptoms started yesterday. Son home with similar symptoms. She states that she has been using her inhaler and neb more frequently. Her temp was 102 this morning and took tylenol around 4a. She states that she has also had nausea and vomiting. Denies any cp or abd pain. States that she has been hospitalized for her asthma a few months ago. Denies any hx of intubation. No recent steroids or abx. No other complaints.       History provided by:  Patient  Flu Symptoms  Presenting symptoms: cough, fever, myalgias, nausea and vomiting    Presenting symptoms: no diarrhea, no headaches, no shortness of breath and no sore throat    Severity:  Moderate  Onset quality:  Sudden  Duration:  1 day  Progression:  Worsening  Chronicity:  New  Relieved by:  None tried  Worsened by:  Nothing  Ineffective treatments:  None tried  Associated symptoms: nasal congestion    Associated symptoms: no chills, no ear pain and no neck stiffness    Risk factors: sick contacts    Fever  Associated symptoms: congestion, cough, fever, myalgias, nausea and vomiting    Associated symptoms: no abdominal pain, no chest pain, no diarrhea, no ear pain, no headaches, no rash, no shortness of breath, no sore throat and no wheezing        Prior to Admission Medications   Prescriptions Last Dose Informant Patient Reported? Taking?   Blood Pressure Monitoring (Blood Pressure Monitor 3) CARLOS MANUEL  Self Yes No   Sig: Use as directed   Cholecalciferol 50 MCG (2000 UT) CAPS  Self Yes No   Sig: every 24 hours   EPINEPHrine (EPIPEN) 0.3 mg/0.3 mL SOAJ  Self Yes No   Sig: as directed   Glucagon 1 MG/0.2ML SOAJ  Self Yes No   Melatonin 10 MG CAPS  Self Yes No   Sig: Take 10 mg by mouth daily at bedtime as  needed   Melatonin 10 MG/ML LIQD   Yes No   Sig: as directed   Multiple Vitamins-Minerals (HIGH POT MULTIVITAMIN/BETA-CAR PO)   Yes No   Sig: every 24 hours   OneTouch Ultra test strip  Self Yes No   Sig: TEST TWICE A DAY   Promethazine-Phenyleph-Codeine 6.25-5-10 MG/5ML SYRP   Yes No   Sig: Every 6 hours   Rosuvastatin Calcium 5 MG CPSP   Yes No   Sig: Take 1 tablet by mouth daily   albuterol (2.5 mg/3 mL) 0.083 % nebulizer solution  Self Yes No   Sig: 3 ml   albuterol (PROVENTIL HFA,VENTOLIN HFA) 90 mcg/act inhaler  Self Yes No   Sig: Inhale 2 puffs as needed   benzonatate (TESSALON) 200 MG capsule   No No   Sig: Take 1 capsule (200 mg total) by mouth 3 (three) times a day as needed for cough (2nd line)   buPROPion (Wellbutrin SR) 150 mg 12 hr tablet   Yes No   Sig: Every 12 hours   budesonide-formoterol (SYMBICORT) 160-4.5 mcg/act inhaler  Self Yes No   Sig: Every 12 hours   dicyclomine (BENTYL) 20 mg tablet   No No   Sig: TAKE 1 TABLET BY MOUTH EVERY 6 HOURS AS NEEDED FOR ABDOMINAL CRAMPING   diphenhydrAMINE (BENADRYL) 25 mg capsule  Self Yes No   Sig: 3 (three) times a day   famotidine (PEPCID) 40 MG tablet   No No   Sig: TAKE 1 TABLET BY MOUTH TWICE A DAY WITH LUNCH AND BEFORE BEDTIME   fluticasone-vilanterol 200-25 mcg/actuation inhaler   Yes No   Si puff every 24 hours   gabapentin (NEURONTIN) 100 mg capsule  Self Yes No   Sig: Take 100 mg by mouth 3 (three) times a day as needed   hydrOXYzine HCL (ATARAX) 25 mg tablet   No No   Sig: Take 1 tablet (25 mg total) by mouth every 6 (six) hours as needed for itching for up to 14 days   insulin aspart (NovoLOG FlexPen) 100 UNIT/ML injection pen   No No   Sig: Inject 3 Units under the skin 3 (three) times a day with meals   ipratropium (ATROVENT) 0.02 % nebulizer solution  Self No No   Sig: Take 2.5 mL (0.5 mg total) by nebulization 3 (three) times a day   ipratropium-albuterol (DUO-NEB) 0.5-2.5 mg/3 mL nebulizer solution  Self No No   Sig: Take 3 mL by  nebulization every 4 (four) hours as needed for wheezing or shortness of breath   ketorolac (TORADOL) 10 mg tablet   No No   Sig: Take 1 tablet (10 mg total) by mouth every 6 (six) hours as needed for moderate pain for up to 5 days   montelukast (SINGULAIR) 10 mg tablet  Self No No   Sig: Take 1 tablet (10 mg total) by mouth daily at bedtime   morphine (MSIR) 15 mg tablet   No No   Sig: Take half a tablet and up to 1 full tablet every 4-6 hours as needed for severe pain   ondansetron (ZOFRAN) 4 mg tablet  Self Yes No   Sig: Take 1 tablet by mouth every 8 (eight) hours   pantoprazole (PROTONIX) 40 mg  Self No No   Sig: Take 1 packet (40 mg total) by mouth 2 (two) times a day before meals   pravastatin (PRAVACHOL) 40 mg tablet  Self No No   Sig: Take 1 tablet (40 mg total) by mouth daily with dinner   promethazine (PHENERGAN) 25 mg tablet  Self No No   Sig: Take 1 tablet (25 mg total) by mouth every 6 (six) hours as needed for nausea or vomiting   rosuvastatin (CRESTOR) 5 mg tablet  Self Yes No   Sig: Take 1 tablet by mouth daily   torsemide (DEMADEX) 10 mg tablet  Self Yes No   Sig: Take 10 mg by mouth daily   zolpidem (AMBIEN) 10 mg tablet   No No   Sig: Take 1 tablet (10 mg total) by mouth daily at bedtime as needed for sleep for up to 7 days      Facility-Administered Medications: None       Past Medical History:   Diagnosis Date    Asthma     Diabetes mellitus (HCC)     GERD (gastroesophageal reflux disease)     Hyperlipidemia     Hypertension        Past Surgical History:   Procedure Laterality Date     SECTION      COLONOSCOPY      HYSTERECTOMY      OVARIAN CYST REMOVAL         History reviewed. No pertinent family history.  I have reviewed and agree with the history as documented.    E-Cigarette/Vaping    E-Cigarette Use Never User      E-Cigarette/Vaping Substances    Nicotine No     THC No     CBD No     Flavoring No     Other No     Unknown No      Social History     Tobacco Use    Smoking status:  Never    Smokeless tobacco: Never   Vaping Use    Vaping status: Never Used   Substance Use Topics    Alcohol use: Yes     Alcohol/week: 7.0 standard drinks of alcohol     Types: 7 Glasses of wine per week    Drug use: Never       Review of Systems   Constitutional:  Positive for fever. Negative for chills.   HENT:  Positive for congestion. Negative for ear pain and sore throat.    Eyes:  Negative for pain and visual disturbance.   Respiratory:  Positive for cough. Negative for shortness of breath and wheezing.    Cardiovascular:  Negative for chest pain and leg swelling.   Gastrointestinal:  Positive for nausea and vomiting. Negative for abdominal pain and diarrhea.   Genitourinary:  Negative for dysuria, frequency, hematuria and urgency.   Musculoskeletal:  Positive for myalgias. Negative for neck pain and neck stiffness.   Skin:  Negative for rash and wound.   Neurological:  Negative for weakness, numbness and headaches.   Psychiatric/Behavioral:  Negative for agitation and confusion.    All other systems reviewed and are negative.      Physical Exam  Physical Exam  Vitals and nursing note reviewed.   Constitutional:       Appearance: She is well-developed.   HENT:      Head: Normocephalic and atraumatic.   Eyes:      Pupils: Pupils are equal, round, and reactive to light.   Cardiovascular:      Rate and Rhythm: Normal rate and regular rhythm.   Pulmonary:      Effort: Pulmonary effort is normal.      Breath sounds: Normal breath sounds.      Comments: Decreased breath sounds   Abdominal:      General: Bowel sounds are normal.      Palpations: Abdomen is soft.   Musculoskeletal:         General: Normal range of motion.      Cervical back: Normal range of motion and neck supple.   Skin:     General: Skin is warm and dry.   Neurological:      General: No focal deficit present.      Mental Status: She is alert and oriented to person, place, and time.      Comments: No focal deficits         Vital Signs  ED Triage  Vitals   Temperature Pulse Respirations Blood Pressure SpO2   01/17/24 0744 01/17/24 0744 01/17/24 0744 01/17/24 0744 01/17/24 0744   (!) 100.7 °F (38.2 °C) (!) 132 22 133/65 94 %      Temp Source Heart Rate Source Patient Position - Orthostatic VS BP Location FiO2 (%)   01/17/24 0744 01/17/24 0744 01/17/24 0744 01/17/24 0744 --   Oral Monitor Lying Right arm       Pain Score       01/17/24 0837       8           Vitals:    01/17/24 0744 01/17/24 0800 01/17/24 1015   BP: 133/65 135/77 95/52   Pulse: (!) 132 (!) 126 (!) 115   Patient Position - Orthostatic VS: Lying Sitting Sitting         Visual Acuity      ED Medications  Medications   sodium chloride (PF) 0.9 % injection 3 mL (has no administration in time range)   ketorolac (TORADOL) injection 15 mg (15 mg Intravenous Given 1/17/24 0837)   ondansetron (ZOFRAN) injection 4 mg (4 mg Intravenous Given 1/17/24 0836)   sodium chloride 0.9 % bolus 1,000 mL (0 mL Intravenous Stopped 1/17/24 1107)   magnesium sulfate 2 g/50 mL IVPB (premix) 2 g (0 g Intravenous Stopped 1/17/24 0931)   albuterol inhalation solution 5 mg (5 mg Nebulization Given 1/17/24 0837)   ipratropium (ATROVENT) 0.02 % inhalation solution 0.5 mg (0.5 mg Nebulization Given 1/17/24 0837)   methylPREDNISolone sodium succinate (Solu-MEDROL) injection 125 mg (125 mg Intravenous Given 1/17/24 0837)       Diagnostic Studies  Results Reviewed       Procedure Component Value Units Date/Time    Lactic acid, plasma (w/reflex if result > 2.0) [[id number]] Collected: 01/17/24 1107    Lab Status: In process Specimen: Blood from Arm, Left Updated: 01/17/24 1113    Procalcitonin [[id number]] Collected: 01/17/24 0940    Lab Status: In process Specimen: Blood from Arm, Left Updated: 01/17/24 1109    Comprehensive metabolic panel [[id number]]  (Abnormal) Collected: 01/17/24 0940    Lab Status: Final result Specimen: Blood from Arm, Left Updated: 01/17/24 1014     Sodium 137 mmol/L      Potassium 3.6 mmol/L      Chloride  105 mmol/L      CO2 22 mmol/L      ANION GAP 10 mmol/L      BUN 10 mg/dL      Creatinine 0.88 mg/dL      Glucose 182 mg/dL      Calcium 9.2 mg/dL      AST 17 U/L      ALT 12 U/L      Alkaline Phosphatase 111 U/L      Total Protein 7.5 g/dL      Albumin 3.7 g/dL      Total Bilirubin 0.60 mg/dL      eGFR 73 ml/min/1.73sq m     Narrative:      National Kidney Disease Foundation guidelines for Chronic Kidney Disease (CKD):     Stage 1 with normal or high GFR (GFR > 90 mL/min/1.73 square meters)    Stage 2 Mild CKD (GFR = 60-89 mL/min/1.73 square meters)    Stage 3A Moderate CKD (GFR = 45-59 mL/min/1.73 square meters)    Stage 3B Moderate CKD (GFR = 30-44 mL/min/1.73 square meters)    Stage 4 Severe CKD (GFR = 15-29 mL/min/1.73 square meters)    Stage 5 End Stage CKD (GFR <15 mL/min/1.73 square meters)  Note: GFR calculation is accurate only with a steady state creatinine    Blood gas, venous [[id number]]  (Abnormal) Collected: 01/17/24 0940    Lab Status: Final result Specimen: Blood from Arm, Left Updated: 01/17/24 0946     pH, Candido 7.418     pCO2, Cand[place] 34.2 mm Hg      pO2, Cand[place] 95.7 mm Hg      HCO3, Cand[place] 21.6 mmol/L      Base Excess, Candido -2.2 mmol/L      O2 Content, Cand[place] 19.0 ml/dL      O2 HGB, VENOUS 94.6 %     FLU/RSV/COVID - if FLU/RSV clinically relevant [[id number]]  (Abnormal) Collected: 01/17/24 0832    Lab Status: Final result Specimen: Nares from Nose Updated: 01/17/24 0934     SARS-CoV-2 Positive     INFLUENZA A PCR Negative     INFLUENZA B PCR Negative     RSV PCR Negative    Narrative:      FOR PEDIATRIC PATIENTS - copy/paste COVID Guidelines URL to browser: [email or web address]    SARS-CoV-2 assay is a Nucleic Acid Amplification assay intended for the  qualitative detection of nucleic acid from SARS-CoV-2 in nasopharyngeal  swabs. Results are for the presumptive identification of SARS-CoV-2 RNA.    Positive results are indicative of infection with SARS-CoV-2,  the virus  causing COVID-19, but do not rule out bacterial infection or co-infection  with other viruses. Laboratories within the [place] and its  territories are required to report all positive results to the appropriate  public health authorities. Negative results do not preclude SARS-CoV-2  infection and should not be used as the sole basis for treatment or other  patient management decisions. Negative results must be combined with  clinical observations, patient history, and epidemiological information.  This test has not been FDA cleared or approved.    This test has been authorized by FDA under an Emergency Use Authorization  (EUA). This test is only authorized for the duration of time the  declaration that circumstances exist justifying the authorization of the  emergency use of an in vitro diagnostic tests for detection of SARS-CoV-2  virus and/or diagnosis of COVID-19 infection under section 564(b)(1) of  the Act, 21 U.S.C. 360bbb-3(b)(1), unless the authorization is terminated  or revoked sooner. The test has been validated but independent review by FDA  and CLIA is pending.    Test performed using [employer] GeneXpert: This RT-PCR assay targets N2,  a region unique to SARS-CoV-2. A conserved region in the E-gene was chosen  for pan-Sarbecovirus detection which includes SARS-CoV-2.    According to CMS-2020-01-R, this platform meets the definition of high-throughput technology.    hCG, qualitative pregnancy [[id number]]  (Normal) Collected: 01/17/24 0832    Lab Status: Final result Specimen: Blood from Arm, Left Updated: 01/17/24 0925     Preg, Serum Negative    HS Troponin 0hr (reflex protocol) [[id number]]  (Normal) Collected: 01/17/24 0832    Lab Status: Final result Specimen: Blood from Arm, Left Updated: 01/17/24 0924     hs TnI 0hr 2 ng/L     Beta Hydroxybutyrate [[id number]]  (Normal) Collected: 01/17/24 0832    Lab Status: Final result Specimen: Blood from Arm, Left Updated: 01/17/24 0854      BETA-HYDROXYBUTYRATE 0.1 mmol/L     CBC and differential [[id number]]  (Abnormal) Collected: 01/17/24 0832    Lab Status: Final result Specimen: Blood from Arm, Left Updated: 01/17/24 0850     WBC 11.47 Thousand/uL      RBC 4.44 Million/uL      Hemoglobin 14.3 g/dL      Hematocrit 42.5 %      MCV 96 fL      MCH 32.2 pg      MCHC 33.6 g/dL      RDW 13.3 %      MPV 10.6 fL      Platelets 418 Thousands/uL      nRBC 0 /100 WBCs      Neutrophils Relative 77 %      Immat GRANS % 1 %      Lymphocytes Relative 15 %      Monocytes Relative 6 %      Eosinophils Relative 1 %      Basophils Relative 0 %      Neutrophils Absolute 8.79 Thousands/µL      Immature Grans Absolute 0.06 Thousand/uL      Lymphocytes Absolute 1.76 Thousands/µL      Monocytes Absolute 0.72 Thousand/µL      Eosinophils Absolute 0.12 Thousand/µL      Basophils Absolute 0.02 Thousands/µL     UA w Reflex to Microscopic w Reflex to Culture [[id number]]     Lab Status: No result Specimen: Urine     POCT pregnancy, urine [[id number]]     Lab Status: No result                    X-ray chest 2 views   Final Result by James Andrea MD (01/17 0909)      No acute cardiopulmonary disease.      Findings are stable            Workstation performed: [id number]                    Procedures  ECG 12 Lead Documentation Only    Date/Time: 1/17/2024 11:13 AM    Performed by: Kemi Joseph DO  Authorized by: Kemi Joseph DO    Indications / Diagnosis:  Cough sob  Patient location:  ED  Previous ECG:     Previous ECG:  Compared to current  Rate:     ECG rate:  120    ECG rate assessment: tachycardic    Rhythm:     Rhythm: sinus tachycardia    Ectopy:     Ectopy: none    QRS:     QRS axis:  Normal    QRS intervals:  Normal  ST segments:     ST segments:  Normal  T waves:     T waves: normal    ECG 12 Lead Documentation Only    Date/Time: 1/17/2024 11:14 AM    Performed by: Kemi Joseph DO  Authorized by: Kemi Joseph DO    Indications / Diagnosis:   Delta  Patient location:  ED  Previous ECG:     Previous ECG:  Compared to current  Rate:     ECG rate:  105    ECG rate assessment: tachycardic    Rhythm:     Rhythm: sinus tachycardia    Ectopy:     Ectopy: none    QRS:     QRS axis:  Normal    QRS intervals:  Normal  Conduction:     Conduction: normal    ST segments:     ST segments:  Normal  T waves:     T waves: normal             ED Course  ED Course as of 01/17/24 1116   Wed Jan 17, 2024   0943 SARS-COV-2(!): Positive                                             Medical Decision Making  59 y/o female with cough wheezing and fever- will get labs, cxr, and covid/flu/rsv. Will give breathing tx, steroids, mag, fluids, antiemetics, and reassess.     O2 sats decreased to 88%on RA- placed on 2L O2 - will admit     Amount and/or Complexity of Data Reviewed  Labs: ordered. Decision-making details documented in ED Course.  Radiology: ordered.    Risk  Prescription drug management.  Decision regarding hospitalization.             Disposition  Final diagnoses:   COVID   Asthma exacerbation   Hypoxia     Time reflects when diagnosis was documented in both MDM as applicable and the Disposition within this note       Time User Action Codes Description Comment    1/17/2024 10:47 AM Kemi Joseph Add [U07.1] COVID     1/17/2024 10:47 AM Kemi Joseph Add [J45.901] Asthma exacerbation     1/17/2024 10:47 AM Kemi Joseph Add [R09.02] Hypoxia           ED Disposition       ED Disposition   Admit    Condition   Stable    Date/Time   Wed Jan 17, 2024 10:47 AM    Comment   Case was discussed with [facility]FA and the patient's admission status was agreed to be Admission Status: observation status to the service of Dr. Griffiths .               Follow-up Information    None         Patient's Medications   Discharge Prescriptions    No medications on file       No discharge procedures on file.    PDMP Review         Value Time User    PDMP Reviewed  Yes 11/15/2023  2:21 PM  EDUARDA Parker            ED Provider  Electronically Signed by             Kemi Joseph,   01/17/24 1118

## 2024-01-17 NOTE — RESPIRATORY THERAPY NOTE
RT Protocol Note  Lidya Ansari 58 y.o. female MRN: [id number]  Unit/Bed#: ED 24 Encounter: [id number]    Assessment    Principal Problem:    Septic shock (HCC)  Active Problems:    Severe persistent asthma with exacerbation    Type 2 diabetes mellitus without complication, with long-term current use of insulin (HCC)    GERD (gastroesophageal reflux disease)    COVID-19      Home Pulmonary Medications:  Atrovent tid, duo neb prn       Past Medical History:   Diagnosis Date    Asthma     Diabetes mellitus (HCC)     GERD (gastroesophageal reflux disease)     Hyperlipidemia     Hypertension      Social History     Socioeconomic History    Marital status: /Civil Union     Spouse name: None    Number of children: None    Years of education: None    Highest education level: None   Occupational History    None   Tobacco Use    Smoking status: Never    Smokeless tobacco: Never   Vaping Use    Vaping status: Never Used   Substance and Sexual Activity    Alcohol use: Yes     Alcohol/week: 7.0 standard drinks of alcohol     Types: 7 Glasses of wine per week    Drug use: Never    Sexual activity: None   Other Topics Concern    None   Social History Narrative    None     Social Determinants of Health     Financial Resource Strain: Not on file   Food Insecurity: No Food Insecurity (11/15/2023)    Hunger Vital Sign     Worried About Running Out of Food in the Last Year: Never true     Ran Out of Food in the Last Year: Never true   Transportation Needs: No Transportation Needs (11/15/2023)    PRAPARE - Transportation     Lack of Transportation (Medical): No     Lack of Transportation (Non-Medical): No   Physical Activity: Not on file   Stress: Not on file   Social Connections: Not on file   Intimate Partner Violence: Not on file   Housing Stability: Low Risk  (11/15/2023)    Housing Stability Vital Sign     Unable to Pay for Housing in the Last Year: No     Number of Places Lived in the Last Year: 1     Unstable Housing in  the Last Year: No       Subjective         Objective    Physical Exam:        Vitals:  Blood pressure 110/59, pulse 95, temperature 98.5 °F (36.9 °C), temperature source Oral, resp. rate 18, SpO2 95%.          Imaging and other studies: I have personally reviewed pertinent reports.            Plan    Respiratory Plan: Home Bronchodilator Patient pathway        Resp Comments: pt with hx of asthma, uses nebs @ home  Will continue with xop/atr tid

## 2024-01-17 NOTE — ASSESSMENT & PLAN NOTE
Patient meeting criteria for prophylactic remdesivir given BMI and chronic lung disease (asthma)  Was initially requiring O2 in the ED, however weaned off this to does not meet criteria for baricitinib or steroids for this (still getting steroids for asthma)

## 2024-01-18 LAB
ALBUMIN SERPL BCP-MCNC: 3.5 G/DL (ref 3.5–5)
ALP SERPL-CCNC: 105 U/L (ref 34–104)
ALT SERPL W P-5'-P-CCNC: 10 U/L (ref 7–52)
ANION GAP SERPL CALCULATED.3IONS-SCNC: 7 MMOL/L
AST SERPL W P-5'-P-CCNC: 12 U/L (ref 13–39)
BACTERIA UR QL AUTO: ABNORMAL /HPF
BILIRUB SERPL-MCNC: 0.34 MG/DL (ref 0.2–1)
BILIRUB UR QL STRIP: NEGATIVE
BUN SERPL-MCNC: 8 MG/DL (ref 5–25)
CALCIUM SERPL-MCNC: 9 MG/DL (ref 8.4–10.2)
CHLORIDE SERPL-SCNC: 106 MMOL/L (ref 96–108)
CLARITY UR: CLEAR
CO2 SERPL-SCNC: 25 MMOL/L (ref 21–32)
COLOR UR: COLORLESS
CREAT SERPL-MCNC: 0.59 MG/DL (ref 0.6–1.3)
ERYTHROCYTE [DISTWIDTH] IN BLOOD BY AUTOMATED COUNT: 13.3 % (ref 11.6–15.1)
GFR SERPL CREATININE-BSD FRML MDRD: 102 ML/MIN/1.73SQ M
GLUCOSE P FAST SERPL-MCNC: 163 MG/DL (ref 65–99)
GLUCOSE SERPL-MCNC: 121 MG/DL (ref 65–140)
GLUCOSE SERPL-MCNC: 141 MG/DL (ref 65–140)
GLUCOSE SERPL-MCNC: 146 MG/DL (ref 65–140)
GLUCOSE SERPL-MCNC: 162 MG/DL (ref 65–140)
GLUCOSE SERPL-MCNC: 163 MG/DL (ref 65–140)
GLUCOSE UR STRIP-MCNC: NEGATIVE MG/DL
HCT VFR BLD AUTO: 35.6 % (ref 34.8–46.1)
HGB BLD-MCNC: 12 G/DL (ref 11.5–15.4)
HGB UR QL STRIP.AUTO: ABNORMAL
KETONES UR STRIP-MCNC: NEGATIVE MG/DL
LACTATE SERPL-SCNC: 1.3 MMOL/L (ref 0.5–2)
LACTATE SERPL-SCNC: 3 MMOL/L (ref 0.5–2)
LACTATE SERPL-SCNC: 3.8 MMOL/L (ref 0.5–2)
LEUKOCYTE ESTERASE UR QL STRIP: NEGATIVE
MCH RBC QN AUTO: 31.7 PG (ref 26.8–34.3)
MCHC RBC AUTO-ENTMCNC: 33.7 G/DL (ref 31.4–37.4)
MCV RBC AUTO: 94 FL (ref 82–98)
NITRITE UR QL STRIP: NEGATIVE
NON-SQ EPI CELLS URNS QL MICRO: ABNORMAL /HPF
PH UR STRIP.AUTO: 7 [PH]
PLATELET # BLD AUTO: 385 THOUSANDS/UL (ref 149–390)
PMV BLD AUTO: 10.1 FL (ref 8.9–12.7)
POTASSIUM SERPL-SCNC: 3.8 MMOL/L (ref 3.5–5.3)
PROCALCITONIN SERPL-MCNC: 0.13 NG/ML
PROT SERPL-MCNC: 7.1 G/DL (ref 6.4–8.4)
PROT UR STRIP-MCNC: NEGATIVE MG/DL
RBC # BLD AUTO: 3.79 MILLION/UL (ref 3.81–5.12)
RBC #/AREA URNS AUTO: ABNORMAL /HPF
SODIUM SERPL-SCNC: 138 MMOL/L (ref 135–147)
SP GR UR STRIP.AUTO: 1.01 (ref 1–1.03)
UROBILINOGEN UR STRIP-ACNC: <2 MG/DL
WBC # BLD AUTO: 11.51 THOUSAND/UL (ref 4.31–10.16)
WBC #/AREA URNS AUTO: ABNORMAL /HPF

## 2024-01-18 PROCEDURE — 87205 SMEAR GRAM STAIN: CPT | Performed by: STUDENT IN AN ORGANIZED HEALTH CARE EDUCATION/TRAINING PROGRAM

## 2024-01-18 PROCEDURE — 94760 N-INVAS EAR/PLS OXIMETRY 1: CPT

## 2024-01-18 PROCEDURE — 82948 REAGENT STRIP/BLOOD GLUCOSE: CPT

## 2024-01-18 PROCEDURE — 83605 ASSAY OF LACTIC ACID: CPT | Performed by: STUDENT IN AN ORGANIZED HEALTH CARE EDUCATION/TRAINING PROGRAM

## 2024-01-18 PROCEDURE — 94664 DEMO&/EVAL PT USE INHALER: CPT

## 2024-01-18 PROCEDURE — 99223 1ST HOSP IP/OBS HIGH 75: CPT | Performed by: INTERNAL MEDICINE

## 2024-01-18 PROCEDURE — 81001 URINALYSIS AUTO W/SCOPE: CPT | Performed by: STUDENT IN AN ORGANIZED HEALTH CARE EDUCATION/TRAINING PROGRAM

## 2024-01-18 PROCEDURE — 94640 AIRWAY INHALATION TREATMENT: CPT

## 2024-01-18 PROCEDURE — 87070 CULTURE OTHR SPECIMN AEROBIC: CPT | Performed by: STUDENT IN AN ORGANIZED HEALTH CARE EDUCATION/TRAINING PROGRAM

## 2024-01-18 PROCEDURE — 80053 COMPREHEN METABOLIC PANEL: CPT | Performed by: STUDENT IN AN ORGANIZED HEALTH CARE EDUCATION/TRAINING PROGRAM

## 2024-01-18 PROCEDURE — 99232 SBSQ HOSP IP/OBS MODERATE 35: CPT | Performed by: HOSPITALIST

## 2024-01-18 PROCEDURE — 85027 COMPLETE CBC AUTOMATED: CPT | Performed by: STUDENT IN AN ORGANIZED HEALTH CARE EDUCATION/TRAINING PROGRAM

## 2024-01-18 PROCEDURE — XW033E5 INTRODUCTION OF REMDESIVIR ANTI-INFECTIVE INTO PERIPHERAL VEIN, PERCUTANEOUS APPROACH, NEW TECHNOLOGY GROUP 5: ICD-10-PCS | Performed by: HOSPITALIST

## 2024-01-18 PROCEDURE — 84145 PROCALCITONIN (PCT): CPT | Performed by: STUDENT IN AN ORGANIZED HEALTH CARE EDUCATION/TRAINING PROGRAM

## 2024-01-18 PROCEDURE — 8E0ZXY6 ISOLATION: ICD-10-PCS | Performed by: HOSPITALIST

## 2024-01-18 RX ORDER — KETOROLAC TROMETHAMINE 30 MG/ML
15 INJECTION, SOLUTION INTRAMUSCULAR; INTRAVENOUS ONCE
Status: COMPLETED | OUTPATIENT
Start: 2024-01-18 | End: 2024-01-18

## 2024-01-18 RX ORDER — BUDESONIDE AND FORMOTEROL FUMARATE DIHYDRATE 160; 4.5 UG/1; UG/1
2 AEROSOL RESPIRATORY (INHALATION) 2 TIMES DAILY
Status: DISCONTINUED | OUTPATIENT
Start: 2024-01-18 | End: 2024-01-19 | Stop reason: HOSPADM

## 2024-01-18 RX ORDER — METHYLPREDNISOLONE SODIUM SUCCINATE 40 MG/ML
40 INJECTION, POWDER, LYOPHILIZED, FOR SOLUTION INTRAMUSCULAR; INTRAVENOUS EVERY 8 HOURS SCHEDULED
Status: DISCONTINUED | OUTPATIENT
Start: 2024-01-18 | End: 2024-01-19

## 2024-01-18 RX ADMIN — BUDESONIDE AND FORMOTEROL FUMARATE DIHYDRATE 2 PUFF: 160; 4.5 AEROSOL RESPIRATORY (INHALATION) at 14:27

## 2024-01-18 RX ADMIN — HYDROCODONE BITARTRATE AND HOMATROPINE METHYLBROMIDE ORAL SOLUTION 5 ML: 5; 1.5 LIQUID ORAL at 00:25

## 2024-01-18 RX ADMIN — HYDROCODONE BITARTRATE AND HOMATROPINE METHYLBROMIDE ORAL SOLUTION 5 ML: 5; 1.5 LIQUID ORAL at 09:58

## 2024-01-18 RX ADMIN — KETOROLAC TROMETHAMINE 15 MG: 30 INJECTION, SOLUTION INTRAMUSCULAR; INTRAVENOUS at 14:58

## 2024-01-18 RX ADMIN — HYDROCODONE BITARTRATE AND HOMATROPINE METHYLBROMIDE ORAL SOLUTION 5 ML: 5; 1.5 LIQUID ORAL at 14:57

## 2024-01-18 RX ADMIN — KETOROLAC TROMETHAMINE 15 MG: 30 INJECTION, SOLUTION INTRAMUSCULAR; INTRAVENOUS at 00:25

## 2024-01-18 RX ADMIN — ONDANSETRON 8 MG: 4 TABLET, ORALLY DISINTEGRATING ORAL at 16:41

## 2024-01-18 RX ADMIN — IPRATROPIUM BROMIDE 0.5 MG: 0.5 SOLUTION RESPIRATORY (INHALATION) at 13:20

## 2024-01-18 RX ADMIN — SODIUM CHLORIDE, SODIUM LACTATE, POTASSIUM CHLORIDE, AND CALCIUM CHLORIDE 1000 ML: .6; .31; .03; .02 INJECTION, SOLUTION INTRAVENOUS at 02:02

## 2024-01-18 RX ADMIN — HYDROCODONE BITARTRATE AND HOMATROPINE METHYLBROMIDE ORAL SOLUTION 5 ML: 5; 1.5 LIQUID ORAL at 21:20

## 2024-01-18 RX ADMIN — MONTELUKAST 10 MG: 10 TABLET, FILM COATED ORAL at 21:15

## 2024-01-18 RX ADMIN — METHYLPREDNISOLONE SODIUM SUCCINATE 40 MG: 40 INJECTION, POWDER, FOR SOLUTION INTRAMUSCULAR; INTRAVENOUS at 21:15

## 2024-01-18 RX ADMIN — LEVALBUTEROL HYDROCHLORIDE 1.25 MG: 1.25 SOLUTION RESPIRATORY (INHALATION) at 13:20

## 2024-01-18 RX ADMIN — METHYLPREDNISOLONE SODIUM SUCCINATE 40 MG: 40 INJECTION, POWDER, FOR SOLUTION INTRAMUSCULAR; INTRAVENOUS at 06:23

## 2024-01-18 RX ADMIN — METHYLPREDNISOLONE SODIUM SUCCINATE 40 MG: 40 INJECTION, POWDER, FOR SOLUTION INTRAMUSCULAR; INTRAVENOUS at 00:08

## 2024-01-18 RX ADMIN — ENOXAPARIN SODIUM 30 MG: 30 INJECTION SUBCUTANEOUS at 09:56

## 2024-01-18 RX ADMIN — BUDESONIDE AND FORMOTEROL FUMARATE DIHYDRATE 2 PUFF: 160; 4.5 AEROSOL RESPIRATORY (INHALATION) at 17:40

## 2024-01-18 RX ADMIN — PANTOPRAZOLE SODIUM 40 MG: 40 TABLET, DELAYED RELEASE ORAL at 07:46

## 2024-01-18 RX ADMIN — Medication 6 MG: at 21:15

## 2024-01-18 RX ADMIN — REMDESIVIR 100 MG: 100 INJECTION, POWDER, LYOPHILIZED, FOR SOLUTION INTRAVENOUS at 17:39

## 2024-01-18 RX ADMIN — METHYLPREDNISOLONE SODIUM SUCCINATE 40 MG: 40 INJECTION, POWDER, FOR SOLUTION INTRAMUSCULAR; INTRAVENOUS at 14:29

## 2024-01-18 RX ADMIN — ENOXAPARIN SODIUM 30 MG: 30 INJECTION SUBCUTANEOUS at 21:15

## 2024-01-18 RX ADMIN — INSULIN LISPRO 1 UNITS: 100 INJECTION, SOLUTION INTRAVENOUS; SUBCUTANEOUS at 07:46

## 2024-01-18 RX ADMIN — FAMOTIDINE 40 MG: 20 TABLET ORAL at 17:39

## 2024-01-18 RX ADMIN — FAMOTIDINE 40 MG: 20 TABLET ORAL at 09:56

## 2024-01-18 RX ADMIN — ZOLPIDEM TARTRATE 10 MG: 5 TABLET, COATED ORAL at 21:20

## 2024-01-18 RX ADMIN — BUPROPION HYDROCHLORIDE 150 MG: 150 TABLET, EXTENDED RELEASE ORAL at 09:56

## 2024-01-18 NOTE — PLAN OF CARE
Problem: INFECTION - ADULT  Goal: Absence or prevention of progression during hospitalization  Description: INTERVENTIONS:  - Assess and monitor for signs and symptoms of infection  - Monitor lab/diagnostic results  - Monitor all insertion sites, i.e. indwelling lines, tubes, and drains  - Monitor endotracheal if appropriate and nasal secretions for changes in amount and color  - [place] appropriate cooling/warming therapies per order  - Administer medications as ordered  - Instruct and encourage patient and family to use good hand hygiene technique  - Identify and instruct in appropriate isolation precautions for identified infection/condition  Outcome: Progressing     Problem: INFECTION - ADULT  Goal: Absence of fever/infection during neutropenic period  Description: INTERVENTIONS:  - Monitor WBC    Outcome: Progressing     Problem: PAIN - ADULT  Goal: Verbalizes/displays adequate comfort level or baseline comfort level  Description: Interventions:  - Encourage patient to monitor pain and request assistance  - Assess pain using appropriate pain scale  - Administer analgesics based on type and severity of pain and evaluate response  - Implement non-pharmacological measures as appropriate and evaluate response  - Consider cultural and social influences on pain and pain management  - Notify physician/advanced practitioner if interventions unsuccessful or patient reports new pain  Outcome: Progressing

## 2024-01-18 NOTE — ASSESSMENT & PLAN NOTE
Lab Results   Component Value Date    HGBA1C 4.8 06/12/2023       Recent Labs     01/17/24  1255 01/17/24  1745 01/18/24  0633   POCGLU 189* 148* 162*         Blood Sugar Average: Last 72 hrs:  (P) [id number]  Uses premeal sliding scale at home, continue here

## 2024-01-18 NOTE — CONSULTS
Consultation - Pulmonary Medicine   Lidya Ansari 58 y.o. female MRN: [id number]  Unit/Bed#: -01 Encounter: [id number]      Assessment:  COVID - 19 mild  Severe persistent asthma with acute exacerbation    Plan:   Severe persistent asthma with acute exacerbation in the setting of mild COVID-19 infection  Her chest x-ray is clear on admission  She is currently on room air with sats in the mid 90s  Continue treatment per COVID protocol, currently on remdesivir  Continue Xopenex, Atrovent, Singulair  Continue Solu-Medrol but will decrease to every 8 hours  Will also restart her home Symbicort  Procalcitonin is normal, can monitor off antibiotics  She follows with a pulmonologist in [place], was supposed to restart Tezspire but has yet been able to follow up with her pulmonologist. Her appt was this week  Anticipate discharge in the next 24-48 hours    History of Present Illness   Physician Requesting Consult: Rk Stoner MD  Reason for Consult / Principal Problem: Asthma exacerbation, COVID  Hx and PE limited by: None  HPI: Lidya Ansari is a 58 y.o. year old female with past medical history of severe persistent asthma, GERD, diabetes, hyperlipidemia, hypertension who presents with complaint of fevers, shortness of breath and cough over the last several days.  She was taking care of her son who is ill a few days ago, did not take a COVID test at home.  She is COVID-positive.  Currently feeling slightly better from prior to admission and not requiring any oxygen.  She does follow with a pulmonologist in [place], did have a follow-up appointment this week as she was to discuss restarting her Tezspire.  She has had several hospitalizations over the past year for asthma exacerbations.    Inpatient consult to Pulmonology  Consult performed by: Jairo Cobb PA-C  Consult ordered by: Rk Stoner MD          Review of Systems   Constitutional:  Positive for fatigue and fever.   HENT: Negative.      Respiratory:  Positive for cough and shortness of breath.    Cardiovascular: Negative.    Gastrointestinal: Negative.    Genitourinary: Negative.    Musculoskeletal: Negative.    Skin: Negative.    Allergic/Immunologic: Negative.    Neurological: Negative.    Psychiatric/Behavioral: Negative.         Historical Information   Past Medical History:   Diagnosis Date    Asthma     Diabetes mellitus (HCC)     GERD (gastroesophageal reflux disease)     Hyperlipidemia     Hypertension      Past Surgical History:   Procedure Laterality Date     SECTION      COLONOSCOPY      HYSTERECTOMY      OVARIAN CYST REMOVAL       Social History   Social History     Substance and Sexual Activity   Alcohol Use Yes    Alcohol/week: 7.0 standard drinks of alcohol    Types: 7 Glasses of wine per week     Social History     Substance and Sexual Activity   Drug Use Never     E-Cigarette/Vaping    E-Cigarette Use Never User      E-Cigarette/Vaping Substances    Nicotine No     THC No     CBD No     Flavoring No     Other No     Unknown No      Social History     Tobacco Use   Smoking Status Never   Smokeless Tobacco Never     Occupational History:     Family History: History reviewed. No pertinent family history.    Meds/Allergies   all current active meds have been reviewed, pertinent pulmonary meds have been reviewed, and current meds:   Current Facility-Administered Medications   Medication Dose Route Frequency    budesonide-formoterol (SYMBICORT) 160-4.5 mcg/act inhaler 2 puff  2 puff Inhalation BID    buPROPion (WELLBUTRIN XL) 24 hr tablet 150 mg  150 mg Oral Daily    dicyclomine (BENTYL) tablet 20 mg  20 mg Oral 4x Daily PRN    enoxaparin (LOVENOX) subcutaneous injection 30 mg  30 mg Subcutaneous Q12H KAYLEIGH    famotidine (PEPCID) tablet 40 mg  40 mg Oral BID    gabapentin (NEURONTIN) capsule 100 mg  100 mg Oral TID PRN    HYDROcodone Bit-Homatrop MBr (HYCODAN) oral syrup 5 mL  5 mL Oral Q4H PRN    insulin lispro (HumaLOG) 100  units/mL subcutaneous injection 1-5 Units  1-5 Units Subcutaneous TID AC    ipratropium (ATROVENT) 0.02 % inhalation solution 0.5 mg  0.5 mg Nebulization TID    ipratropium-albuterol (DUO-NEB) 0.5-2.5 mg/3 mL inhalation solution 3 mL  3 mL Nebulization Q4H PRN    lactated ringers bolus 1,000 mL  1,000 mL Intravenous Once    levalbuterol (XOPENEX) inhalation solution 1.25 mg  1.25 mg Nebulization TID    melatonin tablet 6 mg  6 mg Oral HS    methylPREDNISolone sodium succinate (Solu-MEDROL) injection 40 mg  40 mg Intravenous Q8H KAYLEIGH    montelukast (SINGULAIR) tablet 10 mg  10 mg Oral HS    ondansetron (ZOFRAN-ODT) dispersible tablet 8 mg  8 mg Oral Q8H PRN    pantoprazole (PROTONIX) EC tablet 40 mg  40 mg Oral BID AC    pravastatin (PRAVACHOL) tablet 40 mg  40 mg Oral Daily With Dinner    remdesivir (Veklury) 100 mg in sodium chloride 0.9 % 270 mL IVPB  100 mg Intravenous Q24H    sodium chloride (PF) 0.9 % injection 3 mL  3 mL Intravenous Q1H PRN    zolpidem (AMBIEN) tablet 10 mg  10 mg Oral HS PRN       Allergies   Allergen Reactions    Codeine Other (See Comments)    Aspirin GI Intolerance and Rash    Hydromorphone Rash and Other (See Comments)     GI upset      Oxycodone-Acetaminophen Rash and Other (See Comments)    Tramadol Rash and Other (See Comments)       Objective   Vitals: Blood pressure 109/69, pulse 81, temperature 97.7 °F (36.5 °C), resp. rate 16, SpO2 94%.,There is no height or weight on file to calculate BMI.    Intake/Output Summary (Last 24 hours) at 1/18/2024 1112  Last data filed at 1/18/2024 1001  Gross per 24 hour   Intake 2407.92 ml   Output 0 ml   Net 2407.92 ml     Invasive Devices       Peripheral Intravenous Line  Duration             Peripheral IV 01/17/24 Left;Upper;Ventral (anterior) Arm 1 day                    Physical Exam  Vitals reviewed.   Constitutional:       Appearance: Normal appearance.   HENT:      Head: Normocephalic and atraumatic.      Mouth/Throat:      Pharynx:  Oropharynx is clear.   Eyes:      Conjunctiva/sclera: Conjunctivae normal.   Cardiovascular:      Rate and Rhythm: Normal rate and regular rhythm.   Pulmonary:      Effort: Pulmonary effort is normal. No respiratory distress.      Breath sounds: Decreased breath sounds and wheezing present. No rhonchi or rales.   Abdominal:      General: Abdomen is flat. There is no distension.   Musculoskeletal:         General: Normal range of motion.      Cervical back: Normal range of motion.      Right lower leg: No edema.      Left lower leg: No edema.   Skin:     General: Skin is warm and dry.   Neurological:      Mental Status: She is alert and oriented to person, place, and time.   Psychiatric:         Mood and Affect: Mood normal.         Behavior: Behavior normal.         Lab Results: I have personally reviewed pertinent lab results., CBC:   Lab Results   Component Value Date    WBC 11.51 (H) 01/18/2024    HGB 12.0 01/18/2024    HCT 35.6 01/18/2024    MCV 94 01/18/2024     01/18/2024    RBC 3.79 (L) 01/18/2024    MCH 31.7 01/18/2024    MCHC 33.7 01/18/2024    RDW 13.3 01/18/2024    MPV 10.1 01/18/2024   , CMP:   Lab Results   Component Value Date    SODIUM 138 01/18/2024    K 3.8 01/18/2024     01/18/2024    CO2 25 01/18/2024    BUN 8 01/18/2024    CREATININE 0.59 (L) 01/18/2024    CALCIUM 9.0 01/18/2024    AST 12 (L) 01/18/2024    ALT 10 01/18/2024    ALKPHOS 105 (H) 01/18/2024    EGFR 102 01/18/2024     Imaging Studies: I have personally reviewed pertinent reports.   and I have personally reviewed pertinent films in PACS  EKG, Pathology, and Other Studies: I have personally reviewed pertinent reports.    VTE Prophylaxis: Sequential compression device (Venodyne)  and Enoxaparin (Lovenox)    Code Status: Level 1 - Full Code  Advance Directive and Living Will:      Power of :    POLST:

## 2024-01-18 NOTE — CASE MANAGEMENT
Case Management Assessment & Discharge Planning Note    Patient name Lidya Ansari  Location /-01 MRN [id number]  : 1965 Date 2024       Current Admission Date: 2024  Current Admission Diagnosis:Severe sepsis (HCC)   Patient Active Problem List    Diagnosis Date Noted    Severe sepsis (HCC) 2024    COVID-19 2024    HEATHER (obstructive sleep apnea) 2024    Leukocytosis 2023    Cardiomegaly 11/15/2023    Shortness of breath 2023    Neuropathy 2023    Vitamin D deficiency 2023    Rib pain on left side 2023    Fecal incontinence 08/15/2023    COVID-19 long hauler manifesting chronic dyspnea 08/15/2023    Itching 2023    Polypharmacy 2023    Class 2 obesity with body mass index (BMI) of 35.0 to 35.9 in adult 2023    Dysphagia 2023    Narrowing of airway 01/10/2023    Depression with anxiety 2023    Asthma exacerbation 2023    Chronic cough 2022    GERD (gastroesophageal reflux disease) 10/31/2022    Rectus sheath hematoma 10/29/2022    SIRS (systemic inflammatory response syndrome) ([facility]) 10/24/2022    Severe persistent asthma with exacerbation 10/24/2022    Hyperlipidemia 10/24/2022    Type 2 diabetes mellitus without complication, with long-term current use of insulin ([facility]) 10/24/2022    Mitral valve disorder 2022    Essential hypertension 2022    Symptomatic menopausal or female climacteric states 2018      LOS (days): 0  Geometric Mean LOS (GMLOS) (days): 5.1  Days to GMLOS:5     OBJECTIVE:     Current admission status: Inpatient       Preferred Pharmacy:   [facility]/pharmacy #[id number] - [place], [place] - [address]  [address]  [place] [address]  Phone: [phone] Fax: [phone]    Primary Care Provider: Foreign Noonan    Primary Insurance: MEDICARE  Secondary Insurance:     ASSESSMENT:  Active Health Care Proxies       [facility]  Representative - Spouse   Primary Phone: [phone] (Mobile)                    Readmission Root Cause  30 Day Readmission: No    Patient Information  Admitted from:: Home  Mental Status: Alert  During Assessment patient was accompanied by: Not accompanied during assessment  Assessment information provided by:: Patient  Primary Caregiver: Self  Support Systems: Self, Spouse/significant other, Children  County of Residence: [place]  What city do you live in?: [place]  Home entry access options. Select all that apply.: Stairs  Number of steps to enter home.: 4  Do the steps have railings?: Yes  Type of Current Residence: 2 [address] home  Upon entering residence, is there a bedroom on the main floor (no further steps)?: Yes  Upon entering residence, is there a bathroom on the main floor (no further steps)?: Yes  Is patient a ?: No    Activities of Daily Living Prior to Admission  Functional Status: Assistance  Completes ADLs independently?: No  Level of ADL dependence: Assistance  Ambulates independently?: No  Level of ambulatory dependence: Assistance  Does patient use assisted devices?: Yes  Assisted Devices (DME) used: Straight Cane, Walker  Does patient currently own DME?: Yes  What DME does the patient currently own?: Straight Cane, Walker  Does patient have a history of Outpatient Therapy (PT/OT)?: No  Does the patient have a history of Short-Term Rehab?: Yes  Does patient have a history of HHC?: No  Does patient currently have HHC?: No    Patient Information Continued  Income Source: SSI/SSD  Does patient have prescription coverage?: Yes  Does patient receive dialysis treatments?: No  Does patient have a history of substance abuse?: No  Does patient have a history of Mental Health Diagnosis?: Yes  Is patient receiving treatment for mental health?: Yes (Antidepressants - Dr. Mane Noonan)  Has patient received inpatient treatment related to mental health in the last 2 years?: No    PHQ 2/9 Screening    Reviewed PHQ 2/9 Depression Screening Score?: No    Means of Transportation  Means of Transport to Appts:: Drives Self  [place] Application: Completed with Patient      Housing Stability: Low Risk  (1/18/2024)    Housing Stability Vital Sign     Unable to Pay for Housing in the Last Year: No     Number of Places Lived in the Last Year: 1     Unstable Housing in the Last Year: No   Food Insecurity: No Food Insecurity (1/18/2024)    Hunger Vital Sign     Worried About Running Out of Food in the Last Year: Never true     Ran Out of Food in the Last Year: Never true   Transportation Needs: No Transportation Needs (1/18/2024)    PRAPARE - Transportation     Lack of Transportation (Medical): No     Lack of Transportation (Non-Medical): No   Utilities: Not At Risk (1/18/2024)    [facility] Utilities     Threatened with loss of utilities: No       DISCHARGE DETAILS:    Discharge planning discussed with:: Patient  Freedom of Choice: Yes  Comments - Freedom of Choice: CM discussed discharge planning and freedom of choice if services are recommended by [facility].  CM contacted family/caregiver?: No- see comments (Patient declined)  Were Treatment Team discharge recommendations reviewed with patient/caregiver?: Yes  Did patient/caregiver verbalize understanding of patient care needs?: Yes  Were patient/caregiver advised of the risks associated with not following Treatment Team discharge recommendations?: Yes    Requested Home Health Care         Is the patient interested in HHC at discharge?: No    DME Referral Provided  Referral made for DME?: No    Other Referral/Resources/Interventions Provided:  Interventions: None Indicated    Would you like to participate in our [facility] service program?  : No - Declined    Treatment Team Recommendation: Home  Discharge Destination Plan:: Home  Transport at Discharge : Family

## 2024-01-18 NOTE — ASSESSMENT & PLAN NOTE
Exacerbation likely secondary to COVID-19  IV Solu-Medrol, scheduled nebulizers, continue Singulair  Patient requested Hycodan for cough as other medications have not worked previously  C/s pulm

## 2024-01-18 NOTE — PROGRESS NOTES
[facility]  Progress Note  Name: Lidya Ansari I  MRN: [id number]  Unit/Bed#: -01 I Date of Admission: 1/17/2024   Date of Service: 1/18/2024 I Hospital Day: 0    Assessment/Plan   COVID-19  Assessment & Plan  Patient meeting criteria for prophylactic remdesivir given BMI and chronic lung disease (asthma)  Was initially requiring O2 in the ED, however weaned off this to does not meet criteria for baricitinib or steroids for this (still getting steroids for asthma)    GERD (gastroesophageal reflux disease)  Assessment & Plan  Continue famotidine and pantoprazole twice daily    Type 2 diabetes mellitus without complication, with long-term current use of insulin ([facility])  Assessment & Plan  Lab Results   Component Value Date    HGBA1C 4.8 06/12/2023       Recent Labs     01/17/24  1255 01/17/24  1745 01/18/24  0633   POCGLU 189* 148* 162*         Blood Sugar Average: Last 72 hrs:  (P) [id number]  Uses premeal sliding scale at home, continue here     Severe persistent asthma with exacerbation  Assessment & Plan  Exacerbation likely secondary to COVID-19  IV Solu-Medrol, scheduled nebulizers, continue Singulair  Patient requested Hycodan for cough as other medications have not worked previously  C/s pulm    * Severe sepsis (HCC)  Assessment & Plan  Sepsis criteria: Tachycardia, fever, tachypnea  Suspected source: COVID-19  Meeting criteria for severe sepsis due to lactic acidosis >4 (note septic shock has been ruled out patient never had hypotension refractory to IV fluids)  CXR: No acute cardiopulmonary disease.   Low suspicion for acute bacterial source as procalcitonin is negative. Suspect the lactic acidosis is at least partially due to asthma exacerbation.   Stop antibiotics and observe  Follow-up blood cultures  S/p IVF bolus and started on maintenance fluids  Lactic acidosis is resolved             VTE Pharmacologic Prophylaxis: VTE Score: 4 Lovenox    Mobility:   Basic  Mobility Inpatient Raw Score: 24  JH-HLM Goal: 8: Walk 250 feet or more  JH-HLM Achieved: 7: Walk 25 feet or more  HLM Goal NOT achieved. Continue with multidisciplinary rounding and encourage appropriate mobility to improve upon HLM goals.    Patient Centered Rounds: I performed bedside rounds with nursing staff today.     Total Time Spent on Date of Encounter in care of patient: 35 mins. This time was spent on one or more of the following: performing physical exam; counseling and coordination of care; obtaining or reviewing history; documenting in the medical record; reviewing/ordering tests, medications or procedures; communicating with other healthcare professionals and discussing with patient's family/caregivers.    Current Length of Stay: 0 day(s)  Current Patient Status: Observation   Certification Statement: The patient will continue to require additional inpatient hospital stay due to asthma exac/COVID  Discharge Plan: Anticipate discharge in 24-48 hrs to home.    Code Status: Level 1 - Full Code    Subjective:   Patient reports shortness of breath is improving.  She is intermittently using 1 L nasal cannula oxygen.    Objective:     Vitals:   Temp (24hrs), Av °F (36.7 °C), Min:97.7 °F (36.5 °C), Max:98.5 °F (36.9 °C)    Temp:  [97.7 °F (36.5 °C)-98.5 °F (36.9 °C)] 97.7 °F (36.5 °C)  HR:  [] 81  Resp:  [16-24] 16  BP: ()/(52-70) 109/69  SpO2:  [90 %-100 %] 94 %  There is no height or weight on file to calculate BMI.     Input and Output Summary (last 24 hours):     Intake/Output Summary (Last 24 hours) at 2024 0936  Last data filed at 2024 0601  Gross per 24 hour   Intake 3087.92 ml   Output 0 ml   Net 3087.92 ml       Physical Exam:   Gen: NAD, AAOx3, well developed, well nourished  Eyes: EOMI, PERRLA, no scleral icterus  ENMT:  no nasal discharge, no otic discharge, moist mucous membranes  Neck:  Supple  Cardiovascular:  Regular rate and rhythm, normal S1-S2, no murmurs, rubs, or  gallops  Lungs: Exam is limited by persistent coughing.  Initial breath revealed inspiratory wheezes.  Abdomen:  Positive bowel sounds, soft, nontender, nondistended   Skin:  Intact, no obvious lesions or rashes, no edema  Neuro: Cranial nerves 2-12 are intact, non-focal, moves all 4 extremities      Additional Data:     Labs:  Results from last 7 days   Lab Units 01/18/24  0638 01/17/24  0832   WBC Thousand/uL 11.51* 11.47*   HEMOGLOBIN g/dL 12.0 14.3   HEMATOCRIT % 35.6 42.5   PLATELETS Thousands/uL 385 418*   NEUTROS PCT %  --  77*   LYMPHS PCT %  --  15   MONOS PCT %  --  6   EOS PCT %  --  1     Results from last 7 days   Lab Units 01/18/24  0639   SODIUM mmol/L 138   POTASSIUM mmol/L 3.8   CHLORIDE mmol/L 106   CO2 mmol/L 25   BUN mg/dL 8   CREATININE mg/dL 0.59*   ANION GAP mmol/L 7   CALCIUM mg/dL 9.0   ALBUMIN g/dL 3.5   TOTAL BILIRUBIN mg/dL 0.34   ALK PHOS U/L 105*   ALT U/L 10   AST U/L 12*   GLUCOSE RANDOM mg/dL 163*         Results from last 7 days   Lab Units 01/18/24  0633 01/17/24  1745 01/17/24  1255   POC GLUCOSE mg/dl 162* 148* 189*         Results from last 7 days   Lab Units 01/18/24  0639 01/18/24  0632 01/18/24  0349 01/18/24  0048 01/17/24  2304 01/17/24  1107 01/17/24  0940   LACTIC ACID mmol/L  --  1.3 3.0* 3.8* 3.5*   < >  --    PROCALCITONIN ng/ml 0.13  --   --   --   --   --  0.12    < > = values in this interval not displayed.       Lines/Drains:  Invasive Devices       Peripheral Intravenous Line  Duration             Peripheral IV 01/17/24 Left;Upper;Ventral (anterior) Arm 1 day                    Recent Cultures (last 7 days):   Results from last 7 days   Lab Units 01/17/24  1610   BLOOD CULTURE  Received in Microbiology Lab. Culture in Progress.  Received in Microbiology Lab. Culture in Progress.       Last 24 Hours Medication List:   Current Facility-Administered Medications   Medication Dose Route Frequency Provider Last Rate    buPROPion  150 mg Oral Daily Chago Griffiths,  MD      dicyclomine  20 mg Oral 4x Daily PRN Chago Griffiths MD      enoxaparin  30 mg Subcutaneous Q12H KAYLEIGH Chago Griffiths MD      famotidine  40 mg Oral BID Chago Griffiths MD      gabapentin  100 mg Oral TID PRN Chago Griffiths MD      HYDROcodone Bit-Homatrop MBr  5 mL Oral Q4H PRN Chago Griffiths MD      insulin lispro  1-5 Units Subcutaneous TID AC Chago Griffiths MD      ipratropium  0.5 mg Nebulization TID Chago Griffiths MD      ipratropium-albuterol  3 mL Nebulization Q4H PRN Chago Griffiths MD      lactated ringers  1,000 mL Intravenous Once Chago Griffiths MD      levalbuterol  1.25 mg Nebulization TID Chago Griffiths MD      melatonin  6 mg Oral HS Chago Griffiths MD      methylPREDNISolone sodium succinate  40 mg Intravenous Q6H KAYLEIGH Chago Griffiths MD      montelukast  10 mg Oral HS Chago Griffiths MD      ondansetron  8 mg Oral Q8H PRN Chago Griffiths MD      pantoprazole  40 mg Oral BID AC Chago Griffiths MD      pravastatin  40 mg Oral Daily With Dinner Chago Griffiths MD      remdesivir  100 mg Intravenous Q24H Chago Griffiths MD      sodium chloride (PF)  3 mL Intravenous Q1H PRN Chago Griffiths MD      zolpidem  10 mg Oral HS PRN Chago Griffiths MD          Today, Patient Was Seen By: Rk Stoner MD    **Please Note: This note may have been constructed using a voice recognition system.**

## 2024-01-18 NOTE — RESPIRATORY THERAPY NOTE
RT Protocol Note  Lidya Ansari 58 y.o. female MRN: [id number]  Unit/Bed#: -01 Encounter: [id number]    Assessment    Principal Problem:    Severe sepsis (HCC)  Active Problems:    Severe persistent asthma with exacerbation    Type 2 diabetes mellitus without complication, with long-term current use of insulin (HCC)    GERD (gastroesophageal reflux disease)    COVID-19      Home Pulmonary Medications:         Past Medical History:   Diagnosis Date    Asthma     Diabetes mellitus (HCC)     GERD (gastroesophageal reflux disease)     Hyperlipidemia     Hypertension      Social History     Socioeconomic History    Marital status: /Civil Union     Spouse name: None    Number of children: None    Years of education: None    Highest education level: None   Occupational History    None   Tobacco Use    Smoking status: Never    Smokeless tobacco: Never   Vaping Use    Vaping status: Never Used   Substance and Sexual Activity    Alcohol use: Yes     Alcohol/week: 7.0 standard drinks of alcohol     Types: 7 Glasses of wine per week    Drug use: Never    Sexual activity: None   Other Topics Concern    None   Social History Narrative    None     Social Determinants of Health     Financial Resource Strain: Not on file   Food Insecurity: No Food Insecurity (11/15/2023)    Hunger Vital Sign     Worried About Running Out of Food in the Last Year: Never true     Ran Out of Food in the Last Year: Never true   Transportation Needs: No Transportation Needs (11/15/2023)    PRAPARE - Transportation     Lack of Transportation (Medical): No     Lack of Transportation (Non-Medical): No   Physical Activity: Not on file   Stress: Not on file   Social Connections: Not on file   Intimate Partner Violence: Not on file   Housing Stability: Low Risk  (11/15/2023)    Housing Stability Vital Sign     Unable to Pay for Housing in the Last Year: No     Number of Places Lived in the Last Year: 1     Unstable Housing in the Last Year: No        Subjective         Objective    Physical Exam:        Vitals:  Blood pressure 138/73, pulse 83, temperature 97.6 °F (36.4 °C), resp. rate 16, SpO2 95%.          Imaging and other studies: I have personally reviewed pertinent reports.            Plan    Respiratory Plan: Home Bronchodilator Patient pathway        Resp Comments: will continue with tid [id number]

## 2024-01-18 NOTE — ASSESSMENT & PLAN NOTE
Sepsis criteria: Tachycardia, fever, tachypnea  Suspected source: COVID-19  Meeting criteria for severe sepsis due to lactic acidosis >4 (note septic shock has been ruled out patient never had hypotension refractory to IV fluids)  CXR: No acute cardiopulmonary disease.   Low suspicion for acute bacterial source as procalcitonin is negative. Suspect the lactic acidosis is at least partially due to asthma exacerbation.   Stop antibiotics and observe  Follow-up blood cultures  S/p IVF bolus and started on maintenance fluids  Lactic acidosis is resolved

## 2024-01-19 VITALS
TEMPERATURE: 97.7 F | DIASTOLIC BLOOD PRESSURE: 83 MMHG | HEART RATE: 103 BPM | OXYGEN SATURATION: 97 % | RESPIRATION RATE: 17 BRPM | SYSTOLIC BLOOD PRESSURE: 106 MMHG

## 2024-01-19 LAB
GLUCOSE SERPL-MCNC: 121 MG/DL (ref 65–140)
GLUCOSE SERPL-MCNC: 143 MG/DL (ref 65–140)

## 2024-01-19 PROCEDURE — 99239 HOSP IP/OBS DSCHRG MGMT >30: CPT | Performed by: HOSPITALIST

## 2024-01-19 PROCEDURE — 94664 DEMO&/EVAL PT USE INHALER: CPT

## 2024-01-19 PROCEDURE — 82948 REAGENT STRIP/BLOOD GLUCOSE: CPT

## 2024-01-19 PROCEDURE — 94760 N-INVAS EAR/PLS OXIMETRY 1: CPT

## 2024-01-19 PROCEDURE — 94640 AIRWAY INHALATION TREATMENT: CPT

## 2024-01-19 RX ORDER — HYDROCODONE BITARTRATE AND HOMATROPINE METHYLBROMIDE ORAL SOLUTION 5; 1.5 MG/5ML; MG/5ML
5 LIQUID ORAL EVERY 4 HOURS PRN
Qty: 120 ML | Refills: 0 | Status: SHIPPED | OUTPATIENT
Start: 2024-01-19 | End: 2024-01-29

## 2024-01-19 RX ORDER — ALBUTEROL SULFATE 90 UG/1
2 AEROSOL, METERED RESPIRATORY (INHALATION) EVERY 4 HOURS PRN
Status: DISCONTINUED | OUTPATIENT
Start: 2024-01-19 | End: 2024-01-19 | Stop reason: HOSPADM

## 2024-01-19 RX ORDER — PREDNISONE 20 MG/1
40 TABLET ORAL DAILY
Qty: 10 TABLET | Refills: 0 | Status: SHIPPED | OUTPATIENT
Start: 2024-01-19 | End: 2024-01-24

## 2024-01-19 RX ORDER — METHYLPREDNISOLONE SODIUM SUCCINATE 40 MG/ML
40 INJECTION, POWDER, LYOPHILIZED, FOR SOLUTION INTRAMUSCULAR; INTRAVENOUS EVERY 12 HOURS SCHEDULED
Status: DISCONTINUED | OUTPATIENT
Start: 2024-01-19 | End: 2024-01-19 | Stop reason: HOSPADM

## 2024-01-19 RX ADMIN — METHYLPREDNISOLONE SODIUM SUCCINATE 40 MG: 40 INJECTION, POWDER, FOR SOLUTION INTRAMUSCULAR; INTRAVENOUS at 06:23

## 2024-01-19 RX ADMIN — ENOXAPARIN SODIUM 30 MG: 30 INJECTION SUBCUTANEOUS at 08:51

## 2024-01-19 RX ADMIN — HYDROCODONE BITARTRATE AND HOMATROPINE METHYLBROMIDE ORAL SOLUTION 5 ML: 5; 1.5 LIQUID ORAL at 08:51

## 2024-01-19 RX ADMIN — LEVALBUTEROL HYDROCHLORIDE 1.25 MG: 1.25 SOLUTION RESPIRATORY (INHALATION) at 07:34

## 2024-01-19 RX ADMIN — BUPROPION HYDROCHLORIDE 150 MG: 150 TABLET, EXTENDED RELEASE ORAL at 08:51

## 2024-01-19 RX ADMIN — IPRATROPIUM BROMIDE 0.5 MG: 0.5 SOLUTION RESPIRATORY (INHALATION) at 07:34

## 2024-01-19 RX ADMIN — BUDESONIDE AND FORMOTEROL FUMARATE DIHYDRATE 2 PUFF: 160; 4.5 AEROSOL RESPIRATORY (INHALATION) at 08:52

## 2024-01-19 RX ADMIN — FAMOTIDINE 40 MG: 20 TABLET ORAL at 08:50

## 2024-01-19 RX ADMIN — PANTOPRAZOLE SODIUM 40 MG: 40 TABLET, DELAYED RELEASE ORAL at 06:23

## 2024-01-19 RX ADMIN — IPRATROPIUM BROMIDE AND ALBUTEROL SULFATE 3 ML: 2.5; .5 SOLUTION RESPIRATORY (INHALATION) at 02:31

## 2024-01-19 NOTE — PLAN OF CARE
Problem: PAIN - ADULT  Goal: Verbalizes/displays adequate comfort level or baseline comfort level  Description: Interventions:  - Encourage patient to monitor pain and request assistance  - Assess pain using appropriate pain scale  - Administer analgesics based on type and severity of pain and evaluate response  - Implement non-pharmacological measures as appropriate and evaluate response  - Consider cultural and social influences on pain and pain management  - Notify physician/advanced practitioner if interventions unsuccessful or patient reports new pain  Outcome: Progressing     Problem: INFECTION - ADULT  Goal: Absence or prevention of progression during hospitalization  Description: INTERVENTIONS:  - Assess and monitor for signs and symptoms of infection  - Monitor lab/diagnostic results  - Monitor all insertion sites, i.e. indwelling lines, tubes, and drains  - Monitor endotracheal if appropriate and nasal secretions for changes in amount and color  - [place] appropriate cooling/warming therapies per order  - Administer medications as ordered  - Instruct and encourage patient and family to use good hand hygiene technique  - Identify and instruct in appropriate isolation precautions for identified infection/condition  Outcome: Progressing  Goal: Absence of fever/infection during neutropenic period  Description: INTERVENTIONS:  - Monitor WBC    Outcome: Progressing     Problem: SAFETY ADULT  Goal: Patient will remain free of falls  Description: INTERVENTIONS:  - Educate patient/family on patient safety including physical limitations  - Instruct patient to call for assistance with activity   - Consult OT/PT to assist with strengthening/mobility   - Keep Call bell within reach  - Keep bed low and locked with side rails adjusted as appropriate  - Keep care items and personal belongings within reach  - Initiate and maintain comfort rounds  - Make Fall Risk Sign visible to staff  - Offer Toileting every 2 Hours,  in advance of need  - Obtain necessary fall risk management equipment: yellow socks  - Apply yellow socks and bracelet for high fall risk patients  - Consider moving patient to room near nurses station  Outcome: Progressing  Goal: Maintain or return to baseline ADL function  Description: INTERVENTIONS:  -  Assess patient's ability to carry out ADLs; assess patient's baseline for ADL function and identify physical deficits which impact ability to perform ADLs (bathing, care of mouth/teeth, toileting, grooming, dressing, etc.)  - Assess/evaluate cause of self-care deficits   - Assess range of motion  - Assess patient's mobility; develop plan if impaired  - Assess patient's need for assistive devices and provide as appropriate  - Encourage maximum independence but intervene and supervise when necessary  - Involve family in performance of ADLs  - Assess for home care needs following discharge   - Consider OT consult to assist with ADL evaluation and planning for discharge  - Provide patient education as appropriate  Outcome: Progressing  Goal: Maintains/Returns to pre admission functional level  Description: INTERVENTIONS:  - Perform AM-PAC 6 Click Basic Mobility/ Daily Activity assessment daily.  - Set and communicate daily mobility goal to care team and patient/family/caregiver.   - Collaborate with rehabilitation services on mobility goals if consulted  - Perform Range of Motion 2 times a day.  - Reposition patient every 2 hours.  - Dangle patient 2 times a day  - Stand patient 2 times a day  - Ambulate patient 2 times a day  - Out of bed to chair 2 times a day   - Out of bed for meals 2 times a day  - Out of bed for toileting  - Record patient progress and toleration of activity level   Outcome: Progressing

## 2024-01-19 NOTE — RESPIRATORY THERAPY NOTE
RT Protocol Note  Lidya Ansari 58 y.o. female MRN: [id number]  Unit/Bed#: -01 Encounter: [id number]    Assessment    Principal Problem:    Severe sepsis (HCC)  Active Problems:    Severe persistent asthma with exacerbation    Type 2 diabetes mellitus without complication, with long-term current use of insulin (HCC)    GERD (gastroesophageal reflux disease)    COVID-19      Home Pulmonary Medications:     01/18/24 1950   Respiratory Protocol   Protocol Initiated? No   Protocol Selection Respiratory   Language Barrier? No   Medical & Social History Reviewed? Yes   Diagnostic Studies Reviewed? Yes   Physical Assessment Performed? Yes   Respiratory Plan Home Bronchodilator Patient pathway   Respiratory Assessment   Assessment Type Assess only   General Appearance Alert;Awake   Respiratory Pattern Normal   Chest Assessment Chest expansion symmetrical   Bilateral Breath Sounds Diminished   Cough Non-productive   Resp Comments Will continue with current tx plan   O2 Device RA   Cough Description   Sputum Amount None   Additional Assessments   Pulse 72   Respirations 18   SpO2 93 %            Past Medical History:   Diagnosis Date    Asthma     Diabetes mellitus (HCC)     GERD (gastroesophageal reflux disease)     Hyperlipidemia     Hypertension      Social History     Socioeconomic History    Marital status: /Civil Union     Spouse name: None    Number of children: None    Years of education: None    Highest education level: None   Occupational History    None   Tobacco Use    Smoking status: Never    Smokeless tobacco: Never   Vaping Use    Vaping status: Never Used   Substance and Sexual Activity    Alcohol use: Yes     Alcohol/week: 7.0 standard drinks of alcohol     Types: 7 Glasses of wine per week    Drug use: Never    Sexual activity: None   Other Topics Concern    None   Social History Narrative    None     Social Determinants of Health     Financial Resource Strain: Not on file   Food Insecurity: No  Food Insecurity (1/18/2024)    Hunger Vital Sign     Worried About Running Out of Food in the Last Year: Never true     Ran Out of Food in the Last Year: Never true   Transportation Needs: No Transportation Needs (1/18/2024)    PRAPARE - Transportation     Lack of Transportation (Medical): No     Lack of Transportation (Non-Medical): No   Physical Activity: Not on file   Stress: Not on file   Social Connections: Not on file   Intimate Partner Violence: Not on file   Housing Stability: Low Risk  (1/18/2024)    Housing Stability Vital Sign     Unable to Pay for Housing in the Last Year: No     Number of Places Lived in the Last Year: 1     Unstable Housing in the Last Year: No       Subjective         Objective    Physical Exam:   Assessment Type: Assess only  General Appearance: Alert, Awake  Respiratory Pattern: Normal  Chest Assessment: Chest expansion symmetrical  Bilateral Breath Sounds: Diminished  Cough: Non-productive  O2 Device: RA    Vitals:  Blood pressure 138/73, pulse 72, temperature 97.6 °F (36.4 °C), resp. rate 18, SpO2 93%.          Imaging and other studies: I have personally reviewed pertinent reports.      O2 Device: RA     Plan    Respiratory Plan: Home Bronchodilator Patient pathway        Resp Comments: Will continue with current tx plan

## 2024-01-19 NOTE — ASSESSMENT & PLAN NOTE
Patient met criteria for prophylactic remdesivir given BMI and chronic lung disease (asthma) on admission  Was initially requiring O2 in the ED, however weaned off. She did not meet criteria for baricitinib or steroids for this (still getting steroids for asthma).  D/c on Prednisone 40mg daily x 5 days as per pulm

## 2024-01-19 NOTE — ASSESSMENT & PLAN NOTE
Lab Results   Component Value Date    HGBA1C 4.8 06/12/2023       Recent Labs     01/18/24  1554 01/18/24  2045 01/19/24  0611 01/19/24  1041   POCGLU 146* 141* 121 143*         Blood Sugar Average: Last 72 hrs:  (P) 146.375  Uses premeal sliding scale at home, continue here

## 2024-01-19 NOTE — ASSESSMENT & PLAN NOTE
Exacerbation likely secondary to COVID-19  IV Solu-Medrol, scheduled nebulizers, continue Singulair  Patient requested Hycodan for cough as other medications have not worked previously  Pulm saw in c/s  D/c on Prednisone 40mg daily x 5 days

## 2024-01-19 NOTE — DISCHARGE SUMMARY
Critical access hospital  Discharge- Lidya Ansari 1965, 58 y.o. female MRN: [id number]  Unit/Bed#: -Zana Encounter: [id number]  Primary Care Provider: Foreign Noonan   Date and time admitted to hospital: 1/17/2024  7:38 AM    COVID-19  Assessment & Plan  Patient met criteria for prophylactic remdesivir given BMI and chronic lung disease (asthma) on admission  Was initially requiring O2 in the ED, however weaned off. She did not meet criteria for baricitinib or steroids for this (still getting steroids for asthma).  D/c on Prednisone 40mg daily x 5 days as per pulm    GERD (gastroesophageal reflux disease)  Assessment & Plan  Continue famotidine and pantoprazole twice daily    Type 2 diabetes mellitus without complication, with long-term current use of insulin ([facility])  Assessment & Plan  Lab Results   Component Value Date    HGBA1C 4.8 06/12/2023       Recent Labs     01/18/24  1554 01/18/24  2045 01/19/24  0611 01/19/24  1041   POCGLU 146* 141* 121 143*         Blood Sugar Average: Last 72 hrs:  (P) 146.375  Uses premeal sliding scale at home, continue here     Severe persistent asthma with exacerbation  Assessment & Plan  Exacerbation likely secondary to COVID-19  IV Solu-Medrol, scheduled nebulizers, continue Singulair  Patient requested Hycodan for cough as other medications have not worked previously  Pulm saw in c/s  D/c on Prednisone 40mg daily x 5 days    * Severe sepsis ([facility])  Assessment & Plan  Sepsis criteria: Tachycardia, fever, tachypnea  Suspected source: COVID-19  Meeting criteria for severe sepsis due to lactic acidosis >4 (note septic shock has been ruled out patient never had hypotension refractory to IV fluids)  CXR: No acute cardiopulmonary disease.   Low suspicion for acute bacterial source as procalcitonin is negative. Suspect the lactic acidosis is at least partially due to asthma exacerbation.   Was on antibiotics now stopped as of 1/18/24, no indication at this time for  further antibiotic therapy  BCx NGTD  S/p IVF bolus and maintenance fluids  Lactic acidosis resolved      Medical Problems       Resolved Problems  Date Reviewed: 1/19/2024   None       Discharging Physician / Practitioner: Rk Stoner MD  PCP: Foreign Noonan  Admission Date:   Admission Orders (From admission, onward)       Ordered        01/18/24 1316  Inpatient Admission  Once            01/17/24 1049  Place in Observation  Once                          Discharge Date: 01/19/24    Consultations During Hospital Stay:  Pulmonary    Procedures Performed:   None    Significant Findings / Test Results:   CXR: No acute cardiopulmonary disease.     Incidental Findings:   None    Test Results Pending at Discharge (will require follow up):   None     Outpatient Tests Requested:  None    Complications:  None    Reason for Admission: SOB    Hospital Course:   Lidya Ansari is a 58 y.o. female patient who originally presented to the hospital on 1/17/2024 due to SOB as outlined in the H&P done on admission. Hospital course by problem list:    Please see above list of diagnoses and related plan for additional information.     Condition at Discharge: good    Discharge Day Visit / Exam:   Subjective:  Still with cough. Has not had to use supplemental O2 today.  Vitals: Blood Pressure: 106/83 (01/19/24 0738)  Pulse: 103 (01/19/24 0738)  Temperature: 97.7 °F (36.5 °C) (01/18/24 2326)  Temp Source: Oral (01/19/24 0738)  Respirations: 17 (01/19/24 0738)  SpO2: 97 % (01/19/24 0738)  Exam:   Gen: remains NAD, AAOx3, well developed, well nourished  Eyes: EOMI, PERRLA, no scleral icterus  ENMT:  no nasal discharge, no otic discharge, moist mucous membranes  Neck:  Supple  Cardiovascular: remains Regular rate and rhythm, normal S1-S2, no murmurs, rubs, or gallops  Lungs: B/L expiratory wheezes  Abdomen:  Positive bowel sounds, soft, nontender, nondistended   Skin:  Intact, no obvious lesions or rashes, no edema  Neuro: Cranial  nerves 2-12 are intact, non-focal, moves all 4 extremities    Discharge instructions/Information to patient and family:   See after visit summary for information provided to patient and family.      Provisions for Follow-Up Care:  See after visit summary for information related to follow-up care and any pertinent home health orders.      Mobility at time of Discharge:   Basic Mobility Inpatient Raw Score: 24  JH-HLM Goal: 8: Walk 250 feet or more  JH-HLM Achieved: 6: Walk 10 steps or more  HLM Goal NOT achieved. Continue to encourage mobility in post discharge setting.     Disposition:   Home     Discharge Statement:  I spent 41 minutes discharging the patient. This time was spent on the day of discharge. I had direct contact with the patient on the day of discharge. Greater than 50% of the total time was spent examining patient, answering all patient questions, arranging and discussing plan of care with patient as well as directly providing post-discharge instructions.  Additional time then spent on discharge activities.    Discharge Medications:  See after visit summary for reconciled discharge medications provided to patient and/or family.      **Please Note: This note may have been constructed using a voice recognition system**

## 2024-01-19 NOTE — ASSESSMENT & PLAN NOTE
Sepsis criteria: Tachycardia, fever, tachypnea  Suspected source: COVID-19  Meeting criteria for severe sepsis due to lactic acidosis >4 (note septic shock has been ruled out patient never had hypotension refractory to IV fluids)  CXR: No acute cardiopulmonary disease.   Low suspicion for acute bacterial source as procalcitonin is negative. Suspect the lactic acidosis is at least partially due to asthma exacerbation.   Was on antibiotics now stopped as of 1/18/24, no indication at this time for further antibiotic therapy  BCx NGTD  S/p IVF bolus and maintenance fluids  Lactic acidosis resolved

## 2024-01-20 LAB
BACTERIA SPT RESP CULT: NORMAL
GRAM STN SPEC: NORMAL
GRAM STN SPEC: NORMAL

## 2024-01-22 LAB
BACTERIA BLD CULT: NORMAL
BACTERIA BLD CULT: NORMAL

## 2024-01-23 LAB
BACTERIA BLD CULT: NORMAL
BACTERIA BLD CULT: NORMAL

## 2024-02-02 RX ORDER — SEMAGLUTIDE 2.68 MG/ML
INJECTION, SOLUTION SUBCUTANEOUS
COMMUNITY
Start: 2023-12-28

## 2024-02-05 ENCOUNTER — PREP FOR PROCEDURE (OUTPATIENT)
Dept: GASTROENTEROLOGY | Facility: CLINIC | Age: 59
End: 2024-02-05

## 2024-02-05 ENCOUNTER — OFFICE VISIT (OUTPATIENT)
Dept: GASTROENTEROLOGY | Facility: CLINIC | Age: 59
End: 2024-02-05
Payer: MEDICARE

## 2024-02-05 VITALS
WEIGHT: 173.8 LBS | HEART RATE: 86 BPM | OXYGEN SATURATION: 96 % | BODY MASS INDEX: 35.04 KG/M2 | DIASTOLIC BLOOD PRESSURE: 84 MMHG | HEIGHT: 59 IN | SYSTOLIC BLOOD PRESSURE: 123 MMHG

## 2024-02-05 DIAGNOSIS — R13.19 ESOPHAGEAL DYSPHAGIA: Primary | ICD-10-CM

## 2024-02-05 DIAGNOSIS — E66.01 OBESITY, MORBID (HCC): ICD-10-CM

## 2024-02-05 DIAGNOSIS — K31.84 GASTROPARESIS: ICD-10-CM

## 2024-02-05 DIAGNOSIS — R13.10 ODYNOPHAGIA: ICD-10-CM

## 2024-02-05 PROCEDURE — 99214 OFFICE O/P EST MOD 30 MIN: CPT | Performed by: INTERNAL MEDICINE

## 2024-02-05 RX ORDER — METOCLOPRAMIDE 5 MG/1
5 TABLET ORAL 4 TIMES DAILY
Qty: 124 TABLET | Refills: 4 | Status: SHIPPED | OUTPATIENT
Start: 2024-02-05

## 2024-02-05 NOTE — PROGRESS NOTES
[facility] - Outpatient Follow-up Note  Lidya Ansari 58 y.o. female MRN: [id number]  Encounter: [id number]          ASSESSMENT AND PLAN:      1. Esophageal dysphagia  -EGD with dilation    2. Odynophagia  -EGD with dilation    3. Obesity, morbid (HCC)    4. Gastroparesis  - metoclopramide (REGLAN) 5 mg tablet; Take 1 tablet (5 mg total) by mouth 4 (four) times a day  Dispense: 124 tablet; Refill: 4    5.  Constipation  -MiraLAX to be used on a as needed basis  -High-fiber diet    I told the patient there is no indication for colonoscopy  ______________________________________________________________________    SUBJECTIVE: Lidya returns the office today stating that she is having recurring problems with odynophagia as well as dysphagia.  She feels as if the food is getting stuck in the upper part of the chest.  She underwent esophagogastroduodenoscopy on 2023 which demonstrated no visible stricture of the esophagus.  She had a 54 French savory dilation performed at that time.  She denies any abdominal pain, diarrhea, bloating or gaseousness.  She does experience nausea, persistent basis.  She also experiences occasional abdominal pain.  She is constipated for up to 7 days of the time.  There is no rectal bleeding or melena.  She had ovarian cancer that became metastatic to involve the colon.  She wants to know whether or not it was necessary to undergo more frequent colonoscopies based upon this.      REVIEW OF SYSTEMS IS OTHERWISE NEGATIVE.      Historical Information   Past Medical History:   Diagnosis Date    Asthma     Diabetes mellitus (HCC)     GERD (gastroesophageal reflux disease)     Hyperlipidemia     Hypertension      Past Surgical History:   Procedure Laterality Date     SECTION      COLONOSCOPY      HYSTERECTOMY      OVARIAN CYST REMOVAL       Social History   Social History     Substance and Sexual Activity   Alcohol Use Yes    Alcohol/week: 7.0 standard  "drinks of alcohol    Types: 7 Glasses of wine per week     Social History     Substance and Sexual Activity   Drug Use Never     Social History     Tobacco Use   Smoking Status Never   Smokeless Tobacco Never     History reviewed. No pertinent family history.    Meds/Allergies       Current Outpatient Medications:     Blood Pressure Monitoring (Blood Pressure Monitor 3) CARLOS MANUEL    budesonide-formoterol (SYMBICORT) 160-4.5 mcg/act inhaler    buPROPion (Wellbutrin SR) 150 mg 12 hr tablet    dicyclomine (BENTYL) 20 mg tablet    EPINEPHrine (EPIPEN) 0.3 mg/0.3 mL SOAJ    famotidine (PEPCID) 40 MG tablet    fluticasone-vilanterol 200-25 mcg/actuation inhaler    gabapentin (NEURONTIN) 100 mg capsule    insulin aspart (NovoLOG FlexPen) 100 UNIT/ML injection pen    ipratropium-albuterol (DUO-NEB) 0.5-2.5 mg/3 mL nebulizer solution    Melatonin 10 MG CAPS    metoclopramide (REGLAN) 5 mg tablet    ondansetron (ZOFRAN) 4 mg tablet    OneTouch Ultra test strip    Ozempic, 2 MG/DOSE, 8 MG/3ML injection pen    pantoprazole (PROTONIX) 40 mg    rosuvastatin (CRESTOR) 5 mg tablet    hydrOXYzine HCL (ATARAX) 25 mg tablet    ipratropium (ATROVENT) 0.02 % nebulizer solution    ketorolac (TORADOL) 10 mg tablet    montelukast (SINGULAIR) 10 mg tablet    zolpidem (AMBIEN) 10 mg tablet    Allergies   Allergen Reactions    Codeine Other (See Comments)    Aspirin GI Intolerance and Rash    Hydromorphone Rash and Other (See Comments)     GI upset      Oxycodone-Acetaminophen Rash and Other (See Comments)    Tramadol Rash and Other (See Comments)           Objective     Blood pressure 123/84, pulse 86, height 4' 11\" (1.499 m), weight 78.8 kg (173 lb 12.8 oz), SpO2 96%. Body mass index is 35.1 kg/m².      PHYSICAL EXAM:      General Appearance:   Alert, cooperative, no distress   HEENT:   Normocephalic, atraumatic, anicteric.     Neck:  Supple, symmetrical, trachea midline   Lungs:   Clear to auscultation bilaterally; no rales, rhonchi or " wheezing; respirations unlabored    Heart::   Regular rate and rhythm; no murmur, rub, or gallop.   Abdomen:   Soft, non-tender, non-distended; normal bowel sounds; no masses, no organomegaly    Genitalia:   Deferred    Rectal:   Deferred    Extremities:  No cyanosis, clubbing or edema    Pulses:  2+ and symmetric    Skin:  No jaundice, rashes, or lesions    Lymph nodes:  No palpable cervical lymphadenopathy        Lab Results:   No visits with results within 1 Day(s) from this visit.   Latest known visit with results is:   Admission on 01/17/2024, Discharged on 01/19/2024   Component Date Value    WBC 01/17/2024 11.47 (H)     RBC 01/17/2024 4.44     Hemoglobin 01/17/2024 14.3     Hematocrit 01/17/2024 42.5     MCV 01/17/2024 96     MCH 01/17/2024 32.2     MCHC 01/17/2024 33.6     RDW 01/17/2024 13.3     MPV 01/17/2024 10.6     Platelets 01/17/2024 418 (H)     nRBC 01/17/2024 0     Neutrophils Relative 01/17/2024 77 (H)     Immat GRANS % 01/17/2024 1     Lymphocytes Relative 01/17/2024 15     Monocytes Relative 01/17/2024 6     Eosinophils Relative 01/17/2024 1     Basophils Relative 01/17/2024 0     Neutrophils Absolute 01/17/2024 8.79 (H)     Immature Grans Absolute 01/17/2024 0.06     Lymphocytes Absolute 01/17/2024 1.76     Monocytes Absolute 01/17/2024 0.72     Eosinophils Absolute 01/17/2024 0.12     Basophils Absolute 01/17/2024 0.02     hs TnI 0hr 01/17/2024 2     Sodium 01/17/2024 137     Potassium 01/17/2024 3.6     Chloride 01/17/2024 105     CO2 01/17/2024 22     ANION GAP 01/17/2024 10     BUN 01/17/2024 10     Creatinine 01/17/2024 0.88     Glucose 01/17/2024 182 (H)     Calcium 01/17/2024 9.2     AST 01/17/2024 17     ALT 01/17/2024 12     Alkaline Phosphatase 01/17/2024 111 (H)     Total Protein 01/17/2024 7.5     Albumin 01/17/2024 3.7     Total Bilirubin 01/17/2024 0.60     eGFR 01/17/2024 73     Preg, Serum 01/17/2024 Negative     SARS-CoV-2 01/17/2024 Positive (A)     INFLUENZA A PCR 01/17/2024  Negative     INFLUENZA B PCR 01/17/2024 Negative     RSV PCR 01/17/2024 Negative     Color, UA 01/18/2024 Colorless     Clarity, UA 01/18/2024 Clear     Specific Gravity, UA 01/18/2024 1.010     pH, UA 01/18/2024 7.0     Leukocytes, UA 01/18/2024 Negative     Nitrite, UA 01/18/2024 Negative     Protein, UA 01/18/2024 Negative     Glucose, UA 01/18/2024 Negative     Ketones, UA 01/18/2024 Negative     Urobilinogen, UA 01/18/2024 <2.0     Bilirubin, UA 01/18/2024 Negative     Occult Blood, UA 01/18/2024 Small (A)     pH, [place] 01/17/2024 7.418 (H)     pCO2, [place] 01/17/2024 34.2 (L)     pO2, [place] 01/17/2024 95.7 (H)     HCO3, [place] 01/17/2024 21.6 (L)     Base Excess, [place] 01/17/2024 -2.2     O2 Content, [place] 01/17/2024 19.0     O2 HGB, VENOUS 01/17/2024 94.6 (H)     BETA-HYDROXYBUTYRATE 01/17/2024 0.1     Ventricular Rate 01/17/2024 120     Atrial Rate 01/17/2024 120     UT Interval 01/17/2024 104     QRSD Interval 01/17/2024 66     QT Interval 01/17/2024 428     QTC Interval 01/17/2024 604     QRS [place] 01/17/2024 16     T Wave [place] 01/17/2024 28     Procalcitonin 01/17/2024 0.12     LACTIC ACID 01/17/2024 3.5 (HH)     Ventricular Rate 01/17/2024 105     Atrial Rate 01/17/2024 105     UT Interval 01/17/2024 142     QRSD Interval 01/17/2024 66     QT Interval 01/17/2024 332     QTC Interval 01/17/2024 438     P Axis 01/17/2024 49     QRS Axis 01/17/2024 12     T Wave Axis 01/17/2024 12     LACTIC ACID 01/17/2024 5.5 (HH)     Ventricular Rate 01/17/2024 105     Atrial Rate 01/17/2024 105     UT Interval 01/17/2024 146     QRSD Interval 01/17/2024 66     QT Interval 01/17/2024 368     QTC Interval 01/17/2024 486     P Axis 01/17/2024 60     QRS Axis 01/17/2024 28     T Wave [place] 01/17/2024 14     POC Glucose 01/17/2024 189 (H)     LACTIC ACID 01/17/2024 4.9 (HH)     Blood Culture 01/17/2024 No Growth After 5 Days.     Blood Culture 01/17/2024 No Growth After 5 Days.     Sputum Culture 01/18/2024 1+ Growth of     Gram Stain  Result 01/18/2024 1+ Epithelial cells per low power field     Gram Stain Result 01/18/2024 No Polys or Bacteria seen     LACTIC ACID 01/17/2024 4.4 (HH)     POC Glucose 01/17/2024 148 (H)     LACTIC ACID 01/17/2024 3.5 (HH)     LACTIC ACID 01/18/2024 3.8 (HH)     LACTIC ACID 01/18/2024 3.0 (HH)     Sodium 01/18/2024 138     Potassium 01/18/2024 3.8     Chloride 01/18/2024 106     CO2 01/18/2024 25     ANION GAP 01/18/2024 7     BUN 01/18/2024 8     Creatinine 01/18/2024 0.59 (L)     Glucose 01/18/2024 163 (H)     Glucose, Fasting 01/18/2024 163 (H)     Calcium 01/18/2024 9.0     AST 01/18/2024 12 (L)     ALT 01/18/2024 10     Alkaline Phosphatase 01/18/2024 105 (H)     Total Protein 01/18/2024 7.1     Albumin 01/18/2024 3.5     Total Bilirubin 01/18/2024 0.34     eGFR 01/18/2024 102     Procalcitonin 01/18/2024 0.13     WBC 01/18/2024 11.51 (H)     RBC 01/18/2024 3.79 (L)     Hemoglobin 01/18/2024 12.0     Hematocrit 01/18/2024 35.6     MCV 01/18/2024 94     MCH 01/18/2024 31.7     MCHC 01/18/2024 33.7     RDW 01/18/2024 13.3     Platelets 01/18/2024 385     MPV 01/18/2024 10.1     LACTIC ACID 01/18/2024 1.3     POC Glucose 01/18/2024 162 (H)     RBC, UA 01/18/2024 4-10 (A)     WBC, UA 01/18/2024 None Seen     Epithelial Cells 01/18/2024 Occasional     Bacteria, UA 01/18/2024 Occasional     POC Glucose 01/18/2024 121     POC Glucose 01/18/2024 146 (H)     POC Glucose 01/18/2024 141 (H)     POC Glucose 01/19/2024 121     POC Glucose 01/19/2024 143 (H)          Radiology Results:   X-ray chest 2 views    Result Date: 1/17/2024  Narrative: CHEST INDICATION:   chest pain. COMPARISON: 11/14/2023 EXAM PERFORMED/VIEWS:  XR CHEST PA & LATERAL 2 views FINDINGS: Cardiomediastinal silhouette appears unremarkable. Scarring or atelectasis at left lung base, unchanged The lungs are otherwise clear.  No pneumothorax or pleural effusion. Osseous structures appear within normal limits for patient age.     Impression: No acute  cardiopulmonary disease. Findings are stable Workstation performed: [id number]     Home Study    Result Date: 1/10/2024  Narrative: Table formatting from the original result was not included. Images from the original result were not included. Respiration Report  Patient Information        Full Name: JES ENGLISH Patient ID: [id number]  Height: 149.9 cm Weight: 81.2 kg BMI: 36.2  YOB: 1965 Age: 58 Gender: Female Home Sleep Testing Interpretation Report STUDY FORMAT: The patient was admitted to the sleep laboratory at the [facility] and oriented to the home sleep study testing procedure and equipment.  The home sleep testing study was performed using the [employer] Nox-T3 Recorder which is a Type III monitoring system.  A return demonstration by the patient helped to assure correct usage.  The following parameters were monitored: p-flow via nasal cannula, body position, oximetry, pulse rate and respiratory effort via thoracic and abdominal RIP belts.  The sleep study was scored following the rules established by the American Academy of Sleep Medicine (AASM).  Hypopneas are defined as a =30% drop in flow for =10 seconds that are associated with =4% desaturations.  KIRAN is the Respiratory Event Index (number of respiratory events per hour) and is a surrogate for the apnea/hypopnea index (AHI). PATIENT HISTORY: 58 year old female with a history of uncontrolled asthma who is undergoing home sleep testing for evaluation of suspected sleep apnea TESTING RESULTS: The test results are from the night of 1/8/24.  The total time in bed (analysis time) was 6h 59m minutes.  The patient had a total of 101 respiratory events made up of 31 obstructive apneas, 4 central apneas,  0 mixed apneas and 66 hypopneas resulting in a respiratory event index (KIRAN) of 14.6 .  The lowest SPO2 recorded is 65.0% but was likely artifactual due to poor plethysmography signal.      Impression: Mild  obstructive sleep apnea Normal baseline oxygenation with mild to moderate oxygen desaturations due to sleep apnea; period of low SpO2 recorded was accompanied by poor plethysmography signal and therefore could be artifact RECOMMENDATIONS: Treatment of mild obstructive sleep apnea is indicated in certain clinical settings including excessive daytime sleepiness.  Treatment options include auto titrating CPAP, oral appliance therapy, positional therapy, or weight loss.  Sleep medicine referral can be considered to discuss therapy options. Vipin Stallings MD Board Eligible Sleep Physician Data Report  Recording Information   Recording Date: 2024 Analysis Duration (TRT): 6h 59m  Recording Tags:  Analysis Start Time (Lights out): 9:30 PM  Device Type: T3 Analysis Stop Time (Lights on): 4:29 AM   Summary  Est Total Sleep Time (TST): 6h 54m Est. Sleep Efficiency: 81 % AHI:  JEN:  Snore %:         Respiratory Parameters Total Supine Non-Supine Count   Apneas + Hypopneas (AH): 14.6 /h 14.4 /h 15.2 /h 101   Apneas: 5.1 /h 3.9 /h 7.9 /h 35    Obstructive (OA): 4.5 /h 3.3 /h 7.4 /h 31    Mixed (MA):  0 /h 0 /h 0 /h 0    Central (CA):  0.6 /h 0.6 /h 0.5 /h 4   Hypopneas:  9.5 /h 10.5 /h 7.4 /h 66    Obstructive (OH): 0 /h 0 /h 0 /h 0    Central (CH):  0 /h 0 /h 0 /h 0   Obstructive Apnea Hypopnea (OA + MA + OH): 4.5 /h 3.3 /h 7.4 /h 31   Central Apnea Hypopnea (CA + CH): 0.6 /h 0.6 /h 0.5 /h 4   RDI (A+H+RERAs) 14.6 /h 14.4 /h 15.2 /h 101   Hypoventilation: 0 /h 0 /h 0 /h 0   Respiration Rate (per m): 18.2 /m 18 /m 18.7 /m                Signal Quality Percentage     Percentage    Oximeter: 70.2 %    Abdomen RIP: 86.4 %    Nasal Cannula: 87.1 %    Thorax RIP: 86.5 %          Snore Total Supine Non-Supine Duration   Snore: 3.9 % 5 % 1.2 % 16 m  Flow Limitation: 2 % 1.9 % 2.3 % 2.8 m  Cheyne-Silver Breathin % 0 % 0 % 0 m  Paradoxical Breathin.7 % 2.8 % 2.7 % 11.3 m              Oxygen Saturation (SpO2) Total Supine  Non-Supine Duration   Oxygen Desaturation Index (JEN): 13.2 /h 13.5 /h 12.3 /h    Average SpO2: 90.2 % 90.5 % 89.2 %    Minimum SpO2: 65 % 65 % 76 %    SpO2 Duration < 90% 31.7 % 36.3 % 20.7 % 131.4 m  SpO2 Duration = 88% 17.4 % 19.1 % 13.5 % 72.2 m  SpO2 Duration < 85% 1.2 % 0.6 % 2.7 % 4.9 m  Average Desat Drop: 5.5 % 5 % 6.1 %              Position & Analysis Time Duration Percentage   Duration Percentage  Supine (in TST): 292.5 m 70.5 %  Movement (in TST): 76.9 m 18.5 %  Non-Supine (in TST): 122.1 m 29.4 %  Invalid Data (Excluded): 0 m 0 %    Left (in TST): 6.5 m 1.6 %          Prone (in TST): 54.6 m 13.2 %          Right (in TST): 61 m 14.7 %          Unknown (in TST): 0 m 0 %        Upright (in TRT): 5.2 m 1.2 %                      Pulse Bpm    Minutes   Average (in TST): 97.2     Duration < 40 bpm: 0     Max (in TST): 123     Duration > 100 bpm: 58.6     Max (in TRT): 123     Duration > 90 bpm: 257.2     Min (in TST): 47                         Cardiac Events Index Count   Index Count  Bradycardia 0 /h 0   Tachycardia 0 /h 0   Asystole 0 /h 0   Atrial Fibrillation 0 /h 0              Trend Overview

## 2024-02-05 NOTE — PATIENT INSTRUCTIONS
Scheduled date of EGD(as of today): w/ Dilation  2/13/24  Physician performing EGD:Ada  Location of EGD:[place]  Instructions reviewed with patient by: Eligio guajardo  Clearances:  none

## 2024-02-13 ENCOUNTER — HOSPITAL ENCOUNTER (OUTPATIENT)
Dept: GASTROENTEROLOGY | Facility: HOSPITAL | Age: 59
Setting detail: OUTPATIENT SURGERY
Discharge: HOME | End: 2024-02-13
Attending: INTERNAL MEDICINE
Payer: MEDICARE

## 2024-02-13 ENCOUNTER — ANESTHESIA (OUTPATIENT)
Dept: GASTROENTEROLOGY | Facility: HOSPITAL | Age: 59
End: 2024-02-13

## 2024-02-13 ENCOUNTER — ANESTHESIA EVENT (OUTPATIENT)
Dept: GASTROENTEROLOGY | Facility: HOSPITAL | Age: 59
End: 2024-02-13

## 2024-02-13 VITALS
BODY MASS INDEX: 35.11 KG/M2 | HEIGHT: 59 IN | TEMPERATURE: 98.5 F | RESPIRATION RATE: 20 BRPM | WEIGHT: 174.16 LBS | SYSTOLIC BLOOD PRESSURE: 110 MMHG | OXYGEN SATURATION: 97 % | HEART RATE: 91 BPM | DIASTOLIC BLOOD PRESSURE: 62 MMHG

## 2024-02-13 DIAGNOSIS — R13.19 ESOPHAGEAL DYSPHAGIA: ICD-10-CM

## 2024-02-13 LAB — GLUCOSE SERPL-MCNC: 79 MG/DL (ref 65–140)

## 2024-02-13 PROCEDURE — 82948 REAGENT STRIP/BLOOD GLUCOSE: CPT

## 2024-02-13 PROCEDURE — 43248 EGD GUIDE WIRE INSERTION: CPT | Performed by: INTERNAL MEDICINE

## 2024-02-13 PROCEDURE — 43239 EGD BIOPSY SINGLE/MULTIPLE: CPT | Performed by: INTERNAL MEDICINE

## 2024-02-13 PROCEDURE — 88305 TISSUE EXAM BY PATHOLOGIST: CPT | Performed by: PATHOLOGY

## 2024-02-13 RX ORDER — LIDOCAINE HYDROCHLORIDE 20 MG/ML
INJECTION, SOLUTION EPIDURAL; INFILTRATION; INTRACAUDAL; PERINEURAL AS NEEDED
Status: DISCONTINUED | OUTPATIENT
Start: 2024-02-13 | End: 2024-02-13

## 2024-02-13 RX ORDER — SODIUM CHLORIDE, SODIUM LACTATE, POTASSIUM CHLORIDE, CALCIUM CHLORIDE 600; 310; 30; 20 MG/100ML; MG/100ML; MG/100ML; MG/100ML
INJECTION, SOLUTION INTRAVENOUS CONTINUOUS PRN
Status: DISCONTINUED | OUTPATIENT
Start: 2024-02-13 | End: 2024-02-13

## 2024-02-13 RX ORDER — ALBUTEROL SULFATE 2.5 MG/3ML
2.5 SOLUTION RESPIRATORY (INHALATION) ONCE
Status: COMPLETED | OUTPATIENT
Start: 2024-02-13 | End: 2024-02-13

## 2024-02-13 RX ORDER — PROPOFOL 10 MG/ML
INJECTION, EMULSION INTRAVENOUS AS NEEDED
Status: DISCONTINUED | OUTPATIENT
Start: 2024-02-13 | End: 2024-02-13

## 2024-02-13 RX ADMIN — PROPOFOL 50 MG: 10 INJECTION, EMULSION INTRAVENOUS at 09:04

## 2024-02-13 RX ADMIN — LIDOCAINE HYDROCHLORIDE 100 MG: 20 INJECTION, SOLUTION EPIDURAL; INFILTRATION; INTRACAUDAL; PERINEURAL at 09:02

## 2024-02-13 RX ADMIN — PROPOFOL 120 MG: 10 INJECTION, EMULSION INTRAVENOUS at 09:02

## 2024-02-13 RX ADMIN — ALBUTEROL SULFATE 2.5 MG: 2.5 SOLUTION RESPIRATORY (INHALATION) at 08:29

## 2024-02-13 RX ADMIN — SODIUM CHLORIDE, SODIUM LACTATE, POTASSIUM CHLORIDE, AND CALCIUM CHLORIDE: .6; .31; .03; .02 INJECTION, SOLUTION INTRAVENOUS at 08:59

## 2024-02-13 RX ADMIN — PROPOFOL 30 MG: 10 INJECTION, EMULSION INTRAVENOUS at 09:07

## 2024-02-13 RX ADMIN — PROPOFOL 50 MG: 10 INJECTION, EMULSION INTRAVENOUS at 09:09

## 2024-02-13 NOTE — ANESTHESIA POSTPROCEDURE EVALUATION
Post-Op Assessment Note    CV Status:  Stable  Pain Score: 0    Pain management: adequate       Mental Status:  Alert and awake   Hydration Status:  Stable   PONV Controlled:  None   Airway Patency:  Patent     Post Op Vitals Reviewed: Yes    No anethesia notable event occurred.    Staff: JARRED               /56 (02/13/24 0912)    Temp 98.5 °F (36.9 °C) (02/13/24 0912)    Pulse 100 (02/13/24 0912)   Resp 16 (02/13/24 0912)    SpO2 93 % (02/13/24 0912)

## 2024-02-13 NOTE — ANESTHESIA PREPROCEDURE EVALUATION
Procedure:  EGD    Relevant Problems   CARDIO   (+) Essential hypertension   (+) Hyperlipidemia   (+) Rib pain on left side      ENDO   (+) Type 2 diabetes mellitus without complication, with long-term current use of insulin (HCC)      GI/HEPATIC   (+) Dysphagia   (+) GERD (gastroesophageal reflux disease)      NEURO/PSYCH   (+) Depression with anxiety      PULMONARY   (+) Asthma exacerbation   (+) HEATHER (obstructive sleep apnea)   (+) Severe persistent asthma with exacerbation   (+) Shortness of breath     BMI 35     H/o wheezing during/after previous EGD    Difficult IV stick    NPO status confirmed    Recent Covid injection 1 month ago. Denies respiratory symptoms. Had fever and body aches for 24hrs.     Used rescue inhaler last night before bed due to history of wheezing requiring nebulizer treatment with previous endoscopic procedure. Otherwise only uses inhaler on less than a monthly basis. Reports breathing feels to be at baseline.    Pt on ozempic - last dose 2/1/2024    Physical Exam    Airway    Mallampati score: II  TM Distance: >3 FB  Neck ROM: full     Dental   No notable dental hx     Cardiovascular      Pulmonary      Other Findings  post-pubertal.      Anesthesia Plan  ASA Score- 3     Anesthesia Type- IV sedation with anesthesia with ASA Monitors.         Additional Monitors:     Airway Plan:     Comment: Plan for pre-procedure nebulizer treatment due to pulmonary history, recent covid infection, and h/o anesthesia complications.    Emphasized increased risk of pulmonary complications with patient. .       Plan Factors-Exercise tolerance (METS): >4 METS.    Chart reviewed.    Patient summary reviewed.                  Induction- intravenous.    Postoperative Plan-     Informed Consent- Anesthetic plan and risks discussed with patient.  I personally reviewed this patient with the CRNA. Discussed and agreed on the Anesthesia Plan with the CRNA..

## 2024-02-13 NOTE — H&P
"History and Physical -  [facility]  Lidya Ansari 58 y.o. female MRN: [id number]      HPI: Lidya Ansari is a 58 y.o. year old female who presents for Evaluation of dysphagia and odynophagia, gastroparesis      REVIEW OF SYSTEMS: Per the HPI, and otherwise unremarkable.    Historical Information   Past Medical History:   Diagnosis Date    Asthma     Diabetes mellitus (HCC)     GERD (gastroesophageal reflux disease)     Hyperlipidemia     Hypertension      Past Surgical History:   Procedure Laterality Date     SECTION      COLONOSCOPY      HYSTERECTOMY      OVARIAN CYST REMOVAL       Social History   Social History     Substance and Sexual Activity   Alcohol Use Yes    Alcohol/week: 7.0 standard drinks of alcohol    Types: 7 Glasses of wine per week     Social History     Substance and Sexual Activity   Drug Use Never     Social History     Tobacco Use   Smoking Status Never   Smokeless Tobacco Never     History reviewed. No pertinent family history.    Meds/Allergies     (Not in a hospital admission)      Allergies   Allergen Reactions    Codeine Other (See Comments)    Aspirin GI Intolerance and Rash    Hydromorphone Rash and Other (See Comments)     GI upset      Oxycodone-Acetaminophen Rash and Other (See Comments)    Tramadol Rash and Other (See Comments)       Objective     Blood pressure 121/68, pulse 92, temperature 97.5 °F (36.4 °C), temperature source Temporal, resp. rate 14, height 4' 11\" (1.499 m), weight 79 kg (174 lb 2.6 oz), SpO2 98%.      PHYSICAL EXAM    Gen: NAD  CV: RRR  CHEST: Clear  ABD: soft, NT/ND  EXT: no edema      ASSESSMENT/PLAN:  This is a 58 y.o. year old female here for EGD with possible biopsy and dilation, and she is stable and optimized for her procedure.          "

## 2024-02-15 ENCOUNTER — TELEPHONE (OUTPATIENT)
Dept: SLEEP CENTER | Facility: CLINIC | Age: 59
End: 2024-02-15

## 2024-02-15 ENCOUNTER — TELEPHONE (OUTPATIENT)
Dept: PSYCHIATRY | Facility: CLINIC | Age: 59
End: 2024-02-15

## 2024-02-15 PROCEDURE — 88305 TISSUE EXAM BY PATHOLOGIST: CPT | Performed by: PATHOLOGY

## 2024-02-15 NOTE — TELEPHONE ENCOUNTER
Contacted pt in regards to son's talk therapy referral. During call, pt stated they were looking for med mgmt services for themselves. Pt would like Female provider pref.     Writer did advise pt receives referral from PCP.

## 2024-02-15 NOTE — TELEPHONE ENCOUNTER
Home sleep study resulted and shows mild HEATHER with mild to moderate oxygen desaturations due to HEATHER.  Per study order, patient to consult sleep medicine.    Call placed to patient, left call back message.

## 2024-02-20 ENCOUNTER — TELEPHONE (OUTPATIENT)
Dept: GASTROENTEROLOGY | Facility: CLINIC | Age: 59
End: 2024-02-20

## 2024-02-20 NOTE — TELEPHONE ENCOUNTER
Called pt LMOM advising biopsy results. Also advised to refer to her [employer] message sent from Dr. Caballero.

## 2024-02-20 NOTE — TELEPHONE ENCOUNTER
Lidya, the biopsies of the esophagus     Have come back showing evidence of reflux esophagitis but no evidence of eosinophilic esophagitis.  There is also no evidence of cancer.

## 2024-02-20 NOTE — TELEPHONE ENCOUNTER
----- Message from Joselito Caballero DO sent at 2/20/2024  7:58 AM EST -----  Patient has not read [employer] message. Please call and share results.

## 2024-02-21 PROBLEM — A41.9 SEVERE SEPSIS (HCC): Status: RESOLVED | Noted: 2024-01-17 | Resolved: 2024-02-21

## 2024-02-21 PROBLEM — R65.20 SEVERE SEPSIS (HCC): Status: RESOLVED | Noted: 2024-01-17 | Resolved: 2024-02-21

## 2024-04-03 ENCOUNTER — HOSPITAL ENCOUNTER (EMERGENCY)
Facility: HOSPITAL | Age: 59
Discharge: HOME | End: 2024-04-03
Attending: EMERGENCY MEDICINE | Admitting: EMERGENCY MEDICINE
Payer: MEDICARE

## 2024-04-03 VITALS
DIASTOLIC BLOOD PRESSURE: 77 MMHG | BODY MASS INDEX: 35.93 KG/M2 | TEMPERATURE: 98.4 F | HEART RATE: 100 BPM | SYSTOLIC BLOOD PRESSURE: 140 MMHG | WEIGHT: 177.91 LBS | OXYGEN SATURATION: 99 % | RESPIRATION RATE: 19 BRPM

## 2024-04-03 DIAGNOSIS — J45.901 ACUTE ASTHMA EXACERBATION: Primary | ICD-10-CM

## 2024-04-03 DIAGNOSIS — R05.1 ACUTE COUGH: ICD-10-CM

## 2024-04-03 DIAGNOSIS — R06.02 SOB (SHORTNESS OF BREATH): ICD-10-CM

## 2024-04-03 DIAGNOSIS — R06.2 WHEEZING: ICD-10-CM

## 2024-04-03 PROCEDURE — 94644 CONT INHLJ TX 1ST HOUR: CPT

## 2024-04-03 PROCEDURE — 99284 EMERGENCY DEPT VISIT MOD MDM: CPT

## 2024-04-03 PROCEDURE — 94760 N-INVAS EAR/PLS OXIMETRY 1: CPT

## 2024-04-03 PROCEDURE — 99284 EMERGENCY DEPT VISIT MOD MDM: CPT | Performed by: EMERGENCY MEDICINE

## 2024-04-03 RX ORDER — PREDNISONE 20 MG/1
60 TABLET ORAL DAILY
Qty: 9 TABLET | Refills: 0 | Status: SHIPPED | OUTPATIENT
Start: 2024-04-03 | End: 2024-04-06

## 2024-04-03 RX ORDER — METHYLPREDNISOLONE SODIUM SUCCINATE 125 MG/2ML
62.5 INJECTION, POWDER, LYOPHILIZED, FOR SOLUTION INTRAMUSCULAR; INTRAVENOUS ONCE
Status: DISCONTINUED | OUTPATIENT
Start: 2024-04-03 | End: 2024-04-03 | Stop reason: HOSPADM

## 2024-04-03 RX ORDER — SODIUM CHLORIDE FOR INHALATION 0.9 %
12 VIAL, NEBULIZER (ML) INHALATION ONCE
Status: COMPLETED | OUTPATIENT
Start: 2024-04-03 | End: 2024-04-03

## 2024-04-03 RX ORDER — PREDNISONE 20 MG/1
60 TABLET ORAL ONCE
Status: COMPLETED | OUTPATIENT
Start: 2024-04-03 | End: 2024-04-03

## 2024-04-03 RX ORDER — MAGNESIUM SULFATE HEPTAHYDRATE 40 MG/ML
2 INJECTION, SOLUTION INTRAVENOUS ONCE
Status: DISCONTINUED | OUTPATIENT
Start: 2024-04-03 | End: 2024-04-03 | Stop reason: HOSPADM

## 2024-04-03 RX ADMIN — IPRATROPIUM BROMIDE 1 MG: 0.5 SOLUTION RESPIRATORY (INHALATION) at 05:09

## 2024-04-03 RX ADMIN — ISODIUM CHLORIDE 12 ML: 0.03 SOLUTION RESPIRATORY (INHALATION) at 05:09

## 2024-04-03 RX ADMIN — PREDNISONE 60 MG: 20 TABLET ORAL at 05:04

## 2024-04-03 RX ADMIN — ALBUTEROL SULFATE 10 MG: 2.5 SOLUTION RESPIRATORY (INHALATION) at 05:09

## 2024-04-03 NOTE — ED PROVIDER NOTES
History  Chief Complaint   Patient presents with    Asthma     Pt with known hx of asthma, states started yesterday with wheezing. Albuterol taken at home without relief.      59-year-old female history of asthma presenting with shortness of breath and wheezing.  Patient reports progressively worsening symptoms beginning yesterday.  Trialed home medications with minimal improvement.  Denies any productive cough.  Denies any chest pain.  Denies any abdominal pain nausea vomiting diarrhea.  Denies any other complaints.  Chart reviewed.    Past Medical History:  No date: Asthma  No date: Diabetes mellitus (HCC)  No date: GERD (gastroesophageal reflux disease)  No date: Hyperlipidemia  No date: Hypertension  Family History: non-contributory  Social History          Prior to Admission Medications   Prescriptions Last Dose Informant Patient Reported? Taking?   Blood Pressure Monitoring (Blood Pressure Monitor 3) CARLOS MANUEL  Self Yes No   Sig: Use as directed   EPINEPHrine (EPIPEN) 0.3 mg/0.3 mL SOAJ  Self Yes No   Sig: as directed   Melatonin 10 MG CAPS  Self Yes No   Sig: Take 10 mg by mouth daily at bedtime as needed   OneTouch Ultra test strip  Self Yes No   Sig: TEST TWICE A DAY   Ozempic, 2 MG/DOSE, 8 MG/3ML injection pen  Self Yes No   Sig: INJECT 2MG SUBCUTANEOUSLY ONCE WEEKLY   buPROPion (Wellbutrin SR) 150 mg 12 hr tablet  Self Yes No   Sig: Every 12 hours   budesonide-formoterol (SYMBICORT) 160-4.5 mcg/act inhaler  Self Yes No   Sig: Every 12 hours   dicyclomine (BENTYL) 20 mg tablet  Self No No   Sig: TAKE 1 TABLET BY MOUTH EVERY 6 HOURS AS NEEDED FOR ABDOMINAL CRAMPING   famotidine (PEPCID) 40 MG tablet  Self No No   Sig: TAKE 1 TABLET BY MOUTH TWICE A DAY WITH LUNCH AND BEFORE BEDTIME   fluticasone-vilanterol 200-25 mcg/actuation inhaler  Self Yes No   Si puff every 24 hours   gabapentin (NEURONTIN) 100 mg capsule  Self Yes No   Sig: Take 100 mg by mouth 3 (three) times a day as needed   hydrOXYzine HCL  (ATARAX) 25 mg tablet   No No   Sig: Take 1 tablet (25 mg total) by mouth every 6 (six) hours as needed for itching for up to 14 days   insulin aspart (NovoLOG FlexPen) 100 UNIT/ML injection pen  Self No No   Sig: Inject 3 Units under the skin 3 (three) times a day with meals   ipratropium (ATROVENT) 0.02 % nebulizer solution  Self No No   Sig: Take 2.5 mL (0.5 mg total) by nebulization 3 (three) times a day   ipratropium-albuterol (DUO-NEB) 0.5-2.5 mg/3 mL nebulizer solution  Self No No   Sig: Take 3 mL by nebulization every 4 (four) hours as needed for wheezing or shortness of breath   ketorolac (TORADOL) 10 mg tablet   No No   Sig: Take 1 tablet (10 mg total) by mouth every 6 (six) hours as needed for moderate pain for up to 5 days   metoclopramide (REGLAN) 5 mg tablet   No No   Sig: Take 1 tablet (5 mg total) by mouth 4 (four) times a day   montelukast (SINGULAIR) 10 mg tablet  Self No No   Sig: Take 1 tablet (10 mg total) by mouth daily at bedtime   ondansetron (ZOFRAN) 4 mg tablet  Self Yes No   Sig: Take 1 tablet by mouth every 8 (eight) hours   pantoprazole (PROTONIX) 40 mg  Self No No   Sig: Take 1 packet (40 mg total) by mouth 2 (two) times a day before meals   rosuvastatin (CRESTOR) 5 mg tablet  Self Yes No   Sig: Take 1 tablet by mouth daily   zolpidem (AMBIEN) 10 mg tablet   No No   Sig: Take 1 tablet (10 mg total) by mouth daily at bedtime as needed for sleep for up to 7 days      Facility-Administered Medications: None       Past Medical History:   Diagnosis Date    Asthma     Diabetes mellitus (HCC)     GERD (gastroesophageal reflux disease)     Hyperlipidemia     Hypertension        Past Surgical History:   Procedure Laterality Date     SECTION      COLONOSCOPY      HYSTERECTOMY      OVARIAN CYST REMOVAL         History reviewed. No pertinent family history.  I have reviewed and agree with the history as documented.    E-Cigarette/Vaping    E-Cigarette Use Never User      E-Cigarette/Vaping  Substances    Nicotine No     THC No     CBD No     Flavoring No     Other No     Unknown No      Social History     Tobacco Use    Smoking status: Never    Smokeless tobacco: Never   Vaping Use    Vaping status: Never Used   Substance Use Topics    Alcohol use: Yes     Alcohol/week: 7.0 standard drinks of alcohol     Types: 7 Glasses of wine per week    Drug use: Never       Review of Systems   Constitutional:  Negative for appetite change, chills, diaphoresis, fever and unexpected weight change.   HENT:  Negative for congestion and rhinorrhea.    Eyes:  Negative for photophobia and visual disturbance.   Respiratory:  Positive for cough, shortness of breath and wheezing. Negative for chest tightness.    Cardiovascular:  Negative for chest pain, palpitations and leg swelling.   Gastrointestinal:  Negative for abdominal distention, abdominal pain, blood in stool, constipation, diarrhea, nausea and vomiting.   Genitourinary:  Negative for dysuria and hematuria.   Musculoskeletal:  Negative for back pain, joint swelling, neck pain and neck stiffness.   Skin:  Negative for color change, pallor, rash and wound.   Neurological:  Negative for dizziness, syncope, weakness, light-headedness and headaches.   Psychiatric/Behavioral:  Negative for agitation.    All other systems reviewed and are negative.      Physical Exam  Physical Exam  Vitals and nursing note reviewed.   Constitutional:       General: She is not in acute distress.     Appearance: Normal appearance. She is well-developed. She is not ill-appearing, toxic-appearing or diaphoretic.   HENT:      Head: Normocephalic and atraumatic.      Nose: Nose normal. No congestion or rhinorrhea.      Mouth/Throat:      Mouth: Mucous membranes are moist.      Pharynx: Oropharynx is clear. No oropharyngeal exudate or posterior oropharyngeal erythema.   Eyes:      General: No scleral icterus.        Right eye: No discharge.         Left eye: No discharge.      Extraocular  Movements: Extraocular movements intact.      Conjunctiva/sclera: Conjunctivae normal.      Pupils: Pupils are equal, round, and reactive to light.   Neck:      Vascular: No JVD.      Trachea: No tracheal deviation.      Comments: Supple. Normal range of motion.   Cardiovascular:      Rate and Rhythm: Normal rate and regular rhythm.      Heart sounds: Normal heart sounds. No murmur heard.     No friction rub. No gallop.      Comments: Normal rate and regular rhythm  Pulmonary:      Effort: Pulmonary effort is normal. No respiratory distress.      Breath sounds: No stridor. Wheezing present. No rales.      Comments: Inspiratory expiratory wheezing throughout  Chest:      Chest wall: No tenderness.   Abdominal:      General: Bowel sounds are normal. There is no distension.      Palpations: Abdomen is soft.      Tenderness: There is no abdominal tenderness. There is no right CVA tenderness, left CVA tenderness, guarding or rebound.      Comments: Soft, nontender, nondistended.  Normal bowel sounds throughout   Musculoskeletal:         General: No swelling, tenderness, deformity or signs of injury. Normal range of motion.      Cervical back: Normal range of motion and neck supple. No rigidity. No muscular tenderness.      Right lower leg: No edema.      Left lower leg: No edema.   Lymphadenopathy:      Cervical: No cervical adenopathy.   Skin:     General: Skin is warm and dry.      Coloration: Skin is not pale.      Findings: No erythema or rash.   Neurological:      General: No focal deficit present.      Mental Status: She is alert. Mental status is at baseline.      Sensory: No sensory deficit.      Motor: No weakness or abnormal muscle tone.      Coordination: Coordination normal.      Gait: Gait normal.      Comments: Alert.  Strength and sensation grossly intact.  Ambulatory without difficulty at baseline.    Psychiatric:         Behavior: Behavior normal.         Thought Content: Thought content normal.          Vital Signs  ED Triage Vitals [04/03/24 0342]   Temperature Pulse Respirations Blood Pressure SpO2   98.4 °F (36.9 °C) 100 19 140/77 99 %      Temp Source Heart Rate Source Patient Position - Orthostatic VS BP Location FiO2 (%)   Temporal Monitor Sitting Left arm --      Pain Score       --           Vitals:    04/03/24 0342   BP: 140/77   Pulse: 100   Patient Position - Orthostatic VS: Sitting         Visual Acuity      ED Medications  Medications   methylPREDNISolone sodium succinate (Solu-MEDROL) injection 62.5 mg (62.5 mg Intravenous Not Given 4/3/24 0621)   magnesium sulfate 2 g/50 mL IVPB (premix) 2 g (2 g Intravenous Not Given 4/3/24 0620)   albuterol inhalation solution 10 mg (10 mg Nebulization Given 4/3/24 0509)   ipratropium (ATROVENT) 0.02 % inhalation solution 1 mg (1 mg Nebulization Given 4/3/24 0509)   sodium chloride 0.9 % inhalation solution 12 mL (12 mL Nebulization Given 4/3/24 0509)   predniSONE tablet 60 mg (60 mg Oral Given 4/3/24 0504)       Diagnostic Studies  Results Reviewed       None                   No orders to display              Procedures  Procedures         ED Course                                             Medical Decision Making  59-year-old female history of asthma presenting with shortness of breath and wheezing.  History and exam consistent with likely asthma exacerbation.  Plan for breathing treatment and steroids.  Reassess.    Symptoms improving with medications.  Prescription sent to pharmacy. Discussed results and recommendations. Advised follow up PCP. Medication recommendations. Given instructions and return precautions. Patient/family at bedside acknowledged understanding of all written and verbal instructions and return precautions. Discharged.     Risk  Prescription drug management.             Disposition  Final diagnoses:   Acute asthma exacerbation   Acute cough   Wheezing   SOB (shortness of breath)     Time reflects when diagnosis was documented in  both MDM as applicable and the Disposition within this note       Time User Action Codes Description Comment    4/3/2024  5:11 AM PrestonRadha oroscon Add [J45.901] Acute asthma exacerbation     4/3/2024  5:11 AM Han Gerard Add [R05.1] Acute cough     4/3/2024  5:11 AM Moustapha Han Add [R06.2] Wheezing     4/3/2024  5:11 AM Moustapha Han Add [R06.02] SOB (shortness of breath)           ED Disposition       ED Disposition   Discharge    Condition   Stable    Date/Time   Wed Apr 3, 2024  6:01 AM    Comment   Lidya Ansari discharge to home/self care.                   Follow-up Information       Follow up With Specialties Details Why Contact Info    [facility]  Schedule an appointment as soon as possible for a visit in 1 week  [address] [place] [id number]              Discharge Medication List as of 4/3/2024  6:01 AM        START taking these medications    Details   predniSONE 20 mg tablet Take 3 tablets (60 mg total) by mouth daily for 3 days, Starting Wed 4/3/2024, Until Sat 4/6/2024, Normal           CONTINUE these medications which have NOT CHANGED    Details   Blood Pressure Monitoring (Blood Pressure Monitor 3) CARLOS MANUEL Use as directed, Starting Fri 10/21/2022, Historical Med      budesonide-formoterol (SYMBICORT) 160-4.5 mcg/act inhaler Every 12 hours, Historical Med      buPROPion (Wellbutrin SR) 150 mg 12 hr tablet Every 12 hours, Starting Sat 8/12/2023, Historical Med      dicyclomine (BENTYL) 20 mg tablet TAKE 1 TABLET BY MOUTH EVERY 6 HOURS AS NEEDED FOR ABDOMINAL CRAMPING, Normal      EPINEPHrine (EPIPEN) 0.3 mg/0.3 mL SOAJ as directed, Historical Med      famotidine (PEPCID) 40 MG tablet TAKE 1 TABLET BY MOUTH TWICE A DAY WITH LUNCH AND BEFORE BEDTIME, Normal      fluticasone-vilanterol 200-25 mcg/actuation inhaler 1 puff every 24 hours, Historical Med      gabapentin (NEURONTIN) 100 mg capsule Take 100 mg by mouth 3 (three) times a day as needed, Starting Tue 5/2/2023, Historical Med      hydrOXYzine  HCL (ATARAX) 25 mg tablet Take 1 tablet (25 mg total) by mouth every 6 (six) hours as needed for itching for up to 14 days, Starting Fri 6/16/2023, Until Fri 6/30/2023 at 2359, Normal      insulin aspart (NovoLOG FlexPen) 100 UNIT/ML injection pen Inject 3 Units under the skin 3 (three) times a day with meals, Starting Fri 6/16/2023, Normal      ipratropium (ATROVENT) 0.02 % nebulizer solution Take 2.5 mL (0.5 mg total) by nebulization 3 (three) times a day, Starting Tue 11/1/2022, Until Tue 2/13/2024, Normal      ipratropium-albuterol (DUO-NEB) 0.5-2.5 mg/3 mL nebulizer solution Take 3 mL by nebulization every 4 (four) hours as needed for wheezing or shortness of breath, Starting Tue 11/1/2022, No Print      ketorolac (TORADOL) 10 mg tablet Take 1 tablet (10 mg total) by mouth every 6 (six) hours as needed for moderate pain for up to 5 days, Starting Thu 11/16/2023, Until Tue 11/21/2023 at 2359, Normal      Melatonin 10 MG CAPS Take 10 mg by mouth daily at bedtime as needed, Starting Sat 3/25/2023, Historical Med      metoclopramide (REGLAN) 5 mg tablet Take 1 tablet (5 mg total) by mouth 4 (four) times a day, Starting Mon 2/5/2024, Normal      montelukast (SINGULAIR) 10 mg tablet Take 1 tablet (10 mg total) by mouth daily at bedtime, Starting Tue 11/1/2022, Until Tue 2/13/2024, Normal      ondansetron (ZOFRAN) 4 mg tablet Take 1 tablet by mouth every 8 (eight) hours, Historical Med      OneTouch Ultra test strip TEST TWICE A DAY, Historical Med      Ozempic, 2 MG/DOSE, 8 MG/3ML injection pen INJECT 2MG SUBCUTANEOUSLY ONCE WEEKLY, Historical Med      pantoprazole (PROTONIX) 40 mg Take 1 packet (40 mg total) by mouth 2 (two) times a day before meals, Starting Wed 3/1/2023, Normal      rosuvastatin (CRESTOR) 5 mg tablet Take 1 tablet by mouth daily, Historical Med      zolpidem (AMBIEN) 10 mg tablet Take 1 tablet (10 mg total) by mouth daily at bedtime as needed for sleep for up to 7 days, Starting Fri 6/16/2023,  Until Tue 2/13/2024 at 2359, Normal             No discharge procedures on file.    PDMP Review         Value Time User    PDMP Reviewed  Yes 11/15/2023  2:21 PM EDUARDA Parker            ED Provider  Electronically Signed by             Han Gerard MD  04/03/24 0628

## 2024-04-26 ENCOUNTER — APPOINTMENT (EMERGENCY)
Dept: RADIOLOGY | Facility: HOSPITAL | Age: 59
DRG: 202 | End: 2024-04-26
Payer: MEDICARE

## 2024-04-26 ENCOUNTER — HOSPITAL ENCOUNTER (INPATIENT)
Facility: HOSPITAL | Age: 59
LOS: 4 days | Discharge: HOME | DRG: 202 | End: 2024-05-01
Attending: EMERGENCY MEDICINE | Admitting: FAMILY MEDICINE
Payer: MEDICARE

## 2024-04-26 DIAGNOSIS — J45.901 ACUTE ASTHMA EXACERBATION: Primary | ICD-10-CM

## 2024-04-26 DIAGNOSIS — B37.0 THRUSH: ICD-10-CM

## 2024-04-26 DIAGNOSIS — R65.10 SIRS (SYSTEMIC INFLAMMATORY RESPONSE SYNDROME) (HCC): ICD-10-CM

## 2024-04-26 PROBLEM — E87.5 HYPERKALEMIA: Status: ACTIVE | Noted: 2024-04-26

## 2024-04-26 LAB
ALBUMIN SERPL BCP-MCNC: 3.8 G/DL (ref 3.5–5)
ALP SERPL-CCNC: 137 U/L (ref 34–104)
ALT SERPL W P-5'-P-CCNC: 19 U/L (ref 7–52)
ANION GAP SERPL CALCULATED.3IONS-SCNC: 12 MMOL/L (ref 4–13)
ANION GAP SERPL CALCULATED.3IONS-SCNC: 18 MMOL/L (ref 4–13)
APTT PPP: 30 SECONDS (ref 23–37)
AST SERPL W P-5'-P-CCNC: 32 U/L (ref 13–39)
ATRIAL RATE: 119 BPM
BASE EX.OXY STD BLDV CALC-SCNC: 67.6 % (ref 60–80)
BASE EXCESS BLDV CALC-SCNC: -1 MMOL/L
BASOPHILS # BLD AUTO: 0.04 THOUSANDS/ÂΜL (ref 0–0.1)
BASOPHILS NFR BLD AUTO: 0 % (ref 0–1)
BILIRUB DIRECT SERPL-MCNC: 0.1 MG/DL (ref 0–0.2)
BILIRUB SERPL-MCNC: 0.75 MG/DL (ref 0.2–1)
BUN SERPL-MCNC: 10 MG/DL (ref 5–25)
BUN SERPL-MCNC: 10 MG/DL (ref 5–25)
CALCIUM SERPL-MCNC: 8.7 MG/DL (ref 8.4–10.2)
CALCIUM SERPL-MCNC: 8.9 MG/DL (ref 8.4–10.2)
CARDIAC TROPONIN I PNL SERPL HS: <2 NG/L
CARDIAC TROPONIN I PNL SERPL HS: <2 NG/L
CHLORIDE SERPL-SCNC: 102 MMOL/L (ref 96–108)
CHLORIDE SERPL-SCNC: 104 MMOL/L (ref 96–108)
CO2 SERPL-SCNC: 19 MMOL/L (ref 21–32)
CO2 SERPL-SCNC: 20 MMOL/L (ref 21–32)
CREAT SERPL-MCNC: 0.71 MG/DL (ref 0.6–1.3)
CREAT SERPL-MCNC: 0.96 MG/DL (ref 0.6–1.3)
EOSINOPHIL # BLD AUTO: 0.28 THOUSAND/ÂΜL (ref 0–0.61)
EOSINOPHIL NFR BLD AUTO: 2 % (ref 0–6)
ERYTHROCYTE [DISTWIDTH] IN BLOOD BY AUTOMATED COUNT: 13.4 % (ref 11.6–15.1)
FLUAV RNA RESP QL NAA+PROBE: NEGATIVE
FLUBV RNA RESP QL NAA+PROBE: NEGATIVE
GFR SERPL CREATININE-BSD FRML MDRD: 65 ML/MIN/1.73SQ M
GFR SERPL CREATININE-BSD FRML MDRD: 94 ML/MIN/1.73SQ M
GLUCOSE P FAST SERPL-MCNC: 154 MG/DL (ref 65–99)
GLUCOSE SERPL-MCNC: 130 MG/DL (ref 65–140)
GLUCOSE SERPL-MCNC: 136 MG/DL (ref 65–140)
GLUCOSE SERPL-MCNC: 154 MG/DL (ref 65–140)
GLUCOSE SERPL-MCNC: 175 MG/DL (ref 65–140)
GLUCOSE SERPL-MCNC: 264 MG/DL (ref 65–140)
HCO3 BLDV-SCNC: 24.9 MMOL/L (ref 24–30)
HCT VFR BLD AUTO: 44.2 % (ref 34.8–46.1)
HGB BLD-MCNC: 14.5 G/DL (ref 11.5–15.4)
IMM GRANULOCYTES # BLD AUTO: 0.06 THOUSAND/UL (ref 0–0.2)
IMM GRANULOCYTES NFR BLD AUTO: 1 % (ref 0–2)
INR PPP: 1.01 (ref 0.84–1.19)
LACTATE SERPL-SCNC: 3.1 MMOL/L (ref 0.5–2)
LACTATE SERPL-SCNC: 6.9 MMOL/L (ref 0.5–2)
LYMPHOCYTES # BLD AUTO: 4.12 THOUSANDS/ÂΜL (ref 0.6–4.47)
LYMPHOCYTES NFR BLD AUTO: 33 % (ref 14–44)
MAGNESIUM SERPL-MCNC: 3 MG/DL (ref 1.9–2.7)
MCH RBC QN AUTO: 32.4 PG (ref 26.8–34.3)
MCHC RBC AUTO-ENTMCNC: 32.8 G/DL (ref 31.4–37.4)
MCV RBC AUTO: 99 FL (ref 82–98)
MONOCYTES # BLD AUTO: 1.04 THOUSAND/ÂΜL (ref 0.17–1.22)
MONOCYTES NFR BLD AUTO: 8 % (ref 4–12)
NEUTROPHILS # BLD AUTO: 6.98 THOUSANDS/ÂΜL (ref 1.85–7.62)
NEUTS SEG NFR BLD AUTO: 56 % (ref 43–75)
NRBC BLD AUTO-RTO: 0 /100 WBCS
O2 CT BLDV-SCNC: 14.9 ML/DL
P AXIS: 78 DEGREES
PCO2 BLDV: 45.8 MM HG (ref 42–50)
PH BLDV: 7.35 [PH] (ref 7.3–7.4)
PLATELET # BLD AUTO: 412 THOUSANDS/UL (ref 149–390)
PMV BLD AUTO: 10.5 FL (ref 8.9–12.7)
PO2 BLDV: 35.7 MM HG (ref 35–45)
POTASSIUM SERPL-SCNC: 3.6 MMOL/L (ref 3.5–5.3)
POTASSIUM SERPL-SCNC: 5.7 MMOL/L (ref 3.5–5.3)
PR INTERVAL: 128 MS
PROCALCITONIN SERPL-MCNC: 0.08 NG/ML
PROT SERPL-MCNC: 7.7 G/DL (ref 6.4–8.4)
PROTHROMBIN TIME: 13.9 SECONDS (ref 11.6–14.5)
QRS AXIS: 57 DEGREES
QRSD INTERVAL: 58 MS
QT INTERVAL: 320 MS
QTC INTERVAL: 450 MS
RBC # BLD AUTO: 4.47 MILLION/UL (ref 3.81–5.12)
RSV RNA RESP QL NAA+PROBE: NEGATIVE
S PYO DNA THROAT QL NAA+PROBE: NOT DETECTED
SARS-COV-2 RNA RESP QL NAA+PROBE: NEGATIVE
SODIUM SERPL-SCNC: 136 MMOL/L (ref 135–147)
SODIUM SERPL-SCNC: 139 MMOL/L (ref 135–147)
T WAVE AXIS: 43 DEGREES
VENTRICULAR RATE: 119 BPM
WBC # BLD AUTO: 12.52 THOUSAND/UL (ref 4.31–10.16)

## 2024-04-26 PROCEDURE — 94760 N-INVAS EAR/PLS OXIMETRY 1: CPT

## 2024-04-26 PROCEDURE — 99223 1ST HOSP IP/OBS HIGH 75: CPT | Performed by: FAMILY MEDICINE

## 2024-04-26 PROCEDURE — 0241U HB NFCT DS VIR RESP RNA 4 TRGT: CPT | Performed by: EMERGENCY MEDICINE

## 2024-04-26 PROCEDURE — 85025 COMPLETE CBC W/AUTO DIFF WBC: CPT | Performed by: EMERGENCY MEDICINE

## 2024-04-26 PROCEDURE — 94664 DEMO&/EVAL PT USE INHALER: CPT

## 2024-04-26 PROCEDURE — 84484 ASSAY OF TROPONIN QUANT: CPT | Performed by: EMERGENCY MEDICINE

## 2024-04-26 PROCEDURE — 85730 THROMBOPLASTIN TIME PARTIAL: CPT | Performed by: EMERGENCY MEDICINE

## 2024-04-26 PROCEDURE — 94644 CONT INHLJ TX 1ST HOUR: CPT

## 2024-04-26 PROCEDURE — 94640 AIRWAY INHALATION TREATMENT: CPT

## 2024-04-26 PROCEDURE — 83605 ASSAY OF LACTIC ACID: CPT | Performed by: EMERGENCY MEDICINE

## 2024-04-26 PROCEDURE — 84145 PROCALCITONIN (PCT): CPT | Performed by: EMERGENCY MEDICINE

## 2024-04-26 PROCEDURE — 71045 X-RAY EXAM CHEST 1 VIEW: CPT

## 2024-04-26 PROCEDURE — 87040 BLOOD CULTURE FOR BACTERIA: CPT | Performed by: EMERGENCY MEDICINE

## 2024-04-26 PROCEDURE — 80048 BASIC METABOLIC PNL TOTAL CA: CPT | Performed by: FAMILY MEDICINE

## 2024-04-26 PROCEDURE — 96367 TX/PROPH/DG ADDL SEQ IV INF: CPT

## 2024-04-26 PROCEDURE — 83735 ASSAY OF MAGNESIUM: CPT | Performed by: EMERGENCY MEDICINE

## 2024-04-26 PROCEDURE — 36415 COLL VENOUS BLD VENIPUNCTURE: CPT | Performed by: EMERGENCY MEDICINE

## 2024-04-26 PROCEDURE — 82948 REAGENT STRIP/BLOOD GLUCOSE: CPT

## 2024-04-26 PROCEDURE — 80048 BASIC METABOLIC PNL TOTAL CA: CPT | Performed by: EMERGENCY MEDICINE

## 2024-04-26 PROCEDURE — 93010 ELECTROCARDIOGRAM REPORT: CPT | Performed by: INTERNAL MEDICINE

## 2024-04-26 PROCEDURE — 99285 EMERGENCY DEPT VISIT HI MDM: CPT | Performed by: EMERGENCY MEDICINE

## 2024-04-26 PROCEDURE — 96365 THER/PROPH/DIAG IV INF INIT: CPT

## 2024-04-26 PROCEDURE — 99285 EMERGENCY DEPT VISIT HI MDM: CPT

## 2024-04-26 PROCEDURE — 80076 HEPATIC FUNCTION PANEL: CPT | Performed by: EMERGENCY MEDICINE

## 2024-04-26 PROCEDURE — 93005 ELECTROCARDIOGRAM TRACING: CPT

## 2024-04-26 PROCEDURE — 87651 STREP A DNA AMP PROBE: CPT | Performed by: EMERGENCY MEDICINE

## 2024-04-26 PROCEDURE — 85610 PROTHROMBIN TIME: CPT | Performed by: EMERGENCY MEDICINE

## 2024-04-26 PROCEDURE — 82805 BLOOD GASES W/O2 SATURATION: CPT | Performed by: EMERGENCY MEDICINE

## 2024-04-26 PROCEDURE — 96375 TX/PRO/DX INJ NEW DRUG ADDON: CPT

## 2024-04-26 RX ORDER — KETOROLAC TROMETHAMINE 30 MG/ML
15 INJECTION, SOLUTION INTRAMUSCULAR; INTRAVENOUS ONCE
Status: COMPLETED | OUTPATIENT
Start: 2024-04-26 | End: 2024-04-26

## 2024-04-26 RX ORDER — METHYLPREDNISOLONE SODIUM SUCCINATE 125 MG/2ML
125 INJECTION, POWDER, LYOPHILIZED, FOR SOLUTION INTRAMUSCULAR; INTRAVENOUS ONCE
Status: COMPLETED | OUTPATIENT
Start: 2024-04-26 | End: 2024-04-26

## 2024-04-26 RX ORDER — BUDESONIDE AND FORMOTEROL FUMARATE DIHYDRATE 160; 4.5 UG/1; UG/1
2 AEROSOL RESPIRATORY (INHALATION) 2 TIMES DAILY
Status: DISCONTINUED | OUTPATIENT
Start: 2024-04-26 | End: 2024-05-01 | Stop reason: HOSPADM

## 2024-04-26 RX ORDER — HYDROCODONE BITARTRATE AND HOMATROPINE METHYLBROMIDE ORAL SOLUTION 5; 1.5 MG/5ML; MG/5ML
5 LIQUID ORAL EVERY 6 HOURS PRN
Status: DISCONTINUED | OUTPATIENT
Start: 2024-04-26 | End: 2024-04-28

## 2024-04-26 RX ORDER — SODIUM CHLORIDE FOR INHALATION 0.9 %
12 VIAL, NEBULIZER (ML) INHALATION ONCE
Status: COMPLETED | OUTPATIENT
Start: 2024-04-26 | End: 2024-04-26

## 2024-04-26 RX ORDER — LEVALBUTEROL INHALATION SOLUTION 1.25 MG/3ML
1.25 SOLUTION RESPIRATORY (INHALATION)
Status: DISCONTINUED | OUTPATIENT
Start: 2024-04-26 | End: 2024-04-28

## 2024-04-26 RX ORDER — PANTOPRAZOLE SODIUM 40 MG/1
40 TABLET, DELAYED RELEASE ORAL 2 TIMES DAILY
Status: DISCONTINUED | OUTPATIENT
Start: 2024-04-26 | End: 2024-05-01 | Stop reason: HOSPADM

## 2024-04-26 RX ORDER — BUPROPION HYDROCHLORIDE 150 MG/1
150 TABLET ORAL DAILY
Status: DISCONTINUED | OUTPATIENT
Start: 2024-04-26 | End: 2024-05-01 | Stop reason: HOSPADM

## 2024-04-26 RX ORDER — CALCIUM GLUCONATE 20 MG/ML
1 INJECTION, SOLUTION INTRAVENOUS ONCE
Status: COMPLETED | OUTPATIENT
Start: 2024-04-26 | End: 2024-04-26

## 2024-04-26 RX ORDER — INSULIN LISPRO 100 [IU]/ML
1-5 INJECTION, SOLUTION INTRAVENOUS; SUBCUTANEOUS
Status: DISCONTINUED | OUTPATIENT
Start: 2024-04-26 | End: 2024-05-01 | Stop reason: HOSPADM

## 2024-04-26 RX ORDER — FAMOTIDINE 20 MG/1
40 TABLET, FILM COATED ORAL 2 TIMES DAILY
Status: DISCONTINUED | OUTPATIENT
Start: 2024-04-26 | End: 2024-05-01 | Stop reason: HOSPADM

## 2024-04-26 RX ORDER — FLUTICASONE FUROATE AND VILANTEROL 200; 25 UG/1; UG/1
1 POWDER RESPIRATORY (INHALATION) DAILY
Status: DISCONTINUED | OUTPATIENT
Start: 2024-04-26 | End: 2024-04-29

## 2024-04-26 RX ORDER — MAGNESIUM SULFATE HEPTAHYDRATE 40 MG/ML
2 INJECTION, SOLUTION INTRAVENOUS ONCE
Status: COMPLETED | OUTPATIENT
Start: 2024-04-26 | End: 2024-04-26

## 2024-04-26 RX ORDER — LEVALBUTEROL INHALATION SOLUTION 1.25 MG/3ML
1.25 SOLUTION RESPIRATORY (INHALATION)
Status: DISCONTINUED | OUTPATIENT
Start: 2024-04-26 | End: 2024-04-26

## 2024-04-26 RX ORDER — INSULIN LISPRO 100 [IU]/ML
3 INJECTION, SOLUTION INTRAVENOUS; SUBCUTANEOUS
Status: DISCONTINUED | OUTPATIENT
Start: 2024-04-26 | End: 2024-04-29

## 2024-04-26 RX ORDER — GUAIFENESIN 600 MG/1
600 TABLET, EXTENDED RELEASE ORAL EVERY 12 HOURS SCHEDULED
Status: DISCONTINUED | OUTPATIENT
Start: 2024-04-26 | End: 2024-05-01 | Stop reason: HOSPADM

## 2024-04-26 RX ORDER — SODIUM CHLORIDE, SODIUM GLUCONATE, SODIUM ACETATE, POTASSIUM CHLORIDE, MAGNESIUM CHLORIDE, SODIUM PHOSPHATE, DIBASIC, AND POTASSIUM PHOSPHATE .53; .5; .37; .037; .03; .012; .00082 G/100ML; G/100ML; G/100ML; G/100ML; G/100ML; G/100ML; G/100ML
75 INJECTION, SOLUTION INTRAVENOUS CONTINUOUS
Status: DISPENSED | OUTPATIENT
Start: 2024-04-26 | End: 2024-04-27

## 2024-04-26 RX ORDER — DEXTROSE MONOHYDRATE 25 G/50ML
25 INJECTION, SOLUTION INTRAVENOUS ONCE
Status: COMPLETED | OUTPATIENT
Start: 2024-04-26 | End: 2024-04-26

## 2024-04-26 RX ORDER — GABAPENTIN 100 MG/1
100 CAPSULE ORAL 3 TIMES DAILY
Status: DISCONTINUED | OUTPATIENT
Start: 2024-04-26 | End: 2024-05-01 | Stop reason: HOSPADM

## 2024-04-26 RX ORDER — LEVALBUTEROL INHALATION SOLUTION 1.25 MG/3ML
1.25 SOLUTION RESPIRATORY (INHALATION) ONCE
Status: COMPLETED | OUTPATIENT
Start: 2024-04-26 | End: 2024-04-26

## 2024-04-26 RX ORDER — METHYLPREDNISOLONE SODIUM SUCCINATE 40 MG/ML
40 INJECTION, POWDER, LYOPHILIZED, FOR SOLUTION INTRAMUSCULAR; INTRAVENOUS EVERY 8 HOURS SCHEDULED
Status: DISCONTINUED | OUTPATIENT
Start: 2024-04-26 | End: 2024-04-28

## 2024-04-26 RX ORDER — PRAVASTATIN SODIUM 40 MG
40 TABLET ORAL
Status: DISCONTINUED | OUTPATIENT
Start: 2024-04-26 | End: 2024-05-01 | Stop reason: HOSPADM

## 2024-04-26 RX ORDER — LORAZEPAM 2 MG/ML
0.5 INJECTION INTRAMUSCULAR ONCE
Status: COMPLETED | OUTPATIENT
Start: 2024-04-26 | End: 2024-04-26

## 2024-04-26 RX ADMIN — SODIUM CHLORIDE, SODIUM GLUCONATE, SODIUM ACETATE, POTASSIUM CHLORIDE, MAGNESIUM CHLORIDE, SODIUM PHOSPHATE, DIBASIC, AND POTASSIUM PHOSPHATE 75 ML/HR: .53; .5; .37; .037; .03; .012; .00082 INJECTION, SOLUTION INTRAVENOUS at 17:49

## 2024-04-26 RX ADMIN — DEXTROSE MONOHYDRATE 25 ML: 25 INJECTION, SOLUTION INTRAVENOUS at 18:17

## 2024-04-26 RX ADMIN — BUDESONIDE AND FORMOTEROL FUMARATE DIHYDRATE 2 PUFF: 160; 4.5 AEROSOL RESPIRATORY (INHALATION) at 17:52

## 2024-04-26 RX ADMIN — BUPROPION HYDROCHLORIDE 150 MG: 150 TABLET, EXTENDED RELEASE ORAL at 16:34

## 2024-04-26 RX ADMIN — GUAIFENESIN 600 MG: 600 TABLET, EXTENDED RELEASE ORAL at 21:49

## 2024-04-26 RX ADMIN — PRAVASTATIN SODIUM 40 MG: 40 TABLET ORAL at 16:34

## 2024-04-26 RX ADMIN — IPRATROPIUM BROMIDE 1 MG: 0.5 SOLUTION RESPIRATORY (INHALATION) at 13:12

## 2024-04-26 RX ADMIN — LEVALBUTEROL HYDROCHLORIDE 1.25 MG: 1.25 SOLUTION RESPIRATORY (INHALATION) at 20:06

## 2024-04-26 RX ADMIN — HYDROCODONE BITARTRATE AND HOMATROPINE METHYLBROMIDE ORAL SOLUTION 5 ML: 5; 1.5 LIQUID ORAL at 18:12

## 2024-04-26 RX ADMIN — FAMOTIDINE 40 MG: 20 TABLET, FILM COATED ORAL at 17:30

## 2024-04-26 RX ADMIN — METHYLPREDNISOLONE SODIUM SUCCINATE 40 MG: 40 INJECTION, POWDER, FOR SOLUTION INTRAMUSCULAR; INTRAVENOUS at 23:56

## 2024-04-26 RX ADMIN — LORAZEPAM 0.5 MG: 2 INJECTION INTRAMUSCULAR; INTRAVENOUS at 14:50

## 2024-04-26 RX ADMIN — CALCIUM GLUCONATE 1 G: 20 INJECTION, SOLUTION INTRAVENOUS at 18:20

## 2024-04-26 RX ADMIN — ALBUTEROL SULFATE 10 MG: 2.5 SOLUTION RESPIRATORY (INHALATION) at 13:12

## 2024-04-26 RX ADMIN — INSULIN HUMAN 5 UNITS: 100 INJECTION, SOLUTION PARENTERAL at 18:16

## 2024-04-26 RX ADMIN — KETOROLAC TROMETHAMINE 15 MG: 30 INJECTION, SOLUTION INTRAMUSCULAR; INTRAVENOUS at 14:50

## 2024-04-26 RX ADMIN — GABAPENTIN 100 MG: 100 CAPSULE ORAL at 21:48

## 2024-04-26 RX ADMIN — CEFTRIAXONE SODIUM 1000 MG: 10 INJECTION, POWDER, FOR SOLUTION INTRAVENOUS at 14:59

## 2024-04-26 RX ADMIN — METHYLPREDNISOLONE SODIUM SUCCINATE 125 MG: 125 INJECTION, POWDER, FOR SOLUTION INTRAMUSCULAR; INTRAVENOUS at 13:28

## 2024-04-26 RX ADMIN — ISODIUM CHLORIDE 12 ML: 0.03 SOLUTION RESPIRATORY (INHALATION) at 13:12

## 2024-04-26 RX ADMIN — MAGNESIUM SULFATE HEPTAHYDRATE 2 G: 40 INJECTION, SOLUTION INTRAVENOUS at 13:28

## 2024-04-26 RX ADMIN — IPRATROPIUM BROMIDE 0.5 MG: 0.5 SOLUTION RESPIRATORY (INHALATION) at 20:06

## 2024-04-26 RX ADMIN — GABAPENTIN 100 MG: 100 CAPSULE ORAL at 16:34

## 2024-04-26 RX ADMIN — INSULIN LISPRO 3 UNITS: 100 INJECTION, SOLUTION INTRAVENOUS; SUBCUTANEOUS at 17:53

## 2024-04-26 RX ADMIN — PANTOPRAZOLE SODIUM 40 MG: 40 TABLET, DELAYED RELEASE ORAL at 17:30

## 2024-04-26 RX ADMIN — LEVALBUTEROL HYDROCHLORIDE 1.25 MG: 1.25 SOLUTION RESPIRATORY (INHALATION) at 15:51

## 2024-04-26 NOTE — ASSESSMENT & PLAN NOTE
Meeting SIRS criteria due to tachycardia/tachypnea due to asthma exacerbation  Lactate elevated at 3.1, continue IV fluids until normalizes  Initial Pro-Maurice 0.08, continue to trend.  Chest x-ray negative for evidence of pneumonia [official report pending]  Flu RSV COVID-negative  Blood cultures pending.  Patient received 1 dose IV ceftriaxone in ED, further antibiotics deferred as no clear source of infection.

## 2024-04-26 NOTE — ASSESSMENT & PLAN NOTE
2 week H/O worsening cough/shortness of breath/wheezing.  Was seen in the ED 2 weeks ago and discharged following nebulizer treatment on oral steroids.  Patient has been on the oral steroid taper with no significant improvement.  This week she reported her  was unwell with a URI following which her symptoms got worse in intensity to a point she was unable to breathe and presented to the ED today.  She is currently afebrile.  Does report chest tightness and despite multiple nebulizer treatment in ED continues to have difficulty completing her sentences due to shortness of breath.  She follows outpatient with pulmonology in [place].    Chest x-ray negative for pneumonia  Flu RSV COVID-negative  Patient started on Atrovent/Xopenex nebs 3 times daily  IV Solu-Medrol 40 mg every 8 hours  Monitor O2 levels/respiratory rate closely.

## 2024-04-26 NOTE — ASSESSMENT & PLAN NOTE
Lab Results   Component Value Date    HGBA1C 4.8 06/12/2023       Recent Labs     04/26/24  1453   POCGLU 130       Blood Sugar Average: Last 72 hrs:  (P) 130    Hold Ozempic during hospitalization  Continue Humalog 3 units 3 times daily  On sliding scale coverage with ACHS checks.  Consistent carb diet.

## 2024-04-26 NOTE — H&P
[facility]  H&P  Name: Lidya Ansari 59 y.o. female I MRN: [id number]  Unit/Bed#: ED 23 I Date of Admission: 4/26/2024   Date of Service: 4/26/2024 I Hospital Day: 0      Assessment/Plan   SIRS (systemic inflammatory response syndrome) ([facility])  Assessment & Plan  Meeting SIRS criteria due to tachycardia/tachypnea due to asthma exacerbation  Lactate elevated at 3.1, continue IV fluids until normalizes  Initial Pro-Maurice 0.08, continue to trend.  Chest x-ray negative for evidence of pneumonia [official report pending]  Flu RSV COVID-negative  Blood cultures pending.  Patient received 1 dose IV ceftriaxone in ED, further antibiotics deferred as no clear source of infection.    Severe persistent asthma with exacerbation  Assessment & Plan  2 week H/O worsening cough/shortness of breath/wheezing.  Was seen in the ED 2 weeks ago and discharged following nebulizer treatment on oral steroids.  Patient has been on the oral steroid taper with no significant improvement.  This week she reported her  was unwell with a URI following which her symptoms got worse in intensity to a point she was unable to breathe and presented to the ED today.  She is currently afebrile.  Does report chest tightness and despite multiple nebulizer treatment in ED continues to have difficulty completing her sentences due to shortness of breath.  She follows outpatient with pulmonology in [place].    Chest x-ray negative for pneumonia  Flu RSV COVID-negative  Patient started on Atrovent/Xopenex nebs 3 times daily  IV Solu-Medrol 40 mg every 8 hours  Monitor O2 levels/respiratory rate closely.    Hyperkalemia  Assessment & Plan  Noted to be hyperkalemic on arrival with potassium 5.7  Receiving 5 units insulin/half amp D50/IV calcium gluconate.  Recheck BMP at 7 PM    Type 2 diabetes mellitus without complication, with long-term current use of insulin ([facility])  Assessment & Plan  Lab Results   Component Value Date    HGBA1C  4.8 06/12/2023       Recent Labs     04/26/24  1453   POCGLU 130       Blood Sugar Average: Last 72 hrs:  (P) 130    Hold Ozempic during hospitalization  Continue Humalog 3 units 3 times daily  On sliding scale coverage with ACHS checks.  Consistent carb diet.      Hyperlipidemia  Assessment & Plan  Continue statin.    GERD (gastroesophageal reflux disease)  Assessment & Plan  Continue Pepcid with PPI.    Depression with anxiety  Assessment & Plan  Continue home Wellbutrin.           Code Status: level 1  POLST: POLST form is not discussed and not completed at this time.  Discussion with family: dw  at bedside    Anticipated Length of Stay:  Patient will be admitted on an Observation basis with an anticipated length of stay of  less than 2 midnights.   Justification for Hospital Stay: asthma exac, respiratory distress    Total Time for Visit, including Counseling / Coordination of Care: 30 minutes.  Greater than 50% of this total time spent on direct patient counseling and coordination of care.    Chief Complaint:   shortness of breath    History of Present Illness:    Lidya Ansari is a 59 y.o. female with known underlying history of severe persistent asthma, insulin-dependent diabetes mellitus, hypertension, hyperlipidemia presenting to the ED with a 2-week history of worsening shortness of breath wheezing/cough.  She was seen in the ED once before and discharged on oral steroid taper.  She has been using her nebulizer as needed [patient does not like to use her nebulizer as it makes her feel jittery].  Over the last 1 week, her  was unwell with a URI following which her symptoms worsened and today she was unable to breathe.    In the ED she received IV magnesium/Solu-Medrol/Atrovent/albuterol/Xopenex nebulizer as well as 1 dose of IV ceftriaxone and reports feeling slightly better.  She is still having difficulty completing her sentences.    Review of Systems:    Review of Systems   Constitutional:   Negative for chills and fever.   HENT:  Negative for ear pain and sore throat.    Eyes:  Negative for pain and visual disturbance.   Respiratory:  Positive for cough, shortness of breath and wheezing.    Cardiovascular:  Negative for chest pain, palpitations and leg swelling.   Gastrointestinal:  Negative for abdominal pain and vomiting.   Genitourinary:  Negative for dysuria and hematuria.   Musculoskeletal:  Negative for arthralgias and back pain.   Skin:  Negative for color change and rash.   Neurological:  Negative for seizures and syncope.   All other systems reviewed and are negative.      Past Medical and Surgical History:     Past Medical History:   Diagnosis Date    Asthma     Diabetes mellitus (HCC)     GERD (gastroesophageal reflux disease)     Hyperlipidemia     Hypertension        Past Surgical History:   Procedure Laterality Date     SECTION      COLONOSCOPY      HYSTERECTOMY      OVARIAN CYST REMOVAL         Meds/Allergies:    Prior to Admission medications    Medication Sig Start Date End Date Taking? Authorizing Provider   Blood Pressure Monitoring (Blood Pressure Monitor 3) CARLOS MANUEL Use as directed 10/21/22   Historical Provider, MD   budesonide-formoterol (SYMBICORT) 160-4.5 mcg/act inhaler Every 12 hours    Historical Provider, MD   buPROPion (Wellbutrin SR) 150 mg 12 hr tablet Every 12 hours 23   Historical Provider, MD   dicyclomine (BENTYL) 20 mg tablet TAKE 1 TABLET BY MOUTH EVERY 6 HOURS AS NEEDED FOR ABDOMINAL CRAMPING 10/10/23   Corine Sidhu PA-C   EPINEPHrine (EPIPEN) 0.3 mg/0.3 mL SOAJ as directed    Historical Provider, MD   famotidine (PEPCID) 40 MG tablet TAKE 1 TABLET BY MOUTH TWICE A DAY WITH LUNCH AND BEFORE BEDTIME 10/24/23   Corine Sidhu PA-C   fluticasone-vilanterol 200-25 mcg/actuation inhaler 1 puff every 24 hours    Historical Provider, MD   gabapentin (NEURONTIN) 100 mg capsule Take 100 mg by mouth 3 (three) times a day as needed 23   Historical  Provider, MD   hydrOXYzine HCL (ATARAX) 25 mg tablet Take 1 tablet (25 mg total) by mouth every 6 (six) hours as needed for itching for up to 14 days 6/16/23 6/30/23  EDUARDA Parker   insulin aspart (NovoLOG FlexPen) 100 UNIT/ML injection pen Inject 3 Units under the skin 3 (three) times a day with meals 6/16/23   EDUARDA Parker   ipratropium (ATROVENT) 0.02 % nebulizer solution Take 2.5 mL (0.5 mg total) by nebulization 3 (three) times a day 11/1/22 2/13/24  Sumit Garcia MD   ipratropium-albuterol (DUO-NEB) 0.5-2.5 mg/3 mL nebulizer solution Take 3 mL by nebulization every 4 (four) hours as needed for wheezing or shortness of breath 11/1/22   Sumit Garcia MD   ketorolac (TORADOL) 10 mg tablet Take 1 tablet (10 mg total) by mouth every 6 (six) hours as needed for moderate pain for up to 5 days 11/16/23 11/21/23  EDUARDA Parker   Melatonin 10 MG CAPS Take 10 mg by mouth daily at bedtime as needed 3/25/23   Historical Provider, MD   metoclopramide (REGLAN) 5 mg tablet Take 1 tablet (5 mg total) by mouth 4 (four) times a day 2/5/24   Joselito Caballero DO   montelukast (SINGULAIR) 10 mg tablet Take 1 tablet (10 mg total) by mouth daily at bedtime 11/1/22 2/13/24  Sumit Garcia MD   ondansetron (ZOFRAN) 4 mg tablet Take 1 tablet by mouth every 8 (eight) hours    Historical Provider, MD   OneTouch Ultra test strip TEST TWICE A DAY 10/21/22   Historical Provider, MD   Ozempic, 2 MG/DOSE, 8 MG/3ML injection pen INJECT 2MG SUBCUTANEOUSLY ONCE WEEKLY 12/28/23   Historical Provider, MD   pantoprazole (PROTONIX) 40 mg Take 1 packet (40 mg total) by mouth 2 (two) times a day before meals 3/1/23   Laurence Painting MD   rosuvastatin (CRESTOR) 5 mg tablet Take 1 tablet by mouth daily    Historical Provider, MD   zolpidem (AMBIEN) 10 mg tablet Take 1 tablet (10 mg total) by mouth daily at bedtime as needed for sleep for up to 7 days 6/16/23 2/13/24  EDUARDA Parker     I have reviewed home  medications using allscripts.    Allergies:   Allergies   Allergen Reactions    Codeine Other (See Comments)    Aspirin GI Intolerance and Rash    Hydromorphone Rash and Other (See Comments)     GI upset      Oxycodone-Acetaminophen Rash and Other (See Comments)    Tramadol Rash and Other (See Comments)       Social History:     Marital Status: /Civil Union     Substance Use History:   Social History     Substance and Sexual Activity   Alcohol Use Yes    Alcohol/week: 7.0 standard drinks of alcohol    Types: 7 Glasses of wine per week     Social History     Tobacco Use   Smoking Status Never   Smokeless Tobacco Never     Social History     Substance and Sexual Activity   Drug Use Never       Family History:    non-contributory    Physical Exam:     Vitals:   Blood Pressure: 129/61 (04/26/24 1447)  Pulse: (!) 114 (04/26/24 1535)  Temperature: 98.8 °F (37.1 °C) (04/26/24 1255)  Temp Source: Oral (04/26/24 1255)  Respirations: (!) 23 (04/26/24 1535)  SpO2: 95 % (04/26/24 1535)    Physical Exam  Vitals reviewed.   Constitutional:       General: She is not in acute distress.     Appearance: She is ill-appearing.   HENT:      Head: Normocephalic and atraumatic.      Nose: Nose normal. No congestion.      Mouth/Throat:      Mouth: Mucous membranes are moist.      Pharynx: Oropharynx is clear.   Eyes:      Conjunctiva/sclera: Conjunctivae normal.      Pupils: Pupils are equal, round, and reactive to light.   Cardiovascular:      Rate and Rhythm: Normal rate and regular rhythm.      Pulses: Normal pulses.   Pulmonary:      Effort: Respiratory distress present.      Breath sounds: Wheezing present.      Comments: Having difficulty completing her sentences due to sob  Abdominal:      General: Abdomen is flat. Bowel sounds are normal. There is no distension.      Palpations: Abdomen is soft.      Tenderness: There is no abdominal tenderness. There is no guarding.   Musculoskeletal:         General: No swelling. Normal  range of motion.      Cervical back: Normal range of motion and neck supple.   Skin:     General: Skin is warm and dry.      Findings: No rash.   Neurological:      General: No focal deficit present.      Mental Status: She is alert and oriented to person, place, and time.   Psychiatric:         Mood and Affect: Mood normal.         Behavior: Behavior normal.       Additional Data:     Lab Results: I have personally reviewed pertinent reports.      Results from last 7 days   Lab Units 04/26/24  1327   WBC Thousand/uL 12.52*   HEMOGLOBIN g/dL 14.5   HEMATOCRIT % 44.2   PLATELETS Thousands/uL 412*   SEGS PCT % 56   LYMPHO PCT % 33   MONO PCT % 8   EOS PCT % 2     Results from last 7 days   Lab Units 04/26/24  1424   SODIUM mmol/L 136   POTASSIUM mmol/L 5.7*   CHLORIDE mmol/L 104   CO2 mmol/L 20*   BUN mg/dL 10   CREATININE mg/dL 0.71   ANION GAP mmol/L 12   CALCIUM mg/dL 8.7   ALBUMIN g/dL 3.8   TOTAL BILIRUBIN mg/dL 0.75   ALK PHOS U/L 137*   ALT U/L 19   AST U/L 32   GLUCOSE RANDOM mg/dL 136     Results from last 7 days   Lab Units 04/26/24  1424   INR  1.01     Results from last 7 days   Lab Units 04/26/24  1453   POC GLUCOSE mg/dl 130         Results from last 7 days   Lab Units 04/26/24  1424   LACTIC ACID mmol/L 3.1*   PROCALCITONIN ng/ml 0.08       Imaging: I have personally reviewed pertinent reports.      XR chest 1 view portable   ED Interpretation by Leighann Fofana DO (04/26 1427)   Poor inspiratory effort.  No obvious consolidation.  Questionable small left pleural effusion.            Allscripts / Epic Records Reviewed: Yes     ** Please Note: This note has been constructed using a voice recognition system. **

## 2024-04-26 NOTE — RESPIRATORY THERAPY NOTE
RT Protocol Note  Lidya Ansari 59 y.o. female MRN: [id number]  Unit/Bed#: ED 23 Encounter: [id number]    Assessment    Active Problems:    SIRS (systemic inflammatory response syndrome) (HCC)    Severe persistent asthma with exacerbation    Hyperlipidemia    Type 2 diabetes mellitus without complication, with long-term current use of insulin (HCC)    GERD (gastroesophageal reflux disease)    Depression with anxiety    Hyperkalemia    Physical Exam:   Assessment Type: Assess only  General Appearance: Alert, Awake  Respiratory Pattern: Normal  Chest Assessment: Chest expansion symmetrical  Bilateral Breath Sounds: Diminished, Expiratory wheezes  O2 Device: ra    Vitals:  Blood pressure 129/61, pulse (!) 114, temperature 98.8 °F (37.1 °C), temperature source Oral, resp. rate (!) 23, SpO2 95%.      O2 Device: ra     Plan    Respiratory Plan: Mild Distress pathway        Resp Comments: pt presents to ED with asthma exacerbation, pt uses nebs at home without relief prior to arrival to ED. BBS diminished ex wheezes

## 2024-04-26 NOTE — ED NOTES
Dinner tray not bedside, holding off on SC insulin until that time, SBAR sent to [facility] charge FELICITY Palafox  04/26/24 2922

## 2024-04-26 NOTE — ED NOTES
Patient O2 sat was 88% RA, placed on 2L NC and sat now 95%, provided aware     Maria A Palafox  04/26/24 3745

## 2024-04-26 NOTE — ASSESSMENT & PLAN NOTE
Noted to be hyperkalemic on arrival with potassium 5.7  Receiving 5 units insulin/half amp D50/IV calcium gluconate.  Recheck BMP at 7 PM

## 2024-04-26 NOTE — ED PROVIDER NOTES
History  Chief Complaint   Patient presents with    Asthma     Wheezing and coughing in triage hx of asthma. Nebulizer at home not effective      Patient is a 59-year-old female with past medical history significant for asthma, insulin-dependent type 2 diabetes, hypertension, hyperlipidemia, GERD, prior , hysterectomy, presents to the emergency department for asthma exacerbation that started this morning.  Patient states that since this morning she has had a cough, chest tightness and difficulty breathing as well as wheezing.  She tried using her home nebulizer treatments without relief.  She states she was in the ED about 2 weeks ago with similar asthma symptoms but reports that she was not sent home with any prescription for prednisone despite getting prednisone in the ED.  In triage, patient noted to be tachycardic and significantly tachypneic with wheezing so she was brought immediately back to exam room where I met her on arrival.  Patient has increased work of breathing and decreased air movement throughout with inspiratory and expiratory wheezing.  She is able to speak in short sentences but has to take multiple breaks during speaking due to dyspnea.  Respiratory therapist immediately called to initiate heart neb.  Patient denies any fevers, recent other URI symptoms other than cough, chest pain other than the tight feeling, hemoptysis, abdominal pain, nausea, vomiting, diarrhea, constipation, blood per rectum or melena, urinary symptoms, skin rash or color change, leg pain or swelling, extremity weakness or paresthesia or other focal neurologic deficits.  Denies any cardiac history.    According to medical records, patient was given a prescription for 3 additional days of prednisone 60 mg during her last ED visit on 4/3/24 however it is unclear if she took this.      History provided by:  Patient and medical records   used: No    Asthma  Associated symptoms: cough, shortness of  breath and wheezing    Associated symptoms: no abdominal pain, no chest pain, no congestion, no diarrhea, no ear pain, no fever, no headaches, no nausea, no rash, no rhinorrhea, no sore throat and no vomiting        Prior to Admission Medications   Prescriptions Last Dose Informant Patient Reported? Taking?   Blood Pressure Monitoring (Blood Pressure Monitor 3) CARLOS MANUEL  Self Yes No   Sig: Use as directed   EPINEPHrine (EPIPEN) 0.3 mg/0.3 mL SOAJ  Self Yes No   Sig: as directed   Melatonin 10 MG CAPS  Self Yes No   Sig: Take 10 mg by mouth daily at bedtime as needed   OneTouch Ultra test strip  Self Yes No   Sig: TEST TWICE A DAY   Ozempic, 2 MG/DOSE, 8 MG/3ML injection pen  Self Yes No   Sig: INJECT 2MG SUBCUTANEOUSLY ONCE WEEKLY   buPROPion (Wellbutrin SR) 150 mg 12 hr tablet  Self Yes No   Sig: Every 12 hours   budesonide-formoterol (SYMBICORT) 160-4.5 mcg/act inhaler  Self Yes No   Sig: Every 12 hours   dicyclomine (BENTYL) 20 mg tablet  Self No No   Sig: TAKE 1 TABLET BY MOUTH EVERY 6 HOURS AS NEEDED FOR ABDOMINAL CRAMPING   famotidine (PEPCID) 40 MG tablet  Self No No   Sig: TAKE 1 TABLET BY MOUTH TWICE A DAY WITH LUNCH AND BEFORE BEDTIME   fluticasone-vilanterol 200-25 mcg/actuation inhaler  Self Yes No   Si puff every 24 hours   gabapentin (NEURONTIN) 100 mg capsule  Self Yes No   Sig: Take 100 mg by mouth 3 (three) times a day as needed   hydrOXYzine HCL (ATARAX) 25 mg tablet   No No   Sig: Take 1 tablet (25 mg total) by mouth every 6 (six) hours as needed for itching for up to 14 days   insulin aspart (NovoLOG FlexPen) 100 UNIT/ML injection pen  Self No No   Sig: Inject 3 Units under the skin 3 (three) times a day with meals   ipratropium (ATROVENT) 0.02 % nebulizer solution  Self No No   Sig: Take 2.5 mL (0.5 mg total) by nebulization 3 (three) times a day   ipratropium-albuterol (DUO-NEB) 0.5-2.5 mg/3 mL nebulizer solution  Self No No   Sig: Take 3 mL by nebulization every 4 (four) hours as needed for  wheezing or shortness of breath   ketorolac (TORADOL) 10 mg tablet   No No   Sig: Take 1 tablet (10 mg total) by mouth every 6 (six) hours as needed for moderate pain for up to 5 days   metoclopramide (REGLAN) 5 mg tablet   No No   Sig: Take 1 tablet (5 mg total) by mouth 4 (four) times a day   montelukast (SINGULAIR) 10 mg tablet  Self No No   Sig: Take 1 tablet (10 mg total) by mouth daily at bedtime   ondansetron (ZOFRAN) 4 mg tablet  Self Yes No   Sig: Take 1 tablet by mouth every 8 (eight) hours   pantoprazole (PROTONIX) 40 mg  Self No No   Sig: Take 1 packet (40 mg total) by mouth 2 (two) times a day before meals   rosuvastatin (CRESTOR) 5 mg tablet  Self Yes No   Sig: Take 1 tablet by mouth daily   zolpidem (AMBIEN) 10 mg tablet   No No   Sig: Take 1 tablet (10 mg total) by mouth daily at bedtime as needed for sleep for up to 7 days      Facility-Administered Medications: None       Past Medical History:   Diagnosis Date    Asthma     Diabetes mellitus (HCC)     GERD (gastroesophageal reflux disease)     Hyperlipidemia     Hypertension        Past Surgical History:   Procedure Laterality Date     SECTION      COLONOSCOPY      HYSTERECTOMY      OVARIAN CYST REMOVAL         History reviewed. No pertinent family history.  I have reviewed and agree with the history as documented.    E-Cigarette/Vaping    E-Cigarette Use Never User      E-Cigarette/Vaping Substances    Nicotine No     THC No     CBD No     Flavoring No     Other No     Unknown No      Social History     Tobacco Use    Smoking status: Never    Smokeless tobacco: Never   Vaping Use    Vaping status: Never Used   Substance Use Topics    Alcohol use: Yes     Alcohol/week: 7.0 standard drinks of alcohol     Types: 7 Glasses of wine per week    Drug use: Never       Review of Systems   Constitutional:  Negative for chills and fever.   HENT:  Negative for congestion, ear pain, rhinorrhea and sore throat.    Respiratory:  Positive for cough, chest  tightness, shortness of breath and wheezing.    Cardiovascular:  Negative for chest pain, palpitations and leg swelling.   Gastrointestinal:  Negative for abdominal pain, blood in stool, constipation, diarrhea, nausea and vomiting.   Genitourinary:  Negative for dysuria, flank pain, frequency and hematuria.   Musculoskeletal:  Negative for back pain, neck pain and neck stiffness.   Skin:  Negative for color change, pallor, rash and wound.   Allergic/Immunologic: Negative for immunocompromised state.   Neurological:  Negative for dizziness, syncope, weakness, light-headedness, numbness and headaches.   Hematological:  Negative for adenopathy.   Psychiatric/Behavioral:  Negative for confusion and decreased concentration.    All other systems reviewed and are negative.      Physical Exam  Physical Exam  Vitals and nursing note reviewed.   Constitutional:       General: She is in acute distress.      Appearance: Normal appearance. She is well-developed. She is not ill-appearing, toxic-appearing or diaphoretic.   HENT:      Head: Normocephalic and atraumatic.      Right Ear: External ear normal.      Left Ear: External ear normal.      Mouth/Throat:      Mouth: Mucous membranes are moist.      Pharynx: Oropharynx is clear. No oropharyngeal exudate.   Eyes:      Extraocular Movements: Extraocular movements intact.      Conjunctiva/sclera: Conjunctivae normal.   Neck:      Vascular: No JVD.   Cardiovascular:      Rate and Rhythm: Regular rhythm. Tachycardia present.      Pulses: Normal pulses.      Heart sounds: Normal heart sounds. No murmur heard.     No friction rub. No gallop.   Pulmonary:      Effort: Respiratory distress present.      Breath sounds: Wheezing present. No rales.      Comments: Patient presents in mild respiratory distress with increased work of breathing, tachypnea and accessory muscle use.  She is able to speak in very short sentences due to the dyspnea.  O2 sat 97%.  Patient has decreased air  movement throughout with diffuse inspiratory and expiratory wheezing.  Chest:      Chest wall: No tenderness.   Abdominal:      General: There is no distension.      Palpations: Abdomen is soft.      Tenderness: There is no abdominal tenderness. There is no guarding or rebound.   Musculoskeletal:         General: No swelling or tenderness. Normal range of motion.      Cervical back: Normal range of motion and neck supple. No rigidity.      Right lower leg: No edema.      Left lower leg: No edema.   Skin:     General: Skin is warm and dry.      Coloration: Skin is not pale.      Findings: No erythema or rash.   Neurological:      General: No focal deficit present.      Mental Status: She is alert and oriented to person, place, and time.      Sensory: No sensory deficit.      Motor: No weakness.   Psychiatric:         Mood and Affect: Mood normal.         Behavior: Behavior normal.         Vital Signs  ED Triage Vitals   Temperature Pulse Respirations Blood Pressure SpO2   04/26/24 1255 04/26/24 1255 04/26/24 1255 04/26/24 1255 04/26/24 1255   98.8 °F (37.1 °C) (!) 122 (!) 44 157/91 98 %      Temp Source Heart Rate Source Patient Position - Orthostatic VS BP Location FiO2 (%)   04/26/24 1255 04/26/24 1400 04/26/24 1255 04/26/24 1255 --   Oral Monitor Sitting Left arm       Pain Score       04/26/24 1450       8         Vitals:    04/26/24 1447 04/26/24 1535 04/26/24 1600 04/26/24 1718   BP: 129/61  124/63 149/90   BP Location: Left arm      Pulse: (!) 115 (!) 114 (!) 108 105   Resp: (!) 45 (!) 23 22    Temp:    98 °F (36.7 °C)   TempSrc:       SpO2: 97% 95% 98% 97%          Visual Acuity      ED Medications  Medications   budesonide-formoterol (SYMBICORT) 160-4.5 mcg/act inhaler 2 puff (2 puffs Inhalation Given 4/26/24 1752)   buPROPion (WELLBUTRIN XL) 24 hr tablet 150 mg (150 mg Oral Given 4/26/24 1634)   famotidine (PEPCID) tablet 40 mg (40 mg Oral Given 4/26/24 1730)   fluticasone-vilanterol 200-25 mcg/actuation 1  puff (0 puffs Inhalation Hold 4/26/24 1635)   gabapentin (NEURONTIN) capsule 100 mg (100 mg Oral Given 4/26/24 1634)   insulin lispro (HumALOG/ADMELOG) 100 units/mL subcutaneous injection 3 Units (3 Units Subcutaneous Given 4/26/24 1753)   pravastatin (PRAVACHOL) tablet 40 mg (40 mg Oral Given 4/26/24 1634)   pantoprazole (PROTONIX) EC tablet 40 mg (40 mg Oral Given 4/26/24 1730)   levalbuterol (XOPENEX) inhalation solution 1.25 mg (has no administration in time range)   ipratropium (ATROVENT) 0.02 % inhalation solution 0.5 mg (has no administration in time range)   methylPREDNISolone sodium succinate (Solu-MEDROL) injection 40 mg (has no administration in time range)   guaiFENesin (MUCINEX) 12 hr tablet 600 mg (has no administration in time range)   insulin lispro (HumALOG/ADMELOG) 100 units/mL subcutaneous injection 1-5 Units ( Subcutaneous Not Given 4/26/24 1731)   insulin regular (HumuLIN R,NovoLIN R) injection 5 Units (has no administration in time range)   dextrose 50 % IV solution 25 mL (has no administration in time range)   calcium gluconate 1 g in sodium chloride 0.9% 50 mL (premix) ( Intravenous Canceled Entry 4/26/24 1638)   multi-electrolyte (PLASMALYTE-A/ISOLYTE-S PH 7.4) IV solution (75 mL/hr Intravenous New Bag 4/26/24 1749)   HYDROcodone Bit-Homatrop MBr (HYCODAN) oral syrup 5 mL (has no administration in time range)   albuterol inhalation solution 10 mg (10 mg Nebulization Given 4/26/24 1312)   ipratropium (ATROVENT) 0.02 % inhalation solution 1 mg (1 mg Nebulization Given 4/26/24 1312)   sodium chloride 0.9 % inhalation solution 12 mL (12 mL Nebulization Given 4/26/24 1312)   methylPREDNISolone sodium succinate (Solu-MEDROL) injection 125 mg (125 mg Intravenous Given 4/26/24 1328)   magnesium sulfate 2 g/50 mL IVPB (premix) 2 g (0 g Intravenous Stopped 4/26/24 1431)   LORazepam (ATIVAN) injection 0.5 mg (0.5 mg Intravenous Given 4/26/24 1450)   ketorolac (TORADOL) injection 15 mg (15 mg  Intravenous Given 4/26/24 1450)   levalbuterol (XOPENEX) inhalation solution 1.25 mg (1.25 mg Nebulization Given 4/26/24 1551)   ceftriaxone (ROCEPHIN) 1 g/50 mL in dextrose IVPB (0 mg Intravenous Stopped 4/26/24 1529)       Diagnostic Studies  Results Reviewed       Procedure Component Value Units Date/Time    Basic metabolic panel [[id number]] Collected: 04/26/24 1741    Lab Status: In process Specimen: Blood from Hand, Right Updated: 04/26/24 1749    HS Troponin I 2hr [[id number]] Collected: 04/26/24 1741    Lab Status: In process Specimen: Blood from Hand, Right Updated: 04/26/24 1749    Lactic acid 2 Hours [[id number]] Collected: 04/26/24 1741    Lab Status: In process Specimen: Blood from Hand, Right Updated: 04/26/24 1749    HS Troponin I 4hr [[id number]]     Lab Status: No result Specimen: Blood     Lactic acid, plasma (w/reflex if result > 2.0) [[id number]]  (Abnormal) Collected: 04/26/24 1424    Lab Status: Final result Specimen: Blood from Arm, Right Updated: 04/26/24 1525     LACTIC ACID 3.1 mmol/L     Narrative:      Result may be elevated if tourniquet was used during collection.    Basic metabolic panel [[id number]]  (Abnormal) Collected: 04/26/24 1424    Lab Status: Final result Specimen: Blood from Arm, Right Updated: 04/26/24 1520     Sodium 136 mmol/L      Potassium 5.7 mmol/L      Chloride 104 mmol/L      CO2 20 mmol/L      ANION GAP 12 mmol/L      BUN 10 mg/dL      Creatinine 0.71 mg/dL      Glucose 136 mg/dL      Calcium 8.7 mg/dL      eGFR 94 ml/min/1.73sq m     Narrative:      National Kid[employer] guidelines for Chronic Kidney Disease (CKD):     Stage 1 with normal or high GFR (GFR > 90 mL/min/1.73 square meters)    Stage 2 Mild CKD (GFR = 60-89 mL/min/1.73 square meters)    Stage 3A Moderate CKD (GFR = 45-59 mL/min/1.73 square meters)    Stage 3B Moderate CKD (GFR = 30-44 mL/min/1.73 square meters)    Stage 4 Severe CKD (GFR = 15-29 mL/min/1.73 square meters)    Stage 5 End Stage CKD  (GFR <15 mL/min/1.73 square meters)  Note: GFR calculation is accurate only with a steady state creatinine    Hepatic function panel [[id number]]  (Abnormal) Collected: 04/26/24 1424    Lab Status: Final result Specimen: Blood from Arm, Right Updated: 04/26/24 1520     Total Bilirubin 0.75 mg/dL      Bilirubin, Direct 0.10 mg/dL      Alkaline Phosphatase 137 U/L      AST 32 U/L      ALT 19 U/L      Total Protein 7.7 g/dL      Albumin 3.8 g/dL     Magnesium [[id number]]  (Abnormal) Collected: 04/26/24 1424    Lab Status: Final result Specimen: Blood from Arm, Right Updated: 04/26/24 1520     Magnesium 3.0 mg/dL     Procalcitonin [[id number]]  (Normal) Collected: 04/26/24 1424    Lab Status: Final result Specimen: Blood from Arm, Right Updated: 04/26/24 1511     Procalcitonin 0.08 ng/ml     HS Troponin 0hr (reflex protocol) [[id number]]  (Normal) Collected: 04/26/24 1424    Lab Status: Final result Specimen: Blood from Arm, Right Updated: 04/26/24 1511     hs TnI 0hr <2 ng/L     Strep A PCR [[id number]]  (Normal) Collected: 04/26/24 1424    Lab Status: Final result Specimen: Throat Updated: 04/26/24 1502     STREP A PCR Not Detected    Protime-INR [[id number]]  (Normal) Collected: 04/26/24 1424    Lab Status: Final result Specimen: Blood from Arm, Right Updated: 04/26/24 1459     Protime 13.9 seconds      INR 1.01    APTT [[id number]]  (Normal) Collected: 04/26/24 1424    Lab Status: Final result Specimen: Blood from Arm, Right Updated: 04/26/24 1459     PTT 30 seconds     Fingerstick Glucose (POCT) [[id number]]  (Normal) Collected: 04/26/24 1453    Lab Status: Final result Specimen: Blood Updated: 04/26/24 1454     POC Glucose 130 mg/dl     Blood culture #1 [[id number]] Collected: 04/26/24 1425    Lab Status: In process Specimen: Blood from Arm, Left Updated: 04/26/24 1432    Blood culture #2 [[id number]] Collected: 04/26/24 1424    Lab Status: In process Specimen: Blood from Arm, Left Updated: 04/26/24 1432    FLU/RSV/COVID -  if FLU/RSV clinically relevant [[id number]]  (Normal) Collected: 04/26/24 1327    Lab Status: Final result Specimen: Nares from Nose Updated: 04/26/24 1421     SARS-CoV-2 Negative     INFLUENZA A PCR Negative     INFLUENZA B PCR Negative     RSV PCR Negative    Narrative:      FOR PEDIATRIC PATIENTS - copy/paste COVID Guidelines URL to browser: [email or web address]    SARS-CoV-2 assay is a Nucleic Acid Amplification assay intended for the  qualitative detection of nucleic acid from SARS-CoV-2 in nasopharyngeal  swabs. Results are for the presumptive identification of SARS-CoV-2 RNA.    Positive results are indicative of infection with SARS-CoV-2, the virus  causing COVID-19, but do not rule out bacterial infection or co-infection  with other viruses. Laboratories within the [place] and its  territories are required to report all positive results to the appropriate  public health authorities. Negative results do not preclude SARS-CoV-2  infection and should not be used as the sole basis for treatment or other  patient management decisions. Negative results must be combined with  clinical observations, patient history, and epidemiological information.  This test has not been FDA cleared or approved.    This test has been authorized by FDA under an Emergency Use Authorization  (EUA). This test is only authorized for the duration of time the  declaration that circumstances exist justifying the authorization of the  emergency use of an in vitro diagnostic tests for detection of SARS-CoV-2  virus and/or diagnosis of COVID-19 infection under section 564(b)(1) of  the Act, 21 U.S.C. 360bbb-3(b)(1), unless the authorization is terminated  or revoked sooner. The test has been validated but independent review by FDA  and CLIA is pending.    Test performed using [employer]: This RT-PCR assay targets N2,  a region unique to SARS-CoV-2. A conserved region in the  E-gene was chosen  for pan-Sarbecovirus detection which includes SARS-CoV-2.    According to CMS-2020-01-R, this platform meets the definition of high-throughput technology.    Blood gas, venous [[id number]] Collected: 04/26/24 1327    Lab Status: Final result Specimen: Blood from Arm, Right Updated: 04/26/24 1349     pH, Candido 7.354     pCO2, Candido 45.8 mm Hg      pO2, Candido 35.7 mm Hg      HCO3, Candido 24.9 mmol/L      Base Excess, Candido -1.0 mmol/L      O2 Content, Candido 14.9 ml/dL      O2 HGB, VENOUS 67.6 %     CBC and differential [[id number]]  (Abnormal) Collected: 04/26/24 1327    Lab Status: Final result Specimen: Blood from Arm, Right Updated: 04/26/24 1341     WBC 12.52 Thousand/uL      RBC 4.47 Million/uL      Hemoglobin 14.5 g/dL      Hematocrit 44.2 %      MCV 99 fL      MCH 32.4 pg      MCHC 32.8 g/dL      RDW 13.4 %      MPV 10.5 fL      Platelets 412 Thousands/uL      nRBC 0 /100 WBCs      Segmented % 56 %      Immature Grans % 1 %      Lymphocytes % 33 %      Monocytes % 8 %      Eosinophils Relative 2 %      Basophils Relative 0 %      Absolute Neutrophils 6.98 Thousands/µL      Absolute Immature Grans 0.06 Thousand/uL      Absolute Lymphocytes 4.12 Thousands/µL      Absolute Monocytes 1.04 Thousand/µL      Eosinophils Absolute 0.28 Thousand/µL      Basophils Absolute 0.04 Thousands/µL                    XR chest 1 view portable   ED Interpretation by Leighann Fofana DO (04/26 1427)   Poor inspiratory effort.  No obvious consolidation.  Questionable small left pleural effusion.                 Procedures  ECG 12 Lead Documentation Only    Date/Time: 4/26/2024 1:06 PM    Performed by: Leighann Fofana DO  Authorized by: Leighann Fofana DO    ECG reviewed by me, the ED Provider: yes    Patient location:  ED  Previous ECG:     Previous ECG:  Compared to current    Comparison ECG info:  1-    Similarity:  No change  Rate:     ECG rate:  119    ECG rate assessment: tachycardic     Rhythm:     Rhythm: sinus tachycardia    Ectopy:     Ectopy: none    QRS:     QRS axis:  Normal    QRS intervals:  Normal  Conduction:     Conduction: normal    ST segments:     ST segments:  Normal  T waves:     T waves: non-specific    Other findings:     Other findings: LAE             ED Course  ED Course as of 04/26/24 1800   Fri Apr 26, 2024   1306 Charge nurse to call respiratory therapist to start heart neb right away.   1425 Patient reassessed after the heart neb and reports she is still feeling tight.  Patient still mildly tachypneic but not as significant as on arrival.  On repeat lung exam, she is still very tight with poor air movement and diffuse inspiratory next-door wheezing.  Patient would like to hold off on further albuterol treatments due to it causing her to have shaking and tachycardia and feeling very anxious.  Will give a small dose of Ativan for anxiolysis.  Will eventually give lev albuterol neb treatment for further relief after patient received the Ativan.  I recommended observation admission due to the severe asthma exacerbation and patient is agreeable.   1523 Potassium(!): 5.7  Hemolysis noted.    1523 MAGNESIUM(!): 3.0  Patient received IV magnesium sulfate for asthma exacerbation.    1524 hs TnI 0hr: <2   1526 LACTIC ACID(!!): 3.1  Lactic acid was drawn after patient received the hour-long albuterol nebulizer treatment so likely elevated due to the albuterol.  Will recheck lactic acid.             HEART Risk Score      Flowsheet Row Most Recent Value   Heart Score Risk Calculator    History 0 Filed at: 04/26/2024 1757   ECG 0 Filed at: 04/26/2024 1757   Age 1 Filed at: 04/26/2024 1757   Risk Factors 2 Filed at: 04/26/2024 1757   Troponin 0 Filed at: 04/26/2024 1757   HEART Score 3 Filed at: 04/26/2024 1757                          SBIRT 20yo+      Flowsheet Row Most Recent Value   Initial Alcohol Screen: US AUDIT-C     1. How often do you have a drink containing alcohol? 0 Filed  at: 04/26/2024 1530   2. How many drinks containing alcohol do you have on a typical day you are drinking?  0 Filed at: 04/26/2024 1530   3b. FEMALE Any Age, or MALE 65+: How often do you have 4 or more drinks on one occassion? 0 Filed at: 04/26/2024 1530   Audit-C Score 0 Filed at: 04/26/2024 1530   JANNETH: How many times in the past year have you...    Used an illegal drug or used a prescription medication for non-medical reasons? Never Filed at: 04/26/2024 1530                      Medical Decision Making  59-year-old female with history of asthma presents to the ED for acute asthma attack.  Given her age and comorbidities, will check basic labs including troponin, chest x-ray and EKG.  Respiratory therapy called to initiate hour-long albuterol/Atrovent breathing treatment.  Will give IV Solu-Medrol, IV magnesium sulfate 2 g over 20 minutes.      Amount and/or Complexity of Data Reviewed  Labs: ordered. Decision-making details documented in ED Course.  Radiology: ordered and independent interpretation performed. Decision-making details documented in ED Course.  ECG/medicine tests: ordered and independent interpretation performed. Decision-making details documented in ED Course.    Risk  Prescription drug management.  Decision regarding hospitalization.             Disposition  Final diagnoses:   Acute asthma exacerbation   SIRS (systemic inflammatory response syndrome) (HCC)     Time reflects when diagnosis was documented in both MDM as applicable and the Disposition within this note       Time User Action Codes Description Comment    4/26/2024  3:59 PM Leighann Fofana Add [J45.901] Acute asthma exacerbation     4/26/2024  3:59 PM Leighann Fofana Add [R65.10] SIRS (systemic inflammatory response syndrome) (HCC)           ED Disposition       ED Disposition   Admit    Condition   Stable    Date/Time   Fri Apr 26, 2024 9550    Comment   Case was discussed with RAFA and the patient's admission status was agreed to  be Admission Status: observation status to the service of Dr. Bowman .               Follow-up Information    None         Current Discharge Medication List        CONTINUE these medications which have NOT CHANGED    Details   Blood Pressure Monitoring (Blood Pressure Monitor 3) CARLOS MANUEL Use as directed      budesonide-formoterol (SYMBICORT) 160-4.5 mcg/act inhaler Every 12 hours      buPROPion (Wellbutrin SR) 150 mg 12 hr tablet Every 12 hours      dicyclomine (BENTYL) 20 mg tablet TAKE 1 TABLET BY MOUTH EVERY 6 HOURS AS NEEDED FOR ABDOMINAL CRAMPING  Qty: 360 tablet, Refills: 1    Associated Diagnoses: Gastroesophageal reflux disease, unspecified whether esophagitis present      EPINEPHrine (EPIPEN) 0.3 mg/0.3 mL SOAJ as directed      famotidine (PEPCID) 40 MG tablet TAKE 1 TABLET BY MOUTH TWICE A DAY WITH LUNCH AND BEFORE BEDTIME  Qty: 180 tablet, Refills: 1    Associated Diagnoses: Gastroesophageal reflux disease, unspecified whether esophagitis present      fluticasone-vilanterol 200-25 mcg/actuation inhaler 1 puff every 24 hours      gabapentin (NEURONTIN) 100 mg capsule Take 100 mg by mouth 3 (three) times a day as needed      hydrOXYzine HCL (ATARAX) 25 mg tablet Take 1 tablet (25 mg total) by mouth every 6 (six) hours as needed for itching for up to 14 days  Qty: 56 tablet, Refills: 0    Associated Diagnoses: Itching      insulin aspart (NovoLOG FlexPen) 100 UNIT/ML injection pen Inject 3 Units under the skin 3 (three) times a day with meals  Qty: 15 mL, Refills: 0    Associated Diagnoses: 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, without long-term current use of insulin (HCC)      ipratropium (ATROVENT) 0.02 % nebulizer solution Take 2.5 mL (0.5 mg total) by nebulization 3 (three) times a day  Qty: 225 mL, Refills: 0    Associated Diagnoses: Acute asthma exacerbation      ipratropium-albuterol (DUO-NEB) 0.5-2.5 mg/3 mL nebulizer solution Take 3 mL by nebulization every 4 (four) hours as  needed for wheezing or shortness of breath  Refills: 0    Associated Diagnoses: Acute asthma exacerbation      ketorolac (TORADOL) 10 mg tablet Take 1 tablet (10 mg total) by mouth every 6 (six) hours as needed for moderate pain for up to 5 days  Qty: 20 tablet, Refills: 0    Associated Diagnoses: Fall, initial encounter      Melatonin 10 MG CAPS Take 10 mg by mouth daily at bedtime as needed      metoclopramide (REGLAN) 5 mg tablet Take 1 tablet (5 mg total) by mouth 4 (four) times a day  Qty: 124 tablet, Refills: 4    Associated Diagnoses: Gastroparesis      montelukast (SINGULAIR) 10 mg tablet Take 1 tablet (10 mg total) by mouth daily at bedtime  Qty: 30 tablet, Refills: 0    Associated Diagnoses: Acute asthma exacerbation      ondansetron (ZOFRAN) 4 mg tablet Take 1 tablet by mouth every 8 (eight) hours      OneTouch Ultra test strip TEST TWICE A DAY      Ozempic, 2 MG/DOSE, 8 MG/3ML injection pen INJECT 2MG SUBCUTANEOUSLY ONCE WEEKLY      pantoprazole (PROTONIX) 40 mg Take 1 packet (40 mg total) by mouth 2 (two) times a day before meals  Qty: 60 each, Refills: 1    Associated Diagnoses: Gastroesophageal reflux disease, unspecified whether esophagitis present      rosuvastatin (CRESTOR) 5 mg tablet Take 1 tablet by mouth daily      zolpidem (AMBIEN) 10 mg tablet Take 1 tablet (10 mg total) by mouth daily at bedtime as needed for sleep for up to 7 days  Qty: 7 tablet, Refills: 0    Associated Diagnoses: Insomnia             No discharge procedures on file.    PDMP Review         Value Time User    PDMP Reviewed  Yes 11/15/2023  2:21 PM EDUARDA Parker            ED Provider  Electronically Signed by             Leighann Fofana DO  04/26/24 4244

## 2024-04-27 PROBLEM — G43.909 MIGRAINE WITHOUT STATUS MIGRAINOSUS, NOT INTRACTABLE: Status: ACTIVE | Noted: 2024-04-27

## 2024-04-27 PROBLEM — E87.5 HYPERKALEMIA: Status: RESOLVED | Noted: 2024-04-26 | Resolved: 2024-04-27

## 2024-04-27 PROBLEM — E87.20 LACTIC ACIDOSIS: Status: ACTIVE | Noted: 2024-04-27

## 2024-04-27 LAB
ANION GAP SERPL CALCULATED.3IONS-SCNC: 10 MMOL/L (ref 4–13)
ATRIAL RATE: 102 BPM
ATRIAL RATE: 108 BPM
ATRIAL RATE: 96 BPM
BUN SERPL-MCNC: 9 MG/DL (ref 5–25)
CALCIUM SERPL-MCNC: 9.3 MG/DL (ref 8.4–10.2)
CHLORIDE SERPL-SCNC: 104 MMOL/L (ref 96–108)
CO2 SERPL-SCNC: 22 MMOL/L (ref 21–32)
CREAT SERPL-MCNC: 0.78 MG/DL (ref 0.6–1.3)
ERYTHROCYTE [DISTWIDTH] IN BLOOD BY AUTOMATED COUNT: 13.3 % (ref 11.6–15.1)
EST. AVERAGE GLUCOSE BLD GHB EST-MCNC: 91 MG/DL
GFR SERPL CREATININE-BSD FRML MDRD: 84 ML/MIN/1.73SQ M
GLUCOSE P FAST SERPL-MCNC: 137 MG/DL (ref 65–99)
GLUCOSE SERPL-MCNC: 129 MG/DL (ref 65–140)
GLUCOSE SERPL-MCNC: 137 MG/DL (ref 65–140)
GLUCOSE SERPL-MCNC: 143 MG/DL (ref 65–140)
GLUCOSE SERPL-MCNC: 153 MG/DL (ref 65–140)
GLUCOSE SERPL-MCNC: 161 MG/DL (ref 65–140)
GLUCOSE SERPL-MCNC: 163 MG/DL (ref 65–140)
HBA1C MFR BLD: 4.8 %
HCT VFR BLD AUTO: 41.8 % (ref 34.8–46.1)
HGB BLD-MCNC: 13.7 G/DL (ref 11.5–15.4)
LACTATE SERPL-SCNC: 2.7 MMOL/L (ref 0.5–2)
LACTATE SERPL-SCNC: 2.8 MMOL/L (ref 0.5–2)
LACTATE SERPL-SCNC: 3.9 MMOL/L (ref 0.5–2)
MCH RBC QN AUTO: 31.9 PG (ref 26.8–34.3)
MCHC RBC AUTO-ENTMCNC: 32.8 G/DL (ref 31.4–37.4)
MCV RBC AUTO: 97 FL (ref 82–98)
P AXIS: 57 DEGREES
P AXIS: 60 DEGREES
P AXIS: 64 DEGREES
PLATELET # BLD AUTO: 436 THOUSANDS/UL (ref 149–390)
PMV BLD AUTO: 10.2 FL (ref 8.9–12.7)
POTASSIUM SERPL-SCNC: 4.4 MMOL/L (ref 3.5–5.3)
PR INTERVAL: 130 MS
PR INTERVAL: 134 MS
PR INTERVAL: 136 MS
QRS AXIS: 10 DEGREES
QRS AXIS: 6 DEGREES
QRS AXIS: 9 DEGREES
QRSD INTERVAL: 72 MS
QRSD INTERVAL: 76 MS
QRSD INTERVAL: 78 MS
QT INTERVAL: 340 MS
QT INTERVAL: 346 MS
QT INTERVAL: 352 MS
QTC INTERVAL: 443 MS
QTC INTERVAL: 444 MS
QTC INTERVAL: 463 MS
RBC # BLD AUTO: 4.29 MILLION/UL (ref 3.81–5.12)
SODIUM SERPL-SCNC: 136 MMOL/L (ref 135–147)
T WAVE AXIS: 13 DEGREES
T WAVE AXIS: 19 DEGREES
T WAVE AXIS: 25 DEGREES
VENTRICULAR RATE: 102 BPM
VENTRICULAR RATE: 108 BPM
VENTRICULAR RATE: 96 BPM
WBC # BLD AUTO: 17.54 THOUSAND/UL (ref 4.31–10.16)

## 2024-04-27 PROCEDURE — 93010 ELECTROCARDIOGRAM REPORT: CPT | Performed by: INTERNAL MEDICINE

## 2024-04-27 PROCEDURE — 94640 AIRWAY INHALATION TREATMENT: CPT

## 2024-04-27 PROCEDURE — 82948 REAGENT STRIP/BLOOD GLUCOSE: CPT

## 2024-04-27 PROCEDURE — 94664 DEMO&/EVAL PT USE INHALER: CPT

## 2024-04-27 PROCEDURE — 83605 ASSAY OF LACTIC ACID: CPT | Performed by: STUDENT IN AN ORGANIZED HEALTH CARE EDUCATION/TRAINING PROGRAM

## 2024-04-27 PROCEDURE — 99233 SBSQ HOSP IP/OBS HIGH 50: CPT | Performed by: PHYSICIAN ASSISTANT

## 2024-04-27 PROCEDURE — 80048 BASIC METABOLIC PNL TOTAL CA: CPT | Performed by: FAMILY MEDICINE

## 2024-04-27 PROCEDURE — 93005 ELECTROCARDIOGRAM TRACING: CPT

## 2024-04-27 PROCEDURE — 83036 HEMOGLOBIN GLYCOSYLATED A1C: CPT | Performed by: FAMILY MEDICINE

## 2024-04-27 PROCEDURE — 85027 COMPLETE CBC AUTOMATED: CPT | Performed by: FAMILY MEDICINE

## 2024-04-27 PROCEDURE — 94760 N-INVAS EAR/PLS OXIMETRY 1: CPT

## 2024-04-27 RX ORDER — ACETAMINOPHEN 325 MG/1
650 TABLET ORAL EVERY 6 HOURS PRN
Status: DISCONTINUED | OUTPATIENT
Start: 2024-04-27 | End: 2024-05-01 | Stop reason: HOSPADM

## 2024-04-27 RX ORDER — DIPHENHYDRAMINE HYDROCHLORIDE 50 MG/ML
25 INJECTION INTRAMUSCULAR; INTRAVENOUS EVERY 8 HOURS SCHEDULED
Status: DISCONTINUED | OUTPATIENT
Start: 2024-04-27 | End: 2024-04-28

## 2024-04-27 RX ORDER — LEVALBUTEROL INHALATION SOLUTION 1.25 MG/3ML
1.25 SOLUTION RESPIRATORY (INHALATION) EVERY 6 HOURS PRN
Status: DISCONTINUED | OUTPATIENT
Start: 2024-04-27 | End: 2024-05-01 | Stop reason: HOSPADM

## 2024-04-27 RX ORDER — MAGNESIUM SULFATE HEPTAHYDRATE 40 MG/ML
2 INJECTION, SOLUTION INTRAVENOUS ONCE
Status: COMPLETED | OUTPATIENT
Start: 2024-04-27 | End: 2024-04-27

## 2024-04-27 RX ORDER — ZOLPIDEM TARTRATE 5 MG/1
5 TABLET ORAL ONCE
Status: DISCONTINUED | OUTPATIENT
Start: 2024-04-27 | End: 2024-04-28

## 2024-04-27 RX ORDER — BUSPIRONE HYDROCHLORIDE 5 MG/1
10 TABLET ORAL 2 TIMES DAILY
Status: DISCONTINUED | OUTPATIENT
Start: 2024-04-27 | End: 2024-05-01 | Stop reason: HOSPADM

## 2024-04-27 RX ORDER — SUMATRIPTAN 6 MG/.5ML
6 INJECTION, SOLUTION SUBCUTANEOUS
Status: COMPLETED | OUTPATIENT
Start: 2024-04-27 | End: 2024-04-29

## 2024-04-27 RX ORDER — METOCLOPRAMIDE HYDROCHLORIDE 5 MG/ML
10 INJECTION INTRAMUSCULAR; INTRAVENOUS EVERY 8 HOURS SCHEDULED
Status: DISCONTINUED | OUTPATIENT
Start: 2024-04-27 | End: 2024-04-28

## 2024-04-27 RX ORDER — HYDROXYZINE HYDROCHLORIDE 25 MG/1
25 TABLET, FILM COATED ORAL EVERY 6 HOURS PRN
Status: DISCONTINUED | OUTPATIENT
Start: 2024-04-27 | End: 2024-05-01 | Stop reason: HOSPADM

## 2024-04-27 RX ORDER — BENZONATATE 100 MG/1
100 CAPSULE ORAL 3 TIMES DAILY
Status: DISCONTINUED | OUTPATIENT
Start: 2024-04-27 | End: 2024-04-27

## 2024-04-27 RX ADMIN — INSULIN LISPRO 1 UNITS: 100 INJECTION, SOLUTION INTRAVENOUS; SUBCUTANEOUS at 12:20

## 2024-04-27 RX ADMIN — FLUTICASONE FUROATE AND VILANTEROL TRIFENATATE 1 PUFF: 200; 25 POWDER RESPIRATORY (INHALATION) at 10:01

## 2024-04-27 RX ADMIN — IPRATROPIUM BROMIDE 0.5 MG: 0.5 SOLUTION RESPIRATORY (INHALATION) at 07:39

## 2024-04-27 RX ADMIN — FAMOTIDINE 40 MG: 20 TABLET, FILM COATED ORAL at 09:38

## 2024-04-27 RX ADMIN — NYSTATIN 500000 UNITS: 100000 SUSPENSION ORAL at 17:03

## 2024-04-27 RX ADMIN — FAMOTIDINE 40 MG: 20 TABLET, FILM COATED ORAL at 17:03

## 2024-04-27 RX ADMIN — GUAIFENESIN 600 MG: 600 TABLET, EXTENDED RELEASE ORAL at 22:15

## 2024-04-27 RX ADMIN — DIPHENHYDRAMINE HYDROCHLORIDE 25 MG: 50 INJECTION, SOLUTION INTRAMUSCULAR; INTRAVENOUS at 14:22

## 2024-04-27 RX ADMIN — HYDROCODONE BITARTRATE AND HOMATROPINE METHYLBROMIDE ORAL SOLUTION 5 ML: 5; 1.5 LIQUID ORAL at 08:57

## 2024-04-27 RX ADMIN — HYDROCODONE BITARTRATE AND HOMATROPINE METHYLBROMIDE ORAL SOLUTION 5 ML: 5; 1.5 LIQUID ORAL at 01:48

## 2024-04-27 RX ADMIN — METHYLPREDNISOLONE SODIUM SUCCINATE 40 MG: 40 INJECTION, POWDER, FOR SOLUTION INTRAMUSCULAR; INTRAVENOUS at 06:46

## 2024-04-27 RX ADMIN — NYSTATIN 500000 UNITS: 100000 SUSPENSION ORAL at 22:16

## 2024-04-27 RX ADMIN — INSULIN LISPRO 3 UNITS: 100 INJECTION, SOLUTION INTRAVENOUS; SUBCUTANEOUS at 12:20

## 2024-04-27 RX ADMIN — LEVALBUTEROL HYDROCHLORIDE 1.25 MG: 1.25 SOLUTION RESPIRATORY (INHALATION) at 07:39

## 2024-04-27 RX ADMIN — METOCLOPRAMIDE 10 MG: 5 INJECTION, SOLUTION INTRAMUSCULAR; INTRAVENOUS at 22:16

## 2024-04-27 RX ADMIN — SODIUM CHLORIDE 500 ML: 0.9 INJECTION, SOLUTION INTRAVENOUS at 09:36

## 2024-04-27 RX ADMIN — GUAIFENESIN 600 MG: 600 TABLET, EXTENDED RELEASE ORAL at 08:52

## 2024-04-27 RX ADMIN — BUPROPION HYDROCHLORIDE 150 MG: 150 TABLET, EXTENDED RELEASE ORAL at 08:52

## 2024-04-27 RX ADMIN — ACETAMINOPHEN 650 MG: 325 TABLET, FILM COATED ORAL at 08:56

## 2024-04-27 RX ADMIN — CEFTRIAXONE SODIUM 1000 MG: 10 INJECTION, POWDER, FOR SOLUTION INTRAVENOUS at 15:45

## 2024-04-27 RX ADMIN — HYDROXYZINE HYDROCHLORIDE 25 MG: 25 TABLET ORAL at 09:34

## 2024-04-27 RX ADMIN — BUSPIRONE HYDROCHLORIDE 10 MG: 5 TABLET ORAL at 17:03

## 2024-04-27 RX ADMIN — INSULIN LISPRO 3 UNITS: 100 INJECTION, SOLUTION INTRAVENOUS; SUBCUTANEOUS at 17:12

## 2024-04-27 RX ADMIN — GABAPENTIN 100 MG: 100 CAPSULE ORAL at 22:15

## 2024-04-27 RX ADMIN — PANTOPRAZOLE SODIUM 40 MG: 40 TABLET, DELAYED RELEASE ORAL at 08:52

## 2024-04-27 RX ADMIN — INSULIN LISPRO 1 UNITS: 100 INJECTION, SOLUTION INTRAVENOUS; SUBCUTANEOUS at 18:12

## 2024-04-27 RX ADMIN — GABAPENTIN 100 MG: 100 CAPSULE ORAL at 15:42

## 2024-04-27 RX ADMIN — BUDESONIDE AND FORMOTEROL FUMARATE DIHYDRATE 2 PUFF: 160; 4.5 AEROSOL RESPIRATORY (INHALATION) at 10:01

## 2024-04-27 RX ADMIN — METOCLOPRAMIDE 10 MG: 5 INJECTION, SOLUTION INTRAMUSCULAR; INTRAVENOUS at 14:22

## 2024-04-27 RX ADMIN — BUSPIRONE HYDROCHLORIDE 10 MG: 5 TABLET ORAL at 09:34

## 2024-04-27 RX ADMIN — PANTOPRAZOLE SODIUM 40 MG: 40 TABLET, DELAYED RELEASE ORAL at 17:02

## 2024-04-27 RX ADMIN — METHYLPREDNISOLONE SODIUM SUCCINATE 40 MG: 40 INJECTION, POWDER, FOR SOLUTION INTRAMUSCULAR; INTRAVENOUS at 22:15

## 2024-04-27 RX ADMIN — ACETAMINOPHEN 650 MG: 325 TABLET, FILM COATED ORAL at 03:01

## 2024-04-27 RX ADMIN — METHYLPREDNISOLONE SODIUM SUCCINATE 40 MG: 40 INJECTION, POWDER, FOR SOLUTION INTRAMUSCULAR; INTRAVENOUS at 14:21

## 2024-04-27 RX ADMIN — DIPHENHYDRAMINE HYDROCHLORIDE 25 MG: 50 INJECTION, SOLUTION INTRAMUSCULAR; INTRAVENOUS at 22:16

## 2024-04-27 RX ADMIN — HYDROCODONE BITARTRATE AND HOMATROPINE METHYLBROMIDE ORAL SOLUTION 5 ML: 5; 1.5 LIQUID ORAL at 22:18

## 2024-04-27 RX ADMIN — MAGNESIUM SULFATE HEPTAHYDRATE 2 G: 40 INJECTION, SOLUTION INTRAVENOUS at 14:21

## 2024-04-27 RX ADMIN — HYDROCODONE BITARTRATE AND HOMATROPINE METHYLBROMIDE ORAL SOLUTION 5 ML: 5; 1.5 LIQUID ORAL at 16:27

## 2024-04-27 RX ADMIN — GABAPENTIN 100 MG: 100 CAPSULE ORAL at 08:52

## 2024-04-27 RX ADMIN — PRAVASTATIN SODIUM 40 MG: 40 TABLET ORAL at 15:41

## 2024-04-27 RX ADMIN — BUDESONIDE AND FORMOTEROL FUMARATE DIHYDRATE 2 PUFF: 160; 4.5 AEROSOL RESPIRATORY (INHALATION) at 17:05

## 2024-04-27 RX ADMIN — SUMATRIPTAN 6 MG: 6 INJECTION, SOLUTION SUBCUTANEOUS at 15:42

## 2024-04-27 NOTE — ASSESSMENT & PLAN NOTE
Blood Sugar Average: Last 72 hrs: (P) [id number]    Controlled a/e/b A1c 4.8%  Hold Ozempic during hospitalization, resume on discharge   Diabetic diet   Noted symptomatic hypoglycemia, now resolved

## 2024-04-27 NOTE — ASSESSMENT & PLAN NOTE
Present on admission: tachycardia, tachypnea due to asthma exacerbation  Bacterial pneumonia ruled out given normal procal  No further indication for antibiotics, received 5 days of ceftriaxone   Initial lactate 3.1, procal 0.08, WBC 12.52  Treatment as below

## 2024-04-27 NOTE — RESPIRATORY THERAPY NOTE
RT Protocol Note  Lidya Ansari 59 y.o. female MRN: [id number]  Unit/Bed#: -02 Encounter: [id number]    Assessment    Active Problems:    SIRS (systemic inflammatory response syndrome) (HCC)    Severe persistent asthma with exacerbation    Hyperlipidemia    Type 2 diabetes mellitus without complication, with long-term current use of insulin (HCC)    GERD (gastroesophageal reflux disease)    Depression with anxiety    Hyperkalemia      Home Pulmonary Medications:     04/26/24 2006   Respiratory Protocol   Protocol Initiated? Yes   Protocol Selection Respiratory   Language Barrier? No   Medical & Social History Reviewed? Yes   Diagnostic Studies Reviewed? Yes   Physical Assessment Performed? Yes   Respiratory Plan Mild Distress pathway   Respiratory Assessment   Assessment Type During-treatment   General Appearance Alert;Awake   Respiratory Pattern Normal   Chest Assessment Chest expansion symmetrical   Bilateral Breath Sounds Diminished;Expiratory wheezes   Cough Non-productive   Resp Comments Will continue with current tx plan   O2 Device RA   Cough Description   Sputum Amount None   Additional Assessments   Pulse (!) 109   Respirations (!) 24   SpO2 95 %            Past Medical History:   Diagnosis Date    Asthma     Diabetes mellitus (HCC)     GERD (gastroesophageal reflux disease)     Hyperlipidemia     Hypertension      Social History     Socioeconomic History    Marital status: /Civil Union     Spouse name: None    Number of children: None    Years of education: None    Highest education level: None   Occupational History    None   Tobacco Use    Smoking status: Never    Smokeless tobacco: Never   Vaping Use    Vaping status: Never Used   Substance and Sexual Activity    Alcohol use: Yes     Alcohol/week: 7.0 standard drinks of alcohol     Types: 7 Glasses of wine per week    Drug use: Never    Sexual activity: None   Other Topics Concern    None   Social History Narrative    None     Social  "Determinants of Health     Financial Resource Strain: Not on file   Food Insecurity: No Food Insecurity (4/26/2024)    Hunger Vital Sign     Worried About Running Out of Food in the Last Year: Never true     Ran Out of Food in the Last Year: Never true   Transportation Needs: No Transportation Needs (4/26/2024)    PRAPARE - Transportation     Lack of Transportation (Medical): No     Lack of Transportation (Non-Medical): No   Physical Activity: Not on file   Stress: Not on file   Social Connections: Not on file   Intimate Partner Violence: Not on file   Housing Stability: Low Risk  (4/26/2024)    Housing Stability Vital Sign     Unable to Pay for Housing in the Last Year: No     Number of Places Lived in the Last Year: 1     Unstable Housing in the Last Year: No       Subjective         Objective    Physical Exam:   Assessment Type: During-treatment  General Appearance: Alert, Awake  Respiratory Pattern: Normal  Chest Assessment: Chest expansion symmetrical  Bilateral Breath Sounds: Diminished, Expiratory wheezes  Cough: Non-productive  O2 Device: RA    Vitals:  Blood pressure 149/90, pulse (!) 109, temperature 98 °F (36.7 °C), resp. rate (!) 24, height 4' 11\" (1.499 m), SpO2 95%.          Imaging and other studies:     O2 Device: RA     Plan    Respiratory Plan: Mild Distress pathway        Resp Comments: Will continue with current tx plan   "

## 2024-04-27 NOTE — ASSESSMENT & PLAN NOTE
Present on admission, 3.1; climbing to 6.9, now resolved  Suspect type B lactic acidosis secondary to nebulizer use in setting of asthma exacerbation   No evidence of end-organ damage, VSS

## 2024-04-27 NOTE — PROGRESS NOTES
[facility]  Progress Note  Name: Lidya Ansari I MRN: [id number]  Unit/Bed#: -02I Date of Admission: 4/26/2024   Date of Service: 4/26/2024  I Hospital Day: 0    * SIRS (systemic inflammatory response syndrome) ([facility])  Assessment & Plan  Present on admission: tachycardia, tachypnea due to asthma exacerbation  Rule out pneumonia as source, continue IV ceftriaxone   Initial lactate 3.1, procal 0.08, WBC 12.52  Treatment as below    Severe persistent asthma with exacerbation  Assessment & Plan  Presents with report of 2 weeks worsening cough/shortness of breath with wheezing; seen in ER 2 weeks ago, discharged home after nebulizer treatment with oral steroid course. Sick contact at home,  with URI symptoms.     CXR: bilateral infiltrates + atelectasis  Flu/COVID/RSV negative, consider RP2  Current O2 saturation >88% on room air, monitor   Continue scheduled nebs, respiratory protocol  IV Solu-Medrol 40 mg q8h  Mucinex, tessalon perles prn  Encouraged ISB 10x per hour while awake, ambulation  Consider pulmonology consultation    Lactic acidosis  Assessment & Plan  Present on admission, 3.1; climbing to 6.9, now 3.9  Will recheck lactic today after IV bolus  Suspect type B lactic acidosis secondary to nebulizer use  No evidence of end-organ damage, VSS    Type 2 diabetes mellitus without complication, with long-term current use of insulin ([facility])  Assessment & Plan  Blood Sugar Average: Last 72 hrs: (P) 183    Controlled a/e/b last A1c ,5%  Hold Ozempic during hospitalization  Continue Humalog 3 units 3 times daily  SSI #2  Diabetic diet level 2  Blood glucose monitoring ACHS  Hypoglycemia protocol  Adjust as needed    Hyperkalemia-resolved as of 4/27/2024  Assessment & Plan  Noted to be hyperkalemic on arrival with potassium 5.7  Resolved with appropriate treatment, will monitor     Depression with anxiety  Assessment & Plan  Continue home Wellbutrin.    GERD (gastroesophageal reflux  disease)  Assessment & Plan  Continue Pepcid with PPI.    Hyperlipidemia  Assessment & Plan  Continue statin.          VTE Pharmacologic Prophylaxis: VTE Score: 2 Low Risk (Score 0-2) - Encourage Ambulation.    Mobility:   Basic Mobility Inpatient Raw Score: 23  JH-HLM Goal: 7: Walk 25 feet or more  JH-HLM Achieved: 7: Walk 25 feet or more  JH-HLM Goal achieved. Continue to encourage appropriate mobility.    Patient Centered Rounds: I performed bedside rounds with nursing staff today.   Discussions with Specialists or Other Care Team Provider:     Education and Discussions with Family / Patient: Patient declined call to .     Total Time Spent on Date of Encounter in care of patient: 50 mins. This time was spent on one or more of the following: performing physical exam; counseling and coordination of care; obtaining or reviewing history; documenting in the medical record; reviewing/ordering tests, medications or procedures; communicating with other healthcare professionals and discussing with patient's family/caregivers.    Current Length of Stay: 0 day(s)  Current Patient Status: Observation   Certification Statement: The patient, admitted on an observation basis, will now require > 2 midnight hospital stay due to asthma exacerbation and possible pneumonia   Discharge Plan: Anticipate discharge in 24-48 hrs to home.    Code Status: Level 1 - Full Code    Subjective:   Seen this am, patient is very anxious. Reports that the albuterol and steroids do this to her. She is upset over lack of sleep last night due to not being provided with ambien. We discussed risk and benefits in the setting of acute asthma exacerbation. We also discussed alternate therapy while hospitalized for the anxiety - agreeable to buspar and atarax.     Patient complains of chest tightness and wheezing. No fevers. Reports she she saw the read for the chest xray and requests antibiotics, which have been continued.     Objective:      Vitals:   Temp (24hrs), Av.3 °F (36.8 °C), Min:98 °F (36.7 °C), Max:98.8 °F (37.1 °C)    Temp:  [98 °F (36.7 °C)-98.8 °F (37.1 °C)] 98.2 °F (36.8 °C)  HR:  [] 93  Resp:  [18-45] 18  BP: (108-158)/(56-91) 158/75  SpO2:  [92 %-100 %] 98 %  Body mass index is 35.93 kg/m².     Input and Output Summary (last 24 hours):     Intake/Output Summary (Last 24 hours) at 2024 0842  Last data filed at 2024 1749  Gross per 24 hour   Intake 100 ml   Output --   Net 100 ml       Physical Exam:   Physical Exam  Vitals and nursing note reviewed.   Constitutional:       General: She is in acute distress (anxious, but not toxic).      Appearance: Normal appearance. She is obese. She is not ill-appearing or toxic-appearing.   Cardiovascular:      Rate and Rhythm: Normal rate and regular rhythm.      Heart sounds: Normal heart sounds.   Pulmonary:      Effort: Pulmonary effort is normal. Tachypnea present. No respiratory distress.      Breath sounds: Decreased air movement present. Wheezing and rhonchi present.      Comments: Room air, 95-96%  Abdominal:      General: Bowel sounds are normal. There is no distension.      Palpations: Abdomen is soft.      Tenderness: There is no abdominal tenderness.   Skin:     General: Skin is warm and dry.      Coloration: Skin is not pale.   Neurological:      Mental Status: She is alert and oriented to person, place, and time.   Psychiatric:         Mood and Affect: Mood is anxious.         Behavior: Behavior normal.           Additional Data:     Labs:  Results from last 7 days   Lab Units 24  0616 24  1327   WBC Thousand/uL 17.54* 12.52*   HEMOGLOBIN g/dL 13.7 14.5   HEMATOCRIT % 41.8 44.2   PLATELETS Thousands/uL 436* 412*   SEGS PCT %  --  56   LYMPHO PCT %  --  33   MONO PCT %  --  8   EOS PCT %  --  2     Results from last 7 days   Lab Units 24  0533 24  1741 24  1424   SODIUM mmol/L 136   < > 136   POTASSIUM mmol/L 4.4   < > 5.7*   CHLORIDE  mmol/L 104   < > 104   CO2 mmol/L 22   < > 20*   BUN mg/dL 9   < > 10   CREATININE mg/dL 0.78   < > 0.71   ANION GAP mmol/L 10   < > 12   CALCIUM mg/dL 9.3   < > 8.7   ALBUMIN g/dL  --   --  3.8   TOTAL BILIRUBIN mg/dL  --   --  0.75   ALK PHOS U/L  --   --  137*   ALT U/L  --   --  19   AST U/L  --   --  32   GLUCOSE RANDOM mg/dL 137   < > 136    < > = values in this interval not displayed.     Results from last 7 days   Lab Units 04/26/24  1424   INR  1.01     Results from last 7 days   Lab Units 04/27/24  0740 04/26/24  2126 04/26/24  1832 04/26/24  1453   POC GLUCOSE mg/dl 163* 175* 264* 130         Results from last 7 days   Lab Units 04/27/24  0753 04/26/24  1741 04/26/24  1424   LACTIC ACID mmol/L 3.9* 6.9* 3.1*   PROCALCITONIN ng/ml  --   --  0.08       Lines/Drains:  Invasive Devices       Peripheral Intravenous Line  Duration             Peripheral IV 04/27/24 Distal;Left;Upper;Ventral (anterior) Arm <1 day                          Imaging: Reviewed radiology reports from this admission including: chest xray and Personally reviewed the following imaging: chest xray    Recent Cultures (last 7 days):   Results from last 7 days   Lab Units 04/26/24  1425 04/26/24  1424   BLOOD CULTURE  Received in Microbiology Lab. Culture in Progress. Received in Microbiology Lab. Culture in Progress.       Last 24 Hours Medication List:   Current Facility-Administered Medications   Medication Dose Route Frequency Provider Last Rate    acetaminophen  650 mg Oral Q6H PRN EDUARDA Virk      budesonide-formoterol  2 puff Inhalation BID Mana Bowman MD      buPROPion  150 mg Oral Daily Mana Bowman MD      cefTRIAXone  1,000 mg Intravenous Q24H Dorothy Barajas PA-C      famotidine  40 mg Oral BID Mana Bowman MD      fluticasone-vilanterol  1 puff Inhalation Daily Mana Bowman MD      gabapentin  100 mg Oral TID Mana Bowman MD      guaiFENesin  600 mg Oral Q12H [facility] Mana Bowman MD      HYDROcodone  Bit-Homatrop MBr  5 mL Oral Q6H PRN Mana Bowman MD      insulin lispro  1-5 Units Subcutaneous TID AC Mana Bowman MD      insulin lispro  3 Units Subcutaneous TID With Meals Mana Bowman MD      ipratropium  0.5 mg Nebulization TID Mana Bowman MD      levalbuterol  1.25 mg Nebulization TID Mana Bowman MD      levalbuterol  1.25 mg Nebulization Q6H PRN Harry Griffith MD      methylPREDNISolone sodium succinate  40 mg Intravenous Q8H KAYLEIGH Mana Bowman MD      pantoprazole  40 mg Oral BID Mana Bowman MD      pravastatin  40 mg Oral Daily With Dinner Mana Bowman MD          Today, Patient Was Seen By: Dorothy Barajas PA-C    **Please Note: This note may have been constructed using a voice recognition system.**

## 2024-04-27 NOTE — QUICK NOTE
Went to go see the patient.  She was insisting on taking 10 mg of her Ambien tonight and she also states she takes 20 mg of melatonin.  I did explain to her because she does have respiratory distress and wheezing that is not recommended.  Patient is insistent stating that she takes a lot of Benadryl at home daily and also takes the Ambien.  I will give her a one-time 5 mg dose of Ambien tonight which is less than she takes at home.  Patient knows the risks involved including worsening respiratory distress.  She states that when she has been here before she has always been on her Ambien.  Last time she came in in January she did not get Ambien but the times before that she has gotten it.  On auscultation the patient does have some diffuse expiratory wheezes.  She is on room air.  Patient states that she will assume the risk knowing it could cause respiratory distress.

## 2024-04-27 NOTE — RESPIRATORY THERAPY NOTE
"RT Protocol Note  Lidya Ansari 59 y.o. female MRN: [id number]  Unit/Bed#: -01 Encounter: [id number]    Assessment    Principal Problem:    SIRS (systemic inflammatory response syndrome) ([facility])  Active Problems:    Severe persistent asthma with exacerbation    Hyperlipidemia    Type 2 diabetes mellitus without complication, with long-term current use of insulin ([facility])    GERD (gastroesophageal reflux disease)    Depression with anxiety    Lactic acidosis    Migraine without status migrainosus, not intractable  Physical Exam:   Assessment Type: Post-treatment  General Appearance: Alert, Awake  Respiratory Pattern: Normal  Chest Assessment: Chest expansion symmetrical  Bilateral Breath Sounds: Diminished, Expiratory wheezes  O2 Device: ra    Vitals:  Blood pressure 158/75, pulse 105, temperature 97.8 °F (36.6 °C), resp. rate 17, height 4' 11\" (1.499 m), weight 81.1 kg (178 lb 12.7 oz), SpO2 94%.      O2 Device: ra     Plan    Respiratory Plan: Mild Distress pathway        Resp Comments: will cotn w TID txs, bbs diminshed ex wheezes. spo2 94%   "

## 2024-04-27 NOTE — ASSESSMENT & PLAN NOTE
Reports migraine history, s/p migraine cocktail here with improvement but still with headache due to persistent cough  Stop reglan due to facial twitching, concern for tardive dyskinesia

## 2024-04-27 NOTE — ASSESSMENT & PLAN NOTE
Presents with report of 2 weeks worsening cough/shortness of breath with wheezing; seen in ER 2 weeks ago, discharged home after nebulizer treatment with oral steroid course. Sick contact at home,  + son with URI symptoms.     CXR: bilateral consolidation + atelectasis  Flu/COVID/RSV negative, consider RP2  Current O2 saturation >88% on room air, monitor   Continue scheduled nebs, respiratory protocol  Tolerating IV Solu-Medrol 40 mg q12h - transition to prednisone taper on discharge  Mucinex, hycodan prn  Encouraged ISB 10x per hour while awake, ambulation

## 2024-04-27 NOTE — PLAN OF CARE
Problem: PAIN - ADULT  Goal: Verbalizes/displays adequate comfort level or baseline comfort level  Description: Interventions:  - Encourage patient to monitor pain and request assistance  - Assess pain using appropriate pain scale  - Administer analgesics based on type and severity of pain and evaluate response  - Implement non-pharmacological measures as appropriate and evaluate response  - Consider cultural and social influences on pain and pain management  - Notify physician/advanced practitioner if interventions unsuccessful or patient reports new pain  Outcome: Progressing     Problem: INFECTION - ADULT  Goal: Absence or prevention of progression during hospitalization  Description: INTERVENTIONS:  - Assess and monitor for signs and symptoms of infection  - Monitor lab/diagnostic results  - Monitor all insertion sites, i.e. indwelling lines, tubes, and drains  - Monitor endotracheal if appropriate and nasal secretions for changes in amount and color  - [place] appropriate cooling/warming therapies per order  - Administer medications as ordered  - Instruct and encourage patient and family to use good hand hygiene technique  - Identify and instruct in appropriate isolation precautions for identified infection/condition  Outcome: Progressing

## 2024-04-27 NOTE — NURSING NOTE
Pt requesting to see attending in order to be reassessed in order to get her home ambien dose for tonight. Slim on call notified. Risk explained, and pt reassessed.

## 2024-04-28 PROBLEM — E87.20 LACTIC ACIDOSIS: Status: RESOLVED | Noted: 2024-04-27 | Resolved: 2024-04-28

## 2024-04-28 LAB
GLUCOSE SERPL-MCNC: 115 MG/DL (ref 65–140)
GLUCOSE SERPL-MCNC: 122 MG/DL (ref 65–140)
GLUCOSE SERPL-MCNC: 131 MG/DL (ref 65–140)
GLUCOSE SERPL-MCNC: 154 MG/DL (ref 65–140)
GLUCOSE SERPL-MCNC: 155 MG/DL (ref 65–140)
GLUCOSE SERPL-MCNC: 159 MG/DL (ref 65–140)
GLUCOSE SERPL-MCNC: 159 MG/DL (ref 65–140)
GLUCOSE SERPL-MCNC: 164 MG/DL (ref 65–140)
GLUCOSE SERPL-MCNC: 44 MG/DL (ref 65–140)
LACTATE SERPL-SCNC: 2 MMOL/L (ref 0.5–2)

## 2024-04-28 PROCEDURE — 94760 N-INVAS EAR/PLS OXIMETRY 1: CPT

## 2024-04-28 PROCEDURE — 94640 AIRWAY INHALATION TREATMENT: CPT

## 2024-04-28 PROCEDURE — 93005 ELECTROCARDIOGRAM TRACING: CPT

## 2024-04-28 PROCEDURE — 99232 SBSQ HOSP IP/OBS MODERATE 35: CPT | Performed by: PHYSICIAN ASSISTANT

## 2024-04-28 PROCEDURE — 83605 ASSAY OF LACTIC ACID: CPT | Performed by: PHYSICIAN ASSISTANT

## 2024-04-28 PROCEDURE — 82948 REAGENT STRIP/BLOOD GLUCOSE: CPT

## 2024-04-28 RX ORDER — KETOROLAC TROMETHAMINE 30 MG/ML
15 INJECTION, SOLUTION INTRAMUSCULAR; INTRAVENOUS ONCE
Status: COMPLETED | OUTPATIENT
Start: 2024-04-28 | End: 2024-04-28

## 2024-04-28 RX ORDER — DIPHENHYDRAMINE HYDROCHLORIDE 50 MG/ML
25 INJECTION INTRAMUSCULAR; INTRAVENOUS EVERY 6 HOURS PRN
Status: DISCONTINUED | OUTPATIENT
Start: 2024-04-28 | End: 2024-05-01 | Stop reason: HOSPADM

## 2024-04-28 RX ORDER — HYDROCODONE BITARTRATE AND HOMATROPINE METHYLBROMIDE ORAL SOLUTION 5; 1.5 MG/5ML; MG/5ML
5 LIQUID ORAL EVERY 4 HOURS PRN
Status: DISCONTINUED | OUTPATIENT
Start: 2024-04-28 | End: 2024-05-01 | Stop reason: HOSPADM

## 2024-04-28 RX ORDER — METHYLPREDNISOLONE SODIUM SUCCINATE 40 MG/ML
40 INJECTION, POWDER, LYOPHILIZED, FOR SOLUTION INTRAMUSCULAR; INTRAVENOUS EVERY 12 HOURS SCHEDULED
Status: DISCONTINUED | OUTPATIENT
Start: 2024-04-28 | End: 2024-05-01 | Stop reason: HOSPADM

## 2024-04-28 RX ORDER — METOCLOPRAMIDE HYDROCHLORIDE 5 MG/ML
10 INJECTION INTRAMUSCULAR; INTRAVENOUS ONCE
Status: COMPLETED | OUTPATIENT
Start: 2024-04-28 | End: 2024-04-28

## 2024-04-28 RX ORDER — ZOLPIDEM TARTRATE 5 MG/1
5 TABLET ORAL
Status: DISCONTINUED | OUTPATIENT
Start: 2024-04-28 | End: 2024-05-01 | Stop reason: HOSPADM

## 2024-04-28 RX ORDER — DIPHENHYDRAMINE HYDROCHLORIDE 50 MG/ML
25 INJECTION INTRAMUSCULAR; INTRAVENOUS ONCE
Status: COMPLETED | OUTPATIENT
Start: 2024-04-28 | End: 2024-04-28

## 2024-04-28 RX ORDER — METOCLOPRAMIDE HYDROCHLORIDE 5 MG/ML
10 INJECTION INTRAMUSCULAR; INTRAVENOUS EVERY 6 HOURS PRN
Status: DISCONTINUED | OUTPATIENT
Start: 2024-04-28 | End: 2024-04-30

## 2024-04-28 RX ORDER — LEVALBUTEROL INHALATION SOLUTION 1.25 MG/3ML
1.25 SOLUTION RESPIRATORY (INHALATION)
Status: DISCONTINUED | OUTPATIENT
Start: 2024-04-28 | End: 2024-05-01 | Stop reason: HOSPADM

## 2024-04-28 RX ADMIN — GABAPENTIN 100 MG: 100 CAPSULE ORAL at 08:01

## 2024-04-28 RX ADMIN — INSULIN LISPRO 3 UNITS: 100 INJECTION, SOLUTION INTRAVENOUS; SUBCUTANEOUS at 11:21

## 2024-04-28 RX ADMIN — IPRATROPIUM BROMIDE 0.5 MG: 0.5 SOLUTION RESPIRATORY (INHALATION) at 07:24

## 2024-04-28 RX ADMIN — METHYLPREDNISOLONE SODIUM SUCCINATE 40 MG: 40 INJECTION, POWDER, FOR SOLUTION INTRAMUSCULAR; INTRAVENOUS at 05:55

## 2024-04-28 RX ADMIN — NYSTATIN 500000 UNITS: 100000 SUSPENSION ORAL at 08:00

## 2024-04-28 RX ADMIN — PANTOPRAZOLE SODIUM 40 MG: 40 TABLET, DELAYED RELEASE ORAL at 08:00

## 2024-04-28 RX ADMIN — METOCLOPRAMIDE 10 MG: 5 INJECTION, SOLUTION INTRAMUSCULAR; INTRAVENOUS at 22:37

## 2024-04-28 RX ADMIN — GUAIFENESIN 600 MG: 600 TABLET, EXTENDED RELEASE ORAL at 21:52

## 2024-04-28 RX ADMIN — METOCLOPRAMIDE 10 MG: 5 INJECTION, SOLUTION INTRAMUSCULAR; INTRAVENOUS at 05:55

## 2024-04-28 RX ADMIN — KETOROLAC TROMETHAMINE 15 MG: 30 INJECTION, SOLUTION INTRAMUSCULAR; INTRAVENOUS at 22:37

## 2024-04-28 RX ADMIN — PRAVASTATIN SODIUM 40 MG: 40 TABLET ORAL at 15:56

## 2024-04-28 RX ADMIN — ACETAMINOPHEN 650 MG: 325 TABLET, FILM COATED ORAL at 19:35

## 2024-04-28 RX ADMIN — PANTOPRAZOLE SODIUM 40 MG: 40 TABLET, DELAYED RELEASE ORAL at 19:01

## 2024-04-28 RX ADMIN — INSULIN LISPRO 1 UNITS: 100 INJECTION, SOLUTION INTRAVENOUS; SUBCUTANEOUS at 08:00

## 2024-04-28 RX ADMIN — NYSTATIN 500000 UNITS: 100000 SUSPENSION ORAL at 19:01

## 2024-04-28 RX ADMIN — HYDROXYZINE HYDROCHLORIDE 25 MG: 25 TABLET ORAL at 07:59

## 2024-04-28 RX ADMIN — NYSTATIN 500000 UNITS: 100000 SUSPENSION ORAL at 21:52

## 2024-04-28 RX ADMIN — Medication 5 ML: at 11:19

## 2024-04-28 RX ADMIN — GUAIFENESIN 600 MG: 600 TABLET, EXTENDED RELEASE ORAL at 08:00

## 2024-04-28 RX ADMIN — FAMOTIDINE 40 MG: 20 TABLET, FILM COATED ORAL at 08:00

## 2024-04-28 RX ADMIN — Medication 5 ML: at 19:34

## 2024-04-28 RX ADMIN — BUDESONIDE AND FORMOTEROL FUMARATE DIHYDRATE 2 PUFF: 160; 4.5 AEROSOL RESPIRATORY (INHALATION) at 19:01

## 2024-04-28 RX ADMIN — FLUTICASONE FUROATE AND VILANTEROL TRIFENATATE 1 PUFF: 200; 25 POWDER RESPIRATORY (INHALATION) at 08:01

## 2024-04-28 RX ADMIN — ACETAMINOPHEN 650 MG: 325 TABLET, FILM COATED ORAL at 07:59

## 2024-04-28 RX ADMIN — GABAPENTIN 100 MG: 100 CAPSULE ORAL at 21:52

## 2024-04-28 RX ADMIN — BUPROPION HYDROCHLORIDE 150 MG: 150 TABLET, EXTENDED RELEASE ORAL at 08:01

## 2024-04-28 RX ADMIN — GABAPENTIN 100 MG: 100 CAPSULE ORAL at 15:55

## 2024-04-28 RX ADMIN — Medication 4 G: at 15:09

## 2024-04-28 RX ADMIN — CEFTRIAXONE SODIUM 1000 MG: 10 INJECTION, POWDER, FOR SOLUTION INTRAVENOUS at 15:11

## 2024-04-28 RX ADMIN — DIPHENHYDRAMINE HYDROCHLORIDE 25 MG: 50 INJECTION, SOLUTION INTRAMUSCULAR; INTRAVENOUS at 05:55

## 2024-04-28 RX ADMIN — BUSPIRONE HYDROCHLORIDE 10 MG: 5 TABLET ORAL at 08:00

## 2024-04-28 RX ADMIN — BUDESONIDE AND FORMOTEROL FUMARATE DIHYDRATE 2 PUFF: 160; 4.5 AEROSOL RESPIRATORY (INHALATION) at 08:01

## 2024-04-28 RX ADMIN — INSULIN LISPRO 1 UNITS: 100 INJECTION, SOLUTION INTRAVENOUS; SUBCUTANEOUS at 11:22

## 2024-04-28 RX ADMIN — INSULIN LISPRO 3 UNITS: 100 INJECTION, SOLUTION INTRAVENOUS; SUBCUTANEOUS at 08:02

## 2024-04-28 RX ADMIN — HYDROCODONE BITARTRATE AND HOMATROPINE METHYLBROMIDE ORAL SOLUTION 5 ML: 5; 1.5 LIQUID ORAL at 04:12

## 2024-04-28 RX ADMIN — METHYLPREDNISOLONE SODIUM SUCCINATE 40 MG: 40 INJECTION, POWDER, FOR SOLUTION INTRAMUSCULAR; INTRAVENOUS at 22:34

## 2024-04-28 RX ADMIN — LEVALBUTEROL HYDROCHLORIDE 1.25 MG: 1.25 SOLUTION RESPIRATORY (INHALATION) at 07:24

## 2024-04-28 RX ADMIN — NYSTATIN 500000 UNITS: 100000 SUSPENSION ORAL at 11:19

## 2024-04-28 RX ADMIN — DIPHENHYDRAMINE HYDROCHLORIDE 25 MG: 50 INJECTION, SOLUTION INTRAMUSCULAR; INTRAVENOUS at 22:38

## 2024-04-28 RX ADMIN — BUSPIRONE HYDROCHLORIDE 10 MG: 5 TABLET ORAL at 19:01

## 2024-04-28 RX ADMIN — FAMOTIDINE 40 MG: 20 TABLET, FILM COATED ORAL at 19:01

## 2024-04-28 NOTE — RESPIRATORY THERAPY NOTE
RT Protocol Note  Lidya Ansari 59 y.o. female MRN: [id number]  Unit/Bed#: -01 Encounter: [id number]    Assessment    Principal Problem:    SIRS (systemic inflammatory response syndrome) ([facility])  Active Problems:    Severe persistent asthma with exacerbation    Hyperlipidemia    Type 2 diabetes mellitus without complication, with long-term current use of insulin (HCC)    GERD (gastroesophageal reflux disease)    Depression with anxiety    Migraine without status migrainosus, not intractable      Home Pulmonary Medications:     04/28/24 1309   Respiratory Protocol   Protocol Initiated? No   Protocol Selection Respiratory   Language Barrier? No   Medical & Social History Reviewed? Yes   Diagnostic Studies Reviewed? Yes   Physical Assessment Performed? Yes   Respiratory Plan No distress/Pulmonary history   Respiratory Assessment   Resp Comments patient is refusing the afternoon nebulizer dose, and is requesting the nebs be changed to BID   Additional Assessments   Pulse 79   SpO2 93 %            Past Medical History:   Diagnosis Date    Asthma     Diabetes mellitus (HCC)     GERD (gastroesophageal reflux disease)     Hyperlipidemia     Hypertension      Social History     Socioeconomic History    Marital status: /Civil Union     Spouse name: None    Number of children: None    Years of education: None    Highest education level: None   Occupational History    None   Tobacco Use    Smoking status: Never    Smokeless tobacco: Never   Vaping Use    Vaping status: Never Used   Substance and Sexual Activity    Alcohol use: Yes     Alcohol/week: 7.0 standard drinks of alcohol     Types: 7 Glasses of wine per week    Drug use: Never    Sexual activity: None   Other Topics Concern    None   Social History Narrative    None     Social Determinants of Health     Financial Resource Strain: Not on file   Food Insecurity: No Food Insecurity (4/26/2024)    Hunger Vital Sign     Worried About Running Out of Food in the  "Last Year: Never true     Ran Out of Food in the Last Year: Never true   Transportation Needs: No Transportation Needs (4/26/2024)    PRAPARE - Transportation     Lack of Transportation (Medical): No     Lack of Transportation (Non-Medical): No   Physical Activity: Not on file   Stress: Not on file   Social Connections: Not on file   Intimate Partner Violence: Not on file   Housing Stability: Low Risk  (4/26/2024)    Housing Stability Vital Sign     Unable to Pay for Housing in the Last Year: No     Number of Places Lived in the Last Year: 1     Unstable Housing in the Last Year: No       Subjective         Objective    Physical Exam:   Assessment Type: Pre-treatment  General Appearance: Alert, Awake  Respiratory Pattern: Normal  Chest Assessment: Chest expansion symmetrical  Bilateral Breath Sounds: Expiratory wheezes  O2 Device: ra    Vitals:  Blood pressure 126/86, pulse 79, temperature 97.9 °F (36.6 °C), resp. rate 18, height 4' 11\" (1.499 m), weight 81.1 kg (178 lb 12.7 oz), SpO2 93%.          Imaging and other studies: I have personally reviewed pertinent reports.      O2 Device: ra     Plan    Respiratory Plan: No distress/Pulmonary history        Resp Comments: patient is refusing the afternoon nebulizer dose, and is requesting the nebs be changed to BID   "

## 2024-04-28 NOTE — PROGRESS NOTES
[facility]  Progress Note  Name: Lidya Ansari I MRN: [id number]  Unit/Bed#: -01I Date of Admission: 4/26/2024   Date of Service: 4/26/2024  I Hospital Day: 1    * SIRS (systemic inflammatory response syndrome) ([facility])  Assessment & Plan  Present on admission: tachycardia, tachypnea due to asthma exacerbation  Rule out pneumonia as source, continue IV ceftriaxone   Initial lactate 3.1, procal 0.08, WBC 12.52  Treatment as below    Severe persistent asthma with exacerbation  Assessment & Plan  Presents with report of 2 weeks worsening cough/shortness of breath with wheezing; seen in ER 2 weeks ago, discharged home after nebulizer treatment with oral steroid course. Sick contact at home,  with URI symptoms.     CXR: bilateral infiltrates + atelectasis  Flu/COVID/RSV negative, consider RP2  Current O2 saturation >88% on room air, monitor   Continue scheduled nebs, respiratory protocol  IV Solu-Medrol 40 mg q8h  Mucinex, hycodan prn  Encouraged ISB 10x per hour while awake, ambulation  Consider pulmonology consultation    Lactic acidosis-resolved as of 4/28/2024  Assessment & Plan  Present on admission, 3.1; climbing to 6.9, now resolved  Suspect type B lactic acidosis secondary to nebulizer use in setting of asthma exacerbation   No evidence of end-organ damage, VSS    Type 2 diabetes mellitus without complication, with long-term current use of insulin ([facility])  Assessment & Plan  Blood Sugar Average: Last 72 hrs: (P) 164.75    Controlled a/e/b A1c 4.8%  Hold Ozempic during hospitalization  Continue Humalog 3 units 3 times daily  SSI #2  Diabetic diet level 2  Blood glucose monitoring [facility]  Hypoglycemia protocol  Adjust as needed    Hyperkalemia-resolved as of 4/27/2024  Assessment & Plan  Noted to be hyperkalemic on arrival with potassium 5.7  Resolved with appropriate treatment, will monitor     Migraine without status migrainosus, not intractable  Assessment & Plan  Reports migraine  "- photosensitive, trying ice without relief  s/p migraine cocktail    Depression with anxiety  Assessment & Plan  Continue home Wellbutrin.    GERD (gastroesophageal reflux disease)  Assessment & Plan  Continue Pepcid with PPI.    Hyperlipidemia  Assessment & Plan  Chronic, continue statin.          VTE Pharmacologic Prophylaxis: VTE Score: 2 Low Risk (Score 0-2) - Encourage Ambulation.    Mobility:   Basic Mobility Inpatient Raw Score: 23  JH-HLM Goal: 7: Walk 25 feet or more  JH-HLM Achieved: 7: Walk 25 feet or more  JH-HLM Goal achieved. Continue to encourage appropriate mobility.    Patient Centered Rounds: I performed bedside rounds with nursing staff today.   Discussions with Specialists or Other Care Team Provider:      Education and Discussions with Family / Patient: Patient declined call to .     Total Time Spent on Date of Encounter in care of patient: 40 mins. This time was spent on one or more of the following: performing physical exam; counseling and coordination of care; obtaining or reviewing history; documenting in the medical record; reviewing/ordering tests, medications or procedures; communicating with other healthcare professionals and discussing with patient's family/caregivers.    Current Length of Stay: 1 day(s)  Current Patient Status: Inpatient   Certification Statement: The patient will continue to require additional inpatient hospital stay due to ongoing asthma exacerbation with need for IV steroids   Discharge Plan: Anticipate discharge in 24-48 hrs to home.    Code Status: Level 1 - Full Code    Subjective:   Seen this morning receiving nebulizer treatment. Reports 2 or so hours of sleep, \"which is like 7 hours for me\". Breathing is better, anxiety is better controlled. Discussed current treatment plan with intent to decreased steroids to twice daily, and possibly transition to oral regimen tomorrow with discharge to home in afternoon. Lidya is agreeable to this plan. "     States migraine is much better, mood is much better, was upset regarding ambien yesterday but ended up not even taking the medication and was still able to catch some sleep.     Requesting increase in hycodan frequency to q4h.    Objective:     Vitals:   Temp (24hrs), Av.8 °F (36.6 °C), Min:97.6 °F (36.4 °C), Max:98.4 °F (36.9 °C)    Temp:  [97.6 °F (36.4 °C)-98.4 °F (36.9 °C)] 98.4 °F (36.9 °C)  HR:  [] 92  Resp:  [17] 17  BP: (109-158)/(67-75) 116/69  SpO2:  [93 %-98 %] 96 %  Body mass index is 36.11 kg/m².     Input and Output Summary (last 24 hours):     Intake/Output Summary (Last 24 hours) at 2024 0648  Last data filed at 2024 204  Gross per 24 hour   Intake 360 ml   Output --   Net 360 ml       Physical Exam:   Physical Exam  Vitals and nursing note reviewed.   Constitutional:       General: She is awake. She is not in acute distress.     Appearance: Normal appearance. She is well-developed and overweight. She is not ill-appearing or toxic-appearing.   Cardiovascular:      Rate and Rhythm: Normal rate and regular rhythm.      Heart sounds: Normal heart sounds.   Pulmonary:      Effort: Pulmonary effort is normal. No accessory muscle usage or respiratory distress.      Breath sounds: Wheezing (expiratory) and rhonchi (minimal, much improved) present. No rales.   Abdominal:      General: Bowel sounds are normal. There is no distension.      Palpations: Abdomen is soft.      Tenderness: There is no abdominal tenderness.   Skin:     General: Skin is warm and dry.      Coloration: Skin is not pale.   Neurological:      Mental Status: She is alert and oriented to person, place, and time.   Psychiatric:         Mood and Affect: Mood normal.         Behavior: Behavior normal. Behavior is cooperative.           Additional Data:     Labs:  Results from last 7 days   Lab Units 24  0616 24  1327   WBC Thousand/uL 17.54* 12.52*   HEMOGLOBIN g/dL 13.7 14.5   HEMATOCRIT % 41.8 44.2    PLATELETS Thousands/uL 436* 412*   SEGS PCT %  --  56   LYMPHO PCT %  --  33   MONO PCT %  --  8   EOS PCT %  --  2     Results from last 7 days   Lab Units 04/27/24  0533 04/26/24  1741 04/26/24  1424   SODIUM mmol/L 136   < > 136   POTASSIUM mmol/L 4.4   < > 5.7*   CHLORIDE mmol/L 104   < > 104   CO2 mmol/L 22   < > 20*   BUN mg/dL 9   < > 10   CREATININE mg/dL 0.78   < > 0.71   ANION GAP mmol/L 10   < > 12   CALCIUM mg/dL 9.3   < > 8.7   ALBUMIN g/dL  --   --  3.8   TOTAL BILIRUBIN mg/dL  --   --  0.75   ALK PHOS U/L  --   --  137*   ALT U/L  --   --  19   AST U/L  --   --  32   GLUCOSE RANDOM mg/dL 137   < > 136    < > = values in this interval not displayed.     Results from last 7 days   Lab Units 04/26/24  1424   INR  1.01     Results from last 7 days   Lab Units 04/27/24  2048 04/27/24  1806 04/27/24  1633 04/27/24  1137 04/27/24  0740 04/26/24  2126 04/26/24  1832 04/26/24  1453   POC GLUCOSE mg/dl 143* 153* 129 161* 163* 175* 264* 130     Results from last 7 days   Lab Units 04/27/24  0533   HEMOGLOBIN A1C % 4.8     Results from last 7 days   Lab Units 04/28/24  0547 04/27/24  1941 04/27/24  1506 04/27/24  0753 04/26/24  1741 04/26/24  1424   LACTIC ACID mmol/L 2.0 2.8* 2.7* 3.9*   < > 3.1*   PROCALCITONIN ng/ml  --   --   --   --   --  0.08    < > = values in this interval not displayed.       Lines/Drains:  Invasive Devices       Peripheral Intravenous Line  Duration             Peripheral IV 04/27/24 Distal;Left;Upper;Ventral (anterior) Arm 1 day                          Imaging: No pertinent imaging reviewed.    Recent Cultures (last 7 days):   Results from last 7 days   Lab Units 04/26/24  1425 04/26/24  1424   BLOOD CULTURE  No Growth at 24 hrs. No Growth at 24 hrs.       Last 24 Hours Medication List:   Current Facility-Administered Medications   Medication Dose Route Frequency Provider Last Rate    acetaminophen  650 mg Oral Q6H PRN EDUARDA Virk      budesonide-formoterol  2  puff Inhalation BID Mana Bowman MD      buPROPion  150 mg Oral Daily Mana Bowman MD      busPIRone  10 mg Oral BID Dorothy Barajas PA-C      cefTRIAXone  1,000 mg Intravenous Q24H Dorothy Barajas PA-C 1,000 mg (04/27/24 1545)    diphenhydrAMINE  25 mg Intravenous Q8H [facility] Dorothy Barajas PA-C      famotidine  40 mg Oral BID Mana Bowman MD      fluticasone-vilanterol  1 puff Inhalation Daily Mana Bowman MD      gabapentin  100 mg Oral TID Mana Bowman MD      guaiFENesin  600 mg Oral Q12H [facility] Mana Bowman MD      HYDROcodone Bit-Homatrop MBr  5 mL Oral Q6H PRN Mana Bowman MD      hydrOXYzine HCL  25 mg Oral Q6H PRN Dorothy Barajas PA-C      insulin lispro  1-5 Units Subcutaneous TID AC Mana Bowman MD      insulin lispro  3 Units Subcutaneous TID With Meals Mana Bowman MD      ipratropium  0.5 mg Nebulization TID Mana Bowman MD      levalbuterol  1.25 mg Nebulization TID Mana Bowman MD      levalbuterol  1.25 mg Nebulization Q6H PRN Harry Griffith MD      methylPREDNISolone sodium succinate  40 mg Intravenous Q8H [facility] Mana Bowman MD      metoclopramide  10 mg Intravenous Q8H [facility] Dorothy Barajas PA-C      nystatin  500,000 Units Swish & Swallow 4x Daily Dorothy Barajas PA-C      pantoprazole  40 mg Oral BID Mana Bowman MD      pravastatin  40 mg Oral Daily With Dinner Mana Bowman MD      SUMAtriptan  6 mg Subcutaneous Q1H PRN Dorothy Barajas PA-C      zolpidem  5 mg Oral Once Steve Graham Garcia MD          Today, Patient Was Seen By: Dorothy Barajas PA-C    **Please Note: This note may have been constructed using a voice recognition system.**

## 2024-04-28 NOTE — RESPIRATORY THERAPY NOTE
04/28/24 1900   Respiratory Protocol   Protocol Initiated? No   Protocol Selection Respiratory   Language Barrier? No   Medical & Social History Reviewed? Yes   Diagnostic Studies Reviewed? Yes   Respiratory Plan No distress/Pulmonary history   Respiratory Assessment   General Appearance Alert;Awake   Respiratory Pattern Normal   Chest Assessment Chest expansion symmetrical   Resp Comments patient refused scheduled tx at this time.

## 2024-04-28 NOTE — RESPIRATORY THERAPY NOTE
04/27/24 2037   Respiratory Protocol   Protocol Initiated? No   Protocol Selection Respiratory   Language Barrier? No   Medical & Social History Reviewed? Yes   Diagnostic Studies Reviewed? Yes   Physical Assessment Performed? Yes   Respiratory Plan No distress/Pulmonary history   Respiratory Assessment   General Appearance Awake;Alert   Respiratory Pattern Normal   Chest Assessment Chest expansion symmetrical   Bilateral Breath Sounds Diminished   Cough Non-productive;Dry;Strong   Resp Comments pt asked me to listen to her lungs before agreeing to take aerosol therapy, since she is not wheezing she would like to pass on this nebulizer and call for a PRN later if needed, she feels like the medications make her too anxious   O2 Device room air   Additional Assessments   SpO2 95 %

## 2024-04-28 NOTE — NURSING NOTE
Pt c/o of not feeling well, c/o of sudden headache and a feeling of malaise. VS WNL,  at 1412, blood repeated at 1503 as per pt request and BS 44 at this time. Glucose tablet given, pt rechecked at 15:39  at this time. Slim notified. RN made aware.

## 2024-04-29 ENCOUNTER — APPOINTMENT (INPATIENT)
Dept: RADIOLOGY | Facility: HOSPITAL | Age: 59
DRG: 202 | End: 2024-04-29
Payer: MEDICARE

## 2024-04-29 LAB
ANION GAP SERPL CALCULATED.3IONS-SCNC: 8 MMOL/L (ref 4–13)
ATRIAL RATE: 84 BPM
ATRIAL RATE: 91 BPM
BUN SERPL-MCNC: 18 MG/DL (ref 5–25)
CALCIUM SERPL-MCNC: 8.7 MG/DL (ref 8.4–10.2)
CHLORIDE SERPL-SCNC: 105 MMOL/L (ref 96–108)
CO2 SERPL-SCNC: 25 MMOL/L (ref 21–32)
CREAT SERPL-MCNC: 0.67 MG/DL (ref 0.6–1.3)
ERYTHROCYTE [DISTWIDTH] IN BLOOD BY AUTOMATED COUNT: 13.6 % (ref 11.6–15.1)
GFR SERPL CREATININE-BSD FRML MDRD: 97 ML/MIN/1.73SQ M
GLUCOSE SERPL-MCNC: 115 MG/DL (ref 65–140)
GLUCOSE SERPL-MCNC: 134 MG/DL (ref 65–140)
GLUCOSE SERPL-MCNC: 138 MG/DL (ref 65–140)
GLUCOSE SERPL-MCNC: 141 MG/DL (ref 65–140)
GLUCOSE SERPL-MCNC: 93 MG/DL (ref 65–140)
HCT VFR BLD AUTO: 40.4 % (ref 34.8–46.1)
HGB BLD-MCNC: 13.3 G/DL (ref 11.5–15.4)
MAGNESIUM SERPL-MCNC: 2.3 MG/DL (ref 1.9–2.7)
MCH RBC QN AUTO: 32.4 PG (ref 26.8–34.3)
MCHC RBC AUTO-ENTMCNC: 32.9 G/DL (ref 31.4–37.4)
MCV RBC AUTO: 98 FL (ref 82–98)
P AXIS: 53 DEGREES
P AXIS: 74 DEGREES
PLATELET # BLD AUTO: 432 THOUSANDS/UL (ref 149–390)
PMV BLD AUTO: 10.6 FL (ref 8.9–12.7)
POTASSIUM SERPL-SCNC: 4 MMOL/L (ref 3.5–5.3)
PR INTERVAL: 120 MS
PR INTERVAL: 124 MS
PROCALCITONIN SERPL-MCNC: 0.06 NG/ML
QRS AXIS: 0 DEGREES
QRS AXIS: 5 DEGREES
QRSD INTERVAL: 58 MS
QRSD INTERVAL: 62 MS
QT INTERVAL: 380 MS
QT INTERVAL: 384 MS
QTC INTERVAL: 449 MS
QTC INTERVAL: 472 MS
RBC # BLD AUTO: 4.11 MILLION/UL (ref 3.81–5.12)
SODIUM SERPL-SCNC: 138 MMOL/L (ref 135–147)
T WAVE AXIS: 12 DEGREES
T WAVE AXIS: 3 DEGREES
VENTRICULAR RATE: 84 BPM
VENTRICULAR RATE: 91 BPM
WBC # BLD AUTO: 16 THOUSAND/UL (ref 4.31–10.16)

## 2024-04-29 PROCEDURE — 94640 AIRWAY INHALATION TREATMENT: CPT

## 2024-04-29 PROCEDURE — 99223 1ST HOSP IP/OBS HIGH 75: CPT | Performed by: INTERNAL MEDICINE

## 2024-04-29 PROCEDURE — 99232 SBSQ HOSP IP/OBS MODERATE 35: CPT | Performed by: PHYSICIAN ASSISTANT

## 2024-04-29 PROCEDURE — 71046 X-RAY EXAM CHEST 2 VIEWS: CPT

## 2024-04-29 PROCEDURE — 85027 COMPLETE CBC AUTOMATED: CPT | Performed by: PHYSICIAN ASSISTANT

## 2024-04-29 PROCEDURE — 94664 DEMO&/EVAL PT USE INHALER: CPT

## 2024-04-29 PROCEDURE — 84145 PROCALCITONIN (PCT): CPT | Performed by: PHYSICIAN ASSISTANT

## 2024-04-29 PROCEDURE — 83735 ASSAY OF MAGNESIUM: CPT | Performed by: PHYSICIAN ASSISTANT

## 2024-04-29 PROCEDURE — 82948 REAGENT STRIP/BLOOD GLUCOSE: CPT

## 2024-04-29 PROCEDURE — 80048 BASIC METABOLIC PNL TOTAL CA: CPT | Performed by: PHYSICIAN ASSISTANT

## 2024-04-29 PROCEDURE — 93010 ELECTROCARDIOGRAM REPORT: CPT | Performed by: INTERNAL MEDICINE

## 2024-04-29 PROCEDURE — 94760 N-INVAS EAR/PLS OXIMETRY 1: CPT

## 2024-04-29 RX ORDER — DIPHENHYDRAMINE HYDROCHLORIDE 50 MG/ML
25 INJECTION INTRAMUSCULAR; INTRAVENOUS ONCE
Status: COMPLETED | OUTPATIENT
Start: 2024-04-29 | End: 2024-04-29

## 2024-04-29 RX ORDER — MONTELUKAST SODIUM 10 MG/1
10 TABLET ORAL
Status: DISCONTINUED | OUTPATIENT
Start: 2024-04-29 | End: 2024-05-01 | Stop reason: HOSPADM

## 2024-04-29 RX ORDER — FAMOTIDINE 10 MG/ML
20 INJECTION, SOLUTION INTRAVENOUS ONCE
Status: COMPLETED | OUTPATIENT
Start: 2024-04-29 | End: 2024-04-29

## 2024-04-29 RX ADMIN — MONTELUKAST 10 MG: 10 TABLET, FILM COATED ORAL at 21:28

## 2024-04-29 RX ADMIN — PANTOPRAZOLE SODIUM 40 MG: 40 TABLET, DELAYED RELEASE ORAL at 08:58

## 2024-04-29 RX ADMIN — METHYLPREDNISOLONE SODIUM SUCCINATE 40 MG: 40 INJECTION, POWDER, FOR SOLUTION INTRAMUSCULAR; INTRAVENOUS at 08:58

## 2024-04-29 RX ADMIN — IPRATROPIUM BROMIDE 0.5 MG: 0.5 SOLUTION RESPIRATORY (INHALATION) at 07:09

## 2024-04-29 RX ADMIN — DIPHENHYDRAMINE HYDROCHLORIDE 25 MG: 50 INJECTION, SOLUTION INTRAMUSCULAR; INTRAVENOUS at 13:16

## 2024-04-29 RX ADMIN — ZOLPIDEM TARTRATE 5 MG: 5 TABLET, COATED ORAL at 21:28

## 2024-04-29 RX ADMIN — CEFTRIAXONE SODIUM 1000 MG: 10 INJECTION, POWDER, FOR SOLUTION INTRAVENOUS at 15:19

## 2024-04-29 RX ADMIN — BUSPIRONE HYDROCHLORIDE 10 MG: 5 TABLET ORAL at 08:57

## 2024-04-29 RX ADMIN — GABAPENTIN 100 MG: 100 CAPSULE ORAL at 08:58

## 2024-04-29 RX ADMIN — Medication 5 ML: at 16:38

## 2024-04-29 RX ADMIN — Medication 5 ML: at 23:57

## 2024-04-29 RX ADMIN — BUDESONIDE AND FORMOTEROL FUMARATE DIHYDRATE 2 PUFF: 160; 4.5 AEROSOL RESPIRATORY (INHALATION) at 17:10

## 2024-04-29 RX ADMIN — GABAPENTIN 100 MG: 100 CAPSULE ORAL at 15:23

## 2024-04-29 RX ADMIN — Medication 5 ML: at 04:58

## 2024-04-29 RX ADMIN — NYSTATIN 500000 UNITS: 100000 SUSPENSION ORAL at 21:27

## 2024-04-29 RX ADMIN — BUDESONIDE AND FORMOTEROL FUMARATE DIHYDRATE 2 PUFF: 160; 4.5 AEROSOL RESPIRATORY (INHALATION) at 09:19

## 2024-04-29 RX ADMIN — PRAVASTATIN SODIUM 40 MG: 40 TABLET ORAL at 16:37

## 2024-04-29 RX ADMIN — GUAIFENESIN 600 MG: 600 TABLET, EXTENDED RELEASE ORAL at 21:29

## 2024-04-29 RX ADMIN — LEVALBUTEROL HYDROCHLORIDE 1.25 MG: 1.25 SOLUTION RESPIRATORY (INHALATION) at 20:00

## 2024-04-29 RX ADMIN — PANTOPRAZOLE SODIUM 40 MG: 40 TABLET, DELAYED RELEASE ORAL at 17:11

## 2024-04-29 RX ADMIN — IPRATROPIUM BROMIDE 0.5 MG: 0.5 SOLUTION RESPIRATORY (INHALATION) at 20:00

## 2024-04-29 RX ADMIN — NYSTATIN 500000 UNITS: 100000 SUSPENSION ORAL at 17:11

## 2024-04-29 RX ADMIN — NYSTATIN 500000 UNITS: 100000 SUSPENSION ORAL at 12:30

## 2024-04-29 RX ADMIN — FAMOTIDINE 20 MG: 10 INJECTION, SOLUTION INTRAVENOUS at 13:16

## 2024-04-29 RX ADMIN — METOCLOPRAMIDE 10 MG: 5 INJECTION, SOLUTION INTRAMUSCULAR; INTRAVENOUS at 09:13

## 2024-04-29 RX ADMIN — INSULIN LISPRO 3 UNITS: 100 INJECTION, SOLUTION INTRAVENOUS; SUBCUTANEOUS at 08:55

## 2024-04-29 RX ADMIN — ZOLPIDEM TARTRATE 5 MG: 5 TABLET, COATED ORAL at 00:11

## 2024-04-29 RX ADMIN — FAMOTIDINE 40 MG: 20 TABLET, FILM COATED ORAL at 08:57

## 2024-04-29 RX ADMIN — GABAPENTIN 100 MG: 100 CAPSULE ORAL at 21:27

## 2024-04-29 RX ADMIN — NYSTATIN 500000 UNITS: 100000 SUSPENSION ORAL at 08:56

## 2024-04-29 RX ADMIN — BUSPIRONE HYDROCHLORIDE 10 MG: 5 TABLET ORAL at 17:11

## 2024-04-29 RX ADMIN — LEVALBUTEROL HYDROCHLORIDE 1.25 MG: 1.25 SOLUTION RESPIRATORY (INHALATION) at 07:09

## 2024-04-29 RX ADMIN — ACETAMINOPHEN 650 MG: 325 TABLET, FILM COATED ORAL at 09:13

## 2024-04-29 RX ADMIN — METHYLPREDNISOLONE SODIUM SUCCINATE 40 MG: 40 INJECTION, POWDER, FOR SOLUTION INTRAMUSCULAR; INTRAVENOUS at 21:27

## 2024-04-29 RX ADMIN — DIPHENHYDRAMINE HYDROCHLORIDE 25 MG: 50 INJECTION, SOLUTION INTRAMUSCULAR; INTRAVENOUS at 09:15

## 2024-04-29 RX ADMIN — FAMOTIDINE 40 MG: 20 TABLET, FILM COATED ORAL at 17:11

## 2024-04-29 RX ADMIN — SUMATRIPTAN 6 MG: 6 INJECTION, SOLUTION SUBCUTANEOUS at 12:42

## 2024-04-29 RX ADMIN — BUPROPION HYDROCHLORIDE 150 MG: 150 TABLET, EXTENDED RELEASE ORAL at 08:57

## 2024-04-29 RX ADMIN — GUAIFENESIN 600 MG: 600 TABLET, EXTENDED RELEASE ORAL at 08:57

## 2024-04-29 NOTE — PLAN OF CARE
Problem: INFECTION - ADULT  Goal: Absence or prevention of progression during hospitalization  Description: INTERVENTIONS:  - Assess and monitor for signs and symptoms of infection  - Monitor lab/diagnostic results  - Monitor all insertion sites, i.e. indwelling lines, tubes, and drains  - Monitor endotracheal if appropriate and nasal secretions for changes in amount and color  - [place] appropriate cooling/warming therapies per order  - Administer medications as ordered  - Instruct and encourage patient and family to use good hand hygiene technique  - Identify and instruct in appropriate isolation precautions for identified infection/condition  Outcome: Progressing     Problem: DISCHARGE PLANNING  Goal: Discharge to home or other facility with appropriate resources  Description: INTERVENTIONS:  - Identify barriers to discharge w/patient and caregiver  - Arrange for needed discharge resources and transportation as appropriate  - Identify discharge learning needs (meds, wound care, etc.)  - Arrange for interpretive services to assist at discharge as needed  - Refer to Case Management Department for coordinating discharge planning if the patient needs post-hospital services based on physician/advanced practitioner order or complex needs related to functional status, cognitive ability, or social support system  Outcome: Progressing     Problem: Knowledge Deficit  Goal: Patient/family/caregiver demonstrates understanding of disease process, treatment plan, medications, and discharge instructions  Description: Complete learning assessment and assess knowledge base.  Interventions:  - Provide teaching at level of understanding  - Provide teaching via preferred learning methods  Outcome: Progressing

## 2024-04-29 NOTE — NURSING NOTE
Patient requested Imitrex due to having migraine. Imitrex subcu given, about five minutes later patient stated she was having numbess, throat feeling weird, and some blurred vision. SLIM made aware. Benadryl and pepcid IV given. Patients vitals are stable. Still having a bit of blurred vision from side effect of Imitrex.

## 2024-04-29 NOTE — CONSULTS
Consultation - Pulmonary Medicine   Lidya Ansari 59 y.o. female MRN: [id number]  Unit/Bed#: -01 Encounter: [id number]      Assessment:  Severe persistent asthma with acute exacerbation  Likely viral upper respiratory infection  GERD, Schatzki's ring status post dilation in March    Plan:   Prescription is asthma acute exacerbation likely in the setting of viral URI-son and  are both sick at home as well  Chest x-ray done on presentation shows some bibasilar consolidation/atelectasis  Repeat chest x-ray this morning looks okay  Procalcitonin initially was normal, repeat pending this morning  Do not think she has any bacterial infection - would consider monitoring off antibiotics  She has overall improved significantly since prior to admission though improvement has stalled  Continue Solu-Medrol 40 every 12 hours for today  Continue Symbicort twice per day with Xopenex and Atrovent, restart Singulair  Had a recent EGD with dilation of Schatzki's ring, GERD has been under good control recently  She had been on Tezspire in the past but has not been off of this for over a year, has not followed up with her pulmonologist regarding starting this again  Do think she would be likely stable for discharge home tomorrow, can follow-up with her pulmonologist upon discharge    History of Present Illness   Physician Requesting Consult: Harry Griffith MD  Reason for Consult / Principal Problem: asthma exacerbation  Hx and PE limited by: None  HPI: Lidya Ansari is a 59 y.o. year old female with past medical history of severe persistent asthma, diabetes, hypertension, GERD who presents with complaint of cough and shortness of breath over the last several days.  Both her  and son are sick with viral illnesses.  She has continued with her Symbicort twice per day, Singulair, albuterol as needed.  She is currently feeling much improved from prior to her admission.    She has had multiple hospitalizations for her  asthma over the last year.  Most recently was here in January with COVID.  She had been on Tezspire in the past although did still have exacerbations on this.  [place] allergy panel done in November was negative, eosinophils have not been significantly elevated.    Inpatient consult to Pulmonology  Consult performed by: Jairo Cobb PA-C  Consult ordered by: Dorothy Barajas PA-C          Review of Systems   Constitutional: Negative.    HENT: Negative.     Respiratory:  Positive for cough, shortness of breath and wheezing.    Cardiovascular: Negative.    Gastrointestinal: Negative.    Genitourinary: Negative.    Musculoskeletal: Negative.    Skin: Negative.    Allergic/Immunologic: Negative.    Neurological: Negative.    Psychiatric/Behavioral: Negative.         Historical Information   Past Medical History:   Diagnosis Date    Asthma     Diabetes mellitus (HCC)     GERD (gastroesophageal reflux disease)     Hyperlipidemia     Hypertension      Past Surgical History:   Procedure Laterality Date     SECTION      COLONOSCOPY      HYSTERECTOMY      OVARIAN CYST REMOVAL       Social History   Social History     Substance and Sexual Activity   Alcohol Use Yes    Alcohol/week: 7.0 standard drinks of alcohol    Types: 7 Glasses of wine per week     Social History     Substance and Sexual Activity   Drug Use Never     E-Cigarette/Vaping    E-Cigarette Use Never User      E-Cigarette/Vaping Substances    Nicotine No     THC No     CBD No     Flavoring No     Other No     Unknown No      Social History     Tobacco Use   Smoking Status Never   Smokeless Tobacco Never     Occupational History:     Family History: History reviewed. No pertinent family history.    Meds/Allergies   all current active meds have been reviewed, pertinent pulmonary meds have been reviewed, and current meds:   Current Facility-Administered Medications   Medication Dose Route Frequency    acetaminophen (TYLENOL) tablet 650 mg  650 mg Oral  Q6H PRN    budesonide-formoterol (SYMBICORT) 160-4.5 mcg/act inhaler 2 puff  2 puff Inhalation BID    buPROPion (WELLBUTRIN XL) 24 hr tablet 150 mg  150 mg Oral Daily    busPIRone (BUSPAR) tablet 10 mg  10 mg Oral BID    cefTRIAXone (ROCEPHIN) 1,000 mg in dextrose 5 % 50 mL IVPB  1,000 mg Intravenous Q24H    diphenhydrAMINE (BENADRYL) injection 25 mg  25 mg Intravenous Q6H PRN    famotidine (PEPCID) tablet 40 mg  40 mg Oral BID    gabapentin (NEURONTIN) capsule 100 mg  100 mg Oral TID    guaiFENesin (MUCINEX) 12 hr tablet 600 mg  600 mg Oral Q12H [facility]LEIGH    HYDROcodone Bit-Homatrop MBr (HYCODAN) oral syrup 5 mL  5 mL Oral Q4H PRN    hydrOXYzine HCL (ATARAX) tablet 25 mg  25 mg Oral Q6H PRN    insulin lispro (HumALOG/ADMELOG) 100 units/mL subcutaneous injection 1-5 Units  1-5 Units Subcutaneous TID AC    ipratropium (ATROVENT) 0.02 % inhalation solution 0.5 mg  0.5 mg Nebulization BID    levalbuterol (XOPENEX) inhalation solution 1.25 mg  1.25 mg Nebulization Q6H PRN    levalbuterol (XOPENEX) inhalation solution 1.25 mg  1.25 mg Nebulization BID    methylPREDNISolone sodium succinate (Solu-MEDROL) injection 40 mg  40 mg Intravenous Q12H KAYLEIGH    metoclopramide (REGLAN) injection 10 mg  10 mg Intravenous Q6H PRN    montelukast (SINGULAIR) tablet 10 mg  10 mg Oral HS    nystatin (MYCOSTATIN) oral suspension 500,000 Units  500,000 Units Swish & Swallow 4x Daily    pantoprazole (PROTONIX) EC tablet 40 mg  40 mg Oral BID    pravastatin (PRAVACHOL) tablet 40 mg  40 mg Oral Daily With Dinner    SUMAtriptan (IMITREX) subcutaneous injection 6 mg  6 mg Subcutaneous Q1H PRN    zolpidem (AMBIEN) tablet 5 mg  5 mg Oral HS PRN       Allergies   Allergen Reactions    Codeine Other (See Comments)    Aspirin GI Intolerance and Rash    Hydromorphone Rash and Other (See Comments)     GI upset      Oxycodone-Acetaminophen Rash and Other (See Comments)    Tramadol Rash and Other (See Comments)       Objective   Vitals: Blood pressure 141/87,  "pulse 82, temperature 97.6 °F (36.4 °C), resp. rate 18, height 4' 11\" (1.499 m), weight 81.1 kg (178 lb 12.7 oz), SpO2 94%.,Body mass index is 36.11 kg/m².    Intake/Output Summary (Last 24 hours) at 4/29/2024 1138  Last data filed at 4/28/2024 1924  Gross per 24 hour   Intake 430 ml   Output 350 ml   Net 80 ml     Invasive Devices       Peripheral Intravenous Line  Duration             Peripheral IV 04/29/24 Dorsal (posterior);Right Hand <1 day                    Physical Exam  Vitals reviewed.   Constitutional:       General: She is not in acute distress.     Appearance: Normal appearance. She is obese. She is not ill-appearing.   HENT:      Head: Normocephalic and atraumatic.      Mouth/Throat:      Pharynx: Oropharynx is clear.   Eyes:      Conjunctiva/sclera: Conjunctivae normal.   Cardiovascular:      Rate and Rhythm: Normal rate and regular rhythm.   Pulmonary:      Effort: Pulmonary effort is normal. No respiratory distress.      Breath sounds: Decreased breath sounds and wheezing (expiratory wheeze) present.      Comments: Cough upon deep inspiration  Abdominal:      General: Abdomen is flat. There is no distension.   Musculoskeletal:         General: Normal range of motion.      Cervical back: Normal range of motion.      Right lower leg: No edema.      Left lower leg: No edema.   Skin:     General: Skin is warm and dry.   Neurological:      Mental Status: She is alert and oriented to person, place, and time.   Psychiatric:         Mood and Affect: Mood normal.         Behavior: Behavior normal.         Lab Results: I have personally reviewed pertinent lab results., CBC:   Lab Results   Component Value Date    WBC 16.00 (H) 04/29/2024    HGB 13.3 04/29/2024    HCT 40.4 04/29/2024    MCV 98 04/29/2024     (H) 04/29/2024    RBC 4.11 04/29/2024    MCH 32.4 04/29/2024    MCHC 32.9 04/29/2024    RDW 13.6 04/29/2024    MPV 10.6 04/29/2024   , CMP:   Lab Results   Component Value Date    SODIUM 138 " 04/29/2024    K 4.0 04/29/2024     04/29/2024    CO2 25 04/29/2024    BUN 18 04/29/2024    CREATININE 0.67 04/29/2024    CALCIUM 8.7 04/29/2024    EGFR 97 04/29/2024     Imaging Studies: I have personally reviewed pertinent reports.   and I have personally reviewed pertinent films in PACS  EKG, Pathology, and Other Studies: I have personally reviewed pertinent reports.    VTE Prophylaxis: Sequential compression device (Venodyne)     Code Status: Level 1 - Full Code  Advance Directive and Living Will:      Power of :    POLST:

## 2024-04-29 NOTE — PROGRESS NOTES
[facility]  Progress Note  Name: Lidya Ansari I MRN: [id number]  Unit/Bed#: -01I Date of Admission: 4/26/2024   Date of Service: 4/26/2024  I Hospital Day: 2    * SIRS (systemic inflammatory response syndrome) ([facility])  Assessment & Plan  Present on admission: tachycardia, tachypnea due to asthma exacerbation  Rule out pneumonia as source, continue IV ceftriaxone   Initial lactate 3.1, procal 0.08, WBC 12.52  Treatment as below    Severe persistent asthma with exacerbation  Assessment & Plan  Presents with report of 2 weeks worsening cough/shortness of breath with wheezing; seen in ER 2 weeks ago, discharged home after nebulizer treatment with oral steroid course. Sick contact at home,  with URI symptoms.     CXR: bilateral infiltrates + atelectasis  Flu/COVID/RSV negative, consider RP2  Current O2 saturation >88% on room air, monitor   Continue scheduled nebs, respiratory protocol  Tolerating wean of IV Solu-Medrol 40 mg to q12h  Mucinex, hycodan prn  Encouraged ISB 10x per hour while awake, ambulation    Lactic acidosis-resolved as of 4/28/2024  Assessment & Plan  Present on admission, 3.1; climbing to 6.9, now resolved  Suspect type B lactic acidosis secondary to nebulizer use in setting of asthma exacerbation   No evidence of end-organ damage, VSS    Type 2 diabetes mellitus without complication, with long-term current use of insulin ([facility])  Assessment & Plan  Blood Sugar Average: Last 72 hrs: (P) 147.5    Controlled a/e/b A1c 4.8%  Hold Ozempic during hospitalization  Continue Humalog 3 units 3 times daily  SSI #2  Diabetic diet level 2  [facility]  Hypoglycemia protocol - noted symptomatic hypoglycemia yesterday  Adjust as needed    Hyperkalemia-resolved as of 4/27/2024  Assessment & Plan  Noted to be hyperkalemic on arrival with potassium 5.7  Resolved with appropriate treatment, will monitor     Migraine without status migrainosus, not  intractable  Assessment & Plan  Reports migraine - photosensitive, trying ice without relief  s/p migraine cocktail    Depression with anxiety  Assessment & Plan  Continue home Wellbutrin.    GERD (gastroesophageal reflux disease)  Assessment & Plan  Continue Pepcid with PPI.    Hyperlipidemia  Assessment & Plan  Chronic, continue statin.          VTE Pharmacologic Prophylaxis: VTE Score: 2 Low Risk (Score 0-2) - Encourage Ambulation.    Mobility:   Basic Mobility Inpatient Raw Score: 24  JH-HLM Goal: 8: Walk 250 feet or more  JH-HLM Achieved: 7: Walk 25 feet or more  JH-HLM Goal NOT achieved. Continue with multidisciplinary rounding and encourage appropriate mobility to improve upon JH-HLM goals.    Patient Centered Rounds: I performed bedside rounds with nursing staff today.   Discussions with Specialists or Other Care Team Provider: pulmonology regarding request for consult     Education and Discussions with Family / Patient: Patient declined call to .     Total Time Spent on Date of Encounter in care of patient: 40 mins. This time was spent on one or more of the following: performing physical exam; counseling and coordination of care; obtaining or reviewing history; documenting in the medical record; reviewing/ordering tests, medications or procedures; communicating with other healthcare professionals and discussing with patient's family/caregivers.    Current Length of Stay: 2 day(s)  Current Patient Status: Inpatient   Certification Statement: The patient will continue to require additional inpatient hospital stay due to ongoing asthma exacerbation  Discharge Plan: Anticipate discharge in 24-48 hrs to home.    Code Status: Level 1 - Full Code    Subjective:   Seen this am, reports continues respiratory issues overngiht. Does not feel ready to go home. Asks about still being on antibiotics, which we reviewed as ceftriaxone. No hypoxia overnight. Cough remains minimally productive today. No  subjective fevers overnight.      Objective:     Vitals:   Temp (24hrs), Av °F (36.7 °C), Min:97.6 °F (36.4 °C), Max:98.2 °F (36.8 °C)    Temp:  [97.6 °F (36.4 °C)-98.2 °F (36.8 °C)] 97.6 °F (36.4 °C)  HR:  [79-92] 82  Resp:  [17-18] 18  BP: (126-141)/(78-87) 141/87  SpO2:  [90 %-95 %] 94 %  Body mass index is 36.11 kg/m².     Input and Output Summary (last 24 hours):     Intake/Output Summary (Last 24 hours) at 2024 0847  Last data filed at 2024 1924  Gross per 24 hour   Intake 670 ml   Output 350 ml   Net 320 ml       Physical Exam:   Physical Exam  Vitals and nursing note reviewed.   Constitutional:       General: She is awake. She is not in acute distress.     Appearance: Normal appearance. She is well-developed. She is not ill-appearing or toxic-appearing.   HENT:      Head: Normocephalic.      Mouth/Throat:      Mouth: Mucous membranes are moist.   Cardiovascular:      Rate and Rhythm: Regular rhythm. Tachycardia present.      Heart sounds: Normal heart sounds.   Pulmonary:      Effort: Pulmonary effort is normal. No respiratory distress.      Breath sounds: Decreased air movement present. Wheezing and rhonchi present.      Comments: Seems stable to maybe slightly coarser/tight today compared to yesterday   Abdominal:      General: There is no distension.      Palpations: Abdomen is soft.      Tenderness: There is no abdominal tenderness.   Skin:     General: Skin is warm and dry.      Coloration: Skin is not jaundiced or pale.   Neurological:      Mental Status: She is alert and oriented to person, place, and time.   Psychiatric:         Mood and Affect: Mood normal.         Behavior: Behavior normal. Behavior is cooperative.           Additional Data:     Labs:  Results from last 7 days   Lab Units 24  0507 24  0616 24  1327   WBC Thousand/uL 16.00*   < > 12.52*   HEMOGLOBIN g/dL 13.3   < > 14.5   HEMATOCRIT % 40.4   < > 44.2   PLATELETS Thousands/uL 432*   < > 412*   SEGS PCT  %  --   --  56   LYMPHO PCT %  --   --  33   MONO PCT %  --   --  8   EOS PCT %  --   --  2    < > = values in this interval not displayed.     Results from last 7 days   Lab Units 04/29/24  0507 04/26/24  1741 04/26/24  1424   SODIUM mmol/L 138   < > 136   POTASSIUM mmol/L 4.0   < > 5.7*   CHLORIDE mmol/L 105   < > 104   CO2 mmol/L 25   < > 20*   BUN mg/dL 18   < > 10   CREATININE mg/dL 0.67   < > 0.71   ANION GAP mmol/L 8   < > 12   CALCIUM mg/dL 8.7   < > 8.7   ALBUMIN g/dL  --   --  3.8   TOTAL BILIRUBIN mg/dL  --   --  0.75   ALK PHOS U/L  --   --  137*   ALT U/L  --   --  19   AST U/L  --   --  32   GLUCOSE RANDOM mg/dL 115   < > 136    < > = values in this interval not displayed.     Results from last 7 days   Lab Units 04/26/24  1424   INR  1.01     Results from last 7 days   Lab Units 04/29/24  0721 04/28/24  2116 04/28/24  1902 04/28/24  1752 04/28/24  1609 04/28/24  1539 04/28/24  1503 04/28/24  1412 04/28/24  1122 04/28/24  0716 04/27/24  2048 04/27/24  1806   POC GLUCOSE mg/dl 134 115 122 131 159* 154* 44* 164* 159* 155* 143* 153*     Results from last 7 days   Lab Units 04/27/24  0533   HEMOGLOBIN A1C % 4.8     Results from last 7 days   Lab Units 04/28/24  0547 04/27/24  1941 04/27/24  1506 04/27/24  0753 04/26/24  1741 04/26/24  1424   LACTIC ACID mmol/L 2.0 2.8* 2.7* 3.9*   < > 3.1*   PROCALCITONIN ng/ml  --   --   --   --   --  0.08    < > = values in this interval not displayed.       Lines/Drains:  Invasive Devices       Peripheral Intravenous Line  Duration             Peripheral IV 04/29/24 Dorsal (posterior);Right Hand <1 day                          Imaging: No pertinent imaging reviewed.    Recent Cultures (last 7 days):   Results from last 7 days   Lab Units 04/26/24  1425 04/26/24  1424   BLOOD CULTURE  No Growth at 48 hrs. No Growth at 48 hrs.       Last 24 Hours Medication List:   Current Facility-Administered Medications   Medication Dose Route Frequency Provider Last Rate     acetaminophen  650 mg Oral Q6H PRN EDUARDA Virk      budesonide-formoterol  2 puff Inhalation BID Mana Bowman MD      buPROPion  150 mg Oral Daily Mana Bowman MD      busPIRone  10 mg Oral BID Dorothy Barajas PA-C      cefTRIAXone  1,000 mg Intravenous Q24H Dorothy Barajas PA-C 1,000 mg (04/28/24 1511)    diphenhydrAMINE  25 mg Intravenous Q6H PRN Dorothy Barajas PA-C      famotidine  40 mg Oral BID Mana Bowman MD      fluticasone-vilanterol  1 puff Inhalation Daily Mana Bowman MD      gabapentin  100 mg Oral TID Mana Bowman MD      guaiFENesin  600 mg Oral Q12H KAYLEIGH Mana Bowman MD      HYDROcodone Bit-Homatrop MBr  5 mL Oral Q4H PRN Dorothy Barajas PA-C      hydrOXYzine HCL  25 mg Oral Q6H PRN Dorothy Barajas PA-C      insulin lispro  1-5 Units Subcutaneous TID AC Mana Bowman MD      insulin lispro  3 Units Subcutaneous TID With Meals Mana Bowman MD      ipratropium  0.5 mg Nebulization BID Harry Griffith MD      levalbuterol  1.25 mg Nebulization Q6H PRN Harry Griffith MD      levalbuterol  1.25 mg Nebulization BID Harry Griffith MD      methylPREDNISolone sodium succinate  40 mg Intravenous Q12H [facility] Dorothy Barajas PA-C      metoclopramide  10 mg Intravenous Q6H PRN Dorothy Barajas PA-C      nystatin  500,000 Units Swish & Swallow 4x Daily Dorothy Barajas PA-C      pantoprazole  40 mg Oral BID Mana Bowman MD      pravastatin  40 mg Oral Daily With Dinner Mana Bowman MD      SUMAtriptan  6 mg Subcutaneous Q1H PRN Dorothy Barajas PA-C      zolpidem  5 mg Oral HS PRN Dorothy Barajas PA-C          Today, Patient Was Seen By: Dorothy Barajas PA-C    **Please Note: This note may have been constructed using a voice recognition system.**

## 2024-04-29 NOTE — RESPIRATORY THERAPY NOTE
RT Protocol Note  Lidya Ansari 59 y.o. female MRN: [id number]  Unit/Bed#: -01 Encounter: [id number]    Assessment    Principal Problem:    SIRS (systemic inflammatory response syndrome) (HCC)  Active Problems:    Severe persistent asthma with exacerbation    Hyperlipidemia    Type 2 diabetes mellitus without complication, with long-term current use of insulin (HCC)    GERD (gastroesophageal reflux disease)    Depression with anxiety    Migraine without status migrainosus, not intractable      Home Pulmonary Medications:         Past Medical History:   Diagnosis Date    Asthma     Diabetes mellitus (HCC)     GERD (gastroesophageal reflux disease)     Hyperlipidemia     Hypertension      Social History     Socioeconomic History    Marital status: /Civil Union     Spouse name: None    Number of children: None    Years of education: None    Highest education level: None   Occupational History    None   Tobacco Use    Smoking status: Never    Smokeless tobacco: Never   Vaping Use    Vaping status: Never Used   Substance and Sexual Activity    Alcohol use: Yes     Alcohol/week: 7.0 standard drinks of alcohol     Types: 7 Glasses of wine per week    Drug use: Never    Sexual activity: None   Other Topics Concern    None   Social History Narrative    None     Social Determinants of Health     Financial Resource Strain: Not on file   Food Insecurity: No Food Insecurity (4/26/2024)    Hunger Vital Sign     Worried About Running Out of Food in the Last Year: Never true     Ran Out of Food in the Last Year: Never true   Transportation Needs: No Transportation Needs (4/26/2024)    PRAPARE - Transportation     Lack of Transportation (Medical): No     Lack of Transportation (Non-Medical): No   Physical Activity: Not on file   Stress: Not on file   Social Connections: Not on file   Intimate Partner Violence: Not on file   Housing Stability: Low Risk  (4/26/2024)    Housing Stability Vital Sign     Unable to Pay for  "Housing in the Last Year: No     Number of Places Lived in the Last Year: 1     Unstable Housing in the Last Year: No       Subjective         Objective    Physical Exam:        Vitals:  Blood pressure 152/95, pulse 80, temperature 97.7 °F (36.5 °C), resp. rate 18, height 4' 11\" (1.499 m), weight 81.1 kg (178 lb 12.7 oz), SpO2 96%.          Imaging and other studies: I have personally reviewed pertinent reports.      O2 Device: ra     Plan    Respiratory Plan: Mild Distress pathway        Resp Comments: pt with hx of asthma  will continue with xop/atr bid   "

## 2024-04-30 LAB
BNP SERPL-MCNC: 116 PG/ML (ref 0–100)
GLUCOSE SERPL-MCNC: 119 MG/DL (ref 65–140)
GLUCOSE SERPL-MCNC: 134 MG/DL (ref 65–140)
GLUCOSE SERPL-MCNC: 142 MG/DL (ref 65–140)
GLUCOSE SERPL-MCNC: 195 MG/DL (ref 65–140)

## 2024-04-30 PROCEDURE — 93005 ELECTROCARDIOGRAM TRACING: CPT

## 2024-04-30 PROCEDURE — 94760 N-INVAS EAR/PLS OXIMETRY 1: CPT

## 2024-04-30 PROCEDURE — 99232 SBSQ HOSP IP/OBS MODERATE 35: CPT | Performed by: INTERNAL MEDICINE

## 2024-04-30 PROCEDURE — 99233 SBSQ HOSP IP/OBS HIGH 50: CPT | Performed by: PHYSICIAN ASSISTANT

## 2024-04-30 PROCEDURE — 94640 AIRWAY INHALATION TREATMENT: CPT

## 2024-04-30 PROCEDURE — 82948 REAGENT STRIP/BLOOD GLUCOSE: CPT

## 2024-04-30 PROCEDURE — 83880 ASSAY OF NATRIURETIC PEPTIDE: CPT | Performed by: INTERNAL MEDICINE

## 2024-04-30 PROCEDURE — 94664 DEMO&/EVAL PT USE INHALER: CPT

## 2024-04-30 RX ORDER — DEXAMETHASONE SODIUM PHOSPHATE 10 MG/ML
8 INJECTION, SOLUTION INTRAMUSCULAR; INTRAVENOUS ONCE
Status: DISCONTINUED | OUTPATIENT
Start: 2024-04-30 | End: 2024-04-30

## 2024-04-30 RX ORDER — BENZONATATE 100 MG/1
200 CAPSULE ORAL 3 TIMES DAILY
Status: DISCONTINUED | OUTPATIENT
Start: 2024-04-30 | End: 2024-04-30

## 2024-04-30 RX ORDER — FUROSEMIDE 10 MG/ML
20 INJECTION INTRAMUSCULAR; INTRAVENOUS ONCE
Status: COMPLETED | OUTPATIENT
Start: 2024-04-30 | End: 2024-04-30

## 2024-04-30 RX ADMIN — Medication 5 ML: at 20:29

## 2024-04-30 RX ADMIN — NYSTATIN 500000 UNITS: 100000 SUSPENSION ORAL at 09:32

## 2024-04-30 RX ADMIN — Medication 5 ML: at 09:37

## 2024-04-30 RX ADMIN — LEVALBUTEROL HYDROCHLORIDE 1.25 MG: 1.25 SOLUTION RESPIRATORY (INHALATION) at 08:29

## 2024-04-30 RX ADMIN — PRAVASTATIN SODIUM 40 MG: 40 TABLET ORAL at 16:16

## 2024-04-30 RX ADMIN — IPRATROPIUM BROMIDE 0.5 MG: 0.5 SOLUTION RESPIRATORY (INHALATION) at 19:57

## 2024-04-30 RX ADMIN — METHYLPREDNISOLONE SODIUM SUCCINATE 40 MG: 40 INJECTION, POWDER, FOR SOLUTION INTRAMUSCULAR; INTRAVENOUS at 21:58

## 2024-04-30 RX ADMIN — METHYLPREDNISOLONE SODIUM SUCCINATE 40 MG: 40 INJECTION, POWDER, FOR SOLUTION INTRAMUSCULAR; INTRAVENOUS at 12:37

## 2024-04-30 RX ADMIN — LEVALBUTEROL HYDROCHLORIDE 1.25 MG: 1.25 SOLUTION RESPIRATORY (INHALATION) at 19:57

## 2024-04-30 RX ADMIN — FAMOTIDINE 40 MG: 20 TABLET, FILM COATED ORAL at 17:51

## 2024-04-30 RX ADMIN — BUDESONIDE AND FORMOTEROL FUMARATE DIHYDRATE 2 PUFF: 160; 4.5 AEROSOL RESPIRATORY (INHALATION) at 17:52

## 2024-04-30 RX ADMIN — BUPROPION HYDROCHLORIDE 150 MG: 150 TABLET, EXTENDED RELEASE ORAL at 09:26

## 2024-04-30 RX ADMIN — Medication 5 ML: at 04:20

## 2024-04-30 RX ADMIN — GUAIFENESIN 600 MG: 600 TABLET, EXTENDED RELEASE ORAL at 09:26

## 2024-04-30 RX ADMIN — FUROSEMIDE 20 MG: 10 INJECTION, SOLUTION INTRAMUSCULAR; INTRAVENOUS at 14:46

## 2024-04-30 RX ADMIN — GUAIFENESIN 600 MG: 600 TABLET, EXTENDED RELEASE ORAL at 21:58

## 2024-04-30 RX ADMIN — GABAPENTIN 100 MG: 100 CAPSULE ORAL at 16:16

## 2024-04-30 RX ADMIN — Medication 5 ML: at 13:48

## 2024-04-30 RX ADMIN — LEVALBUTEROL HYDROCHLORIDE 1.25 MG: 1.25 SOLUTION RESPIRATORY (INHALATION) at 13:04

## 2024-04-30 RX ADMIN — BUSPIRONE HYDROCHLORIDE 10 MG: 5 TABLET ORAL at 09:32

## 2024-04-30 RX ADMIN — ACETAMINOPHEN 650 MG: 325 TABLET, FILM COATED ORAL at 21:58

## 2024-04-30 RX ADMIN — GABAPENTIN 100 MG: 100 CAPSULE ORAL at 21:59

## 2024-04-30 RX ADMIN — BUSPIRONE HYDROCHLORIDE 10 MG: 5 TABLET ORAL at 17:51

## 2024-04-30 RX ADMIN — ACETAMINOPHEN 650 MG: 325 TABLET, FILM COATED ORAL at 04:16

## 2024-04-30 RX ADMIN — PANTOPRAZOLE SODIUM 40 MG: 40 TABLET, DELAYED RELEASE ORAL at 17:51

## 2024-04-30 RX ADMIN — MONTELUKAST 10 MG: 10 TABLET, FILM COATED ORAL at 21:57

## 2024-04-30 RX ADMIN — NYSTATIN 500000 UNITS: 100000 SUSPENSION ORAL at 12:17

## 2024-04-30 RX ADMIN — PANTOPRAZOLE SODIUM 40 MG: 40 TABLET, DELAYED RELEASE ORAL at 09:27

## 2024-04-30 RX ADMIN — FAMOTIDINE 40 MG: 20 TABLET, FILM COATED ORAL at 09:27

## 2024-04-30 RX ADMIN — GABAPENTIN 100 MG: 100 CAPSULE ORAL at 09:27

## 2024-04-30 RX ADMIN — BUDESONIDE AND FORMOTEROL FUMARATE DIHYDRATE 2 PUFF: 160; 4.5 AEROSOL RESPIRATORY (INHALATION) at 08:30

## 2024-04-30 RX ADMIN — IPRATROPIUM BROMIDE 0.5 MG: 0.5 SOLUTION RESPIRATORY (INHALATION) at 08:29

## 2024-04-30 RX ADMIN — BENZONATATE 200 MG: 100 CAPSULE ORAL at 10:59

## 2024-04-30 NOTE — PROGRESS NOTES
[facility]  Progress Note  Name: Lidya Ansari I MRN: [id number]  Unit/Bed#: -01I Date of Admission: 4/26/2024   Date of Service: 4/26/2024  I Hospital Day: 3    * SIRS (systemic inflammatory response syndrome) ([facility])  Assessment & Plan  Present on admission: tachycardia, tachypnea due to asthma exacerbation  Bacterial pneumonia ruled out given normal procal  No further indication for antibiotics, received 5 days of ceftriaxone   Initial lactate 3.1, procal 0.08, WBC 12.52  Treatment as below    Severe persistent asthma with exacerbation  Assessment & Plan  Presents with report of 2 weeks worsening cough/shortness of breath with wheezing; seen in ER 2 weeks ago, discharged home after nebulizer treatment with oral steroid course. Sick contact at home,  + son with URI symptoms.     CXR: bilateral consolidation + atelectasis  Flu/COVID/RSV negative, consider RP2  Current O2 saturation >88% on room air, monitor   Continue scheduled nebs, respiratory protocol  Tolerating wean of IV Solu-Medrol 40 mg to q12h - transition to prednisone taper on discharge, hopefully tomorrow  Mucinex, hycodan prn  Encouraged ISB 10x per hour while awake, ambulation    Lactic acidosis-resolved as of 4/28/2024  Assessment & Plan  Present on admission, 3.1; climbing to 6.9, now resolved  Suspect type B lactic acidosis secondary to nebulizer use in setting of asthma exacerbation   No evidence of end-organ damage, VSS    Type 2 diabetes mellitus without complication, with long-term current use of insulin ([facility])  Assessment & Plan  Blood Sugar Average: Last 72 hrs: (P) [id number]    Controlled a/e/b A1c 4.8%  Hold Ozempic during hospitalization, resume on discharge   Diabetic diet   Noted symptomatic hypoglycemia, now resolved     Hyperkalemia-resolved as of 4/27/2024  Assessment & Plan  Noted to be hyperkalemic on arrival with potassium 5.7  Resolved with appropriate treatment    Migraine without  status migrainosus, not intractable  Assessment & Plan  Reports migraine history, s/p migraine cocktail here with improvement     Depression with anxiety  Assessment & Plan  Continue home Wellbutrin.    GERD (gastroesophageal reflux disease)  Assessment & Plan  Continue Pepcid with PPI.    Hyperlipidemia  Assessment & Plan  Chronic, continue statin.          VTE Pharmacologic Prophylaxis: VTE Score: 2 Low Risk (Score 0-2) - Encourage Ambulation.    Mobility:   Basic Mobility Inpatient Raw Score: 24  JH-HLM Goal: 8: Walk 250 feet or more  JH-HLM Achieved: 8: Walk 250 feet ot more  JH-HLM Goal achieved. Continue to encourage appropriate mobility.    Patient Centered Rounds: I performed bedside rounds with nursing staff today.   Discussions with Specialists or Other Care Team Provider: pulm, CM     Education and Discussions with Family / Patient: Patient declined call to .     Total Time Spent on Date of Encounter in care of patient: 56 mins. This time was spent on one or more of the following: performing physical exam; counseling and coordination of care; obtaining or reviewing history; documenting in the medical record; reviewing/ordering tests, medications or procedures; communicating with other healthcare professionals and discussing with patient's family/caregivers.    Current Length of Stay: 3 day(s)  Current Patient Status: Inpatient   Certification Statement: The patient will continue to require additional inpatient hospital stay due to ongoing asthma exacerbation , trial lasix   Discharge Plan: Anticipate discharge in 24-48 hrs to home.    Code Status: Level 1 - Full Code    Subjective:   Seen this afternoon, continues to wheeze and is tearful today. She feels like this is the worse exacerbation she's had yet. Notes that she never had problems with asthma until after she contracted COVID in 2021, and now she is no longer able to work due to her symptoms and limitations with activity. Has tried  monoclonal antibodies and personally didn't feel like it helped.     Today she complains of wheezing and anxiety. She also notes twitching muscle spasms of her face today.     Objective:     Vitals:   Temp (24hrs), Av.8 °F (36.6 °C), Min:97.7 °F (36.5 °C), Max:97.9 °F (36.6 °C)    Temp:  [97.7 °F (36.5 °C)-97.9 °F (36.6 °C)] 97.9 °F (36.6 °C)  HR:  [76-91] 91  Resp:  [18-20] 18  BP: (141-159)/(88-98) 141/88  SpO2:  [94 %-98 %] 94 %  Body mass index is 36.11 kg/m².     Input and Output Summary (last 24 hours):     Intake/Output Summary (Last 24 hours) at 2024 1203  Last data filed at 2024 1700  Gross per 24 hour   Intake 480 ml   Output --   Net 480 ml       Physical Exam:   Physical Exam  Vitals and nursing note reviewed.   Constitutional:       General: She is awake. She is in acute distress.      Appearance: Normal appearance. She is overweight. She is not ill-appearing or toxic-appearing.   HENT:      Head: Normocephalic and atraumatic.   Cardiovascular:      Rate and Rhythm: Regular rhythm. Tachycardia present.      Heart sounds: Normal heart sounds.   Pulmonary:      Effort: Pulmonary effort is normal. No respiratory distress.      Breath sounds: Decreased air movement present. Wheezing present.   Abdominal:      General: Bowel sounds are normal. There is no distension.      Palpations: Abdomen is soft.      Tenderness: There is no abdominal tenderness.   Skin:     General: Skin is warm and dry.      Coloration: Skin is not pale.   Neurological:      Mental Status: She is alert and oriented to person, place, and time.      Comments: Facial fasiculations    Psychiatric:         Mood and Affect: Mood is anxious. Affect is tearful.         Behavior: Behavior normal. Behavior is cooperative.        Additional Data:     Labs:  Results from last 7 days   Lab Units 24  0507 24  0616 24  1327   WBC Thousand/uL 16.00*   < > 12.52*   HEMOGLOBIN g/dL 13.3   < > 14.5   HEMATOCRIT % 40.4   <  > 44.2   PLATELETS Thousands/uL 432*   < > 412*   SEGS PCT %  --   --  56   LYMPHO PCT %  --   --  33   MONO PCT %  --   --  8   EOS PCT %  --   --  2    < > = values in this interval not displayed.     Results from last 7 days   Lab Units 04/29/24  0507 04/26/24  1741 04/26/24  1424   SODIUM mmol/L 138   < > 136   POTASSIUM mmol/L 4.0   < > 5.7*   CHLORIDE mmol/L 105   < > 104   CO2 mmol/L 25   < > 20*   BUN mg/dL 18   < > 10   CREATININE mg/dL 0.67   < > 0.71   ANION GAP mmol/L 8   < > 12   CALCIUM mg/dL 8.7   < > 8.7   ALBUMIN g/dL  --   --  3.8   TOTAL BILIRUBIN mg/dL  --   --  0.75   ALK PHOS U/L  --   --  137*   ALT U/L  --   --  19   AST U/L  --   --  32   GLUCOSE RANDOM mg/dL 115   < > 136    < > = values in this interval not displayed.     Results from last 7 days   Lab Units 04/26/24  1424   INR  1.01     Results from last 7 days   Lab Units 04/30/24  1056 04/30/24  0747 04/29/24  2100 04/29/24  1610 04/29/24  1043 04/29/24  0721 04/28/24  2116 04/28/24  1902 04/28/24  1752 04/28/24  1609 04/28/24  1539 04/28/24  1503   POC GLUCOSE mg/dl 134 119 138 141* 93 134 115 122 131 159* 154* 44*     Results from last 7 days   Lab Units 04/27/24  0533   HEMOGLOBIN A1C % 4.8     Results from last 7 days   Lab Units 04/29/24  0507 04/28/24  0547 04/27/24  1941 04/27/24  1506 04/27/24  0753 04/26/24  1741 04/26/24  1424   LACTIC ACID mmol/L  --  2.0 2.8* 2.7* 3.9*   < > 3.1*   PROCALCITONIN ng/ml 0.06  --   --   --   --   --  0.08    < > = values in this interval not displayed.       Lines/Drains:  Invasive Devices       Peripheral Intravenous Line  Duration             Peripheral IV 04/29/24 Dorsal (posterior);Right Hand 1 day                  Imaging: No pertinent imaging reviewed.    Recent Cultures (last 7 days):   Results from last 7 days   Lab Units 04/26/24  1425 04/26/24  1424   BLOOD CULTURE  No Growth at 72 hrs. No Growth at 72 hrs.       Last 24 Hours Medication List:   Current Facility-Administered  Medications   Medication Dose Route Frequency Provider Last Rate    acetaminophen  650 mg Oral Q6H PRN EDUARDA Virk      benzonatate  200 mg Oral TID Jairo Cobb PA-C      budesonide-formoterol  2 puff Inhalation BID Mana Bowman MD      buPROPion  150 mg Oral Daily Mana Bowman MD      busPIRone  10 mg Oral BID Dorothy Barajas PA-C      diphenhydrAMINE  25 mg Intravenous Q6H PRN Dorothy Barajas PA-C      famotidine  40 mg Oral BID Mana Bowman MD      gabapentin  100 mg Oral TID Mana Bowman MD      guaiFENesin  600 mg Oral Q12H [facility] Mana Bowman MD      HYDROcodone Bit-Homatrop MBr  5 mL Oral Q4H PRN Dorothy Barajas PA-C      hydrOXYzine HCL  25 mg Oral Q6H PRN Dorothy Barajas PA-C      insulin lispro  1-5 Units Subcutaneous TID AC Mana Bowman MD      ipratropium  0.5 mg Nebulization BID Harry Griffith MD      levalbuterol  1.25 mg Nebulization Q6H PRN Harry Griffith MD      levalbuterol  1.25 mg Nebulization BID Harry Griffith MD      methylPREDNISolone sodium succinate  40 mg Intravenous Q12H [facility] Dorothy Barajas PA-C      metoclopramide  10 mg Intravenous Q6H PRN Dorothy Barajas PA-C      montelukast  10 mg Oral HS Jairo Cobb PA-C      nystatin  500,000 Units Swish & Swallow 4x Daily Dorothy Barajas PA-C      pantoprazole  40 mg Oral BID Mana Bowman MD      pravastatin  40 mg Oral Daily With Dinner Mana Bowman MD      zolpidem  5 mg Oral HS PRN Dorothy Barajas PA-C          Today, Patient Was Seen By: Dorothy Barajas PA-C    **Please Note: This note may have been constructed using a voice recognition system.**

## 2024-04-30 NOTE — PROGRESS NOTES
"Progress Note - Pulmonary   Lidya Ansari 59 y.o. female MRN: [id number]  Unit/Bed#: -01 Encounter: [id number]    Assessment:  Severe persistent asthma with acute exacerbation  Likely viral upper respiratory infection  GERD, Schatzki's ring status post dilation in March    Plan:  Severe persistent asthma with acute exacerbation in the setting of viral URI with sick contacts at home  She did have a reaction to Imitrex yesterday evening with numbness, strange feeling in her throat and some blurred vision  Procalcitonin is normal, no need for antibiotics, no pneumonia on chest x-ray  Continue Symbicort twice per day with Xopenex and Atrovent, Singulair  Will add Tessalon Perles to help with cough  Will continue Solu-Medrol at the same dose today and hopefully transition to prednisone tomorrow, still with significant wheezing and cough on exam  Will need to follow-up with her outpatient pulmonologist and restart a biologic  Will continue to follow    Subjective:   Patient resting in bed.  Continues to have ongoing cough especially with movement and taking a deep breath.    Objective:     Vitals: Blood pressure 154/92, pulse 91, temperature 97.9 °F (36.6 °C), resp. rate 18, height 4' 11\" (1.499 m), weight 81.1 kg (178 lb 12.7 oz), SpO2 94%.,Body mass index is 36.11 kg/m².      Intake/Output Summary (Last 24 hours) at 4/30/2024 1018  Last data filed at 4/29/2024 1700  Gross per 24 hour   Intake 480 ml   Output --   Net 480 ml       Invasive Devices       Peripheral Intravenous Line  Duration             Peripheral IV 04/29/24 Dorsal (posterior);Right Hand 1 day                    Physical Exam: /92   Pulse 91   Temp 97.9 °F (36.6 °C)   Resp 18   Ht 4' 11\" (1.499 m)   Wt 81.1 kg (178 lb 12.7 oz)   LMP  (LMP Unknown)   SpO2 94%   BMI 36.11 kg/m²   General appearance: alert and oriented, in no acute distress  Head: Normocephalic, without obvious abnormality, atraumatic  Eyes: negative findings: " "conjunctivae and sclerae normal  Lungs:  Expiratory wheeze, cough on deep inspiration  Heart: regular rate and rhythm  Abdomen: normal findings: soft, non-tender  Extremities:  No edema  Skin:  warm and dry  Neurologic: Mental status: Alert, oriented, thought content appropriate     Labs: I have personally reviewed pertinent lab results., CBC: No results found for: \"WBC\", \"HGB\", \"HCT\", \"MCV\", \"PLT\", \"ADJUSTEDWBC\", \"RBC\", \"MCH\", \"MCHC\", \"RDW\", \"MPV\", \"NRBC\", CMP: No results found for: \"SODIUM\", \"K\", \"CL\", \"CO2\", \"ANIONGAP\", \"BUN\", \"CREATININE\", \"GLUCOSE\", \"CALCIUM\", \"AST\", \"ALT\", \"ALKPHOS\", \"PROT\", \"BILITOT\", \"EGFR\"  Imaging and other studies: I have personally reviewed pertinent reports.   and I have personally reviewed pertinent films in PACS    "

## 2024-04-30 NOTE — CASE MANAGEMENT
Case Management Assessment & Discharge Planning Note    Patient name Lidya Ansari  Location /-01 MRN [id number]  : 1965 Date 2024       Current Admission Date: 2024  Current Admission Diagnosis:SIRS (systemic inflammatory response syndrome) ([facility])   Patient Active Problem List    Diagnosis Date Noted    Migraine without status migrainosus, not intractable 2024    COVID-19 2024    HEATHER (obstructive sleep apnea) 2024    Leukocytosis 2023    Cardiomegaly 11/15/2023    Shortness of breath 2023    Neuropathy 2023    Vitamin D deficiency 2023    Rib pain on left side 2023    Fecal incontinence 08/15/2023    COVID-19 long hauler manifesting chronic dyspnea 08/15/2023    Itching 2023    Polypharmacy 2023    Class 2 obesity with body mass index (BMI) of 35.0 to 35.9 in adult 2023    Dysphagia 2023    Narrowing of airway 01/10/2023    Depression with anxiety 2023    Asthma exacerbation 2023    Chronic cough 2022    GERD (gastroesophageal reflux disease) 10/31/2022    Rectus sheath hematoma 10/29/2022    SIRS (systemic inflammatory response syndrome) (HCC) 10/24/2022    Severe persistent asthma with exacerbation 10/24/2022    Hyperlipidemia 10/24/2022    Type 2 diabetes mellitus without complication, with long-term current use of insulin (HCC) 10/24/2022    Mitral valve disorder 2022    Essential hypertension 2022    Symptomatic menopausal or female climacteric states 2018      LOS (days): 3  Geometric Mean LOS (GMLOS) (days):   Days to GMLOS:     OBJECTIVE:    Risk of Unplanned Readmission Score: 15.94         Current admission status: Inpatient       Preferred Pharmacy:   [facility]/pharmacy #[id number] - [place], [place] - [address]  [address]  [place] [address]  Phone: [phone] Fax: [phone]    Primary Care Provider: Foreign Noonan    Primary Insurance:  MEDICARE  Secondary Insurance:     ASSESSMENT:  Active Health Care Proxies       MISTY ENGLISH Health Care Representative - Spouse   Primary Phone: [phone] (Mobile)                 Advance Directives  Does patient have a Health Care POA?: No  Was patient offered paperwork?: No (pt declined)  Does patient currently have a Health Care decision maker?: Yes, please see Health Care Proxy section  Does patient have Advance Directives?: No  Was patient offered paperwork?: No (pt declined)  Primary Contact: MISTY (Spouse)  [phone]         Readmission Root Cause  30 Day Readmission: No    Patient Information  Admitted from:: Home  Mental Status: Alert  During Assessment patient was accompanied by: Not accompanied during assessment  Assessment information provided by:: Patient  Primary Caregiver: Self  Support Systems: Self, Spouse/significant other, Children  County of Residence: [place]  What city do you live in[place] [facility]  Home entry access options. Select all that apply.: Stairs  Number of steps to enter home.: 3  Do the steps have railings?: No  Type of Current Residence: 2 story home  Upon entering residence, is there a bedroom on the main floor (no further steps)?: No  A bedroom is located on the following floor levels of residence (select all that apply):: 2nd Floor  Upon entering residence, is there a bathroom on the main floor (no further steps)?: No  Indicate which floors of current residence have a bathroom (select all the apply):: 2nd Floor  Number of steps to 2nd floor from main floor: One Flight  Living Arrangements: Lives w/ Spouse/significant other, Lives w/ Son  Is patient a ?: No    Activities of Daily Living Prior to Admission  Functional Status: Independent  Completes ADLs independently?: Yes  Ambulates independently?: Yes  Does patient use assisted devices?: Yes  Assisted Devices (DME) used: Shower Chair  Does patient currently own DME?: Yes  What DME does the patient currently own?:  Shower Eduard Patel  Does patient have a history of Outpatient Therapy (PT/OT)?: No  Does the patient have a history of Short-Term Rehab?: No  Does patient have a history of HHC?: No  Does patient currently have HHC?: No         Patient Information Continued  Income Source: SSI/SSD  Does patient have prescription coverage?: Yes  Does patient receive dialysis treatments?: No  Does patient have a history of substance abuse?: No  Does patient have a history of Mental Health Diagnosis?: Yes (depression)  Is patient receiving treatment for mental health?: Yes  Has patient received inpatient treatment related to mental health in the last 2 years?: No         Means of Transportation  Means of Transport to Appts:: Drives Self      Social Determinants of Health (SDOH)      Flowsheet Row Most Recent Value   Housing Stability    In the last 12 months, was there a time when you were not able to pay the mortgage or rent on time? N   In the last 12 months, how many places have you lived? 1   In the last 12 months, was there a time when you did not have a steady place to sleep or slept in a shelter (including now)? N   Transportation Needs    In the past 12 months, has lack of transportation kept you from medical appointments or from getting medications? no   In the past 12 months, has lack of transportation kept you from meetings, work, or from getting things needed for daily living? No   Food Insecurity    Within the past 12 months, you worried that your food would run out before you got the money to buy more. Never true   Within the past 12 months, the food you bought just didn't last and you didn't have money to get more. Never true   Utilities    In the past 12 months has the electric, gas, oil, or water company threatened to shut off services in your home? No            DISCHARGE DETAILS:    Discharge planning discussed with:: patient at bedside  Freedom of Choice: Yes  Comments - Freedom of Choice: Cm maintained freedom of  choice as it pertains to discharge planning. Patient reports [facility] home [facility]. CM offered HHC and advised VNA could help patient w management of chronic disease and PT/OT could help improve strength and mobility. Patient refused HHC. Patient reports she owns a shower chair and walker and declines need for any further DME. No discharge/ CM needs anticipated.  CM contacted family/caregiver?: No- see comments (pt declined)  Were Treatment Team discharge recommendations reviewed with patient/caregiver?: Yes  Did patient/caregiver verbalize understanding of patient care needs?: Yes  Were patient/caregiver advised of the risks associated with not following Treatment Team discharge recommendations?: Yes         Requested Home Health Care         Is the patient interested in HHC at discharge?: No    DME Referral Provided  Referral made for DME?: No    Other Referral/Resources/Interventions Provided:  Interventions: None Indicated    Would you like to participate in our [facility] service program?  : No - Declined    Treatment Team Recommendation: Home with Home Health Care  Discharge Destination Plan:: Home  Transport at Discharge : Self

## 2024-04-30 NOTE — RESPIRATORY THERAPY NOTE
RT Protocol Note  Lidya Ansari 59 y.o. female MRN: [id number]  Unit/Bed#: -01 Encounter: [id number]    Assessment    Principal Problem:    SIRS (systemic inflammatory response syndrome) (HCC)  Active Problems:    Severe persistent asthma with exacerbation    Hyperlipidemia    Type 2 diabetes mellitus without complication, with long-term current use of insulin (HCC)    GERD (gastroesophageal reflux disease)    Depression with anxiety    Migraine without status migrainosus, not intractable      Home Pulmonary Medications:         Past Medical History:   Diagnosis Date    Asthma     Diabetes mellitus (HCC)     GERD (gastroesophageal reflux disease)     Hyperlipidemia     Hypertension      Social History     Socioeconomic History    Marital status: /Civil Union     Spouse name: None    Number of children: None    Years of education: None    Highest education level: None   Occupational History    None   Tobacco Use    Smoking status: Never    Smokeless tobacco: Never   Vaping Use    Vaping status: Never Used   Substance and Sexual Activity    Alcohol use: Yes     Alcohol/week: 7.0 standard drinks of alcohol     Types: 7 Glasses of wine per week    Drug use: Never    Sexual activity: None   Other Topics Concern    None   Social History Narrative    None     Social Determinants of Health     Financial Resource Strain: Not on file   Food Insecurity: No Food Insecurity (4/26/2024)    Hunger Vital Sign     Worried About Running Out of Food in the Last Year: Never true     Ran Out of Food in the Last Year: Never true   Transportation Needs: No Transportation Needs (4/26/2024)    PRAPARE - Transportation     Lack of Transportation (Medical): No     Lack of Transportation (Non-Medical): No   Physical Activity: Not on file   Stress: Not on file   Social Connections: Not on file   Intimate Partner Violence: Not on file   Housing Stability: Low Risk  (4/26/2024)    Housing Stability Vital Sign     Unable to Pay for  "Housing in the Last Year: No     Number of Places Lived in the Last Year: 1     Unstable Housing in the Last Year: No       Subjective         Objective    Physical Exam:   Assessment Type: During-treatment  General Appearance: Awake, Alert  Respiratory Pattern: Normal  Chest Assessment: Chest expansion symmetrical  Bilateral Breath Sounds: Diminished, Expiratory wheezes  Cough: Congested, Non-productive  O2 Device: RA    Vitals:  Blood pressure 154/92, pulse (P) 91, temperature 97.9 °F (36.6 °C), resp. rate (P) 18, height 4' 11\" (1.499 m), weight 81.1 kg (178 lb 12.7 oz), SpO2 94%.          Imaging and other studies: I have personally reviewed pertinent reports.      O2 Device: RA     Plan    Respiratory Plan: Mild Distress pathway        Resp Comments: Pt resting in bed in no apparent distress.  Offers no complaints.  Will cont w/ current therapy.   "

## 2024-04-30 NOTE — RESPIRATORY THERAPY NOTE
RT Protocol Note  Lidya Ansari 59 y.o. female MRN: [id number]  Unit/Bed#: -01 Encounter: [id number]    Assessment    Principal Problem:    SIRS (systemic inflammatory response syndrome) ([facility])  Active Problems:    Severe persistent asthma with exacerbation    Hyperlipidemia    Type 2 diabetes mellitus without complication, with long-term current use of insulin (HCC)    GERD (gastroesophageal reflux disease)    Depression with anxiety    Migraine without status migrainosus, not intractable      Home Pulmonary Medications:     04/29/24 2000   Respiratory Protocol   Protocol Initiated? Yes   Protocol Selection Respiratory   Language Barrier? No   Medical & Social History Reviewed? Yes   Diagnostic Studies Reviewed? Yes   Physical Assessment Performed? Yes   Respiratory Plan Mild Distress pathway   Respiratory Assessment   Assessment Type During-treatment   General Appearance Alert;Awake   Respiratory Pattern Normal   Chest Assessment Chest expansion symmetrical   Bilateral Breath Sounds Diminished;Expiratory wheezes   Cough Non-productive   Resp Comments Will continue with current tx plan   O2 Device RA   Additional Assessments   Pulse 76   Respirations 20   SpO2 96 %            Past Medical History:   Diagnosis Date    Asthma     Diabetes mellitus (HCC)     GERD (gastroesophageal reflux disease)     Hyperlipidemia     Hypertension      Social History     Socioeconomic History    Marital status: /Civil Union     Spouse name: None    Number of children: None    Years of education: None    Highest education level: None   Occupational History    None   Tobacco Use    Smoking status: Never    Smokeless tobacco: Never   Vaping Use    Vaping status: Never Used   Substance and Sexual Activity    Alcohol use: Yes     Alcohol/week: 7.0 standard drinks of alcohol     Types: 7 Glasses of wine per week    Drug use: Never    Sexual activity: None   Other Topics Concern    None   Social History Narrative    None  "    Social Determinants of Health     Financial Resource Strain: Not on file   Food Insecurity: No Food Insecurity (4/26/2024)    Hunger Vital Sign     Worried About Running Out of Food in the Last Year: Never true     Ran Out of Food in the Last Year: Never true   Transportation Needs: No Transportation Needs (4/26/2024)    PRAPARE - Transportation     Lack of Transportation (Medical): No     Lack of Transportation (Non-Medical): No   Physical Activity: Not on file   Stress: Not on file   Social Connections: Not on file   Intimate Partner Violence: Not on file   Housing Stability: Low Risk  (4/26/2024)    Housing Stability Vital Sign     Unable to Pay for Housing in the Last Year: No     Number of Places Lived in the Last Year: 1     Unstable Housing in the Last Year: No       Subjective         Objective    Physical Exam:   Assessment Type: During-treatment  General Appearance: Alert, Awake  Respiratory Pattern: Normal  Chest Assessment: Chest expansion symmetrical  Bilateral Breath Sounds: Diminished, Expiratory wheezes  Cough: Non-productive  O2 Device: RA    Vitals:  Blood pressure 159/98, pulse 76, temperature 97.7 °F (36.5 °C), resp. rate 20, height 4' 11\" (1.499 m), weight 81.1 kg (178 lb 12.7 oz), SpO2 96%.          Imaging and other studies: I have personally reviewed pertinent reports.      O2 Device: RA     Plan    Respiratory Plan: Mild Distress pathway        Resp Comments: Will continue with current tx plan   "

## 2024-04-30 NOTE — PLAN OF CARE
Problem: INFECTION - ADULT  Goal: Absence or prevention of progression during hospitalization  Description: INTERVENTIONS:  - Assess and monitor for signs and symptoms of infection  - Monitor lab/diagnostic results  - Monitor all insertion sites, i.e. indwelling lines, tubes, and drains  - Monitor endotracheal if appropriate and nasal secretions for changes in amount and color  - [place] appropriate cooling/warming therapies per order  - Administer medications as ordered  - Instruct and encourage patient and family to use good hand hygiene technique  - Identify and instruct in appropriate isolation precautions for identified infection/condition  Outcome: Progressing     Problem: RESPIRATORY - ADULT  Goal: Achieves optimal ventilation and oxygenation  Description: INTERVENTIONS:  - Assess for changes in respiratory status  - Assess for changes in mentation and behavior  - Position to facilitate oxygenation and minimize respiratory effort  - Oxygen administered by appropriate delivery if ordered  - Initiate smoking cessation education as indicated  - Encourage broncho-pulmonary hygiene including cough, deep breathe, Incentive Spirometry  - Assess the need for suctioning and aspirate as needed  - Assess and instruct to report SOB or any respiratory difficulty  - Respiratory Therapy support as indicated  Outcome: Progressing     Problem: Knowledge Deficit  Goal: Patient/family/caregiver demonstrates understanding of disease process, treatment plan, medications, and discharge instructions  Description: Complete learning assessment and assess knowledge base.  Interventions:  - Provide teaching at level of understanding  - Provide teaching via preferred learning methods  4/30/2024 1731 by Bartolo Morel RN  Outcome: Progressing  4/30/2024 1730 by Bartolo Morel RN  Outcome: Progressing

## 2024-05-01 VITALS
WEIGHT: 178.79 LBS | DIASTOLIC BLOOD PRESSURE: 84 MMHG | BODY MASS INDEX: 36.04 KG/M2 | RESPIRATION RATE: 18 BRPM | TEMPERATURE: 98.2 F | HEIGHT: 59 IN | HEART RATE: 82 BPM | OXYGEN SATURATION: 98 % | SYSTOLIC BLOOD PRESSURE: 128 MMHG

## 2024-05-01 PROBLEM — J90 PLEURAL EFFUSION: Status: ACTIVE | Noted: 2024-05-01

## 2024-05-01 LAB
ANION GAP SERPL CALCULATED.3IONS-SCNC: 9 MMOL/L (ref 4–13)
ATRIAL RATE: 85 BPM
ATRIAL RATE: 88 BPM
ATRIAL RATE: 90 BPM
ATRIAL RATE: 99 BPM
BACTERIA BLD CULT: NORMAL
BACTERIA BLD CULT: NORMAL
BUN SERPL-MCNC: 15 MG/DL (ref 5–25)
CALCIUM SERPL-MCNC: 8.5 MG/DL (ref 8.4–10.2)
CHLORIDE SERPL-SCNC: 102 MMOL/L (ref 96–108)
CO2 SERPL-SCNC: 26 MMOL/L (ref 21–32)
CREAT SERPL-MCNC: 0.65 MG/DL (ref 0.6–1.3)
ERYTHROCYTE [DISTWIDTH] IN BLOOD BY AUTOMATED COUNT: 13.6 % (ref 11.6–15.1)
GFR SERPL CREATININE-BSD FRML MDRD: 98 ML/MIN/1.73SQ M
GLUCOSE SERPL-MCNC: 144 MG/DL (ref 65–140)
GLUCOSE SERPL-MCNC: 145 MG/DL (ref 65–140)
GLUCOSE SERPL-MCNC: 154 MG/DL (ref 65–140)
HCT VFR BLD AUTO: 43.5 % (ref 34.8–46.1)
HGB BLD-MCNC: 14.3 G/DL (ref 11.5–15.4)
MAGNESIUM SERPL-MCNC: 2.3 MG/DL (ref 1.9–2.7)
MCH RBC QN AUTO: 32.2 PG (ref 26.8–34.3)
MCHC RBC AUTO-ENTMCNC: 32.9 G/DL (ref 31.4–37.4)
MCV RBC AUTO: 98 FL (ref 82–98)
P AXIS: 60 DEGREES
P AXIS: 60 DEGREES
P AXIS: 65 DEGREES
P AXIS: 66 DEGREES
PLATELET # BLD AUTO: 429 THOUSANDS/UL (ref 149–390)
PMV BLD AUTO: 11.4 FL (ref 8.9–12.7)
POTASSIUM SERPL-SCNC: 4.4 MMOL/L (ref 3.5–5.3)
PR INTERVAL: 122 MS
PR INTERVAL: 122 MS
PR INTERVAL: 124 MS
PR INTERVAL: 124 MS
QRS AXIS: 15 DEGREES
QRS AXIS: 18 DEGREES
QRS AXIS: 18 DEGREES
QRS AXIS: 19 DEGREES
QRSD INTERVAL: 72 MS
QRSD INTERVAL: 74 MS
QT INTERVAL: 354 MS
QT INTERVAL: 368 MS
QT INTERVAL: 374 MS
QT INTERVAL: 380 MS
QTC INTERVAL: 445 MS
QTC INTERVAL: 452 MS
QTC INTERVAL: 454 MS
QTC INTERVAL: 457 MS
RBC # BLD AUTO: 4.44 MILLION/UL (ref 3.81–5.12)
SODIUM SERPL-SCNC: 137 MMOL/L (ref 135–147)
T WAVE AXIS: 25 DEGREES
T WAVE AXIS: 26 DEGREES
T WAVE AXIS: 31 DEGREES
T WAVE AXIS: 43 DEGREES
VENTRICULAR RATE: 85 BPM
VENTRICULAR RATE: 88 BPM
VENTRICULAR RATE: 90 BPM
VENTRICULAR RATE: 99 BPM
WBC # BLD AUTO: 13.63 THOUSAND/UL (ref 4.31–10.16)

## 2024-05-01 PROCEDURE — 99232 SBSQ HOSP IP/OBS MODERATE 35: CPT | Performed by: INTERNAL MEDICINE

## 2024-05-01 PROCEDURE — 93005 ELECTROCARDIOGRAM TRACING: CPT

## 2024-05-01 PROCEDURE — 83735 ASSAY OF MAGNESIUM: CPT | Performed by: PHYSICIAN ASSISTANT

## 2024-05-01 PROCEDURE — 94640 AIRWAY INHALATION TREATMENT: CPT

## 2024-05-01 PROCEDURE — 99239 HOSP IP/OBS DSCHRG MGMT >30: CPT | Performed by: PHYSICIAN ASSISTANT

## 2024-05-01 PROCEDURE — 94664 DEMO&/EVAL PT USE INHALER: CPT

## 2024-05-01 PROCEDURE — 94760 N-INVAS EAR/PLS OXIMETRY 1: CPT

## 2024-05-01 PROCEDURE — 93010 ELECTROCARDIOGRAM REPORT: CPT | Performed by: INTERNAL MEDICINE

## 2024-05-01 PROCEDURE — 85027 COMPLETE CBC AUTOMATED: CPT | Performed by: PHYSICIAN ASSISTANT

## 2024-05-01 PROCEDURE — NC001 PR NO CHARGE: Performed by: PHYSICIAN ASSISTANT

## 2024-05-01 PROCEDURE — 80048 BASIC METABOLIC PNL TOTAL CA: CPT | Performed by: PHYSICIAN ASSISTANT

## 2024-05-01 PROCEDURE — 82948 REAGENT STRIP/BLOOD GLUCOSE: CPT

## 2024-05-01 RX ORDER — PREDNISONE 10 MG/1
TABLET ORAL DAILY
Qty: 30 TABLET | Refills: 0 | Status: SHIPPED | OUTPATIENT
Start: 2024-05-01 | End: 2024-05-13

## 2024-05-01 RX ORDER — FUROSEMIDE 10 MG/ML
20 INJECTION INTRAMUSCULAR; INTRAVENOUS ONCE
Status: DISCONTINUED | OUTPATIENT
Start: 2024-05-01 | End: 2024-05-01 | Stop reason: HOSPADM

## 2024-05-01 RX ORDER — PREDNISONE 10 MG/1
TABLET ORAL DAILY
Qty: 39 TABLET | Refills: 0 | Status: SHIPPED | OUTPATIENT
Start: 2024-05-01 | End: 2024-05-01

## 2024-05-01 RX ORDER — BUSPIRONE HYDROCHLORIDE 10 MG/1
10 TABLET ORAL 2 TIMES DAILY PRN
Qty: 30 TABLET | Refills: 0 | Status: SHIPPED | OUTPATIENT
Start: 2024-05-01

## 2024-05-01 RX ORDER — HYDROCODONE BITARTRATE AND HOMATROPINE METHYLBROMIDE ORAL SOLUTION 5; 1.5 MG/5ML; MG/5ML
5 LIQUID ORAL EVERY 4 HOURS PRN
Qty: 120 ML | Refills: 0 | Status: SHIPPED | OUTPATIENT
Start: 2024-05-01 | End: 2024-05-11

## 2024-05-01 RX ADMIN — BUSPIRONE HYDROCHLORIDE 10 MG: 5 TABLET ORAL at 08:35

## 2024-05-01 RX ADMIN — BUPROPION HYDROCHLORIDE 150 MG: 150 TABLET, EXTENDED RELEASE ORAL at 08:34

## 2024-05-01 RX ADMIN — BUDESONIDE AND FORMOTEROL FUMARATE DIHYDRATE 2 PUFF: 160; 4.5 AEROSOL RESPIRATORY (INHALATION) at 08:36

## 2024-05-01 RX ADMIN — Medication 5 ML: at 08:48

## 2024-05-01 RX ADMIN — PANTOPRAZOLE SODIUM 40 MG: 40 TABLET, DELAYED RELEASE ORAL at 08:35

## 2024-05-01 RX ADMIN — METHYLPREDNISOLONE SODIUM SUCCINATE 40 MG: 40 INJECTION, POWDER, FOR SOLUTION INTRAMUSCULAR; INTRAVENOUS at 08:34

## 2024-05-01 RX ADMIN — LEVALBUTEROL HYDROCHLORIDE 1.25 MG: 1.25 SOLUTION RESPIRATORY (INHALATION) at 01:53

## 2024-05-01 RX ADMIN — LEVALBUTEROL HYDROCHLORIDE 1.25 MG: 1.25 SOLUTION RESPIRATORY (INHALATION) at 07:44

## 2024-05-01 RX ADMIN — GABAPENTIN 100 MG: 100 CAPSULE ORAL at 08:36

## 2024-05-01 RX ADMIN — ZOLPIDEM TARTRATE 5 MG: 5 TABLET, COATED ORAL at 00:44

## 2024-05-01 RX ADMIN — IPRATROPIUM BROMIDE 0.5 MG: 0.5 SOLUTION RESPIRATORY (INHALATION) at 07:44

## 2024-05-01 RX ADMIN — FAMOTIDINE 40 MG: 20 TABLET, FILM COATED ORAL at 08:34

## 2024-05-01 RX ADMIN — GUAIFENESIN 600 MG: 600 TABLET, EXTENDED RELEASE ORAL at 08:36

## 2024-05-01 RX ADMIN — Medication 5 ML: at 02:08

## 2024-05-01 NOTE — CASE MANAGEMENT
Case Management Discharge Planning Note    Patient name Lidya Ansari  Location /-01 MRN [id number]  : 1965 Date 2024       Current Admission Date: 2024  Current Admission Diagnosis:SIRS (systemic inflammatory response syndrome) ([facility])   Patient Active Problem List    Diagnosis Date Noted    Pleural effusion 2024    Migraine without status migrainosus, not intractable 2024    COVID-19 2024    HEATHER (obstructive sleep apnea) 2024    Leukocytosis 2023    Cardiomegaly 11/15/2023    Shortness of breath 2023    Neuropathy 2023    Vitamin D deficiency 2023    Rib pain on left side 2023    Fecal incontinence 08/15/2023    COVID-19 long hauler manifesting chronic dyspnea 08/15/2023    Itching 2023    Polypharmacy 2023    Class 2 obesity with body mass index (BMI) of 35.0 to 35.9 in adult 2023    Dysphagia 2023    Narrowing of airway 01/10/2023    Depression with anxiety 2023    Asthma exacerbation 2023    Chronic cough 2022    GERD (gastroesophageal reflux disease) 10/31/2022    Rectus sheath hematoma 10/29/2022    SIRS (systemic inflammatory response syndrome) ([facility]) 10/24/2022    Severe persistent asthma with exacerbation 10/24/2022    Hyperlipidemia 10/24/2022    Type 2 diabetes mellitus without complication, with long-term current use of insulin ([facility]) 10/24/2022    Mitral valve disorder 2022    Essential hypertension 2022    Symptomatic menopausal or female climacteric states 2018      LOS (days): 4  Geometric Mean LOS (GMLOS) (days): 2.9  Days to GMLOS:-1.2     OBJECTIVE:  Risk of Unplanned Readmission Score: 15.87         Current admission status: Inpatient   Preferred Pharmacy:   [id number] - [place], [place] - [address]  [address] [address]  Phone: [phone] Fax: [phone]    Primary Care Provider: Foreign kAbar  Insurance: MEDICARE  Secondary Insurance:     DISCHARGE DETAILS:    IMM Given (Date):: 05/01/24  IMM Given to:: Patient     Additional Comments: CM reviewed IMM with patient at bedside who verbalized understanding and signed IMM. Copy of IMM left w patient and signed original returned to medical records. Patient declining HHC and other discharge needs.

## 2024-05-01 NOTE — PLAN OF CARE
Problem: RESPIRATORY - ADULT  Goal: Achieves optimal ventilation and oxygenation  Description: INTERVENTIONS:  - Assess for changes in respiratory status  - Assess for changes in mentation and behavior  - Position to facilitate oxygenation and minimize respiratory effort  - Oxygen administered by appropriate delivery if ordered  - Initiate smoking cessation education as indicated  - Encourage broncho-pulmonary hygiene including cough, deep breathe, Incentive Spirometry  - Assess the need for suctioning and aspirate as needed  - Assess and instruct to report SOB or any respiratory difficulty  - Respiratory Therapy support as indicated  Outcome: Progressing     Problem: Knowledge Deficit  Goal: Patient/family/caregiver demonstrates understanding of disease process, treatment plan, medications, and discharge instructions  Description: Complete learning assessment and assess knowledge base.  Interventions:  - Provide teaching at level of understanding  - Provide teaching via preferred learning methods  Outcome: Progressing     Problem: DISCHARGE PLANNING  Goal: Discharge to home or other facility with appropriate resources  Description: INTERVENTIONS:  - Identify barriers to discharge w/patient and caregiver  - Arrange for needed discharge resources and transportation as appropriate  - Identify discharge learning needs (meds, wound care, etc.)  - Arrange for interpretive services to assist at discharge as needed  - Refer to Case Management Department for coordinating discharge planning if the patient needs post-hospital services based on physician/advanced practitioner order or complex needs related to functional status, cognitive ability, or social support system  Outcome: Progressing     Problem: SAFETY ADULT  Goal: Maintains/Returns to pre admission functional level  Description: INTERVENTIONS:  - Perform AM-PAC 6 Click Basic Mobility/ Daily Activity assessment daily.  - Set and communicate daily mobility goal to  care team and patient/family/caregiver.   - Collaborate with rehabilitation services on mobility goals if consulted  - Out of bed for toileting  - Record patient progress and toleration of activity level   Outcome: Progressing

## 2024-05-01 NOTE — PLAN OF CARE
Problem: INFECTION - ADULT  Goal: Absence or prevention of progression during hospitalization  Description: INTERVENTIONS:  - Assess and monitor for signs and symptoms of infection  - Monitor lab/diagnostic results  - Monitor all insertion sites, i.e. indwelling lines, tubes, and drains  - Monitor endotracheal if appropriate and nasal secretions for changes in amount and color  - [place]ot appropriate cooling/warming therapies per order  - Administer medications as ordered  - Instruct and encourage patient and family to use good hand hygiene technique  - Identify and instruct in appropriate isolation precautions for identified infection/condition  Outcome: Progressing  Goal: Absence of fever/infection during neutropenic period  Description: INTERVENTIONS:  - Monitor WBC    Outcome: Progressing     Problem: DISCHARGE PLANNING  Goal: Discharge to home or other facility with appropriate resources  Description: INTERVENTIONS:  - Identify barriers to discharge w/patient and caregiver  - Arrange for needed discharge resources and transportation as appropriate  - Identify discharge learning needs (meds, wound care, etc.)  - Arrange for interpretive services to assist at discharge as needed  - Refer to Case Management Department for coordinating discharge planning if the patient needs post-hospital services based on physician/advanced practitioner order or complex needs related to functional status, cognitive ability, or social support system  Outcome: Progressing     Problem: Knowledge Deficit  Goal: Patient/family/caregiver demonstrates understanding of disease process, treatment plan, medications, and discharge instructions  Description: Complete learning assessment and assess knowledge base.  Interventions:  - Provide teaching at level of understanding  - Provide teaching via preferred learning methods  Outcome: Progressing

## 2024-05-01 NOTE — PROGRESS NOTES
"Progress Note - Pulmonary   Lidya Ansari 59 y.o. female MRN: [id number]  Unit/Bed#: -01 Encounter: [id number]    Assessment:  Severe persistent asthma with acute exacerbation  Likely viral upper respiratory infection  GERD, Schatzki's ring status post dilation in March    Plan:  Severe persistent asthma with acute exacerbation in the setting of viral URI with sick contacts at home  She had trace effusions on chest x-ray, slightly elevated BNP at 116  She was given a dose of Lasix yesterday which she feels has helped-to consider additional dosing today  Will transition to prednisone and can decrease by 10 mg every 3 days  Continue Symbicort twice per day with Xopenex and Atrovent, Singulair  Tessalon Perles do not help with the cough, can continue Hycodan as needed  She will need to follow-up with her outpatient pulmonologist and restart a biologic  Discharge either later today or tomorrow    Subjective:   Patient resting in bed.  Does report improvement this morning and her cough and shortness of breath.    Objective:     Vitals: Blood pressure 130/82, pulse 82, temperature 97.9 °F (36.6 °C), resp. rate 18, height 4' 11\" (1.499 m), weight 81.1 kg (178 lb 12.7 oz), SpO2 95%.,Body mass index is 36.11 kg/m².      Intake/Output Summary (Last 24 hours) at 5/1/2024 1058  Last data filed at 4/30/2024 1739  Gross per 24 hour   Intake 400 ml   Output --   Net 400 ml       Invasive Devices       Peripheral Intravenous Line  Duration             Peripheral IV 04/30/24 Left;Ventral (anterior) Forearm <1 day                    Physical Exam: /82   Pulse 82   Temp 97.9 °F (36.6 °C)   Resp 18   Ht 4' 11\" (1.499 m)   Wt 81.1 kg (178 lb 12.7 oz)   LMP  (LMP Unknown)   SpO2 95%   BMI 36.11 kg/m²   General appearance: alert and oriented, in no acute distress  Head: Normocephalic, without obvious abnormality, atraumatic  Eyes: negative findings: conjunctivae and sclerae normal  Lungs:  Cough with deep inspiration, " "improved  Heart: regular rate and rhythm  Abdomen: normal findings: soft, non-tender  Extremities:  no edema  Skin:  warm and dry  Neurologic: Mental status: Alert, oriented, thought content appropriate     Labs: I have personally reviewed pertinent lab results., CBC: No results found for: \"WBC\", \"HGB\", \"HCT\", \"MCV\", \"PLT\", \"ADJUSTEDWBC\", \"RBC\", \"MCH\", \"MCHC\", \"RDW\", \"MPV\", \"NRBC\", CMP: No results found for: \"SODIUM\", \"K\", \"CL\", \"CO2\", \"ANIONGAP\", \"BUN\", \"CREATININE\", \"GLUCOSE\", \"CALCIUM\", \"AST\", \"ALT\", \"ALKPHOS\", \"PROT\", \"BILITOT\", \"EGFR\"  Imaging and other studies: I have personally reviewed pertinent reports.   and I have personally reviewed pertinent films in PACS    "

## 2024-05-01 NOTE — ASSESSMENT & PLAN NOTE
Possible mild volume overload as noted by CXR 4/29, could be iatrogenic from prior IV fluids, no systolic or diastolic dysfunction on last echo  BNP only 116  s/p lasix 20 mg 4/30

## 2024-05-01 NOTE — DISCHARGE SUMMARY
[facility]  Discharge Summary  Name: Lidya Ansari I MRN: [id number]  Unit/Bed#: -01I Date of Admission: 4/26/2024   Date of Service: 4/26/2024  I Hospital Day: 4    * SIRS (systemic inflammatory response syndrome) ([facility])  Assessment & Plan  Present on admission: tachycardia, tachypnea due to asthma exacerbation  Bacterial pneumonia ruled out given normal procal  No further indication for antibiotics, received 5 days of ceftriaxone   Initial lactate 3.1, procal 0.08, WBC 12.52  Treatment as below    Severe persistent asthma with exacerbation  Assessment & Plan  Presents with report of 2 weeks worsening cough/shortness of breath with wheezing; seen in ER 2 weeks ago, discharged home after nebulizer treatment with oral steroid course. Sick contact at home,  + son with URI symptoms.     CXR: bilateral consolidation + atelectasis  Flu/COVID/RSV negative, consider RP2  Current O2 saturation >88% on room air, monitor   Continue scheduled nebs, respiratory protocol  Tolerating IV Solu-Medrol 40 mg q12h - transition to prednisone taper on discharge  Mucinex, hycodan prn  Encouraged ISB 10x per hour while awake, ambulation    Lactic acidosis-resolved as of 4/28/2024  Assessment & Plan  Present on admission, 3.1; climbing to 6.9, now resolved  Suspect type B lactic acidosis secondary to nebulizer use in setting of asthma exacerbation   No evidence of end-organ damage, VSS    Type 2 diabetes mellitus without complication, with long-term current use of insulin ([facility])  Assessment & Plan  Blood Sugar Average: Last 72 hrs: (P) 135.7420952170952589    Controlled a/e/b A1c 4.8%  Hold Ozempic during hospitalization, resume on discharge   Diabetic diet   Noted symptomatic hypoglycemia, now resolved     Hyperkalemia-resolved as of 4/27/2024  Assessment & Plan  Noted to be hyperkalemic on arrival with potassium 5.7  Resolved with appropriate treatment    Pleural effusion  Assessment & Plan  Possible  mild volume overload as noted by CXR 4/29, could be iatrogenic from prior IV fluids, no systolic or diastolic dysfunction on last echo  BNP only 116  s/p lasix 20 mg 4/30    Migraine without status migrainosus, not intractable  Assessment & Plan  Reports migraine history, s/p migraine cocktail here with improvement but still with headache due to persistent cough  Stop reglan due to facial twitching, concern for tardive dyskinesia    Depression with anxiety  Assessment & Plan  Continue home Wellbutrin.    GERD (gastroesophageal reflux disease)  Assessment & Plan  Continue Pepcid with PPI.    Hyperlipidemia  Assessment & Plan  Chronic, continue statin.         Medical Problems       Resolved Problems  Date Reviewed: 5/1/2024            Resolved    Hyperkalemia 4/27/2024     Resolved by  Dorothy Barajas PA-C    Lactic acidosis 4/28/2024     Resolved by  Dorothy Barajas PA-C        Discharging Physician / Practitioner: Dorothy Barajas PA-C  PCP: [facility]on[facility]  Admission Date:   Admission Orders (From admission, onward)       Ordered        04/27/24 1041  INPATIENT ADMISSION  Once            04/26/24 1533  Place in Observation  Once                          Discharge Date: 05/01/24    Consultations During Hospital Stay:  IP CONSULT TO PULMONOLOGY    Procedures Performed:       Significant Findings / Test Results:   XR chest pa & lateral    Result Date: 4/29/2024  Lungs clear with nothing to indicate pneumonia. Trace effusions. Workstation performed: [id number]     XR chest 1 view portable    Result Date: 4/26/2024  Bibasilar infiltrates and atelectasis noted, left greater than right. Workstation performed: [id number]       Incidental Findings:        Test Results Pending at Discharge (will require follow up):        Outpatient Tests Requested:      Complications:      Reason for Admission: asthma exacerbation     Hospital Course:   Lidya Ansari is a 59 y.o. female patient who originally presented to the hospital on  4/26/2024 due to asthma exacerbation with cough and shortness of breath, as well as known sick contacts at home. Given her symptoms and history of severe asthma, she was admitted for treatment and observation. Pulmonology was ultimately consulted due to slow improvement and she is now hemodynamically stable to discharge to home. Complete prednisone taper, continue hycodan as needed. Will also send BuSpar as she does endorse increased anxiety with the steroid and albuterol use.       Please see above list of diagnoses and related plan for additional information.     Condition at Discharge: fair    Discharge Day Visit / Exam:   * Please refer to separate progress note for these details *    Discussion with Family: Patient declined call to .     Discharge instructions/Information to patient and family:   See after visit summary for information provided to patient and family.      Provisions for Follow-Up Care:  See after visit summary for information related to follow-up care and any pertinent home health orders.      Mobility at time of Discharge:   Basic Mobility Inpatient Raw Score: 23  JH-HLM Goal: 7: Walk 25 feet or more  JH-HLM Achieved: 8: Walk 250 feet ot more  HLM Goal achieved. Continue to encourage appropriate mobility.     Disposition:   Home    Planned Readmission:      Discharge Statement:  I spent 40 minutes discharging the patient. This time was spent on the day of discharge. I had direct contact with the patient on the day of discharge. Greater than 50% of the total time was spent examining patient, answering all patient questions, arranging and discussing plan of care with patient as well as directly providing post-discharge instructions.  Additional time then spent on discharge activities.    Discharge Medications:  See after visit summary for reconciled discharge medications provided to patient and/or family.      **Please Note: This note may have been constructed using a voice  recognition system**

## 2024-05-01 NOTE — RESPIRATORY THERAPY NOTE
RT Protocol Note  Lidya Ansari 59 y.o. female MRN: [id number]  Unit/Bed#: -01 Encounter: [id number]    Assessment    Principal Problem:    SIRS (systemic inflammatory response syndrome) (HCC)  Active Problems:    Severe persistent asthma with exacerbation    Hyperlipidemia    Type 2 diabetes mellitus without complication, with long-term current use of insulin (HCC)    GERD (gastroesophageal reflux disease)    Depression with anxiety    Migraine without status migrainosus, not intractable    Pleural effusion      Home Pulmonary Medications:         Past Medical History:   Diagnosis Date    Asthma     Diabetes mellitus (HCC)     GERD (gastroesophageal reflux disease)     Hyperlipidemia     Hypertension      Social History     Socioeconomic History    Marital status: /Civil Union     Spouse name: None    Number of children: None    Years of education: None    Highest education level: None   Occupational History    None   Tobacco Use    Smoking status: Never    Smokeless tobacco: Never   Vaping Use    Vaping status: Never Used   Substance and Sexual Activity    Alcohol use: Yes     Alcohol/week: 7.0 standard drinks of alcohol     Types: 7 Glasses of wine per week    Drug use: Never    Sexual activity: None   Other Topics Concern    None   Social History Narrative    None     Social Determinants of Health     Financial Resource Strain: Not on file   Food Insecurity: No Food Insecurity (4/30/2024)    Hunger Vital Sign     Worried About Running Out of Food in the Last Year: Never true     Ran Out of Food in the Last Year: Never true   Transportation Needs: No Transportation Needs (4/30/2024)    PRAPARE - Transportation     Lack of Transportation (Medical): No     Lack of Transportation (Non-Medical): No   Physical Activity: Not on file   Stress: Not on file   Social Connections: Not on file   Intimate Partner Violence: Not on file   Housing Stability: Low Risk  (4/30/2024)    Housing Stability Vital Sign      "Unable to Pay for Housing in the Last Year: No     Number of Places Lived in the Last Year: 1     Unstable Housing in the Last Year: No       Subjective         Objective    Physical Exam:   Assessment Type: During-treatment  General Appearance: Awake, Alert  Respiratory Pattern: Normal  Chest Assessment: Chest expansion symmetrical  Bilateral Breath Sounds: Diminished, Expiratory wheezes  R Breath Sounds: Expiratory wheezes, Scattered  L Breath Sounds: Expiratory wheezes, Scattered  Cough: Non-productive, Strong  O2 Device: RA    Vitals:  Blood pressure 130/82, pulse 82, temperature 97.9 °F (36.6 °C), resp. rate (P) 18, height 4' 11\" (1.499 m), weight 81.1 kg (178 lb 12.7 oz), SpO2 95%.          Imaging and other studies: I have personally reviewed pertinent reports.      O2 Device: RA     Plan    Respiratory Plan: Mild Distress pathway        Resp Comments: Pt resting and in no apparent distress.  Offers no complaints.  Will cont w/ current therapy.   "

## 2024-05-01 NOTE — RESPIRATORY THERAPY NOTE
04/30/24 1957   Respiratory Protocol   Protocol Initiated? No   Protocol Selection Respiratory   Language Barrier? No   Medical & Social History Reviewed? Yes   Diagnostic Studies Reviewed? Yes   Physical Assessment Performed? Yes   Respiratory Plan Mild Distress pathway   Respiratory Assessment   General Appearance Awake;Alert   Respiratory Pattern Dyspnea with exertion   Chest Assessment Chest expansion symmetrical   Bilateral Breath Sounds Diminished   R Breath Sounds Expiratory wheezes;Scattered   L Breath Sounds Expiratory wheezes;Scattered   Cough Non-productive;Strong;Dry   Resp Comments pt c/o coughing and asking for cough medication   O2 Device room air   Additional Assessments   SpO2 97 %

## 2024-05-01 NOTE — PROGRESS NOTES
[facility]  Progress Note  Name: Lidya Ansari I MRN: [id number]  Unit/Bed#: -01I Date of Admission: 4/26/2024   Date of Service: 4/26/2024  I Hospital Day: 4    * SIRS (systemic inflammatory response syndrome) ([facility])  Assessment & Plan  Present on admission: tachycardia, tachypnea due to asthma exacerbation  Bacterial pneumonia ruled out given normal procal  No further indication for antibiotics, received 5 days of ceftriaxone   Initial lactate 3.1, procal 0.08, WBC 12.52  Treatment as below    Severe persistent asthma with exacerbation  Assessment & Plan  Presents with report of 2 weeks worsening cough/shortness of breath with wheezing; seen in ER 2 weeks ago, discharged home after nebulizer treatment with oral steroid course. Sick contact at home,  + son with URI symptoms.     CXR: bilateral consolidation + atelectasis  Flu/COVID/RSV negative, consider RP2  Current O2 saturation >88% on room air, monitor   Continue scheduled nebs, respiratory protocol  Tolerating IV Solu-Medrol 40 mg q12h - transition to prednisone taper on discharge  Mucinex, hycodan prn  Encouraged ISB 10x per hour while awake, ambulation    Lactic acidosis-resolved as of 4/28/2024  Assessment & Plan  Present on admission, 3.1; climbing to 6.9, now resolved  Suspect type B lactic acidosis secondary to nebulizer use in setting of asthma exacerbation   No evidence of end-organ damage, VSS    Type 2 diabetes mellitus without complication, with long-term current use of insulin ([facility])  Assessment & Plan  Blood Sugar Average: Last 72 hrs: (P) 135.4901662726205260    Controlled a/e/b A1c 4.8%  Hold Ozempic during hospitalization, resume on discharge   Diabetic diet   Noted symptomatic hypoglycemia, now resolved     Hyperkalemia-resolved as of 4/27/2024  Assessment & Plan  Noted to be hyperkalemic on arrival with potassium 5.7  Resolved with appropriate treatment    Pleural effusion  Assessment & Plan  Possible mild  volume overload as noted by CXR 4/29, could be iatrogenic from prior IV fluids, no systolic or diastolic dysfunction on last echo  BNP only 116  s/p lasix 20 mg 4/30    Migraine without status migrainosus, not intractable  Assessment & Plan  Reports migraine history, s/p migraine cocktail here with improvement but still with headache due to persistent cough  Stop reglan due to facial twitching, concern for tardive dyskinesia    Depression with anxiety  Assessment & Plan  Continue home Wellbutrin.    GERD (gastroesophageal reflux disease)  Assessment & Plan  Continue Pepcid with PPI.    Hyperlipidemia  Assessment & Plan  Chronic, continue statin.          VTE Pharmacologic Prophylaxis: VTE Score: 2 Low Risk (Score 0-2) - Encourage Ambulation.    Mobility:   Basic Mobility Inpatient Raw Score: 24  JH-HLM Goal: 8: Walk 250 feet or more  JH-HLM Achieved: 8: Walk 250 feet ot more  JH-HLM Goal achieved. Continue to encourage appropriate mobility.    Patient Centered Rounds: I performed bedside rounds with nursing staff today.   Discussions with Specialists or Other Care Team Provider:      Education and Discussions with Family / Patient: Patient declined call to .     Total Time Spent on Date of Encounter in care of patient: 45 mins. This time was spent on one or more of the following: performing physical exam; counseling and coordination of care; obtaining or reviewing history; documenting in the medical record; reviewing/ordering tests, medications or procedures; communicating with other healthcare professionals and discussing with patient's family/caregivers.    Current Length of Stay: 4 day(s)  Current Patient Status: Inpatient   Certification Statement: The patient will continue to require additional inpatient hospital stay due to pending labs for possible repeat dosing lasix prior to discharge   Discharge Plan: Anticipate discharge later today or tomorrow to home.    Code Status: Level 1 - Full  Code    Subjective:   Doing better this morning, feels the lasix did help. No fevers. Still coughing and notes she will definitely need the hycodan on discharge. Still with facial fasciculations, better. Denies having a neurologist currently, can be referred outpatient. States the imitrex likely culprit because she has been on reglan for years without adverse effect.     Objective:     Vitals:   Temp (24hrs), Av °F (36.7 °C), Min:97.7 °F (36.5 °C), Max:98.2 °F (36.8 °C)    Temp:  [97.7 °F (36.5 °C)-98.2 °F (36.8 °C)] 98.2 °F (36.8 °C)  HR:  [82-91] 85  Resp:  [17-18] 17  BP: (140-154)/(87-92) 150/89  SpO2:  [94 %-98 %] 94 %  Body mass index is 36.11 kg/m².     Input and Output Summary (last 24 hours):     Intake/Output Summary (Last 24 hours) at 2024 0702  Last data filed at 2024 1739  Gross per 24 hour   Intake 520 ml   Output --   Net 520 ml       Physical Exam:   Physical Exam  Vitals and nursing note reviewed.   Constitutional:       General: She is not in acute distress.     Appearance: Normal appearance. She is not ill-appearing or toxic-appearing.   Cardiovascular:      Rate and Rhythm: Normal rate and regular rhythm.      Heart sounds: Normal heart sounds.   Pulmonary:      Effort: Pulmonary effort is normal. No respiratory distress.      Breath sounds: Normal breath sounds. Decreased air movement present. No wheezing.      Comments: With regular respirations, no audible wheezing noted. With deep inspiration, bronchospasm and cough   Abdominal:      General: There is no distension.   Skin:     General: Skin is warm and dry.      Coloration: Skin is not pale.   Neurological:      Mental Status: She is alert and oriented to person, place, and time.   Psychiatric:         Mood and Affect: Mood normal.         Behavior: Behavior normal.           Additional Data:     Labs:  Results from last 7 days   Lab Units 24  0507 24  0616 24  1327   WBC Thousand/uL 16.00*   < > 12.52*    HEMOGLOBIN g/dL 13.3   < > 14.5   HEMATOCRIT % 40.4   < > 44.2   PLATELETS Thousands/uL 432*   < > 412*   SEGS PCT %  --   --  56   LYMPHO PCT %  --   --  33   MONO PCT %  --   --  8   EOS PCT %  --   --  2    < > = values in this interval not displayed.     Results from last 7 days   Lab Units 04/29/24  0507 04/26/24  1741 04/26/24  1424   SODIUM mmol/L 138   < > 136   POTASSIUM mmol/L 4.0   < > 5.7*   CHLORIDE mmol/L 105   < > 104   CO2 mmol/L 25   < > 20*   BUN mg/dL 18   < > 10   CREATININE mg/dL 0.67   < > 0.71   ANION GAP mmol/L 8   < > 12   CALCIUM mg/dL 8.7   < > 8.7   ALBUMIN g/dL  --   --  3.8   TOTAL BILIRUBIN mg/dL  --   --  0.75   ALK PHOS U/L  --   --  137*   ALT U/L  --   --  19   AST U/L  --   --  32   GLUCOSE RANDOM mg/dL 115   < > 136    < > = values in this interval not displayed.     Results from last 7 days   Lab Units 04/26/24  1424   INR  1.01     Results from last 7 days   Lab Units 04/30/24  2228 04/30/24  1541 04/30/24  1056 04/30/24  0747 04/29/24  2100 04/29/24  1610 04/29/24  1043 04/29/24  0721 04/28/24  2116 04/28/24  1902 04/28/24  1752 04/28/24  1609   POC GLUCOSE mg/dl 195* 142* 134 119 138 141* 93 134 115 122 131 159*     Results from last 7 days   Lab Units 04/27/24  0533   HEMOGLOBIN A1C % 4.8     Results from last 7 days   Lab Units 04/29/24  0507 04/28/24  0547 04/27/24  1941 04/27/24  1506 04/27/24  0753 04/26/24  1741 04/26/24  1424   LACTIC ACID mmol/L  --  2.0 2.8* 2.7* 3.9*   < > 3.1*   PROCALCITONIN ng/ml 0.06  --   --   --   --   --  0.08    < > = values in this interval not displayed.       Lines/Drains:  Invasive Devices       Peripheral Intravenous Line  Duration             Peripheral IV 04/30/24 Left;Ventral (anterior) Forearm <1 day                          Imaging: No pertinent imaging reviewed.    Recent Cultures (last 7 days):   Results from last 7 days   Lab Units 04/26/24  1425 04/26/24  1424   BLOOD CULTURE  No Growth After 4 Days. No Growth After 4 Days.        Last 24 Hours Medication List:   Current Facility-Administered Medications   Medication Dose Route Frequency Provider Last Rate    acetaminophen  650 mg Oral Q6H PRN EDUARDA Virk      budesonide-formoterol  2 puff Inhalation BID Mana Bowman MD      buPROPion  150 mg Oral Daily Mana Bowman MD      busPIRone  10 mg Oral BID Dorothy Barajas PA-C      diphenhydrAMINE  25 mg Intravenous Q6H PRN Dorothy Barajas PA-C      famotidine  40 mg Oral BID Mana Bowman MD      gabapentin  100 mg Oral TID Mana Bowman MD      guaiFENesin  600 mg Oral Q12H KAYLEIGH Mana Bowman MD      HYDROcodone Bit-[place] MBr  5 mL Oral Q4H PRN Dorothy Barajas PA-C      hydrOXYzine HCL  25 mg Oral Q6H PRN Dorothy Barajas PA-C      insulin lispro  1-5 Units Subcutaneous TID AC Mana Bowman MD      ipratropium  0.5 mg Nebulization BID Harry Griffith MD      levalbuterol  1.25 mg Nebulization Q6H PRN Harry Griffith MD      levalbuterol  1.25 mg Nebulization BID Harry Griffith MD      methylPREDNISolone sodium succinate  40 mg Intravenous Q12H KAYLEIGH Dorothy Barajas PA-C      montelukast  10 mg Oral HS Jairo Cobb PA-C      nystatin  500,000 Units Swish & Swallow 4x Daily Dorothy Barajas PA-C      pantoprazole  40 mg Oral BID Mana Bowman MD      pravastatin  40 mg Oral Daily With Dinner Mana Bowman MD      zolpidem  5 mg Oral HS PRN Dorothy Barajas PA-C          Today, Patient Was Seen By: Doorthy Barajas PA-C    **Please Note: This note may have been constructed using a voice recognition system.**

## 2024-05-02 ENCOUNTER — HOSPITAL ENCOUNTER (EMERGENCY)
Facility: HOSPITAL | Age: 59
Discharge: HOME | End: 2024-05-02
Attending: EMERGENCY MEDICINE
Payer: MEDICARE

## 2024-05-02 ENCOUNTER — APPOINTMENT (EMERGENCY)
Dept: CT IMAGING | Facility: HOSPITAL | Age: 59
End: 2024-05-02
Payer: MEDICARE

## 2024-05-02 VITALS
HEIGHT: 59 IN | RESPIRATION RATE: 17 BRPM | OXYGEN SATURATION: 94 % | DIASTOLIC BLOOD PRESSURE: 55 MMHG | HEART RATE: 82 BPM | SYSTOLIC BLOOD PRESSURE: 115 MMHG | WEIGHT: 179 LBS | TEMPERATURE: 98 F | BODY MASS INDEX: 36.08 KG/M2

## 2024-05-02 DIAGNOSIS — S22.31XA CLOSED FRACTURE OF ONE RIB OF RIGHT SIDE, INITIAL ENCOUNTER: Primary | ICD-10-CM

## 2024-05-02 LAB
ALBUMIN SERPL BCP-MCNC: 3.6 G/DL (ref 3.5–5)
ALP SERPL-CCNC: 105 U/L (ref 34–104)
ALT SERPL W P-5'-P-CCNC: 31 U/L (ref 7–52)
ANION GAP SERPL CALCULATED.3IONS-SCNC: 7 MMOL/L (ref 4–13)
AST SERPL W P-5'-P-CCNC: 35 U/L (ref 13–39)
BASOPHILS # BLD MANUAL: 0 THOUSAND/UL (ref 0–0.1)
BASOPHILS NFR MAR MANUAL: 0 % (ref 0–1)
BILIRUB SERPL-MCNC: 0.54 MG/DL (ref 0.2–1)
BUN SERPL-MCNC: 23 MG/DL (ref 5–25)
CALCIUM SERPL-MCNC: 8.5 MG/DL (ref 8.4–10.2)
CHLORIDE SERPL-SCNC: 102 MMOL/L (ref 96–108)
CO2 SERPL-SCNC: 30 MMOL/L (ref 21–32)
CREAT SERPL-MCNC: 0.84 MG/DL (ref 0.6–1.3)
EOSINOPHIL # BLD MANUAL: 0 THOUSAND/UL (ref 0–0.4)
EOSINOPHIL NFR BLD MANUAL: 0 % (ref 0–6)
ERYTHROCYTE [DISTWIDTH] IN BLOOD BY AUTOMATED COUNT: 13.7 % (ref 11.6–15.1)
GFR SERPL CREATININE-BSD FRML MDRD: 77 ML/MIN/1.73SQ M
GLUCOSE SERPL-MCNC: 73 MG/DL (ref 65–140)
HCT VFR BLD AUTO: 44.9 % (ref 34.8–46.1)
HGB BLD-MCNC: 14.5 G/DL (ref 11.5–15.4)
LIPASE SERPL-CCNC: 22 U/L (ref 11–82)
LYMPHOCYTES # BLD AUTO: 40 % (ref 14–44)
LYMPHOCYTES # BLD AUTO: 6.89 THOUSAND/UL (ref 0.6–4.47)
MCH RBC QN AUTO: 32 PG (ref 26.8–34.3)
MCHC RBC AUTO-ENTMCNC: 32.3 G/DL (ref 31.4–37.4)
MCV RBC AUTO: 99 FL (ref 82–98)
MONOCYTES # BLD AUTO: 2.07 THOUSAND/UL (ref 0–1.22)
MONOCYTES NFR BLD: 12 % (ref 4–12)
MYELOCYTE ABSOLUTE CT: 0.17 THOUSAND/UL (ref 0–0.1)
MYELOCYTES NFR BLD MANUAL: 1 % (ref 0–1)
NEUTROPHILS # BLD MANUAL: 8.1 THOUSAND/UL (ref 1.85–7.62)
NEUTS SEG NFR BLD AUTO: 47 % (ref 43–75)
PLATELET # BLD AUTO: 410 THOUSANDS/UL (ref 149–390)
PLATELET BLD QL SMEAR: ABNORMAL
PMV BLD AUTO: 10.8 FL (ref 8.9–12.7)
POTASSIUM SERPL-SCNC: 3.6 MMOL/L (ref 3.5–5.3)
PROT SERPL-MCNC: 7.1 G/DL (ref 6.4–8.4)
RBC # BLD AUTO: 4.53 MILLION/UL (ref 3.81–5.12)
SODIUM SERPL-SCNC: 139 MMOL/L (ref 135–147)
WBC # BLD AUTO: 17.23 THOUSAND/UL (ref 4.31–10.16)

## 2024-05-02 PROCEDURE — 83690 ASSAY OF LIPASE: CPT | Performed by: EMERGENCY MEDICINE

## 2024-05-02 PROCEDURE — 85007 BL SMEAR W/DIFF WBC COUNT: CPT | Performed by: EMERGENCY MEDICINE

## 2024-05-02 PROCEDURE — 36415 COLL VENOUS BLD VENIPUNCTURE: CPT | Performed by: EMERGENCY MEDICINE

## 2024-05-02 PROCEDURE — 96366 THER/PROPH/DIAG IV INF ADDON: CPT

## 2024-05-02 PROCEDURE — 99284 EMERGENCY DEPT VISIT MOD MDM: CPT

## 2024-05-02 PROCEDURE — 85027 COMPLETE CBC AUTOMATED: CPT | Performed by: EMERGENCY MEDICINE

## 2024-05-02 PROCEDURE — 71260 CT THORAX DX C+: CPT

## 2024-05-02 PROCEDURE — 96365 THER/PROPH/DIAG IV INF INIT: CPT

## 2024-05-02 PROCEDURE — 93005 ELECTROCARDIOGRAM TRACING: CPT

## 2024-05-02 PROCEDURE — 80053 COMPREHEN METABOLIC PANEL: CPT | Performed by: EMERGENCY MEDICINE

## 2024-05-02 PROCEDURE — 99285 EMERGENCY DEPT VISIT HI MDM: CPT | Performed by: EMERGENCY MEDICINE

## 2024-05-02 PROCEDURE — 74177 CT ABD & PELVIS W/CONTRAST: CPT

## 2024-05-02 PROCEDURE — 96375 TX/PRO/DX INJ NEW DRUG ADDON: CPT

## 2024-05-02 PROCEDURE — 96376 TX/PRO/DX INJ SAME DRUG ADON: CPT

## 2024-05-02 RX ORDER — MORPHINE SULFATE 4 MG/ML
4 INJECTION, SOLUTION INTRAMUSCULAR; INTRAVENOUS ONCE
Status: COMPLETED | OUTPATIENT
Start: 2024-05-02 | End: 2024-05-02

## 2024-05-02 RX ORDER — MORPHINE SULFATE 15 MG/1
15 TABLET ORAL EVERY 6 HOURS PRN
Qty: 14 TABLET | Refills: 0 | Status: SHIPPED | OUTPATIENT
Start: 2024-05-02

## 2024-05-02 RX ORDER — HYDROCODONE BITARTRATE AND HOMATROPINE METHYLBROMIDE ORAL SOLUTION 5; 1.5 MG/5ML; MG/5ML
5 LIQUID ORAL ONCE
Status: COMPLETED | OUTPATIENT
Start: 2024-05-02 | End: 2024-05-02

## 2024-05-02 RX ORDER — MAGNESIUM SULFATE HEPTAHYDRATE 40 MG/ML
2 INJECTION, SOLUTION INTRAVENOUS ONCE
Status: COMPLETED | OUTPATIENT
Start: 2024-05-02 | End: 2024-05-02

## 2024-05-02 RX ADMIN — IOHEXOL 100 ML: 350 INJECTION, SOLUTION INTRAVENOUS at 18:50

## 2024-05-02 RX ADMIN — MAGNESIUM SULFATE HEPTAHYDRATE 2 G: 40 INJECTION, SOLUTION INTRAVENOUS at 18:29

## 2024-05-02 RX ADMIN — MORPHINE SULFATE 4 MG: 4 INJECTION INTRAVENOUS at 18:27

## 2024-05-02 RX ADMIN — MORPHINE SULFATE 4 MG: 4 INJECTION INTRAVENOUS at 21:34

## 2024-05-02 RX ADMIN — HYDROCODONE BITARTRATE AND HOMATROPINE METHYLBROMIDE ORAL SOLUTION 5 ML: 5; 1.5 LIQUID ORAL at 17:51

## 2024-05-02 NOTE — ED PROVIDER NOTES
"Pt Name: Lidya Ansari  MRN: [id number]  Birthdate 1965  Age/Sex: 59 y.o. female  Date of evaluation: 5/2/2024  PCP: [facility]    CHIEF COMPLAINT    Chief Complaint   Patient presents with    Asthma     Discharged yesterday for same, patient reports continued cough, shortness of breath and new onset of right upper \"sharp\" abdominal pain that started this morning.     Abdominal Pain         HPI and MDM    59 y.o. female presenting with right upper flank/abdominal pain.  Patient recently discharged yesterday for asthma exacerbation, still has wheezing, states she has been taking Hycodan for cough, using her inhalers, is on prednisone.  States she has been coughing a lot, and started having pain in her right upper quadrant/flank this morning.  States she has had a hematoma in her abdominal wall in the past from coughing.  Pain is worse with coughing.  No chest pain.  No fevers or chills.      Differential diagnosis considered includes but not limited to asthma exacerbation, rib fracture, pneumonia, pleural effusion, hematoma, cholecystitis, cholelithiasis, ureterolithiasis.    Patient does not want any breathing treatments currently.  Just wants for pain control.  I discussed that her wheezing and shortness of breath may get worse however she does not want a breathing treatment, she understands the risks and benefits.  She is not requiring any supplemental oxygen currently.      ED Course as of 05/02/24 2331   Thu May 02, 2024   1855 WBC(!): 17.23  Pt on steroids      CT scan results as below, patient has rib fracture.  Feeling better upon reevaluation.  Wheezing is resolved with IV magnesium.  She is already on steroids for her asthma.  Patient does have cough medication at home.  Sent pain medication for her rib fracture.  Standard narcotic precautions were discussed, patient verbalized understanding.  Return precautions were discussed, patient verbalized understanding and is agreement with " plan.    Medications   morphine injection 4 mg (4 mg Intravenous Given 24)   magnesium sulfate 2 g/50 mL IVPB (premix) 2 g (0 g Intravenous Stopped 24)   HYDROcodone Bit-Homatrop MBr (HYCODAN) oral syrup 5 mL (5 mL Oral Given 24)   iohexol (OMNIPAQUE) 350 MG/ML injection (MULTI-DOSE) 100 mL (100 mL Intravenous Given 24)   morphine injection 4 mg (4 mg Intravenous Given 24)         Past Medical and Surgical History    Past Medical History:   Diagnosis Date    Asthma     Diabetes mellitus (HCC)     GERD (gastroesophageal reflux disease)     Hyperlipidemia     Hypertension        Past Surgical History:   Procedure Laterality Date     SECTION      COLONOSCOPY      HYSTERECTOMY      OVARIAN CYST REMOVAL         History reviewed. No pertinent family history.    Social History     Tobacco Use    Smoking status: Never    Smokeless tobacco: Never   Vaping Use    Vaping status: Never Used   Substance Use Topics    Alcohol use: Yes     Alcohol/week: 7.0 standard drinks of alcohol     Types: 7 Glasses of wine per week    Drug use: Never           Allergies    Allergies   Allergen Reactions    Reglan [Metoclopramide] Tremor     Causes tardive dyskinesia     Codeine Other (See Comments)    Imitrex [Sumatriptan] Other (See Comments)     Blurred vision, numbness and tingling,weird feeling in throat     Aspirin GI Intolerance and Rash    Hydromorphone Rash and Other (See Comments)     GI upset      Oxycodone-Acetaminophen Rash and Other (See Comments)    Tramadol Rash and Other (See Comments)       Home Medications    Prior to Admission medications    Medication Sig Start Date End Date Taking? Authorizing Provider   Blood Pressure Monitoring (Blood Pressure Monitor 3) CARLOS MANUEL Use as directed 10/21/22   Historical Provider, MD   budesonide-formoterol (SYMBICORT) 160-4.5 mcg/act inhaler Every 12 hours    Historical Provider, MD   buPROPion (Wellbutrin SR) 150 mg 12 hr tablet Every 12  hours 8/12/23   Historical Provider, MD   busPIRone (BUSPAR) 10 mg tablet Take 1 tablet (10 mg total) by mouth 2 (two) times a day as needed (anxiety) 5/1/24   Dorothy Barajas PA-C   dicyclomine (BENTYL) 20 mg tablet TAKE 1 TABLET BY MOUTH EVERY 6 HOURS AS NEEDED FOR ABDOMINAL CRAMPING 10/10/23   Corine Sidhu PA-C   EPINEPHrine (EPIPEN) 0.3 mg/0.3 mL SOAJ as directed    Historical Provider, MD   famotidine (PEPCID) 40 MG tablet TAKE 1 TABLET BY MOUTH TWICE A DAY WITH LUNCH AND BEFORE BEDTIME 10/24/23   Corine Sidhu PA-C   fluticasone-vilanterol 200-25 mcg/actuation inhaler 1 puff every 24 hours    Historical Provider, MD   gabapentin (NEURONTIN) 100 mg capsule Take 100 mg by mouth 3 (three) times a day as needed 5/2/23   Historical Provider, MD   HYDROcodone Bit-Homatrop MBr (HYCODAN) 5-1.5 mg/5 mL syrup Take 5 mL by mouth every 4 (four) hours as needed for cough for up to 10 days Max Daily Amount: 30 mL 5/1/24 5/11/24  Dorothy Barajas PA-C   hydrOXYzine HCL (ATARAX) 25 mg tablet Take 1 tablet (25 mg total) by mouth every 6 (six) hours as needed for itching for up to 14 days 6/16/23 6/30/23  EDUARDA Parker   insulin aspart (NovoLOG FlexPen) 100 UNIT/ML injection pen Inject 3 Units under the skin 3 (three) times a day with meals 6/16/23   EDUARDA Parker   ipratropium (ATROVENT) 0.02 % nebulizer solution Take 2.5 mL (0.5 mg total) by nebulization 3 (three) times a day 11/1/22 2/13/24  Sumit Garcia MD   ipratropium-albuterol (DUO-NEB) 0.5-2.5 mg/3 mL nebulizer solution Take 3 mL by nebulization every 4 (four) hours as needed for wheezing or shortness of breath 11/1/22   Sumit Garcia MD   Melatonin 10 MG CAPS Take 10 mg by mouth daily at bedtime as needed 3/25/23   Historical Provider, MD   metoclopramide (REGLAN) 5 mg tablet Take 1 tablet (5 mg total) by mouth 4 (four) times a day 2/5/24   Joselito Caballero DO   montelukast (SINGULAIR) 10 mg tablet Take 1 tablet (10 mg total) by mouth  daily at bedtime 11/1/22 2/13/24  Sumit Garcia MD   nystatin (MYCOSTATIN) 500,000 units/5 mL suspension Swish and swallow 5 mL (500,000 Units total) 4 (four) times a day 5/1/24   Dorothy Barajas PA-C   ondansetron (ZOFRAN) 4 mg tablet Take 1 tablet by mouth every 8 (eight) hours    Historical Provider, MD   OneTouch Ultra test strip TEST TWICE A DAY 10/21/22   Historical Provider, MD   Ozempic, 2 MG/DOSE, 8 MG/3ML injection pen INJECT 2MG SUBCUTANEOUSLY ONCE WEEKLY  Patient not taking: Reported on 4/26/2024 12/28/23   Historical Provider, MD   pantoprazole (PROTONIX) 40 mg Take 1 packet (40 mg total) by mouth 2 (two) times a day before meals 3/1/23   Laurence Painting MD   predniSONE 10 mg tablet Take 4 tablets (40 mg total) by mouth daily for 3 days, THEN 3 tablets (30 mg total) daily for 3 days, THEN 2 tablets (20 mg total) daily for 3 days, THEN 1 tablet (10 mg total) daily for 3 days. 5/1/24 5/13/24  Dorothy Barajas PA-C   rosuvastatin (CRESTOR) 5 mg tablet Take 1 tablet by mouth daily    Historical Provider, MD   zolpidem (AMBIEN) 10 mg tablet Take 1 tablet (10 mg total) by mouth daily at bedtime as needed for sleep for up to 7 days 6/16/23 4/26/24  EDUARDA Parker           Physical Exam      ED Triage Vitals   Temperature Pulse Respirations Blood Pressure SpO2   05/02/24 1706 05/02/24 1706 05/02/24 1706 05/02/24 1706 05/02/24 1706   98 °F (36.7 °C) 92 20 131/78 94 %      Temp Source Heart Rate Source Patient Position - Orthostatic VS BP Location FiO2 (%)   05/02/24 1706 05/02/24 1706 05/02/24 1706 05/02/24 1706 --   Temporal Monitor Sitting Left arm       Pain Score       05/02/24 1827       7               Physical Exam  Constitutional:       Appearance: She is not diaphoretic.   HENT:      Head: Normocephalic and atraumatic.      Nose: Nose normal.      Mouth/Throat:      Mouth: Mucous membranes are moist.   Eyes:      Extraocular Movements: Extraocular movements intact.      Pupils: Pupils are  equal, round, and reactive to light.   Cardiovascular:      Rate and Rhythm: Normal rate and regular rhythm.   Pulmonary:      Comments: Right lower lateral chest wall tenderness to palpation.  Diffuse inspiratory and expiratory wheezing appreciated throughout lung fields.  Abdominal:      General: There is no distension.      Palpations: Abdomen is soft.      Comments: Right upper quadrant/right upper flank tenderness to palpation   Musculoskeletal:         General: No swelling or deformity. Normal range of motion.      Cervical back: Normal range of motion and neck supple.   Skin:     General: Skin is warm.      Findings: No erythema.   Neurological:      Mental Status: She is alert and oriented to person, place, and time. Mental status is at baseline.              Diagnostic Results      Labs:    Results Reviewed       Procedure Component Value Units Date/Time    CBC and differential [[id number]]  (Abnormal) Collected: 05/02/24 1826    Lab Status: Final result Specimen: Blood from Arm, Left Updated: 05/02/24 1937     WBC 17.23 Thousand/uL      RBC 4.53 Million/uL      Hemoglobin 14.5 g/dL      Hematocrit 44.9 %      MCV 99 fL      MCH 32.0 pg      MCHC 32.3 g/dL      RDW 13.7 %      MPV 10.8 fL      Platelets 410 Thousands/uL     Narrative:      This is an appended report.  These results have been appended to a previously verified report.    Manual Differential(PHLEBS Do Not Order) [[id number]]  (Abnormal) Collected: 05/02/24 1826    Lab Status: Final result Specimen: Blood from Arm, Left Updated: 05/02/24 1937     Segmented % 47 %      Lymphocytes % 40 %      Monocytes % 12 %      Eosinophils % 0 %      Basophils % 0 %      Myelocytes % 1 %      Absolute Neutrophils 8.10 Thousand/uL      Absolute Lymphocytes 6.89 Thousand/uL      Absolute Monocytes 2.07 Thousand/uL      Absolute Eosinophils 0.00 Thousand/uL      Absolute Basophils 0.00 Thousand/uL      Absolute Myelocytes 0.17 Thousand/uL      Total Counted --      Platelet Estimate Increased    Comprehensive metabolic panel [[id number]]  (Abnormal) Collected: 05/02/24 1826    Lab Status: Final result Specimen: Blood from Arm, Left Updated: 05/02/24 1917     Sodium 139 mmol/L      Potassium 3.6 mmol/L      Chloride 102 mmol/L      CO2 30 mmol/L      ANION GAP 7 mmol/L      BUN 23 mg/dL      Creatinine 0.84 mg/dL      Glucose 73 mg/dL      Calcium 8.5 mg/dL      AST 35 U/L      ALT 31 U/L      Alkaline Phosphatase 105 U/L      Total Protein 7.1 g/dL      Albumin 3.6 g/dL      Total Bilirubin 0.54 mg/dL      eGFR 77 ml/min/1.73sq m     Narrative:      [employer] guidelines for Chronic Kidney Disease (CKD):     Stage 1 with normal or high GFR (GFR > 90 mL/min/1.73 square meters)    Stage 2 Mild CKD (GFR = 60-89 mL/min/1.73 square meters)    Stage 3A Moderate CKD (GFR = 45-59 mL/min/1.73 square meters)    Stage 3B Moderate CKD (GFR = 30-44 mL/min/1.73 square meters)    Stage 4 Severe CKD (GFR = 15-29 mL/min/1.73 square meters)    Stage 5 End Stage CKD (GFR <15 mL/min/1.73 square meters)  Note: GFR calculation is accurate only with a steady state creatinine    Lipase [[id number]]  (Normal) Collected: 05/02/24 1826    Lab Status: Final result Specimen: Blood from Arm, Left Updated: 05/02/24 1917     Lipase 22 u/L             All labs reviewed and utilized in the medical decision making process    Radiology:    CT chest abdomen pelvis w contrast   Final Result   Motion-degraded evaluation of the ribs. Suspected mildly displaced fracture of the lateral right rib 9.   No acute intrathoracic or intra-abdominal pathology.   Incidental findings, as per the body of the report.                  Workstation performed: [id number]             All radiology studies independently viewed by me and interpreted by the radiologist.    Procedure    Procedures        FINAL IMPRESSION    Final diagnoses:   Closed fracture of one rib of right side, initial encounter          DISPOSITION    Time reflects when diagnosis was documented in both MDM as applicable and the Disposition within this note       Time User Action Codes Description Comment    5/2/2024  9:26 PM Gerson Eufemia Add [S22.31XA] Closed fracture of one rib of right side, initial encounter           ED Disposition       ED Disposition   Discharge    Condition   Stable    Date/Time   Thu May 2, 2024  9:26 PM    Comment   Lidya Ansari discharge to home/self care.                   Follow-up Information       Follow up With Specialties Details Why Contact Info    [facility]  Call in 1 day  [address] [facility] [id number]                PATIENT REFERRED TO:    [facility]  [address] [facility] [id number]    Call in 1 day        DISCHARGE MEDICATIONS:    Discharge Medication List as of 5/2/2024  9:42 PM        START taking these medications    Details   morphine (MSIR) 15 mg tablet Take 1 tablet (15 mg total) by mouth every 6 (six) hours as needed for severe pain for up to 14 doses Max Daily Amount: 60 mg, Starting Thu 5/2/2024, Normal           CONTINUE these medications which have NOT CHANGED    Details   Blood Pressure Monitoring (Blood Pressure Monitor 3) CARLOS MANUEL Use as directed, Starting Fri 10/21/2022, Historical Med      budesonide-formoterol (SYMBICORT) 160-4.5 mcg/act inhaler Every 12 hours, Historical Med      buPROPion (Wellbutrin SR) 150 mg 12 hr tablet Every 12 hours, Starting Sat 8/12/2023, Historical Med      busPIRone (BUSPAR) 10 mg tablet Take 1 tablet (10 mg total) by mouth 2 (two) times a day as needed (anxiety), Starting Wed 5/1/2024, Normal      dicyclomine (BENTYL) 20 mg tablet TAKE 1 TABLET BY MOUTH EVERY 6 HOURS AS NEEDED FOR ABDOMINAL CRAMPING, Normal      EPINEPHrine (EPIPEN) 0.3 mg/0.3 mL SOAJ as directed, Historical Med      famotidine (PEPCID) 40 MG tablet TAKE 1 TABLET BY MOUTH TWICE A DAY WITH LUNCH AND BEFORE BEDTIME, Normal      fluticasone-vilanterol 200-25  mcg/actuation inhaler 1 puff every 24 hours, Historical Med      gabapentin (NEURONTIN) 100 mg capsule Take 100 mg by mouth 3 (three) times a day as needed, Starting Tue 5/2/2023, Historical Med      HYDROcodone Bit-Homatrop MBr (HYCODAN) 5-1.5 mg/5 mL syrup Take 5 mL by mouth every 4 (four) hours as needed for cough for up to 10 days Max Daily Amount: 30 mL, Starting Wed 5/1/2024, Until Sat 5/11/2024 at 2359, Normal      hydrOXYzine HCL (ATARAX) 25 mg tablet Take 1 tablet (25 mg total) by mouth every 6 (six) hours as needed for itching for up to 14 days, Starting Fri 6/16/2023, Until Fri 6/30/2023 at 2359, Normal      insulin aspart (NovoLOG FlexPen) 100 UNIT/ML injection pen Inject 3 Units under the skin 3 (three) times a day with meals, Starting Fri 6/16/2023, Normal      ipratropium (ATROVENT) 0.02 % nebulizer solution Take 2.5 mL (0.5 mg total) by nebulization 3 (three) times a day, Starting Tue 11/1/2022, Until Tue 2/13/2024, Normal      ipratropium-albuterol (DUO-NEB) 0.5-2.5 mg/3 mL nebulizer solution Take 3 mL by nebulization every 4 (four) hours as needed for wheezing or shortness of breath, Starting Tue 11/1/2022, No Print      Melatonin 10 MG CAPS Take 10 mg by mouth daily at bedtime as needed, Starting Sat 3/25/2023, Historical Med      metoclopramide (REGLAN) 5 mg tablet Take 1 tablet (5 mg total) by mouth 4 (four) times a day, Starting Mon 2/5/2024, Normal      montelukast (SINGULAIR) 10 mg tablet Take 1 tablet (10 mg total) by mouth daily at bedtime, Starting Tue 11/1/2022, Until Tue 2/13/2024, Normal      nystatin (MYCOSTATIN) 500,000 units/5 mL suspension Swish and swallow 5 mL (500,000 Units total) 4 (four) times a day, Starting Wed 5/1/2024, Normal      ondansetron (ZOFRAN) 4 mg tablet Take 1 tablet by mouth every 8 (eight) hours, Historical Med      OneTouch Ultra test strip TEST TWICE A DAY, Historical Med      Ozempic, 2 MG/DOSE, 8 MG/3ML injection pen INJECT 2MG SUBCUTANEOUSLY ONCE WEEKLY,  Historical Med      pantoprazole (PROTONIX) 40 mg Take 1 packet (40 mg total) by mouth 2 (two) times a day before meals, Starting Wed 3/1/2023, Normal      predniSONE 10 mg tablet Multiple Dosages:Starting Wed 5/1/2024, Until Fri 5/3/2024, THEN Starting Sat 5/4/2024, Until Mon 5/6/2024, THEN Starting Tue 5/7/2024, Until Thu 5/9/2024, THEN Starting Fri 5/10/2024, Until Sun 5/12/2024Take 4 tablets (40 mg total) by mouth daily f or 3 days, THEN 3 tablets (30 mg total) daily for 3 days, THEN 2 tablets (20 mg total) daily for 3 days, THEN 1 tablet (10 mg total) daily for 3 days., Normal      rosuvastatin (CRESTOR) 5 mg tablet Take 1 tablet by mouth daily, Historical Med      zolpidem (AMBIEN) 10 mg tablet Take 1 tablet (10 mg total) by mouth daily at bedtime as needed for sleep for up to 7 days, Starting Fri 6/16/2023, Until Fri 4/26/2024 at 2359, Normal             No discharge procedures on file.         Eufemia Nieto DO        This note was partially completed using voice recognition technology, and was scanned for gross errors; however some errors may still exist. Please contact the author with any questions or requests for clarification.      Eufemia Nieto DO  05/02/24 [id number]

## 2024-05-03 LAB
ATRIAL RATE: 87 BPM
P AXIS: 74 DEGREES
PR INTERVAL: 124 MS
QRS AXIS: 63 DEGREES
QRSD INTERVAL: 78 MS
QT INTERVAL: 366 MS
QTC INTERVAL: 440 MS
T WAVE AXIS: 53 DEGREES
VENTRICULAR RATE: 87 BPM

## 2024-05-03 PROCEDURE — 93010 ELECTROCARDIOGRAM REPORT: CPT | Performed by: INTERNAL MEDICINE

## 2024-05-03 NOTE — DISCHARGE INSTRUCTIONS
Do NOT drive or operate machinery while taking narcotic pain medication.    Return to the emergency department for any new or worsening symptoms.

## 2024-05-10 LAB
ATRIAL RATE: 88 BPM
P AXIS: 73 DEGREES
PR INTERVAL: 122 MS
QRS AXIS: 51 DEGREES
QRSD INTERVAL: 66 MS
QT INTERVAL: 378 MS
QTC INTERVAL: 457 MS
T WAVE AXIS: 47 DEGREES
VENTRICULAR RATE: 88 BPM

## 2024-05-16 ENCOUNTER — OFFICE VISIT (OUTPATIENT)
Dept: GASTROENTEROLOGY | Facility: CLINIC | Age: 59
End: 2024-05-16
Payer: MEDICARE

## 2024-05-16 ENCOUNTER — TELEPHONE (OUTPATIENT)
Dept: GASTROENTEROLOGY | Facility: CLINIC | Age: 59
End: 2024-05-16

## 2024-05-16 VITALS
BODY MASS INDEX: 35.48 KG/M2 | OXYGEN SATURATION: 98 % | DIASTOLIC BLOOD PRESSURE: 84 MMHG | SYSTOLIC BLOOD PRESSURE: 122 MMHG | HEIGHT: 59 IN | RESPIRATION RATE: 18 BRPM | WEIGHT: 176 LBS | TEMPERATURE: 98 F | HEART RATE: 91 BPM

## 2024-05-16 DIAGNOSIS — J45.51 SEVERE PERSISTENT ASTHMA WITH ACUTE EXACERBATION: ICD-10-CM

## 2024-05-16 DIAGNOSIS — G62.9 NEUROPATHY: ICD-10-CM

## 2024-05-16 DIAGNOSIS — R10.13 EPIGASTRIC PAIN: ICD-10-CM

## 2024-05-16 DIAGNOSIS — K44.9 HIATAL HERNIA: ICD-10-CM

## 2024-05-16 DIAGNOSIS — K21.9 GASTROESOPHAGEAL REFLUX DISEASE, UNSPECIFIED WHETHER ESOPHAGITIS PRESENT: Primary | ICD-10-CM

## 2024-05-16 PROCEDURE — G2211 COMPLEX E/M VISIT ADD ON: HCPCS | Performed by: PHYSICIAN ASSISTANT

## 2024-05-16 PROCEDURE — 99214 OFFICE O/P EST MOD 30 MIN: CPT | Performed by: PHYSICIAN ASSISTANT

## 2024-05-16 RX ORDER — TIRZEPATIDE 5 MG/.5ML
INJECTION, SOLUTION SUBCUTANEOUS
COMMUNITY
End: 2024-06-04

## 2024-05-16 RX ORDER — CLOTRIMAZOLE 10 MG/1
LOZENGE ORAL; TOPICAL
COMMUNITY
Start: 2024-05-15

## 2024-05-16 RX ORDER — LIDOCAINE HYDROCHLORIDE 20 MG/ML
15 SOLUTION OROPHARYNGEAL 4 TIMES DAILY PRN
Qty: 100 ML | Refills: 0 | Status: SHIPPED | OUTPATIENT
Start: 2024-05-16

## 2024-05-16 RX ORDER — ALBUTEROL SULFATE 90 UG/1
AEROSOL, METERED RESPIRATORY (INHALATION)
COMMUNITY
Start: 2024-05-15

## 2024-05-16 NOTE — PROGRESS NOTES
[facility]  Lidya Ansari 59 y.o. female MRN: [id number]       CC: Follow-up    HPI: Lidya is a 59-year-old female with history of severe asthma, type 2 diabetes on Mounjaro with A1c of 4.8, and chronic reflux.  Patient presents the office today after she was just recently discharged after she was hospitalized for asthma exacerbation.  Unfortunately due to excessive coughing, she had broken a rib.  Patient reports that she decided to come into the office today as she is experiencing epigastric discomfort, and a numbness sensation as well.  Patient reports that she can scratch the surface of her skin on her abdomen and not feel anything.  While she was in the hospital, she required Solu-Medrol 40 mg every 12 hours followed by prednisone taper.  Patient reports associated abdominal bloating and alternating diarrhea and constipation as well.  Her asthma exacerbation this time was thought to be secondary to upper respiratory infection.  She had COVID earlier this year in February.    When I last saw the patient, we had referred to bariatric surgery for consideration of bypass with hiatal hernia repair.  Pulmonary had believed that a lot of her asthma exacerbations were stemming from her severe acid reflux.  Patient has since lost a significant amount of weight while on GLP-1 agonist.  She used to weigh 220 pounds, weighs 176 pounds today.  Overall, she feels that her regimen of pantoprazole twice daily and Pepcid has been controlling her acid reflux symptoms.    Patient's last EGD was in February 2024 with Dr. Caballero.  She had a Schatzki's ring that was dilated.  Otherwise her esophageal biopsies did not show eosinophilic esophagitis.  She had a previous manometry test in 2023 which was normal.  She has history of hiatal hernia.  Patient believes that her colonoscopy was performed sometime in 2020.    Review of Systems:    CONSTITUTIONAL: Denies any fever, chills, or rigors. Good appetite, and no  recent weight loss.  HEENT: No earache or tinnitus. Denies hearing loss or visual disturbances.  CARDIOVASCULAR: No chest pain or palpitations.   RESPIRATORY: Denies any cough, hemoptysis, shortness of breath or dyspnea on exertion.  GASTROINTESTINAL: As noted in the History of Present Illness.   GENITOURINARY: No problems with urination. Denies any hematuria or dysuria.  NEUROLOGIC: No dizziness or vertigo, denies headaches.   MUSCULOSKELETAL: Denies any muscle or joint pain.   SKIN: Denies skin rashes or itching.   ENDOCRINE: Denies excessive thirst. Denies intolerance to heat or cold.  PSYCHOSOCIAL: Denies depression or anxiety. Denies any recent memory loss.       Current Outpatient Medications   Medication Sig Dispense Refill    albuterol (PROVENTIL HFA,VENTOLIN HFA) 90 mcg/act inhaler       amoxicillin (AMOXIL) 500 MG tablet TAKE 1 TABLET 3 TIMES A DAY FOR 7 DAYS      Blood Pressure Monitoring (Blood Pressure Monitor 3) CARLOS MANUEL Use as directed      budesonide-formoterol (SYMBICORT) 160-4.5 mcg/act inhaler Every 12 hours      buPROPion (Wellbutrin SR) 150 mg 12 hr tablet Every 12 hours      busPIRone (BUSPAR) 10 mg tablet Take 1 tablet (10 mg total) by mouth 2 (two) times a day as needed (anxiety) 30 tablet 0    clotrimazole (MYCELEX) 10 mg iram       dicyclomine (BENTYL) 20 mg tablet TAKE 1 TABLET BY MOUTH EVERY 6 HOURS AS NEEDED FOR ABDOMINAL CRAMPING 360 tablet 1    EPINEPHrine (EPIPEN) 0.3 mg/0.3 mL SOAJ as directed      famotidine (PEPCID) 40 MG tablet TAKE 1 TABLET BY MOUTH TWICE A DAY WITH LUNCH AND BEFORE BEDTIME 180 tablet 1    fluticasone-vilanterol 200-25 mcg/actuation inhaler 1 puff every 24 hours      gabapentin (NEURONTIN) 100 mg capsule Take 100 mg by mouth 3 (three) times a day as needed      insulin aspart (NovoLOG FlexPen) 100 UNIT/ML injection pen Inject 3 Units under the skin 3 (three) times a day with meals 15 mL 0    ipratropium-albuterol (DUO-NEB) 0.5-2.5 mg/3 mL nebulizer solution Take 3  mL by nebulization every 4 (four) hours as needed for wheezing or shortness of breath  0    Lidocaine Viscous HCl (XYLOCAINE) 2 % mucosal solution Swish and spit 15 mL 4 (four) times a day as needed for mouth pain or discomfort 100 mL 0    Melatonin 10 MG CAPS Take 10 mg by mouth daily at bedtime as needed      metoclopramide (REGLAN) 5 mg tablet Take 1 tablet (5 mg total) by mouth 4 (four) times a day 124 tablet 4    morphine (MSIR) 15 mg tablet Take 1 tablet (15 mg total) by mouth every 6 (six) hours as needed for severe pain for up to 14 doses Max Daily Amount: 60 mg 14 tablet 0    Mounjaro 5 MG/0.5ML 5 mg Subcutaneous Once a Week for 30 days      Mounjaro 7.5 MG/0.5ML INJECT 7.5 MG UNDER THE SKIN ONE DAY A WEEK      nystatin (MYCOSTATIN) 500,000 units/5 mL suspension Swish and swallow 5 mL (500,000 Units total) 4 (four) times a day 120 mL 0    ondansetron (ZOFRAN) 4 mg tablet Take 1 tablet by mouth every 8 (eight) hours      OneTouch Ultra test strip TEST TWICE A DAY      pantoprazole (PROTONIX) 40 mg Take 1 packet (40 mg total) by mouth 2 (two) times a day before meals 60 each 1    Promethazine-Phenyleph-Codeine 6.25-5-10 MG/5ML SYRP Every 6 hours      rosuvastatin (CRESTOR) 5 mg tablet Take 1 tablet by mouth daily      hydrOXYzine HCL (ATARAX) 25 mg tablet Take 1 tablet (25 mg total) by mouth every 6 (six) hours as needed for itching for up to 14 days 56 tablet 0    ipratropium (ATROVENT) 0.02 % nebulizer solution Take 2.5 mL (0.5 mg total) by nebulization 3 (three) times a day 225 mL 0    montelukast (SINGULAIR) 10 mg tablet Take 1 tablet (10 mg total) by mouth daily at bedtime 30 tablet 0    Ozempic, 2 MG/DOSE, 8 MG/3ML injection pen INJECT 2MG SUBCUTANEOUSLY ONCE WEEKLY (Patient not taking: Reported on 4/26/2024)      zolpidem (AMBIEN) 10 mg tablet Take 1 tablet (10 mg total) by mouth daily at bedtime as needed for sleep for up to 7 days 7 tablet 0     No current facility-administered medications for this  visit.     Past Medical History:   Diagnosis Date    Asthma     Diabetes mellitus (HCC)     GERD (gastroesophageal reflux disease)     Hyperlipidemia     Hypertension      Past Surgical History:   Procedure Laterality Date     SECTION      COLONOSCOPY      HYSTERECTOMY      OVARIAN CYST REMOVAL       Social History     Socioeconomic History    Marital status: /Civil Union     Spouse name: None    Number of children: None    Years of education: None    Highest education level: None   Occupational History    None   Tobacco Use    Smoking status: Never    Smokeless tobacco: Never   Vaping Use    Vaping status: Never Used   Substance and Sexual Activity    Alcohol use: Yes     Alcohol/week: 7.0 standard drinks of alcohol     Types: 7 Glasses of wine per week    Drug use: Never    Sexual activity: None   Other Topics Concern    None   Social History Narrative    None     Social Determinants of Health     Financial Resource Strain: Not on file   Food Insecurity: No Food Insecurity (2024)    Hunger Vital Sign     Worried About Running Out of Food in the Last Year: Never true     Ran Out of Food in the Last Year: Never true   Transportation Needs: No Transportation Needs (2024)    PRAPARE - Transportation     Lack of Transportation (Medical): No     Lack of Transportation (Non-Medical): No   Physical Activity: Not on file   Stress: Not on file   Social Connections: Not on file   Intimate Partner Violence: Not on file   Housing Stability: Low Risk  (2024)    Housing Stability Vital Sign     Unable to Pay for Housing in the Last Year: No     Number of Places Lived in the Last Year: 1     Unstable Housing in the Last Year: No     History reviewed. No pertinent family history.         PHYSICAL EXAM:    Vitals:    24 1455   BP: 122/84   BP Location: Left arm   Patient Position: Sitting   Pulse: 91   Resp: 18   Temp: 98 °F (36.7 °C)   TempSrc: Tympanic   SpO2: 98%   Weight: 79.8 kg (176 lb)  "  Height: 4' 11\" (1.499 m)     General Appearance:   Alert and oriented x 3. Cooperative, and in no respiratory distress.  Dry cough.   HEENT:   Normocephalic, atraumatic, anicteric.     Neck:  Supple, symmetrical, trachea midline   Lungs:   Wheezing to auscultation bilaterally    Heart::   Regular rate and rhythm   Abdomen:   Soft, non-tender, non-distended; normal bowel sounds; no masses, no organomegaly    Genitalia:   Deferred    Rectal:   Deferred    Extremities:  No cyanosis, clubbing or edema    Pulses:  2+ and symmetric all extremities    Skin:  Skin color, texture, turgor normal, no rashes or lesions    Lymph nodes:  No palpable cervical or supraclavicular lymphadenopathy        Lab Results:   Results from last 6 Months   Lab Units 05/02/24  1826 04/27/24  0616 04/26/24  1327   WBC Thousand/uL 17.23*   < > 12.52*   HEMOGLOBIN g/dL 14.5   < > 14.5   HEMATOCRIT % 44.9   < > 44.2   PLATELETS Thousands/uL 410*   < > 412*   SEGS PCT %  --   --  56   LYMPHO PCT % 40  --  33   MONO PCT % 12  --  8   EOS PCT % 0  --  2    < > = values in this interval not displayed.     Results from last 6 Months   Lab Units 05/02/24  1826   POTASSIUM mmol/L 3.6   CHLORIDE mmol/L 102   CO2 mmol/L 30   BUN mg/dL 23   CREATININE mg/dL 0.84   CALCIUM mg/dL 8.5   ALK PHOS U/L 105*   ALT U/L 31   AST U/L 35     Results from last 6 Months   Lab Units 04/26/24  1424   INR  1.01     Results from last 6 Months   Lab Units 05/02/24  1826   LIPASE u/L 22       Imaging Studies:   CT chest abdomen pelvis w contrast    Result Date: 5/2/2024  Impression: Motion-degraded evaluation of the ribs. Suspected mildly displaced fracture of the lateral right rib 9. No acute intrathoracic or intra-abdominal pathology. Incidental findings, as per the body of the report. Workstation performed: [id number]       ASSESSMENT and PLAN:      1) Numbness sensation over the abdomen, epigastric discomfort and bloating- Patient has CT scan after her discharge from the " "hospital on 5 2 when she was discovered to have a broken rib.  Numbness over the abdomen may be secondary to neuropathy, which she does have a history of in her lower extremities.  This can be a side effect from IV steroids.  - Will check abdominal ultrasound, rule out gallstones given epigastric pain and significant weight loss which would promote the formation of gallstones  - If her testing is unremarkable, would recommend a Xifaxan course  - She will follow-up with her PCP regarding neuropathy.  She follows very closely with her PCP in [place] Dr. Noonan    2) Chronic GERD, hiatal hernia and BMI of 35; severe asthma - Patient reports that when she was on her old insurance last year, the insurance company would not pay for a bariatric surgery.  She is now on Medicare, but is very fearful of having any sort of surgery.  She would like to continue with medical management at this time.  I do think this should be revisited at some point.      Follow up in 1 month.      Portions of the record may have been created with voice recognition software.  Occasional wrong word or \"sound a like\" substitutions may have occurred due to the inherent limitations of voice recognition software.  Read the chart carefully and recognize, using context, where substitutions have occurred.  "

## 2024-05-17 ENCOUNTER — TELEPHONE (OUTPATIENT)
Dept: GASTROENTEROLOGY | Facility: CLINIC | Age: 59
End: 2024-05-17

## 2024-05-17 NOTE — TELEPHONE ENCOUNTER
----- Message from Corine Sidhu PA-C sent at 5/16/2024  4:11 PM EDT -----  Please schedule urgent appointment in 1 month.  Thank you

## 2024-07-30 ENCOUNTER — TELEPHONE (OUTPATIENT)
Age: 59
End: 2024-07-30

## 2024-07-30 NOTE — TELEPHONE ENCOUNTER
Contacted patient off of [facility]  to verify needs of services in attempts to offer patient an appointment. LVM for patient to contact intake dept  in regards to wait list and possible scheduling.     1st attempt/medicare

## 2024-08-06 NOTE — TELEPHONE ENCOUNTER
Contacted patient off of [facility]  to verify needs of services in attempts to offer patient an appointment. LVM for patient to contact intake dept  in regards to attempt to schedule off wait list.    2nd attempt, pt removed from wait list at this time.

## 2024-08-21 ENCOUNTER — APPOINTMENT (OUTPATIENT)
Dept: LAB | Facility: HOSPITAL | Age: 59
End: 2024-08-21
Payer: MEDICARE

## 2024-08-21 ENCOUNTER — HOSPITAL ENCOUNTER (OUTPATIENT)
Dept: CT IMAGING | Facility: CLINIC | Age: 59
Discharge: HOME | End: 2024-08-21
Payer: MEDICARE

## 2024-08-21 DIAGNOSIS — R41.3 OTHER AMNESIA: ICD-10-CM

## 2024-08-21 DIAGNOSIS — I10 ESSENTIAL HYPERTENSION, MALIGNANT: ICD-10-CM

## 2024-08-21 LAB
ALBUMIN SERPL BCG-MCNC: 4 G/DL (ref 3.5–5)
ALP SERPL-CCNC: 138 U/L (ref 34–104)
ALT SERPL W P-5'-P-CCNC: 23 U/L (ref 7–52)
ANION GAP SERPL CALCULATED.3IONS-SCNC: 8 MMOL/L (ref 4–13)
AST SERPL W P-5'-P-CCNC: 31 U/L (ref 13–39)
BILIRUB SERPL-MCNC: 0.67 MG/DL (ref 0.2–1)
BUN SERPL-MCNC: 9 MG/DL (ref 5–25)
CALCIUM SERPL-MCNC: 9 MG/DL (ref 8.4–10.2)
CHLORIDE SERPL-SCNC: 103 MMOL/L (ref 96–108)
CO2 SERPL-SCNC: 28 MMOL/L (ref 21–32)
CREAT SERPL-MCNC: 0.85 MG/DL (ref 0.6–1.3)
GFR SERPL CREATININE-BSD FRML MDRD: 76 ML/MIN/1.73SQ M
GLUCOSE P FAST SERPL-MCNC: 97 MG/DL (ref 65–99)
POTASSIUM SERPL-SCNC: 3.9 MMOL/L (ref 3.5–5.3)
PROT SERPL-MCNC: 7.7 G/DL (ref 6.4–8.4)
SODIUM SERPL-SCNC: 139 MMOL/L (ref 135–147)

## 2024-08-21 PROCEDURE — 80053 COMPREHEN METABOLIC PANEL: CPT

## 2024-08-21 PROCEDURE — 36415 COLL VENOUS BLD VENIPUNCTURE: CPT

## 2024-08-21 PROCEDURE — 70470 CT HEAD/BRAIN W/O & W/DYE: CPT

## 2024-08-21 RX ADMIN — IOHEXOL 85 ML: 350 INJECTION, SOLUTION INTRAVENOUS at 12:35

## 2024-10-17 ENCOUNTER — HOSPITAL ENCOUNTER (EMERGENCY)
Facility: HOSPITAL | Age: 59
Discharge: HOME | End: 2024-10-17
Payer: MEDICARE

## 2024-10-17 VITALS
DIASTOLIC BLOOD PRESSURE: 100 MMHG | BODY MASS INDEX: 33.17 KG/M2 | WEIGHT: 164.24 LBS | RESPIRATION RATE: 16 BRPM | OXYGEN SATURATION: 99 % | TEMPERATURE: 97.6 F | SYSTOLIC BLOOD PRESSURE: 136 MMHG | HEART RATE: 95 BPM

## 2024-10-17 DIAGNOSIS — R20.2 PARESTHESIAS: ICD-10-CM

## 2024-10-17 DIAGNOSIS — G62.9 NEUROPATHY: Primary | ICD-10-CM

## 2024-10-17 LAB
ANION GAP SERPL CALCULATED.3IONS-SCNC: 9 MMOL/L (ref 4–13)
BASOPHILS # BLD AUTO: 0.04 THOUSANDS/ΜL (ref 0–0.1)
BASOPHILS NFR BLD AUTO: 1 % (ref 0–1)
BUN SERPL-MCNC: 5 MG/DL (ref 5–25)
CALCIUM SERPL-MCNC: 9.2 MG/DL (ref 8.4–10.2)
CHLORIDE SERPL-SCNC: 101 MMOL/L (ref 96–108)
CO2 SERPL-SCNC: 27 MMOL/L (ref 21–32)
CREAT SERPL-MCNC: 0.71 MG/DL (ref 0.6–1.3)
EOSINOPHIL # BLD AUTO: 0.24 THOUSAND/ΜL (ref 0–0.61)
EOSINOPHIL NFR BLD AUTO: 3 % (ref 0–6)
ERYTHROCYTE [DISTWIDTH] IN BLOOD BY AUTOMATED COUNT: 13.8 % (ref 11.6–15.1)
GFR SERPL CREATININE-BSD FRML MDRD: 94 ML/MIN/1.73SQ M
GLUCOSE SERPL-MCNC: 81 MG/DL (ref 65–140)
HCT VFR BLD AUTO: 47.1 % (ref 34.8–46.1)
HGB BLD-MCNC: 15.6 G/DL (ref 11.5–15.4)
IMM GRANULOCYTES # BLD AUTO: 0.03 THOUSAND/UL (ref 0–0.2)
IMM GRANULOCYTES NFR BLD AUTO: 0 % (ref 0–2)
LYMPHOCYTES # BLD AUTO: 3.03 THOUSANDS/ΜL (ref 0.6–4.47)
LYMPHOCYTES NFR BLD AUTO: 36 % (ref 14–44)
MAGNESIUM SERPL-MCNC: 2 MG/DL (ref 1.9–2.7)
MCH RBC QN AUTO: 32.7 PG (ref 26.8–34.3)
MCHC RBC AUTO-ENTMCNC: 33.1 G/DL (ref 31.4–37.4)
MCV RBC AUTO: 99 FL (ref 82–98)
MONOCYTES # BLD AUTO: 0.66 THOUSAND/ΜL (ref 0.17–1.22)
MONOCYTES NFR BLD AUTO: 8 % (ref 4–12)
NEUTROPHILS # BLD AUTO: 4.43 THOUSANDS/ΜL (ref 1.85–7.62)
NEUTS SEG NFR BLD AUTO: 52 % (ref 43–75)
NRBC BLD AUTO-RTO: 0 /100 WBCS
PLATELET # BLD AUTO: 370 THOUSANDS/UL (ref 149–390)
PMV BLD AUTO: 9.9 FL (ref 8.9–12.7)
POTASSIUM SERPL-SCNC: 4.1 MMOL/L (ref 3.5–5.3)
RBC # BLD AUTO: 4.77 MILLION/UL (ref 3.81–5.12)
SODIUM SERPL-SCNC: 137 MMOL/L (ref 135–147)
WBC # BLD AUTO: 8.43 THOUSAND/UL (ref 4.31–10.16)

## 2024-10-17 PROCEDURE — 99284 EMERGENCY DEPT VISIT MOD MDM: CPT

## 2024-10-17 PROCEDURE — 80048 BASIC METABOLIC PNL TOTAL CA: CPT

## 2024-10-17 PROCEDURE — 96372 THER/PROPH/DIAG INJ SC/IM: CPT

## 2024-10-17 PROCEDURE — 83735 ASSAY OF MAGNESIUM: CPT

## 2024-10-17 PROCEDURE — 36415 COLL VENOUS BLD VENIPUNCTURE: CPT

## 2024-10-17 PROCEDURE — 85025 COMPLETE CBC W/AUTO DIFF WBC: CPT

## 2024-10-17 RX ORDER — KETOROLAC TROMETHAMINE 30 MG/ML
15 INJECTION, SOLUTION INTRAMUSCULAR; INTRAVENOUS ONCE
Status: COMPLETED | OUTPATIENT
Start: 2024-10-17 | End: 2024-10-17

## 2024-10-17 RX ADMIN — KETOROLAC TROMETHAMINE 15 MG: 30 INJECTION, SOLUTION INTRAMUSCULAR at 10:13

## 2024-10-17 NOTE — ED PROVIDER NOTES
Time reflects when diagnosis was documented in both MDM as applicable and the Disposition within this note       Time User Action Codes Description Comment    10/17/2024 11:06 AM Sohan Abarca [G62.9] Neuropathy     10/17/2024 11:06 AM Sohan Abarca [R20.2] Paresthesias           ED Disposition       ED Disposition   Discharge    Condition   Stable    Date/Time   u Oct 17, 2024 11:06 AM    Comment   Lidya Ansari discharge to home/self care.                   Assessment & Plan       Medical Decision Making  Patient is a 59-year-old female with past medical history of diabetes, neuropathy, hypertension, hyperlipidemia, COVID, presenting to the ED for evaluation of sensory changes to the bilateral lower legs, lower abdomen and the upper chest wall.  History and clinical exam documented below.  Believe that her sensory changes are related to her history of neuropathy, but there could also be electrolyte disturbance contributing to patient's complaints today.  Patient states that she has had the sensory changes for the last 3 to 4 weeks and they are not acute.  Patient has been prescribed Neurontin in the past for neuropathy, but does not take it frequently.  I do not suspect any infectious cause of patient's symptoms, or any cutaneous involvement.  Patient does not have any acute neurological deficits.  Patient states that she had an MRI in 2022 that was unremarkable.  Will check electrolytes to rule out any kind of metabolic disturbance contributing to patient's sensory changes and administer Toradol for patient's discomfort.    All diagnostics reviewed with the patient.  No acute electrolyte abnormalities.  Ambulatory referral was placed for neurology.  Patient agrees with the plan for follow-up with neurology and will talk to her PCP today in order to discuss changes to her medications to manage her neuropathy.    I reviewed all testing with the patient:  I gave oral return precautions for what to return  "for in addition to the written return precautions.   The patient verbalized understanding of the discharge instructions and warnings that would necessitate return to the Emergency Department.  I specifically highlighted areas of special concern regarding the written and verbal discharge instructions and return precautions.    All questions were answered prior to discharge.      Amount and/or Complexity of Data Reviewed  Labs: ordered. Decision-making details documented in ED Course.    Risk  Prescription drug management.        ED Course as of 10/17/24 1757   Thu Oct 17, 2024   1050 MAGNESIUM: 2.0  WNL     1050 Potassium: 4.1  WNL         Medications   ketorolac (TORADOL) injection 15 mg (15 mg Intramuscular Given 10/17/24 1013)       ED Risk Strat Scores                           SBIRT 22yo+      Flowsheet Row Most Recent Value   Initial Alcohol Screen: US AUDIT-C     1. How often do you have a drink containing alcohol? 0 Filed at: 10/17/2024 0947   2. How many drinks containing alcohol do you have on a typical day you are drinking?  0 Filed at: 10/17/2024 0947   3a. Male UNDER 65: How often do you have five or more drinks on one occasion? 0 Filed at: 10/17/2024 0947   3b. FEMALE Any Age, or MALE 65+: How often do you have 4 or more drinks on one occassion? 0 Filed at: 10/17/2024 0947   Audit-C Score 0 Filed at: 10/17/2024 0947   JANNETH: How many times in the past year have you...    Used an illegal drug or used a prescription medication for non-medical reasons? Never Filed at: 10/17/2024 0947                            History of Present Illness       Chief Complaint   Patient presents with    Leg Pain     Leg pain bilaterally for \"months\" States she is unable to \"feel her skin\" Hx of neuropathy but unsure if related.        Past Medical History:   Diagnosis Date    Asthma     Diabetes mellitus (HCC)     GERD (gastroesophageal reflux disease)     Hyperlipidemia     Hypertension       Past Surgical History: "   Procedure Laterality Date     SECTION      COLONOSCOPY      HYSTERECTOMY      OVARIAN CYST REMOVAL        No family history on file.   Social History     Tobacco Use    Smoking status: Never    Smokeless tobacco: Never   Vaping Use    Vaping status: Never Used   Substance Use Topics    Alcohol use: Yes     Alcohol/week: 7.0 standard drinks of alcohol     Types: 7 Glasses of wine per week    Drug use: Never      E-Cigarette/Vaping    E-Cigarette Use Never User       E-Cigarette/Vaping Substances    Nicotine No     THC No     CBD No     Flavoring No     Other No     Unknown No       I have reviewed and agree with the history as documented.     HPI    Patient is a 59-year-old female with past medical history of asthma, diabetes, hypertension, GERD, hyperlipidemia, chronic cough related to COVID, presenting to the ED for evaluation of sensation changes to bilateral lower legs, lower abdomen, and the upper chest wall.  Patient states that she has a history of neuropathy and has experienced similar sensation changes to the legs in the past.  She describes that she has an occasional burning and tingling sensation in the anterior lower legs.  Patient has been prescribed Neurontin for this, but she has not taken it for the past 3 to 4 weeks because she has been caring for her mother in [place].  Patient returned a few days ago.  She states that for the past 2 to 3 weeks she has been experiencing sensation changes to the lower legs, lower abdominal wall, and the upper chest wall above the breast.  Patient believes that her discomfort is related to neuropathy, but is not sure.  Patient states that she does not like to take the Neurontin because it makes her have muscle spasms in her face.  Patient's diabetes is managed by her PCP, and she has an appointment with him at 1 PM today.  Patient states that she was unable to sleep last night secondary to her sensation changes, prompting ED evaluation today.  Patient denies  any rashes in the area of pain.  She denies any back pain, weakness in the legs, urinary or bowel incontinence, saddle anesthesia.  Denies abdominal pain, vomiting, constipation or diarrhea.  She denies any chest pain, difficulty breathing.    Review of Systems   All other systems reviewed and are negative.          Objective       ED Triage Vitals   Temperature Pulse Blood Pressure Respirations SpO2 Patient Position - Orthostatic VS   10/17/24 0906 10/17/24 0906 10/17/24 0906 10/17/24 0906 10/17/24 0906 10/17/24 0906   97.6 °F (36.4 °C) 95 136/100 16 99 % Sitting      Temp Source Heart Rate Source BP Location FiO2 (%) Pain Score    10/17/24 0906 10/17/24 0906 10/17/24 0906 -- 10/17/24 1013    Temporal Monitor Left arm  9      Vitals      Date and Time Temp Pulse SpO2 Resp BP Pain Score FACES Pain Rating User   10/17/24 1013 -- -- -- -- -- 9 -- SG   10/17/24 0906 97.6 °F (36.4 °C) 95 99 % 16 136/100 -- -- FB            Physical Exam  Vitals and nursing note reviewed.   Constitutional:       General: She is not in acute distress.     Appearance: Normal appearance. She is well-developed. She is obese. She is not ill-appearing, toxic-appearing or diaphoretic.   HENT:      Head: Normocephalic and atraumatic.      Right Ear: External ear normal.      Left Ear: External ear normal.      Nose: Nose normal. No congestion or rhinorrhea.      Mouth/Throat:      Mouth: Mucous membranes are moist.      Pharynx: Oropharynx is clear.   Eyes:      Conjunctiva/sclera: Conjunctivae normal.   Cardiovascular:      Rate and Rhythm: Normal rate and regular rhythm.      Pulses: Normal pulses.      Heart sounds: Normal heart sounds. No murmur heard.  Pulmonary:      Effort: Pulmonary effort is normal. No respiratory distress.      Breath sounds: Normal breath sounds. No wheezing, rhonchi or rales.   Chest:      Chest wall: No tenderness.   Abdominal:      General: Abdomen is flat.      Palpations: Abdomen is soft.      Tenderness: There  is no abdominal tenderness. There is no guarding or rebound.   Musculoskeletal:         General: No swelling. Normal range of motion.      Cervical back: Normal range of motion and neck supple.   Skin:     General: Skin is warm and dry.      Capillary Refill: Capillary refill takes less than 2 seconds.      Findings: No erythema.      Comments: No rashes in area of sensory change     Neurological:      General: No focal deficit present.      Mental Status: She is alert.      Cranial Nerves: No cranial nerve deficit or facial asymmetry.      Sensory: Sensory deficit (subjective sensory changes to light touch over bilateral anterior lower extremities and the upper chest wall; sensation to palpation intact as well as sharp sensation intact) present.      Motor: No weakness or abnormal muscle tone.      Coordination: Coordination normal.      Gait: Gait normal.   Psychiatric:         Mood and Affect: Mood normal.         Results Reviewed       Procedure Component Value Units Date/Time    Basic metabolic panel [[id number]] Collected: 10/17/24 1013    Lab Status: Final result Specimen: Blood from Arm, Right Updated: 10/17/24 1048     Sodium 137 mmol/L      Potassium 4.1 mmol/L      Chloride 101 mmol/L      CO2 27 mmol/L      ANION GAP 9 mmol/L      BUN 5 mg/dL      Creatinine 0.71 mg/dL      Glucose 81 mg/dL      Calcium 9.2 mg/dL      eGFR 94 ml/min/1.73sq m     Narrative:      [employer] guidelines for Chronic Kidney Disease (CKD):     Stage 1 with normal or high GFR (GFR > 90 mL/min/1.73 square meters)    Stage 2 Mild CKD (GFR = 60-89 mL/min/1.73 square meters)    Stage 3A Moderate CKD (GFR = 45-59 mL/min/1.73 square meters)    Stage 3B Moderate CKD (GFR = 30-44 mL/min/1.73 square meters)    Stage 4 Severe CKD (GFR = 15-29 mL/min/1.73 square meters)    Stage 5 End Stage CKD (GFR <15 mL/min/1.73 square meters)  Note: GFR calculation is accurate only with a steady state creatinine    Magnesium  [[id number]]  (Normal) Collected: 10/17/24 1013    Lab Status: Final result Specimen: Blood from Arm, Right Updated: 10/17/24 1048     Magnesium 2.0 mg/dL     CBC and differential [[id number]]  (Abnormal) Collected: 10/17/24 1013    Lab Status: Final result Specimen: Blood from Arm, Right Updated: 10/17/24 1020     WBC 8.43 Thousand/uL      RBC 4.77 Million/uL      Hemoglobin 15.6 g/dL      Hematocrit 47.1 %      MCV 99 fL      MCH 32.7 pg      MCHC 33.1 g/dL      RDW 13.8 %      MPV 9.9 fL      Platelets 370 Thousands/uL      nRBC 0 /100 WBCs      Segmented % 52 %      Immature Grans % 0 %      Lymphocytes % 36 %      Monocytes % 8 %      Eosinophils Relative 3 %      Basophils Relative 1 %      Absolute Neutrophils 4.43 Thousands/µL      Absolute Immature Grans 0.03 Thousand/uL      Absolute Lymphocytes 3.03 Thousands/µL      Absolute Monocytes 0.66 Thousand/µL      Eosinophils Absolute 0.24 Thousand/µL      Basophils Absolute 0.04 Thousands/µL             No orders to display       Procedures    ED Medication and Procedure Management   Prior to Admission Medications   Prescriptions Last Dose Informant Patient Reported? Taking?   Blood Pressure Monitoring (Blood Pressure Monitor 3) CARLOS MANUEL  Self Yes No   Sig: Use as directed   EPINEPHrine (EPIPEN) 0.3 mg/0.3 mL SOAJ  Self Yes No   Sig: as directed   Lidocaine Viscous HCl (XYLOCAINE) 2 % mucosal solution   No No   Sig: Swish and spit 15 mL 4 (four) times a day as needed for mouth pain or discomfort   Melatonin 10 MG CAPS  Self Yes No   Sig: Take 10 mg by mouth daily at bedtime as needed   Mounjaro 7.5 MG/0.5ML   Yes No   Sig: INJECT 7.5 MG UNDER THE SKIN ONE DAY A WEEK   OneTouch Ultra test strip  Self Yes No   Sig: TEST TWICE A DAY   Ozempic, 2 MG/DOSE, 8 MG/3ML injection pen  Self Yes No   Sig: INJECT 2MG SUBCUTANEOUSLY ONCE WEEKLY   Patient not taking: Reported on 4/26/2024   Promethazine-Phenyleph-Codeine 6.25-5-10 MG/5ML SYRP   Yes No   Sig: Every 6 hours    albuterol (PROVENTIL HFA,VENTOLIN HFA) 90 mcg/act inhaler   Yes No   amoxicillin (AMOXIL) 500 MG tablet   Yes No   Sig: TAKE 1 TABLET 3 TIMES A DAY FOR 7 DAYS   buPROPion (Wellbutrin SR) 150 mg 12 hr tablet  Self Yes No   Sig: Every 12 hours   budesonide-formoterol (SYMBICORT) 160-4.5 mcg/act inhaler  Self Yes No   Sig: Every 12 hours   busPIRone (BUSPAR) 10 mg tablet   No No   Sig: Take 1 tablet (10 mg total) by mouth 2 (two) times a day as needed (anxiety)   clotrimazole (MYCELEX) 10 mg iram   Yes No   dicyclomine (BENTYL) 20 mg tablet  Self No No   Sig: TAKE 1 TABLET BY MOUTH EVERY 6 HOURS AS NEEDED FOR ABDOMINAL CRAMPING   famotidine (PEPCID) 40 MG tablet  Self No No   Sig: TAKE 1 TABLET BY MOUTH TWICE A DAY WITH LUNCH AND BEFORE BEDTIME   fluticasone-vilanterol 200-25 mcg/actuation inhaler  Self Yes No   Si puff every 24 hours   gabapentin (NEURONTIN) 100 mg capsule  Self Yes No   Sig: Take 100 mg by mouth 3 (three) times a day as needed   hydrOXYzine HCL (ATARAX) 25 mg tablet   No No   Sig: Take 1 tablet (25 mg total) by mouth every 6 (six) hours as needed for itching for up to 14 days   insulin aspart (NovoLOG FlexPen) 100 UNIT/ML injection pen  Self No No   Sig: Inject 3 Units under the skin 3 (three) times a day with meals   ipratropium (ATROVENT) 0.02 % nebulizer solution  Self No No   Sig: Take 2.5 mL (0.5 mg total) by nebulization 3 (three) times a day   ipratropium-albuterol (DUO-NEB) 0.5-2.5 mg/3 mL nebulizer solution  Self No No   Sig: Take 3 mL by nebulization every 4 (four) hours as needed for wheezing or shortness of breath   metoclopramide (REGLAN) 5 mg tablet   No No   Sig: Take 1 tablet (5 mg total) by mouth 4 (four) times a day   montelukast (SINGULAIR) 10 mg tablet  Self No No   Sig: Take 1 tablet (10 mg total) by mouth daily at bedtime   morphine (MSIR) 15 mg tablet   No No   Sig: Take 1 tablet (15 mg total) by mouth every 6 (six) hours as needed for severe pain for up to 14 doses Max  Daily Amount: 60 mg   nystatin (MYCOSTATIN) 500,000 units/5 mL suspension   No No   Sig: Swish and swallow 5 mL (500,000 Units total) 4 (four) times a day   ondansetron (ZOFRAN) 4 mg tablet  Self Yes No   Sig: Take 1 tablet by mouth every 8 (eight) hours   pantoprazole (PROTONIX) 40 mg  Self No No   Sig: Take 1 packet (40 mg total) by mouth 2 (two) times a day before meals   rosuvastatin (CRESTOR) 5 mg tablet  Self Yes No   Sig: Take 1 tablet by mouth daily   zolpidem (AMBIEN) 10 mg tablet   No No   Sig: Take 1 tablet (10 mg total) by mouth daily at bedtime as needed for sleep for up to 7 days      Facility-Administered Medications: None     Discharge Medication List as of 10/17/2024 11:10 AM        CONTINUE these medications which have NOT CHANGED    Details   albuterol (PROVENTIL HFA,VENTOLIN HFA) 90 mcg/act inhaler Historical Med      amoxicillin (AMOXIL) 500 MG tablet TAKE 1 TABLET 3 TIMES A DAY FOR 7 DAYS, Historical Med      Blood Pressure Monitoring (Blood Pressure Monitor 3) CARLOS MANUEL Use as directed, Starting Fri 10/21/2022, Historical Med      budesonide-formoterol (SYMBICORT) 160-4.5 mcg/act inhaler Every 12 hours, Historical Med      buPROPion (Wellbutrin SR) 150 mg 12 hr tablet Every 12 hours, Starting Sat 8/12/2023, Historical Med      busPIRone (BUSPAR) 10 mg tablet Take 1 tablet (10 mg total) by mouth 2 (two) times a day as needed (anxiety), Starting Wed 5/1/2024, Normal      clotrimazole (MYCELEX) 10 mg iram Historical Med      dicyclomine (BENTYL) 20 mg tablet TAKE 1 TABLET BY MOUTH EVERY 6 HOURS AS NEEDED FOR ABDOMINAL CRAMPING, Normal      EPINEPHrine (EPIPEN) 0.3 mg/0.3 mL SOAJ as directed, Historical Med      famotidine (PEPCID) 40 MG tablet TAKE 1 TABLET BY MOUTH TWICE A DAY WITH LUNCH AND BEFORE BEDTIME, Normal      fluticasone-vilanterol 200-25 mcg/actuation inhaler 1 puff every 24 hours, Historical Med      gabapentin (NEURONTIN) 100 mg capsule Take 100 mg by mouth 3 (three) times a day as  needed, Starting Tue 5/2/2023, Historical Med      hydrOXYzine HCL (ATARAX) 25 mg tablet Take 1 tablet (25 mg total) by mouth every 6 (six) hours as needed for itching for up to 14 days, Starting Fri 6/16/2023, Until Fri 6/30/2023 at 2359, Normal      insulin aspart (NovoLOG FlexPen) 100 UNIT/ML injection pen Inject 3 Units under the skin 3 (three) times a day with meals, Starting Fri 6/16/2023, Normal      ipratropium (ATROVENT) 0.02 % nebulizer solution Take 2.5 mL (0.5 mg total) by nebulization 3 (three) times a day, Starting Tue 11/1/2022, Until Tue 2/13/2024, Normal      ipratropium-albuterol (DUO-NEB) 0.5-2.5 mg/3 mL nebulizer solution Take 3 mL by nebulization every 4 (four) hours as needed for wheezing or shortness of breath, Starting Tue 11/1/2022, No Print      Lidocaine Viscous HCl (XYLOCAINE) 2 % mucosal solution Swish and spit 15 mL 4 (four) times a day as needed for mouth pain or discomfort, Starting Thu 5/16/2024, Normal      Melatonin 10 MG CAPS Take 10 mg by mouth daily at bedtime as needed, Starting Sat 3/25/2023, Historical Med      metoclopramide (REGLAN) 5 mg tablet Take 1 tablet (5 mg total) by mouth 4 (four) times a day, Starting Mon 2/5/2024, Normal      montelukast (SINGULAIR) 10 mg tablet Take 1 tablet (10 mg total) by mouth daily at bedtime, Starting Tue 11/1/2022, Until Tue 2/13/2024, Normal      morphine (MSIR) 15 mg tablet Take 1 tablet (15 mg total) by mouth every 6 (six) hours as needed for severe pain for up to 14 doses Max Daily Amount: 60 mg, Starting Thu 5/2/2024, Normal      Mounjaro 7.5 MG/0.5ML INJECT 7.5 MG UNDER THE SKIN ONE DAY A WEEK, Historical Med      nystatin (MYCOSTATIN) 500,000 units/5 mL suspension Swish and swallow 5 mL (500,000 Units total) 4 (four) times a day, Starting Wed 5/1/2024, Normal      ondansetron (ZOFRAN) 4 mg tablet Take 1 tablet by mouth every 8 (eight) hours, Historical Med      OneTouch Ultra test strip TEST TWICE A DAY, Historical Med      Ozempic,  2 MG/DOSE, 8 MG/3ML injection pen INJECT 2MG SUBCUTANEOUSLY ONCE WEEKLY, Historical Med      pantoprazole (PROTONIX) 40 mg Take 1 packet (40 mg total) by mouth 2 (two) times a day before meals, Starting Wed 3/1/2023, Normal      Promethazine-Phenyleph-Codeine 6.25-5-10 MG/5ML SYRP Every 6 hours, Starting Thu 11/2/2023, Historical Med      rosuvastatin (CRESTOR) 5 mg tablet Take 1 tablet by mouth daily, Historical Med      zolpidem (AMBIEN) 10 mg tablet Take 1 tablet (10 mg total) by mouth daily at bedtime as needed for sleep for up to 7 days, Starting Fri 6/16/2023, Until Fri 4/26/2024 at 2359, Normal             ED SEPSIS DOCUMENTATION   Time reflects when diagnosis was documented in both MDM as applicable and the Disposition within this note       Time User Action Codes Description Comment    10/17/2024 11:06 AM Sohan Abarca [G62.9] Neuropathy     10/17/2024 11:06 AM Sohan Abarca [R20.2] Paresthesias                  Sohan Abarca,   10/17/24 2233

## 2024-10-17 NOTE — DISCHARGE INSTRUCTIONS
Please follow-up with your PCP as scheduled today to discuss other options for management of your neuropathy.  Return to the ED with any new or worsening concerns.    Please follow up with neurologist as discussed.

## 2024-12-20 DIAGNOSIS — K21.9 GASTROESOPHAGEAL REFLUX DISEASE, UNSPECIFIED WHETHER ESOPHAGITIS PRESENT: ICD-10-CM

## 2024-12-20 RX ORDER — FAMOTIDINE 40 MG/1
TABLET, FILM COATED ORAL
Qty: 180 TABLET | Refills: 1 | Status: SHIPPED | OUTPATIENT
Start: 2024-12-20

## 2025-04-10 ENCOUNTER — TELEPHONE (OUTPATIENT)
Age: 60
End: 2025-04-10

## 2025-04-10 NOTE — TELEPHONE ENCOUNTER
OA Questions for EGD  Date: [  ]  Screened by: [  ]     Referring Provider: [  ]     Pre-Screening: BMI [  ]    Past EGD? If yes - Date: [   ]  Physician/Facility: [   ]  Reason: [   ]    ·      Does the patient want to see a gastroenterologist prior to their procedure to discuss any GI symptoms? no  ·      Has the patient been hospitalized or had abdominal surgery in the past 6 months? yes  ·      Does the patient use supplemental oxygen?  ·      Does the patient take [Coumadin], [Lovenox], [Plavix], [Eliquis], [Xarelto], or other blood thinning medication? [Yes] [No]  ·      Has the patient had a stroke, cardiac event, or stent placed in the past year? [Yes] [No]     ·       If a repeat EGD is belated and patient declines procedure à notify provider.

## 2025-04-10 NOTE — TELEPHONE ENCOUNTER
Patients GI provider:  Dr. Caballero    Number to return call: [phone]    Reason for call: The patient called in to schedule an EGD and advised that she requires esophageal stretch. She stated that for the last three days, she's been having food get stuck no matter what she eats, and it's making her nervous. The patient did not pass the OA; she mentioned that she has been hospitalized in the last six months. An OV screening for the EGD was scheduled.      Scheduled procedure/appointment date if applicable: Appt: 04/17/25

## 2025-04-17 ENCOUNTER — TELEPHONE (OUTPATIENT)
Dept: GASTROENTEROLOGY | Facility: CLINIC | Age: 60
End: 2025-04-17

## 2025-04-17 ENCOUNTER — TELEMEDICINE (OUTPATIENT)
Dept: GASTROENTEROLOGY | Facility: CLINIC | Age: 60
End: 2025-04-17
Payer: MEDICARE

## 2025-04-17 VITALS
BODY MASS INDEX: 32.66 KG/M2 | TEMPERATURE: 97.8 F | SYSTOLIC BLOOD PRESSURE: 130 MMHG | WEIGHT: 162 LBS | DIASTOLIC BLOOD PRESSURE: 70 MMHG | HEIGHT: 59 IN

## 2025-04-17 DIAGNOSIS — Z12.11 SCREENING FOR COLON CANCER: ICD-10-CM

## 2025-04-17 DIAGNOSIS — R13.19 ESOPHAGEAL DYSPHAGIA: Primary | ICD-10-CM

## 2025-04-17 DIAGNOSIS — K21.9 GASTROESOPHAGEAL REFLUX DISEASE WITHOUT ESOPHAGITIS: ICD-10-CM

## 2025-04-17 DIAGNOSIS — K22.2 SCHATZKI RING OF DISTAL ESOPHAGUS: ICD-10-CM

## 2025-04-17 PROCEDURE — 99214 OFFICE O/P EST MOD 30 MIN: CPT | Performed by: PHYSICIAN ASSISTANT

## 2025-04-17 RX ORDER — TRAMADOL HYDROCHLORIDE 50 MG/1
TABLET ORAL
COMMUNITY
Start: 2025-01-30 | End: 2025-04-17

## 2025-04-17 RX ORDER — PROMETHAZINE HYDROCHLORIDE 6.25 MG/5ML
SYRUP ORAL
COMMUNITY
Start: 2025-01-30 | End: 2025-04-17

## 2025-04-17 RX ORDER — TEMAZEPAM 15 MG/1
CAPSULE ORAL
COMMUNITY
Start: 2025-04-15

## 2025-04-17 RX ORDER — FLUCONAZOLE 100 MG/1
1 TABLET ORAL DAILY
COMMUNITY
Start: 2025-01-30 | End: 2025-04-17

## 2025-04-17 NOTE — TELEPHONE ENCOUNTER
----- Message from Liseth Nam PA-C sent at 4/17/2025 12:41 PM EDT -----  Alex Del Valle, I just had a virtual visit with this patient and she needs an EGD with dilation scheduled with Dr. Caballero.  She is a diabetic and on Mounjaro.  She is having a lot of trouble swallowing so we are looking to schedule sooner than later if possible, she takes Mounjaro on Fridays and she can hold her dose tomorrow if there is something next week.  Thank you!

## 2025-04-17 NOTE — ASSESSMENT & PLAN NOTE
- She reports several weeks of dysphagia requiring vomiting at times as well as odynophaiga with a recent EGD in January showing candidal esophagitis s/p diflucan course and a prior history of requiring dilations; she reports this feels similar to her dysphagia episodes in the past that have improved with dilations  - We will plan for a EGD urgently as she reports only being able to tolerate liquids/soups; we will see if she has a stricture requiring dilation, Schatki's ring, evaluate for recurrent candidal esophagitis, and also ensure there is no evidence of HSV/CMV esophagitis given the odynophagia  - Hold Mounjaro 7 days  - Manometry in 2023 with normal motility  - Continue protonix 40 mg BID and pepcid 40 mg BID     Orders:    EGD Dilation; Future

## 2025-04-17 NOTE — TELEPHONE ENCOUNTER
Called and spoke to Lidya  offered her 5/1 with Dr Caballero  for her EGD  She is going to check with her  and callback

## 2025-04-17 NOTE — TELEPHONE ENCOUNTER
Patient called in requesting to speak with [facility]. Patient was warmed transferred over to [facility] for further assistance.

## 2025-04-17 NOTE — PROGRESS NOTES
Virtual Regular VisitName: Lidya Ansari      : 1965      MRN: [id number]  Encounter Provider: Liseth Nam PA-C  Encounter Date: 2025   Encounter department: [facility] [place]  :  Assessment & Plan  Esophageal dysphagia    - She reports several weeks of dysphagia requiring vomiting at times as well as odynophaiga with a recent EGD in January showing candidal esophagitis s/p diflucan course and a prior history of requiring dilations; she reports this feels similar to her dysphagia episodes in the past that have improved with dilations  - We will plan for a EGD urgently as she reports only being able to tolerate liquids/soups; we will see if she has a stricture requiring dilation, Schatki's ring, evaluate for recurrent candidal esophagitis, and also ensure there is no evidence of HSV/CMV esophagitis given the odynophagia  - Hold Mounjaro 7 days  - Manometry in  with normal motility  - Continue protonix 40 mg BID and pepcid 40 mg BID     Orders:    EGD Dilation; Future    Gastroesophageal reflux disease without esophagitis    Orders:    EGD Dilation; Future    Schatzki ring of distal esophagus    Orders:    EGD Dilation; Future    Screening for colon cancer  - She had a colonoscopy in  with a small rectal polyp removed she is due for a 5-year recall  -She does have asthma which is currently under good control and she will talk to her pulmonologist if she is cleared to get this scheduled, she will call us back when she is ready to schedule this                      History of Present Illness     HPI Lidya is a 59 yo F presenting due to several weeks of dysphagia and odynophagia.  She has had a history of dysphagia in the past but has improved with empiric dilations and she has also had a nonobstructing Schatzki's ring in the past that has been dilated.  She unfortunately was diagnosed with transverse myelitis last year after she was having abdominal numbness  "and she is received IVIG in the hospital as well as intermittent steroids as well.  She was having dysphagia while she was in the hospital in January and she had an endoscopy done which showed candidal esophagitis as well as a 2 cm hiatal hernia.  She did complete a Diflucan course and she improved with that up until a few weeks ago.  She reports that it feels like food is getting stuck in her chest and cannot go down and she has had to regurgitate or vomit.  It is painful when she does swallow and as it moves down.  This feels similar to her prior episodes of dysphagia in the past.  She is required multiple dilations that have improved her symptoms in the past.  Review of Systems    Objective   /70 (BP Location: Left arm, Patient Position: Sitting)   Temp 97.8 °F (36.6 °C) (Oral)   Ht 4' 11\" (1.499 m)   Wt 73.5 kg (162 lb)   LMP  (LMP Unknown)   BMI 32.72 kg/m²     Physical Exam    Administrative Statements   Encounter provider Liseth Nam PA-C    The Patient is located at Home and in the following state in which I hold an active license [place].    The patient was identified by name and date of birth. Lidya Ansari was informed that this is a telemedicine visit and that the visit is being conducted through the Epic Embedded platform. She agrees to proceed..  My office door was closed. No one else was in the room.  She acknowledged consent and understanding of privacy and security of the video platform. The patient has agreed to participate and understands they can discontinue the visit at any time.    I have spent a total time of 30 minutes in caring for this patient on the day of the visit/encounter including Diagnostic results, Instructions for management, and Counseling / Coordination of care, not including the time spent for establishing the audio/video connection.    "

## 2025-04-23 NOTE — TELEPHONE ENCOUNTER
Called and spoke to Lidya  she is having upper respiratory symptoms and will need to cancel her EGD until after she speaks to her pulmonologist - she will cb to reschedule -Dr Caballero pt

## 2025-04-23 NOTE — TELEPHONE ENCOUNTER
Pt called asking to speak with Veda. I called the office, but Veda was not available. Pt would like a return call and only wishes to speak with Veda

## 2025-06-05 ENCOUNTER — APPOINTMENT (EMERGENCY)
Dept: RADIOLOGY | Facility: HOSPITAL | Age: 60
End: 2025-06-05
Payer: MEDICARE

## 2025-06-05 ENCOUNTER — HOSPITAL ENCOUNTER (EMERGENCY)
Facility: HOSPITAL | Age: 60
Discharge: HOME | End: 2025-06-05
Attending: EMERGENCY MEDICINE
Payer: MEDICARE

## 2025-06-05 VITALS
OXYGEN SATURATION: 95 % | DIASTOLIC BLOOD PRESSURE: 85 MMHG | RESPIRATION RATE: 21 BRPM | HEART RATE: 94 BPM | TEMPERATURE: 97.1 F | SYSTOLIC BLOOD PRESSURE: 143 MMHG

## 2025-06-05 DIAGNOSIS — S69.90XA WRIST INJURY: Primary | ICD-10-CM

## 2025-06-05 PROCEDURE — 99284 EMERGENCY DEPT VISIT MOD MDM: CPT

## 2025-06-05 PROCEDURE — 73110 X-RAY EXAM OF WRIST: CPT

## 2025-06-05 PROCEDURE — 99283 EMERGENCY DEPT VISIT LOW MDM: CPT

## 2025-06-05 RX ORDER — ONDANSETRON 4 MG/1
4 TABLET, ORALLY DISINTEGRATING ORAL ONCE
Status: COMPLETED | OUTPATIENT
Start: 2025-06-05 | End: 2025-06-05

## 2025-06-05 RX ORDER — ONDANSETRON 4 MG/1
4 TABLET, ORALLY DISINTEGRATING ORAL EVERY 6 HOURS PRN
Qty: 20 TABLET | Refills: 0 | Status: SHIPPED | OUTPATIENT
Start: 2025-06-05

## 2025-06-05 RX ORDER — OXYCODONE HYDROCHLORIDE 5 MG/1
5 TABLET ORAL ONCE
Refills: 0 | Status: COMPLETED | OUTPATIENT
Start: 2025-06-05 | End: 2025-06-05

## 2025-06-05 RX ORDER — OXYCODONE HYDROCHLORIDE 5 MG/1
5 TABLET ORAL EVERY 6 HOURS PRN
Qty: 8 TABLET | Refills: 0 | Status: SHIPPED | OUTPATIENT
Start: 2025-06-05

## 2025-06-05 RX ADMIN — ONDANSETRON 4 MG: 4 TABLET, ORALLY DISINTEGRATING ORAL at 12:43

## 2025-06-05 RX ADMIN — OXYCODONE HYDROCHLORIDE 5 MG: 5 TABLET ORAL at 12:43

## 2025-06-05 NOTE — ED PROVIDER NOTES
Time reflects when diagnosis was documented in both MDM as applicable and the Disposition within this note       Time User Action Codes Description Comment    6/5/2025 12:05 PM Farzana Baptiste Add [S69.90XA] Wrist injury           ED Disposition       ED Disposition   Discharge    Condition   Stable    Date/Time   Thu Jun 5, 2025 12:04 PM    Comment   Lidya Ansari discharge to home/self care.                   Assessment & Plan       Medical Decision Making  60-year-old female patient presents the ER for evaluation of right wrist pain.  Patient stated that she was in [place] when she fell and went to the ER and was diagnosed with a right wrist fracture.  Patient is unaware of which bone is broken in her wrist and/or hand.  Patient stated that she was given hydrocodone and has been making her vomit and be extremely nauseated.  Patient has not been tolerating p.o. intake since taking the hydrocodone.  Patient arrives in volar splint.  Patient's wrist was mariam-rayed and new volar splint was placed on patient.  Patient neurovascularly intact, sensation equal bilaterally.  Patient is right-hand dominant.  Patient was given referral to orthopedics.  Strict return precautions given.  Splint care discussed with patient.    Amount and/or Complexity of Data Reviewed  Radiology: ordered.    Risk  Prescription drug management.             Medications   oxyCODONE (ROXICODONE) IR tablet 5 mg (5 mg Oral Given 6/5/25 1243)   ondansetron (ZOFRAN-ODT) dispersible tablet 4 mg (4 mg Oral Given 6/5/25 1243)       ED Risk Strat Scores                    No data recorded        SBIRT 22yo+      Flowsheet Row Most Recent Value   Initial Alcohol Screen: US AUDIT-C     1. How often do you have a drink containing alcohol? 0 Filed at: 06/05/2025 1051   2. How many drinks containing alcohol do you have on a typical day you are drinking?  0 Filed at: 06/05/2025 1051   3b. FEMALE Any Age, or MALE 65+: How often do you have 4 or more drinks on one  occassion? 0 Filed at: 06/05/2025 1051   Audit-C Score 0 Filed at: 06/05/2025 1051   JANNETH: How many times in the past year have you...    Used an illegal drug or used a prescription medication for non-medical reasons? Never Filed at: 06/05/2025 1051                            History of Present Illness       Chief Complaint   Patient presents with    Arm Pain     Diagnosed with right arm fracture on Monday. States that the pain medication that she has is making her nauseated.        Past Medical History[1]   Past Surgical History[2]   Family History[3]   Social History[4]   E-Cigarette/Vaping    E-Cigarette Use Never User       E-Cigarette/Vaping Substances    Nicotine No     THC No     CBD No     Flavoring No     Other No     Unknown No       I have reviewed and agree with the history as documented.     60-year-old female patient presents to the ER for evaluation of right wrist pain.  Patient has a known fracture and is splinted.  Patient coming in for worsening wrist pain and side effects of hydrocodone.       Arm Pain  Associated symptoms: no abdominal pain, no chest pain, no cough, no ear pain, no fever, no rash, no shortness of breath, no sore throat and no vomiting        Review of Systems   Constitutional:  Negative for chills and fever.   HENT:  Negative for ear pain and sore throat.    Eyes:  Negative for pain and visual disturbance.   Respiratory:  Negative for cough and shortness of breath.    Cardiovascular:  Negative for chest pain and palpitations.   Gastrointestinal:  Negative for abdominal pain and vomiting.   Genitourinary:  Negative for dysuria and hematuria.   Musculoskeletal:  Positive for arthralgias (Right wrist). Negative for back pain.   Skin:  Negative for color change and rash.   Neurological:  Negative for seizures and syncope.   All other systems reviewed and are negative.          Objective       ED Triage Vitals   Temperature Pulse Blood Pressure Respirations SpO2 Patient Position -  Orthostatic VS   06/05/25 1018 06/05/25 1018 06/05/25 1018 06/05/25 1018 06/05/25 1018 06/05/25 1018   (!) 97.1 °F (36.2 °C) 94 143/85 21 95 % Sitting      Temp Source Heart Rate Source BP Location FiO2 (%) Pain Score    06/05/25 1018 06/05/25 1018 06/05/25 1018 -- 06/05/25 1243    Temporal Monitor Left arm  10 - Worst Possible Pain      Vitals      Date and Time Temp Pulse SpO2 Resp BP Pain Score FACES Pain Rating User   06/05/25 1243 -- -- -- -- -- 10 - Worst Possible Pain -- BH   06/05/25 1018 97.1 °F (36.2 °C) 94 95 % 21 143/85 -- -- GP            Physical Exam  Vitals and nursing note reviewed.   Constitutional:       General: She is not in acute distress.     Appearance: She is well-developed.   HENT:      Head: Normocephalic and atraumatic.      Right Ear: External ear normal.      Left Ear: External ear normal.     Eyes:      Conjunctiva/sclera: Conjunctivae normal.       Cardiovascular:      Rate and Rhythm: Normal rate and regular rhythm.      Pulses: Normal pulses.      Heart sounds: Normal heart sounds.   Pulmonary:      Effort: Pulmonary effort is normal. No respiratory distress.      Breath sounds: Normal breath sounds.   Abdominal:      Palpations: Abdomen is soft.     Musculoskeletal:         General: No swelling.      Right wrist: Tenderness present. Decreased range of motion. Normal pulse.      Cervical back: Neck supple.     Skin:     General: Skin is warm and dry.      Capillary Refill: Capillary refill takes less than 2 seconds.     Neurological:      Mental Status: She is alert and oriented to person, place, and time. Mental status is at baseline.     Psychiatric:         Mood and Affect: Mood normal.         Results Reviewed       None            XR wrist 3+ views RIGHT    (Results Pending)       Procedures    ED Medication and Procedure Management   Prior to Admission Medications   Prescriptions Last Dose Informant Patient Reported? Taking?   Blood Pressure Monitoring (Blood Pressure Monitor  3) CARLOS MANUEL  Self Yes No   Sig: Use as directed   EPINEPHrine (EPIPEN) 0.3 mg/0.3 mL SOAJ  Self Yes No   Sig: as directed   OneTouch Ultra test strip  Self Yes No   Sig: TEST TWICE A DAY   albuterol (PROVENTIL HFA,VENTOLIN HFA) 90 mcg/act inhaler   Yes No   budesonide-formoterol (SYMBICORT) 160-4.5 mcg/act inhaler  Self Yes No   Sig: Every 12 hours   famotidine (PEPCID) 40 MG tablet   No No   Sig: TAKE 1 TABLET BY MOUTH TWICE A DAY WITH LUNCH AND BEFORE BEDTIME   fluticasone-vilanterol 200-25 mcg/actuation inhaler  Self Yes No   Si puff every 24 hours   gabapentin (NEURONTIN) 100 mg capsule  Self Yes No   Sig: Take 100 mg by mouth 3 (three) times a day as needed   insulin aspart (NovoLOG FlexPen) 100 UNIT/ML injection pen  Self No No   Sig: Inject 3 Units under the skin 3 (three) times a day with meals   ipratropium (ATROVENT) 0.02 % nebulizer solution  Self No No   Sig: Take 2.5 mL (0.5 mg total) by nebulization 3 (three) times a day   ipratropium-albuterol (DUO-NEB) 0.5-2.5 mg/3 mL nebulizer solution  Self No No   Sig: Take 3 mL by nebulization every 4 (four) hours as needed for wheezing or shortness of breath   montelukast (SINGULAIR) 10 mg tablet  Self No No   Sig: Take 1 tablet (10 mg total) by mouth daily at bedtime   pantoprazole (PROTONIX) 40 mg  Self No No   Sig: Take 1 packet (40 mg total) by mouth 2 (two) times a day before meals   temazepam (RESTORIL) 15 mg capsule   Yes No   tirzepatide (Mounjaro) 15 MG/0.5ML   Yes No   Sig: Pt not taking      Facility-Administered Medications: None     Discharge Medication List as of 2025 12:12 PM        CONTINUE these medications which have NOT CHANGED    Details   albuterol (PROVENTIL HFA,VENTOLIN HFA) 90 mcg/act inhaler Historical Med      Blood Pressure Monitoring (Blood Pressure Monitor 3) CARLOS MANUEL Use as directed, Starting Fri 10/21/2022, Historical Med      budesonide-formoterol (SYMBICORT) 160-4.5 mcg/act inhaler Every 12 hours, Historical Med      EPINEPHrine  (EPIPEN) 0.3 mg/0.3 mL SOAJ as directed, Historical Med      famotidine (PEPCID) 40 MG tablet TAKE 1 TABLET BY MOUTH TWICE A DAY WITH LUNCH AND BEFORE BEDTIME, Normal      fluticasone-vilanterol 200-25 mcg/actuation inhaler 1 puff every 24 hours, Historical Med      gabapentin (NEURONTIN) 100 mg capsule Take 100 mg by mouth 3 (three) times a day as needed, Starting 2023, Historical Med      insulin aspart (NovoLOG FlexPen) 100 UNIT/ML injection pen Inject 3 Units under the skin 3 (three) times a day with meals, Starting 2023, Normal      ipratropium (ATROVENT) 0.02 % nebulizer solution Take 2.5 mL (0.5 mg total) by nebulization 3 (three) times a day, Starting 2022, Until 2024, Normal      ipratropium-albuterol (DUO-NEB) 0.5-2.5 mg/3 mL nebulizer solution Take 3 mL by nebulization every 4 (four) hours as needed for wheezing or shortness of breath, Starting 2022, No Print      montelukast (SINGULAIR) 10 mg tablet Take 1 tablet (10 mg total) by mouth daily at bedtime, Starting 2022, Until 2024, Normal      OneTouch Ultra test strip TEST TWICE A DAY, Historical Med      pantoprazole (PROTONIX) 40 mg Take 1 packet (40 mg total) by mouth 2 (two) times a day before meals, Starting Wed 3/1/2023, Normal      temazepam (RESTORIL) 15 mg capsule Historical Med      tirzepatide (Mounjaro) 15 MG/0.5ML Pt not taking, Historical Med           No discharge procedures on file.  ED SEPSIS DOCUMENTATION   Time reflects when diagnosis was documented in both MDM as applicable and the Disposition within this note       Time User Action Codes Description Comment    2025 12:05 PM Farzana Baptiste Add [S69.90XA] Wrist injury                    [1]   Past Medical History:  Diagnosis Date    Asthma     Diabetes mellitus (HCC)     GERD (gastroesophageal reflux disease)     Hyperlipidemia     Hypertension    [2]   Past Surgical History:  Procedure Laterality Date     SECTION       COLONOSCOPY      HYSTERECTOMY      OVARIAN CYST REMOVAL     [3] No family history on file.  [4]   Social History  Tobacco Use    Smoking status: Never    Smokeless tobacco: Never   Vaping Use    Vaping status: Never Used   Substance Use Topics    Alcohol use: Yes     Alcohol/week: 7.0 standard drinks of alcohol     Types: 7 Glasses of wine per week     Comment: occasional    Drug use: Never        EDUARDA Hsu  06/05/25 3892

## 2025-06-05 NOTE — DISCHARGE INSTRUCTIONS
Tylenol/Motrin   Zofran- every 6 hours as needed for nausea/vomiting  Oxycodone- every 6 hours as needed for severe pain; do not drive or drink alcohol  Follow up with orthopedics  Return to ER if symptoms worsen

## 2025-06-06 ENCOUNTER — RESULTS FOLLOW-UP (OUTPATIENT)
Dept: EMERGENCY DEPT | Facility: HOSPITAL | Age: 60
End: 2025-06-06

## 2025-06-19 ENCOUNTER — TELEPHONE (OUTPATIENT)
Age: 60
End: 2025-06-19

## 2025-06-19 NOTE — TELEPHONE ENCOUNTER
Scheduled date of EGD(as of today): 7/1/25  Physician performing EGD: Ada  Location of EGD:  [facility]  Instructions reviewed with patient by: anthony  Clearances: na    Mounjaro - 7 day hold

## 2025-06-25 ENCOUNTER — NURSE TRIAGE (OUTPATIENT)
Age: 60
End: 2025-06-25

## 2025-06-25 NOTE — TELEPHONE ENCOUNTER
Patients GI provider:  Dr. COOPER     Number to return call: ( [phone]     Reason for call: Pt calling having Diarrhea with everything she wants to add colonoscopy to upcoming procedure EGD 7/1 advised not due until Nov please review     Scheduled procedure/appointment date if applicable: Apt/procedure

## 2025-06-27 ENCOUNTER — PREP FOR PROCEDURE (OUTPATIENT)
Dept: GASTROENTEROLOGY | Facility: CLINIC | Age: 60
End: 2025-06-27

## 2025-06-27 DIAGNOSIS — Z86.0100 HISTORY OF COLON POLYPS: Primary | ICD-10-CM

## 2025-06-27 DIAGNOSIS — R19.7 DIARRHEA, UNSPECIFIED TYPE: ICD-10-CM

## 2025-06-27 NOTE — TELEPHONE ENCOUNTER
I would advise she use miralax 1-2 capfuls daily for constipation as this is gentle versus fiber supplementation daily that can help regulate BMs further when alternating. I will have our  check if a colonoscopy can be added to 7/1 or we will need to reschedule to another day if it is already full with procedures.

## 2025-06-27 NOTE — TELEPHONE ENCOUNTER
"REASON FOR CONVERSATION: Advice Only    SYMPTOMS: Diarrhea -watery, 1 - 2 x/day x 1 month.  N/V-after eating. Constipation.  Reports vomiting bile once, but it was \" a long time ago\" unsure of date, but not recent. Vomiting after eating.    OTHER HEALTH INFORMATION: Pt states she is using Zofran for nausea without relief and warm gingerale helps a little bit.  She is taking Pantoprazole 40 mg BID and Famotidine 40 mg daily.     She is concerned that she is constipated- states she usually has to take a Fleets enema to go.  Pt states she has not taken Imodium because it causes her to become constipated.   Feels there is \"no in between.\"  She is holding Mounjaro for colonoscopy on 7/1/25.    She is scheduled for EGD on 7/1/25 and would like to have colonoscopy added on.       PROTOCOL DISPOSITION: See Within 2 Weeks in Office    CARE ADVICE PROVIDED: Discuss with provider and call back.     PRACTICE FOLLOW-UP: Please advise.  Add on colonoscopy ?   Recommendations?  Okay to leave msg.    Reason for Disposition   Diarrhea is a chronic symptom (recurrent or ongoing AND lasting > 4 weeks)    Answer Assessment - Initial Assessment Questions  1. DIARRHEA SEVERITY: \"How bad is the diarrhea?\" \"How many more stools have you had in the past 24 hours than normal?\"         2. ONSET: \"When did the diarrhea begin?\"       1 month ago  3. STOOL DESCRIPTION:  \"How loose or watery is the diarrhea?\" \"What is the stool color?\" \"Is there any blood or mucous in the stool?\"      Watery 1 to 2 times/day  4. VOMITING: \"Are you also vomiting?\" If Yes, ask: \"How many times in the past 24 hours?\"        No  5. ABDOMEN PAIN: \"Are you having any abdomen pain?\" If Yes, ask: \"What does it feel like?\" (e.g., crampy, dull, intermittent, constant)       Yes, \"sharp\" before diarrhea episodes, then resolves  6. ABDOMEN PAIN SEVERITY: If present, ask: \"How bad is the pain?\"  (e.g., Scale 1-10; mild, moderate, or severe)      \"Sharp\"-  7. ORAL INTAKE: If " "vomiting, \"Have you been able to drink liquids?\" \"How much liquids have you had in the past 24 hours?\"      No concerns   8. HYDRATION: \"Any signs of dehydration?\" (e.g., dry mouth [not just dry lips], too weak to stand, dizziness, new weight loss) \"When did you last urinate?\"      No concerns   9. EXPOSURE: \"Have you traveled to a foreign country recently?\" \"Have you been exposed to anyone with diarrhea?\" \"Could you have eaten any food that was spoiled?\"      None   10. ANTIBIOTIC USE: \"Are you taking antibiotics now or have you taken antibiotics in the past 2 months?\"          11. OTHER SYMPTOMS: \"Do you have any other symptoms?\" (e.g., fever, blood in stool)   Nausea    Protocols used: Diarrhea-Adult-OH    "

## 2025-06-27 NOTE — TELEPHONE ENCOUNTER
She is due for a  colonoscopy this year and wanted to hold off when I saw her in April but now is calling asking if it can be added on to 7/1. I'm assuming you wouldn't be able to add on a colonoscopy to her EGD on 7/1 given it is so soon? Or we could  off rescheduling a double to another day. Thank you! She would be okay for miralax prep.

## 2025-07-01 ENCOUNTER — ANESTHESIA (OUTPATIENT)
Dept: GASTROENTEROLOGY | Facility: HOSPITAL | Age: 60
End: 2025-07-01
Payer: MEDICARE

## 2025-07-01 ENCOUNTER — HOSPITAL ENCOUNTER (OUTPATIENT)
Dept: GASTROENTEROLOGY | Facility: HOSPITAL | Age: 60
Setting detail: OUTPATIENT SURGERY
Discharge: HOME | End: 2025-07-01
Attending: PHYSICIAN ASSISTANT
Payer: MEDICARE

## 2025-07-01 ENCOUNTER — ANESTHESIA EVENT (OUTPATIENT)
Dept: GASTROENTEROLOGY | Facility: HOSPITAL | Age: 60
End: 2025-07-01
Payer: MEDICARE

## 2025-07-01 VITALS
TEMPERATURE: 97.8 F | SYSTOLIC BLOOD PRESSURE: 110 MMHG | HEART RATE: 89 BPM | BODY MASS INDEX: 31.38 KG/M2 | RESPIRATION RATE: 20 BRPM | HEIGHT: 59 IN | DIASTOLIC BLOOD PRESSURE: 61 MMHG | OXYGEN SATURATION: 95 % | WEIGHT: 155.65 LBS

## 2025-07-01 DIAGNOSIS — K22.2 SCHATZKI RING OF DISTAL ESOPHAGUS: ICD-10-CM

## 2025-07-01 DIAGNOSIS — R13.19 ESOPHAGEAL DYSPHAGIA: ICD-10-CM

## 2025-07-01 DIAGNOSIS — Z86.0100 HISTORY OF COLON POLYPS: ICD-10-CM

## 2025-07-01 DIAGNOSIS — R19.7 DIARRHEA, UNSPECIFIED TYPE: ICD-10-CM

## 2025-07-01 DIAGNOSIS — K21.9 GASTROESOPHAGEAL REFLUX DISEASE WITHOUT ESOPHAGITIS: ICD-10-CM

## 2025-07-01 PROCEDURE — 82948 REAGENT STRIP/BLOOD GLUCOSE: CPT

## 2025-07-01 PROCEDURE — 88305 TISSUE EXAM BY PATHOLOGIST: CPT | Performed by: STUDENT IN AN ORGANIZED HEALTH CARE EDUCATION/TRAINING PROGRAM

## 2025-07-01 RX ORDER — PROPOFOL 10 MG/ML
INJECTION, EMULSION INTRAVENOUS AS NEEDED
Status: DISCONTINUED | OUTPATIENT
Start: 2025-07-01 | End: 2025-07-01

## 2025-07-01 RX ORDER — IPRATROPIUM BROMIDE AND ALBUTEROL SULFATE 2.5; .5 MG/3ML; MG/3ML
3 SOLUTION RESPIRATORY (INHALATION)
Status: COMPLETED | OUTPATIENT
Start: 2025-07-01 | End: 2025-07-01

## 2025-07-01 RX ORDER — SODIUM CHLORIDE, SODIUM LACTATE, POTASSIUM CHLORIDE, CALCIUM CHLORIDE 600; 310; 30; 20 MG/100ML; MG/100ML; MG/100ML; MG/100ML
INJECTION, SOLUTION INTRAVENOUS CONTINUOUS PRN
Status: DISCONTINUED | OUTPATIENT
Start: 2025-07-01 | End: 2025-07-01

## 2025-07-01 RX ORDER — LIDOCAINE HYDROCHLORIDE 20 MG/ML
INJECTION, SOLUTION EPIDURAL; INFILTRATION; INTRACAUDAL; PERINEURAL AS NEEDED
Status: DISCONTINUED | OUTPATIENT
Start: 2025-07-01 | End: 2025-07-01

## 2025-07-01 RX ADMIN — PROPOFOL 50 MG: 10 INJECTION, EMULSION INTRAVENOUS at 09:36

## 2025-07-01 RX ADMIN — PROPOFOL 100 MG: 10 INJECTION, EMULSION INTRAVENOUS at 09:48

## 2025-07-01 RX ADMIN — PROPOFOL 150 MG: 10 INJECTION, EMULSION INTRAVENOUS at 09:33

## 2025-07-01 RX ADMIN — IPRATROPIUM BROMIDE AND ALBUTEROL SULFATE 3 ML: 2.5; .5 SOLUTION RESPIRATORY (INHALATION) at 09:05

## 2025-07-01 RX ADMIN — SODIUM CHLORIDE, SODIUM LACTATE, POTASSIUM CHLORIDE, AND CALCIUM CHLORIDE: .6; .31; .03; .02 INJECTION, SOLUTION INTRAVENOUS at 09:20

## 2025-07-01 RX ADMIN — PROPOFOL 50 MG: 10 INJECTION, EMULSION INTRAVENOUS at 09:55

## 2025-07-01 RX ADMIN — LIDOCAINE HYDROCHLORIDE 50 MG: 20 INJECTION, SOLUTION EPIDURAL; INFILTRATION; INTRACAUDAL; PERINEURAL at 09:33

## 2025-07-01 RX ADMIN — PROPOFOL 50 MG: 10 INJECTION, EMULSION INTRAVENOUS at 09:40

## 2025-07-01 NOTE — ANESTHESIA POSTPROCEDURE EVALUATION
Post-Op Assessment Note    CV Status:  Stable    Pain management: adequate       Mental Status:  Alert and awake   Hydration Status:  Euvolemic   PONV Controlled:  Controlled   Airway Patency:  Patent     Post Op Vitals Reviewed: Yes    No anethesia notable event occurred.    Staff: Anesthesiologist           Last Filed PACU Vitals:  Vitals Value Taken Time   Temp 97.8 °F (36.6 °C) 07/01/25 10:04   Pulse 89 07/01/25 10:24   /61 07/01/25 10:24   Resp 20 07/01/25 10:24   SpO2 95 % 07/01/25 10:24       Modified Maris:     Vitals Value Taken Time   Activity 2 07/01/25 10:24   Respiration 2 07/01/25 10:24   Circulation 2 07/01/25 10:24   Consciousness 2 07/01/25 10:24   Oxygen Saturation 2 07/01/25 10:24     Modified Maris Score: 10

## 2025-07-01 NOTE — H&P
"History and Physical -  [facility]  Lidya Ansari 60 y.o. female MRN: [id number]      HPI: Lidya Ansari is a 60 y.o. year old female who presents for evaluation of dysphagia, gastroesophageal reflux disease, and screening colonoscopy      REVIEW OF SYSTEMS: Per the HPI, and otherwise unremarkable.    Historical Information   Past Medical History[1]  Past Surgical History[2]  Social History   Social History     Substance and Sexual Activity   Alcohol Use Yes    Alcohol/week: 7.0 standard drinks of alcohol    Types: 7 Glasses of wine per week    Comment: occasional     Social History     Substance and Sexual Activity   Drug Use Never     Tobacco Use History[3]  Family History[4]    Meds/Allergies     Not in a hospital admission.    Allergies[5]    Objective     Blood pressure 124/67, pulse 84, temperature 97.9 °F (36.6 °C), temperature source Temporal, resp. rate 16, height 4' 11\" (1.499 m), weight 70.6 kg (155 lb 10.3 oz), SpO2 98%.      PHYSICAL EXAM    Gen: NAD  CV: RRR  CHEST: Clear  ABD: soft, NT/ND  EXT: no edema      ASSESSMENT/PLAN:  This is a 60 y.o. year old female here for EGD, colonoscopy, and she is stable and optimized for her procedure.               [1]   Past Medical History:  Diagnosis Date    Asthma     Diabetes mellitus (HCC)     GERD (gastroesophageal reflux disease)     Hyperlipidemia     Hypertension    [2]   Past Surgical History:  Procedure Laterality Date     SECTION      COLONOSCOPY      HYSTERECTOMY      OVARIAN CYST REMOVAL     [3]   Social History  Tobacco Use   Smoking Status Never   Smokeless Tobacco Never   [4] No family history on file.  [5]   Allergies  Allergen Reactions    Codeine Other (See Comments)    Imitrex [Sumatriptan] Other (See Comments)     Blurred vision, numbness and tingling,weird feeling in throat     Aspirin GI Intolerance and Rash    Hydromorphone Rash and Other (See Comments)     GI upset      Oxycodone-Acetaminophen Rash and Other " (See Comments)

## 2025-07-01 NOTE — ANESTHESIA PREPROCEDURE EVALUATION
Procedure:  EGD  COLONOSCOPY    Relevant Problems   CARDIO   (+) Essential hypertension   (+) Hyperlipidemia   (+) Migraine without status migrainosus, not intractable   (+) Mitral valve disorder   (+) Rib pain on left side      ENDO   (+) Type 2 diabetes mellitus without complication, with long-term current use of insulin (HCC)      GI/HEPATIC   (+) Dysphagia   (+) GERD (gastroesophageal reflux disease)      NEURO/PSYCH   (+) Depression with anxiety   (+) Migraine without status migrainosus, not intractable      PULMONARY   (+) Asthma exacerbation   (+) HEATEHR (obstructive sleep apnea)   (+) Pleural effusion   (+) Severe persistent asthma with exacerbation   (+) Shortness of breath        Physical Exam    Airway     Mallampati score: II  TM Distance: >3 FB  Neck ROM: full      Cardiovascular      Dental       Pulmonary      Neurological      Other Findings  post-pubertal.      Anesthesia Plan  ASA Score- 3     Anesthesia Type- IV sedation with anesthesia with ASA Monitors.         Additional Monitors:     Airway Plan:            Plan Factors-Exercise tolerance (METS): >4 METS.    Chart reviewed.   Existing labs reviewed. Patient summary reviewed.                  Induction-     Postoperative Plan- .   Monitoring Plan - Monitoring plan - standard ASA monitoring          Informed Consent- Anesthetic plan and risks discussed with patient.  I personally reviewed this patient with the CRNA. Discussed and agreed on the Anesthesia Plan with the CRNA..      NPO Status:  Vitals Value Taken Time   Date of last liquid 07/01/25 07/01/25 08:30   Time of last liquid 0100 07/01/25 08:30   Date of last solid 06/29/25 07/01/25 08:30   Time of last solid 1800 07/01/25 08:30

## 2025-07-01 NOTE — ANESTHESIA POSTPROCEDURE EVALUATION
Post-Op Assessment Note    CV Status:  Stable  Pain Score: 0    Pain management: adequate       Mental Status:  Sleepy   Hydration Status:  Stable   PONV Controlled:  None   Airway Patency:  Patent     Post Op Vitals Reviewed: Yes    No anethesia notable event occurred.    Staff: CRNA           Last Filed PACU Vitals:  Vitals Value Taken Time   Temp 98    Pulse 87    /67    Resp 16    SpO2 98

## 2025-07-01 NOTE — DISCHARGE INSTRUCTIONS
EGD    IMPRESSION:  The cricopharynx, upper third of the esophagus, middle third of the esophagus and lower third of the esophagus appeared normal.  The cardia, fundus of the stomach, body of the stomach, greater curve of the stomach, lesser curve of the stomach, incisura, antrum, prepyloric region and pylorus appeared normal. Performed 6 random biopsies to rule out H. pylori.  Schatzki ring in the GE junction and Z-line; dilated to 54 Fr ending size  Performed forceps biopsies in the lower third of the esophagus to rule out eosinophilic esophagitis  Performed forceps biopsies in the upper third of the esophagus to rule out eosinophilic esophagitis  The duodenal bulb and 2nd part of the duodenum appeared normal. Performed 6 random biopsies to rule out celiac disease.        RECOMMENDATION:  1.  Will contact the patient in 1 to 2 weeks with the biopsy results

## 2025-07-02 LAB — GLUCOSE SERPL-MCNC: 85 MG/DL (ref 65–140)

## 2025-07-03 PROCEDURE — 88305 TISSUE EXAM BY PATHOLOGIST: CPT | Performed by: STUDENT IN AN ORGANIZED HEALTH CARE EDUCATION/TRAINING PROGRAM

## 2025-07-11 ENCOUNTER — NURSE TRIAGE (OUTPATIENT)
Age: 60
End: 2025-07-11

## 2025-07-11 NOTE — TELEPHONE ENCOUNTER
Patient calling back.  Is not stopping Mounjaro.  Has discussed with PCP and has been on for 1.5 years and is not causing issues.  Patient is currently taking pantoprazole 40 mg twice daily and pepcid 40 mg twice daily.  Has zofron for broken arm and it does nothing.  Wants other ideas. Feels that dilation did not work or something was not done right.  Always feels better after and this time it is not.

## 2025-07-11 NOTE — TELEPHONE ENCOUNTER
Hi team, please ask her not to restart her Mounjaro just yet.  She should wait until she starting to feel better to inject again.  If she has any vomiting of blood or coffee grounds with chest pain, she would have to come back to the hospital.  Is she willing to go back on pantoprazole and Zofran to help with her symptoms in the meantime?

## 2025-07-11 NOTE — TELEPHONE ENCOUNTER
EGD on 7/1.  Calling with nausea, vomiting, and reflux coming out of nose.  No appetite.  Normally dilation is successful, but this time symptoms are still bad.  Vomits everything solid.  Had rice Wed night and vomited it back up yesterday morning.  Only able to drink liquids at this time but even they make her nauseated.  Does not want to go to ED.  Please advise.

## 2025-07-11 NOTE — TELEPHONE ENCOUNTER
Please clarify with the patient that I meant skipping a dose for a week to make sure that the Mounjaro is not causing worsening emptying of the esophagus and stomach.  Another nausea medication would be Phenergan